# Patient Record
Sex: MALE | Race: BLACK OR AFRICAN AMERICAN | NOT HISPANIC OR LATINO | Employment: OTHER | URBAN - METROPOLITAN AREA
[De-identification: names, ages, dates, MRNs, and addresses within clinical notes are randomized per-mention and may not be internally consistent; named-entity substitution may affect disease eponyms.]

---

## 2017-01-03 ENCOUNTER — ALLSCRIPTS OFFICE VISIT (OUTPATIENT)
Dept: OTHER | Facility: OTHER | Age: 77
End: 2017-01-03

## 2017-03-13 ENCOUNTER — GENERIC CONVERSION - ENCOUNTER (OUTPATIENT)
Dept: OTHER | Facility: OTHER | Age: 77
End: 2017-03-13

## 2017-03-28 ENCOUNTER — GENERIC CONVERSION - ENCOUNTER (OUTPATIENT)
Dept: OTHER | Facility: OTHER | Age: 77
End: 2017-03-28

## 2017-04-25 ENCOUNTER — GENERIC CONVERSION - ENCOUNTER (OUTPATIENT)
Dept: OTHER | Facility: OTHER | Age: 77
End: 2017-04-25

## 2017-05-15 ENCOUNTER — ALLSCRIPTS OFFICE VISIT (OUTPATIENT)
Dept: OTHER | Facility: OTHER | Age: 77
End: 2017-05-15

## 2017-05-16 ENCOUNTER — GENERIC CONVERSION - ENCOUNTER (OUTPATIENT)
Dept: OTHER | Facility: OTHER | Age: 77
End: 2017-05-16

## 2017-06-05 ENCOUNTER — GENERIC CONVERSION - ENCOUNTER (OUTPATIENT)
Dept: OTHER | Facility: OTHER | Age: 77
End: 2017-06-05

## 2017-06-19 ENCOUNTER — GENERIC CONVERSION - ENCOUNTER (OUTPATIENT)
Dept: OTHER | Facility: OTHER | Age: 77
End: 2017-06-19

## 2017-06-27 ENCOUNTER — GENERIC CONVERSION - ENCOUNTER (OUTPATIENT)
Dept: OTHER | Facility: OTHER | Age: 77
End: 2017-06-27

## 2017-07-11 ENCOUNTER — APPOINTMENT (OUTPATIENT)
Dept: LAB | Facility: CLINIC | Age: 77
End: 2017-07-11
Payer: MEDICARE

## 2017-07-11 DIAGNOSIS — N18.9 CHRONIC KIDNEY DISEASE: ICD-10-CM

## 2017-07-11 DIAGNOSIS — E78.5 HYPERLIPIDEMIA: ICD-10-CM

## 2017-07-11 DIAGNOSIS — E11.9 TYPE 2 DIABETES MELLITUS WITHOUT COMPLICATIONS (HCC): ICD-10-CM

## 2017-07-11 LAB
ALBUMIN SERPL BCP-MCNC: 4.4 G/DL (ref 3.5–5)
ALP SERPL-CCNC: 109 U/L (ref 46–116)
ALT SERPL W P-5'-P-CCNC: 37 U/L (ref 12–78)
ANION GAP SERPL CALCULATED.3IONS-SCNC: 10 MMOL/L (ref 4–13)
AST SERPL W P-5'-P-CCNC: 48 U/L (ref 5–45)
BILIRUB SERPL-MCNC: 1.5 MG/DL (ref 0.2–1)
BUN SERPL-MCNC: 21 MG/DL (ref 5–25)
CALCIUM SERPL-MCNC: 9.8 MG/DL (ref 8.3–10.1)
CHLORIDE SERPL-SCNC: 104 MMOL/L (ref 100–108)
CHOLEST SERPL-MCNC: 132 MG/DL (ref 50–200)
CO2 SERPL-SCNC: 24 MMOL/L (ref 21–32)
CREAT SERPL-MCNC: 2.55 MG/DL (ref 0.6–1.3)
ERYTHROCYTE [DISTWIDTH] IN BLOOD BY AUTOMATED COUNT: 16.9 % (ref 11.6–15.1)
EST. AVERAGE GLUCOSE BLD GHB EST-MCNC: 140 MG/DL
GFR SERPL CREATININE-BSD FRML MDRD: 30.1 ML/MIN/1.73SQ M
GLUCOSE P FAST SERPL-MCNC: 121 MG/DL (ref 65–99)
HBA1C MFR BLD: 6.5 % (ref 4.2–6.3)
HCT VFR BLD AUTO: 50.8 % (ref 36.5–49.3)
HDLC SERPL-MCNC: 37 MG/DL (ref 40–60)
HGB BLD-MCNC: 16.2 G/DL (ref 12–17)
LDLC SERPL CALC-MCNC: 72 MG/DL (ref 0–100)
MCH RBC QN AUTO: 24.4 PG (ref 26.8–34.3)
MCHC RBC AUTO-ENTMCNC: 31.9 G/DL (ref 31.4–37.4)
MCV RBC AUTO: 77 FL (ref 82–98)
PLATELET # BLD AUTO: 245 THOUSANDS/UL (ref 149–390)
POTASSIUM SERPL-SCNC: 4 MMOL/L (ref 3.5–5.3)
PROT SERPL-MCNC: 8.7 G/DL (ref 6.4–8.2)
RBC # BLD AUTO: 6.63 MILLION/UL (ref 3.88–5.62)
SODIUM SERPL-SCNC: 138 MMOL/L (ref 136–145)
TRIGL SERPL-MCNC: 116 MG/DL
TSH SERPL DL<=0.05 MIU/L-ACNC: 1.19 UIU/ML (ref 0.36–3.74)
WBC # BLD AUTO: 12.08 THOUSAND/UL (ref 4.31–10.16)

## 2017-07-11 PROCEDURE — 80053 COMPREHEN METABOLIC PANEL: CPT

## 2017-07-11 PROCEDURE — 85027 COMPLETE CBC AUTOMATED: CPT

## 2017-07-11 PROCEDURE — 36415 COLL VENOUS BLD VENIPUNCTURE: CPT

## 2017-07-11 PROCEDURE — 80061 LIPID PANEL: CPT

## 2017-07-11 PROCEDURE — 83036 HEMOGLOBIN GLYCOSYLATED A1C: CPT

## 2017-07-11 PROCEDURE — 84443 ASSAY THYROID STIM HORMONE: CPT

## 2017-07-17 ENCOUNTER — GENERIC CONVERSION - ENCOUNTER (OUTPATIENT)
Dept: OTHER | Facility: OTHER | Age: 77
End: 2017-07-17

## 2017-07-18 ENCOUNTER — APPOINTMENT (OUTPATIENT)
Dept: LAB | Facility: CLINIC | Age: 77
End: 2017-07-18
Payer: MEDICARE

## 2017-07-18 ENCOUNTER — TRANSCRIBE ORDERS (OUTPATIENT)
Dept: LAB | Facility: CLINIC | Age: 77
End: 2017-07-18

## 2017-07-18 ENCOUNTER — GENERIC CONVERSION - ENCOUNTER (OUTPATIENT)
Dept: OTHER | Facility: OTHER | Age: 77
End: 2017-07-18

## 2017-07-18 ENCOUNTER — ALLSCRIPTS OFFICE VISIT (OUTPATIENT)
Dept: OTHER | Facility: OTHER | Age: 77
End: 2017-07-18

## 2017-07-18 DIAGNOSIS — N18.9 CHRONIC KIDNEY DISEASE, UNSPECIFIED: ICD-10-CM

## 2017-07-18 DIAGNOSIS — N18.9 CHRONIC KIDNEY DISEASE: ICD-10-CM

## 2017-07-18 DIAGNOSIS — N18.9 CHRONIC KIDNEY DISEASE, UNSPECIFIED: Primary | ICD-10-CM

## 2017-07-18 LAB
ALBUMIN SERPL BCP-MCNC: 4.5 G/DL (ref 3.5–5)
ANION GAP SERPL CALCULATED.3IONS-SCNC: 10 MMOL/L (ref 4–13)
BASOPHILS # BLD AUTO: 0.02 THOUSANDS/ΜL (ref 0–0.1)
BASOPHILS NFR BLD AUTO: 0 % (ref 0–1)
BUN SERPL-MCNC: 21 MG/DL (ref 5–25)
CALCIUM ALBUM COR SERPL-MCNC: 9.9 MG/DL (ref 8.3–10.1)
CALCIUM SERPL-MCNC: 10.3 MD/DL (ref 8.3–10.1)
CALCIUM SERPL-MCNC: 10.3 MG/DL (ref 8.3–10.1)
CHLORIDE SERPL-SCNC: 108 MMOL/L (ref 100–108)
CO2 SERPL-SCNC: 24 MMOL/L (ref 21–32)
CREAT SERPL-MCNC: 1.88 MG/DL (ref 0.6–1.3)
EOSINOPHIL # BLD AUTO: 0.05 THOUSAND/ΜL (ref 0–0.61)
EOSINOPHIL NFR BLD AUTO: 0 % (ref 0–6)
ERYTHROCYTE [DISTWIDTH] IN BLOOD BY AUTOMATED COUNT: 16.7 % (ref 11.6–15.1)
ERYTHROCYTE [SEDIMENTATION RATE] IN BLOOD: 13 MM/HOUR (ref 0–10)
GFR SERPL CREATININE-BSD FRML MDRD: 42.7 ML/MIN/1.73SQ M
GLUCOSE P FAST SERPL-MCNC: 138 MG/DL (ref 65–99)
HCT VFR BLD AUTO: 48 % (ref 36.5–49.3)
HGB BLD-MCNC: 15.7 G/DL (ref 12–17)
LYMPHOCYTES # BLD AUTO: 2.01 THOUSANDS/ΜL (ref 0.6–4.47)
LYMPHOCYTES NFR BLD AUTO: 18 % (ref 14–44)
MAGNESIUM SERPL-MCNC: 2.2 MG/DL (ref 1.6–2.6)
MCH RBC QN AUTO: 24.8 PG (ref 26.8–34.3)
MCHC RBC AUTO-ENTMCNC: 32.7 G/DL (ref 31.4–37.4)
MCV RBC AUTO: 76 FL (ref 82–98)
MONOCYTES # BLD AUTO: 0.73 THOUSAND/ΜL (ref 0.17–1.22)
MONOCYTES NFR BLD AUTO: 7 % (ref 4–12)
NEUTROPHILS # BLD AUTO: 8.34 THOUSANDS/ΜL (ref 1.85–7.62)
NEUTS SEG NFR BLD AUTO: 75 % (ref 43–75)
NRBC BLD AUTO-RTO: 0 /100 WBCS
PLATELET # BLD AUTO: 270 THOUSANDS/UL (ref 149–390)
POTASSIUM SERPL-SCNC: 3.8 MMOL/L (ref 3.5–5.3)
PTH-INTACT SERPL-MCNC: 24.6 PG/ML (ref 14–72)
RBC # BLD AUTO: 6.34 MILLION/UL (ref 3.88–5.62)
SODIUM SERPL-SCNC: 142 MMOL/L (ref 136–145)
URATE SERPL-MCNC: 6.8 MG/DL (ref 4.2–8)
WBC # BLD AUTO: 11.18 THOUSAND/UL (ref 4.31–10.16)

## 2017-07-18 PROCEDURE — 82310 ASSAY OF CALCIUM: CPT

## 2017-07-18 PROCEDURE — 83735 ASSAY OF MAGNESIUM: CPT

## 2017-07-18 PROCEDURE — 83883 ASSAY NEPHELOMETRY NOT SPEC: CPT

## 2017-07-18 PROCEDURE — 85025 COMPLETE CBC W/AUTO DIFF WBC: CPT

## 2017-07-18 PROCEDURE — 82040 ASSAY OF SERUM ALBUMIN: CPT

## 2017-07-18 PROCEDURE — 36415 COLL VENOUS BLD VENIPUNCTURE: CPT

## 2017-07-18 PROCEDURE — 84550 ASSAY OF BLOOD/URIC ACID: CPT

## 2017-07-18 PROCEDURE — 83970 ASSAY OF PARATHORMONE: CPT

## 2017-07-18 PROCEDURE — 84166 PROTEIN E-PHORESIS/URINE/CSF: CPT

## 2017-07-18 PROCEDURE — 82043 UR ALBUMIN QUANTITATIVE: CPT

## 2017-07-18 PROCEDURE — 84165 PROTEIN E-PHORESIS SERUM: CPT

## 2017-07-18 PROCEDURE — 80048 BASIC METABOLIC PNL TOTAL CA: CPT

## 2017-07-18 PROCEDURE — 85652 RBC SED RATE AUTOMATED: CPT

## 2017-07-18 PROCEDURE — 86038 ANTINUCLEAR ANTIBODIES: CPT

## 2017-07-18 PROCEDURE — 82570 ASSAY OF URINE CREATININE: CPT

## 2017-07-19 LAB
CREAT UR-MCNC: 481 MG/DL
KAPPA LC FREE SER-MCNC: 28.4 MG/L (ref 3.3–19.4)
KAPPA LC FREE/LAMBDA FREE SER: 1.27 {RATIO} (ref 0.26–1.65)
LAMBDA LC FREE SERPL-MCNC: 22.4 MG/L (ref 5.7–26.3)
MICROALBUMIN UR-MCNC: 36.1 MG/L (ref 0–20)
MICROALBUMIN/CREAT 24H UR: 8 MG/G CREATININE (ref 0–30)
RYE IGE QN: NEGATIVE

## 2017-07-20 LAB
ALBUMIN SERPL ELPH-MCNC: 4.64 G/DL (ref 3.5–5)
ALBUMIN SERPL ELPH-MCNC: 54.6 % (ref 52–65)
ALPHA1 GLOB SERPL ELPH-MCNC: 0.33 G/DL (ref 0.1–0.4)
ALPHA1 GLOB SERPL ELPH-MCNC: 3.9 % (ref 2.5–5)
ALPHA2 GLOB SERPL ELPH-MCNC: 0.99 G/DL (ref 0.4–1.2)
ALPHA2 GLOB SERPL ELPH-MCNC: 11.7 % (ref 7–13)
BETA GLOB ABNORMAL SERPL ELPH-MCNC: 0.51 G/DL (ref 0.4–0.8)
BETA1 GLOB SERPL ELPH-MCNC: 6 % (ref 5–13)
BETA2 GLOB SERPL ELPH-MCNC: 6.1 % (ref 2–8)
BETA2+GAMMA GLOB SERPL ELPH-MCNC: 0.52 G/DL (ref 0.2–0.5)
GAMMA GLOB ABNORMAL SERPL ELPH-MCNC: 1.5 G/DL (ref 0.5–1.6)
GAMMA GLOB SERPL ELPH-MCNC: 17.7 % (ref 12–22)
IGG/ALB SER: 1.2 {RATIO} (ref 1.1–1.8)
PROT PATTERN SERPL ELPH-IMP: ABNORMAL
PROT SERPL-MCNC: 8.5 G/DL (ref 6.4–8.2)

## 2017-07-21 LAB
ALBUMIN UR ELPH-MCNC: 36 %
ALPHA1 GLOB MFR UR ELPH: 17.7 %
ALPHA2 GLOB MFR UR ELPH: 13.2 %
B-GLOBULIN MFR UR ELPH: 17.1 %
GAMMA GLOB MFR UR ELPH: 16 %
PROT PATTERN UR ELPH-IMP: ABNORMAL
PROT UR-MCNC: 44 MG/DL

## 2017-07-24 ENCOUNTER — HOSPITAL ENCOUNTER (OUTPATIENT)
Dept: ULTRASOUND IMAGING | Facility: HOSPITAL | Age: 77
Discharge: HOME | End: 2017-07-24
Attending: INTERNAL MEDICINE
Payer: MEDICARE

## 2017-07-24 DIAGNOSIS — N18.9 CHRONIC KIDNEY DISEASE: ICD-10-CM

## 2017-07-24 PROCEDURE — 76770 US EXAM ABDO BACK WALL COMP: CPT

## 2017-08-29 ENCOUNTER — ALLSCRIPTS OFFICE VISIT (OUTPATIENT)
Dept: OTHER | Facility: OTHER | Age: 77
End: 2017-08-29

## 2017-09-12 ENCOUNTER — ALLSCRIPTS OFFICE VISIT (OUTPATIENT)
Dept: OTHER | Facility: OTHER | Age: 77
End: 2017-09-12

## 2017-09-12 ENCOUNTER — APPOINTMENT (OUTPATIENT)
Dept: LAB | Facility: HOSPITAL | Age: 77
End: 2017-09-12
Attending: INTERNAL MEDICINE
Payer: MEDICARE

## 2017-09-12 ENCOUNTER — TRANSCRIBE ORDERS (OUTPATIENT)
Dept: ADMINISTRATIVE | Facility: HOSPITAL | Age: 77
End: 2017-09-12

## 2017-09-12 DIAGNOSIS — N18.30 CHRONIC KIDNEY DISEASE, STAGE III (MODERATE) (HCC): ICD-10-CM

## 2017-09-12 LAB
25(OH)D3 SERPL-MCNC: 46.8 NG/ML (ref 30–100)
ANION GAP SERPL CALCULATED.3IONS-SCNC: 7 MMOL/L (ref 4–13)
BACTERIA UR QL AUTO: ABNORMAL /HPF
BASOPHILS # BLD AUTO: 0.04 THOUSANDS/ΜL (ref 0–0.1)
BASOPHILS NFR BLD AUTO: 1 % (ref 0–1)
BILIRUB UR QL STRIP: NEGATIVE
BUN SERPL-MCNC: 13 MG/DL (ref 5–25)
CALCIUM SERPL-MCNC: 9.3 MG/DL (ref 8.3–10.1)
CAOX CRY URNS QL MICRO: ABNORMAL /HPF
CHLORIDE SERPL-SCNC: 104 MMOL/L (ref 100–108)
CLARITY UR: CLEAR
CO2 SERPL-SCNC: 30 MMOL/L (ref 21–32)
COLOR UR: YELLOW
CREAT SERPL-MCNC: 1.32 MG/DL (ref 0.6–1.3)
CREAT UR-MCNC: 309 MG/DL
EOSINOPHIL # BLD AUTO: 0.08 THOUSAND/ΜL (ref 0–0.61)
EOSINOPHIL NFR BLD AUTO: 1 % (ref 0–6)
ERYTHROCYTE [DISTWIDTH] IN BLOOD BY AUTOMATED COUNT: 18.6 % (ref 11.6–15.1)
GFR SERPL CREATININE-BSD FRML MDRD: 61 ML/MIN/1.73SQ M
GLUCOSE P FAST SERPL-MCNC: 109 MG/DL (ref 65–99)
GLUCOSE UR STRIP-MCNC: NEGATIVE MG/DL
HCT VFR BLD AUTO: 43.5 % (ref 36.5–49.3)
HGB BLD-MCNC: 13.5 G/DL (ref 12–17)
HGB UR QL STRIP.AUTO: ABNORMAL
KETONES UR STRIP-MCNC: NEGATIVE MG/DL
LEUKOCYTE ESTERASE UR QL STRIP: NEGATIVE
LYMPHOCYTES # BLD AUTO: 1.47 THOUSANDS/ΜL (ref 0.6–4.47)
LYMPHOCYTES NFR BLD AUTO: 18 % (ref 14–44)
MCH RBC QN AUTO: 23.7 PG (ref 26.8–34.3)
MCHC RBC AUTO-ENTMCNC: 31 G/DL (ref 31.4–37.4)
MCV RBC AUTO: 76 FL (ref 82–98)
MICROALBUMIN UR-MCNC: 28.1 MG/L (ref 0–20)
MICROALBUMIN/CREAT 24H UR: 9 MG/G CREATININE (ref 0–30)
MONOCYTES # BLD AUTO: 0.47 THOUSAND/ΜL (ref 0.17–1.22)
MONOCYTES NFR BLD AUTO: 6 % (ref 4–12)
MUCOUS THREADS UR QL AUTO: ABNORMAL
NEUTROPHILS # BLD AUTO: 6.01 THOUSANDS/ΜL (ref 1.85–7.62)
NEUTS SEG NFR BLD AUTO: 74 % (ref 43–75)
NITRITE UR QL STRIP: NEGATIVE
NON-SQ EPI CELLS URNS QL MICRO: ABNORMAL /HPF
NRBC BLD AUTO-RTO: 0 /100 WBCS
PH UR STRIP.AUTO: 5.5 [PH] (ref 4.5–8)
PHOSPHATE SERPL-MCNC: 2.7 MG/DL (ref 2.3–4.1)
PLATELET # BLD AUTO: 214 THOUSANDS/UL (ref 149–390)
PMV BLD AUTO: 10.6 FL (ref 8.9–12.7)
POTASSIUM SERPL-SCNC: 3.5 MMOL/L (ref 3.5–5.3)
PROT UR STRIP-MCNC: NEGATIVE MG/DL
PTH-INTACT SERPL-MCNC: 27.3 PG/ML (ref 14–72)
RBC # BLD AUTO: 5.7 MILLION/UL (ref 3.88–5.62)
RBC #/AREA URNS AUTO: ABNORMAL /HPF
SODIUM SERPL-SCNC: 141 MMOL/L (ref 136–145)
SP GR UR STRIP.AUTO: >=1.03 (ref 1–1.03)
URATE SERPL-MCNC: 6 MG/DL (ref 4.2–8)
UROBILINOGEN UR QL STRIP.AUTO: 0.2 E.U./DL
WBC # BLD AUTO: 8.1 THOUSAND/UL (ref 4.31–10.16)
WBC #/AREA URNS AUTO: ABNORMAL /HPF

## 2017-09-12 PROCEDURE — 36415 COLL VENOUS BLD VENIPUNCTURE: CPT

## 2017-09-12 PROCEDURE — 82306 VITAMIN D 25 HYDROXY: CPT

## 2017-09-12 PROCEDURE — 83970 ASSAY OF PARATHORMONE: CPT

## 2017-09-12 PROCEDURE — 82570 ASSAY OF URINE CREATININE: CPT

## 2017-09-12 PROCEDURE — 80048 BASIC METABOLIC PNL TOTAL CA: CPT

## 2017-09-12 PROCEDURE — 84550 ASSAY OF BLOOD/URIC ACID: CPT

## 2017-09-12 PROCEDURE — 84100 ASSAY OF PHOSPHORUS: CPT

## 2017-09-12 PROCEDURE — 85025 COMPLETE CBC W/AUTO DIFF WBC: CPT

## 2017-09-12 PROCEDURE — 82043 UR ALBUMIN QUANTITATIVE: CPT

## 2017-09-12 PROCEDURE — 81001 URINALYSIS AUTO W/SCOPE: CPT

## 2017-09-21 ENCOUNTER — GENERIC CONVERSION - ENCOUNTER (OUTPATIENT)
Dept: OTHER | Facility: OTHER | Age: 77
End: 2017-09-21

## 2017-10-16 ENCOUNTER — HOSPITAL ENCOUNTER (INPATIENT)
Facility: HOSPITAL | Age: 77
LOS: 3 days | Discharge: HOME | DRG: 872 | End: 2017-10-20
Attending: EMERGENCY MEDICINE | Admitting: ANESTHESIOLOGY
Payer: MEDICARE

## 2017-10-16 ENCOUNTER — APPOINTMENT (EMERGENCY)
Dept: CT IMAGING | Facility: HOSPITAL | Age: 77
DRG: 872 | End: 2017-10-16
Payer: MEDICARE

## 2017-10-16 ENCOUNTER — APPOINTMENT (EMERGENCY)
Dept: RADIOLOGY | Facility: HOSPITAL | Age: 77
DRG: 872 | End: 2017-10-16
Payer: MEDICARE

## 2017-10-16 DIAGNOSIS — R65.20 SEVERE SEPSIS (HCC): Primary | ICD-10-CM

## 2017-10-16 DIAGNOSIS — N17.9 AKI (ACUTE KIDNEY INJURY) (HCC): ICD-10-CM

## 2017-10-16 DIAGNOSIS — K56.7 ILEUS (HCC): ICD-10-CM

## 2017-10-16 DIAGNOSIS — A41.9 SEVERE SEPSIS (HCC): Primary | ICD-10-CM

## 2017-10-16 DIAGNOSIS — R34 OLIGURIA: ICD-10-CM

## 2017-10-16 DIAGNOSIS — N18.9 ACUTE KIDNEY INJURY SUPERIMPOSED ON CHRONIC KIDNEY DISEASE (HCC): ICD-10-CM

## 2017-10-16 DIAGNOSIS — E87.2 LACTIC ACIDOSIS: ICD-10-CM

## 2017-10-16 DIAGNOSIS — N17.9 ACUTE KIDNEY INJURY SUPERIMPOSED ON CHRONIC KIDNEY DISEASE (HCC): ICD-10-CM

## 2017-10-16 DIAGNOSIS — E86.0 SEVERE DEHYDRATION: ICD-10-CM

## 2017-10-16 LAB
ABO GROUP BLD: NORMAL
ALBUMIN SERPL BCP-MCNC: 4.1 G/DL (ref 3.5–5)
ALP SERPL-CCNC: 85 U/L (ref 46–116)
ALT SERPL W P-5'-P-CCNC: 32 U/L (ref 12–78)
ANION GAP SERPL CALCULATED.3IONS-SCNC: 18 MMOL/L (ref 4–13)
APTT PPP: 31 SECONDS (ref 23–35)
AST SERPL W P-5'-P-CCNC: 42 U/L (ref 5–45)
BACTERIA UR QL AUTO: ABNORMAL /HPF
BASOPHILS # BLD MANUAL: 0 THOUSAND/UL (ref 0–0.1)
BASOPHILS NFR MAR MANUAL: 0 % (ref 0–1)
BILIRUB DIRECT SERPL-MCNC: 0.4 MG/DL (ref 0–0.2)
BILIRUB SERPL-MCNC: 1.8 MG/DL (ref 0.2–1)
BILIRUB UR QL STRIP: ABNORMAL
BLD GP AB SCN SERPL QL: NEGATIVE
BUN SERPL-MCNC: 52 MG/DL (ref 5–25)
CALCIUM SERPL-MCNC: 9.9 MG/DL (ref 8.3–10.1)
CHLORIDE SERPL-SCNC: 99 MMOL/L (ref 100–108)
CLARITY UR: ABNORMAL
CO2 SERPL-SCNC: 22 MMOL/L (ref 21–32)
COLOR UR: YELLOW
CREAT SERPL-MCNC: 5.19 MG/DL (ref 0.6–1.3)
EOSINOPHIL # BLD MANUAL: 0 THOUSAND/UL (ref 0–0.4)
EOSINOPHIL NFR BLD MANUAL: 0 % (ref 0–6)
ERYTHROCYTE [DISTWIDTH] IN BLOOD BY AUTOMATED COUNT: 18.5 % (ref 11.6–15.1)
GFR SERPL CREATININE-BSD FRML MDRD: 12 ML/MIN/1.73SQ M
GLUCOSE SERPL-MCNC: 152 MG/DL (ref 65–140)
GLUCOSE UR STRIP-MCNC: NEGATIVE MG/DL
HCT VFR BLD AUTO: 50.9 % (ref 36.5–49.3)
HGB BLD-MCNC: 15.9 G/DL (ref 12–17)
HGB UR QL STRIP.AUTO: NEGATIVE
INR PPP: 1.1 (ref 0.86–1.16)
KETONES UR STRIP-MCNC: NEGATIVE MG/DL
LACTATE SERPL-SCNC: 3.4 MMOL/L (ref 0.5–2)
LEUKOCYTE ESTERASE UR QL STRIP: NEGATIVE
LYMPHOCYTES # BLD AUTO: 26 % (ref 14–44)
LYMPHOCYTES # BLD AUTO: 3.13 THOUSAND/UL (ref 0.6–4.47)
MAGNESIUM SERPL-MCNC: 2.1 MG/DL (ref 1.6–2.6)
MCH RBC QN AUTO: 23.8 PG (ref 26.8–34.3)
MCHC RBC AUTO-ENTMCNC: 31.2 G/DL (ref 31.4–37.4)
MCV RBC AUTO: 76 FL (ref 82–98)
MONOCYTES # BLD AUTO: 0.48 THOUSAND/UL (ref 0–1.22)
MONOCYTES NFR BLD: 4 % (ref 4–12)
NEUTROPHILS # BLD MANUAL: 8.43 THOUSAND/UL (ref 1.85–7.62)
NEUTS BAND NFR BLD MANUAL: 9 % (ref 0–8)
NEUTS SEG NFR BLD AUTO: 61 % (ref 43–75)
NITRITE UR QL STRIP: NEGATIVE
NON-SQ EPI CELLS URNS QL MICRO: ABNORMAL /HPF
NRBC BLD AUTO-RTO: 0 /100 WBCS
PH UR STRIP.AUTO: 5.5 [PH] (ref 4.5–8)
PLATELET # BLD AUTO: 299 THOUSANDS/UL (ref 149–390)
PLATELET BLD QL SMEAR: ADEQUATE
PMV BLD AUTO: 12.1 FL (ref 8.9–12.7)
POTASSIUM SERPL-SCNC: 4 MMOL/L (ref 3.5–5.3)
PROT SERPL-MCNC: 9.1 G/DL (ref 6.4–8.2)
PROT UR STRIP-MCNC: ABNORMAL MG/DL
PROTHROMBIN TIME: 14.5 SECONDS (ref 12.1–14.4)
RBC # BLD AUTO: 6.67 MILLION/UL (ref 3.88–5.62)
RBC #/AREA URNS AUTO: ABNORMAL /HPF
RBC MORPH BLD: NORMAL
RH BLD: POSITIVE
SODIUM SERPL-SCNC: 139 MMOL/L (ref 136–145)
SP GR UR STRIP.AUTO: 1.02 (ref 1–1.03)
SPECIMEN EXPIRATION DATE: NORMAL
TOTAL CELLS COUNTED SPEC: 100
TROPONIN I SERPL-MCNC: <0.02 NG/ML
UROBILINOGEN UR QL STRIP.AUTO: 0.2 E.U./DL
WBC # BLD AUTO: 12.04 THOUSAND/UL (ref 4.31–10.16)
WBC #/AREA URNS AUTO: ABNORMAL /HPF

## 2017-10-16 PROCEDURE — 83735 ASSAY OF MAGNESIUM: CPT | Performed by: PHYSICIAN ASSISTANT

## 2017-10-16 PROCEDURE — 85027 COMPLETE CBC AUTOMATED: CPT | Performed by: PHYSICIAN ASSISTANT

## 2017-10-16 PROCEDURE — 85610 PROTHROMBIN TIME: CPT | Performed by: PHYSICIAN ASSISTANT

## 2017-10-16 PROCEDURE — 74000 HB X-RAY EXAM OF ABDOMEN (SINGLE ANTEROPOSTERIOR VIEW): CPT

## 2017-10-16 PROCEDURE — 96361 HYDRATE IV INFUSION ADD-ON: CPT

## 2017-10-16 PROCEDURE — 82436 ASSAY OF URINE CHLORIDE: CPT | Performed by: PHYSICIAN ASSISTANT

## 2017-10-16 PROCEDURE — 93005 ELECTROCARDIOGRAM TRACING: CPT | Performed by: PHYSICIAN ASSISTANT

## 2017-10-16 PROCEDURE — 86850 RBC ANTIBODY SCREEN: CPT | Performed by: PHYSICIAN ASSISTANT

## 2017-10-16 PROCEDURE — 86901 BLOOD TYPING SEROLOGIC RH(D): CPT | Performed by: PHYSICIAN ASSISTANT

## 2017-10-16 PROCEDURE — 74176 CT ABD & PELVIS W/O CONTRAST: CPT

## 2017-10-16 PROCEDURE — 84300 ASSAY OF URINE SODIUM: CPT | Performed by: PHYSICIAN ASSISTANT

## 2017-10-16 PROCEDURE — 84484 ASSAY OF TROPONIN QUANT: CPT | Performed by: PHYSICIAN ASSISTANT

## 2017-10-16 PROCEDURE — 86900 BLOOD TYPING SEROLOGIC ABO: CPT | Performed by: PHYSICIAN ASSISTANT

## 2017-10-16 PROCEDURE — 96365 THER/PROPH/DIAG IV INF INIT: CPT

## 2017-10-16 PROCEDURE — 96375 TX/PRO/DX INJ NEW DRUG ADDON: CPT

## 2017-10-16 PROCEDURE — 80076 HEPATIC FUNCTION PANEL: CPT | Performed by: PHYSICIAN ASSISTANT

## 2017-10-16 PROCEDURE — 85007 BL SMEAR W/DIFF WBC COUNT: CPT | Performed by: PHYSICIAN ASSISTANT

## 2017-10-16 PROCEDURE — 80048 BASIC METABOLIC PNL TOTAL CA: CPT | Performed by: PHYSICIAN ASSISTANT

## 2017-10-16 PROCEDURE — 71250 CT THORAX DX C-: CPT

## 2017-10-16 PROCEDURE — 81001 URINALYSIS AUTO W/SCOPE: CPT | Performed by: PHYSICIAN ASSISTANT

## 2017-10-16 PROCEDURE — 36415 COLL VENOUS BLD VENIPUNCTURE: CPT | Performed by: PHYSICIAN ASSISTANT

## 2017-10-16 PROCEDURE — 82570 ASSAY OF URINE CREATININE: CPT | Performed by: PHYSICIAN ASSISTANT

## 2017-10-16 PROCEDURE — 85730 THROMBOPLASTIN TIME PARTIAL: CPT | Performed by: PHYSICIAN ASSISTANT

## 2017-10-16 PROCEDURE — 87040 BLOOD CULTURE FOR BACTERIA: CPT | Performed by: PHYSICIAN ASSISTANT

## 2017-10-16 PROCEDURE — 83605 ASSAY OF LACTIC ACID: CPT | Performed by: PHYSICIAN ASSISTANT

## 2017-10-16 PROCEDURE — 71010 HB CHEST X-RAY 1 VIEW FRONTAL (PORTABLE): CPT

## 2017-10-16 RX ORDER — LOSARTAN POTASSIUM 100 MG/1
100 TABLET ORAL DAILY
COMMUNITY
Start: 2017-04-12 | End: 2018-06-08 | Stop reason: SDUPTHER

## 2017-10-16 RX ORDER — ALLOPURINOL 100 MG/1
100 TABLET ORAL DAILY
COMMUNITY
Start: 2015-04-14 | End: 2018-10-11 | Stop reason: SDUPTHER

## 2017-10-16 RX ORDER — ONDANSETRON 2 MG/ML
4 INJECTION INTRAMUSCULAR; INTRAVENOUS ONCE
Status: COMPLETED | OUTPATIENT
Start: 2017-10-16 | End: 2017-10-16

## 2017-10-16 RX ORDER — AMLODIPINE BESYLATE 5 MG/1
5 TABLET ORAL DAILY
COMMUNITY
Start: 2016-10-24 | End: 2018-10-11 | Stop reason: SDUPTHER

## 2017-10-16 RX ORDER — METOPROLOL SUCCINATE 25 MG/1
25 TABLET, EXTENDED RELEASE ORAL DAILY
COMMUNITY
Start: 2015-05-19 | End: 2018-06-23 | Stop reason: SDUPTHER

## 2017-10-16 RX ADMIN — SODIUM CHLORIDE 1000 ML: 0.9 INJECTION, SOLUTION INTRAVENOUS at 23:03

## 2017-10-16 RX ADMIN — ONDANSETRON 4 MG: 2 INJECTION INTRAMUSCULAR; INTRAVENOUS at 22:29

## 2017-10-16 RX ADMIN — PIPERACILLIN SODIUM,TAZOBACTAM SODIUM 3.38 G: 3; .375 INJECTION, POWDER, FOR SOLUTION INTRAVENOUS at 22:23

## 2017-10-16 RX ADMIN — SODIUM CHLORIDE 1000 ML: 0.9 INJECTION, SOLUTION INTRAVENOUS at 22:21

## 2017-10-16 RX ADMIN — SODIUM CHLORIDE 1000 ML: 0.9 INJECTION, SOLUTION INTRAVENOUS at 22:22

## 2017-10-16 RX ADMIN — SODIUM CHLORIDE 500 ML: 0.9 INJECTION, SOLUTION INTRAVENOUS at 23:28

## 2017-10-17 PROBLEM — R19.7 NAUSEA VOMITING AND DIARRHEA: Status: ACTIVE | Noted: 2017-10-17

## 2017-10-17 PROBLEM — E87.20 LACTIC ACIDOSIS: Status: ACTIVE | Noted: 2017-10-17

## 2017-10-17 PROBLEM — J44.9 COPD (CHRONIC OBSTRUCTIVE PULMONARY DISEASE) (HCC): Status: ACTIVE | Noted: 2017-10-17

## 2017-10-17 PROBLEM — Z86.79 HISTORY OF HYPERTENSION: Status: ACTIVE | Noted: 2017-10-17

## 2017-10-17 PROBLEM — E80.6 HYPERBILIRUBINEMIA: Status: ACTIVE | Noted: 2017-10-17

## 2017-10-17 PROBLEM — K56.7 ILEUS (HCC): Status: ACTIVE | Noted: 2017-10-17

## 2017-10-17 PROBLEM — N18.9 ACUTE KIDNEY INJURY SUPERIMPOSED ON CHRONIC KIDNEY DISEASE (HCC): Status: ACTIVE | Noted: 2017-10-17

## 2017-10-17 PROBLEM — N17.9 ACUTE KIDNEY INJURY SUPERIMPOSED ON CHRONIC KIDNEY DISEASE (HCC): Status: ACTIVE | Noted: 2017-10-17

## 2017-10-17 PROBLEM — E87.2 LACTIC ACIDOSIS: Status: ACTIVE | Noted: 2017-10-17

## 2017-10-17 PROBLEM — A41.9 SEPSIS (HCC): Status: ACTIVE | Noted: 2017-10-17

## 2017-10-17 PROBLEM — R11.2 NAUSEA VOMITING AND DIARRHEA: Status: ACTIVE | Noted: 2017-10-17

## 2017-10-17 LAB
ALBUMIN SERPL BCP-MCNC: 3.4 G/DL (ref 3.5–5)
ALP SERPL-CCNC: 67 U/L (ref 46–116)
ALT SERPL W P-5'-P-CCNC: 31 U/L (ref 12–78)
ANION GAP SERPL CALCULATED.3IONS-SCNC: 14 MMOL/L (ref 4–13)
AST SERPL W P-5'-P-CCNC: 44 U/L (ref 5–45)
ATRIAL RATE: 134 BPM
BASOPHILS # BLD MANUAL: 0 THOUSAND/UL (ref 0–0.1)
BASOPHILS NFR MAR MANUAL: 0 % (ref 0–1)
BILIRUB SERPL-MCNC: 1.3 MG/DL (ref 0.2–1)
BUN SERPL-MCNC: 48 MG/DL (ref 5–25)
C DIFF TOX GENS STL QL NAA+PROBE: NORMAL
CALCIUM SERPL-MCNC: 8.7 MG/DL (ref 8.3–10.1)
CHLORIDE SERPL-SCNC: 106 MMOL/L (ref 100–108)
CHLORIDE UR-SCNC: <10 MMOL/L (ref 10–330)
CO2 SERPL-SCNC: 21 MMOL/L (ref 21–32)
CREAT SERPL-MCNC: 3.45 MG/DL (ref 0.6–1.3)
CREAT UR-MCNC: 199 MG/DL
EOSINOPHIL # BLD MANUAL: 0.09 THOUSAND/UL (ref 0–0.4)
EOSINOPHIL NFR BLD MANUAL: 1 % (ref 0–6)
ERYTHROCYTE [DISTWIDTH] IN BLOOD BY AUTOMATED COUNT: 18.3 % (ref 11.6–15.1)
GFR SERPL CREATININE-BSD FRML MDRD: 19 ML/MIN/1.73SQ M
GLUCOSE SERPL-MCNC: 113 MG/DL (ref 65–140)
GLUCOSE SERPL-MCNC: 96 MG/DL (ref 65–140)
HCT VFR BLD AUTO: 47 % (ref 36.5–49.3)
HGB BLD-MCNC: 14.6 G/DL (ref 12–17)
LACTATE SERPL-SCNC: 1.5 MMOL/L (ref 0.5–2)
LACTATE SERPL-SCNC: 2.6 MMOL/L (ref 0.5–2)
LG PLATELETS BLD QL SMEAR: PRESENT
LYMPHOCYTES # BLD AUTO: 2.16 THOUSAND/UL (ref 0.6–4.47)
LYMPHOCYTES # BLD AUTO: 23 % (ref 14–44)
MCH RBC QN AUTO: 23.8 PG (ref 26.8–34.3)
MCHC RBC AUTO-ENTMCNC: 31.1 G/DL (ref 31.4–37.4)
MCV RBC AUTO: 77 FL (ref 82–98)
METAMYELOCYTES NFR BLD MANUAL: 2 % (ref 0–1)
MONOCYTES # BLD AUTO: 0.75 THOUSAND/UL (ref 0–1.22)
MONOCYTES NFR BLD: 8 % (ref 4–12)
NEUTROPHILS # BLD MANUAL: 6.1 THOUSAND/UL (ref 1.85–7.62)
NEUTS BAND NFR BLD MANUAL: 8 % (ref 0–8)
NEUTS SEG NFR BLD AUTO: 57 % (ref 43–75)
NRBC BLD AUTO-RTO: 0 /100 WBCS
P AXIS: 68 DEGREES
PLATELET # BLD AUTO: 229 THOUSANDS/UL (ref 149–390)
PLATELET BLD QL SMEAR: ADEQUATE
PMV BLD AUTO: 10.3 FL (ref 8.9–12.7)
POTASSIUM SERPL-SCNC: 4 MMOL/L (ref 3.5–5.3)
PR INTERVAL: 154 MS
PROT SERPL-MCNC: 7.8 G/DL (ref 6.4–8.2)
QRS AXIS: 94 DEGREES
QRSD INTERVAL: 132 MS
QT INTERVAL: 362 MS
QTC INTERVAL: 540 MS
RBC # BLD AUTO: 6.14 MILLION/UL (ref 3.88–5.62)
SODIUM 24H UR-SCNC: 15 MOL/L
SODIUM SERPL-SCNC: 141 MMOL/L (ref 136–145)
T WAVE AXIS: 53 DEGREES
TOTAL CELLS COUNTED SPEC: 100
VARIANT LYMPHS # BLD AUTO: 1 %
VENTRICULAR RATE: 134 BPM
WBC # BLD AUTO: 9.39 THOUSAND/UL (ref 4.31–10.16)

## 2017-10-17 PROCEDURE — 83605 ASSAY OF LACTIC ACID: CPT | Performed by: NURSE PRACTITIONER

## 2017-10-17 PROCEDURE — 94760 N-INVAS EAR/PLS OXIMETRY 1: CPT

## 2017-10-17 PROCEDURE — 87493 C DIFF AMPLIFIED PROBE: CPT | Performed by: PHYSICIAN ASSISTANT

## 2017-10-17 PROCEDURE — 96365 THER/PROPH/DIAG IV INF INIT: CPT

## 2017-10-17 PROCEDURE — 82948 REAGENT STRIP/BLOOD GLUCOSE: CPT

## 2017-10-17 PROCEDURE — 85027 COMPLETE CBC AUTOMATED: CPT | Performed by: NURSE PRACTITIONER

## 2017-10-17 PROCEDURE — 94664 DEMO&/EVAL PT USE INHALER: CPT

## 2017-10-17 PROCEDURE — 85007 BL SMEAR W/DIFF WBC COUNT: CPT | Performed by: NURSE PRACTITIONER

## 2017-10-17 PROCEDURE — 99285 EMERGENCY DEPT VISIT HI MDM: CPT

## 2017-10-17 PROCEDURE — 80053 COMPREHEN METABOLIC PANEL: CPT | Performed by: NURSE PRACTITIONER

## 2017-10-17 PROCEDURE — 36415 COLL VENOUS BLD VENIPUNCTURE: CPT | Performed by: PHYSICIAN ASSISTANT

## 2017-10-17 PROCEDURE — 83605 ASSAY OF LACTIC ACID: CPT | Performed by: PHYSICIAN ASSISTANT

## 2017-10-17 RX ORDER — HEPARIN SODIUM 5000 [USP'U]/ML
5000 INJECTION, SOLUTION INTRAVENOUS; SUBCUTANEOUS EVERY 8 HOURS SCHEDULED
Status: DISCONTINUED | OUTPATIENT
Start: 2017-10-17 | End: 2017-10-20 | Stop reason: HOSPADM

## 2017-10-17 RX ORDER — METOPROLOL TARTRATE 5 MG/5ML
2.5 INJECTION INTRAVENOUS EVERY 6 HOURS
Status: DISCONTINUED | OUTPATIENT
Start: 2017-10-17 | End: 2017-10-19

## 2017-10-17 RX ORDER — FAMOTIDINE 20 MG/1
20 TABLET, FILM COATED ORAL 2 TIMES DAILY
Status: DISCONTINUED | OUTPATIENT
Start: 2017-10-17 | End: 2017-10-20 | Stop reason: HOSPADM

## 2017-10-17 RX ORDER — SODIUM CHLORIDE, SODIUM LACTATE, POTASSIUM CHLORIDE, CALCIUM CHLORIDE 600; 310; 30; 20 MG/100ML; MG/100ML; MG/100ML; MG/100ML
125 INJECTION, SOLUTION INTRAVENOUS CONTINUOUS
Status: DISCONTINUED | OUTPATIENT
Start: 2017-10-17 | End: 2017-10-18

## 2017-10-17 RX ORDER — ALBUTEROL SULFATE 2.5 MG/3ML
2.5 SOLUTION RESPIRATORY (INHALATION) EVERY 4 HOURS PRN
Status: DISCONTINUED | OUTPATIENT
Start: 2017-10-17 | End: 2017-10-20 | Stop reason: HOSPADM

## 2017-10-17 RX ORDER — LIDOCAINE HYDROCHLORIDE 20 MG/ML
JELLY TOPICAL ONCE
Status: COMPLETED | OUTPATIENT
Start: 2017-10-17 | End: 2017-10-17

## 2017-10-17 RX ORDER — ERYTHROMYCIN ETHYLSUCCINATE 200 MG/5ML
400 SUSPENSION ORAL
Status: COMPLETED | OUTPATIENT
Start: 2017-10-17 | End: 2017-10-19

## 2017-10-17 RX ORDER — CHLORHEXIDINE GLUCONATE 0.12 MG/ML
15 RINSE ORAL EVERY 12 HOURS SCHEDULED
Status: DISCONTINUED | OUTPATIENT
Start: 2017-10-17 | End: 2017-10-20 | Stop reason: HOSPADM

## 2017-10-17 RX ADMIN — HEPARIN SODIUM 5000 UNITS: 5000 INJECTION, SOLUTION INTRAVENOUS; SUBCUTANEOUS at 13:58

## 2017-10-17 RX ADMIN — FAMOTIDINE 20 MG: 10 INJECTION, SOLUTION INTRAVENOUS at 17:05

## 2017-10-17 RX ADMIN — FLUTICASONE PROPIONATE AND SALMETEROL 1 PUFF: 50; 250 POWDER RESPIRATORY (INHALATION) at 17:05

## 2017-10-17 RX ADMIN — ERYTHROMYCIN ETHYLSUCCINATE 400 MG: 200 SUSPENSION ORAL at 17:05

## 2017-10-17 RX ADMIN — SODIUM CHLORIDE, SODIUM LACTATE, POTASSIUM CHLORIDE, AND CALCIUM CHLORIDE 1000 ML: .6; .31; .03; .02 INJECTION, SOLUTION INTRAVENOUS at 00:24

## 2017-10-17 RX ADMIN — METOPROLOL TARTRATE 2.5 MG: 5 INJECTION INTRAVENOUS at 09:10

## 2017-10-17 RX ADMIN — SODIUM CHLORIDE, SODIUM LACTATE, POTASSIUM CHLORIDE, AND CALCIUM CHLORIDE 1000 ML: .6; .31; .03; .02 INJECTION, SOLUTION INTRAVENOUS at 03:28

## 2017-10-17 RX ADMIN — METOPROLOL TARTRATE 2.5 MG: 5 INJECTION INTRAVENOUS at 03:30

## 2017-10-17 RX ADMIN — SODIUM CHLORIDE 3.38 G: 9 INJECTION, SOLUTION INTRAVENOUS at 05:00

## 2017-10-17 RX ADMIN — SODIUM CHLORIDE, SODIUM LACTATE, POTASSIUM CHLORIDE, AND CALCIUM CHLORIDE 125 ML/HR: .6; .31; .03; .02 INJECTION, SOLUTION INTRAVENOUS at 22:04

## 2017-10-17 RX ADMIN — CHLORHEXIDINE GLUCONATE 15 ML: 1.2 RINSE ORAL at 09:10

## 2017-10-17 RX ADMIN — TIOTROPIUM BROMIDE 18 MCG: 18 CAPSULE ORAL; RESPIRATORY (INHALATION) at 09:10

## 2017-10-17 RX ADMIN — HEPARIN SODIUM 5000 UNITS: 5000 INJECTION, SOLUTION INTRAVENOUS; SUBCUTANEOUS at 22:38

## 2017-10-17 RX ADMIN — METOPROLOL TARTRATE 2.5 MG: 5 INJECTION INTRAVENOUS at 15:46

## 2017-10-17 RX ADMIN — ERYTHROMYCIN ETHYLSUCCINATE 400 MG: 200 SUSPENSION ORAL at 13:58

## 2017-10-17 RX ADMIN — PIPERACILLIN SODIUM,TAZOBACTAM SODIUM 2.25 G: 2; .25 INJECTION, POWDER, FOR SOLUTION INTRAVENOUS at 13:59

## 2017-10-17 RX ADMIN — LIDOCAINE HYDROCHLORIDE: 20 JELLY TOPICAL at 11:34

## 2017-10-17 RX ADMIN — FLUTICASONE PROPIONATE AND SALMETEROL 1 PUFF: 50; 250 POWDER RESPIRATORY (INHALATION) at 09:10

## 2017-10-17 RX ADMIN — SODIUM CHLORIDE 1000 ML: 0.9 INJECTION, SOLUTION INTRAVENOUS at 00:20

## 2017-10-17 RX ADMIN — ERYTHROMYCIN ETHYLSUCCINATE 400 MG: 200 SUSPENSION ORAL at 09:11

## 2017-10-17 RX ADMIN — FAMOTIDINE 20 MG: 20 TABLET, FILM COATED ORAL at 09:10

## 2017-10-17 RX ADMIN — SODIUM CHLORIDE, SODIUM LACTATE, POTASSIUM CHLORIDE, AND CALCIUM CHLORIDE 125 ML/HR: .6; .31; .03; .02 INJECTION, SOLUTION INTRAVENOUS at 04:00

## 2017-10-17 RX ADMIN — PIPERACILLIN SODIUM,TAZOBACTAM SODIUM 2.25 G: 2; .25 INJECTION, POWDER, FOR SOLUTION INTRAVENOUS at 22:38

## 2017-10-17 RX ADMIN — METOPROLOL TARTRATE 2.5 MG: 5 INJECTION INTRAVENOUS at 22:40

## 2017-10-17 RX ADMIN — SODIUM CHLORIDE, SODIUM LACTATE, POTASSIUM CHLORIDE, AND CALCIUM CHLORIDE 125 ML/HR: .6; .31; .03; .02 INJECTION, SOLUTION INTRAVENOUS at 13:59

## 2017-10-17 NOTE — CASE MANAGEMENT
Initial Clinical Review    Admission: Date/Time/Statement: 10/17/17 @ 0147 Inpatient Written     Orders Placed This Encounter   Procedures    Inpatient Admission (expected length of stay for this patient is greater than two midnights)     Standing Status:   Standing     Number of Occurrences:   1     Order Specific Question:   Admitting Physician     Answer:   Jillian Huggins     Order Specific Question:   Level of Care     Answer:   Level 1 Stepdown [13]     Order Specific Question:   Estimated length of stay     Answer:   More than 2 Midnights     Order Specific Question:   Certification     Answer:   I certify that inpatient services are medically necessary for this patient for a duration of greater than two midnights  See H&P and MD Progress Notes for additional information about the patient's course of treatment  ED: Date/Time/Mode of Arrival:   ED Arrival Information     Expected Arrival Acuity Means of Arrival Escorted By Service Admission Type    - 10/16/2017 21:24 Emergent Walk-In Spouse Critical Care/ICU Urgent    Arrival Complaint    NAUSEA          Chief Complaint:   Chief Complaint   Patient presents with    Weakness - Generalized     Pt c/o N/V and diarrhea for 3 days  History of Illness: Truddie Phalen is a 78 y o  male who presents On 10/17 with a complaint of 3 days of nausea, vomiting, and diarrhea  He has a past medical history of chronic kidney disease, COPD, type 2 diabetes, gout, iron deficiency anemia, hypertension, history of prostate cancer status post prostatectomy, history of colon cancer status post partial colectomy and wears O2 at home at night  Per the patient there has been no change in his diet and he began spontaneously with nausea and vomiting and intermittent diarrhea 3 days ago  He denies any precipitating factors  After he was unable to keep down Pepto-Bismol he presented to the emergency room    He was found to be tachycardic with a lactic acidosis of 3 and despite fluid resuscitation continued to have a lactic acidosis for this reason he is being referred to the step-down unit for closer monitoring      ED Vital Signs:   ED Triage Vitals   Temperature Pulse Respirations Blood Pressure SpO2   10/16/17 2214 10/16/17 2146 10/16/17 2146 10/16/17 2146 10/16/17 2217   98 3 °F (36 8 °C) (!) 135 (!) 28 (!) 89/61 (!) 88 %      Temp Source Heart Rate Source Patient Position - Orthostatic VS BP Location FiO2 (%)   10/16/17 2214 10/16/17 2226 10/16/17 2226 10/16/17 2146 --   Oral Monitor Lying Right arm       Pain Score       --               Wt Readings from Last 1 Encounters:   10/17/17 116 kg (256 lb 9 9 oz)       Vital Signs (abnormal):   Date/Time  Temp  Pulse  Resp  BP  SpO2  O2 Device   10/17/17 0911  --   114  15  --  --  --   10/17/17 0700  98 °F (36 7 °C)  104  17  119/77  93 %  Nasal cannula   10/17/17 0600  --   109  21  112/76  96 %  --   10/17/17 0425  --   110  20  --  93 %  --   10/17/17 0400  --   113  19  101/72  92 %  --   10/17/17 0333  --   114  20  113/73  91 %  --   10/17/17 0324  98 7 °F (37 1 °C)   127  20  --  91 %  --   10/17/17 0245  --   127  --  --  92 %  --   10/17/17 0230  --   128  16  --  90 %  --   10/17/17 0027  --   119  20  129/69  92 %  Nasal cannula   10/17/17 0015  --   118  --  --  95 %  --   10/17/17 0000  --   118   37  --  95 %  --   10/16/17 2300  --   115  22  111/73   89 %  --   10/16/17 2254  --   122  --  122/65  97 %  --   10/16/17 2245  --   118  --  108/69  --  --   10/16/17 2226  --   119   24  115/73  91 %  Nasal cannula   10/16/17 2217  --  --  --  --   88 %  None (Room air)     Abnormal Labs/Diagnostic Test Results:   WBC 12 04*   HEMATOCRIT 50 9*     CHLORIDE 99*   BUN 52*   CREATININE 5 19*   TOTAL PROTEIN 9 1*   BILIRUBIN TOTAL 1 80*   GLUCOSE RANDOM 152*     LACTIC ACID 2 6* 3 4*     10/16 CT C/A/P- S/p partial colectomy, portion of remaining colon moderately distended and gas-filled  10/16 CXR- No active pulmonary disease, my interpretation I have personally reviewed pertinent reports  and I have personally reviewed pertinent films in PACS  EKG: sinus tachycardia     ED Treatment:   Medication Administration from 10/16/2017 2124 to 10/17/2017 0258       Date/Time Order Dose Route Action     10/16/2017 2229 ondansetron (ZOFRAN) injection 4 mg 4 mg Intravenous Given     10/16/2017 2222 sodium chloride 0 9 % bolus 1,000 mL 1,000 mL Intravenous New Bag     10/16/2017 2221 sodium chloride 0 9 % bolus 1,000 mL 1,000 mL Intravenous New Bag     10/16/2017 2223 piperacillin-tazobactam (ZOSYN) 3 375 g in sodium chloride 0 9 % 50 mL IVPB 3 375 g Intravenous New Bag     10/16/2017 2303 sodium chloride 0 9 % bolus 1,000 mL 1,000 mL Intravenous New Bag     10/16/2017 2328 sodium chloride 0 9 % bolus 510 mL 500 mL Intravenous New Bag     10/17/2017 0020 sodium chloride 0 9 % bolus 1,000 mL 1,000 mL Intravenous New Bag     10/17/2017 0024 lactated ringers bolus 1,000 mL 1,000 mL Intravenous New Bag          Past Medical/Surgical History: Active Ambulatory Problems     Diagnosis Date Noted    No Active Ambulatory Problems     Resolved Ambulatory Problems     Diagnosis Date Noted    No Resolved Ambulatory Problems     Past Medical History:   Diagnosis Date    [facility] ([facility] )     Hypertension        Admitting Diagnosis: Lactic acidosis [E87 2]  Oliguria [R34]  Ileus (HCC) [K56 7]  Vomiting [R11 10]  Severe dehydration [E86 0]  [facility] [N17 9]  Severe sepsis (HCC) [A41 9, R65 20]    Age/Sex: 78 y o  male    Assessment:    1  Sepsis  2  Likely ileus  3  Acute on chronic kidney disease   4  Lactic acidosis  5  Hyperbilirubinemia  6  Nausea, vomiting, and diarrhea  7  COPD on home O2  8   History of hypertension     Plan:                            Neuro: Frequent neuro monitoring, reorientation as needed                           CV: Close hemodynamic monitoring, continue volume resuscitation                           Pulm: Encourage good pulmonary hygiene, continue home Advair, order respiratory protocol                           GI: NPO for now, serial abdominal exams, can begin erythromycin                           :  Close intake and output, daily weights, transferring creatinine, follow urine electrolytes                           F/E/N:  Continue fluid resuscitation, replete electrolytes as necessary, NPO                           ID:  Given history low suspicion of C diff however will obtain sample in addition stool samples                           Heme:  Begin heparin for DVT prophylaxis                           Endo: Follow blood sugar closely                           Msk/Skin:  Out of bed as tolerated                           Disposition:  Step-down    Admission Orders:  ICU, telemetry  NPO  NGT  Daily weights  I/O  Stool and blood cxs pending  Consult cm  Sequential compression device  Urinary catheter    Scheduled Meds:   chlorhexidine 15 mL Swish & Spit Q12H [address]   erythromycin ethylsuccinate 400 mg Oral TID AC   famotidine 20 mg Oral BID   Or      famotidine 20 mg Intravenous BID   fluticasone-salmeterol 1 puff Inhalation BID   heparin (porcine) 5,000 Units Subcutaneous Q8H [address]   lidocaine  Topical Once   metoprolol 2 5 mg Intravenous Q6H   piperacillin-tazobactam 2 25 g Intravenous Q8H   tiotropium 18 mcg Inhalation Daily     Continuous Infusions:   lactated ringers 125 mL/hr Last Rate: 125 mL/hr (10/17/17 0400)     PRN Meds:   albuterol    St  [address] [facility] in the [facility] by August Orellana for 2017  Network Utilization Review Department  Phone: [phone]; Fax [phone]  ATTENTION: The Network Utilization Review Department is now centralized for our 7 Facilities  Make a note that we have a new phone and fax numbers for our Department   Please call with any questions or concerns to [phone] and carefully follow the prompts so that you are directed to the right person  All voicemails are confidential  Fax any determinations, approvals, denials, and requests for initial or continue stay review clinical to [phone]  Due to HIGH CALL volume, it would be easier if you could please send faxed requests to expedite your requests and in part, help us provide discharge notifications faster

## 2017-10-17 NOTE — H&P
History and Physical - Critical Care   Radha Quiñones 78 y o  male MRN: [id number]  Unit/Bed#: ED 26 Encounter: [id number]    Reason for Admission / Chief Complaint: Lactic acidosis    History of Present Illness:  Radha Quiñones is a 78 y o  male who presents On 10/17 with a complaint of 3 days of nausea, vomiting, and diarrhea  He has a past medical history of chronic kidney disease, COPD, type 2 diabetes, gout, iron deficiency anemia, hypertension, history of prostate cancer status post prostatectomy, history of colon cancer status post partial colectomy and wears O2 at home at night  Per the patient there has been no change in his diet and he began spontaneously with nausea and vomiting and intermittent diarrhea 3 days ago  He denies any precipitating factors  After he was unable to keep down Pepto-Bismol he presented to the emergency room  He was found to be tachycardic with a lactic acidosis of  3 and despite fluid resuscitation continued to have a lactic acidosis for this reason he is being referred to the step-down unit for closer monitoring  History obtained from chart review and the patient  Past Medical History:  Past Medical History:   Diagnosis Date    [facility]     Hypertension        Past Surgical History:  Past Surgical History:   Procedure Laterality Date    JOINT REPLACEMENT         Past Family History:  No family history on file  Social History:  History   Smoking Status    Former Smoker   Smokeless Tobacco    Never Used     History   Alcohol Use No     History   Drug Use No     Marital Status: /Civil Union  Exercise History:   Independent in ADLs    Medications:  No current facility-administered medications for this encounter  Home medications:  Prior to Admission medications    Medication Sig Start Date End Date Taking?  Authorizing Provider   allopurinol (ZYLOPRIM) 100 mg tablet Take 1 tablet by mouth daily 4/14/15  Yes Historical Provider, MD   amLODIPine (NORVASC) 5 mg tablet Take 1 tablet by mouth daily 10/24/16  Yes Historical Provider, MD   fluticasone-salmeterol (ADVAIR DISKUS) 250-50 mcg/dose inhaler Inhale 1 puff 2 (two) times a day 14  Yes Historical Provider, MD   losartan (COZAAR) 100 MG tablet Take 100 mg by mouth daily 17  Yes Historical Provider, MD   metoprolol succinate (TOPROL-XL) 25 mg 24 hr tablet Take 25 mg by mouth daily 5/19/15  Yes Historical Provider, MD   Vitamins-Lipotropics (LIPOFLAVONOID PO) Take 1 tablet by mouth 2 (two) times a day    Historical Provider, MD     Allergies:  No Known Allergies    ROS:   Review of Systems   Constitutional: Positive for appetite change (anoerxia) and chills  Negative for unexpected weight change  HENT: Positive for dental problem  Negative for trouble swallowing  Eyes: Negative for photophobia and visual disturbance  Respiratory: Positive for shortness of breath (chronic, with exertion)  Cardiovascular: Negative  Gastrointestinal: Positive for abdominal distention (chronic), diarrhea, nausea and vomiting  Negative for abdominal pain  Genitourinary: Negative  Musculoskeletal: Positive for arthralgias, back pain and gait problem  Skin: Negative  Neurological: Positive for weakness and light-headedness  Hematological: Negative  Psychiatric/Behavioral: Negative  Vitals:  Vitals:    10/17/17 0000 10/17/17 0005 10/17/17 0015 10/17/17 0027   BP:  123/81  129/69   Pulse: (!) 118  (!) 118 (!) 119   Resp: (!) 37   20   Temp:       TempSrc:       SpO2: 95%  95% 92%   Weight:         Temperature:   Temp (24hrs), Av 3 °F (36 8 °C), Min:98 3 °F (36 8 °C), Max:98 3 °F (36 8 °C)    Current Temperature: 98 3 °F (36 8 °C)    Weights:   IBW: -88 kg  There is no height or weight on file to calculate BMI      Hemodynamic Monitoring:  N/A     Non-Invasive/Invasive Ventilation Settings:  Respiratory    Lab Data (Last 4 hours)    None         O2/Vent Data (Last 4 hours)    None              No results found for: PHART, TDN5HIX, PO2ART, GPN7KLQ, Y7TAFHEN, BEART, SOURCE  SpO2: SpO2: 92 %, SpO2 Activity: SpO2 Activity: At Rest, SpO2 Device: O2 Device: Nasal cannula     Physical Exam:  Physical Exam   Constitutional: He is oriented to person, place, and time  He appears well-developed and well-nourished  He is cooperative  No distress  Nasal cannula in place  HENT:   Head: Normocephalic and atraumatic  Eyes: Conjunctivae are normal  Pupils are equal, round, and reactive to light  Neck: Normal range of motion  No JVD present  No tracheal deviation present  Cardiovascular: Regular rhythm and intact distal pulses  Tachycardia present  Pulmonary/Chest: Effort normal and breath sounds normal  No respiratory distress  He has no wheezes  Abdominal: Soft  He exhibits distension  There is no tenderness  Musculoskeletal: Normal range of motion  He exhibits no edema, tenderness or deformity  Neurological: He is alert and oriented to person, place, and time  He has normal strength  No cranial nerve deficit or sensory deficit  GCS eye subscore is 4  GCS verbal subscore is 5  GCS motor subscore is 6  Skin: Skin is warm and dry  He is not diaphoretic  Psychiatric: He has a normal mood and affect         Labs:    Results from last 7 days  Lab Units 10/16/17  2208   WBC Thousand/uL 12 04*   HEMOGLOBIN g/dL 15 9   HEMATOCRIT % 50 9*   PLATELETS Thousands/uL 299   MONO PCT MAN % 4      Results from last 7 days  Lab Units 10/16/17  2208   SODIUM mmol/L 139   POTASSIUM mmol/L 4 0   CHLORIDE mmol/L 99*   CO2 mmol/L 22   BUN mg/dL 52*   CREATININE mg/dL 5 19*   CALCIUM mg/dL 9 9   TOTAL PROTEIN g/dL 9 1*   BILIRUBIN TOTAL mg/dL 1 80*   ALK PHOS U/L 85   ALT U/L 32   AST U/L 42   GLUCOSE RANDOM mg/dL 152*       Results from last 7 days  Lab Units 10/16/17  2208   MAGNESIUM mg/dL 2 1            Results from last 7 days  Lab Units 10/16/17  2208   INR  1 10   PTT seconds 31       Results from last 7 days  Lab Units 10/17/17  0005 10/16/17  2208   LACTIC ACID mmol/L 2 6* 3 4*       0  Lab Value Date/Time   TROPONINI <0 02 10/16/2017 2208       Imaging:   10/16 CT C/A/P- S/p partial colectomy, portion of remaining colon moderately distended and gas-filled  10/16 CXR- No active pulmonary disease, my interpretation I have personally reviewed pertinent reports  and I have personally reviewed pertinent films in PACS  EKG: Review of EKG demonstrates sinus tachycardia This was personally reviewed by myself  Micro:  No results found for: Ashlie Snider, WOUNDCULT, [facility]    ______________________________________________________________________    Assessment:    1  Sepsis  2  Likely ileus  3  Acute on chronic kidney disease   4  Lactic acidosis  5  Hyperbilirubinemia  6  Nausea, vomiting, and diarrhea  7  COPD on home O2  8  History of hypertension    Plan:      Neuro: Frequent neuro monitoring, reorientation as needed     CV: Close hemodynamic monitoring, continue volume resuscitation     Pulm: Encourage good pulmonary hygiene, continue home Advair, order respiratory protocol     GI: NPO for now, serial abdominal exams, can begin erythromycin     :  Close intake and output, daily weights, transferring creatinine, follow urine electrolytes     F/E/N:  Continue fluid resuscitation, replete electrolytes as necessary, NPO     ID:  Given history low suspicion of C diff however will obtain sample in addition stool samples     Heme:  Begin heparin for DVT prophylaxis     Endo: Follow blood sugar closely     Msk/Skin:  Out of bed as tolerated     Disposition:  Step-down    ______________________________________________________________________    VTE Pharmacologic Prophylaxis: Heparin  VTE Mechanical Prophylaxis: sequential compression device    Invasive lines and devices:   Invasive Devices     Peripheral Intravenous Line            Peripheral IV 10/16/17 Right Antecubital less than 1 day    Peripheral IV 10/16/17 Right Hand less than 1 day          Drain            Urethral Catheter Temperature probe less than 1 day                Code Status: No Order  POA:    POLST:      Given critical illness, patient length of stay will require greater than two midnights  Portions of the record may have been created with voice recognition software  Occasional wrong word or "sound a like" substitutions may have occurred due to the inherent limitations of voice recognition software  Read the chart carefully and recognize, using context, where substitutions have occurred        HUA Arias

## 2017-10-17 NOTE — ED NOTES
Patient transported to [address] [place] [address] [place] Dayron Manzano RN  10/16/17 9036

## 2017-10-17 NOTE — CONSULTS
GENERAL SURGERY CONSULT                                                                                                                                               Denis Signs Leyla 78 y o  male MRN:                                                                     [id number]  Unit/Bed#:                                                          Encounter: [id number]                                                        Assessment/Plan    Ileus  Vomiting Diarrhea x 3 days  vanco  -NGT, ok to clamp for Erythromycin  NPO  -IVF    Lactic Acidosis  PRIMO  Hyperbilirubinemia  COPD O2 Dependant   HTN  -management per ICU Team    CHIEF COMPLAINT:  Vomiting and diarrhea x 3 days     HPI: Casa Iniguez is a 78y o  year old male consulted for lactic acidosis, diarrhea and vomiting x 3 days  Pt was admitted to the ICU Ct imaging reveals taransverse colon distention  He has a PMH: Colon Resection 9 yrs ago, primary anastomosis for numerous colonic polyps  Open prostatectomy for CA, HTN,Gout, COPD, back and hip surgery  Pt states that he developed vomiting and diarrhea x 3 days  He denies f/c,history of antibiotics  He  does not think it was food born illness, his wife is not ill  He states that he has normal formed bowel movements  Lactic Acid 3 on admission, 1 today with fluids  WBC 9    Ct Chest Abdomen Pelvis Wo Contrast    Result Date: 10/17/2017  Impression: 1  Stable pulmonary nodules on the right  Stable pleural plaques on the right  2   Partial colectomy  There is liquid stool in the distal colon concerning for an acute diarrheal disease  Gaseous distention of the transverse colon is concerning for colonic ileus      Workstation performed: [id number]     Xr Chest Portable    Result Date: 10/17/2017  Impression: COPD with minor bibasilar subsegmental atelectasis or scarring Workstation performed: [id number]     Xr Abdomen 1 View Kub    Result Date: 10/17/2017  Impression: Diffusely increased bowel gas within the colon, nonspecific but suggestive of ileus  Please refer to subsequently performed abdominal CT  Workstation performed: [id number]       Inpatient consult to Acute Care Surgery  Consult performed by: Adalgisa Topete ordered by: Flavio Bertrand          Historical Information   Past Medical History:   Diagnosis Date    COPD (chronic obstructive pulmonary disease) ([address] )     Hypertension      Past Surgical History:   Procedure Laterality Date    JOINT REPLACEMENT       Social History   History   Alcohol Use No     History   Drug Use No     History   Smoking Status    Former Smoker   Smokeless Tobacco    Never Used     Family History: non-contributory    No Known Allergies    Meds/Allergies   current meds:   Current Facility-Administered Medications   Medication Dose Route Frequency    albuterol inhalation solution 2 5 mg  2 5 mg Nebulization Q4H PRN    chlorhexidine (PERIDEX) 0 12 % oral rinse 15 mL  15 mL Swish & Spit Q12H [address]    erythromycin ethylsuccinate (ERYPED) oral suspension 400 mg  400 mg Oral TID AC    famotidine (PEPCID) tablet 20 mg  20 mg Oral BID    Or    famotidine (PEPCID) injection 20 mg  20 mg Intravenous BID    fluticasone-salmeterol (ADVAIR) 250-50 mcg/dose inhaler 1 puff  1 puff Inhalation BID    heparin (porcine) subcutaneous injection 5,000 Units  5,000 Units Subcutaneous Q8H [address]    lactated ringers infusion  125 mL/hr Intravenous Continuous    metoprolol (LOPRESSOR) injection 2 5 mg  2 5 mg Intravenous Q6H    piperacillin-tazobactam (ZOSYN) 2 25 g in sodium chloride 0 9 % 50 mL IVPB  2 25 g Intravenous Q8H    tiotropium (SPIRIVA) capsule for inhaler 18 mcg  18 mcg Inhalation Daily              Objective   Vitals: Blood pressure 119/76, pulse (!) 106, temperature 97 8 °F (36 6 °C), temperature source Oral, resp  rate 14, height 6' 1" (1 854 m), weight 116 kg (256 lb 9 9 oz), SpO2 91 %      Intake/Output Summary (Last 24 hours) at 10/17/17 1305  Last data filed at 10/17/17 1137   Gross per 24 hour   Intake          8500 41 ml   Output             1665 ml   Net          6835 41 ml     Invasive Devices     Peripheral Intravenous Line            Peripheral IV 10/16/17 Right Antecubital less than 1 day    Peripheral IV 10/16/17 Right Hand less than 1 day          Drain            NG/OG/Enteral Tube Nasogastric 16 Fr Right nares less than 1 day    Urethral Catheter Temperature probe less than 1 day                Review of Systems   Diarrhea  Vomiting  No gout flares  abd is distended,     Physical Exam    GEN: A & O x 3, cooperative   HEENT: PERRLA nystagmus , sclera anicterus, oral mucosa dry, NGT in place draining bilious material 30 ml out  NECK: supple   LUNGS: clear throughout  COR: RRR no murmur  ABD: distended, this is his baseline per pt  Tense, non tender, tympanic bowel sounds, healed midline incisional scars, no hernia,   EXTREM: FROM no joint deformities    Edema none   PULSES:+2   SKIN:no rashes, lesions, jaundice   NEURO: CN II -XII intact, no tremor, affect appropriate    Lab Results:   Lab Results   Component Value Date    WBC 9 39 10/17/2017    WBC 8 4 08/18/2015    HGB 14 6 10/17/2017    HGB 13 8 08/18/2015    HCT 47 0 10/17/2017    HCT 44 0 08/18/2015     10/17/2017     08/18/2015     Lab Results   Component Value Date     10/17/2017     08/18/2015    K 4 0 10/17/2017    K 3 8 08/18/2015     10/17/2017     08/18/2015    CO2 21 10/17/2017    CO2 25 4 08/18/2015    BUN 48 (H) 10/17/2017    BUN 9 08/18/2015    CREATININE 3 45 (H) 10/17/2017    CREATININE 1 3 08/18/2015    GLUCOSE 113 10/17/2017    GLUCOSE 97 08/18/2015     Lab Results   Component Value Date    COLORU Yellow 10/16/2017    COLORU Yellow 01/02/2015    CLARITYU Cloudy 10/16/2017    CLARITYU Cloudy 01/02/2015    SPECGRAV 1 020 10/16/2017    SPECGRAV 1 020 01/02/2015    PHUR 5 5 10/16/2017    PHUR 6 0 01/02/2015    GLUCOSEU Negative 10/16/2017    GLUCOSEU Negative 01/02/2015    KETONESU Negative 10/16/2017    KETONESU Trace (A) 01/02/2015    BLOODU Negative 10/16/2017    BLOODU Small (A) 01/02/2015    NITRITE Negative 10/16/2017    NITRITE Positive (A) 01/02/2015    LEUKOCYTESUR Negative 10/16/2017    LEUKOCYTESUR Small (A) 01/02/2015    BILIRUBINUR Small (A) 10/16/2017    BILIRUBINUR Negative 01/02/2015    UROBILINOGEN 0 2 10/16/2017    UROBILINOGEN 0 2 01/02/2015    RBCUA None Seen 10/16/2017    RBCUA None Seen 01/02/2015    WBCUA 1-2 (A) 10/16/2017    WBCUA 3-8 01/02/2015    BACTERIA Moderate (A) 10/16/2017    BACTERIA 3 +++ 01/02/2015     Imaging Studies: Ct Chest Abdomen Pelvis Wo Contrast    Result Date: 10/17/2017  Impression: 1  Stable pulmonary nodules on the right  Stable pleural plaques on the right  2   Partial colectomy  There is liquid stool in the distal colon concerning for an acute diarrheal disease  Gaseous distention of the transverse colon is concerning for colonic ileus  Workstation performed: [id number]     Xr Chest Portable    Result Date: 10/17/2017  Impression: COPD with minor bibasilar subsegmental atelectasis or scarring Workstation performed: [id number]     Xr Abdomen 1 View Kub    Result Date: 10/17/2017  Impression: Diffusely increased bowel gas within the colon, nonspecific but suggestive of ileus  Please refer to subsequently performed abdominal CT   Workstation performed: [id number]       Code Status: Level 1 - Full Code  Advance Directive and Living Will:      Power of :    POLST:        Carolina Meadows PA-C  10/17/2017

## 2017-10-17 NOTE — ED PROVIDER NOTES
History  Chief Complaint   Patient presents with    Weakness - Generalized     Pt c/o N/V and diarrhea for 3 days  This is a 79-year-old male presenting to have ER for evaluation of nausea vomiting diarrhea for the past 3 days  States that the past 1 day has suddenly got worse  Today he is unable to tolerate any solids or liquids by mouth  States he will immediately vomit after eating or drinking  Continues to feel constantly nauseous  He has had loose stool  No hematochezia  No melena  He has had no recent changes in his diet  No sick contacts  He has never had this issue in the past   He has had prior extensive abdominal surgery including partial colectomy for severe polyps  States he has not been making urine for the past few days  He previously followed with Nephrology for chronic kidney disease but on his most recent visit he was told that everything was normal  Past medical history of COPD as well as hypertension  No recent travels traumas or surgeries  Most recent endoscopy was about 7-8 years ago and was normal  Most recent colonoscopy was later than that and was told at that time he does need anything further because of the surgeries he had  He feels generally weak  He feels dehydrated  He has abdominal distension at this time but states this is chronic and ongoing for years  Denies chest pain, fevers, difficulty breathing or shortness of breath, leg pain or swelling          History provided by:  Patient   used: No    Abdominal Pain   Pain location:  Generalized  Pain quality: bloating    Pain radiates to:  Does not radiate  Pain severity:  Mild  Onset quality:  Gradual  Duration:  3 days  Timing:  Constant  Progression:  Unchanged  Chronicity:  New  Context: previous surgery    Context: not alcohol use, not awakening from sleep, not diet changes, not eating, not laxative use, not medication withdrawal, not recent illness, not recent sexual activity, not recent travel, not retching, not sick contacts, not suspicious food intake and not trauma    Relieved by:  Nothing  Worsened by:  Eating  Ineffective treatments:  None tried  Associated symptoms: anorexia, diarrhea and nausea    Associated symptoms: no belching, no chest pain, no chills, no constipation, no cough, no dysuria, no fatigue, no fever, no flatus, no hematemesis, no hematochezia, no hematuria, no melena, no shortness of breath, no sore throat and no vomiting    Diarrhea:     Quality:  Semi-solid    Number of occurrences:  Multiple    Severity:  Moderate    Duration:  3 days    Timing:  Constant    Progression:  Unchanged  Nausea:     Severity:  Mild    Onset quality:  Gradual    Duration:  3 days    Timing:  Constant    Progression:  Unchanged  Risk factors: no alcohol abuse, no aspirin use, not elderly, has not had multiple surgeries, no NSAID use, not obese, not pregnant and no recent hospitalization        Prior to Admission Medications   Prescriptions Last Dose Informant Patient Reported? Taking?    Vitamins-Lipotropics (LIPOFLAVONOID PO) 10/16/2017 at Unknown time  Yes Yes   Sig: Take 1 tablet by mouth 2 (two) times a day   allopurinol (ZYLOPRIM) 100 mg tablet 10/16/2017 at Unknown time  Yes Yes   Sig: Take 1 tablet by mouth daily   amLODIPine (NORVASC) 5 mg tablet 10/16/2017 at Unknown time  Yes Yes   Sig: Take 1 tablet by mouth daily   fluticasone-salmeterol (ADVAIR DISKUS) 250-50 mcg/dose inhaler 10/16/2017 at Unknown time  Yes Yes   Sig: Inhale 1 puff 2 (two) times a day   losartan (COZAAR) 100 MG tablet 10/16/2017 at Unknown time  Yes Yes   Sig: Take 100 mg by mouth daily   metoprolol succinate (TOPROL-XL) 25 mg 24 hr tablet 10/16/2017 at Unknown time  Yes Yes   Sig: Take 25 mg by mouth daily      Facility-Administered Medications: None       Past Medical History:   Diagnosis Date    COPD (chronic obstructive pulmonary disease) ([facility] [address] )     Hypertension        Past Surgical History:   Procedure Laterality Date    JOINT REPLACEMENT         History reviewed  No pertinent family history  I have reviewed and agree with the history as documented  Social History   Substance Use Topics    Smoking status: Former Smoker    Smokeless tobacco: Never Used    Alcohol use No        Review of Systems   Constitutional: Negative for activity change, appetite change, chills, diaphoresis, fatigue, fever and unexpected weight change  HENT: Negative for congestion, rhinorrhea, sinus pressure, sore throat and trouble swallowing  Eyes: Negative for photophobia and visual disturbance  Respiratory: Negative for apnea, cough, choking, chest tightness, shortness of breath, wheezing and stridor  Cardiovascular: Negative for chest pain, palpitations and leg swelling  Gastrointestinal: Positive for abdominal pain, anorexia, diarrhea and nausea  Negative for abdominal distention, blood in stool, constipation, flatus, hematemesis, hematochezia, melena and vomiting  Genitourinary: Negative for decreased urine volume, difficulty urinating, dysuria, enuresis, flank pain, frequency, hematuria and urgency  Musculoskeletal: Negative for arthralgias, myalgias, neck pain and neck stiffness  Skin: Negative for color change, pallor, rash and wound  Allergic/Immunologic: Negative  Neurological: Negative for dizziness, tremors, syncope, weakness, light-headedness, numbness and headaches  Hematological: Negative  Psychiatric/Behavioral: Negative  All other systems reviewed and are negative        Physical Exam  ED Triage Vitals   Temperature Pulse Respirations Blood Pressure SpO2   10/16/17 2214 10/16/17 2146 10/16/17 2146 10/16/17 2146 10/16/17 2217   98 3 °F (36 8 °C) (!) 135 (!) 28 (!) 89/61 (!) 88 %      Temp Source Heart Rate Source Patient Position - Orthostatic VS BP Location FiO2 (%)   10/16/17 2214 10/16/17 2226 10/16/17 2226 10/16/17 2146 --   Oral Monitor Lying Right arm       Pain Score       10/18/17 0800       No Pain           Physical Exam   Constitutional: He is oriented to person, place, and time  He appears well-developed and well-nourished  Non-toxic appearance  He does not have a sickly appearance  He does not appear ill  No distress  HENT:   Head: Normocephalic and atraumatic  Eyes: EOM and lids are normal  Pupils are equal, round, and reactive to light  Neck: Normal range of motion  Neck supple  Cardiovascular: Normal rate, regular rhythm, S1 normal, S2 normal, normal heart sounds, intact distal pulses and normal pulses  Exam reveals no gallop, no distant heart sounds, no friction rub and no decreased pulses  No murmur heard  Pulses:       Radial pulses are 2+ on the right side, and 2+ on the left side  Pulmonary/Chest: Effort normal and breath sounds normal  No accessory muscle usage  No apnea, no tachypnea and no bradypnea  No respiratory distress  He has no decreased breath sounds  He has no wheezes  He has no rhonchi  He has no rales  Abdominal: Soft  Normal appearance and bowel sounds are normal  He exhibits distension  He exhibits no mass  There is generalized tenderness  There is no rigidity, no rebound, no guarding, no CVA tenderness, no tenderness at McBurney's point and negative Gaona's sign  No hernia  Musculoskeletal: Normal range of motion  He exhibits no edema, tenderness or deformity  Neurological: He is alert and oriented to person, place, and time  No cranial nerve deficit  GCS eye subscore is 4  GCS verbal subscore is 5  GCS motor subscore is 6  GCS 15  AAOx3  Ambulating in department without difficulty  CN II-XII grossly intact  No focal neuro deficits  Skin: Skin is warm, dry and intact  No rash noted  He is not diaphoretic  No erythema  No pallor  Psychiatric: His speech is normal    Nursing note and vitals reviewed        ED Medications  Medications   fluticasone-salmeterol (ADVAIR) 250-50 mcg/dose inhaler 1 puff (1 puff Inhalation Given 10/19/17 1702)   chlorhexidine (PERIDEX) 0 12 % oral rinse 15 mL (15 mL Swish & Spit Given 10/19/17 2005)   famotidine (PEPCID) tablet 20 mg (20 mg Oral Given 10/19/17 1702)   heparin (porcine) subcutaneous injection 5,000 Units (5,000 Units Subcutaneous Given 10/20/17 0521)   tiotropium (SPIRIVA) capsule for inhaler 18 mcg (18 mcg Inhalation Given 10/19/17 0919)   albuterol inhalation solution 2 5 mg (not administered)   allopurinol (ZYLOPRIM) tablet 100 mg (100 mg Oral Given 10/19/17 0918)   amLODIPine (NORVASC) tablet 5 mg (5 mg Oral Given 10/19/17 0919)   losartan (COZAAR) tablet 100 mg (100 mg Oral Given 10/19/17 0918)   metoprolol succinate (TOPROL-XL) 24 hr tablet 25 mg (25 mg Oral Given 10/19/17 0919)   cefdinir (OMNICEF) capsule 300 mg (300 mg Oral Given 10/19/17 2005)   potassium chloride (K-DUR,KLOR-CON) CR tablet 40 mEq (not administered)   ondansetron (ZOFRAN) injection 4 mg (4 mg Intravenous Given 10/16/17 2229)   sodium chloride 0 9 % bolus 1,000 mL (0 mL Intravenous Stopped 10/16/17 2323)   sodium chloride 0 9 % bolus 1,000 mL (0 mL Intravenous Stopped 10/16/17 2234)   piperacillin-tazobactam (ZOSYN) 3 375 g in sodium chloride 0 9 % 50 mL IVPB (0 g Intravenous Stopped 10/16/17 2252)   sodium chloride 0 9 % bolus 1,000 mL (0 mL Intravenous Stopped 10/16/17 2327)   sodium chloride 0 9 % bolus 510 mL (0 mL Intravenous Stopped 10/17/17 0020)   lactated ringers bolus 1,000 mL (0 mL Intravenous Stopped 10/17/17 0124)   lactated ringers bolus 1,000 mL (0 mL Intravenous Stopped 10/17/17 0435)   erythromycin ethylsuccinate (ERYPED) oral suspension 400 mg (400 mg Oral Given 10/19/17 1702)   piperacillin-tazobactam (ZOSYN) 3 375 g in sodium chloride 0 9 % 50 mL IVPB (0 g Intravenous Stopped 10/17/17 1824)   lidocaine (URO-JET) 2 % topical gel ( Topical Given 10/17/17 1134)   potassium chloride (K-DUR,KLOR-CON) CR tablet 40 mEq (40 mEq Oral Given 10/19/17 0918)   magnesium sulfate 2 g/50 mL IVPB (premix) 2 g (2 g Intravenous New Bag 10/19/17 0919)       Diagnostic Studies  Labs Reviewed   CBC AND DIFFERENTIAL - Abnormal        Result Value Ref Range Status    WBC 12 04 (*) 4 31 - 10 16 Thousand/uL Final    RBC 6 67 (*) 3 88 - 5 62 Million/uL Final    Hematocrit 50 9 (*) 36 5 - 49 3 % Final    MCV 76 (*) 82 - 98 fL Final    MCH 23 8 (*) 26 8 - 34 3 pg Final    MCHC 31 2 (*) 31 4 - 37 4 g/dL Final    RDW 18 5 (*) 11 6 - 15 1 % Final    Hemoglobin 15 9  12 0 - 17 0 g/dL Final    MPV 12 1  8 9 - 12 7 fL Final    Platelets 600  404 - 390 Thousands/uL Final    nRBC 0  /100 WBCs Final    Comment: This is an appended report  These results have been appended to a previously preliminary verified report  Narrative: This is an appended report  These results have been appended to a previously verified report  PROTIME-INR - Abnormal     Protime 14 5 (*) 12 1 - 14 4 seconds Final    INR 1 10  0 86 - 1 16 Final   BASIC METABOLIC PANEL - Abnormal     Chloride 99 (*) 100 - 108 mmol/L Final    Anion Gap 18 (*) 4 - 13 mmol/L Final    BUN 52 (*) 5 - 25 mg/dL Final    Creatinine 5 19 (*) 0 60 - 1 30 mg/dL Final    Comment: Standardized to IDMS reference method    Glucose 152 (*) 65 - 140 mg/dL Final    Comment:   If the patient is fasting, the ADA then defines impaired fasting glucose as > 100 mg/dL and diabetes as > or equal to 123 mg/dL  Specimen collection should occur prior to Sulfasalazine administration due to the potential for falsely depressed results  Specimen collection should occur prior to Sulfapyridine administration due to the potential for falsely elevated results      Sodium 139  136 - 145 mmol/L Final    Potassium 4 0  3 5 - 5 3 mmol/L Final    CO2 22  21 - 32 mmol/L Final    Calcium 9 9  8 3 - 10 1 mg/dL Final    eGFR 12  ml/min/1 73sq m Final    Narrative:     National Kidney Disease Education Program recommendations are as follows:  GFR calculation is accurate only with a steady state creatinine  Chronic Kidney disease less than 60 ml/min/1 73 sq  meters  Kidney failure less than 15 ml/min/1 73 sq  meters  HEPATIC FUNCTION PANEL - Abnormal     Total Bilirubin 1 80 (*) 0 20 - 1 00 mg/dL Final    Bilirubin, Direct 0 40 (*) 0 00 - 0 20 mg/dL Final    Total Protein 9 1 (*) 6 4 - 8 2 g/dL Final    Alkaline Phosphatase 85  46 - 116 U/L Final    AST 42  5 - 45 U/L Final    Comment:   Specimen collection should occur prior to Sulfasalazine administration due to the potential for falsely depressed results  ALT 32  12 - 78 U/L Final    Comment:   Specimen collection should occur prior to Sulfasalazine administration due to the potential for falsely depressed results  Albumin 4 1  3 5 - 5 0 g/dL Final   LACTIC ACID, PLASMA - Abnormal     LACTIC ACID 3 4 (*) 0 5 - 2 0 mmol/L Final    Narrative:     Result may be elevated if tourniquet was used during collection  LACTIC ACID, PLASMA - Abnormal     LACTIC ACID 2 6 (*) 0 5 - 2 0 mmol/L Final    Narrative:     Result may be elevated if tourniquet was used during collection     UA W REFLEX TO MICROSCOPIC WITH REFLEX TO CULTURE - Abnormal     Protein, UA 30 (1+) (*) Negative mg/dl Final    Bilirubin, UA Small (*) Negative Final    Color, UA Yellow   Final    Clarity, UA Cloudy   Final    Specific Gravity, UA 1 020  1 003 - 1 030 Final    pH, UA 5 5  4 5 - 8 0 Final    Leukocytes, UA Negative  Negative Final    Nitrite, UA Negative  Negative Final    Glucose, UA Negative  Negative mg/dl Final    Ketones, UA Negative  Negative mg/dl Final    Urobilinogen, UA 0 2  0 2, 1 0 E U /dl E U /dl Final    Blood, UA Negative  Negative Final   URINE MICROSCOPIC - Abnormal     WBC, UA 1-2 (*) None Seen, 0-5, 5-55, 5-65 /hpf Final    Bacteria, UA Moderate (*) None Seen, Occasional /hpf Final    RBC, UA None Seen  None Seen, 0-5 /hpf Final    Epithelial Cells Occasional  None Seen, Occasional /hpf Final   MANUAL DIFFERENTIAL(PHLEBS DO NOT ORDER) - Abnormal     Bands % 9 (*) 0 - 8 % Final Absolute Neutrophils 8 43 (*) 1 85 - 7 62 Thousand/uL Final    Segmented % 61  43 - 75 % Final    Lymphocytes % 26  14 - 44 % Final    Monocytes % 4  4 - 12 % Final    Eosinophils % 0  0 - 6 % Final    Basophils % 0  0 - 1 % Final    Lymphocytes Absolute 3 13  0 60 - 4 47 Thousand/uL Final    Monocytes Absolute 0 48  0 00 - 1 22 Thousand/uL Final    Eosinophils Absolute 0 00  0 00 - 0 40 Thousand/uL Final    Basophils Absolute 0 00  0 00 - 0 10 Thousand/uL Final    Total Counted 100   Final    RBC Morphology Normal   Final    Platelet Estimate Adequate  Adequate Final   APTT - Normal    PTT 31  23 - 35 seconds Final    Narrative: Therapeutic Heparin Range = 60-90 seconds   MAGNESIUM - Normal    Magnesium 2 1  1 6 - 2 6 mg/dL Final   TROPONIN I - Normal    Troponin I <0 02  <=0 04 ng/mL Final    Comment: 3Autovalidation override    Narrative:     [employer] Chemistry analyzer 99% cutoff is > 0 04 ng/mL in network labs    o cTnI 99% cutoff is useful only when applied to patients in the clinical setting of myocardial ischemia  o cTnI 99% cutoff should be interpreted in the context of clinical history, ECG findings and possibly cardiac imaging to establish correct diagnosis  o cTnI 99% cutoff may be suggestive but clearly not indicative of a coronary event without the clinical setting of myocardial ischemia  LACTIC ACID, PLASMA - Normal    LACTIC ACID 1 5  0 5 - 2 0 mmol/L Final    Narrative:     Result may be elevated if tourniquet was used during collection  TYPE AND SCREEN    ABO Grouping O   Final    Rh Factor Positive   Final    Antibody Screen Negative   Final    Specimen Expiration Date [id number]   Final       XR chest portable   ED Interpretation   No obvious pulmonary infiltrate  Limited to rotation        Final Result      COPD with minor bibasilar subsegmental atelectasis or scarring         Workstation performed: [id number]         XR abdomen 1 view kub   ED Interpretation   Obstructive bowel gas pattern      Final Result      Diffusely increased bowel gas within the colon, nonspecific but suggestive of ileus  Please refer to subsequently performed abdominal CT  Workstation performed: [id number]         CT chest abdomen pelvis wo contrast   ED Interpretation   EXAM:   CT Chest Without Intravenous Contrast   CLINICAL HISTORY:   78years old, male; Signs and symptoms; Nausea and vomiting; Other: Weakness; Prior surgery; Additional info: Weakness - generalized (pt C/O n/v and diarrhea   for 3 days  )   TECHNIQUE:   Axial computed tomography images of the chest without intravenous   contrast  All CT scans at this   facility use one or more dose reduction techniques, viz :   automated exposure control; ma/kV   adjustment per patient size (including targeted exams where dose   is matched to indication; i e  head);   or iterative reconstruction technique  Coronal and sagittal reformatted images were created and   reviewed  COMPARISON:   No relevant prior studies available  FINDINGS:   Lungs: Basilar dependent pulmonary atelectasis is present  Centrilobular emphysema is present  Pleural space: Right sided pleural plaque noted No pneumothorax  No significant effusion  Heart: Coronary artery calcifications are noted  No significant   pericardial effusion  Bones/joints: Unremarkable  No acute fracture  No dislocation  Soft tissues: Unremarkable  Vasculature: Ascending thoracic aortic aneurysm measures 4 4 x   4 3 cm   Lymph nodes: Unremarkable  No enlarged lymph nodes  IMPRESSION:   No acute findings  EXAM:   CT Abdomen and Pelvis Without Intravenous Contrast   EXAM DATE/TIME:   10/16/2017 11:42 PM   CLINICAL HISTORY:   78years old, male; Signs and symptoms; Nausea and vomiting; Other: Weakness; Prior surgery;    Additional info: Weakness - generalized (pt C/O n/v and diarrhea   for 3 days  )   TECHNIQUE:   Axial computed tomography images of the abdomen and pelvis   without intravenous contrast  All CT   scans at this facility use one or more dose reduction techniques,   viz : automated exposure control;   ma/kV adjustment per patient size (including targeted exams where   dose is matched to indication; i e    head); or iterative reconstruction technique  Coronal and sagittal reformatted images were created and   reviewed  COMPARISON:   POCCT ABD/PELVIS W/O W [id number] 4/14/2015 9:30:03 AM   FINDINGS:   Lower thorax: No acute findings  ABDOMEN:   Liver: Unremarkable  Gallbladder and bile ducts: Unremarkable  No calcified stones  No   ductal dilation  Pancreas: Unremarkable  No ductal dilation  Spleen: Unremarkable  No splenomegaly  Adrenals: Unremarkable  No mass  Kidneys and ureters: Unremarkable  No obstructing stones  No   hydronephrosis  Stomach and bowel: Patient status post partial colectomy  A   portion of the colon is moderately   distended and gas-filled measuring up to 6 8 cm  This may reflect   a colonic ileus  If clinical suspicion   for a colonic obstruction exists, an early or partial obstruction   is not excluded initial further evaluation   could include an oral contrast enhanced CT scan to assess for   distal progress of contrast    Appendix: See above  PELVIS:   Bladder: The urinary bladder contains a Larson catheter  No   stones  Reproductive: Patient status post prostatectomy   ABDOMEN and PELVIS:   Intraperitoneal space: Unremarkable  No free air  No significant   fluid collection  Bones/joints: No acute fracture  No dislocation  Soft tissues: Unremarkable  Vasculature: Unremarkable  No abdominal aortic aneurysm  Lymph nodes: Unremarkable  No enlarged lymph nodes  IMPRESSION:   Patient status post partial colectomy  A portion of the colon is   moderately distended and gas-filled   measuring up to 6 8 cm  This may reflect a colonic ileus   If   clinical suspicion for a colonic obstruction   exists, an early or partial obstruction is not excluded initial   further evaluation could include an oral   contrast enhanced CT scan to assess for distal progress of   contrast    Thank you for allowing us to participate in the care of your   patient  Dictated and Authenticated by: Milagros Butts MD   10/16/2017 11:44 PM Central Time (Lucretia Mckeon 2241)      Final Result         1  Stable pulmonary nodules on the right  Stable pleural plaques on the right  2   Partial colectomy  There is liquid stool in the distal colon concerning for an acute diarrheal disease  Gaseous distention of the transverse colon is concerning for colonic ileus                    Workstation performed: [id number]             Procedures  ECG 12 Lead Documentation  Date/Time: 10/16/2017 10:10 PM  Performed by: Verona Verduzco  Authorized by: Edvin Marr     Indications / Diagnosis:  Generalized weakness  ECG reviewed by me, the ED Provider: yes    Patient location:  ED  Previous ECG:     Previous ECG:  Unavailable    Comparison to cardiac monitor: Yes    Interpretation:     Interpretation: abnormal    Quality:     Tracing quality:  Limited by artifact  Rate:     ECG rate:  134    ECG rate assessment: tachycardic    Rhythm:     Rhythm: sinus tachycardia    Ectopy:     Ectopy: none    QRS:     QRS axis:  Normal    QRS intervals:  Normal  Conduction:     Conduction: abnormal      Abnormal conduction: complete RBBB    ST segments:     ST segments:  Normal  T waves:     T waves: non-specific    CriticalCare Time  Performed by: Verona Verduzco  Authorized by: Edvin Marr     Critical care provider statement:     Critical care time (minutes):  60    Critical care time was exclusive of:  Separately billable procedures and treating other patients and teaching time    Critical care was necessary to treat or prevent imminent or life-threatening deterioration of the following conditions:  Renal failure, sepsis, shock and dehydration    Critical care was time spent personally by me on the following activities:  Obtaining history from patient or surrogate, development of treatment plan with patient or surrogate, evaluation of patient's response to treatment, examination of patient, review of old charts, re-evaluation of patient's condition, ordering and review of radiographic studies, ordering and review of laboratory studies and discussions with consultants          Phone Contacts  ED Phone Contact    ED Course  ED Course as of Oct 20 0812   Mon Oct 16, 2017   2219 Two peripheral IVs are placed  Patient has fluids running through both IVs     2231 Bedside US performed  No obvious pericardial effusion  BP has normalized after about 1L of fluids  2320 False reading  Pt was having bergeron catheter placed during this time SpO2: (!) 83 %   2327 Bergeron placed, 15ml initial return    2353 Bacteria, UA: (!) Moderate   Tue Oct 17, 2017   0008 Urine output: 30ml over 1 hour    0049 Discussed case with on call surgeon who will review patient's CT scan and call back    0055 [facility] INTERPRETATION CT chest abdomen pelvis wo contrast   0121 Discussed with Dr Mery Brice  No surgical intervention at this time  Suspicious for low-flow state ileus  Discussed with ICU attending, Dr Gladis Honeycutt who agrees with admission             HEART Risk Score    Flowsheet Row Most Recent Value   History  0 Filed at: 10/17/2017 0015   ECG  1 Filed at: 10/17/2017 0015   Age  2 Filed at: 10/17/2017 0015   Risk Factors  1 Filed at: 10/17/2017 0015   Troponin  0 Filed at: 10/17/2017 0015   Heart Score Risk Calculator   History  0 Filed at: 10/17/2017 0015   ECG  1 Filed at: 10/17/2017 0015   Age  2 Filed at: 10/17/2017 0015   Risk Factors  1 Filed at: 10/17/2017 0015   Troponin  0 Filed at: 10/17/2017 0015   HEART Score  4 Filed at: 10/17/2017 0015   HEART Score  4 Filed at: 10/17/2017 0015                      Initial Sepsis Screening     Row Name 10/17/17 0043 10/17/17 0001 10/16/17 2208          Is the patient's history suggestive of a new or worsening infection?    (!)  Yes (Proceed)  -CO      Suspected source of infection     acute abdominal infection  -CO      Are two or more of the following signs & symptoms of infection both present and new to the patient?    (!)  Yes (Proceed)  -CO      Indicate SIRS criteria     Leukocytosis (WBC > 02732 IJL); Tachycardia > 90 bpm  -CO      If the answer is yes to both questions, suspicion of sepsis is present            If severe sepsis is present AND tissue hypoperfusion perists in the hour after fluid resuscitation or lactate > 4, the patient meets criteria for SEPTIC SHOCK            Are any of the following organ dysfunction criteria present within 6 hours of suspected infection and SIRS criteria that are NOT considered to be chronic conditions?    (!)  Yes  -CO      Organ dysfunction     Creatinine > 2 0 mg/dL; Lactate > 2 0 mmol/L;SBP < 90 mmHg  -CO      Date of presentation of severe sepsis   10/16/17  -CO        Time of presentation of severe sepsis   2208  -CO        Tissue hypoperfusion persists in the hour after crystalloid fluid administration, evidenced, by either:            Was hypotension present within one hour of the conclusion of crystalloid fluid administration? No  -CO          Date of presentation of septic shock            Time of presentation of septic shock              User Key  (r) = Recorded By, (t) = Taken By, (c) = Cosigned By    [address] Name Provider Type    CO Francia Menjivar PA-C Physician Assistant           Default Flowsheet Data (last 720 hours)      Sepsis Reassessment     Row Name 10/17/17 0004                   Volume Status and Tissue Perfusion Post Fluid Resuscitation- Must Document ALL of the Following:    Vital Signs Reviewed Yes  -CO        Cardio (!)  Normal S1/S2; No murmor; No rub or gallop; Tachycardia  -CO        Pulmonary Normal effort  -CO        Capillary Refill Brisk  -CO        Peripheral Pulses Radial  -CO        Peripheral Pulse +2  -CO        Skin Warm  -CO           *OR*   Intensive Monitoring- Must Document Two * of the Following Four *:    Vital Signs Reviewed Yes  -CO        * Central Venous Pressure (CVP or RAP)          * Central Venous Oxygen (SVO2, ScvO2 or Oxygen saturation via central catheter)          * Bedside Cardiovascular US in IVC diameter and % collapse          * Passive Leg Raise OR Crystalloid Challenge            User Key  (r) = Recorded By, (t) = Taken By, (c) = Cosigned By    Initials Name Provider Type    CO Jovani Roman PA-C Physician Assistant                MDM  Number of Diagnoses or Management Options  PRIMO (acute kidney injury) [facility]): new and requires workup  Ileus [facility]): new and requires workup  Lactic acidosis: new and requires workup  Oliguria: new and requires workup  Severe dehydration: new and requires workup  Severe sepsis [facility]): new and requires workup  Diagnosis management comments: DDx including but not limited to: appendicitis, gastroenteritis, gastritis, PUD, GERD, gastroparesis, hepatitis, pancreatitis, colitis, enteritis, diverticulitis, food poisoning, mesenteric adenitis, mesenteric ischemia, IBD, IBS, ileus, bowel obstruction, volvulus, cholecystitis, biliary colic, choledocholithiasis, AAA, testicular torsion, renal colic, pyelonephritis, UTI; doubt cardiac etiology  Plan:  Fluid resuscitation given his hypotension, cardiac workup, portable chest portable KUB  Ultimately CT scan with or without contrast, depending on kidney function  Will perform bedside US to check for possible pericardial effusion  Bedside US shows no pericardial effusion          Amount and/or Complexity of Data Reviewed  Clinical lab tests: ordered and reviewed  Tests in the radiology section of CPT®: ordered and reviewed  Discuss the patient with other providers: yes  Independent visualization of images, tracings, or specimens: yes    Risk of Complications, Morbidity, and/or Mortality  Presenting problems: moderate  Management options: low  General comments: 79 y/o male presenting with n/v/d  Meets sepsis criteria  Given zosyn for suspected intraabdominal source of infection  Also initially hypotensive  He was fluid responsive  Initially had poor urine output which improved with fluid hydration  Found to have ileus vs obstruction on CT scan  Discussed with surgery who feels this is more likely ileus 2/2 low flow state from hypotension  No surgical intervention at this time  He at this point has no abdominal pain  Does have PRIMO, lactic acidosis, leukocytosis  Urine suspicious for UTI  Given his severe sepsis and hypotension will admit pt to stepdown unit  His vitals at this time are all improving, continues to be tachycardic however  The patient has been informed of results  Pt understands and agrees with current plan  Spoke with ICU physician for admission who agrees  Pt tachycardic but otherwise stable at time of admission  Patient Progress  Patient progress: stable    CritCare Time    Disposition  Final diagnoses:   Severe sepsis ([address] )   PRIMO (acute kidney injury) ([address] )   Oliguria   Lactic acidosis   Ileus (HCC)   Severe dehydration     ED Disposition     ED Disposition Condition Comment    Admit  Case was discussed with Dr Courtney Rodriguez and the patient's admission status was agreed to be Admission Status: inpatient status to the service of Dr Courtney Rodriguez          Follow-up Information     Follow up With Specialties Details Why Contact Info    Shimon Andrade MD Internal Medicine   [address] [place] [address], [address]          Current Discharge Medication List      START taking these medications    Details   tiotropium (SPIRIVA) 18 mcg inhalation capsule Place 1 capsule into inhaler and inhale daily  Qty: 30 capsule, Refills: 0         CONTINUE these medications which have NOT CHANGED    Details   allopurinol (ZYLOPRIM) 100 mg tablet Take 1 tablet by mouth daily amLODIPine (NORVASC) 5 mg tablet Take 1 tablet by mouth daily      fluticasone-salmeterol (ADVAIR DISKUS) 250-50 mcg/dose inhaler Inhale 1 puff 2 (two) times a day      losartan (COZAAR) 100 MG tablet Take 100 mg by mouth daily      metoprolol succinate (TOPROL-XL) 25 mg 24 hr tablet Take 25 mg by mouth daily      Vitamins-Lipotropics (LIPOFLAVONOID PO) Take 1 tablet by mouth 2 (two) times a day             Outpatient Discharge Orders  CBC   Standing Status: Future  Standing Exp  Date: 10/21/18     Comprehensive metabolic panel   Standing Status: Future  Standing Exp  Date: 10/20/18     Ambulatory Referral to Home Health   Standing Status: Future  Standing Exp   Date: 04/20/18     Discharge Diet     Activity as tolerated     Call provider for:  persistent nausea or vomiting     Call provider for:  severe uncontrolled pain     Call provider for:  difficulty breathing, headache or visual disturbances     Call provider for:  extreme fatigue     Discharge Instructions         ED Provider  Electronically Signed by       Tio Saab PA-C  10/20/17 0813       Tio Saab PA-C  10/20/17 7035

## 2017-10-17 NOTE — RESPIRATORY THERAPY NOTE
RT Protocol Note  Remi Rojas 78 y o  male MRN: [id number]  Unit/Bed#:  Encounter: [id number]    Assessment     Principal Problem:    Lactic acidosis  Active Problems:    Sepsis ([facility] )    Ileus ([facility] )    Acute kidney injury superimposed on chronic kidney disease (HCC)    Hyperbilirubinemia    Nausea vomiting and diarrhea    COPD (chronic obstructive pulmonary disease) ([facility])    History of hypertension      Home Pulmonary Medications:  Advair  Spiriva    Past Medical History:   Diagnosis Date    COPD (chronic obstructive pulmonary disease) ([facility] )     Hypertension      Social History     Social History    Marital status: /Civil Union     Spouse name: N/A    Number of children: N/A    Years of education: N/A     Social History Main Topics    Smoking status: Former Smoker    Smokeless tobacco: Never Used    Alcohol use No    Drug use: No    Sexual activity: Not on file     Other Topics Concern    Not on file     Social History Narrative    No narrative on file       Subjective   Pt states he has a 60 pack yr smoking Hx, does not have chest tightness, SOB while resting  Pt wears 4LNC HS only  Per pt he takes Advair and Spiriva at home  He currently does not have a rescue inhaler  Objective    BS: clear diminished with no resp distress noted at this time  SpO2 >92  Will order albuterol neb prn  Physical Exam:   Assessment Type: Assess only  General Appearance: Alert  Respiratory Pattern: Dyspnea with exertion  Chest Assessment: Chest expansion symmetrical  Bilateral Breath Sounds: Clear, Diminished    Vitals:  Blood pressure 113/73, pulse (!) 110, temperature 98 7 °F (37 1 °C), temperature source Oral, resp  rate 20, height 6' 1" (1 854 m), weight 116 kg (256 lb 9 9 oz), SpO2 93 %            Imaging and other studies: I have personally reviewed pertinent films in PACS          Plan     Respiratory Plan: Home Bronchodilator Patient pathway

## 2017-10-17 NOTE — SOCIAL WORK
no PT eval yet  cm met w pt who reposts admitting to the hospital with stomach issues and diarrhea  resides w wife and Olivier Kerns  no AD  ind w adls  no dme  does not drive  wife transports him to medical appts  VNA hx w [place]  agreeable to RN at d c choice [place]  referral sent  uses [employer] in [facility], also uses mail in [facility]  reports no issues obtaining medications  no other needs reported  wife will transport home   will await any PT recs

## 2017-10-17 NOTE — SEPSIS NOTE
Sepsis Note   Izabela Aguilar 78 y o  male MRN: [id number]  Unit/Bed#: ED 26 Encounter: [id number]            Initial Sepsis Screening     Row Name 10/17/17 0043 10/17/17 0001 10/16/17 2208          Is the patient's history suggestive of a new or worsening infection?    (!)  Yes (Proceed)  -CO      Suspected source of infection     acute abdominal infection  -CO      Are two or more of the following signs & symptoms of infection both present and new to the patient?    (!)  Yes (Proceed)  -CO      Indicate SIRS criteria     Leukocytosis (WBC > 22136 IJL); Tachycardia > 90 bpm  -CO      If the answer is yes to both questions, suspicion of sepsis is present            If severe sepsis is present AND tissue hypoperfusion perists in the hour after fluid resuscitation or lactate > 4, the patient meets criteria for SEPTIC SHOCK            Are any of the following organ dysfunction criteria present within 6 hours of suspected infection and SIRS criteria that are NOT considered to be chronic conditions?    (!)  Yes  -CO      Organ dysfunction     Creatinine > 2 0 mg/dL; Lactate > 2 0 mmol/L;SBP < 90 mmHg  -CO      Date of presentation of severe sepsis   10/16/17  -CO        Time of presentation of severe sepsis   2208  -CO        Tissue hypoperfusion persists in the hour after crystalloid fluid administration, evidenced, by either:            Was hypotension present within one hour of the conclusion of crystalloid fluid administration?  No  -CO          Date of presentation of septic shock            Time of presentation of septic shock              User Key  (r) = Recorded By, (t) = Taken By, (c) = Cosigned By    [address] Name Provider Type    CO Red Martinez PA-C Physician Assistant               Default Flowsheet Data (last 720 hours)      Sepsis Reassessment     Row Name 10/17/17 0004                   Volume Status and Tissue Perfusion Post Fluid Resuscitation- Must Document ALL of the Following:    Vital Signs Reviewed Yes  -CO        Cardio (!)  Normal S1/S2; No murmor; No rub or gallop; Tachycardia  -CO        Pulmonary Normal effort  -CO        Capillary Refill Brisk  -CO        Peripheral Pulses Radial  -CO        Peripheral Pulse +2  -CO        Skin Warm  -CO           *OR*   Intensive Monitoring- Must Document Two * of the Following Four *:    Vital Signs Reviewed Yes  -CO        * Central Venous Pressure (CVP or RAP)          * Central Venous Oxygen (SVO2, ScvO2 or Oxygen saturation via central catheter)          * Bedside Cardiovascular US in IVC diameter and % collapse          * Passive Leg Raise OR Crystalloid Challenge            User Key  (r) = Recorded By, (t) = Taken By, (c) = Cosigned By    Initials Name Provider Type    CO Red Martinez PA-C Physician Assistant

## 2017-10-17 NOTE — PLAN OF CARE
Problem: DISCHARGE PLANNING - CARE MANAGEMENT  Goal: Discharge to post-acute care or home with appropriate resources  INTERVENTIONS:  - Conduct assessment to determine patient/family and health care team treatment goals, and need for post-acute services based on payer coverage, community resources, and patient preferences, and barriers to discharge  - Address psychosocial, clinical, and financial barriers to discharge as identified in assessment in conjunction with the patient/family and health care team  - Arrange appropriate level of post-acute services according to patients   needs and preference and payer coverage in collaboration with the physician and health care team  - Communicate with and update the patient/family, physician, and health care team regarding progress on the discharge plan  - Arrange appropriate transportation to post-acute venues  Outcome: Progressing  no PT eval yet  cm met w pt who reposts admitting to the hospital with stomach issues and diarrhea  resides w wife and Maco Schilling  no AD  ind w adls  no dme  does not drive  wife transports him to medical appts  [facility] hx w [place]  agreeable to RN at d c choice [place]  referral sent  uses [facility] in [facility], also uses mail in [facility]  reports no issues obtaining medications  no other needs reported  wife will transport home   will await any PT recs

## 2017-10-18 PROBLEM — N17.9 ACUTE KIDNEY INJURY SUPERIMPOSED ON CHRONIC KIDNEY DISEASE (HCC): Status: RESOLVED | Noted: 2017-10-17 | Resolved: 2017-10-18

## 2017-10-18 PROBLEM — R19.7 NAUSEA VOMITING AND DIARRHEA: Status: RESOLVED | Noted: 2017-10-17 | Resolved: 2017-10-18

## 2017-10-18 PROBLEM — E87.2 LACTIC ACIDOSIS: Status: RESOLVED | Noted: 2017-10-17 | Resolved: 2017-10-18

## 2017-10-18 PROBLEM — E80.6 HYPERBILIRUBINEMIA: Status: RESOLVED | Noted: 2017-10-17 | Resolved: 2017-10-18

## 2017-10-18 PROBLEM — R11.2 NAUSEA VOMITING AND DIARRHEA: Status: RESOLVED | Noted: 2017-10-17 | Resolved: 2017-10-18

## 2017-10-18 PROBLEM — E87.20 LACTIC ACIDOSIS: Status: RESOLVED | Noted: 2017-10-17 | Resolved: 2017-10-18

## 2017-10-18 PROBLEM — A41.9 SEPSIS (HCC): Status: RESOLVED | Noted: 2017-10-17 | Resolved: 2017-10-18

## 2017-10-18 PROBLEM — N18.9 ACUTE KIDNEY INJURY SUPERIMPOSED ON CHRONIC KIDNEY DISEASE (HCC): Status: RESOLVED | Noted: 2017-10-17 | Resolved: 2017-10-18

## 2017-10-18 LAB
AMMONIA PLAS-SCNC: <10 UMOL/L (ref 11–35)
ANION GAP SERPL CALCULATED.3IONS-SCNC: 10 MMOL/L (ref 4–13)
BASOPHILS # BLD AUTO: 0.02 THOUSANDS/ΜL (ref 0–0.1)
BASOPHILS NFR BLD AUTO: 0 % (ref 0–1)
BUN SERPL-MCNC: 26 MG/DL (ref 5–25)
CALCIUM SERPL-MCNC: 7.9 MG/DL (ref 8.3–10.1)
CHLORIDE SERPL-SCNC: 109 MMOL/L (ref 100–108)
CO2 SERPL-SCNC: 22 MMOL/L (ref 21–32)
CREAT SERPL-MCNC: 1.43 MG/DL (ref 0.6–1.3)
EOSINOPHIL # BLD AUTO: 0.04 THOUSAND/ΜL (ref 0–0.61)
EOSINOPHIL NFR BLD AUTO: 1 % (ref 0–6)
ERYTHROCYTE [DISTWIDTH] IN BLOOD BY AUTOMATED COUNT: 17.1 % (ref 11.6–15.1)
GFR SERPL CREATININE-BSD FRML MDRD: 55 ML/MIN/1.73SQ M
GLUCOSE SERPL-MCNC: 114 MG/DL (ref 65–140)
GLUCOSE SERPL-MCNC: 71 MG/DL (ref 65–140)
GLUCOSE SERPL-MCNC: 73 MG/DL (ref 65–140)
HCT VFR BLD AUTO: 34.3 % (ref 36.5–49.3)
HGB BLD-MCNC: 10.3 G/DL (ref 12–17)
LYMPHOCYTES # BLD AUTO: 1.14 THOUSANDS/ΜL (ref 0.6–4.47)
LYMPHOCYTES NFR BLD AUTO: 18 % (ref 14–44)
MCH RBC QN AUTO: 23.7 PG (ref 26.8–34.3)
MCHC RBC AUTO-ENTMCNC: 30 G/DL (ref 31.4–37.4)
MCV RBC AUTO: 79 FL (ref 82–98)
MONOCYTES # BLD AUTO: 0.47 THOUSAND/ΜL (ref 0.17–1.22)
MONOCYTES NFR BLD AUTO: 7 % (ref 4–12)
NEUTROPHILS # BLD AUTO: 4.76 THOUSANDS/ΜL (ref 1.85–7.62)
NEUTS SEG NFR BLD AUTO: 74 % (ref 43–75)
NRBC BLD AUTO-RTO: 0 /100 WBCS
PLATELET # BLD AUTO: 183 THOUSANDS/UL (ref 149–390)
PMV BLD AUTO: 11.7 FL (ref 8.9–12.7)
POTASSIUM SERPL-SCNC: 4.4 MMOL/L (ref 3.5–5.3)
RBC # BLD AUTO: 4.34 MILLION/UL (ref 3.88–5.62)
SODIUM SERPL-SCNC: 141 MMOL/L (ref 136–145)
WBC # BLD AUTO: 6.47 THOUSAND/UL (ref 4.31–10.16)

## 2017-10-18 PROCEDURE — 80048 BASIC METABOLIC PNL TOTAL CA: CPT | Performed by: PHYSICIAN ASSISTANT

## 2017-10-18 PROCEDURE — 82948 REAGENT STRIP/BLOOD GLUCOSE: CPT

## 2017-10-18 PROCEDURE — 82140 ASSAY OF AMMONIA: CPT | Performed by: ANESTHESIOLOGY

## 2017-10-18 PROCEDURE — 85025 COMPLETE CBC W/AUTO DIFF WBC: CPT | Performed by: PHYSICIAN ASSISTANT

## 2017-10-18 RX ADMIN — METOPROLOL TARTRATE 2.5 MG: 5 INJECTION INTRAVENOUS at 15:30

## 2017-10-18 RX ADMIN — CHLORHEXIDINE GLUCONATE 15 ML: 1.2 RINSE ORAL at 08:17

## 2017-10-18 RX ADMIN — HEPARIN SODIUM 5000 UNITS: 5000 INJECTION, SOLUTION INTRAVENOUS; SUBCUTANEOUS at 14:24

## 2017-10-18 RX ADMIN — ERYTHROMYCIN ETHYLSUCCINATE 400 MG: 200 SUSPENSION ORAL at 17:15

## 2017-10-18 RX ADMIN — METOPROLOL TARTRATE 2.5 MG: 5 INJECTION INTRAVENOUS at 04:25

## 2017-10-18 RX ADMIN — FAMOTIDINE 20 MG: 20 TABLET, FILM COATED ORAL at 17:15

## 2017-10-18 RX ADMIN — FAMOTIDINE 20 MG: 10 INJECTION, SOLUTION INTRAVENOUS at 08:18

## 2017-10-18 RX ADMIN — HEPARIN SODIUM 5000 UNITS: 5000 INJECTION, SOLUTION INTRAVENOUS; SUBCUTANEOUS at 06:07

## 2017-10-18 RX ADMIN — METOPROLOL TARTRATE 2.5 MG: 5 INJECTION INTRAVENOUS at 20:54

## 2017-10-18 RX ADMIN — CHLORHEXIDINE GLUCONATE 15 ML: 1.2 RINSE ORAL at 20:53

## 2017-10-18 RX ADMIN — METOPROLOL TARTRATE 2.5 MG: 5 INJECTION INTRAVENOUS at 08:48

## 2017-10-18 RX ADMIN — ERYTHROMYCIN ETHYLSUCCINATE 400 MG: 200 SUSPENSION ORAL at 08:17

## 2017-10-18 RX ADMIN — PIPERACILLIN SODIUM,TAZOBACTAM SODIUM 2.25 G: 2; .25 INJECTION, POWDER, FOR SOLUTION INTRAVENOUS at 14:24

## 2017-10-18 RX ADMIN — PIPERACILLIN SODIUM,TAZOBACTAM SODIUM 2.25 G: 2; .25 INJECTION, POWDER, FOR SOLUTION INTRAVENOUS at 06:05

## 2017-10-18 RX ADMIN — PIPERACILLIN SODIUM,TAZOBACTAM SODIUM 2.25 G: 2; .25 INJECTION, POWDER, FOR SOLUTION INTRAVENOUS at 21:05

## 2017-10-18 RX ADMIN — FLUTICASONE PROPIONATE AND SALMETEROL 1 PUFF: 50; 250 POWDER RESPIRATORY (INHALATION) at 17:15

## 2017-10-18 RX ADMIN — ERYTHROMYCIN ETHYLSUCCINATE 400 MG: 200 SUSPENSION ORAL at 11:24

## 2017-10-18 RX ADMIN — FLUTICASONE PROPIONATE AND SALMETEROL 1 PUFF: 50; 250 POWDER RESPIRATORY (INHALATION) at 08:17

## 2017-10-18 RX ADMIN — TIOTROPIUM BROMIDE 18 MCG: 18 CAPSULE ORAL; RESPIRATORY (INHALATION) at 08:17

## 2017-10-18 RX ADMIN — HEPARIN SODIUM 5000 UNITS: 5000 INJECTION, SOLUTION INTRAVENOUS; SUBCUTANEOUS at 21:00

## 2017-10-18 NOTE — PROGRESS NOTES
Patient bladder scanned at 1800 s/p bergeron catheter removal and no urine output, patient with 115 mL in bladder

## 2017-10-18 NOTE — PROGRESS NOTES
Transfer Note - ICU Transfer to SD/MS tele   Gurmeet Ríos 78 y o  male MRN: [id number]  [facility]   Unit/Bed#:  Encounter: [id number]    Code Status: Level 1 - Full Code  POA:    POLST:      Reason for ICU adm:     Active problems:   Principal Problem (Resolved):    Lactic acidosis  Active Problems:    Ileus (HCC)    COPD (chronic obstructive pulmonary disease) ([facility] )    History of hypertension  Resolved Problems:    Sepsis ([place] [address] )    Acute kidney injury superimposed on chronic kidney disease ([place] [address] )    Hyperbilirubinemia    Nausea vomiting and diarrhea      Consultants:  General surgery    History of Present Illness:   Per Documentation  " Guremet Ríos is a 78 y o  male who presents On 10/17 with a complaint of 3 days of nausea, vomiting, and diarrhea  He has a past medical history of chronic kidney disease, COPD, type 2 diabetes, gout, iron deficiency anemia, hypertension, history of prostate cancer status post prostatectomy, history of colon cancer status post partial colectomy and wears O2 at home at night  Per the patient there has been no change in his diet and he began spontaneously with nausea and vomiting and intermittent diarrhea 3 days ago  He denies any precipitating factors  After he was unable to keep down Pepto-Bismol he presented to the emergency room  He was found to be tachycardic with a lactic acidosis of  3 and despite fluid resuscitation continued to have a lactic acidosis for this reason he is being referred to the step-down unit for closer monitoring"    Summary of clinical course: On CT imaging patient was found to have dilated distal colon concerning for colonic ileus  In further discussion patient, he is followed by acute pain service for chronic lower lumbar disc herniation, in uses chronic narcotics  Patient was managed medically  NG tube was placed for gastric decompression, and he was started on erythromycin    He is valued by General surgery team who agreed with primary medical management  The preceding day patient malfunction return  He was trialed on a clear liquid diet, and tolerated without difficulty  NG tube was removed 10/8/17  He will continue close diet times 24 hours  And continue to advance as tolerated  Of note patient also noted to have UTI present on admission  While NPO patient was started on Zosyn for broad-spectrum coverage  He can be de-escalated to p o  antibiotics once he is tolerating advanced diet  He is currently day 3 of 7 of antibiotics  Recent or scheduled procedures: Outstanding/pending diagnostics: n/a       Mobilization Plan: Activity as tolerated  Nutrition Plan:  Clinical diet times 24 hours end date 10/19/17  Advance as tolerated  Antibiotic plan:  Day 3 of 7 of antibiotics  Transition to p  o  medication when tolerating advanced diet  Discharge Plan:   Patient should be ready for discharge in next 24-48 hours  Specific Diagnosis Plan:    Sepsis: Source: urine , Antibiotics:  Zosyn, Length:  7 days    Need for Infectious Disease consult: no  Heart Failure:  Cardiology consult placed  Diuresis plan: N/A  Hyperglycemia:  Converting from IV to subcutaneous insulin: N/a    Need for Endocrine consult: no    Spoke with Dr Corea Patches  regarding transfer  Please call critical care with any questions or concerns  Portions of the record may have been created with voice recognition software  Occasional wrong word or "sound a like" substitutions may have occurred due to the inherent limitations of voice recognition software  Read the chart carefully and recognize, using context, where substitutions have occurred      Primo Miranda PA-C

## 2017-10-18 NOTE — PROGRESS NOTES
Progress Note - [address] 78 y o  male MRN: [id number]  Unit/Bed#:  Encounter: [id number]    Attending Physician: Yandel Rowe MD      ______________________________________________________________________  Assessment and Plan:   Principal Problem:    Lactic acidosis  Active Problems:    Sepsis ([facility] )    Ileus ([facility] [address] )    Acute kidney injury superimposed on chronic kidney disease ([address] )    Hyperbilirubinemia    Nausea vomiting and diarrhea    COPD (chronic obstructive pulmonary disease) ([facility] )    History of hypertension  Resolved Problems:    * No resolved hospital problems  *        Neuro: continue to monitor neuro status, obtain ammonia level    CV: normotensive, 1st degree AV block, continue to monitor    Pulm: encourage deep breathing, have patient OOB to chair    GI: Small bowel obstruction, keep NG tube to LIS, general surgery consulted, medical management for now    : strict I&O's    F/E/N: NPO    ID: ABX as ordered    Heme: monitor labs, replete electrolytes as indicated    Endo: monitor Bg, insulin per protocol       Disposition: keep In ICU    Code Status: Level 1 - Full Code    Counseling / Coordination of Care  Total Critical Care time spent 35 minutes excluding procedures, teaching and family updates      ______________________________________________________________________    Chief Complaint: 3 day hx of N/V/D, CT abd/pelvis, small bowel obstruction    24 Hour Events: abdomen remains distended, no bowel movement overnight      ______________________________________________________________________    Physical Exam:   Constitutional: ill appearing  HEENT: normocephalic, PEARLA, sclera anicteric, airway patent  Pulm: Lungs =CTA  CV: normotensive, 1st degree AV block, S1S2 neg GMR  ABD: distended, bowel sounds not appreciated  M/S: good ROM x4  Skin: intact w/d  Neuro: no focal deficits  Mental Status: CA&Ox3, in proper sensorium    ______________________________________________________________________  Vitals:    10/17/17 1137 10/17/17 1547 10/17/17 1848 10/17/17 2318   BP: 119/76 130/84 134/81 139/81   Pulse: (!) 106 (!) 106 97 89   Resp: 14  16   Temp: 97 8 °F (36 6 °C) 98 5 °F (36 9 °C) 97 5 °F (36 4 °C) 98 4 °F (36 9 °C)   TempSrc: Oral Oral Oral Oral   SpO2: 91% 94% 96% 92%   Weight:       Height:                  Temperature:   Temp (24hrs), Av °F (36 7 °C), Min:97 5 °F (36 4 °C), Max:98 5 °F (36 9 °C)    Current Temperature: 98 4 °F (36 9 °C)  Weights:   IBW: 79 9 kg    Body mass index is 33 86 kg/m²  Weight (last 2 days)     Date/Time   Weight    10/17/17 0259  116 (256 62)    10/16/17 2232  117 (257 28)            Hemodynamic Monitoring:  N/A     Non-Invasive/Invasive Ventilation Settings:  Respiratory    Lab Data (Last 4 hours)    None         O2/Vent Data (Last 4 hours)    None              No results found for: PHART, RVA9OZL, PO2ART, HYL0OUX, C9QGQMNH, BEART, SOURCE  SpO2: SpO2: 92 %  Intake and Outputs:  I/O       10/16 0701 - 10/17 0700 10/17 07 - 10/18 0700    I V  (mL/kg)  1500 (12 9)    NG/GT  60    IV Piggyback 7548 3 50    Total Intake(mL/kg) 7548 3 (65 1) 1610 (13 9)    Urine (mL/kg/hr) 500 725 (0 3)    Emesis/NG output  50 (0)    Stool  1000 (0 4)    Total Output 500 1775    Net +7048 3 -165              UOP: 1700 ml out   Nutrition:        Diet Orders            Start     Ordered    10/17/17 1158  Diet NPO  Diet effective now     Question Answer Comment   Diet Type NPO    RD to adjust diet per protocol?  No        10/17/17 115          Labs:     Results from last 7 days  Lab Units 10/17/17  0908 10/16/17  2208   WBC Thousand/uL 9 39 12 04*   HEMOGLOBIN g/dL 14 6 15 9   HEMATOCRIT % 47 0 50 9*   PLATELETS Thousands/uL 229 299   MONO PCT MAN % 8 4       Results from last 7 days  Lab Units 10/17/17  0908 10/16/17  2208   SODIUM mmol/L 141 139   POTASSIUM mmol/L 4 0 4 0   CHLORIDE mmol/L 106 99* CO2 mmol/L 21 22   BUN mg/dL 48* 52*   CREATININE mg/dL 3 45* 5 19*   CALCIUM mg/dL 8 7 9 9   TOTAL PROTEIN g/dL 7 8 9 1*   BILIRUBIN TOTAL mg/dL 1 30* 1 80*   ALK PHOS U/L 67 85   ALT U/L 31 32   AST U/L 44 42   GLUCOSE RANDOM mg/dL 113 152*       Results from last 7 days  Lab Units 10/16/17  2208   MAGNESIUM mg/dL 2 1     Lab Results   Component Value Date    PHOS 2 7 09/12/2017        Results from last 7 days  Lab Units 10/16/17  2208   INR  1 10   PTT seconds 31       0  Lab Value Date/Time   TROPONINI <0 02 10/16/2017 2208       Results from last 7 days  Lab Units 10/17/17  0225 10/17/17  0005 10/16/17  2208   LACTIC ACID mmol/L 1 5 2 6* 3 4*     ABG:No results found for: PHART, BRK5RTL, PO2ART, ORL2AUD, A3VLOSKY, BEART, SOURCE  Imaging:  I have personally reviewed pertinent reports  EKG: monitor 1st degree av block  Micro:  No results found for: Anita Pennington, WOUNDCULT, SPUTUMCULTUR  Allergies: No Known Allergies  Medications:   Scheduled Meds:  chlorhexidine 15 mL Swish & Spit Q12H [address]   erythromycin ethylsuccinate 400 mg Oral TID AC   famotidine 20 mg Oral BID   Or      famotidine 20 mg Intravenous BID   fluticasone-salmeterol 1 puff Inhalation BID   heparin (porcine) 5,000 Units Subcutaneous Q8H [address]   metoprolol 2 5 mg Intravenous Q6H   piperacillin-tazobactam 2 25 g Intravenous Q8H   tiotropium 18 mcg Inhalation Daily     Continuous Infusions:  lactated ringers 125 mL/hr Last Rate: 125 mL/hr (10/17/17 2204)     PRN Meds:    albuterol 2 5 mg Q4H PRN     VTE Pharmacologic Prophylaxis: Heparin  VTE Mechanical Prophylaxis: sequential compression device  Invasive lines and devices:   Invasive Devices     Peripheral Intravenous Line            Peripheral IV 10/16/17 Right Antecubital 1 day    Peripheral IV 10/16/17 Right Hand 1 day          Drain            Urethral Catheter Temperature probe 1 day    NG/OG/Enteral Tube Nasogastric 16 Fr Right nares less than 1 day                     Portions of the record may have been created with voice recognition software  Occasional wrong word or "sound a like" substitutions may have occurred due to the inherent limitations of voice recognition software  Read the chart carefully and recognize, using context, where substitutions have occurred      Enrique Nava

## 2017-10-18 NOTE — PLAN OF CARE
DISCHARGE PLANNING     Discharge to home or other facility with appropriate resources Progressing        DISCHARGE PLANNING - CARE MANAGEMENT     Discharge to post-acute care or home with appropriate resources Progressing        GASTROINTESTINAL - ADULT     Minimal or absence of nausea and/or vomiting Progressing     Maintains adequate nutritional intake Progressing        GENITOURINARY - ADULT     Maintains or returns to baseline urinary function Progressing     Urinary catheter remains patent Progressing        INFECTION - ADULT     Absence or prevention of progression during hospitalization Progressing        Knowledge Deficit     Patient/family/caregiver demonstrates understanding of disease process, treatment plan, medications, and discharge instructions Progressing        METABOLIC, FLUID AND ELECTROLYTES - ADULT     Electrolytes maintained within normal limits Progressing     Fluid balance maintained Progressing        Nutrition/Hydration-ADULT     Nutrient/Hydration intake appropriate for improving, restoring or maintaining nutritional needs Progressing        PAIN - ADULT     Verbalizes/displays adequate comfort level or baseline comfort level Progressing        Potential for Falls     Patient will remain free of falls Progressing        Prexisting or High Potential for Compromised Skin Integrity     Skin integrity is maintained or improved Progressing        SAFETY ADULT     Maintain or return to baseline ADL function Progressing     Maintain or return mobility status to optimal level Progressing

## 2017-10-19 LAB
ANION GAP SERPL CALCULATED.3IONS-SCNC: 8 MMOL/L (ref 4–13)
BASOPHILS # BLD AUTO: 0.02 THOUSANDS/ΜL (ref 0–0.1)
BASOPHILS NFR BLD AUTO: 0 % (ref 0–1)
BUN SERPL-MCNC: 14 MG/DL (ref 5–25)
CALCIUM SERPL-MCNC: 8.4 MG/DL (ref 8.3–10.1)
CHLORIDE SERPL-SCNC: 108 MMOL/L (ref 100–108)
CO2 SERPL-SCNC: 27 MMOL/L (ref 21–32)
CREAT SERPL-MCNC: 1.4 MG/DL (ref 0.6–1.3)
EOSINOPHIL # BLD AUTO: 0.07 THOUSAND/ΜL (ref 0–0.61)
EOSINOPHIL NFR BLD AUTO: 1 % (ref 0–6)
ERYTHROCYTE [DISTWIDTH] IN BLOOD BY AUTOMATED COUNT: 16.8 % (ref 11.6–15.1)
GFR SERPL CREATININE-BSD FRML MDRD: 56 ML/MIN/1.73SQ M
GLUCOSE SERPL-MCNC: 86 MG/DL (ref 65–140)
GLUCOSE SERPL-MCNC: 90 MG/DL (ref 65–140)
GLUCOSE SERPL-MCNC: 92 MG/DL (ref 65–140)
HCT VFR BLD AUTO: 39.1 % (ref 36.5–49.3)
HGB BLD-MCNC: 12.2 G/DL (ref 12–17)
LYMPHOCYTES # BLD AUTO: 1.8 THOUSANDS/ΜL (ref 0.6–4.47)
LYMPHOCYTES NFR BLD AUTO: 26 % (ref 14–44)
MAGNESIUM SERPL-MCNC: 1.7 MG/DL (ref 1.6–2.6)
MCH RBC QN AUTO: 23.9 PG (ref 26.8–34.3)
MCHC RBC AUTO-ENTMCNC: 31.2 G/DL (ref 31.4–37.4)
MCV RBC AUTO: 77 FL (ref 82–98)
MONOCYTES # BLD AUTO: 0.42 THOUSAND/ΜL (ref 0.17–1.22)
MONOCYTES NFR BLD AUTO: 6 % (ref 4–12)
NEUTROPHILS # BLD AUTO: 4.55 THOUSANDS/ΜL (ref 1.85–7.62)
NEUTS SEG NFR BLD AUTO: 66 % (ref 43–75)
NRBC BLD AUTO-RTO: 0 /100 WBCS
PHOSPHATE SERPL-MCNC: 2.2 MG/DL (ref 2.3–4.1)
PLATELET # BLD AUTO: 226 THOUSANDS/UL (ref 149–390)
PMV BLD AUTO: 10.9 FL (ref 8.9–12.7)
POTASSIUM SERPL-SCNC: 3.6 MMOL/L (ref 3.5–5.3)
RBC # BLD AUTO: 5.11 MILLION/UL (ref 3.88–5.62)
SODIUM SERPL-SCNC: 143 MMOL/L (ref 136–145)
WBC # BLD AUTO: 6.9 THOUSAND/UL (ref 4.31–10.16)

## 2017-10-19 PROCEDURE — 85025 COMPLETE CBC W/AUTO DIFF WBC: CPT | Performed by: PHYSICIAN ASSISTANT

## 2017-10-19 PROCEDURE — 82948 REAGENT STRIP/BLOOD GLUCOSE: CPT

## 2017-10-19 PROCEDURE — 83735 ASSAY OF MAGNESIUM: CPT | Performed by: PHYSICIAN ASSISTANT

## 2017-10-19 PROCEDURE — 84100 ASSAY OF PHOSPHORUS: CPT | Performed by: PHYSICIAN ASSISTANT

## 2017-10-19 PROCEDURE — 80048 BASIC METABOLIC PNL TOTAL CA: CPT | Performed by: PHYSICIAN ASSISTANT

## 2017-10-19 RX ORDER — METOPROLOL SUCCINATE 25 MG/1
25 TABLET, EXTENDED RELEASE ORAL DAILY
Status: DISCONTINUED | OUTPATIENT
Start: 2017-10-19 | End: 2017-10-20 | Stop reason: HOSPADM

## 2017-10-19 RX ORDER — ALLOPURINOL 100 MG/1
100 TABLET ORAL DAILY
Status: DISCONTINUED | OUTPATIENT
Start: 2017-10-19 | End: 2017-10-20 | Stop reason: HOSPADM

## 2017-10-19 RX ORDER — LOSARTAN POTASSIUM 50 MG/1
100 TABLET ORAL DAILY
Status: DISCONTINUED | OUTPATIENT
Start: 2017-10-19 | End: 2017-10-20 | Stop reason: HOSPADM

## 2017-10-19 RX ORDER — CEFDINIR 300 MG/1
300 CAPSULE ORAL EVERY 12 HOURS SCHEDULED
Status: DISCONTINUED | OUTPATIENT
Start: 2017-10-19 | End: 2017-10-20 | Stop reason: HOSPADM

## 2017-10-19 RX ORDER — BRAN 5 G
WAFER ORAL 2 TIMES DAILY
Status: DISCONTINUED | OUTPATIENT
Start: 2017-10-19 | End: 2017-10-19

## 2017-10-19 RX ORDER — AMLODIPINE BESYLATE 5 MG/1
5 TABLET ORAL DAILY
Status: DISCONTINUED | OUTPATIENT
Start: 2017-10-19 | End: 2017-10-20 | Stop reason: HOSPADM

## 2017-10-19 RX ORDER — POTASSIUM CHLORIDE 20 MEQ/1
40 TABLET, EXTENDED RELEASE ORAL ONCE
Status: COMPLETED | OUTPATIENT
Start: 2017-10-19 | End: 2017-10-19

## 2017-10-19 RX ORDER — MAGNESIUM SULFATE HEPTAHYDRATE 40 MG/ML
2 INJECTION, SOLUTION INTRAVENOUS ONCE
Status: COMPLETED | OUTPATIENT
Start: 2017-10-19 | End: 2017-10-19

## 2017-10-19 RX ADMIN — HEPARIN SODIUM 5000 UNITS: 5000 INJECTION, SOLUTION INTRAVENOUS; SUBCUTANEOUS at 15:51

## 2017-10-19 RX ADMIN — CHLORHEXIDINE GLUCONATE 15 ML: 1.2 RINSE ORAL at 20:05

## 2017-10-19 RX ADMIN — ERYTHROMYCIN ETHYLSUCCINATE 400 MG: 200 SUSPENSION ORAL at 17:02

## 2017-10-19 RX ADMIN — AMLODIPINE BESYLATE 5 MG: 5 TABLET ORAL at 09:19

## 2017-10-19 RX ADMIN — METOPROLOL TARTRATE 2.5 MG: 5 INJECTION INTRAVENOUS at 03:06

## 2017-10-19 RX ADMIN — HEPARIN SODIUM 5000 UNITS: 5000 INJECTION, SOLUTION INTRAVENOUS; SUBCUTANEOUS at 05:16

## 2017-10-19 RX ADMIN — POTASSIUM CHLORIDE 40 MEQ: 1500 TABLET, EXTENDED RELEASE ORAL at 09:18

## 2017-10-19 RX ADMIN — CEFDINIR 300 MG: 300 CAPSULE ORAL at 15:51

## 2017-10-19 RX ADMIN — FAMOTIDINE 20 MG: 20 TABLET, FILM COATED ORAL at 17:02

## 2017-10-19 RX ADMIN — FLUTICASONE PROPIONATE AND SALMETEROL 1 PUFF: 50; 250 POWDER RESPIRATORY (INHALATION) at 09:19

## 2017-10-19 RX ADMIN — TIOTROPIUM BROMIDE 18 MCG: 18 CAPSULE ORAL; RESPIRATORY (INHALATION) at 09:19

## 2017-10-19 RX ADMIN — METOPROLOL SUCCINATE 25 MG: 25 TABLET, FILM COATED, EXTENDED RELEASE ORAL at 09:19

## 2017-10-19 RX ADMIN — CHLORHEXIDINE GLUCONATE 15 ML: 1.2 RINSE ORAL at 09:19

## 2017-10-19 RX ADMIN — ALLOPURINOL 100 MG: 100 TABLET ORAL at 09:18

## 2017-10-19 RX ADMIN — ERYTHROMYCIN ETHYLSUCCINATE 400 MG: 200 SUSPENSION ORAL at 09:19

## 2017-10-19 RX ADMIN — HEPARIN SODIUM 5000 UNITS: 5000 INJECTION, SOLUTION INTRAVENOUS; SUBCUTANEOUS at 21:13

## 2017-10-19 RX ADMIN — CEFDINIR 300 MG: 300 CAPSULE ORAL at 20:05

## 2017-10-19 RX ADMIN — ERYTHROMYCIN ETHYLSUCCINATE 400 MG: 200 SUSPENSION ORAL at 12:55

## 2017-10-19 RX ADMIN — FLUTICASONE PROPIONATE AND SALMETEROL 1 PUFF: 50; 250 POWDER RESPIRATORY (INHALATION) at 17:02

## 2017-10-19 RX ADMIN — FAMOTIDINE 20 MG: 20 TABLET, FILM COATED ORAL at 09:18

## 2017-10-19 RX ADMIN — PIPERACILLIN SODIUM,TAZOBACTAM SODIUM 2.25 G: 2; .25 INJECTION, POWDER, FOR SOLUTION INTRAVENOUS at 05:16

## 2017-10-19 RX ADMIN — LOSARTAN POTASSIUM 100 MG: 50 TABLET, FILM COATED ORAL at 09:18

## 2017-10-19 RX ADMIN — MAGNESIUM SULFATE HEPTAHYDRATE 2 G: 40 INJECTION, SOLUTION INTRAVENOUS at 09:19

## 2017-10-19 NOTE — SOCIAL WORK
S/w Dr Alanna Yee who thought pt may be ready to go but was hesistant as he had not been seen by PT so he placed PT eval order  I questioned if nursing had ambulated the pt  I questioned this as the pt has an ileus  Checked with nursing who stated that pt had been out of bed ambulating and doing well  DCP is for pt to go home with  and possible PT with [facility]

## 2017-10-19 NOTE — PROGRESS NOTES
[facility] Progress Note  Patient: Gina Ramirez 78 y o  male   MRN: [id number]  PCP: Ar Gardner MD  Unit/Bed#:  Encounter: [id number]  Date Of Visit: 10/19/17    Assessment:    Active Problems:    Ileus ([facility] [address] )    COPD (chronic obstructive pulmonary disease) ([facility] [address] )    History of hypertension      Plan:    1  Small-bowel obstruction, on imaging, patient has benign exam today also spoke with surgery with signed off after initial evaluation, NG tube has been removed patient now tolerating a diet, will continue erythromycin for now discontinue, after a total of 9 doses will not discharge home on prokinetic   2  Lactic acidosis time of admission, was not hypotensive though, sepsis, possibly from urinary tract infection, resolved  3  UA shows bacteria, no culture was done though, no indication for IV antibiotics will continue with p  o  since at this time the culture will be unreliable  4  Diet has been advanced and he seems to be tolerating it  5  Physical therapy occupational therapy, will ambulate today and anticipate/plan discharge tomorrow  6  Morbid obesity nutritional counseling  7  Acute kidney injury on chronic kidney disease, improved very quickly with resuscitation and improved hemodynamics, creatinine seems now to be at baseline         VTE Pharmacologic Prophylaxis:   Pharmacologic: Heparin  Mechanical VTE Prophylaxis in Place: Yes    Patient Centered Rounds: I have performed bedside rounds with nursing staff today  Discussions with Specialists or Other Care Team Provider: y    Education and Discussions with Family / Patient: y    Time Spent for Care: 20 minutes  More than 50% of total time spent on counseling and coordination of care as described above      Current Length of Stay: 2 day(s)    Current Patient Status: Inpatient   Certification Statement: The patient will continue to require additional inpatient hospital stay due to Small-bowel obstruction        Code Status: Level 1 - Full Code      Subjective:   Patient feels better today as per him, tolerated his lunch    Objective:     Vitals:   Temp (24hrs), Av 1 °F (36 7 °C), Min:97 8 °F (36 6 °C), Max:98 4 °F (36 9 °C)    HR:  [] 86  Resp:  [14-25] 14  BP: (130-157)/(73-96) 136/82  SpO2:  [89 %-94 %] 91 %  Body mass index is 33 86 kg/m²  Input and Output Summary (last 24 hours): Intake/Output Summary (Last 24 hours) at 10/19/17 1404  Last data filed at 10/19/17 0000   Gross per 24 hour   Intake              100 ml   Output              850 ml   Net             -750 ml       Physical Exam:     Physical Exam   Constitutional: He is oriented to person, place, and time  No distress  HENT:   Head: Normocephalic  Eyes: Pupils are equal, round, and reactive to light  Neck: No tracheal deviation present  Cardiovascular: Normal rate  No murmur heard  Pulmonary/Chest: No respiratory distress  He has no wheezes  Abdominal: He exhibits no distension  There is no tenderness  Musculoskeletal: He exhibits no edema  Neurological: He is alert and oriented to person, place, and time  Skin: He is not diaphoretic  Additional Data:     Labs:      Results from last 7 days  Lab Units 10/19/17  0520   WBC Thousand/uL 6 90   HEMOGLOBIN g/dL 12 2   HEMATOCRIT % 39 1   PLATELETS Thousands/uL 226   NEUTROS PCT % 66   LYMPHS PCT % 26   MONOS PCT % 6   EOS PCT % 1       Results from last 7 days  Lab Units 10/19/17  0520  10/17/17  0908   SODIUM mmol/L 143  < > 141   POTASSIUM mmol/L 3 6  < > 4 0   CHLORIDE mmol/L 108  < > 106   CO2 mmol/L 27  < > 21   BUN mg/dL 14  < > 48*   CREATININE mg/dL 1 40*  < > 3 45*   CALCIUM mg/dL 8 4  < > 8 7   TOTAL PROTEIN g/dL  --   --  7 8   BILIRUBIN TOTAL mg/dL  --   --  1 30*   ALK PHOS U/L  --   --  67   ALT U/L  --   --  31   AST U/L  --   --  44   GLUCOSE RANDOM mg/dL 90  < > 113   < > = values in this interval not displayed      Results from last 7 days  Lab Units 10/16/17  5591 INR  1 10       * I Have Reviewed All Lab Data Listed Above  * Additional Pertinent Lab Tests Reviewed: [address] Admission Reviewed    Imaging:      Recent Cultures (last 7 days):       Results from last 7 days  Lab Units 10/17/17  1136 10/16/17  2210 10/16/17  2208   BLOOD CULTURE   --  No Growth at 48 hrs  No Growth at 48 hrs  C DIFF TOXIN B  NEGATIVE for C difficle toxin by PCR    --   --        Last 24 Hours Medication List:     allopurinol 100 mg Oral Daily   amLODIPine 5 mg Oral Daily   cefdinir 300 mg Oral Q12H LAZ   chlorhexidine 15 mL [employer] Spit Q12H [address]   erythromycin ethylsuccinate 400 mg Oral TID AC   famotidine 20 mg Oral BID   fluticasone-salmeterol 1 puff Inhalation BID   heparin (porcine) 5,000 Units Subcutaneous Q8H [address]   losartan 100 mg Oral Daily   metoprolol succinate 25 mg Oral Daily   tiotropium 18 mcg Inhalation Daily        Today, Patient Was Seen By: Usama Chamberlain MD    ** Please Note: Dragon 360 Dictation voice to text software may have been used in the creation of this document   **

## 2017-10-19 NOTE — CONSULTS
Assessment  1  Chronic kidney disease, stage 3 (585 3) (N18 3)   2  Benign hypertension with chronic kidney disease (403 10,585 9) (I12 9)    Plan  Chronic kidney disease, stage 3    · (1) BASIC METABOLIC PROFILE; Status:Active; Requested for:72Std6782;    Perform:[facility]; Due:35Lwq5602; Ordered; For:Chronic kidney disease, stage 3; Ordered By:Brian Pierre;   · (1) BASIC METABOLIC PROFILE; Status:Active; Requested for:57Fpd2375;    Perform:[facility]; Due:47Ykk0646; Ordered; For:Chronic kidney disease, stage 3; Ordered By:Brian Pierre;   · (1) CBC/PLT/DIFF; Status:Active; Requested for:19Xss4159;    Perform:[facility]; Due:50Gsr2377; Ordered; For:Chronic kidney disease, stage 3; Ordered By:Brian Pierre;   · (1) CBC/PLT/DIFF; Status:Active; Requested for:85Sxu5270;    Perform:[facility]; Due:29Nnd3750; Ordered; For:Chronic kidney disease, stage 3; Ordered By:Brian Pierre;   · (1) MICROALBUMIN CREATININE RATIO, RANDOM URINE; Status:Active; Requested  for:00Cuf3708;    Perform:[facility]; Due:71Oig3468; Ordered; For:Chronic kidney disease, stage 3; Ordered By:Brian Pierre;   · (1) MICROALBUMIN CREATININE RATIO, RANDOM URINE; Status:Active; Requested  for:61Kku7903;    Perform:[facility]; Due:95Ljt7234; Ordered; For:Chronic kidney disease, stage 3; Ordered By:Brian Pierre;   · (1) PHOSPHORUS; Status:Active; Requested for:37Yaw6861;    Perform:[facility]; Due:57Efq7640; Ordered; For:Chronic kidney disease, stage 3; Ordered By:Brian Pierre;   · (1) PHOSPHORUS; Status:Active; Requested for:85Wvx5364;    Perform:[facility]; Due:46Lyp2480; Ordered; For:Chronic kidney disease, stage 3; Ordered By:Brian Pierre;   · (1) PTH N-TERMINAL (INTACT); Status:Active; Requested for:04Wvg1518;    Perform:[facility]; Due:29Abo0011; Ordered; For:Chronic kidney disease, stage 3; Ordered By:Brian Pierre;   · (1) PTH N-TERMINAL (INTACT); Status:Active; Requested for:38Ljx3266;    Perform:[facility]; Due:06Sli7423; Ordered; For:Chronic kidney disease, stage 3; Ordered By:Brian Pierre;   · (1) URIC ACID; Status:Active; Requested for:56Vno7454;    Perform:[facility]; Due:44Abg0123; Ordered; For:Chronic kidney disease, stage 3; Ordered By:Brian Pierre;   · (1) URIC ACID; Status:Active; Requested for:23Zml3689;    Perform:[facility]; Due:96Syz0651; Ordered; For:Chronic kidney disease, stage 3; Ordered By:Brian Pierre;   · (1) URINALYSIS WITH MICROSCOPIC; Status:Active; Requested for:68Kvl3734;    Perform:[facility]; Due:60Evn3227; Ordered; For:Chronic kidney disease, stage 3; Ordered By:Brian Pierre;   · (1) URINALYSIS WITH MICROSCOPIC; Status:Active; Requested for:08Fah4473;    Perform:[facility]; Due:78Iby2029; Ordered; For:Chronic kidney disease, stage 3; Ordered By:Brian Pierre;   · (1) VITAMIN D 25-HYDROXY; Status:Active; Requested for:45Uok5020;    Perform:[facility]; Due:50Dyb5936; Ordered; For:Chronic kidney disease, stage 3; Ordered By:Brian Pierre;   · (1) VITAMIN D 25-HYDROXY; Status:Active; Requested for:78Vhq7484;    Perform:[facility]; Due:96Yhv9333; Ordered; For:Chronic kidney disease, stage 3; Ordered By:Brian Pierre;   · Follow-up visit in 3 months Evaluation and Treatment  Follow-up  Status: Hold For -  Scheduling  Requested for: 79Mwg3239   Ordered; For: Chronic kidney disease, stage 3; Ordered By: Moni Storey Performed:  Due: 93IFZ9813    Discussion/Summary    1  CKD stage III  Multifactorial and suspect secondary to underlying hypertensive nephrosclerosis  review of old medical records patient's previously known baseline creatinine was between 1 3-1 6   Patient's last known creatinine was 1 88 with EGFR of 42   to check CBC, BMP, uric acid, PTH, vitamin D, urinalysis along with urine microalbumin to creatinine ratio today to further evaluate chronic kidney disease  Patient had underwent a renal ultrasound on 7/24/2017 which showed multiple simple left renal cyst but no stone or hydronephrosis on either kidneys  blood pressure was elevated in the office but said that he has a history of white coat hypertension and his blood pressure was generally well controlled at home  Patient was advised to make a log of home blood pressure for our review with the next visit  to avoid NSAIDs and plan to recheck renal function before next visit  Hypertension in chronic kidney disease  Today's blood pressure was elevated and able to go although patient said that he has a history of white coat hypertension and he regularly checks blood pressure at home which is well controlled  I have advised patient to follow a low-salt diet and medical home log of blood pressure  Plan to monitor hypertension today with chlorthalidone 12 5 mg q  daily, losartan 100 mg q  daily and metoprolol extended release 25 mg q  daily  to nephrology clinic in 3 months  Future labs ordered  can be reached at [phone]  [reviewed on 9/13/2017]-called and discussed results with the patientcreatinine at 1 32 with EGFR of 61  Hemoglobin 13 5  Urine analysis showed trace microscopic hematuria + calcium oxalate crystals but no signs of dysuria -> ? Stone    Advise patient to drink at least 2 L of water every day unless tablet labs leg swelling  Urine microalbumin to creatinine ratio of 9 mg -> continue losartan 100 mg PO daily  Normal PTH [27], vitamin D [46], uric acid [6 0], sodium, potassium, bicarbonate, calcium and phosphorus  The patient was counseled regarding diagnostic results,-- instructions for management,-- risk factor reductions,-- prognosis,-- patient and family education,-- impressions,-- risks and benefits of treatment options,-- importance of compliance with treatment  Patient is able to Self-Care  Check blood pressure regularly at home and make a log     Possible side effects of new medications were reviewed with the patient/guardian today  The treatment plan was reviewed with the patient/guardian  The patient/guardian understands and agrees with the treatment plan      Reason For Visit  Further management of CKD stage III      History of Present Illness  This is 79-year-old male with a past medical history of gout, chronic kidney disease, hypertension, COPD, hyperlipidemia recently found to have creatinine of 1 88 with EGFR 42 and was referred to nephrology for further management of CKD stage III  review of old medical records patient's previously known baseline creatinine was between 1 3-1 6   has a history of hypertension and said that he also history of white coat hypertension and his blood pressure is severely elevated at doctor's office but he routinely check blood pressure at home and according to patient his blood pressure has been well controlled at home  was also taking NSAID in the past but for last few months he has stopped taking NSAID and takes only Tylenol as needed  also a history of gout and did not have any gout flareup in last few years  also history of COPD  Patient currently does not smoke and his COPD is currently well controlled on current regimen  Review of Systems    Constitutional: no fever,-- no chills,-- no fatigue-- and-- not feeling poorly  Eyes: no dryness of the eyes  ENT: no hearing loss-- and-- no nasal discharge  Cardiovascular: no orthopnea,-- no chest pain,-- no palpitations-- and-- no lower extremity edema  Respiratory: no shortness of breath,-- no wheezing,-- no cough-- and-- no shortness of breath during exertion  Gastrointestinal: no abdominal pain,-- no nausea-- and-- no diarrhea  Genitourinary: no dysuria,-- does not have slow stream-- and-- no nocturia  Musculoskeletal: no arthralgias,-- no joint swelling,-- no joint stiffness-- and-- no back pain  Integumentary: no itching-- and-- no skin wound     Neurological: no headache,-- no numbness-- and-- no dizziness  Psychiatric: not suicidal,-- no anxiety-- and-- no depression  Endocrine: no muscle weakness  Hematologic/Lymphatic: no tendency for easy bleeding  ROS reviewed  Active Problems  1  Adenocarcinoma of prostate (185) (C61)   2  Changes in skin texture (782 8) (R23 4)   3  Chronic kidney disease, stage 3 (585 3) (N18 3)   4  Chronic obstructive pulmonary disease (496) (J44 9)   5  COPD with exacerbation (491 21) (J44 1)   6  Disc degeneration, 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) (M51 36)   7  DMII (diabetes mellitus, type 2) (250 00) (E11 9)   8  Dyslipidemia (272 4) (E78 5)   9  Esophageal reflux (530 81) (K21 9)   10  Fissure, anal (565 0) (K60 2)   11  Generalized osteoarthritis of multiple sites (715 09) (M15 9)   12  Gout (274 9) (M10 9)   13  Herniated lumbar intervertebral disc (722 10) (M51 26)   14  History of colon cancer (V10 05) (Z85 038)   15  History of prostate cancer (V10 46) (Z85 46)   16  Hypertension (401 9) (I10)   17  Iron deficiency anemia (280 9) (D50 9)   18  Knee pain, right (719 46) (M25 561)   19  Lumbago (724 2) (M54 5)   20  Lumbar canal stenosis (724 02) (M48 06)   21  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)   22  Lung nodule (793 11) (R91 1)   23  Nausea (787 02) (R11 0)   24  Need for influenza vaccination (V04 81) (Z23)   25  Need for pneumococcal vaccination (V03 82) (Z23)   26  Need for pneumococcal vaccine (V03 82) (Z23)   27  Obesity (278 00) (E66 9)   28  Pilonidal cyst (685 1) (L05 91)   29  Renal mass, right (593 9) (N28 89)   30  Requires oxygen therapy (V46 2) (Z99 81)   31  Screening for genitourinary condition (V81 6) (Z13 89)   32  SOB (shortness of breath) (786 05) (R06 02)   33  Tachycardia (785 0) (R00 0)   34  UTI (urinary tract infection) (599 0) (N39 0)   35  Vitamin B12 deficiency (266 2) (E53 8)   36  Vitamin D deficiency (268 9) (E55 9)   37  Weight loss, non-intentional (783 21) (R63 4)   38   Wound of skin (782 9) (R23 8)    Past Medical History  1  History of Bursitis of hip, unspecified laterality   2  History of Colonoscopy (Fiberoptic)   3  History of Congestive heart failure (428 0) (I50 9)   4  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)   5  History of Malignant Oral Cavity Neoplasm M0   6  History of Open Wound Of The Toe(S) (893 0)   7  History of Prostate Cancer (V10 46)   8  History of Special screening examination for neoplasm of prostate (V76 44) (Z12 5)   9  History of Tachycardia (785 0) (R00 0)   10  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)   11  History of X-Ray UGI Small Bowel Obstruction    The active problems and past medical history were reviewed and updated today  Surgical History  1  History of Appendectomy   2  History of CABG   3  History of Cath Stent Placement   4  History of Cholecystectomy   5  History of Incisional Hernia Repair   6  History of Partial Colectomy   7  History of Prostate Surgery    The surgical history was reviewed and updated today  Family History  Mother    1  Family history of Congestive Heart Failure   2  Family history of Coronary Artery Disease (V17 49)  Father    3  Family history of Coronary Artery Disease (305 90)    The family history was reviewed and updated today  Social History   · Denied: History of Alcohol Use (History)   · Denied: History of Drug Use   · Former smoker (W51 08) (O71 960)   · Former tobacco use (V15 82) (Z87 891)   · Living Independently With Spouse   · Non smoker / tobacco user (V49 89) (Z78 9)   · Occupation:  The social history was reviewed and updated today  The social history was reviewed and is unchanged  Current Meds   1  Acetaminophen-Codeine #3 300-30 MG Oral Tablet; TAKE 1 TABLET EVERY 4 TO 6   HOURS AS NEEDED FOR PAIN;   Therapy: (Recorded:11Mwb1104) to Recorded   2  Allopurinol 100 MG Oral Tablet; Take 1 tablet daily; Therapy: 79Aee2876 to (Last Rx:24Apr2017)  Requested for: 24Apr2017 Ordered   3  AmLODIPine Besylate 5 MG Oral Tablet; TAKE 1 TABLET DAILY IN THE MORNING; Therapy: 18MTX0261 to (Last Rx:24Oct2016)  Requested for: 28Auz3609 Ordered   4  Chlorthalidone 25 MG Oral Tablet; TAKE ONE-HALF (1/2) TABLET DAILY; Therapy: 99JCD6313 to (Last Rx:25Nov2016)  Requested for: 99BMT6269 Ordered   5  Colcrys 0 6 MG Oral Tablet; 1 po qd-tid prn; Therapy: 65Ckp0040 to (Last Rx:14Apr2015) Ordered   6  Endocet 5-325 MG Oral Tablet; TAKE 1 TABLET EVERY 4 TO 6 HOURS AS NEEDED FOR   PAIN;   Therapy: (Recorded:05Lan1982) to Recorded   7  Klor-Con/EF 25 MEQ Oral Tablet Effervescent; TAKE AS DIRECTED; Therapy: (Recorded:50Dup3733) to Recorded   8  Lipoflavonoid TABS; take 2 tabs qd; Therapy: (Malena Crwaford) to Recorded   9  Losartan Potassium 100 MG Oral Tablet; Take 1 tablet daily; Therapy: 78KAT9499 to (Last Rx:12Apr2017)  Requested for: 12Apr2017 Ordered   10  Metoprolol Succinate ER 25 MG Oral Tablet Extended Release 24 Hour; TAKE ONE    TABLET BY MOUTH ONCE DAILY; Therapy: 65HMI0864 to (Evaluate:17Oct2017)  Requested for: 43PGV6344; Last    Rx:19Jun2017 Ordered   11  ProAir  (90 Base) MCG/ACT Inhalation Aerosol Solution; INHALE 2 PUFFS    EVERY 4 HOURS AS NEEDED; Therapy: 70FQZ4501 to (Last Rx:33Kir9338)  Requested for: 76IWM6116 Ordered   12  Prochlorperazine Maleate 10 MG Oral Tablet; TAKE 1 TABLET 3 times daily PRN    NAUSEA; Therapy: 12THB0767 to (Last Rx:89Scm4357)  Requested for: 17IFL7280 Ordered   13  Spiriva HandiHaler 18 MCG Inhalation Capsule; INHALE THE CONTENTS OF 1    CAPSULE DAILY; Therapy: 42PMP6681 to (Last Rx:25Nov2016)  Requested for: 58USR2437 Ordered   14  TraMADol HCl - 50 MG Oral Tablet; TAKE ONE TABLET BY MOUTH FOUR TIMES DAILY    AS NEEDED FOR PAIN; Last Rx:20Oct2015 Ordered   15  Tylenol with Codeine #4 300-60 MG Oral Tablet; Therapy: 74ADO1107 to Recorded   16  Vitamin D3 2000 UNIT Oral Tablet Chewable; 1 PO QD;     Therapy: 97BTL1297 to (Last Rx: 15ZBB9507) Ordered    The medication list was reviewed and updated today  Allergies  1  No Known Drug Allergies    Vitals  Vital Signs    Recorded: 12Sep2017 09:33AM   Temperature 98 2 F   Heart Rate 88   Systolic 413, Sitting   Diastolic 101, Sitting   Height 6 ft 1 in   Weight 262 lb 6 oz   BMI Calculated 34 62   BSA Calculated 2 41     Physical Exam    Constitutional: General appearance: Abnormal  -- Awake, obese, not in acute distress  ENT: External ears and nose appear normal      Eyes: Anicteric sclerae  Neck: No bruit heard over either carotid  JVD:  No JVD present  Pulmonary: Respiratory effort: No increased work of breathing or signs of respiratory distress  -- Auscultation of lungs: Clear to auscultation  Cardiovascular: Auscultation of heart: Normal rate and rhythm, normal S1 and S2, without murmurs  Abdomen: Non-tender, no masses  Extremities: No cyanosis, clubbing or edema  Pulses: Dorsalis Pedis and Posterior Tibial pulses normal    Rash: No rash present  Neurologic: Non Focal      Psychiatric: Orientation to person, place, and time: Normal  -- and-- Mood and affect: Normal     Back: No CVA tenderness  Results/Data  PHQ-9 Adult Depression Screening 85Pxv4384 11:10AM User, [employer]     Test Name Result Flag Reference   PHQ-9 Adult Depression Score 0     Over the last two weeks, how often have you been bothered by any of the following problems?   Little interest or pleasure in doing things: Not at all - 0  Feeling down, depressed, or hopeless: Not at all - 0  Trouble falling or staying asleep, or sleeping too much: Not at all - 0  Feeling tired or having little energy: Not at all - 0  Poor appetite or over eating: Not at all - 0  Feeling bad about yourself - or that you are a failure or have let yourself or your family down: Not at all - 0  Trouble concentrating on things, such as reading the newspaper or watching television: Not at all - 0  Moving or speaking so slowly that other people could have noticed  Or the opposite -  being so fidgety or restless that you have been moving around a lot more than usual: Not at all - 0  Thoughts that you would be better off dead, or of hurting yourself in some way: Not at all - 0   PHQ-9 Adult Depression Screening Negative     PHQ-9 Difficulty Level Not difficult at all     PHQ-9 Severity No Depression       Falls Risk Assessment (Dx Z13 89 Screen for Neurologic Disorder) 83Zwt4914 11:10AM User, [facility]     Test Name Result Flag Reference   Falls Risk      No falls in the past year     *VB - Foot Exam 29Aug2017 11:05AM Kelli Chandler Vickie     Test Name Result Flag Reference   FOOT EXAM 72WDQ4230       *VB - Urinary Incontinence Screen (Dx Z13 89 Screen for UI) 29Aug2017 11:04AM Kelli Chandler Vickie     Test Name Result Flag Reference   Urinary Incontinence Assessment 29Aug2017       US KIDNEY AND BLADDER 94JSD3766 07:44AM Consuelo Olsen Order Number: [id number]    - Patient Instructions: To schedule this appointment, please contact Central Scheduling at [phone]  Test Name Result Flag Reference   US KIDNEY AND BLADDER (Report)     RENAL ULTRASOUND     INDICATION: 68-year-old male with history of chronic kidney disease  Routine follow-up     COMPARISON: 3/31/2015 ultrasound exam as well as CT study from 4/14/2015     TECHNIQUE:  Ultrasound of the retroperitoneum was performed with a curvilinear transducer utilizing volumetric sweeps and still imaging techniques  FINDINGS:     KIDNEYS:   Symmetric and normal size  Right kidney: 10 8 x 5 7 cm with cortical thickness 1 1 cm  Normal echogenicity and contour  No suspicious masses detected  Small exophytic mid pole region cyst is 12 mm diameter and appears probably slightly larger than on the prior CT from a couple years ago and also slightly enlarged from the prior ultrasound but remains otherwise benign in    appearance   Another exophytic cyst closer to the lower pole is 8 mm and appears new but has benign appearance  No hydronephrosis  No shadowing calculi  No perinephric fluid collections  Left kidney: 11 1 x 5 7 cm with cortical thickness 1 1 cm  Normal echogenicity and contour  No suspicious masses detected  There is a lower pole cyst which is 12 x 20 x 10 mm diameter  Adjacent to this is a small right echogenic focus which does not seem to produce definitive shadowing  Whether this represents a small calcification is    uncertain  Adjacent lower pole cyst is about 12 mm diameter with a more rounded configuration  No hydronephrosis  No shadowing calculi  No perinephric fluid collections  URETERS:   Nonvisualized  BLADDER:    The bladder is not well distended despite patient reporting following the recommended prep  It is only partially distended  No obvious stone or mass  Portions of the bladder are secured by shadowing  Jets were not seen from the ureters  IMPRESSION:     Limited assessment of the bladder  Small bilateral renal cysts  Questionable small calculus lower pole left kidney  No hydronephrosis  Workstation performed: [id number]     Signed by:   Trace Garcia MD   7/26/17     (1) BASIC METABOLIC PROFILE 70WAM2269 03:41PM Catalina Chandler     Test Name Result Flag Reference   SODIUM 142 mmol/L  136-145   POTASSIUM 3 8 mmol/L  3 5-5 3   CHLORIDE 108 mmol/L  100-108   CARBON DIOXIDE 24 mmol/L  21-32   ANION GAP (CALC) 10 mmol/L  4-13   BLOOD UREA NITROGEN 21 mg/dL  5-25   CREATININE 1 88 mg/dL H 0 60-1 30   Standardized to IDMS reference method   CALCIUM 10 3 mg/dL H 8 3-10 1   eGFR Non-African American 42 7 ml/min/1 73sq m     National Kidney Disease Education Program recommendations are as follows:  GFR calculation is accurate only with a steady state creatinine  Chronic Kidney disease less than 60 ml/min/1 73 sq  meters  Kidney failure less than 15 ml/min/1 73 sq  meters     CORRECTED CALCIUM 9 9 mg/dL 8  3-10 1   ALBUMIN 4 5 g/dL  3 5-5 0   CALCIUM FOR CA/ALB 10 3 md/dL H 8 3-10 1   GLUCOSE FASTING 138 mg/dL H 65-99     Future Appointments    Date/Time Provider Specialty Site   12/12/2017 02:45 PM Bishop Morales, [facility] [address] [facility]     Signatures   Electronically signed by : CRISTIANA Mohr ; Sep 12 2017  9:58AM EST                       (Author)    Electronically signed by : CRISTIANA Mohr ; Sep 13 2017  9:53AM EST                       (Author)

## 2017-10-20 VITALS
SYSTOLIC BLOOD PRESSURE: 134 MMHG | DIASTOLIC BLOOD PRESSURE: 79 MMHG | HEIGHT: 73 IN | WEIGHT: 256.62 LBS | BODY MASS INDEX: 34.01 KG/M2 | TEMPERATURE: 97.7 F | HEART RATE: 91 BPM | RESPIRATION RATE: 15 BRPM | OXYGEN SATURATION: 97 %

## 2017-10-20 LAB
ANION GAP SERPL CALCULATED.3IONS-SCNC: 8 MMOL/L (ref 4–13)
BUN SERPL-MCNC: 11 MG/DL (ref 5–25)
CALCIUM SERPL-MCNC: 8.7 MG/DL (ref 8.3–10.1)
CHLORIDE SERPL-SCNC: 109 MMOL/L (ref 100–108)
CO2 SERPL-SCNC: 28 MMOL/L (ref 21–32)
CREAT SERPL-MCNC: 1.42 MG/DL (ref 0.6–1.3)
ERYTHROCYTE [DISTWIDTH] IN BLOOD BY AUTOMATED COUNT: 16.6 % (ref 11.6–15.1)
GFR SERPL CREATININE-BSD FRML MDRD: 55 ML/MIN/1.73SQ M
GLUCOSE SERPL-MCNC: 86 MG/DL (ref 65–140)
GLUCOSE SERPL-MCNC: 95 MG/DL (ref 65–140)
GLUCOSE SERPL-MCNC: 96 MG/DL (ref 65–140)
HCT VFR BLD AUTO: 40.1 % (ref 36.5–49.3)
HGB BLD-MCNC: 12.3 G/DL (ref 12–17)
MCH RBC QN AUTO: 23.7 PG (ref 26.8–34.3)
MCHC RBC AUTO-ENTMCNC: 30.7 G/DL (ref 31.4–37.4)
MCV RBC AUTO: 77 FL (ref 82–98)
PLATELET # BLD AUTO: 208 THOUSANDS/UL (ref 149–390)
PMV BLD AUTO: 10.1 FL (ref 8.9–12.7)
POTASSIUM SERPL-SCNC: 3.3 MMOL/L (ref 3.5–5.3)
RBC # BLD AUTO: 5.19 MILLION/UL (ref 3.88–5.62)
SODIUM SERPL-SCNC: 145 MMOL/L (ref 136–145)
WBC # BLD AUTO: 6.3 THOUSAND/UL (ref 4.31–10.16)

## 2017-10-20 PROCEDURE — G8979 MOBILITY GOAL STATUS: HCPCS

## 2017-10-20 PROCEDURE — 85027 COMPLETE CBC AUTOMATED: CPT | Performed by: INTERNAL MEDICINE

## 2017-10-20 PROCEDURE — 97162 PT EVAL MOD COMPLEX 30 MIN: CPT

## 2017-10-20 PROCEDURE — G8978 MOBILITY CURRENT STATUS: HCPCS

## 2017-10-20 PROCEDURE — 80048 BASIC METABOLIC PNL TOTAL CA: CPT | Performed by: INTERNAL MEDICINE

## 2017-10-20 PROCEDURE — 82948 REAGENT STRIP/BLOOD GLUCOSE: CPT

## 2017-10-20 RX ORDER — POTASSIUM CHLORIDE 20 MEQ/1
40 TABLET, EXTENDED RELEASE ORAL ONCE
Status: COMPLETED | OUTPATIENT
Start: 2017-10-20 | End: 2017-10-20

## 2017-10-20 RX ADMIN — CHLORHEXIDINE GLUCONATE 15 ML: 1.2 RINSE ORAL at 08:30

## 2017-10-20 RX ADMIN — AMLODIPINE BESYLATE 5 MG: 5 TABLET ORAL at 08:29

## 2017-10-20 RX ADMIN — LOSARTAN POTASSIUM 100 MG: 50 TABLET, FILM COATED ORAL at 08:29

## 2017-10-20 RX ADMIN — POTASSIUM CHLORIDE 40 MEQ: 1500 TABLET, EXTENDED RELEASE ORAL at 07:45

## 2017-10-20 RX ADMIN — CEFDINIR 300 MG: 300 CAPSULE ORAL at 08:30

## 2017-10-20 RX ADMIN — TIOTROPIUM BROMIDE 18 MCG: 18 CAPSULE ORAL; RESPIRATORY (INHALATION) at 08:30

## 2017-10-20 RX ADMIN — FAMOTIDINE 20 MG: 20 TABLET, FILM COATED ORAL at 08:29

## 2017-10-20 RX ADMIN — HEPARIN SODIUM 5000 UNITS: 5000 INJECTION, SOLUTION INTRAVENOUS; SUBCUTANEOUS at 05:21

## 2017-10-20 RX ADMIN — METOPROLOL SUCCINATE 25 MG: 25 TABLET, FILM COATED, EXTENDED RELEASE ORAL at 08:29

## 2017-10-20 RX ADMIN — ALLOPURINOL 100 MG: 100 TABLET ORAL at 08:29

## 2017-10-20 RX ADMIN — FLUTICASONE PROPIONATE AND SALMETEROL 1 PUFF: 50; 250 POWDER RESPIRATORY (INHALATION) at 08:31

## 2017-10-20 NOTE — DISCHARGE SUMMARY
Discharge Summary - Toshia Cloud 78 y o  male MRN: [id number]  Unit/Bed#:  Encounter: [id number]    Admission Date:    10/16/2017   Discharge Date:   10/20/17   Admitting Diagnosis:   Lactic acidosis [E87 2]  Oliguria [R34]  Ileus (HCC) [K56 7]  Vomiting [R11 10]  Severe dehydration [E86 0]  PRIMO (acute kidney injury) ([facility] [address] ) [N17 9]  Severe sepsis ([facility] [address] ) [A41 9, R65 20]  Admitting Provider:   Monique Ramírez MD  Discharge Provider:   Kathyleen Harada, MD     Primary Care Physician at Discharge:   Kathyleen Harada, MD,[phone]    HPI:   Toshia Cloud is a 78 y o  male who presents On 10/17 with a complaint of 3 days of nausea, vomiting, and diarrhea  He has a past medical history of chronic kidney disease, COPD, type 2 diabetes, gout, iron deficiency anemia, hypertension, history of prostate cancer status post prostatectomy, history of colon cancer status post partial colectomy and wears O2 at home at night  Per the patient there has been no change in his diet and he began spontaneously with nausea and vomiting and intermittent diarrhea 3 days ago  He denies any precipitating factors  After he was unable to keep down Pepto-Bismol he presented to the emergency room  He was found to be tachycardic with a lactic acidosis of  3 and despite fluid resuscitation continued to have a lactic acidosis for this reason he is being referred to the step-down unit for closer monitoring        Procedures Performed:   Orders Placed This Encounter   Procedures   [facility] ED ECG Documentation Only       Hospital Course:   Priscila Montoya was admitted to step-down ICU where his lactic acidosis and acute kidney injury resolved with intravenous fluids  General surgery evaluated the patient and after reviewing CT scan in bedside examination found no indication for surgical intervention    CT scan showed evidence of ileus with diarrheal illness coupled with patient's history of nausea, vomiting and diarrhea preceding is this admission it is most likely related to viral gastroenteritis which has resolved  Diet was advanced and patient able to tolerate regular diet  Diarrhea and vomiting resolved  He had a normal formed bowel movement on the evening prior to discharge  Patient was ambulatory within his room but the time of this dictation has not yet been formally evaluated by Physical therapy  He does feel strong enough to return home and complete his activities of daily living  He has been active with [facility] and has made arrangements for them to visit with him upon discharge  Acute kidney injury-admission serum creatinine 3 45 with estimated GFR 19 down to 1 4 on the day of discharge which is patient's baseline  Hypertension-patient was maintained on amlodipine and losartan, chlorthalidone was held due to kidney injury  Chlorthalidone will not be resumed based upon history of gout in addition to kidney injury  COPD remained stable throughout the hospital course  Questionable UTI-patient received Omnicef for bacteria however no culture was performed  Nitrate and leukocyte esterase were negative on urinalysis  Patient did not have symptoms of dysuria, increased urinary frequency or urgency  Therefore, it is my opinion that this is contaminant and does not represent true infection, will follow clinically off of antibiotics in the outpatient setting  Consulting Providers   General surgery    Significant Findings, Care, Treatment and Services Provided:   CT chest, abdomen and pelvis:1   Stable pulmonary nodules on the right  Stable pleural plaques on the right  2   Partial colectomy  There is liquid stool in the distal colon concerning for an acute diarrheal disease  Gaseous distention of the transverse colon is concerning for colonic ileus      Complications:   None  Physical Exam:  General Appearance:    Alert, cooperative, no distress   Head:    Normocephalic, without obvious abnormality, atraumatic   Eyes: PERRL, conjunctiva/corneas clear, EOM's intact       Nose:   Moist mucous membranes, no drainage or sinus tenderness   Throat:   No tenderness, no exudates   Neck:   Supple, symmetrical, trachea midline, no JVD   Lungs:     Clear to auscultation bilaterally, respirations unlabored, prolonged expiratory phase   Heart:    Regular rate and rhythm, S1 and S2 normal, no murmur, rub   or gallop   Abdomen: Soft, obese, mildly distended, non-tender, positive bowel sounds, no masses, no organomegaly   Extremities:  No pedal edema, calf tenderness  Distal pulses palpable  Neurologic:     CNII-XII intact      Labs:   Lab Results   Component Value Date    WBC 6 30 10/20/2017    WBC 8 4 08/18/2015    RBC 5 19 10/20/2017    RBC 6 07 (H) 08/18/2015    HGB 12 3 10/20/2017    HGB 13 8 08/18/2015    HCT 40 1 10/20/2017    HCT 44 0 08/18/2015    MCV 77 (L) 10/20/2017    MCV 72 (L) 08/18/2015    MCH 23 7 (L) 10/20/2017    MCH 22 7 (L) 08/18/2015    RDW 16 6 (H) 10/20/2017    RDW 16 3 (H) 08/18/2015     10/20/2017     08/18/2015     Lab Results   Component Value Date    CREATININE 1 42 (H) 10/20/2017    CREATININE 1 3 08/18/2015    BUN 11 10/20/2017    BUN 9 08/18/2015     10/20/2017     08/18/2015    K 3 3 (L) 10/20/2017    K 3 8 08/18/2015     (H) 10/20/2017     08/18/2015    CO2 28 10/20/2017    CO2 25 4 08/18/2015    GLUCOSE 96 10/20/2017    GLUCOSE 97 08/18/2015    PROT 7 8 10/17/2017    PROT 8 2 08/18/2015    ALKPHOS 67 10/17/2017    ALKPHOS 125 (H) 08/18/2015    ALT 31 10/17/2017    ALT 28 08/18/2015    AST 44 10/17/2017    AST 34 08/18/2015    BILIDIR 0 40 (H) 10/16/2017    BILIDIR 0 23 (H) 08/18/2015       Treatments:  IV hydration    Discharge Diagnosis:   Principal Problem (Resolved):    Lactic acidosis  Active Problems:    Ileus (HCC)    COPD (chronic obstructive pulmonary disease) (HCC)    History of hypertension  Resolved Problems:    Sepsis ([facility] [address] )    Acute kidney injury superimposed on chronic kidney disease (HCC)    Hyperbilirubinemia    Nausea vomiting and diarrhea      Condition at Discharge:   Good     Code Status: Level 1 - Full Code  Advance Directive and Living Will: <no information>  Power of :    POLST:      Discharge instructions/Information to patient and family:   See after visit summary for information provided to patient and family  Provisions for Follow-Up Care:  See after visit summary for information related to follow-up care and any pertinent home health orders  Disposition:   Home    Planned Readmission:   No    Discharge Statement   I spent 25 minutes discharging the patient  This time was spent on the day of discharge  I had direct contact with the patient on the day of discharge  Additional documentation is required if more than 30 minutes were spent on discharge  Greater than 50% of the total time was spent examining patient, answering all patient questions, arranging and discussing plan of care with patient as well as directly providing post-discharge instructions  Additional time then spent on discharge activities  Discharge Medications:  See after visit summary for reconciled discharge medications provided to patient and family        Jason Woody MD  10/20/2017,8:16 AM

## 2017-10-20 NOTE — PHYSICAL THERAPY NOTE
Physical Therapy Evaluation    Patient's Name: Prasanna Cook    Admitting Diagnosis  Lactic acidosis [E87 2]  Oliguria [R34]  Ileus ([facility] [address] ) [K56 7]  Vomiting [R11 10]  Severe dehydration [E86 0]  PRIMO [facility]) [facility] ) [N17 9]  Severe sepsis ([facility] ) [A41 9, R65 20]    Problem List  Patient Active Problem List   Diagnosis    Ileus ([facility] )    COPD (chronic obstructive pulmonary disease) ([place] [address] )    History of hypertension       Past Medical History  Past Medical History:   Diagnosis Date    COPD [facility]chronic obstructive pulmonary disease) [facility]     Hypertension        Past Surgical History  Past Surgical History:   Procedure Laterality Date    JOINT REPLACEMENT          10/20/17 0839   Note Type   Note type Eval/Treat   Pain Assessment   Pain Assessment 0-10   Pain Score 3  (increased pain w/ mobility)   Pain Type Chronic pain   Pain Location Back   Pain Orientation Lower;Right   Home Living   Type of [address] [place] [address] Two level;Stairs to enter with rails; Performs ADLs on one level;1/2 bath on main level  (4 ELAINE w/ HR; 1st floor setup)   Bathroom Shower/Tub Tub/shower unit  (on 2nd level)   Bathroom Toilet Raised   Bathroom Equipment Grab bars in shower; Shower chair; Toilet raiser   [facility] Walker;Cane  (no AD at baseline)   Prior Function   Level of [place] Independent with ADLs and functional mobility   Lives With Spouse   Receives Help From Family   ADL Assistance Independent   IADLs Independent   Falls in the last 6 months 0   Vocational Retired   Comments pt drives   Restrictions/Precautions   [place] Bearing Precautions Per Order No   Braces or Orthoses (none per pt)   Other Precautions Fall Risk;Pain   General   Family/Caregiver Present No   Cognition   Overall Cognitive Status WFL   Arousal/Participation Alert   Orientation Level Oriented X4   Memory Within functional limits   Following Commands Follows all commands and directions without difficulty   RUE Assessment   RUE Assessment WFL   LUE Assessment   LUE Assessment WFL   RLE Assessment   RLE Assessment WFL  (MMT full in B LEs)   LLE Assessment   LLE Assessment WFL   Coordination   Movements are Fluid and Coordinated 1   Sensation WFL  (pt denies numbness/tingling)   Light Touch   RLE Light Touch Grossly intact   LLE Light Touch Grossly intact   Bed Mobility   Rolling R Unable to assess  (pt received OOB in recliner)   Transfers   Sit to Stand 5  Supervision   Additional items Assist x 1; Armrests   Stand to Sit 5  Supervision   Additional items Assist x 1; Armrests; Verbal cues   Ambulation/Elevation   Gait pattern Wide VLADISLAV; Decreased foot clearance  (increased sway; 1 corrected LOB at end of amb trial)   Gait Assistance 5  Supervision   Additional items Assist x 1;Verbal cues   Assistive Device None   Distance 150'   Stair Management Assistance 5  Supervision   Additional items Assist x 1;Verbal cues   Stair Management Technique One rail R;Alternating pattern; Foreward   Number of Stairs 13   Balance   Static Sitting Normal   Dynamic Sitting Normal   Static Standing Good   Dynamic Standing Fair +   Ambulatory Fair +   Endurance Deficit   Endurance Deficit Yes   Endurance Deficit Description (+) SOB/ROBLES at end of amb trial   Activity Tolerance   Activity Tolerance Patient limited by fatigue;Patient tolerated treatment well   Medical Staff Made Aware pt w/ up w/ A and activity as tolerated orders   Nurse Made Aware Yes, RN Deanna verbalized pt appropriate for PT session   Assessment   Prognosis Good   Problem List Decreased endurance; Impaired balance;Decreased mobility;Pain   Assessment Pt is 78 y o  male seen for PT evaluation on 10/20/2017 s/p admit to [facility] on 10/16/2017 w/ Lactic acidosis; pt presented to ED w/ 3 days of N/V/D  PT consulted to assess pt's functional mobility and d/c needs  Order placed for PT eval and tx, w/ up w/ A and activity as tolerated order   Comorbidities affecting pt's physical performance at time of assessment include: CKD, COPD, type 2 diabetes, gout, iron deficiency anemia, HTN, h/o prostate CA s/p prostatectomy, h/o colon CA s/p partial colectomy  PTA, pt was independent w/ all functional mobility w/ no AD, ambulates community distances and elevations, ambulates household distances, has 4 ELAINE, lives w/ wife in 2 level home and retired  Personal factors affecting pt at time of IE include: lives in 2 story house, stairs to enter home and limited insight into impairments  Please find objective findings from PT assessment regarding body systems outlined above with impairments and limitations including impaired balance, decreased endurance, pain, decreased activity tolerance, fall risk and SOB upon exertion, as well as mobility assessment (need for supervision level of assist w/ all phases of mobility when usually ambulating independently)  The following objective measures performed on IE also reveal limitations: Barthel Index: 85/100 and Modified Ervin: 3 (moderate disability)  Pt's clinical presentation is currently evolving  Pt to benefit from continued PT tx to address deficits as defined above and maximize level of functional independent mobility and consistency  From PT/mobility standpoint, recommendation at time of d/c would be Home PT pending progress in order to facilitate return to PLOF     Barriers to Discharge None   Goals   Patient Goals "to go home"   [facility] Expiration Date 10/30/17   Short Term Goal #1 In 7-10 days: Perform all transfers independently to improve independence, Ambulate > 250 ft  with least restrictive assistive device independently w/o LOB and w/ normalized gait pattern 100% of the time, Navigate 13 stairs independently with unilateral handrail to facilitate return to previous living environment, Increase all balance 1/2 grade to decrease risk for falls and Complete exercise program independently   Treatment Day 0   Plan   Treatment/Interventions LE strengthening/ROM; Elevations; Therapeutic exercise; Endurance training;Patient/family training;Gait training;Spoke to nursing   PT Frequency 2-3x/wk   Recommendation   Recommendation Home PT   Equipment Recommended (none at this time)   PT - OK to Discharge Yes  (when medically cleared)   Modified Placer Scale   Modified Ervin Scale 3   Barthel Index   Feeding 10   Bathing 5   Grooming Score 5   Dressing Score 10   Bladder Score 10   Bowels Score 10   Toilet Use Score 10   Transfers (Bed/Chair) Score 10   Mobility (Level Surface) Score 10   Stairs Score 5   Barthel Index Score 85         Margie Webb, PT

## 2017-10-20 NOTE — PROGRESS NOTES
[facility] Internal Medicine Progress Note  Patient: Laymon Schaumann 78 y o  male   MRN: [id number]  PCP: Andrew Johnson MD  Unit/Bed#:  Encounter: [id number]  Date Of Visit: 10/20/17    Assessment:    Active Problems:    Ileus ([facility] [address] )    [facility]    History of hypertension      Plan:    · Ileus- Resolved, continue oral diet  · UTI- Day 5 of antibiotics, continue for 7 days total  · Hypertension- Continue home medications  · COPD- Continue home medications      VTE Pharmacologic Prophylaxis:   Pharmacologic: Heparin  Mechanical VTE Prophylaxis in Place: Yes    Patient Centered Rounds: I have performed bedside rounds with nursing staff today  Discussions with Specialists or Other Care Team Provider: Discuss suitability for discharge with therapy services    Education and Discussions with Family / Patient: Barriers to discharge    Time Spent for Care: 15 minutes  More than 50% of total time spent on counseling and coordination of care as described above  Current Length of Stay: 3 day(s)    Current Patient Status: Inpatient   Certification Statement: The patient will continue to require additional inpatient hospital stay due to therapy evaluation    Discharge Plan / Estimated Discharge Date: Hopeful discharge today    Code Status: Level 1 - Full Code      Subjective:   "I'd like to go home"    Objective:     Vitals:   Temp (24hrs), Av 1 °F (36 7 °C), Min:97 9 °F (36 6 °C), Max:98 2 °F (36 8 °C)    HR:  [86-96] 96  Resp:  [14-25] 15  BP: (136-161)/(82-96) 161/86  SpO2:  [91 %-92 %] 92 %  Body mass index is 33 86 kg/m²  Input and Output Summary (last 24 hours): Intake/Output Summary (Last 24 hours) at 10/20/17 0215  Last data filed at 10/19/17 1601   Gross per 24 hour   Intake                0 ml   Output              100 ml   Net             -100 ml       Physical Exam:     Physical Exam   Constitutional: He is oriented to person, place, and time   He appears well-developed and well-nourished  No distress  HENT:   Head: Normocephalic and atraumatic  Eyes: Conjunctivae are normal  Pupils are equal, round, and reactive to light  Neck: No JVD present  No tracheal deviation present  Cardiovascular: Normal rate and regular rhythm  Pulmonary/Chest: Effort normal and breath sounds normal  No respiratory distress  Abdominal: Soft  He exhibits distension (chronic per patient)  There is no tenderness  Musculoskeletal: Normal range of motion  He exhibits no edema, tenderness or deformity  Neurological: He is alert and oriented to person, place, and time  No cranial nerve deficit  Skin: Skin is warm and dry  He is not diaphoretic  Psychiatric: He has a normal mood and affect  Additional Data:     Labs:      Results from last 7 days  Lab Units 10/19/17  0520   WBC Thousand/uL 6 90   HEMOGLOBIN g/dL 12 2   HEMATOCRIT % 39 1   PLATELETS Thousands/uL 226   NEUTROS PCT % 66   LYMPHS PCT % 26   MONOS PCT % 6   EOS PCT % 1       Results from last 7 days  Lab Units 10/19/17  0520  10/17/17  0908   SODIUM mmol/L 143  < > 141   POTASSIUM mmol/L 3 6  < > 4 0   CHLORIDE mmol/L 108  < > 106   CO2 mmol/L 27  < > 21   BUN mg/dL 14  < > 48*   CREATININE mg/dL 1 40*  < > 3 45*   CALCIUM mg/dL 8 4  < > 8 7   TOTAL PROTEIN g/dL  --   --  7 8   BILIRUBIN TOTAL mg/dL  --   --  1 30*   ALK PHOS U/L  --   --  67   ALT U/L  --   --  31   AST U/L  --   --  44   GLUCOSE RANDOM mg/dL 90  < > 113   < > = values in this interval not displayed  Results from last 7 days  Lab Units 10/16/17  2208   INR  1 10       * I Have Reviewed All Lab Data Listed Above  * Additional Pertinent Lab Tests Reviewed:  All Labs Within Last 24 Hours Reviewed    Imaging:    Imaging Reports Reviewed Today Include: None  Imaging Personally Reviewed by Myself Includes: None    Recent Cultures (last 7 days):       Results from last 7 days  Lab Units 10/17/17  1136 10/16/17  2210 10/16/17  2208   BLOOD CULTURE   --  No Growth at 48 hrs  No Growth at 48 hrs  C DIFF TOXIN B  NEGATIVE for C difficle toxin by PCR    --   --        Last 24 Hours Medication List:     allopurinol 100 mg Oral Daily   amLODIPine 5 mg Oral Daily   cefdinir 300 mg Oral Q12H LAZ   chlorhexidine 15 mL Swish & Spit Q12H [address]   famotidine 20 mg Oral BID   fluticasone-salmeterol 1 puff Inhalation BID   heparin (porcine) 5,000 Units Subcutaneous Q8H [address]   losartan 100 mg Oral Daily   metoprolol succinate 25 mg Oral Daily   tiotropium 18 mcg Inhalation Daily        Today, Patient Was Seen By: HUA Sen    ** Please Note: This note has been constructed using a voice recognition system   **

## 2017-10-20 NOTE — CASE MANAGEMENT
8:16 AM         []Hide copied text  Discharge Summary - Scarlet Redman 78 y o  male MRN: [id number]  Unit/Bed#:  Encounter: [id number]     Admission Date:                   10/16/2017   Discharge Date:   10/20/17   Admitting Diagnosis:   Lactic acidosis [E87 2]  Oliguria [R34]  Ileus (HCC) [K56 7]  Vomiting [R11 10]  Severe dehydration [E86 0]  PRIMO (acute kidney injury) ([facility] ) [N17 9]  Severe sepsis ([facility] ) [A41 9, R65 20]  Admitting Provider:   Rilla Boast, MD  Discharge Provider:   Chantell Del Real MD      Primary Care Physician at Discharge:   Chantell Del Real MD,[phone]     HPI:   Josh arriola 78 y  o  male who presents On 10/17 with a complaint of 3 days of nausea, vomiting, and diarrhea  Arvind Jones has a past medical history of chronic kidney disease, COPD, type 2 diabetes, gout, iron deficiency anemia, hypertension, history of prostate cancer status post prostatectomy, history of colon cancer status post partial colectomy and wears O2 at home at night   Per the patient there has been no change in his diet and he began spontaneously with nausea and vomiting and intermittent diarrhea 3 days ago  Arvind Jones denies any precipitating factors   After he was unable to keep down Pepto-Bismol he presented to the emergency room  Arvind Jones was found to be tachycardic with a lactic acidosis of  3 and despite fluid resuscitation continued to have a lactic acidosis for this reason he is being referred to the step-down unit for closer monitoring         Procedures Performed:       Orders Placed This Encounter   Procedures   [address] ED ECG Documentation Only         Hospital Course:   Keara Ledesma was admitted to step-down ICU where his lactic acidosis and acute kidney injury resolved with intravenous fluids  General surgery evaluated the patient and after reviewing CT scan in bedside examination found no indication for surgical intervention    CT scan showed evidence of ileus with diarrheal illness coupled with patient's history of nausea, vomiting and diarrhea preceding is this admission it is most likely related to viral gastroenteritis which has resolved  Diet was advanced and patient able to tolerate regular diet  Diarrhea and vomiting resolved  He had a normal formed bowel movement on the evening prior to discharge  Patient was ambulatory within his room but the time of this dictation has not yet been formally evaluated by Physical therapy  He does feel strong enough to return home and complete his activities of daily living  He has been active with [facility] and has made arrangements for them to visit with him upon discharge      Acute kidney injury-admission serum creatinine 3 45 with estimated GFR 19 down to 1 4 on the day of discharge which is patient's baseline      Hypertension-patient was maintained on amlodipine and losartan, chlorthalidone was held due to kidney injury  Chlorthalidone will not be resumed based upon history of gout in addition to kidney injury      COPD remained stable throughout the hospital course      Questionable UTI-patient received Omnicef for bacteria however no culture was performed  Nitrate and leukocyte esterase were negative on urinalysis  Patient did not have symptoms of dysuria, increased urinary frequency or urgency  Therefore, it is my opinion that this is contaminant and does not represent true infection, will follow clinically off of antibiotics in the outpatient setting      Consulting Providers   General surgery     Significant Findings, Care, Treatment and Services Provided:   CT chest, abdomen and pelvis:1   Stable pulmonary nodules on the right   Stable pleural plaques on the right    2   Partial colectomy  Rissa Luther is liquid stool in the distal colon concerning for an acute diarrheal disease   Gaseous distention of the transverse colon is concerning for colonic ileus      Complications:   None  Physical Exam:  General Appearance:    Alert, cooperative, no distress   Head: Normocephalic, without obvious abnormality, atraumatic   Eyes:    PERRL, conjunctiva/corneas clear, EOM's intact       Nose:   Moist mucous membranes, no drainage or sinus tenderness   Throat:   No tenderness, no exudates   Neck:   Supple, symmetrical, trachea midline, no JVD   Lungs:     Clear to auscultation bilaterally, respirations unlabored, prolonged expiratory phase   Heart:    Regular rate and rhythm, S1 and S2 normal, no murmur, rub   or gallop   Abdomen: Soft, obese, mildly distended, non-tender, positive bowel sounds, no masses, no organomegaly   Extremities:  No pedal edema, calf tenderness  Distal pulses palpable  Neurologic:     CNII-XII intact      Labs:         Lab Results   Component Value Date     WBC 6 30 10/20/2017     WBC 8 4 08/18/2015     RBC 5 19 10/20/2017     RBC 6 07 (H) 08/18/2015     HGB 12 3 10/20/2017     HGB 13 8 08/18/2015     HCT 40 1 10/20/2017     HCT 44 0 08/18/2015     MCV 77 (L) 10/20/2017     MCV 72 (L) 08/18/2015     MCH 23 7 (L) 10/20/2017     MCH 22 7 (L) 08/18/2015     RDW 16 6 (H) 10/20/2017     RDW 16 3 (H) 08/18/2015      10/20/2017      08/18/2015            Lab Results   Component Value Date     CREATININE 1 42 (H) 10/20/2017     CREATININE 1 3 08/18/2015     BUN 11 10/20/2017     BUN 9 08/18/2015      10/20/2017      08/18/2015     K 3 3 (L) 10/20/2017     K 3 8 08/18/2015      (H) 10/20/2017      08/18/2015     CO2 28 10/20/2017     CO2 25 4 08/18/2015     GLUCOSE 96 10/20/2017     GLUCOSE 97 08/18/2015     PROT 7 8 10/17/2017     PROT 8 2 08/18/2015     ALKPHOS 67 10/17/2017     ALKPHOS 125 (H) 08/18/2015     ALT 31 10/17/2017     ALT 28 08/18/2015     AST 44 10/17/2017     AST 34 08/18/2015     BILIDIR 0 40 (H) 10/16/2017     BILIDIR 0 23 (H) 08/18/2015         Treatments:  IV hydration     Discharge Diagnosis:   Principal Problem (Resolved):    Lactic acidosis  Active Problems:    Ileus (HCC)    COPD (chronic obstructive pulmonary disease) ([facility] )    History of hypertension  Resolved Problems:    Sepsis ([facility] )    Acute kidney injury superimposed on chronic kidney disease (HCC)    Hyperbilirubinemia    Nausea vomiting and diarrhea        Condition at Discharge:   Good      Code Status: Level 1 - Full Code  Advance Directive and Living Will: <no information>  Power of :    POLST:       Discharge instructions/Information to patient and family:   See after visit summary for information provided to patient and family        Provisions for Follow-Up Care:  See after visit summary for information related to follow-up care and any pertinent home health orders        Disposition:   Home     Planned Readmission:   No     Discharge Statement   I spent 25 minutes discharging the patient  This time was spent on the day of discharge  I had direct contact with the patient on the day of discharge  Additional documentation is required if more than 30 minutes were spent on discharge  Greater than 50% of the total time was spent examining patient, answering all patient questions, arranging and discussing plan of care with patient as well as directly providing post-discharge instructions   Additional time then spent on discharge activities      Discharge Medications:  See after visit summary for reconciled discharge medications provided to patient and family        Martina Rivera MD  10/20/2017,8:16 AM        Scheduled Meds:  allopurinol 100 mg Oral Daily   amLODIPine 5 mg Oral Daily   cefdinir 300 mg Oral Q12H [address]   chlorhexidine 15 mL Swish [employer] Spit Q12H [address]   famotidine 20 mg Oral BID   fluticasone-salmeterol 1 puff Inhalation BID   heparin (porcine) 5,000 Units Subcutaneous Q8H [address]   losartan 100 mg Oral Daily   metoprolol succinate 25 mg Oral Daily   tiotropium 18 mcg Inhalation Daily     Continuous Infusions:   PRN Meds:   albuterol

## 2017-10-20 NOTE — PLAN OF CARE
Problem: PHYSICAL THERAPY ADULT  Goal: Performs mobility at highest level of function for planned discharge setting  See evaluation for individualized goals  Treatment/Interventions: LE strengthening/ROM, Elevations, Therapeutic exercise, Endurance training, Patient/family training, Gait training, Spoke to nursing  Equipment Recommended:  (none at this time)       See flowsheet documentation for full assessment, interventions and recommendations  Prognosis: Good  Problem List: Decreased endurance, Impaired balance, Decreased mobility, Pain  Assessment: Pt is 78 y o  male seen for PT evaluation on 10/20/2017 s/p admit to [facility] on 10/16/2017 w/ Lactic acidosis; pt presented to ED w/ 3 days of N/V/D  PT consulted to assess pt's functional mobility and d/c needs  Order placed for PT eval and tx, w/ up w/ A and activity as tolerated order  Comorbidities affecting pt's physical performance at time of assessment include: CKD, COPD, type 2 diabetes, gout, iron deficiency anemia, HTN, h/o prostate CA s/p prostatectomy, h/o colon CA s/p partial colectomy  PTA, pt was independent w/ all functional mobility w/ no AD, ambulates community distances and elevations, ambulates household distances, has 4 ELAINE, lives w/ wife in 2 level home and retired  Personal factors affecting pt at time of IE include: lives in 2 story house, stairs to enter home and limited insight into impairments  Please find objective findings from PT assessment regarding body systems outlined above with impairments and limitations including impaired balance, decreased endurance, pain, decreased activity tolerance, fall risk and SOB upon exertion, as well as mobility assessment (need for supervision level of assist w/ all phases of mobility when usually ambulating independently)  The following objective measures performed on IE also reveal limitations: Barthel Index: 85/100 and Modified Ervin: 3 (moderate disability)   Pt's clinical presentation is currently evolving  Pt to benefit from continued PT tx to address deficits as defined above and maximize level of functional independent mobility and consistency  From PT/mobility standpoint, recommendation at time of d/c would be Home PT pending progress in order to facilitate return to PLOF  Barriers to Discharge: None     Recommendation: Home PT     PT - OK to Discharge: Yes (when medically cleared)    See flowsheet documentation for full assessment

## 2017-10-22 LAB
BACTERIA BLD CULT: NORMAL
BACTERIA BLD CULT: NORMAL

## 2017-10-24 ENCOUNTER — APPOINTMENT (OUTPATIENT)
Dept: LAB | Facility: HOSPITAL | Age: 77
End: 2017-10-24
Attending: INTERNAL MEDICINE
Payer: MEDICARE

## 2017-10-24 DIAGNOSIS — N18.9 ACUTE KIDNEY INJURY SUPERIMPOSED ON CHRONIC KIDNEY DISEASE (HCC): ICD-10-CM

## 2017-10-24 DIAGNOSIS — N17.9 ACUTE KIDNEY INJURY SUPERIMPOSED ON CHRONIC KIDNEY DISEASE (HCC): ICD-10-CM

## 2017-10-24 LAB
ALBUMIN SERPL BCP-MCNC: 3.7 G/DL (ref 3.5–5)
ALP SERPL-CCNC: 97 U/L (ref 46–116)
ALT SERPL W P-5'-P-CCNC: 70 U/L (ref 12–78)
ANION GAP SERPL CALCULATED.3IONS-SCNC: 8 MMOL/L (ref 4–13)
AST SERPL W P-5'-P-CCNC: 72 U/L (ref 5–45)
BILIRUB SERPL-MCNC: 0.6 MG/DL (ref 0.2–1)
BUN SERPL-MCNC: 13 MG/DL (ref 5–25)
CALCIUM SERPL-MCNC: 9.7 MG/DL (ref 8.3–10.1)
CHLORIDE SERPL-SCNC: 104 MMOL/L (ref 100–108)
CO2 SERPL-SCNC: 30 MMOL/L (ref 21–32)
CREAT SERPL-MCNC: 1.46 MG/DL (ref 0.6–1.3)
ERYTHROCYTE [DISTWIDTH] IN BLOOD BY AUTOMATED COUNT: 17.1 % (ref 11.6–15.1)
GFR SERPL CREATININE-BSD FRML MDRD: 54 ML/MIN/1.73SQ M
GLUCOSE P FAST SERPL-MCNC: 107 MG/DL (ref 65–99)
HCT VFR BLD AUTO: 45.4 % (ref 36.5–49.3)
HGB BLD-MCNC: 13.9 G/DL (ref 12–17)
MCH RBC QN AUTO: 23.8 PG (ref 26.8–34.3)
MCHC RBC AUTO-ENTMCNC: 30.6 G/DL (ref 31.4–37.4)
MCV RBC AUTO: 78 FL (ref 82–98)
PLATELET # BLD AUTO: 299 THOUSANDS/UL (ref 149–390)
PMV BLD AUTO: 11.4 FL (ref 8.9–12.7)
POTASSIUM SERPL-SCNC: 4.2 MMOL/L (ref 3.5–5.3)
PROT SERPL-MCNC: 8 G/DL (ref 6.4–8.2)
RBC # BLD AUTO: 5.85 MILLION/UL (ref 3.88–5.62)
SODIUM SERPL-SCNC: 142 MMOL/L (ref 136–145)
WBC # BLD AUTO: 8.67 THOUSAND/UL (ref 4.31–10.16)

## 2017-10-24 PROCEDURE — 80053 COMPREHEN METABOLIC PANEL: CPT

## 2017-10-24 PROCEDURE — 85027 COMPLETE CBC AUTOMATED: CPT

## 2017-10-24 PROCEDURE — 36415 COLL VENOUS BLD VENIPUNCTURE: CPT

## 2017-10-24 NOTE — PROGRESS NOTES
Assessment  1  History of chronic kidney disease (V13 09) (Z87 448)  2  Chronic kidney disease, stage 3 (585 3) (N18 3)  3  Chronic obstructive pulmonary disease (496) (J44 9)  4  DMII (diabetes mellitus, type 2) (250 00) (E11 9)  5  Dyslipidemia (272 4) (E78 5)  6  Gout (274 9) (M10 9)  7  Hypertension (401 9) (I10)    Plan  DMII (diabetes mellitus, type 2)    · (1) HEMOGLOBIN A1C; Status:Active; Requested for:11Nov2017;    · *VB - Eye Exam; Status:Canceled;    · *VB - Foot Exam; Status:Complete;   Done: 32JXT4527 11:05AM   · *VB - Foot Exam ; every 1 year; Last 29Aug2017; Next 29Aug2018; Status:Active  Dyslipidemia    · (1) LIPID PANEL, FASTING; Status:Active; Requested for:11Nov2017;   Gout    · (1) URIC ACID; Status:Active; Requested for:11Nov2017;   Hypertension    · (1) BASIC METABOLIC PROFILE; Status:Active; Requested for:11Nov2017;    · (1) CBC/PLT/DIFF; Status:Active; Requested for:11Nov2017;    · (1) HEPATIC FUNCTION PANEL; Status:Active; Requested for:11Nov2017;     Discussion/Summary  Discussion Summary:   Lab data and renal ultrasound reviewed?no identifiable primary cause for renal insufficiency, likely related to hypertensive kidney disease and diabetes?scheduled to see nephrology next week, continue to keep well hydrated and avoid nephrotoxic substances such as nonsteroidal anti-inflammatory drugs  improved with pro air, continue Advair and Spiriva as well  2 diabetes mellitus?diet controlled? check A1c in November prior to follow-up  stable on present regimen  pain following with pain management  follow-up after labs in November, sooner as needed  Medication SE Review and Pt Understands Tx: Possible side effects of new medications were reviewed with the patient/guardian today  The treatment plan was reviewed with the patient/guardian   The patient/guardian understands and agrees with the treatment plan      Chief Complaint  Chief Complaint Chronic Condition  Sanchez [place]: Patient is here today for follow up of chronic conditions described in HPI  History of Present Illness  HPI: Feeling better, more active, appetite improved  Keeping adequately hydrated  improved  Breathing much better since starting back on proairmore limited by back pain than breathing, f/b Dr Diana Mitchell      Review of Systems  Complete-Male:   Constitutional: No fever or chills, feels well, no tiredness, no recent weight gain or weight loss  Eyes: No complaints of eye pain, no red eyes, no discharge from eyes, no itchy eyes  ENT: no complaints of earache, no hearing loss, no nosebleeds, no nasal discharge, no sore throat, no hoarseness  Cardiovascular: No complaints of slow heart rate, no fast heart rate, no chest pain, no palpitations, no leg claudication, no lower extremity  Respiratory: No complaints of shortness of breath, no wheezing, no cough, no SOB on exertion, no orthopnea or PND  Gastrointestinal: No complaints of abdominal pain, no constipation, no nausea or vomiting, no diarrhea or bloody stools  Genitourinary: No complaints of dysuria, no incontinence, no hesitancy, no nocturia, no genital lesion, no testicular pain  Musculoskeletal: as noted in HPI  Integumentary: as noted in HPI  Neurological: No compliants of headache, no confusion, no convulsions, no numbness or tingling, no dizziness or fainting, no limb weakness, no difficulty walking  Psychiatric: Is not suicidal, no sleep disturbances, no anxiety or depression, no change in personality, no emotional problems  Endocrine: No complaints of proptosis, no hot flashes, no muscle weakness, no erectile dysfunction, no deepening of the voice, no feelings of weakness  Hematologic/Lymphatic: No complaints of swollen glands, no swollen glands in the neck, does not bleed easily, no easy bruising  Active Problems  1  Adenocarcinoma of prostate (185) (C61)  2  Changes in skin texture (782 8) (R23 4)  3  Chronic obstructive pulmonary disease (496) (J44 9)  4  COPD with exacerbation (491 21) (J44 1)  5  Disc degeneration, 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) (M51 36)  6  DMII (diabetes mellitus, type 2) (250 00) (E11 9)  7  Dyslipidemia (272 4) (E78 5)  8  Esophageal reflux (530 81) (K21 9)  9  Fissure, anal (565 0) (K60 2)  10  Generalized osteoarthritis of multiple sites (715 09) (M15 9)  11  Gout (274 9) (M10 9)  12  Herniated lumbar intervertebral disc (722 10) (M51 26)  13  History of colon cancer (V10 05) (Z85 038)  14  History of prostate cancer (V10 46) (Z85 46)  15  Hypertension (401 9) (I10)  16  Iron deficiency anemia (280 9) (D50 9)  17  Knee pain, right (719 46) (M25 561)  18  Lumbago (724 2) (M54 5)  19  Lumbar canal stenosis (724 02) (M48 06)  20  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)  21  Lung nodule (793 11) (R91 1)  22  Nausea (787 02) (R11 0)  23  Need for influenza vaccination (V04 81) (Z23)  24  Need for pneumococcal vaccination (V03 82) (Z23)  25  Need for pneumococcal vaccine (V03 82) (Z23)  26  Obesity (278 00) (E66 9)  27  Pilonidal cyst (685 1) (L05 91)  28  Renal mass, right (593 9) (N28 89)  29  Requires oxygen therapy (V46 2) (Z99 81)  30  Screening for genitourinary condition (V81 6) (Z13 89)  31  SOB (shortness of breath) (786 05) (R06 02)  32  Tachycardia (785 0) (R00 0)  33  UTI (urinary tract infection) (599 0) (N39 0)  34  Vitamin B12 deficiency (266 2) (E53 8)  35  Vitamin D deficiency (268 9) (E55 9)  36  Weight loss, non-intentional (783 21) (R63 4)  37  Wound of skin (782 9) (R23 8)    Past Medical History  1  History of Bursitis of hip, unspecified laterality  2  History of Colonoscopy (Fiberoptic)  3  History of Congestive heart failure (428 0) (I50 9)  4  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)  5  History of Malignant Oral Cavity Neoplasm M0  6  History of Open Wound Of The Toe(S) (893 0)  7  History of Prostate Cancer (V10 46)  8  History of Special screening examination for neoplasm of prostate (V76 44) (Z12 5)  9   History of Tachycardia (785 0) (R00 0)  10  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)  11  History of X-Ray UGI Small Bowel Obstruction  Active Problems And Past Medical History Reviewed: The active problems and past medical history were reviewed and updated today  Surgical History  1  History of Appendectomy  2  History of CABG  3  History of Cath Stent Placement  4  History of Cholecystectomy  5  History of Incisional Hernia Repair  6  History of Partial Colectomy  7  History of Prostate Surgery  Surgical History Reviewed: The surgical history was reviewed and updated today  Family History  Mother   1  Family history of Congestive Heart Failure  2  Family history of Coronary Artery Disease (V17 49)  Father   3  Family history of Coronary Artery Disease (305 90)  Family History Reviewed: The family history was reviewed and updated today  Social History   · Denied: History of Alcohol Use (History)   · Denied: History of Drug Use   · Former smoker (D39 61) (I18 416)   · Former tobacco use (V15 82) (S41 109)   · Living Independently With Spouse   · Non smoker / tobacco user (V49 89) (Z78 9)   · Occupation:  Social History Reviewed: The social history was reviewed and updated today  Current Meds  1  Advair Diskus 500-50 MCG/DOSE Inhalation Aerosol Powder Breath Activated; INHALE 1 PUFF   TWICE DAILY; Therapy: 33SZE2366 to (Last Rx:80Oft2744)  Requested for: 04XBP6398; Status: ACTIVE -   [facility] Ordered  2  Allopurinol 100 MG Oral Tablet; Take 1 tablet daily; Therapy: 13Dat6511 to (Last Rx:97Jwb4700)  Requested for: 32Ipy3941 Ordered  3  AmLODIPine Besylate 5 MG Oral Tablet; TAKE 1 TABLET DAILY IN THE MORNING; Therapy: 20WII5495 to (Last Rx:59Fys2110)  Requested for: 38Yzj2300 Ordered  4  Chlorthalidone 25 MG Oral Tablet; TAKE ONE-HALF (1/2) TABLET DAILY; Therapy: 89UVJ4788 to (Last Rx:91Egt2812)  Requested for: 22JOG6268 Ordered  5   Colcrys 0 6 MG Oral Tablet (Colchicine); 1 po qd-tid prn; Therapy: 57Qzr2885 to (Last Rx:55Imz1673) Ordered  6  Lipoflavonoid TABS; take 2 tabs qd; Therapy: (Vera Conroy) to Recorded  7  Losartan Potassium 100 MG Oral Tablet; Take 1 tablet daily; Therapy: 01ZRR6735 to (Last Rx:36Qzb9185)  Requested for: 27Vpk9103 Ordered  8  Metoprolol Succinate ER 25 MG Oral Tablet Extended Release 24 Hour; TAKE ONE TABLET BY   MOUTH ONCE DAILY; Therapy: 26UTT1322 to (Evaluate:38Uak5579)  Requested for: 83HBY9345; Last Rx:45Vtt1630   Ordered  9  ProAir  (90 Base) MCG/ACT Inhalation Aerosol Solution; INHALE 2 PUFFS EVERY 4 HOURS   AS NEEDED; Therapy: 96PZX7698 to (Last Rx:13Imy7677)  Requested for: 86RCX8871 Ordered  10  Prochlorperazine Maleate 10 MG Oral Tablet; TAKE 1 TABLET 3 times daily PRN NAUSEA; Therapy: 31JYZ0621 to (Last Rx:07Fid2133)  Requested for: 23MPH0314 Ordered  11  Spiriva HandiHaler 18 MCG Inhalation Capsule; INHALE THE CONTENTS OF 1 CAPSULE DAILY; Therapy: 32IPE5605 to (Last Rx:38Pof3444)  Requested for: 19VNC3284 Ordered  12  TraMADol HCl - 50 MG Oral Tablet; TAKE ONE TABLET BY MOUTH FOUR TIMES DAILY AS NEEDED    FOR PAIN; Last Rx:77Rwq8364 Ordered  13  Tylenol with Codeine #4 300-60 MG Oral Tablet (Acetaminophen-Codeine #4); Therapy: 67ZHL9511 to Recorded  14  Vitamin D3 2000 UNIT Oral Tablet Chewable; 1 PO QD; Therapy: 45IZE2254 to (Last Rx:20Bad0299) Ordered  Medication List Reviewed: The medication list was reviewed and updated today  Allergies  1  No Known Drug Allergies    Vitals  Vital Signs    Recorded: 57Snj0349 11:07AM   Heart Rate 92   Respiration 18   Systolic 085   Diastolic 80   Height 6 ft 1 in   Weight 261 lb    BMI Calculated 34 44   BSA Calculated 2 41     Physical Exam    Constitutional   General appearance: Abnormal  -- obese  Eyes   Conjunctiva and lids: No swelling, erythema, or discharge      Ears, Nose, Mouth, and Throat   External inspection of ears and nose: Normal     Oropharynx: Normal with no erythema, edema, exudate or lesions  Pulmonary   Respiratory effort: No increased work of breathing or signs of respiratory distress  Auscultation of lungs: Abnormal  -- No wheeze, but expiratory phase is prolonged  Cardiovascular   Auscultation of heart: Normal rate and rhythm, normal S1 and S2, without murmurs  Examination of extremities for edema and/or varicosities: Normal     Carotid pulses: Normal     Abdomen   Abdomen: Non-tender, no masses  Liver and spleen: No hepatomegaly or splenomegaly  Lymphatic   Palpation of lymph nodes in neck: No lymphadenopathy  Musculoskeletal   Gait and station: Normal     Skin   Skin and subcutaneous tissue: Normal without rashes or lesions  Neurologic   Cranial nerves: Cranial nerves 2-12 intact  Psychiatric   Orientation to person, place and time: Normal     Mood and affect: Normal          Results/Data  PHQ-9 Adult Depression Screening 39Ccx7700 11:10AM User, [facility]     Test Name Result Flag Reference   PHQ-9 Adult Depression Score 0     Over the last two weeks, how often have you been bothered by any of the following problems? Little interest or pleasure in doing things: Not at all - 0  Feeling down, depressed, or hopeless: Not at all - 0  Trouble falling or staying asleep, or sleeping too much: Not at all - 0  Feeling tired or having little energy: Not at all - 0  Poor appetite or over eating: Not at all - 0  Feeling bad about yourself - or that you are a failure or have let yourself or your family down: Not at all - 0  Trouble concentrating on things, such as reading the newspaper or watching television: Not at all - 0  Moving or speaking so slowly that other people could have noticed   Or the opposite -  being so fidgety or restless that you have been moving around a lot more than usual: Not at all - 0  Thoughts that you would be better off dead, or of hurting yourself in some way: Not at all - 0   PHQ-9 Adult Depression Screening Negative     PHQ-9 Difficulty Level Not difficult at all     PHQ-9 Severity No Depression       Falls Risk Assessment (Dx Z13 89 Screen for Neurologic Disorder) 59Jua7780 11:10AM User, [facility]     Test Name Result Flag Reference   Falls Risk      No falls in the past year     *VB - Foot Exam 29Aug2017 11:05AM [facility] [employer] [facility]     Test Name Result Flag Reference   FOOT EXAM 62EHX6103       *VB - Urinary Incontinence Screen (Dx Z13 89 Screen for UI) 29Aug2017 11:04AM [facility] [employer] [facility]     Test Name Result Flag Reference   Urinary Incontinence Assessment 83Quy0788       US KIDNEY AND BLADDER 60YRN4022 07:44AM Dora Vincent Order Number: [id number]    - Patient Instructions: To schedule this appointment, please contact Central Scheduling at [phone]  Test Name Result Flag Reference   US KIDNEY AND BLADDER (Report)     RENAL ULTRASOUND     INDICATION: 51-year-old male with history of chronic kidney disease  Routine follow-up     COMPARISON: 3/31/2015 ultrasound exam as well as CT study from 4/14/2015     TECHNIQUE:  Ultrasound of the retroperitoneum was performed with a curvilinear transducer utilizing volumetric sweeps and still imaging techniques  FINDINGS:     KIDNEYS:   Symmetric and normal size  Right kidney: 10 8 x 5 7 cm with cortical thickness 1 1 cm  Normal echogenicity and contour  No suspicious masses detected  Small exophytic mid pole region cyst is 12 mm diameter and appears probably slightly larger than on the prior CT from a couple years ago and also slightly enlarged from the prior ultrasound but remains otherwise benign in    appearance  Another exophytic cyst closer to the lower pole is 8 mm and appears new but has benign appearance  No hydronephrosis  No shadowing calculi  No perinephric fluid collections  Left kidney: 11 1 x 5 7 cm with cortical thickness 1 1 cm  Normal echogenicity and contour  No suspicious masses detected   There is a lower pole cyst which is 12 x 20 x 10 mm diameter  Adjacent to this is a small right echogenic focus which does not seem to produce definitive shadowing  Whether this represents a small calcification is    uncertain  Adjacent lower pole cyst is about 12 mm diameter with a more rounded configuration  No hydronephrosis  No shadowing calculi  No perinephric fluid collections  URETERS:   Nonvisualized  BLADDER:    The bladder is not well distended despite patient reporting following the recommended prep  It is only partially distended  No obvious stone or mass  Portions of the bladder are secured by shadowing  Jets were not seen from the ureters  IMPRESSION:     Limited assessment of the bladder  Small bilateral renal cysts  Questionable small calculus lower pole left kidney  No hydronephrosis  Workstation performed: [id number]     Signed by:   Nohemy Borrero MD   7/26/17     (1) MICROALBUMIN CREATININE RATIO, RANDOM URINE 29RVZ5633 10:24AM Julia Chandler     Test Name Result Flag Reference   MICROALBUMIN/ CREAT R 8 mg/g creatinine  0-30   MICROALBUMIN,URINE 36 1 mg/L H 0 0-20 0   CREATININE URINE 481 0 mg/dL       (1) PROTEIN ELECTRO, URINE 14KVD0111 10:24AM Min Chandler Order Number: [id number]     Test Name Result Flag Reference   ALPHA 1 URINE 17 7 %     ALPHA 2 URINE 13 2 %     BETA URINE 17 1 %     GAMMA GLOBULIN URINE 16 0 %     UPEP INTERPRETATION      The urine total protein is increased  The urine protein electrophoresis shows non-selective proteinuria  No monoclonal bands noted  Reviewed by: [place] ([id number]) **Electronic Signature**   ALBUMIN URINE ELP 36 0 %     URINE PROTEIN 44 0 mg/dL H 2 0-17 5     (1) BASIC METABOLIC PROFILE 98DJW9905 03:41PM Julia Chandler     Test Name Result Flag Reference   SODIUM 142 mmol/L  136-145   POTASSIUM 3 8 mmol/L  3 5-5 3   CHLORIDE 108 mmol/L  100-108   CARBON DIOXIDE 24 mmol/L  21-32 ANION GAP (CALC) 10 mmol/L  4-13   BLOOD UREA NITROGEN 21 mg/dL  5-25   CREATININE 1 88 mg/dL H 0 60-1 30   Standardized to IDMS reference method   CALCIUM 10 3 mg/dL H 8 3-10 1   eGFR Non-African American 42 7 ml/min/1 73sq m     [facility] Disease Education Program recommendations are as follows:  GFR calculation is accurate only with a steady state creatinine  Chronic Kidney disease less than 60 ml/min/1 73 sq  meters  Kidney failure less than 15 ml/min/1 73 sq  meters  CORRECTED CALCIUM 9 9 mg/dL  8 3-10 1   ALBUMIN 4 5 g/dL  3 5-5 0   CALCIUM FOR CA/ALB 10 3 md/dL H 8 3-10 1   GLUCOSE FASTING 138 mg/dL H 65-99     (1) CBC/PLT/DIFF 27HBN2865 03:41PM Mann Chandler     Test Name Result Flag Reference   WBC COUNT 11 18 Thousand/uL H 4 31-10 16   RBC COUNT 6 34 Million/uL H 3 88-5 62   HEMOGLOBIN 15 7 g/dL  12 0-17 0   HEMATOCRIT 48 0 %  36 5-49 3   MCV 76 fL L 82-98   MCH 24 8 pg L 26 8-34 3   MCHC 32 7 g/dL  31 4-37 4   RDW 16 7 % H 11 6-15 1   PLATELET COUNT 285 Thousands/uL  149-390   nRBC AUTOMATED 0 /100 WBCs     NEUTROPHILS RELATIVE PERCENT 75 %  43-75   LYMPHOCYTES RELATIVE PERCENT 18 %  14-44   MONOCYTES RELATIVE PERCENT 7 %  4-12   EOSINOPHILS RELATIVE PERCENT 0 %  0-6   BASOPHILS RELATIVE PERCENT 0 %  0-1   NEUTROPHILS ABSOLUTE COUNT 8 34 Thousands/? ??L H 1 85-7 62   LYMPHOCYTES ABSOLUTE COUNT 2 01 Thousands/? ??L  0 60-4 47   MONOCYTES ABSOLUTE COUNT 0 73 Thousand/? ??L  0 17-1 22   EOSINOPHILS ABSOLUTE COUNT 0 05 Thousand/? ??L  0 00-0 61   BASOPHILS ABSOLUTE COUNT 0 02 Thousands/? ??L  0 00-0 10     (1) ACACIA SCREEN W/REFLEX TO TITER/PATTERN 60PBD9679 03:41PM Geoff Chandler Order Number: [id number]     Test Name Result Flag Reference   ACACIA SCREEN   Negative  Negative     (1) MAGNESIUM 69AZF3421 03:41PM Mann Chandler     Test Name Result Flag Reference   MAGNESIUM 2 2 mg/dL  1 6-2 6     (1) PROTEIN ELECTRO, SERUM 11XCV7572 03:41PM Mann Chandler     Test Name Result Flag Reference A/G RATIO 1 20  1 10-1 80   Albumin 54 6 %  52 0-65 0   Albumin Conc  4 64 g/dl  3 50-5 00   Alpha 1 Conc  0 33 g/dL  0 10-0 40   ALPHA 1 3 9 %  2 5-5 0   Alpha 2 Conc  0 99 g/dL  0 40-1 20   ALPHA 2 11 7 %  7 0-13 0   Beta 1 Conc  0 51 g/dL  0 40-0 80   BETA-1 6 0 %  5 0-13 0   Beta 2 Conc 0 52 g/dL H 0 20-0 50   BETA-2 6 1 %  2 0-8 0   Gamma Conc 1 50 g/dL  0 50-1 60   GAMMA GLOBULIN 17 7 %  12 0-22 0   Interpretation      The serum total protein is increased and albumin is within normal limits  The serum protein electrophoresis shows an increased beta-2 region  No monoclonal bands noted  Reviewed by: Diane King MD ([id number]) **Electronic Signature**   TOTAL PROTEIN  8 5 g/dL H 6 4-8 2     (1) PTH N-TERMINAL (INTACT) 92YVH0074 03:41PM Degler, Pat Later     Test Name Result Flag Reference   PARATHYROID HORMONE INTACT 24 6 pg/mL  14 0-72 0     (1) SED RATE 38FSW0930 03:41PM Degler, Pat Later     Test Name Result Flag Reference   SED RATE 13 mm/hour H 0-10     (1) URIC ACID 78HFV4768 03:41PM Degler, Pat Later     Test Name Result Flag Reference   URIC ACID 6 8 mg/dL  4 2-8 0   Specimen collection should occur prior to Metamizole administration due to the potential for falsely depressed results  (1) FREE LIGHT CHAINS, SERUM 05KIN5850 03:41PM Degler, Pat Later     Test Name Result Flag Reference   FREE KAPPA LIGHT CHAINS, SERUM 28 4 mg/L H 3 3 - 19 4   FREE LAMBDA LIGHT CHAINS, SERUM 22 4 mg/L  5 7 - 26 3   KAPPA/LAMBDA RATIO, SERUM 1 27  0 26 - 1 65   Performed at:  [address]  [id number]  : Eduardo Finley MD, Phone:  [phone]     Health Management  DMII (diabetes mellitus, type 2)   *VB - Foot Exam; every 1 year; Last 62Hew3068; Next Due: 05Fbd8427; Active  Health Maintenance   Tdap (Adacel); every 10 years; Last 04JAM4079; Next Due: 97ETG2179; Active    Future Appointments    Date/Time Provider Specialty Site   09/12/2017 09:15 AM CRISTIANA [place]   Nephrology [address]     Signatures   Electronically signed by : CRISTIANA Solano ; Aug 29 2017 11:41AM EST                       (Author)    Electronically signed by : CRISTIANA Solano ; Aug 29 2017 11:42AM EST                       (Author)

## 2017-10-26 ENCOUNTER — HOSPITAL ENCOUNTER (EMERGENCY)
Facility: HOSPITAL | Age: 77
Discharge: HOME | End: 2017-10-26
Attending: EMERGENCY MEDICINE | Admitting: EMERGENCY MEDICINE
Payer: MEDICARE

## 2017-10-26 VITALS
OXYGEN SATURATION: 90 % | BODY MASS INDEX: 32.98 KG/M2 | DIASTOLIC BLOOD PRESSURE: 68 MMHG | TEMPERATURE: 97 F | SYSTOLIC BLOOD PRESSURE: 108 MMHG | WEIGHT: 250 LBS | RESPIRATION RATE: 16 BRPM | HEART RATE: 92 BPM

## 2017-10-26 DIAGNOSIS — R19.7 DIARRHEA: Primary | ICD-10-CM

## 2017-10-26 LAB
ALBUMIN SERPL BCP-MCNC: 3.9 G/DL (ref 3.5–5)
ALP SERPL-CCNC: 92 U/L (ref 46–116)
ALT SERPL W P-5'-P-CCNC: 49 U/L (ref 12–78)
ANION GAP SERPL CALCULATED.3IONS-SCNC: 11 MMOL/L (ref 4–13)
AST SERPL W P-5'-P-CCNC: 67 U/L (ref 5–45)
BASOPHILS # BLD AUTO: 0.02 THOUSANDS/ΜL (ref 0–0.1)
BASOPHILS NFR BLD AUTO: 0 % (ref 0–1)
BILIRUB SERPL-MCNC: 1.4 MG/DL (ref 0.2–1)
BUN SERPL-MCNC: 13 MG/DL (ref 5–25)
CALCIUM SERPL-MCNC: 9.3 MG/DL (ref 8.3–10.1)
CHLORIDE SERPL-SCNC: 101 MMOL/L (ref 100–108)
CO2 SERPL-SCNC: 28 MMOL/L (ref 21–32)
CREAT SERPL-MCNC: 1.68 MG/DL (ref 0.6–1.3)
EOSINOPHIL # BLD AUTO: 0.02 THOUSAND/ΜL (ref 0–0.61)
EOSINOPHIL NFR BLD AUTO: 0 % (ref 0–6)
ERYTHROCYTE [DISTWIDTH] IN BLOOD BY AUTOMATED COUNT: 17.5 % (ref 11.6–15.1)
GFR SERPL CREATININE-BSD FRML MDRD: 45 ML/MIN/1.73SQ M
GLUCOSE SERPL-MCNC: 101 MG/DL (ref 65–140)
HCT VFR BLD AUTO: 48.4 % (ref 36.5–49.3)
HGB BLD-MCNC: 14.9 G/DL (ref 12–17)
LYMPHOCYTES # BLD AUTO: 1.49 THOUSANDS/ΜL (ref 0.6–4.47)
LYMPHOCYTES NFR BLD AUTO: 10 % (ref 14–44)
MCH RBC QN AUTO: 23.8 PG (ref 26.8–34.3)
MCHC RBC AUTO-ENTMCNC: 30.8 G/DL (ref 31.4–37.4)
MCV RBC AUTO: 77 FL (ref 82–98)
MONOCYTES # BLD AUTO: 0.74 THOUSAND/ΜL (ref 0.17–1.22)
MONOCYTES NFR BLD AUTO: 5 % (ref 4–12)
NEUTROPHILS # BLD AUTO: 12.27 THOUSANDS/ΜL (ref 1.85–7.62)
NEUTS SEG NFR BLD AUTO: 84 % (ref 43–75)
NRBC BLD AUTO-RTO: 0 /100 WBCS
PLATELET # BLD AUTO: 302 THOUSANDS/UL (ref 149–390)
PMV BLD AUTO: 12.2 FL (ref 8.9–12.7)
POTASSIUM SERPL-SCNC: 5.2 MMOL/L (ref 3.5–5.3)
PROT SERPL-MCNC: 8.4 G/DL (ref 6.4–8.2)
RBC # BLD AUTO: 6.25 MILLION/UL (ref 3.88–5.62)
SODIUM SERPL-SCNC: 140 MMOL/L (ref 136–145)
WBC # BLD AUTO: 14.61 THOUSAND/UL (ref 4.31–10.16)

## 2017-10-26 PROCEDURE — 96361 HYDRATE IV INFUSION ADD-ON: CPT

## 2017-10-26 PROCEDURE — 87493 C DIFF AMPLIFIED PROBE: CPT | Performed by: EMERGENCY MEDICINE

## 2017-10-26 PROCEDURE — 80053 COMPREHEN METABOLIC PANEL: CPT | Performed by: EMERGENCY MEDICINE

## 2017-10-26 PROCEDURE — 99284 EMERGENCY DEPT VISIT MOD MDM: CPT

## 2017-10-26 PROCEDURE — 85025 COMPLETE CBC W/AUTO DIFF WBC: CPT | Performed by: EMERGENCY MEDICINE

## 2017-10-26 PROCEDURE — 36415 COLL VENOUS BLD VENIPUNCTURE: CPT | Performed by: EMERGENCY MEDICINE

## 2017-10-26 PROCEDURE — 96374 THER/PROPH/DIAG INJ IV PUSH: CPT

## 2017-10-26 RX ORDER — ONDANSETRON 2 MG/ML
4 INJECTION INTRAMUSCULAR; INTRAVENOUS ONCE
Status: COMPLETED | OUTPATIENT
Start: 2017-10-26 | End: 2017-10-26

## 2017-10-26 RX ADMIN — ONDANSETRON 4 MG: 2 INJECTION INTRAMUSCULAR; INTRAVENOUS at 19:41

## 2017-10-26 RX ADMIN — SODIUM CHLORIDE 500 ML: 0.9 INJECTION, SOLUTION INTRAVENOUS at 21:14

## 2017-10-27 LAB — C DIFF TOX GENS STL QL NAA+PROBE: NORMAL

## 2017-10-27 NOTE — DISCHARGE INSTRUCTIONS
Acute Diarrhea   WHAT YOU NEED TO KNOW:   Acute diarrhea starts quickly and lasts a short time, usually 1 to 3 days  It can last up to 2 weeks  You may not be able to control your diarrhea  Acute diarrhea usually stops on its own  DISCHARGE INSTRUCTIONS:   Return to the emergency department if:   · You feel confused  · Your heartbeat is faster than normal      · Your eyes look deeply sunken, or you have no tears when you cry  · You urinate less than usual, or your urine is dark yellow  · You have blood or mucus in your stools  · You have severe abdominal pain  · You are unable to drink any liquids  Contact your healthcare provider if:   · Your symptoms do not get better with treatment  · You have a fever higher than 101 3°F (38 5°C)  · You have trouble eating and drinking because you are vomiting  · You are thirsty or have a dry mouth  · Your diarrhea does not get better in 7 days  · You have questions or concerns about your condition or care  Follow up with your healthcare provider as directed:  Write down your questions so you remember to ask them during your visits  Medicines:  · Diarrhea medicine  is an over-the-counter medicine that helps slow or stop your diarrhea  If you take other medicines, talk to your healthcare provider before you take diarrhea medicine  · Antibiotics  may be given to help treat an infection caused by bacteria  · Antiparasitics  may be given to treat an infection caused by parasites  · Take your medicine as directed  Contact your healthcare provider if you think your medicine is not helping or if you have side effects  Tell him of her if you are allergic to any medicine  Keep a list of the medicines, vitamins, and herbs you take  Include the amounts, and when and why you take them  Bring the list or the pill bottles to follow-up visits  Carry your medicine list with you in case of an emergency    Self-care:   · Drink liquids as directed  Liquids will help prevent dehydration caused by diarrhea  Ask your healthcare provider how much liquid to drink each day and which liquids are best for you  You may need to drink an oral rehydration solution (ORS)  An ORS has the right amounts of water, salts, and sugar you need to replace body fluids  You can buy an ORS at most grocery stores and pharmacies  · Eat foods that are easy to digest   Examples include rice, lentils, cereal, bananas, potatoes, and bread  It also includes some fruits (bananas, melon), well-cooked vegetables, and lean meats  Avoid foods high in fiber, fat, and sugar  Also avoid caffeine, alcohol, dairy, and red meat until your diarrhea is gone  Prevent acute diarrhea:   · Wash your hands often  Use soap and water  Wash your hands before you eat or prepare food  Also wash your hands after you use the bathroom  Use an alcohol-based hand gel when soap and water are not available  · Keep bathroom surfaces clean  This helps prevent the spread of germs that cause acute diarrhea  · Wash fruits and vegetables well before you eat them  This can help remove germs that cause diarrhea  If possible, remove the skin from fruits and vegetables, or cook them well before you eat them  · Cook meat as directed  ¨ Cook ground meat  to 160°F      ¨ Cook ground poultry, whole poultry, or cuts of poultry  to at least 165°F  Remove the meat from heat  Let it stand for 3 minutes before you eat it  ¨ Cook whole cuts of meat other than poultry  to at least 145°F  Remove the meat from heat  Let it stand for 3 minutes before you eat it  · Wash dishes that have touched raw meat with hot water and soap  This includes cutting boards, utensils, dishes, and serving containers  · Place raw or cooked meat in the refrigerator as soon as possible  Bacteria can grow in meat that is left at room temperature too long  · Do not eat raw or undercooked oysters, clams, or mussels  These foods may be contaminated and cause infection  · Drink filtered or treated water only when you travel  Do not put ice in your drinks  Drink bottled water whenever possible  © 2017 [address] [employer] Information is for End User's use only and may not be sold, redistributed or otherwise used for commercial purposes  All illustrations and images included in CareNotes® are the copyrighted property of A D A M [employer]  or August Orlelana  The above information is an  only  It is not intended as medical advice for individual conditions or treatments  Talk to your doctor, nurse or pharmacist before following any medical regimen to see if it is safe and effective for you

## 2017-10-31 ENCOUNTER — GENERIC CONVERSION - ENCOUNTER (OUTPATIENT)
Dept: OTHER | Facility: OTHER | Age: 77
End: 2017-10-31

## 2017-11-11 DIAGNOSIS — E11.9 TYPE 2 DIABETES MELLITUS WITHOUT COMPLICATIONS (HCC): ICD-10-CM

## 2017-11-11 DIAGNOSIS — I10 ESSENTIAL (PRIMARY) HYPERTENSION: ICD-10-CM

## 2017-11-11 DIAGNOSIS — M10.9 GOUT: ICD-10-CM

## 2017-11-11 DIAGNOSIS — E78.5 HYPERLIPIDEMIA: ICD-10-CM

## 2017-11-28 ENCOUNTER — GENERIC CONVERSION - ENCOUNTER (OUTPATIENT)
Dept: OTHER | Facility: OTHER | Age: 77
End: 2017-11-28

## 2017-11-30 ENCOUNTER — LAB REQUISITION (OUTPATIENT)
Dept: LAB | Facility: HOSPITAL | Age: 77
End: 2017-11-30
Payer: MEDICARE

## 2017-11-30 DIAGNOSIS — M10.9 GOUT: ICD-10-CM

## 2017-11-30 DIAGNOSIS — E11.9 TYPE 2 DIABETES MELLITUS WITHOUT COMPLICATIONS (HCC): ICD-10-CM

## 2017-11-30 DIAGNOSIS — I10 ESSENTIAL (PRIMARY) HYPERTENSION: ICD-10-CM

## 2017-11-30 DIAGNOSIS — E78.5 HYPERLIPIDEMIA: ICD-10-CM

## 2017-11-30 LAB
ALBUMIN SERPL BCP-MCNC: 3.8 G/DL (ref 3.5–5)
ALP SERPL-CCNC: 98 U/L (ref 46–116)
ALT SERPL W P-5'-P-CCNC: 26 U/L (ref 12–78)
ANION GAP SERPL CALCULATED.3IONS-SCNC: 8 MMOL/L (ref 4–13)
AST SERPL W P-5'-P-CCNC: 30 U/L (ref 5–45)
BASOPHILS # BLD AUTO: 0.02 THOUSANDS/ΜL (ref 0–0.1)
BASOPHILS NFR BLD AUTO: 0 % (ref 0–1)
BILIRUB DIRECT SERPL-MCNC: 0.19 MG/DL (ref 0–0.2)
BILIRUB SERPL-MCNC: 0.6 MG/DL (ref 0.2–1)
BUN SERPL-MCNC: 17 MG/DL (ref 5–25)
CALCIUM SERPL-MCNC: 9.3 MG/DL (ref 8.3–10.1)
CHLORIDE SERPL-SCNC: 104 MMOL/L (ref 100–108)
CHOLEST SERPL-MCNC: 138 MG/DL (ref 50–200)
CO2 SERPL-SCNC: 29 MMOL/L (ref 21–32)
CREAT SERPL-MCNC: 1.36 MG/DL (ref 0.6–1.3)
EOSINOPHIL # BLD AUTO: 0 THOUSAND/ΜL (ref 0–0.61)
EOSINOPHIL NFR BLD AUTO: 0 % (ref 0–6)
ERYTHROCYTE [DISTWIDTH] IN BLOOD BY AUTOMATED COUNT: 16.5 % (ref 11.6–15.1)
EST. AVERAGE GLUCOSE BLD GHB EST-MCNC: 126 MG/DL
GFR SERPL CREATININE-BSD FRML MDRD: 58 ML/MIN/1.73SQ M
GLUCOSE SERPL-MCNC: 119 MG/DL (ref 65–140)
HBA1C MFR BLD: 6 % (ref 4.2–6.3)
HCT VFR BLD AUTO: 44.8 % (ref 36.5–49.3)
HDLC SERPL-MCNC: 46 MG/DL (ref 40–60)
HGB BLD-MCNC: 13.6 G/DL (ref 12–17)
LDLC SERPL CALC-MCNC: 69 MG/DL (ref 0–100)
LYMPHOCYTES # BLD AUTO: 1.72 THOUSANDS/ΜL (ref 0.6–4.47)
LYMPHOCYTES NFR BLD AUTO: 12 % (ref 14–44)
MCH RBC QN AUTO: 23.8 PG (ref 26.8–34.3)
MCHC RBC AUTO-ENTMCNC: 30.4 G/DL (ref 31.4–37.4)
MCV RBC AUTO: 78 FL (ref 82–98)
MONOCYTES # BLD AUTO: 1.05 THOUSAND/ΜL (ref 0.17–1.22)
MONOCYTES NFR BLD AUTO: 7 % (ref 4–12)
NEUTROPHILS # BLD AUTO: 11.42 THOUSANDS/ΜL (ref 1.85–7.62)
NEUTS SEG NFR BLD AUTO: 80 % (ref 43–75)
NRBC BLD AUTO-RTO: 0 /100 WBCS
PLATELET # BLD AUTO: 312 THOUSANDS/UL (ref 149–390)
POTASSIUM SERPL-SCNC: 3.9 MMOL/L (ref 3.5–5.3)
PROT SERPL-MCNC: 7.8 G/DL (ref 6.4–8.2)
RBC # BLD AUTO: 5.72 MILLION/UL (ref 3.88–5.62)
SODIUM SERPL-SCNC: 141 MMOL/L (ref 136–145)
TRIGL SERPL-MCNC: 116 MG/DL
URATE SERPL-MCNC: 5.3 MG/DL (ref 4.2–8)
WBC # BLD AUTO: 14.29 THOUSAND/UL (ref 4.31–10.16)

## 2017-11-30 PROCEDURE — 80048 BASIC METABOLIC PNL TOTAL CA: CPT | Performed by: INTERNAL MEDICINE

## 2017-11-30 PROCEDURE — 80061 LIPID PANEL: CPT | Performed by: INTERNAL MEDICINE

## 2017-11-30 PROCEDURE — 85025 COMPLETE CBC W/AUTO DIFF WBC: CPT | Performed by: INTERNAL MEDICINE

## 2017-11-30 PROCEDURE — 80076 HEPATIC FUNCTION PANEL: CPT | Performed by: INTERNAL MEDICINE

## 2017-11-30 PROCEDURE — 84550 ASSAY OF BLOOD/URIC ACID: CPT | Performed by: INTERNAL MEDICINE

## 2017-11-30 PROCEDURE — 83036 HEMOGLOBIN GLYCOSYLATED A1C: CPT | Performed by: INTERNAL MEDICINE

## 2017-12-12 ENCOUNTER — ALLSCRIPTS OFFICE VISIT (OUTPATIENT)
Dept: OTHER | Facility: OTHER | Age: 77
End: 2017-12-12

## 2017-12-12 DIAGNOSIS — D72.829 ELEVATED WHITE BLOOD CELL COUNT: ICD-10-CM

## 2017-12-12 DIAGNOSIS — N18.30 CHRONIC KIDNEY DISEASE, STAGE III (MODERATE) (HCC): ICD-10-CM

## 2017-12-13 NOTE — PROGRESS NOTES
Assessment  1  Chronic kidney disease, stage 3 (585 3) (N18 3)   2  Benign hypertension with chronic kidney disease (403 10,585 9) (I12 9)    Plan  Chronic kidney disease, stage 3    · (1) BASIC METABOLIC PROFILE; Status:Active; Requested for:39Hzn5776;    Perform:[facility]; Due:36Vnj0265; Ordered; For:Chronic kidney disease, stage 3; Ordered By:Brian Pierre;   · (1) CBC/PLT/DIFF; Status:Active; Requested for:56Meb9561;    Perform:[facility]; Due:12Ivg9652; Ordered;kidney disease, stage 3; Ordered By:Brina Pierre;   · (1) MICROALBUMIN CREATININE RATIO, RANDOM URINE; Status:Active; Requestedfor:03Fhk2932;    Perform:[facility]; Due:55Nrt3876; Ordered; For:Chronic kidney disease, stage 3; Ordered By:Brian Pierre;   · (1) PHOSPHORUS; Status:Active; Requested for:14Tgj4556;    Perform:[facility]; Due:58Fsz0152; Ordered; For:Chronic kidney disease, stage 3; Ordered By:Brian Pierre;   · (1) PTH N-TERMINAL (INTACT); Status:Active; Requested for:17Uwq5314;    Perform:[facility]; Due:38Cwt5573; Ordered; For:Chronic kidney disease, stage 3; Ordered By:Brian Pierre;   · (1) URIC ACID; Status:Active; Requested for:24Svi0739;    Perform:[facility]; Due:67Hib5412; Ordered; For:Chronic kidney disease, stage 3; Ordered By:Brian Pierre;   · (1) URINALYSIS WITH MICROSCOPIC; Status:Active; Requested for:75Dyu6955;    Perform:[facility]; Due:36Mmo3643; Ordered; For:Chronic kidney disease, stage 3; Ordered By:Brian Pierre;   · (1) VITAMIN D 25-HYDROXY; Status:Active; Requested for:36Fpm8340;    Perform:[facility]; Due:55Qpe3372; Ordered;kidney disease, stage 3; Ordered By:Brian Pierre;   · Follow-up visit in 6 months Evaluation and Treatment  Follow-up  Status: Hold For -Scheduling  Requested for: 66Dbo0411   Ordered;Chronic kidney disease, stage 3; Ordered By: Anyi Nevarez Performed:  Due: 98LHF5717    Discussion/Summary    1   CKD stage III  Multifactorial and suspect secondary to underlying hypertensive nephrosclerosis  review of old medical records patient's previously known baseline creatinine was between 1 3-1 6  In the interim patient's renal function has remained stable at creatinine of 1 36 with EGFR of 58  Patient was also found to have calcium oxalate crystals in the urine which is most likely due to kidney stone and was advised to drink plenty of water to maintain daily urinary output > 2 L per day  Continue to avoid NSAID  Patient has been checking blood pressure regularly at home which is also well controlled  Plan to recheck renal function before next visit  Hypertension in chronic kidney disease  Today's blood pressure was elevated and above the goal although patient said that he has a history of white coat hypertension and he regularly checks blood pressure at home which is well controlled  I have reviewed patient's home log of blood pressure which is showing that patient's hypertension has been well controlled at home  I have advised patient to follow a low-salt diet    Plan to monitor hypertension today with chlorthalidone 12 5 mg q  daily, losartan 100 mg q  daily and metoprolol extended release 25 mg q  daily  Proteinuria  Minimal with urine microalbumin to creatinine ratio of 9 mg  Continue losartan 100 mg PO daily  Calcium oxalate crystal  Patient's urine analysis showed calcium oxalate crystals in the urine and renal ultrasound also showed possible stone in the left kidney  Calcium oxalate crystal is most likely due to underlying kidney stone  Patient was advised to drink plenty of water to maintain daily urinary output > 2 L for time being  Patient was also advised to avoid NSAIDs in the light of chronic kidney disease  to nephrology clinic in 6 months  Future labs ordered     The patient was counseled regarding diagnostic results,-- instructions for management,-- risk factor reductions,-- prognosis,-- patient and family education,-- impressions,-- risks and benefits of treatment options,-- importance of compliance with treatment  The patient has the current Goals: Drink plenty of water to maintain daily urinary output > 2 LNSAIDsblood pressure regularly at home and make along  Patient is able to Self-Care  Follow low salt diet  Possible side effects of new medications were reviewed with the patient/guardian today  The treatment plan was reviewed with the patient/guardian  The patient/guardian understands and agrees with the treatment plan      Reason For Visit  Further management of CKD stage III      History of Present Illness  This is 30-year-old male with a past medical history of gout, chronic kidney disease, hypertension, COPD, hyperlipidemia recently returns to Nephrology Clinic for further management of CKD stage 3  review of old medical records patient's previously known baseline creatinine was between 1 3-1 6   also a history of hypertension has been checking blood pressure regularly at home which is also well controlled  has not taken any NSAID since last visit and use p r n  Tylenol  also history of gout which is currently well controlled with colchicine and allopurinol  also history of COPD  Patient currently does not smoke and his COPD is currently well controlled on current regimen  Review of Systems   Constitutional: no fever,-- no chills-- and-- no anorexia  Integumentary: no rashes  Gastrointestinal: no abdominal pain,-- no constipation-- and-- no nausea  Respiratory: no shortness of breath-- and-- no cough  Cardiovascular: no orthopnea,-- no chest pain-- and-- no palpitations  Musculoskeletal: no joint swelling  Neurological: no dizziness  Genitourinary: no dysuria,-- no nocturia-- and-- no change in urinary frequency  Eyes: no eyesight problems  ENT: no nasal discharge  Psychiatric: not suicidal,-- no anxiety-- and-- no depression  ROS reviewed        Past Medical History    The active problems and past medical history were reviewed and updated today  Surgical History    The surgical history was reviewed and updated today  Family History    The family history was reviewed and updated today  Social History  The social history was reviewed and updated today  The social history was reviewed and is unchanged  Current Meds   1  Acetaminophen-Codeine #3 300-30 MG Oral Tablet; TAKE 1 TABLET EVERY 4 TO 6 HOURS AS NEEDED FOR PAIN; Therapy: (Recorded:84Zav7931) to Recorded   2  Advair Diskus 500-50 MCG/DOSE Inhalation Aerosol Powder Breath Activated; INHALE 1 PUFF TWICE DAILY; Therapy: 99DQS5039 to (Last Rx:34Hde6473)  Requested for: 31Oct2017 Ordered   3  Allopurinol 100 MG Oral Tablet; Take 1 tablet daily; Therapy: 68Nuw0262 to (Last Rx:24Eer7152)  Requested for: 53Oqs8976 Ordered   4  AmLODIPine Besylate 5 MG Oral Tablet; TAKE 1 TABLET DAILY IN THE MORNING; Therapy: 47WCX1228 to (Last Rx:29Oct2017)  Requested for: 29Oct2017 Ordered   5  Colcrys 0 6 MG Oral Tablet; 1 po qd-tid prn; Therapy: 28Zgv0349 to (Last Rx:22Vvh2878) Ordered   6  Lipoflavonoid TABS; take 2 tabs qd; Therapy: (Jason Evans) to Recorded   7  Losartan Potassium 100 MG Oral Tablet; Take 1 tablet daily; Therapy: 18EFE2569 to (Last Rx:29Oct2017)  Requested for: 29Oct2017 Ordered   8  Metoprolol Succinate ER 25 MG Oral Tablet Extended Release 24 Hour; TAKE ONE TABLET BY MOUTH ONCE DAILY; Therapy: 14SPN3081 to (Evaluate:21Pcn6660)  Requested for: 25Frr0501 Recorded   9  ProAir  (90 Base) MCG/ACT Inhalation Aerosol Solution; INHALE 2 PUFFS EVERY 4 HOURS AS NEEDED; Therapy: 76OTA6018 to (Last Rx:28Jfx0102)  Requested for: 47NUO8122 Ordered   10  Prochlorperazine Maleate 10 MG Oral Tablet; TAKE 1 TABLET 3 times daily PRN  NAUSEA; Therapy: 32QEV5075 to (Last Rx:89Evj3684)  Requested for: 69VHE4780 Ordered   11   Spiriva HandiHaler 18 MCG Inhalation Capsule; INHALE THE CONTENTS OF 1  CAPSULE DAILY; Therapy: 79AJU3816 to (Last Rx:26Qnx8218)  Requested for: 29Oct2017 Ordered   12  Vitamin D3 2000 UNIT Oral Tablet Chewable; 1 PO QD; Therapy: 81Hou7039 to (Last Rx:06Mkz1154) Ordered    The medication list was reviewed and updated today  Allergies  1  No Known Drug Allergies  2  No Known Environmental Allergies   3  No Known Food Allergies    Vitals  Vital Signs    Recorded: 12Dec2017 09:06AM   Temperature 98 5 F   Heart Rate 92   Systolic 909, Sitting   Diastolic 086, Sitting   Height 6 ft 1 in   Weight 260 lb    BMI Calculated 34 3   BSA Calculated 2 41       Physical Exam   Constitutional: General appearance: Abnormal  -- Awake, obese, not in acute distress  ENT: External ears and nose appear normal     Eyes: Anicteric sclerae  Neck: No bruit heard over either carotid  JVD:  No JVD present  Pulmonary: Respiratory effort: No increased work of breathing or signs of respiratory distress  -- Auscultation of lungs: Clear to auscultation  Cardiovascular: Auscultation of heart: Normal rate and rhythm, normal S1 and S2, without murmurs  Abdomen: Non-tender, no masses  Extremities: No cyanosis, clubbing or edema  Pulses: Dorsalis Pedis and Posterior Tibial pulses normal   Rash: No rash present  Neurologic: Non Focal     Psychiatric: Orientation to person, place, and time: Normal  -- and-- Mood and affect: Normal    Back: No CVA tenderness        Results/Data  (1) CBC/PLT/DIFF 82BBF5490 11:30AM Mann Chandler     Test Name Result Flag Reference   WBC COUNT 14 29 Thousand/uL H 4 31-10 16   RBC COUNT 5 72 Million/uL H 3 88-5 62   HEMOGLOBIN 13 6 g/dL  12 0-17 0   HEMATOCRIT 44 8 %  36 5-49 3   MCV 78 fL L 82-98   MCH 23 8 pg L 26 8-34 3   MCHC 30 4 g/dL L 31 4-37 4   RDW 16 5 % H 11 6-15 1   PLATELET COUNT 031 Thousands/uL  149-390   nRBC AUTOMATED 0 /100 WBCs     NEUTROPHILS RELATIVE PERCENT 80 % H 43-75   LYMPHOCYTES RELATIVE PERCENT 12 % L 14-44   MONOCYTES RELATIVE PERCENT 7 %  4-12   EOSINOPHILS RELATIVE PERCENT 0 %  0-6   BASOPHILS RELATIVE PERCENT 0 %  0-1   NEUTROPHILS ABSOLUTE COUNT 11 42 Thousands/? ??L H 1 85-7 62   LYMPHOCYTES ABSOLUTE COUNT 1 72 Thousands/? ??L  0 60-4 47   MONOCYTES ABSOLUTE COUNT 1 05 Thousand/? ??L  0 17-1 22   EOSINOPHILS ABSOLUTE COUNT 0 00 Thousand/? ??L  0 00-0 61   BASOPHILS ABSOLUTE COUNT 0 02 Thousands/? ??L  0 00-0 10   This is a patient instruction: This test is non-fasting  Please drink two glasses of water morning of bloodwork  (1) URIC ACID 57YIW2709 11:30AM Tahmina Chandler Later     Test Name Result Flag Reference   URIC ACID 5 3 mg/dL  4 2-8 0   Specimen collection should occur prior to Metamizole administration due to the potential for falsely depressed results  (1) BASIC METABOLIC PROFILE 42MHQ9187 11:30AM Tahmina Chandler Later     Test Name Result Flag Reference   GLUCOSE,RANDM 119 mg/dL       If the patient is fasting, the ADA then defines impaired fasting glucose as > 100 mg/dL and diabetes as > or equal to 123 mg/dL  Specimen collection should occur prior to Sulfasalazine administration due to the potential for falsely depressed results  Specimen collection should occur prior to Sulfapyridine administration due to the potential for falsely elevated results  SODIUM 141 mmol/L  136-145   POTASSIUM 3 9 mmol/L  3 5-5 3   CHLORIDE 104 mmol/L  100-108   CARBON DIOXIDE 29 mmol/L  21-32   ANION GAP (CALC) 8 mmol/L  4-13   BLOOD UREA NITROGEN 17 mg/dL  5-25   CREATININE 1 36 mg/dL H 0 60-1 30   Standardized to IDMS reference method   CALCIUM 9 3 mg/dL  8 3-10 1   eGFR 58 ml/min/1 73sq m       This is a patient instruction: Patient fasting for 8 hours or longer recommended  National Kidney Disease Education Program recommendations are as follows: GFR calculation is accurate only with a steady state creatinine Chronic Kidney disease less than 60 ml/min/1 73 sq  meters Kidney failure less than 15 ml/min/1 73 sq  meters       (1) LIPID PANEL, FASTING 79FDN6047 11:30AM Sarah Stringer     Test Name Result Flag Reference   CHOLESTEROL 138 mg/dL     HDL,DIRECT 46 mg/dL  40-60   Specimen collection should occur prior to Metamizole administration due to the potential for falsley depressed results  LDL CHOLESTEROL CALCULATED 69 mg/dL  0-100     This is a patient instruction: This is a fasting test  Water, black tea or black coffee only after 9:00pm the night before the test  Drink 2 glasses of water the morning of the test    Triglyceride:       Normal <150 mg/dl  Borderline High 150-199 mg/dl  High 200-499 mg/dl  Very High >499 mg/dl   Cholesterol:      Desirable <200 mg/dl   Borderline High 200-239 mg/dl   High >239 mg/dl   HDL Cholesterol:      High>59 mg/dL   Low <41 mg/dL   This screening LDL is a calculated result  It does not have the accuracy of the Direct Measured LDL in the monitoring of patients with hyperlipidemia and/or statin therapy  Direct Measure LDL (WER580) must be ordered separately in these patients  TRIGLYCERIDES 116 mg/dL  <=150   Specimen collection should occur prior to N-Acetylcysteine or Metamizole administration due to the potential for falsely depressed results  (1) HEPATIC FUNCTION PANEL 52TQD2724 11:30AM Degler, Ora Drivers     Test Name Result Flag Reference   ALBUMIN 3 8 g/dL  3 5-5 0   This is a patient instruction: This test is non-fasting  Please drink two glasses of water morning of bloodwork  ALK PHOSPHATAS 98 U/L     ALT (SGPT) 26 U/L  12-78   Specimen collection should occur prior to Sulfasalazine administration due to the potential for falsely depressed results  AST(SGOT) 30 U/L  5-45   Specimen collection should occur prior to Sulfasalazine administration due to the potential for falsely depressed results     BILI, DIRECT 0 19 mg/dL  0 00-0 20   BILI, TOTAL 0 60 mg/dL  0 20-1 00   TOTAL PROTEIN 7 8 g/dL  6 4-8 2     (1) HEMOGLOBIN A1C [id number] 11:30AM Degler, Ora Drivers     Test Name Result Flag Reference   HEMOGLOBIN A1C 6 0 %  4 2-6 3   EST  AVG  GLUCOSE 126 mg/dl       (1) CBC/PLT/DIFF 70LRD1541 11:30AM Mariaelena Chandler     Test Name Result Flag Reference   WBC COUNT 14 29 Thousand/uL H 4 31-10 16   RBC COUNT 5 72 Million/uL H 3 88-5 62   HEMOGLOBIN 13 6 g/dL  12 0-17 0   HEMATOCRIT 44 8 %  36 5-49 3   MCV 78 fL L 82-98   MCH 23 8 pg L 26 8-34 3   MCHC 30 4 g/dL L 31 4-37 4   RDW 16 5 % H 11 6-15 1   PLATELET COUNT 957 Thousands/uL  149-390   nRBC AUTOMATED 0 /100 WBCs     NEUTROPHILS RELATIVE PERCENT 80 % H 43-75   LYMPHOCYTES RELATIVE PERCENT 12 % L 14-44   MONOCYTES RELATIVE PERCENT 7 %  4-12   EOSINOPHILS RELATIVE PERCENT 0 %  0-6   BASOPHILS RELATIVE PERCENT 0 %  0-1   NEUTROPHILS ABSOLUTE COUNT 11 42 Thousands/? ??L H 1 85-7 62   LYMPHOCYTES ABSOLUTE COUNT 1 72 Thousands/? ??L  0 60-4 47   MONOCYTES ABSOLUTE COUNT 1 05 Thousand/? ??L  0 17-1 22   EOSINOPHILS ABSOLUTE COUNT 0 00 Thousand/? ??L  0 00-0 61   BASOPHILS ABSOLUTE COUNT 0 02 Thousands/? ??L  0 00-0 10   This is a patient instruction: This test is non-fasting  Please drink two glasses of water morning of bloodwork  (1) URIC ACID [id number] 11:30AM Mariaelena Chandler     Test Name Result Flag Reference   URIC ACID 5 3 mg/dL  4 2-8 0   Specimen collection should occur prior to Metamizole administration due to the potential for falsely depressed results  (1) BASIC METABOLIC PROFILE 37LRV8406 11:30AM Mariaelena Chandler     Test Name Result Flag Reference   GLUCOSE,RANDM 119 mg/dL       If the patient is fasting, the ADA then defines impaired fasting glucose as > 100 mg/dL and diabetes as > or equal to 123 mg/dL  Specimen collection should occur prior to Sulfasalazine administration due to the potential for falsely depressed results  Specimen collection should occur prior to Sulfapyridine administration due to the potential for falsely elevated results     SODIUM 141 mmol/L  136-145   POTASSIUM 3 9 mmol/L  3 5-5 3   CHLORIDE 104 mmol/L  100-108   CARBON DIOXIDE 29 mmol/L 21-32   ANION GAP (CALC) 8 mmol/L  4-13   BLOOD UREA NITROGEN 17 mg/dL  5-25   CREATININE 1 36 mg/dL H 0 60-1 30   Standardized to IDMS reference method   CALCIUM 9 3 mg/dL  8 3-10 1   eGFR 58 ml/min/1 73sq m       This is a patient instruction: Patient fasting for 8 hours or longer recommended  National Kidney Disease Education Program recommendations are as follows: GFR calculation is accurate only with a steady state creatinine Chronic Kidney disease less than 60 ml/min/1 73 sq  meters Kidney failure less than 15 ml/min/1 73 sq  meters  Health Management  DMII (diabetes mellitus, type 2)   *VB - Foot Exam; every 1 year; Last 10Pqj0939; Next Due: 83Wsr9349; Active  Health Maintenance   Tdap (Adacel); every 10 years; Last 38FCZ3711; Next Due: 72YKR7733;  Active    Future Appointments    Date/Time Provider Specialty Site   12/12/2017 02:45 PM Stephan Quiros, 10 [facility] Internal Medicine [facility]       Signatures   Electronically signed by : CRISTIANA Winslow ; Dec 12 2017  9:24AM EST                       (Author)

## 2017-12-13 NOTE — PROGRESS NOTES
Assessment    1  Elevated white blood cell count (288 60) (D72 829)   2  Adenocarcinoma of prostate (185) (C61)   3  Chronic obstructive pulmonary disease (496) (J44 9)   4  DMII (diabetes mellitus, type 2) (250 00) (E11 9)   5  Gout (274 9) (M10 9)   6  Hypertension (401 9) (I10)    Plan  Adenocarcinoma of prostate, DMII (diabetes mellitus, type 2), Hypertension    · (1) PSA, DIAGNOSTIC (FOLLOW-UP); Status:Active; Requested [id number];   DMII (diabetes mellitus, type 2)    · (1) HEMOGLOBIN A1C; Status:Canceled;   DMII (diabetes mellitus, type 2), Hypertension    · (1) CBC/ PLT (NO DIFF); Status:Active; Requested [id number];    · (1) COMPREHENSIVE METABOLIC PANEL; Status:Active; Requested KQI:[id number];    · (1) HEMOGLOBIN A1C; Status:Active; Requested SYLVESTER:[id number];    · (1) LIPID PANEL, FASTING; Status:Active; Requested [id number]:[id number];    · (1) TSH; Status:Active; Requested [id number];   Dyslipidemia    · (1) LIPID PANEL, FASTING; Status:Canceled;   Elevated white blood cell count    · (1) CBC/PLT/DIFF; Status:Active; Requested for:97Vam3933;   Gout    · (1) URIC ACID; Status:Canceled; Hypertension    · (1) BASIC METABOLIC PROFILE; Status:Canceled;    · (1) CBC/PLT/DIFF; Status:Canceled;    · (1) HEPATIC FUNCTION PANEL; Status:Canceled; Discussion/Summary  Discussion Summary:   Chronic kidney disease at baseline continue to monitor following with Nephrology  2 diabetes mellitus under excellent control continue with weight loss efforts  stable without flare  stable on current medication  likely related to recent epidural injection no evidence of infection recheck  of prostate cancer check diagnostic PSA  stable on current treatment  pain under the care of pain management     CBC 2-3 weeks call with results      Chief Complaint  Chief Complaint Free Text Note Form: routine follow up review labs and multiple medical problems      History of Present Illness  HPI: no home sugarsbowel obstruction in oct no surgery needed resolved on its ownformal exercisehome bpsw/ nephroprostate caw/ pain management- had epidural -- 10 days ago      Review of Systems  Complete-Male:  Constitutional: No fever or chills, feels well, no tiredness, no recent weight gain or weight loss  Eyes: No complaints of eye pain, no red eyes, no discharge from eyes, no itchy eyes  ENT: no complaints of earache, no hearing loss, no nosebleeds, no nasal discharge, no sore throat, no hoarseness  Cardiovascular: No complaints of slow heart rate, no fast heart rate, no chest pain, no palpitations, no leg claudication, no lower extremity  Respiratory: No complaints of shortness of breath, no wheezing, no cough, no SOB on exertion, no orthopnea or PND  Gastrointestinal: No complaints of abdominal pain, no constipation, no nausea or vomiting, no diarrhea or bloody stools  Genitourinary: No complaints of dysuria, no incontinence, no hesitancy, no nocturia, no genital lesion, no testicular pain  Musculoskeletal: No complaints of arthralgia, no myalgias, no joint swelling or stiffness, no limb pain or swelling  Integumentary: No complaints of skin rash or skin lesions, no itching, no skin wound, no dry skin  Neurological: No compliants of headache, no confusion, no convulsions, no numbness or tingling, no dizziness or fainting, no limb weakness, no difficulty walking  Psychiatric: Is not suicidal, no sleep disturbances, no anxiety or depression, no change in personality, no emotional problems  Endocrine: No complaints of proptosis, no hot flashes, no muscle weakness, no erectile dysfunction, no deepening of the voice, no feelings of weakness  Hematologic/Lymphatic: No complaints of swollen glands, no swollen glands in the neck, does not bleed easily, no easy bruising  Active Problems  1  Active advance directive (V49 89) (Z78 9)   2  Acute kidney injury superimposed on CKD (866 00,585 9) (N17 9,N18 9)   3   Adenocarcinoma of prostate (185) (C61)   4  Benign hypertension with chronic kidney disease (403 10,585 9) (I12 9)   5  Changes in skin texture (782 8) (R23 4)   6  Chronic kidney disease, stage 3 (585 3) (N18 3)   7  Chronic obstructive pulmonary disease (496) (J44 9)   8  COPD with exacerbation (491 21) (J44 1)   9  Disc degeneration, 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) (M51 36)   10  DMII (diabetes mellitus, type 2) (250 00) (E11 9)   11  Dyslipidemia (272 4) (E78 5)   12  Esophageal reflux (530 81) (K21 9)   13  Fissure, anal (565 0) (K60 2)   14  Generalized osteoarthritis of multiple sites (715 09) (M15 9)   15  Gout (274 9) (M10 9)   16  Herniated lumbar intervertebral disc (722 10) (M51 26)   17  History of colon cancer (V10 05) (Z85 038)   18  History of prostate cancer (V10 46) (Z85 46)   19  Hypertension (401 9) (I10)   20  Ileus (560 1) (K56 7)   21  Iron deficiency anemia (280 9) (D50 9)   22  Knee pain, right (719 46) (M25 561)   23  Lactic acidosis (276 2) (E87 2)   24  Living will on file (V49 89) (Y99 006)   25  History of Living will on file (V49 89) (E65 450)   26  Lumbago (724 2) (M54 5)   27  Lumbar canal stenosis (724 02) (M48 061)   28  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)   29  Lung nodule (793 11) (R91 1)   30  Nausea (787 02) (R11 0)   31  Need for influenza vaccination (V04 81) (Z23)   32  Need for pneumococcal vaccination (V03 82) (Z23)   33  Need for pneumococcal vaccine (V03 82) (Z23)   34  Obesity (278 00) (E66 9)   35  Pilonidal cyst (685 1) (L05 91)   36  Renal mass, right (593 9) (N28 89)   37  Requires oxygen therapy (V46 2) (Z99 81)   38  Screening for genitourinary condition (V81 6) (Z13 89)   39  SOB (shortness of breath) (786 05) (R06 02)   40  Tachycardia (785 0) (R00 0)   41  UTI (urinary tract infection) (599 0) (N39 0)   42  Vitamin B12 deficiency (266 2) (E53 8)   43  Vitamin D deficiency (268 9) (E55 9)   44  Weight loss, non-intentional (783 21) (R63 4)   45  Wound of skin (782 9) (R23 8)    Past Medical History  1   History of Bursitis of hip, unspecified laterality   2  History of Colonoscopy (Fiberoptic)   3  History of Congestive heart failure (428 0) (I50 9)   4  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)   5  History of Malignant Oral Cavity Neoplasm M0   6  History of Open Wound Of The Toe(S) (893 0)   7  History of Prostate Cancer (V10 46)   8  History of Special screening examination for neoplasm of prostate (V76 44) (Z12 5)   9  History of Tachycardia (785 0) (R00 0)   10  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)   11  History of X-Ray UGI Small Bowel Obstruction  Active Problems And Past Medical History Reviewed: The active problems and past medical history were reviewed and updated today  Surgical History  1  History of Appendectomy   2  History of Back Surgery   3  History of CABG   4  History of Cath Stent Placement   5  History of Cholecystectomy   6  History of Hip Surgery   7  History of Incisional Hernia Repair   8  History of Partial Colectomy   9  History of Prostate Surgery  Surgical History Reviewed: The surgical history was reviewed and updated today  Family History  Mother    1  Family history of Congestive Heart Failure   2  Family history of Coronary Artery Disease (V17 49)   3  Family history of diabetes mellitus (V18 0) (Z83 3)  Father    4  Family history of Coronary Artery Disease (305 90)  Sister    5  Family history of malignant neoplasm (V16 9) (Z80 9)  Family History Reviewed: The family history was reviewed and updated today         Social History     · Active advance directive (V49 89) (Z78 9)   · Denied: History of Alcohol Use (History)   · Denied: History of Drug Use   · Former smoker (U72 47) (Q32 389)   · Former tobacco use (V15 82) (Z87 891)   · Living Independently With Spouse   · Living will on file (V49 89) (Z87 898)   · History of Living will on file (V49 89) (H08 772)   · Non smoker / tobacco user (V49 89) (Z78 9)   · Occupation:  Social History Reviewed: The social history was reviewed and updated today  Current Meds   1  Acetaminophen-Codeine #3 300-30 MG Oral Tablet; TAKE 1 TABLET EVERY 4 TO 6 HOURS AS NEEDED FOR PAIN; Therapy: (Recorded:42Ygh5732) to Recorded   2  Advair Diskus 500-50 MCG/DOSE Inhalation Aerosol Powder Breath Activated; INHALE 1 PUFF TWICE DAILY; Therapy: 82VZH0142 to (Last Rx:64Mru4887)  Requested for: 38Wsb9888 Ordered   3  Allopurinol 100 MG Oral Tablet; Take 1 tablet daily; Therapy: 77Jnc1419 to (Last Rx:34Bbh4974)  Requested for: 69Has2483 Ordered   4  AmLODIPine Besylate 5 MG Oral Tablet; TAKE 1 TABLET DAILY IN THE MORNING; Therapy: 19BTT6799 to (Last Rx:29Oct2017)  Requested for: 29Oct2017 Ordered   5  Colcrys 0 6 MG Oral Tablet; 1 po qd-tid prn; Therapy: 99Vdv6883 to (Last Rx:05Zho7327) Ordered   6  Lipoflavonoid TABS; take 2 tabs qd; Therapy: ([facility]) to Recorded   7  Losartan Potassium 100 MG Oral Tablet; Take 1 tablet daily; Therapy: 69RDS0162 to (Last Rx:29Oct2017)  Requested for: 29Oct2017 Ordered   8  Metoprolol Succinate ER 25 MG Oral Tablet Extended Release 24 Hour; TAKE ONE TABLET BY MOUTH ONCE DAILY; Therapy: 70FSL9102 to (Evaluate:30Mst5057)  Requested for: 76Enf6643 Recorded   9  ProAir  (90 Base) MCG/ACT Inhalation Aerosol Solution; INHALE 2 PUFFS EVERY 4 HOURS AS NEEDED; Therapy: 76XXO7062 to (Last Rx:15Ucg4595)  Requested for: 19GIV1646 Ordered   10  Prochlorperazine Maleate 10 MG Oral Tablet; TAKE 1 TABLET 3 times daily PRN NAUSEA; Therapy: 88JWJ3940 to (Last Rx:78Elo2900)  Requested for: 50ZPB6429 Ordered   11  Spiriva HandiHaler 18 MCG Inhalation Capsule; INHALE THE CONTENTS OF 1 CAPSULE DAILY; Therapy: 97DYS5332 to (Last Rx:21Nbm2797)  Requested for: 29Oct2017 Ordered   12  Vitamin D3 2000 UNIT Oral Tablet Chewable; 1 PO QD; Therapy: 88PRN5358 to (Last Rx:60Zuc6037) Ordered  Medication List Reviewed: The medication list was reviewed and updated today  Allergies  1   No Known Drug Allergies  2  No Known Environmental Allergies   3  No Known Food Allergies    Vitals  Vital Signs    Recorded: 32Nql0078 03:16PM Recorded: 66Tfb3584 02:47PM   Heart Rate  76   Respiration  18   Systolic 046 635   Diastolic 82 96   Weight  758 lb 6 oz   BMI Calculated  34 35   BSA Calculated  2 41       Physical Exam   Constitutional  General appearance: No acute distress, well appearing and well nourished  Eyes  Conjunctiva and lids: No swelling, erythema, or discharge  Pulmonary  Auscultation of lungs: Clear to auscultation, equal breath sounds bilaterally, no wheezes, no rales, no rhonci  Cardiovascular  Auscultation of heart: Normal rate and rhythm, normal S1 and S2, without murmurs  Examination of extremities for edema and/or varicosities: Normal    Lymphatic  Palpation of lymph nodes in neck: No lymphadenopathy  Musculoskeletal  Gait and station: Normal    Neurologic  Cranial nerves: Cranial nerves 2-12 intact  Health Management  DMII (diabetes mellitus, type 2)   *VB - Foot Exam; every 1 year; Last 68Rzl8086; Next Due: 07Rpv0337; Active  Health Maintenance   Tdap (Adacel); every 10 years; Last 03FKW7551; Next Due: 09OYW8285; Active    Future Appointments    Date/Time Provider Specialty Site   06/12/2018 03:00 PM Eva Shirley [facility]   06/12/2018 08:30 AM CRISTIANA Rodriguez   Nephrology [address]       Signatures   Electronically signed by : Eva Pak; Dec 12 2017  4:21PM EST                       (Author)    Electronically signed by : CRISTIANA Price ; Dec 12 2017  5:44PM EST

## 2018-01-02 ENCOUNTER — APPOINTMENT (INPATIENT)
Dept: CT IMAGING | Facility: HOSPITAL | Age: 78
DRG: 191 | End: 2018-01-02
Payer: MEDICARE

## 2018-01-02 ENCOUNTER — APPOINTMENT (EMERGENCY)
Dept: RADIOLOGY | Facility: HOSPITAL | Age: 78
DRG: 191 | End: 2018-01-02
Payer: MEDICARE

## 2018-01-02 ENCOUNTER — HOSPITAL ENCOUNTER (INPATIENT)
Facility: HOSPITAL | Age: 78
LOS: 3 days | Discharge: HOME | DRG: 191 | End: 2018-01-05
Attending: EMERGENCY MEDICINE | Admitting: INTERNAL MEDICINE
Payer: MEDICARE

## 2018-01-02 ENCOUNTER — APPOINTMENT (OUTPATIENT)
Dept: LAB | Facility: HOSPITAL | Age: 78
DRG: 191 | End: 2018-01-02
Payer: MEDICARE

## 2018-01-02 ENCOUNTER — TRANSCRIBE ORDERS (OUTPATIENT)
Dept: ADMINISTRATIVE | Facility: HOSPITAL | Age: 78
End: 2018-01-02

## 2018-01-02 DIAGNOSIS — D72.829 ELEVATED WHITE BLOOD CELL COUNT: ICD-10-CM

## 2018-01-02 DIAGNOSIS — J44.1 COPD EXACERBATION (HCC): Primary | ICD-10-CM

## 2018-01-02 DIAGNOSIS — J44.1 ACUTE EXACERBATION OF CHRONIC OBSTRUCTIVE PULMONARY DISEASE (COPD) (HCC): ICD-10-CM

## 2018-01-02 DIAGNOSIS — R55 SYNCOPE: ICD-10-CM

## 2018-01-02 DIAGNOSIS — N17.9 ACUTE KIDNEY INJURY (HCC): ICD-10-CM

## 2018-01-02 PROBLEM — J20.9 ACUTE BRONCHITIS: Status: ACTIVE | Noted: 2018-01-02

## 2018-01-02 PROBLEM — I10 HYPERTENSION: Status: ACTIVE | Noted: 2018-01-02

## 2018-01-02 LAB
ANION GAP SERPL CALCULATED.3IONS-SCNC: 10 MMOL/L (ref 4–13)
ATRIAL RATE: 109 BPM
BASOPHILS # BLD AUTO: 0.03 THOUSANDS/ΜL (ref 0–0.1)
BASOPHILS # BLD MANUAL: 0 THOUSAND/UL (ref 0–0.1)
BASOPHILS NFR BLD AUTO: 0 % (ref 0–1)
BASOPHILS NFR MAR MANUAL: 0 % (ref 0–1)
BUN SERPL-MCNC: 36 MG/DL (ref 5–25)
CALCIUM SERPL-MCNC: 9.7 MG/DL (ref 8.3–10.1)
CHLORIDE SERPL-SCNC: 98 MMOL/L (ref 100–108)
CO2 SERPL-SCNC: 26 MMOL/L (ref 21–32)
CREAT SERPL-MCNC: 2.31 MG/DL (ref 0.6–1.3)
DEPRECATED D DIMER PPP: 582 NG/ML (FEU) (ref 0–424)
EOSINOPHIL # BLD AUTO: 0.02 THOUSAND/ΜL (ref 0–0.61)
EOSINOPHIL # BLD MANUAL: 0.16 THOUSAND/UL (ref 0–0.4)
EOSINOPHIL NFR BLD AUTO: 0 % (ref 0–6)
EOSINOPHIL NFR BLD MANUAL: 2 % (ref 0–6)
ERYTHROCYTE [DISTWIDTH] IN BLOOD BY AUTOMATED COUNT: 16.8 % (ref 11.6–15.1)
ERYTHROCYTE [DISTWIDTH] IN BLOOD BY AUTOMATED COUNT: 17.3 % (ref 11.6–15.1)
FLUAV AG SPEC QL: NORMAL
FLUBV AG SPEC QL: NORMAL
GFR SERPL CREATININE-BSD FRML MDRD: 31 ML/MIN/1.73SQ M
GLUCOSE SERPL-MCNC: 113 MG/DL (ref 65–140)
HCT VFR BLD AUTO: 49.6 % (ref 36.5–49.3)
HCT VFR BLD AUTO: 51.1 % (ref 36.5–49.3)
HGB BLD-MCNC: 15.4 G/DL (ref 12–17)
HGB BLD-MCNC: 15.9 G/DL (ref 12–17)
LYMPHOCYTES # BLD AUTO: 1.01 THOUSAND/UL (ref 0.6–4.47)
LYMPHOCYTES # BLD AUTO: 1.51 THOUSANDS/ΜL (ref 0.6–4.47)
LYMPHOCYTES # BLD AUTO: 13 % (ref 14–44)
LYMPHOCYTES NFR BLD AUTO: 16 % (ref 14–44)
MCH RBC QN AUTO: 23.7 PG (ref 26.8–34.3)
MCH RBC QN AUTO: 23.9 PG (ref 26.8–34.3)
MCHC RBC AUTO-ENTMCNC: 31 G/DL (ref 31.4–37.4)
MCHC RBC AUTO-ENTMCNC: 31.1 G/DL (ref 31.4–37.4)
MCV RBC AUTO: 76 FL (ref 82–98)
MCV RBC AUTO: 77 FL (ref 82–98)
MICROCYTES BLD QL AUTO: PRESENT
MONOCYTES # BLD AUTO: 0.7 THOUSAND/UL (ref 0–1.22)
MONOCYTES # BLD AUTO: 0.79 THOUSAND/ΜL (ref 0.17–1.22)
MONOCYTES NFR BLD AUTO: 9 % (ref 4–12)
MONOCYTES NFR BLD: 9 % (ref 4–12)
NEUTROPHILS # BLD AUTO: 6.89 THOUSANDS/ΜL (ref 1.85–7.62)
NEUTROPHILS # BLD MANUAL: 5.5 THOUSAND/UL (ref 1.85–7.62)
NEUTS BAND NFR BLD MANUAL: 3 % (ref 0–8)
NEUTS SEG NFR BLD AUTO: 68 % (ref 43–75)
NEUTS SEG NFR BLD AUTO: 74 % (ref 43–75)
NRBC BLD AUTO-RTO: 0 /100 WBCS
NRBC BLD AUTO-RTO: 0 /100 WBCS
P AXIS: 65 DEGREES
PLATELET # BLD AUTO: 314 THOUSANDS/UL (ref 149–390)
PLATELET # BLD AUTO: 317 THOUSANDS/UL (ref 149–390)
PLATELET BLD QL SMEAR: ADEQUATE
PMV BLD AUTO: 10.7 FL (ref 8.9–12.7)
PMV BLD AUTO: 11.2 FL (ref 8.9–12.7)
POTASSIUM SERPL-SCNC: 3.5 MMOL/L (ref 3.5–5.3)
PR INTERVAL: 168 MS
QRS AXIS: 87 DEGREES
QRSD INTERVAL: 142 MS
QT INTERVAL: 378 MS
QTC INTERVAL: 509 MS
RBC # BLD AUTO: 6.5 MILLION/UL (ref 3.88–5.62)
RBC # BLD AUTO: 6.66 MILLION/UL (ref 3.88–5.62)
SODIUM SERPL-SCNC: 134 MMOL/L (ref 136–145)
T WAVE AXIS: 53 DEGREES
TOTAL CELLS COUNTED SPEC: 100
TROPONIN I SERPL-MCNC: <0.02 NG/ML
VARIANT LYMPHS # BLD AUTO: 5 %
VENTRICULAR RATE: 109 BPM
WBC # BLD AUTO: 7.75 THOUSAND/UL (ref 4.31–10.16)
WBC # BLD AUTO: 9.27 THOUSAND/UL (ref 4.31–10.16)

## 2018-01-02 PROCEDURE — 85379 FIBRIN DEGRADATION QUANT: CPT | Performed by: PHYSICIAN ASSISTANT

## 2018-01-02 PROCEDURE — 85027 COMPLETE CBC AUTOMATED: CPT | Performed by: PHYSICIAN ASSISTANT

## 2018-01-02 PROCEDURE — 94644 CONT INHLJ TX 1ST HOUR: CPT

## 2018-01-02 PROCEDURE — 99285 EMERGENCY DEPT VISIT HI MDM: CPT

## 2018-01-02 PROCEDURE — 93005 ELECTROCARDIOGRAM TRACING: CPT | Performed by: PHYSICIAN ASSISTANT

## 2018-01-02 PROCEDURE — 94640 AIRWAY INHALATION TREATMENT: CPT

## 2018-01-02 PROCEDURE — 71046 X-RAY EXAM CHEST 2 VIEWS: CPT

## 2018-01-02 PROCEDURE — 80048 BASIC METABOLIC PNL TOTAL CA: CPT | Performed by: PHYSICIAN ASSISTANT

## 2018-01-02 PROCEDURE — 71250 CT THORAX DX C-: CPT

## 2018-01-02 PROCEDURE — 36415 COLL VENOUS BLD VENIPUNCTURE: CPT

## 2018-01-02 PROCEDURE — 87798 DETECT AGENT NOS DNA AMP: CPT | Performed by: PHYSICIAN ASSISTANT

## 2018-01-02 PROCEDURE — 85007 BL SMEAR W/DIFF WBC COUNT: CPT | Performed by: PHYSICIAN ASSISTANT

## 2018-01-02 PROCEDURE — 85025 COMPLETE CBC W/AUTO DIFF WBC: CPT

## 2018-01-02 PROCEDURE — 94760 N-INVAS EAR/PLS OXIMETRY 1: CPT

## 2018-01-02 PROCEDURE — 84484 ASSAY OF TROPONIN QUANT: CPT | Performed by: PHYSICIAN ASSISTANT

## 2018-01-02 PROCEDURE — 87801 DETECT AGNT MULT DNA AMPLI: CPT | Performed by: FAMILY MEDICINE

## 2018-01-02 RX ORDER — SODIUM CHLORIDE FOR INHALATION 0.9 %
3 VIAL, NEBULIZER (ML) INHALATION ONCE
Status: COMPLETED | OUTPATIENT
Start: 2018-01-02 | End: 2018-01-02

## 2018-01-02 RX ORDER — HEPARIN SODIUM 5000 [USP'U]/ML
5000 INJECTION, SOLUTION INTRAVENOUS; SUBCUTANEOUS EVERY 8 HOURS SCHEDULED
Status: DISCONTINUED | OUTPATIENT
Start: 2018-01-02 | End: 2018-01-05 | Stop reason: HOSPADM

## 2018-01-02 RX ORDER — ACETAMINOPHEN 325 MG/1
650 TABLET ORAL EVERY 6 HOURS PRN
Status: DISCONTINUED | OUTPATIENT
Start: 2018-01-02 | End: 2018-01-05 | Stop reason: HOSPADM

## 2018-01-02 RX ORDER — ACETAMINOPHEN AND CODEINE PHOSPHATE 300; 30 MG/1; MG/1
1 TABLET ORAL 3 TIMES DAILY PRN
COMMUNITY
End: 2019-08-01

## 2018-01-02 RX ORDER — GLUCOSAMINE/CHONDR SU A SOD 750-600 MG
TABLET ORAL DAILY
Status: DISCONTINUED | OUTPATIENT
Start: 2018-01-02 | End: 2018-01-05 | Stop reason: HOSPADM

## 2018-01-02 RX ORDER — AZITHROMYCIN 250 MG/1
500 TABLET, FILM COATED ORAL EVERY 24 HOURS
Status: DISCONTINUED | OUTPATIENT
Start: 2018-01-02 | End: 2018-01-03

## 2018-01-02 RX ORDER — PREDNISONE 20 MG/1
60 TABLET ORAL ONCE
Status: COMPLETED | OUTPATIENT
Start: 2018-01-02 | End: 2018-01-02

## 2018-01-02 RX ORDER — METHYLPREDNISOLONE SODIUM SUCCINATE 40 MG/ML
40 INJECTION, POWDER, LYOPHILIZED, FOR SOLUTION INTRAMUSCULAR; INTRAVENOUS EVERY 8 HOURS SCHEDULED
Status: DISCONTINUED | OUTPATIENT
Start: 2018-01-02 | End: 2018-01-03

## 2018-01-02 RX ORDER — AMLODIPINE BESYLATE 5 MG/1
5 TABLET ORAL DAILY
Status: DISCONTINUED | OUTPATIENT
Start: 2018-01-02 | End: 2018-01-05 | Stop reason: HOSPADM

## 2018-01-02 RX ORDER — SODIUM CHLORIDE FOR INHALATION 0.9 %
3 VIAL, NEBULIZER (ML) INHALATION
Status: DISCONTINUED | OUTPATIENT
Start: 2018-01-02 | End: 2018-01-05 | Stop reason: HOSPADM

## 2018-01-02 RX ORDER — SODIUM CHLORIDE 9 MG/ML
125 INJECTION, SOLUTION INTRAVENOUS CONTINUOUS
Status: DISCONTINUED | OUTPATIENT
Start: 2018-01-02 | End: 2018-01-03

## 2018-01-02 RX ORDER — ONDANSETRON 2 MG/ML
4 INJECTION INTRAMUSCULAR; INTRAVENOUS EVERY 6 HOURS PRN
Status: DISCONTINUED | OUTPATIENT
Start: 2018-01-02 | End: 2018-01-05 | Stop reason: HOSPADM

## 2018-01-02 RX ORDER — METOPROLOL SUCCINATE 25 MG/1
25 TABLET, EXTENDED RELEASE ORAL DAILY
Status: DISCONTINUED | OUTPATIENT
Start: 2018-01-02 | End: 2018-01-05 | Stop reason: HOSPADM

## 2018-01-02 RX ORDER — ACETAMINOPHEN AND CODEINE PHOSPHATE 300; 30 MG/1; MG/1
1 TABLET ORAL 3 TIMES DAILY PRN
Status: DISCONTINUED | OUTPATIENT
Start: 2018-01-02 | End: 2018-01-02 | Stop reason: ALTCHOICE

## 2018-01-02 RX ORDER — LEVALBUTEROL 1.25 MG/.5ML
1.25 SOLUTION, CONCENTRATE RESPIRATORY (INHALATION)
Status: DISCONTINUED | OUTPATIENT
Start: 2018-01-02 | End: 2018-01-05 | Stop reason: HOSPADM

## 2018-01-02 RX ADMIN — LEVALBUTEROL HYDROCHLORIDE 1.25 MG: 1.25 SOLUTION, CONCENTRATE RESPIRATORY (INHALATION) at 15:40

## 2018-01-02 RX ADMIN — LEVALBUTEROL HYDROCHLORIDE 1.25 MG: 1.25 SOLUTION, CONCENTRATE RESPIRATORY (INHALATION) at 19:28

## 2018-01-02 RX ADMIN — SODIUM CHLORIDE 125 ML/HR: 0.9 INJECTION, SOLUTION INTRAVENOUS at 13:26

## 2018-01-02 RX ADMIN — HEPARIN SODIUM 5000 UNITS: 5000 INJECTION, SOLUTION INTRAVENOUS; SUBCUTANEOUS at 14:39

## 2018-01-02 RX ADMIN — ISODIUM CHLORIDE 3 ML: 0.03 SOLUTION RESPIRATORY (INHALATION) at 10:02

## 2018-01-02 RX ADMIN — IPRATROPIUM BROMIDE 1 MG: 0.5 SOLUTION RESPIRATORY (INHALATION) at 10:02

## 2018-01-02 RX ADMIN — ISODIUM CHLORIDE 3 ML: 0.03 SOLUTION RESPIRATORY (INHALATION) at 15:40

## 2018-01-02 RX ADMIN — SODIUM CHLORIDE 1000 ML: 0.9 INJECTION, SOLUTION INTRAVENOUS at 11:02

## 2018-01-02 RX ADMIN — METHYLPREDNISOLONE SODIUM SUCCINATE 40 MG: 40 INJECTION, POWDER, FOR SOLUTION INTRAMUSCULAR; INTRAVENOUS at 14:39

## 2018-01-02 RX ADMIN — METHYLPREDNISOLONE SODIUM SUCCINATE 40 MG: 40 INJECTION, POWDER, FOR SOLUTION INTRAMUSCULAR; INTRAVENOUS at 22:24

## 2018-01-02 RX ADMIN — ALBUTEROL SULFATE 10 MG: 2.5 SOLUTION RESPIRATORY (INHALATION) at 10:02

## 2018-01-02 RX ADMIN — FLUTICASONE PROPIONATE AND SALMETEROL 1 PUFF: 50; 250 POWDER RESPIRATORY (INHALATION) at 19:14

## 2018-01-02 RX ADMIN — HEPARIN SODIUM 5000 UNITS: 5000 INJECTION, SOLUTION INTRAVENOUS; SUBCUTANEOUS at 22:24

## 2018-01-02 RX ADMIN — PREDNISONE 60 MG: 20 TABLET ORAL at 09:50

## 2018-01-02 RX ADMIN — ISODIUM CHLORIDE 3 ML: 0.03 SOLUTION RESPIRATORY (INHALATION) at 19:28

## 2018-01-02 RX ADMIN — AZITHROMYCIN 500 MG: 250 TABLET, FILM COATED ORAL at 14:39

## 2018-01-02 NOTE — ED PROVIDER NOTES
History  Chief Complaint   Patient presents with    Cough     Pt stated that he has been having a cough for the past cough  Wife stated that he was coughing so much he would pass out  78year old male with past medical history of hypertension, diabetes type 2, COPD (complaint with Spiriva and Advair), presents emergency department for dry cough x2 weeks  Patient also admits to having occasional syncopal episodes, lasting 10 seconds, only following coughing episodes  Spouse states that she has had to [address] him up off the floor after he passes out " Denies any auras prior to syncope, or any postictal periods or loss of bowel bladder control or tongue biting  Denies chest pain, palpitations, leg pain or swelling  Denies fever, chills, nasal congestion, rhinorrhea, sore throat  Denies nausea, vomiting, diarrhea, abdominal pain  Denies dizziness or headaches  Denies ill contacts  Denies recent travel, surgery, history of clot  Does have history of smoking, but quit years ago  Does use 4 L of oxygen at at night, which is baseline for him and has not required increased oxygen demand  History provided by:  Patient and medical records   used: No        Prior to Admission Medications   Prescriptions Last Dose Informant Patient Reported? Taking?    Specialty Vitamins Products (RA EAR CARE PO)   Yes Yes   Sig: Take 3 tablets by mouth daily   Vitamins-Lipotropics (LIPOFLAVONOID PO)   Yes No   Sig: Take 1 tablet by mouth 2 (two) times a day   acetaminophen-codeine (TYLENOL #3) 300-30 mg per tablet   Yes Yes   Sig: Take 1 tablet by mouth 3 (three) times a day as needed for moderate pain   allopurinol (ZYLOPRIM) 100 mg tablet   Yes Yes   Sig: Take 100 mg by mouth daily     amLODIPine (NORVASC) 5 mg tablet   Yes Yes   Sig: Take 5 mg by mouth daily     fluticasone-salmeterol (ADVAIR DISKUS) 250-50 mcg/dose inhaler   Yes Yes   Sig: Inhale 1 puff 2 (two) times a day   losartan (COZAAR) 100 MG tablet   Yes Yes   Sig: Take 100 mg by mouth daily   metoprolol succinate (TOPROL-XL) 25 mg 24 hr tablet   Yes Yes   Sig: Take 25 mg by mouth daily   tiotropium (SPIRIVA) 18 mcg inhalation capsule   No Yes   Sig: Place 1 capsule into inhaler and inhale daily      Facility-Administered Medications: None       Past Medical History:   Diagnosis Date    COPD (chronic obstructive pulmonary disease) ([facility] [address] )     Hypertension        Past Surgical History:   Procedure Laterality Date    COLON SURGERY      JOINT REPLACEMENT         History reviewed  No pertinent family history  I have reviewed and agree with the history as documented  Social History   Substance Use Topics    Smoking status: Former Smoker    Smokeless tobacco: Never Used    Alcohol use No        Review of Systems   Constitutional: Negative for chills and fever  HENT: Negative for congestion, ear pain, facial swelling, rhinorrhea, sneezing, sore throat and trouble swallowing  Respiratory: Positive for cough and shortness of breath  Negative for chest tightness, wheezing and stridor  Cardiovascular: Negative for chest pain, palpitations and leg swelling  Gastrointestinal: Negative  Genitourinary: Negative  Neurological: Negative  Physical Exam  ED Triage Vitals [01/02/18 0817]   Temperature Pulse Respirations Blood Pressure SpO2   98 5 °F (36 9 °C) (!) 131 22 118/63 91 %      Temp Source Heart Rate Source Patient Position - Orthostatic VS BP Location FiO2 (%)   Oral Monitor Sitting Right arm --      Pain Score       --           Orthostatic Vital Signs  Vitals:    01/02/18 0817 01/02/18 1051   BP: 118/63 109/72   Pulse: (!) 131 (!) 106   Patient Position - Orthostatic VS: Sitting Lying       Physical Exam   Constitutional: He is oriented to person, place, and time  He appears well-developed and well-nourished  HENT:   Mouth/Throat: Oropharynx is clear and moist    Bilateral TMs with good light reflex  No erythema     Eyes: Conjunctivae and EOM are normal  Pupils are equal, round, and reactive to light  Neck: Normal range of motion  Neck supple  Cardiovascular: Normal rate, regular rhythm, normal heart sounds and intact distal pulses  Pulmonary/Chest: Effort normal  No respiratory distress  He has wheezes (Expiratory, Throughout bilateral lung fields)  He has no rales  He exhibits no tenderness  Abdominal: Soft  Bowel sounds are normal  He exhibits no distension  There is no tenderness  There is no rebound and no guarding  Musculoskeletal: Normal range of motion  He exhibits no edema or tenderness  Neurological: He is alert and oriented to person, place, and time  No cranial nerve deficit or sensory deficit  He exhibits normal muscle tone  Coordination normal    Skin: Skin is warm and dry  Capillary refill takes less than 2 seconds  Psychiatric: He has a normal mood and affect  His behavior is normal    Nursing note and vitals reviewed        ED Medications  Medications   sodium chloride 0 9 % bolus 1,000 mL (1,000 mL Intravenous New Bag 1/2/18 1102)   albuterol inhalation solution 10 mg (10 mg Nebulization Given 1/2/18 1002)   ipratropium (ATROVENT) 0 02 % inhalation solution 1 mg (1 mg Nebulization Given 1/2/18 1002)   sodium chloride 0 9 % inhalation solution 3 mL (3 mL Nebulization Given 1/2/18 1002)   predniSONE tablet 60 mg (60 mg Oral Given 1/2/18 0950)       Diagnostic Studies  Results Reviewed     Procedure Component Value Units Date/Time    CBC and differential [[id number]]  (Abnormal) Collected:  01/02/18 0937    Lab Status:  Final result Specimen:  Blood from Arm, Right Updated:  01/02/18 1016     WBC 7 75 Thousand/uL      RBC 6 50 (H) Million/uL      Hemoglobin 15 4 g/dL      Hematocrit 49 6 (H) %      MCV 76 (L) fL      MCH 23 7 (L) pg      MCHC 31 0 (L) g/dL      RDW 16 8 (H) %      MPV 11 2 fL      Platelets 781 Thousands/uL      nRBC 0 /100 WBCs     Troponin I [[id number]]  (Normal) Collected:  01/02/18 0510 Lab Status:  Final result Specimen:  Blood from Arm, Right Updated:  01/02/18 1004     Troponin I <0 02 ng/mL     Narrative:         [employer] Chemistry analyzer 99% cutoff is > 0 04 ng/mL in network labs    o cTnI 99% cutoff is useful only when applied to patients in the clinical setting of myocardial ischemia  o cTnI 99% cutoff should be interpreted in the context of clinical history, ECG findings and possibly cardiac imaging to establish correct diagnosis  o cTnI 99% cutoff may be suggestive but clearly not indicative of a coronary event without the clinical setting of myocardial ischemia  D-dimer, quantitative [[id number]]  (Abnormal) Collected:  01/02/18 0937    Lab Status:  Final result Specimen:  Blood from Arm, Right Updated:  01/02/18 1002     D-Dimer, Quant 582 (H) ng/ml (FEU)     Basic metabolic panel [[id number]]  (Abnormal) Collected:  01/02/18 0937    Lab Status:  Final result Specimen:  Blood from Arm, Right Updated:  01/02/18 0955     Sodium 134 (L) mmol/L      Potassium 3 5 mmol/L      Chloride 98 (L) mmol/L      CO2 26 mmol/L      Anion Gap 10 mmol/L      BUN 36 (H) mg/dL      Creatinine 2 31 (H) mg/dL      Glucose 113 mg/dL      Calcium 9 7 mg/dL      eGFR 31 ml/min/1 73sq m     Narrative:         National Kidney Disease Education Program recommendations are as follows:  GFR calculation is accurate only with a steady state creatinine  Chronic Kidney disease less than 60 ml/min/1 73 sq  meters  Kidney failure less than 15 ml/min/1 73 sq  meters  Influenza A/B and RSV by PCR (indicated for patients >2 mo of age) [[id number]] Collected:  01/02/18 0939    Lab Status: In process Specimen:  Nasopharyngeal from Nasopharyngeal Swab Updated:  01/02/18 0942                 XR chest 2 views   Final Result by Glenys Strange MD (01/02 0940)   No congestion seen   Hazy groundglass areas seen in the right lower lung may be due to atelectasis, less likely infiltrate     Consider follow-up radiograph in 6-8 weeks to demonstrate resolution      The study was marked in EPIC for immediate notification  Workstation performed: [id number]                    Procedures  Procedures       Phone Contacts  ED Phone Contact    ED Course  ED Course as of Jan 02 1128   Tue Jan 02, 2018   1011 D-dimer elevated  However, due to patient's newly elevated creatinine, unable to obtain CT r/o PE  Will plan for admission for new PRIMO and V/Q scan to r/o PE      1015 EKG: Sinus tachycardia 109, QTc: 509  RBBB, TWI in leads V2, V3  Non-specific ST depressions, which are unchanged compared to 10/16/17  MDM  Number of Diagnoses or Management Options  Diagnosis management comments: 78year old male with past medical history of hypertension, diabetes type 2, COPD (complaint with Spiriva and Advair), presents emergency department for dry cough x2 weeks  Hx of smoking, but quit  Differential Diagnosis includes but is not limited to:  Sepsis, COPD exacerbation, upper respiratory infection, cough, pulmonary embolus, ACS, pneumonia  Sepsis considered  Patient is tachycardic and oxygen saturation is 91% on intake; however, on chart review this is patient's baseline oxygen saturation  He is currently afebrile  Sepsis unlikely  CXR shows atelectasis, unlikely infiltrate  EKG shows no acute ST elevations  Initial peak flow was 200 prior to nebulizer treatments and steroids  Labs shows new PRIMO  Otherwise generally unchanged compared to previous  Troponin negative  D-dimer mildly elevated; however, due to new PRIMO unable to obtain CT chest to rule out pulmonary embolus  Will need to be admitted for a V/Q scan  On observation in the Ed, patient has continued to have O2 saturation 89-91% while on room air  HR continues to remain in low 100s  Patient will be admitted to Medicine for COPD exacerbation with possible syncopal episodes, new PRIMO, and for a V/Q scan to rule out pulmonary embolus         Amount and/or Complexity of Data Reviewed  Clinical lab tests: ordered and reviewed  Tests in the radiology section of CPT®: ordered and reviewed  Independent visualization of images, tracings, or specimens: yes      CritCare Time    Disposition  Final diagnoses:   COPD exacerbation ([facility] )   Acute kidney injury ([facility] )   Syncope     Time reflects when diagnosis was documented in both MDM as applicable and the Disposition within this note     Time User Action Codes Description Comment    1/2/2018 11:25 AM Stephanie Macias Add [J44 1] COPD exacerbation ([facility] )     1/2/2018 11:25 AM Angélica Paz Add [N17 9] Acute kidney injury ([facility] )     1/2/2018 11:26 AM Stephanie Macias Add [R55] Syncope       ED Disposition     ED Disposition Condition Comment    Admit  Case was discussed with Dr Melani James and the patient's admission status was agreed to be Admission Status: inpatient status to the service of Dr Melani James  Follow-up Information    None       Patient's Medications   Discharge Prescriptions    No medications on file     No discharge procedures on file      ED Provider  Electronically Signed by           Sybil Willis PA-C  01/02/18 1128

## 2018-01-02 NOTE — CONSULTS
Consultation - Pulmonary Medicine   Ariane Christensen 78 y o  male MRN: [id number]  Unit/Bed#: ED 27 Encounter: [id number]      Assessment:  1  COPD with acute exacerbation  2  Acute tracheobronchitis  3  Lung nodules    Plan:   1  COPD with acute exacerbation secondary to Acute tracheobronchitis  -currently saturating 90 to 93% on room air; documented 94% on 2 L via nasal cannula; continue oxygen at night at 2 L via nasal cannula  -reviewed chest x-ray, which has a questionable right lower lobe infiltrate versus atelectasis? Evidence of Peribronchial cuffing; would recommend obtaining CT of chest for better characterization to rule out underlying pneumonia  Will consider adjusting antibiotics based on results of CT of chest   Can continue with azithromycin for now (day 1/5)  -influenza and RSV PCR negative; sputum culture to be collected; pertussis PCR pending; no leukocytosis  -agree with 40 mg q 8 of IV Solu-Medrol; can consider decrease in a m  if patient improves; monitor peak flows  -continue Spiriva and Advair; add in levalbuterol nebulizer treatments t i d   -needs outpatient PFTs  2  Lung nodules  -stable since August 2016; will need repeat CT of chest in 1 year to demonstrate stability for 2 years    History of Present Illness   Physician Requesting Consult: Lucy Cunningham MD  Reason for Consult / Principal Problem: COPD exac  Hx and PE limited by: None  HPI: Ariane Christensen is a 78y o  year old male former smoker who quit in early 2000s with about a 45 pack year smoking history with significant PMH of COPD, HTN, Prostate Cancer who originally presented to the ED for worsening SOB  Patient reports his wife was sick a couple weeks ago with the cold and a few days later, he noticed himself feeling fatigued/foggy with increased cough/mucus production and SOB  Denies fever, chills, chest pain, N/V/D, or abdominal pain  Does admit to associated wheezing and loss of appetite   His symptoms continued to persist so decision was made to go into the ED  In the ED, CXR was completed with questionable RLL infiltrate  He received one dose of azithromycin in the ED  Patient was diagnosed with COPD years ago and is on 2 L of oxygen via NC nocturnally  He uses symbicort and spiriva on a daily basis and his last COPD exacerbation was 1 year ago  He has never been hospitalized for a COPD exacerbation  He uses his rescue inhaler about once daily on average for increase WOB/SOB  Inpatient consult to Pulmonology  Consult performed by: Laura Murray ordered by: Anton Silveira          Review of Systems   Constitutional: Positive for activity change  Negative for chills, fatigue, fever and unexpected weight change  HENT: Positive for congestion  Eyes: Negative for visual disturbance  Respiratory: Positive for cough, shortness of breath and wheezing  Negative for chest tightness  Cardiovascular: Negative for chest pain and leg swelling  Gastrointestinal: Negative for abdominal pain, diarrhea, nausea and vomiting  Musculoskeletal: Negative for arthralgias and gait problem  Skin: Negative for rash  Allergic/Immunologic: Negative for immunocompromised state  Neurological: Negative for dizziness, facial asymmetry and light-headedness  Hematological: Negative for adenopathy  Psychiatric/Behavioral: Negative for agitation, behavioral problems and confusion         Historical Information   Past Medical History:   Diagnosis Date    Back pain     [facility]     Hypertension     [facility]      Past Surgical History:   Procedure Laterality Date    BACK SURGERY      COLON SURGERY      JOINT REPLACEMENT      PROSTATE SURGERY       Social History   History   Alcohol Use No     History   Drug Use No     History   Smoking Status    Former Smoker   Smokeless Tobacco    Never Used     Occupational History:     Family History: non-contributory    Meds/Allergies   all current active meds have been reviewed    No Known Allergies    Objective   Vitals: Blood pressure 116/79, pulse 99, temperature 98 5 °F (36 9 °C), temperature source Oral, resp  rate 18, height 6' 1" (1 854 m), weight 113 kg (250 lb), SpO2 94 %  ,Body mass index is 32 98 kg/m²  Intake/Output Summary (Last 24 hours) at 01/02/18 1454  Last data filed at 01/02/18 1326   Gross per 24 hour   Intake             1000 ml   Output                0 ml   Net             1000 ml     Invasive Devices     Peripheral Intravenous Line            Peripheral IV 01/02/18 Right Forearm less than 1 day                Physical Exam   Constitutional: He is oriented to person, place, and time  He appears well-developed and well-nourished  No distress  HENT:   Head: Normocephalic and atraumatic  Cardiovascular: Regular rhythm  No murmur heard  Fast HR   Pulmonary/Chest: Effort normal    Poor air movement bilaterally with diffuse expiratory wheezes   Abdominal: Soft  He exhibits no distension  There is no tenderness  Rounded   Musculoskeletal: Normal range of motion  He exhibits no edema or tenderness  Lymphadenopathy:     He has no cervical adenopathy  Neurological: He is alert and oriented to person, place, and time  Skin: Skin is warm and dry  Psychiatric: He has a normal mood and affect  His behavior is normal        Lab Results:   I have personally reviewed pertinent lab results  , CBC:   Lab Results   Component Value Date    WBC 7 75 01/02/2018    HGB 15 4 01/02/2018    HCT 49 6 (H) 01/02/2018    MCV 76 (L) 01/02/2018     01/02/2018    MCH 23 7 (L) 01/02/2018    MCHC 31 0 (L) 01/02/2018    RDW 16 8 (H) 01/02/2018    MPV 11 2 01/02/2018    NRBC 0 01/02/2018   , CMP:   Lab Results   Component Value Date     (L) 01/02/2018    K 3 5 01/02/2018    CL 98 (L) 01/02/2018    CO2 26 01/02/2018    ANIONGAP 10 01/02/2018    BUN 36 (H) 01/02/2018    CREATININE 2 31 (H) 01/02/2018    GLUCOSE 113 01/02/2018    CALCIUM 9 7 01/02/2018    EGFR 31 01/02/2018     Imaging Studies: I have personally reviewed pertinent reports  EKG, Pathology, and Other Studies: I have personally reviewed pertinent reports  VTE Prophylaxis: Heparin    Code Status: Level 1 - Full Code  Advance Directive and Living Will:      Power of :    POLST:      Counseling/Coordination of Care: Total floor / unit time spent today 35 minutes  Greater than 50% of total time was spent with the patient and / or family counseling and / or coordination of care   A description of the counseling / coordination of care: Discussion with attending physician, discussion with SLIM, review of diagnostic studies with patient discussion of clinical impressions and treatment plan with patient

## 2018-01-02 NOTE — ED NOTES
Patient transported to [address] [place] [address], [address] [place] [address]  01/02/18 5161

## 2018-01-02 NOTE — H&P
History and Physical - [facility] Internal Medicine    Patient Information: Ale Joy 78 y o  male MRN: [id number]  Unit/Bed#: ED 27 Encounter: [id number]  Admitting Physician: Conchita Powers MD  PCP: Antonina Garcia MD  Date of Admission:  01/02/18    Assessment/Plan:    Hospital Problem List:     Principal Problem:    Acute exacerbation of chronic obstructive pulmonary disease (COPD) ([facility] )  Active Problems:    Acute bronchitis    Acute renal failure (ARF) ([facility] )    Hypertension      Plan for the Primary Problem(s):  · Acute asthma exacerbation:  · Put the patient on IV steroids and monitor  Put him on respiratory protocol and have pulmonology evaluate the patient  Chest x-ray did not show any active disease  Plan for Additional Problems:   · Bronchitis with persistent cough  Although suspicion is less we will check a Bordetella since the patient did have some post-tussive syncope as well as his description for the cough  I will put him on azithromycin and monitor  · Acute renal failure with chronic kidney disease stage 3:  Patient IV fluids and monitor  There is no reason for a renal evaluation at this time unless his creatinine does not improve  Hold Cozaar  · Hypertension:  Continue Toprol  We will keep the Cozaar on hold secondary to his renal failure  Continue amlodipine  ·   ·     VTE Prophylaxis: Heparin  / sequential compression device   Code Status:  Full code  POLST: POLST is not applicable to this patient    Anticipated Length of Stay:  Patient will be admitted on an Inpatient basis with an anticipated length of stay of  greater than 2 midnights  Justification for Hospital Stay:  Patient will require greater than 2 midnights secondary to having COPD exacerbation need of IV steroids as well as antibiotics and further pulmonary monitoring    Total Time for Visit, including Counseling / Coordination of Care: 1 hour    Greater than 50% of this total time spent on direct patient counseling and coordination of care  Chief Complaint:   Shortness of breath and cough    History of Present Illness:    Juliette Caldera is a 78 y o  male who presents with shortness of breath and cough  patient states he is having shortness of breath and cough for the past couple of days getting progressively worse  Patient is to the point where he walks a few steps and he gets short of breath  The patient states that he has these coughing fits and the states he has passed out twice secondary to the coughing fits  States he is unable to bring anything up when he coughs  His wife was recently sick as well  Patient denies any body aches  He had some chills but no fevers     Patient also states that he has been wheezing quite a bit at home  He denies any weight gain or pillow orthopnea or any edema  Patient also states he has had a decreased appetite the past couple of days  Review of Systems:    Review of Systems    Past Medical and Surgical History:     Past Medical History:   Diagnosis Date    Back pain     [facility] ([facility] )     Hypertension     Prostate cancer ([facility] )        Past Surgical History:   Procedure Laterality Date    BACK SURGERY      COLON SURGERY      JOINT REPLACEMENT      PROSTATE SURGERY         Meds/Allergies:    Prior to Admission medications    Medication Sig Start Date End Date Taking?  Authorizing Provider   acetaminophen-codeine (TYLENOL #3) 300-30 mg per tablet Take 1 tablet by mouth 3 (three) times a day as needed for moderate pain   Yes Historical Provider, MD   allopurinol (ZYLOPRIM) 100 mg tablet Take 100 mg by mouth daily   4/14/15  Yes Historical Provider, MD   amLODIPine (NORVASC) 5 mg tablet Take 5 mg by mouth daily   10/24/16  Yes Historical Provider, MD   fluticasone-salmeterol (ADVAIR DISKUS) 250-50 mcg/dose inhaler Inhale 1 puff 2 (two) times a day 2/7/14  Yes Historical Provider, MD   losartan (COZAAR) 100 MG tablet Take 100 mg by mouth daily 4/12/17  Yes Historical Provider, MD   metoprolol succinate (TOPROL-XL) 25 mg 24 hr tablet Take 25 mg by mouth daily 5/19/15  Yes Historical Provider, MD   Specialty Vitamins Products (RA EAR CARE PO) Take 3 tablets by mouth daily   Yes Historical Provider, MD   tiotropium (SPIRIVA) 18 mcg inhalation capsule Place 1 capsule into inhaler and inhale daily 10/20/17  Yes Juan Antonio Mayberry MD   Vitamins-Lipotropics (LIPOFLAVONOID PO) Take 1 tablet by mouth 2 (two) times a day    Historical Provider, MD     I have reviewed home medications with patient personally      Allergies: No Known Allergies    Social History:     Marital Status: /Civil Union   Occupation:  Retired  Patient Pre-hospital Living Situation:  Lives with his wife  Patient Pre-hospital Level of Mobility:  Independent  Patient Pre-hospital Diet Restrictions:  None  Substance Use History:   History   Alcohol Use No     History   Smoking Status    Former Smoker   Smokeless Tobacco    Never Used     History   Drug Use No       Family History:    Family History   Problem Relation Age of Onset    No Known Problems Mother     No Known Problems Father        Physical Exam:     Vitals:   Blood Pressure: 109/72 (01/02/18 1051)  Pulse: (!) 106 (01/02/18 1051)  Temperature: 98 5 °F (36 9 °C) (01/02/18 0817)  Temp Source: Oral (01/02/18 0817)  Respirations: 20 (01/02/18 1051)  Height: 6' 1" (185 4 cm) (01/02/18 0817)  Weight - Scale: 113 kg (250 lb) (01/02/18 0817)  SpO2: 97 % (01/02/18 1051)    Physical Exam    (   General Appearance:    Alert, cooperative, no distress, appears stated age   Head:    Normocephalic, without obvious abnormality, atraumatic   Eyes:    PERRL, conjunctiva/corneas clear, EOM's intact,             Nose:   Nares normal, septum midline, mucosa normal   Throat:   Lips, mucosa, and tongue normal; teeth and gums normal   Neck:   Supple, symmetrical, no adenopathy;        thyroid:  No enlargement/tenderness/nodules; no carotid    bruit or JVD   Back:     Symmetric, no curvature, ROM normal, no CVA tenderness   Lungs:     Diffuse expiratory wheezes and some scattered rhonchi noted       Heart:    Regular rate and rhythm, S1 and S2 normal, no murmur, rub    or gallop   Abdomen:     Soft, non-tender, bowel sounds active all four quadrants,     no masses, no organomegaly           Extremities:   Extremities normal, atraumatic, no cyanosis or edema   Pulses:   2+ and symmetric all extremities   Skin:   Skin color, texture, turgor normal, no rashes or lesions   Lymph nodes:   Cervical, supraclavicular, and axillary nodes normal   Neurologic:   CNII-XII intact  Normal strength, sensation and reflexes       throughout       Additional Data:     Lab Results: I have personally reviewed pertinent reports  Results from last 7 days  Lab Units 01/02/18  0937 01/02/18  0756   WBC Thousand/uL 7 75 9 27   HEMOGLOBIN g/dL 15 4 15 9   HEMATOCRIT % 49 6* 51 1*   PLATELETS Thousands/uL 314 317   NEUTROS PCT %  --  74   LYMPHS PCT %  --  16   LYMPHO PCT % 13*  --    MONOS PCT %  --  9   MONO PCT MAN % 9  --    EOS PCT %  --  0   EOSINO PCT MANUAL % 2  --        Results from last 7 days  Lab Units 01/02/18  0937   SODIUM mmol/L 134*   POTASSIUM mmol/L 3 5   CHLORIDE mmol/L 98*   CO2 mmol/L 26   BUN mg/dL 36*   CREATININE mg/dL 2 31*   CALCIUM mg/dL 9 7   GLUCOSE RANDOM mg/dL 113           Imaging: I have personally reviewed pertinent reports  Xr Chest 2 Views    Result Date: 1/2/2018  Narrative: CHEST - DUAL ENERGY INDICATION:  Cough for 2 weeks COMPARISON:  None VIEWS:  PA (including soft tissue/bone algorithms) and lateral projections IMAGES:  5 FINDINGS:     Cardiomediastinal silhouette appears unremarkable  No congestion seen Hazy groundglass changes seen in the right lower lung may be due to atelectasis, less likely due to infiltrate Visualized osseous structures appear within normal limits for the patient's age       Impression: No congestion seen Hazy groundglass areas seen in the right lower lung may be due to atelectasis, less likely infiltrate  Consider follow-up radiograph in 6-8 weeks to demonstrate resolution The study was marked in EPIC for immediate notification  Workstation performed: [id number]       EKG, Pathology, and Other Studies Reviewed on Admission:   · Chest x-ray reviewed    Allscripts / Epic Records Reviewed: Yes     ** Please Note: This note has been constructed using a voice recognition system   **

## 2018-01-03 PROBLEM — R05.8 POST-TUSSIVE SYNCOPE: Status: ACTIVE | Noted: 2018-01-03

## 2018-01-03 LAB
ANION GAP SERPL CALCULATED.3IONS-SCNC: 13 MMOL/L (ref 4–13)
BUN SERPL-MCNC: 35 MG/DL (ref 5–25)
CALCIUM SERPL-MCNC: 8.7 MG/DL (ref 8.3–10.1)
CHLORIDE SERPL-SCNC: 103 MMOL/L (ref 100–108)
CO2 SERPL-SCNC: 22 MMOL/L (ref 21–32)
CREAT SERPL-MCNC: 1.94 MG/DL (ref 0.6–1.3)
GFR SERPL CREATININE-BSD FRML MDRD: 38 ML/MIN/1.73SQ M
GLUCOSE SERPL-MCNC: 170 MG/DL (ref 65–140)
MAGNESIUM SERPL-MCNC: 2.1 MG/DL (ref 1.6–2.6)
POTASSIUM SERPL-SCNC: 3.8 MMOL/L (ref 3.5–5.3)
SODIUM SERPL-SCNC: 138 MMOL/L (ref 136–145)

## 2018-01-03 PROCEDURE — 80048 BASIC METABOLIC PNL TOTAL CA: CPT | Performed by: FAMILY MEDICINE

## 2018-01-03 PROCEDURE — 94640 AIRWAY INHALATION TREATMENT: CPT

## 2018-01-03 PROCEDURE — 94150 VITAL CAPACITY TEST: CPT

## 2018-01-03 PROCEDURE — 83735 ASSAY OF MAGNESIUM: CPT | Performed by: FAMILY MEDICINE

## 2018-01-03 PROCEDURE — 94760 N-INVAS EAR/PLS OXIMETRY 1: CPT

## 2018-01-03 RX ORDER — METHYLPREDNISOLONE SODIUM SUCCINATE 40 MG/ML
40 INJECTION, POWDER, LYOPHILIZED, FOR SOLUTION INTRAMUSCULAR; INTRAVENOUS EVERY 12 HOURS SCHEDULED
Status: DISCONTINUED | OUTPATIENT
Start: 2018-01-03 | End: 2018-01-05 | Stop reason: HOSPADM

## 2018-01-03 RX ORDER — AZITHROMYCIN 250 MG/1
500 TABLET, FILM COATED ORAL EVERY 24 HOURS
Status: DISCONTINUED | OUTPATIENT
Start: 2018-01-03 | End: 2018-01-04

## 2018-01-03 RX ORDER — GUAIFENESIN 600 MG
600 TABLET, EXTENDED RELEASE 12 HR ORAL EVERY 12 HOURS SCHEDULED
Status: DISCONTINUED | OUTPATIENT
Start: 2018-01-03 | End: 2018-01-05 | Stop reason: HOSPADM

## 2018-01-03 RX ORDER — AZITHROMYCIN 250 MG/1
250 TABLET, FILM COATED ORAL EVERY 24 HOURS
Status: DISCONTINUED | OUTPATIENT
Start: 2018-01-03 | End: 2018-01-03

## 2018-01-03 RX ADMIN — Medication: at 10:05

## 2018-01-03 RX ADMIN — FLUTICASONE PROPIONATE AND SALMETEROL 1 PUFF: 50; 250 POWDER RESPIRATORY (INHALATION) at 18:09

## 2018-01-03 RX ADMIN — LEVALBUTEROL HYDROCHLORIDE 1.25 MG: 1.25 SOLUTION, CONCENTRATE RESPIRATORY (INHALATION) at 13:39

## 2018-01-03 RX ADMIN — GUAIFENESIN 600 MG: 600 TABLET, EXTENDED RELEASE ORAL at 13:32

## 2018-01-03 RX ADMIN — HEPARIN SODIUM 5000 UNITS: 5000 INJECTION, SOLUTION INTRAVENOUS; SUBCUTANEOUS at 05:04

## 2018-01-03 RX ADMIN — AZITHROMYCIN 500 MG: 250 TABLET, FILM COATED ORAL at 13:32

## 2018-01-03 RX ADMIN — LEVALBUTEROL HYDROCHLORIDE 1.25 MG: 1.25 SOLUTION, CONCENTRATE RESPIRATORY (INHALATION) at 07:51

## 2018-01-03 RX ADMIN — SODIUM CHLORIDE 125 ML/HR: 0.9 INJECTION, SOLUTION INTRAVENOUS at 02:34

## 2018-01-03 RX ADMIN — LEVALBUTEROL HYDROCHLORIDE 1.25 MG: 1.25 SOLUTION, CONCENTRATE RESPIRATORY (INHALATION) at 19:56

## 2018-01-03 RX ADMIN — METOPROLOL SUCCINATE 25 MG: 25 TABLET, FILM COATED, EXTENDED RELEASE ORAL at 10:07

## 2018-01-03 RX ADMIN — FLUTICASONE PROPIONATE AND SALMETEROL 1 PUFF: 50; 250 POWDER RESPIRATORY (INHALATION) at 10:08

## 2018-01-03 RX ADMIN — HEPARIN SODIUM 5000 UNITS: 5000 INJECTION, SOLUTION INTRAVENOUS; SUBCUTANEOUS at 15:30

## 2018-01-03 RX ADMIN — ONDANSETRON 4 MG: 2 INJECTION INTRAMUSCULAR; INTRAVENOUS at 23:04

## 2018-01-03 RX ADMIN — METHYLPREDNISOLONE SODIUM SUCCINATE 40 MG: 40 INJECTION, POWDER, FOR SOLUTION INTRAMUSCULAR; INTRAVENOUS at 05:04

## 2018-01-03 RX ADMIN — ISODIUM CHLORIDE 3 ML: 0.03 SOLUTION RESPIRATORY (INHALATION) at 19:56

## 2018-01-03 RX ADMIN — TIOTROPIUM BROMIDE 18 MCG: 18 CAPSULE ORAL; RESPIRATORY (INHALATION) at 10:08

## 2018-01-03 RX ADMIN — ISODIUM CHLORIDE 3 ML: 0.03 SOLUTION RESPIRATORY (INHALATION) at 07:51

## 2018-01-03 RX ADMIN — ISODIUM CHLORIDE 3 ML: 0.03 SOLUTION RESPIRATORY (INHALATION) at 13:39

## 2018-01-03 RX ADMIN — GUAIFENESIN 600 MG: 600 TABLET, EXTENDED RELEASE ORAL at 21:37

## 2018-01-03 RX ADMIN — HEPARIN SODIUM 5000 UNITS: 5000 INJECTION, SOLUTION INTRAVENOUS; SUBCUTANEOUS at 21:37

## 2018-01-03 RX ADMIN — AMLODIPINE BESYLATE 5 MG: 5 TABLET ORAL at 10:07

## 2018-01-03 RX ADMIN — METHYLPREDNISOLONE SODIUM SUCCINATE 40 MG: 40 INJECTION, POWDER, FOR SOLUTION INTRAMUSCULAR; INTRAVENOUS at 21:37

## 2018-01-03 NOTE — PROGRESS NOTES
Progress Note - Pulmonary   Nicanor Reed 78 y o  male MRN: [id number]  Unit/Bed#: -01 Encounter: [id number]    Assessment:  1  COPD with acute exacerbation  2  Acute tracheobronchitis  3  Lung nodules    Plan:  1  COPD with acute exacerbation secondary to Acute tracheobronchitis  -currently saturating 90 to 94% on room air; documented 98% on 2 L via nasal cannula; continue oxygen at night at 2 L via nasal cannula  -CT of chest with no evidence of acute consolidation  Complete course of azithromycin day 2 of 5  -influenza and RSV PCR negative; sputum culture to be collected; pertussis PCR pending; no leukocytosis  -continue with 40 mg q 8 of IV Solu-Medrol; can consider decrease in a m  if patient improves; monitor peak flows (200 lpm today)  -continue Spiriva and Advair; add in levalbuterol nebulizer treatments t i d   - add in Mucinex for increased chest congestion  -needs outpatient PFTs  2  Lung nodules  -stable since August 2016; will need repeat CT of chest in 1 year to demonstrate stability for 2 years    Chief Complaint:   Shortness of breath    Subjective:   Patient continues to complain of shortness of breath and chest congestion  He does report improvement  However, he feels more congested today and feels the mucus has increased in his chest     Objective:     Vitals: Blood pressure 131/79, pulse 99, temperature 97 8 °F (36 6 °C), temperature source Oral, resp  rate 18, height 6' 1" (1 854 m), weight 113 kg (250 lb), SpO2 98 %  ,Body mass index is 32 98 kg/m²        Intake/Output Summary (Last 24 hours) at 01/03/18 1131  Last data filed at 01/03/18 0234   Gross per 24 hour   Intake          2881 67 ml   Output                0 ml   Net          2881 67 ml       Invasive Devices     Peripheral Intravenous Line            Peripheral IV 01/02/18 Right Forearm 1 day                Physical Exam: /79   Pulse 99   Temp 97 8 °F (36 6 °C) (Oral)   Resp 18   Ht 6' 1" (1 854 m)   Wt 113 kg (250 lb)   SpO2 98%   BMI 32 98 kg/m²   General appearance: alert, appears stated age and cooperative  Head: Normocephalic, without obvious abnormality, atraumatic  Lungs: Poor air movement on exam, diffuse expiratory wheezes  Heart: regular rate and rhythm, S1, S2 normal, no murmur, click, rub or gallop  Extremities: extremities normal, atraumatic, no cyanosis or edema  Skin: Skin color, texture, turgor normal  No rashes or lesions  Neurologic: Mental status: Alert, oriented, thought content appropriate     Labs: I have personally reviewed pertinent lab results  Imaging and other studies: I have personally reviewed pertinent reports

## 2018-01-03 NOTE — PROGRESS NOTES
Progress Note - [facility] 78 y o  male MRN: [id number]  Unit/Bed#: -01 Encounter: [id number]    Subjective:   Vrea Constanza is feeling approximately 60% better  He is significantly less short of breath, he has been ambulatory in his room without exertion related dyspnea  Cough has improved dramatically  He denies any fever or chills  He denies chest pain  He denies dizziness or lightheadedness  His appetite is stable  He denies any lower extremity swelling  All other ROS are negative  Objective:   Vitals: Blood pressure 131/79, pulse 99, temperature 97 8 °F (36 6 °C), temperature source Oral, resp  rate 18, height 6' 1" (1 854 m), weight 113 kg (250 lb), SpO2 98 %  ,Body mass index is 32 98 kg/m²  SPO2 RA Rest    Flowsheet Row ED to Hosp-Admission (Current) from 1/2/2018 in [address] [place] [address] 3rd Floor Med Surg Unit   SpO2  98 %   SpO2 Activity  At Rest   O2 Device  Nasal cannula   O2 Flow Rate  3 L/min        I&O:   Intake/Output Summary (Last 24 hours) at 01/03/18 1135  Last data filed at 01/03/18 0234   Gross per 24 hour   Intake          2881 67 ml   Output                0 ml   Net          2881 67 ml         Physical Exam:       General Appearance:    Alert, cooperative, no distress   Head:    Normocephalic, without obvious abnormality, atraumatic   Eyes:    PERRL, conjunctiva/corneas clear, EOM's intact       Nose:   Moist mucous membranes, no drainage or sinus tenderness   Throat:   No tenderness, no exudates   Neck:   Supple, symmetrical, trachea midline, no JVD   Lungs:     Good air exchange, faint bilateral wheezes, slightly prolonged expiratory phase, respirations unlabored   Heart:    Regular rate and rhythm, S1 and S2 normal, no murmur, rub   or gallop   Abdomen: Soft, non-tender, positive bowel sounds, no masses, no organomegaly   Extremities:  No pedal edema, calf tenderness  Distal pulses palpable  Neurologic:     CNII-XII intact        Invasive Devices     Peripheral Intravenous Line            Peripheral IV 01/02/18 Right Forearm 1 day                      Social History  reviewed  Family History   Problem Relation Age of Onset    No Known Problems Mother     No Known Problems Father     reviewed    Meds:  Current Facility-Administered Medications   Medication Dose Route Frequency Provider Last Rate Last Dose    acetaminophen (TYLENOL) tablet 650 mg  650 mg Oral Q6H PRN Raúl Merritt MD        amLODIPine (NORVASC) tablet 5 mg  5 mg Oral Daily Raúl Merritt MD   5 mg at 01/03/18 1007    azithromycin (ZITHROMAX) tablet 250 mg  250 mg Oral Q24H JessicaASHLIE WhaleyC        fluticasone-salmeterol (ADVAIR) 250-50 mcg/dose inhaler 1 puff  1 puff Inhalation BID Raúl Merritt MD   1 puff at 01/03/18 1008    guaiFENesin (MUCINEX) 12 hr tablet 600 mg  600 mg Oral Q12H [address] Jessicaayana Conklin PARulaC        heparin (porcine) subcutaneous injection 5,000 Units  5,000 Units Subcutaneous Q8H [address] Raúl Merritt MD   5,000 Units at 01/03/18 0504    levalbuterol (XOPENEX) inhalation solution 1 25 mg  1 25 mg Nebulization TID JessicaASHLIE AlvarezC   1 25 mg at 01/03/18 0751    And    sodium chloride 0 9 % inhalation solution 3 mL  3 mL Nebulization TID JessicaASHLIE AlvarezC   3 mL at 01/03/18 0751    methylPREDNISolone sodium succinate (Solu-MEDROL) injection 40 mg  40 mg Intravenous Q12H [address] Jess Cedillo MD        metoprolol succinate (TOPROL-XL) 24 hr tablet 25 mg  25 mg Oral Daily Raúl Merritt MD   25 mg at 01/03/18 1007    ondansetron (ZOFRAN) injection 4 mg  4 mg Intravenous Q6H PRN Raúl Merritt MD        RA EAR CARE TABS   Oral Daily Raúl Merritt MD        tiotropium [facility]) capsule for inhaler 18 mcg  18 mcg Inhalation Daily Raúl Merritt MD   18 mcg at 01/03/18 1008      Prescriptions Prior to Admission   Medication    acetaminophen-codeine (TYLENOL #3) 300-30 mg per tablet    allopurinol (ZYLOPRIM) 100 mg tablet    amLODIPine (NORVASC) 5 mg tablet    fluticasone-salmeterol (ADVAIR DISKUS) 250-50 mcg/dose inhaler    losartan (COZAAR) 100 MG tablet    metoprolol succinate (TOPROL-XL) 25 mg 24 hr tablet    Specialty Vitamins Products (RA EAR CARE PO)    tiotropium (SPIRIVA) 18 mcg inhalation capsule    Vitamins-Lipotropics (LIPOFLAVONOID PO)       Labs:    Results from last 7 days  Lab Units 01/02/18  0937 01/02/18  0756   WBC Thousand/uL 7 75 9 27   HEMOGLOBIN g/dL 15 4 15 9   HEMATOCRIT % 49 6* 51 1*   PLATELETS Thousands/uL 314 317   NEUTROS PCT %  --  74   LYMPHS PCT %  --  16   LYMPHO PCT % 13*  --    MONOS PCT %  --  9   MONO PCT MAN % 9  --    EOS PCT %  --  0   EOSINO PCT MANUAL % 2  --        Results from last 7 days  Lab Units 01/03/18  0532 01/02/18  0937   SODIUM mmol/L 138 134*   POTASSIUM mmol/L 3 8 3 5   CHLORIDE mmol/L 103 98*   CO2 mmol/L 22 26   BUN mg/dL 35* 36*   CREATININE mg/dL 1 94* 2 31*   CALCIUM mg/dL 8 7 9 7   GLUCOSE RANDOM mg/dL 170* 113     Lab Results   Component Value Date    TROPONINI <0 02 01/02/2018    TROPONINI <0 02 10/16/2017         Lab Results   Component Value Date    BLOODCX No Growth After 5 Days  10/16/2017    BLOODCX No Growth After 5 Days  10/16/2017       Imaging:  Results for orders placed during the hospital encounter of 10/16/17   XR chest portable    Narrative CHEST     INDICATION:  Nausea and vomiting, diarrhea  History of COPD    COMPARISON:  None    VIEWS:   AP frontal    IMAGES:  2    FINDINGS:         Cardiomediastinal silhouette appears unremarkable  Appearance of the lungs is compatible with COPD  Linear changes at the lung bases suggest subsegmental atelectasis or scar  No pulmonary edema or confluent infiltrate  No pneumothorax or significant effusion  Visualized osseous structures appear within normal limits for the patient's age        Impression COPD with minor bibasilar subsegmental atelectasis or scarring      Workstation performed: [id number]       Results for orders placed during the hospital encounter of 01/02/18   XR chest 2 views    Narrative CHEST - DUAL ENERGY    INDICATION:  Cough for 2 weeks    COMPARISON:  None    VIEWS:  PA (including soft tissue/bone algorithms) and lateral projections    IMAGES:  5    FINDINGS:         Cardiomediastinal silhouette appears unremarkable  No congestion seen  Hazy groundglass changes seen in the right lower lung may be due to atelectasis, less likely due to infiltrate    Visualized osseous structures appear within normal limits for the patient's age  Impression No congestion seen  Hazy groundglass areas seen in the right lower lung may be due to atelectasis, less likely infiltrate  Consider follow-up radiograph in 6-8 weeks to demonstrate resolution    The study was marked in EPIC for immediate notification  Workstation performed: [id number]         VTE Pharmacologic Prophylaxis: Heparin      Code Status:   Level 1 - Full Code    Assessment:  Principal Problem:    Acute exacerbation of chronic obstructive pulmonary disease (COPD) ([facility] [address] )  Active Problems:    Acute bronchitis    Acute renal failure (ARF) ([facility] [address] )    Hypertension    Post-tussive syncope  Resolved Problems:    * No resolved hospital problems  *      Plan:  · COPD exacerbation-clinically improving, decrease Solu-Medrol to q 12 hours, continue Zithromax, changed to 500 mg  continue bronchodilators  Appreciate Pulmonary recommendations  · Post tussive syncope-likely vasovagal, will add telemetry monitoring  · Acute kidney injury on top of chronic kidney disease has improved with intravenous fluids  Hep-Lock IV and follow-up BMP in the a m  · Hypertension stable on present regimen  · Anticipate DC tomorrow on oral prednisone taper  Will consider PT eval prior to discharge to assure stability for discharge home        Michelle Ruiz MD  1/3/2018,11:35 AM

## 2018-01-03 NOTE — CASE MANAGEMENT
Initial Clinical Review    Admission: Date/Time/Statement: 1/2/18 @ 1128     Orders Placed This Encounter   Procedures    Inpatient Admission (expected length of stay for this patient is greater than two midnights)     Standing Status:   Standing     Number of Occurrences:   1     Order Specific Question:   Admitting Physician     Answer:   Porter Bettencourt [[id number]]     Order Specific Question:   Level of Care     Answer:   Med Surg [[id number]]     Order Specific Question:   Estimated length of stay     Answer:   More than 2 Midnights     Order Specific Question:   Certification     Answer:   I certify that inpatient services are medically necessary for this patient for a duration of greater than two midnights  See H&P and MD Progress Notes for additional information about the patient's course of treatment  ED: Date/Time/Mode of Arrival:   ED Arrival Information     Expected Arrival Acuity Means of Arrival Escorted By Service Admission Type    - 1/2/2018 08:07 Urgent Walk-In Family Member General Medicine Urgent    Arrival Complaint    COUGH          Chief Complaint:   Chief Complaint   Patient presents with    Cough     Pt stated that he has been having a cough for the past cough  Wife stated that he was coughing so much he would pass out  History of Illness:    Wu Chaudhari is a 78 y o  male who presents with shortness of breath and cough  patient states he is having shortness of breath and cough for the past couple of days getting progressively worse  Patient is to the point where he walks a few steps and he gets short of breath  The patient states that he has these coughing fits and the states he has passed out twice secondary to the coughing fits  States he is unable to bring anything up when he coughs  His wife was recently sick as well  Patient denies any body aches  He had some chills but no fevers     Patient also states that he has been wheezing quite a bit at home    He denies any weight gain or pillow orthopnea or any edema  Patient also states he has had a decreased appetite the past couple of days  ED Vital Signs:   ED Triage Vitals   Temperature Pulse Respirations Blood Pressure SpO2   01/02/18 0817 01/02/18 0817 01/02/18 0817 01/02/18 0817 01/02/18 0817   98 5 °F (36 9 °C) (!) 131 22 118/63 91 %      Temp Source Heart Rate Source Patient Position - Orthostatic VS BP Location FiO2 (%)   01/02/18 0817 01/02/18 0817 01/02/18 0817 01/02/18 0817 --   Oral Monitor Sitting Right arm       Pain Score       01/02/18 1758       No Pain        Wt Readings from Last 1 Encounters:   01/02/18 113 kg (250 lb)       Vital Signs (abnormal):  01/02/18 1828  98 4 °F (36 9 °C)  100  18  133/88  106  95 %  Nasal cannula  Lying   01/02/18 1758  --  100  19  126/76  --  95 %  Nasal cannula  Lying   01/02/18 1326  --  99  18  116/79  --  94 %  Nasal cannula  Lying   01/02/18 1051  --   106  20  109/72  --  97 %  Other (comment)  Lying   O2 Device: Heart-neb tx in progress at 01/02/18 1051   01/02/18 1010  --  --  --  --  --   89 %  --  --     Abnormal Labs/Diagnostic Test Results: H&H   15 4  49 6, d-dimer  582, Na  134, cl  98, BUN creat   36  2 31  CT chest -    No acute findings  Stable right-sided pulmonary nodules and pleural plaque      CXR -Hazy groundglass areas seen in the right lower lung may be due to atelectasis, less likely infiltrate    EKG- ST     ED Treatment:   Medication Administration from 01/02/2018 0807 to 01/02/2018 1819       Date/Time Order Dose Route Action Action by Comments     01/02/2018 1002 albuterol inhalation solution 10 mg 10 mg Nebulization Given Eugenie Kidd,       01/02/2018 1002 ipratropium (ATROVENT) 0 02 % inhalation solution 1 mg 1 mg Nebulization Given Eugenie Kidd,       01/02/2018 1002 sodium chloride 0 9 % inhalation solution 3 mL 3 mL Nebulization Given RT Mick      01/02/2018 0950 predniSONE tablet 60 mg 60 mg Oral Given Maged Qureshi RN      01/02/2018 1326 sodium chloride 0 9 % bolus 1,000 mL 0 mL Intravenous Stopped Roshan Tavarez      01/02/2018 1102 sodium chloride 0 9 % bolus 1,000 mL 1,000 mL Intravenous New Bag Roshan Tavarez      01/02/2018 1326 sodium chloride 0 9 % infusion 125 mL/hr Intravenous New Bag Symone Jacques      01/02/2018 1430 amLODIPine (NORVASC) tablet 5 mg 5 mg Oral Not Given Ele Garcia, RN pt took this AM PTA     01/02/2018 1430 metoprolol succinate (TOPROL-XL) 24 hr tablet 25 mg 25 mg Oral Not Given Ele Garcia, RN pt took this AM PTA     01/02/2018 1431 RA EAR CARE TABS   Oral Not Given Ele Garcia, RN pt took this AM PTA     01/02/2018 1431 tiotropium (SPIRIVA) capsule for inhaler 18 mcg 18 mcg Inhalation Not Given Ele Garcia, RN pt took this AM PTA     01/02/2018 1439 methylPREDNISolone sodium succinate (Solu-MEDROL) injection 40 mg 40 mg Intravenous Given Ele Garcia, RICK      01/02/2018 1439 azithromycin (ZITHROMAX) tablet 500 mg 500 mg Oral Given Ele Garcia, RICK      01/02/2018 1439 heparin (porcine) subcutaneous injection 5,000 Units 5,000 Units Subcutaneous Given Ele Garcia RN      01/02/2018 1540 levalbuterol (XOPENEX) inhalation solution 1 25 mg 1 25 mg Nebulization Given Ele Garcia, RICK      01/02/2018 1540 sodium chloride 0 9 % inhalation solution 3 mL 3 mL Nebulization Given Ele Garcia RN           Past Medical/Surgical History:    Active Ambulatory Problems     Diagnosis Date Noted    Ileus ([facility] [address] ) 10/17/2017    COPD (chronic obstructive pulmonary disease) ([facility] [address] ) 10/17/2017    History of hypertension 10/17/2017     Resolved Ambulatory Problems     Diagnosis Date Noted    Sepsis ([facility] [address] ) 10/17/2017    Acute kidney injury superimposed on chronic kidney disease ([facility] [address] ) 10/17/2017    Hyperbilirubinemia 10/17/2017    Lactic acidosis 10/17/2017    Nausea vomiting and diarrhea 10/17/2017     Past Medical History:   Diagnosis Date    Back pain     COPD (chronic obstructive pulmonary disease) ([place] [address] )     Hypertension     Prostate cancer ([place] [address] )        Admitting Diagnosis: Cough [R05]  Syncope [R55]  COPD exacerbation ([place] [address] ) [J44 1]  Acute kidney injury ([place] [address] ) [N17 9]    Age/Sex: 78 y o  male    Assessment/Plan: Hospital Problem List:      Principal Problem:    Acute exacerbation of chronic obstructive pulmonary disease (COPD) ([place] [address] )  Active Problems:    Acute bronchitis    Acute renal failure (ARF) ([place] [address] )    Hypertension   Plan for the Primary Problem(s):  · Acute asthma exacerbation:  ? Put the patient on IV steroids and monitor  Put him on respiratory protocol and have pulmonology evaluate the patient  Chest x-ray did not show any active disease      Plan for Additional Problems:   · Bronchitis with persistent cough  Although suspicion is less we will check a Bordetella since the patient did have some post-tussive syncope as well as his description for the cough  I will put him on azithromycin and monitor  · Acute renal failure with chronic kidney disease stage 3:  Patient IV fluids and monitor  There is no reason for a renal evaluation at this time unless his creatinine does not improve  Hold Cozaar  · Hypertension:  Continue Toprol  We will keep the Cozaar on hold secondary to his renal failure  Continue amlodipine  ·    ·       VTE Prophylaxis: Heparin  / sequential compression device   Code Status:  Full code  POLST: POLST is not applicable to this patient     Anticipated Length of Stay:  Patient will be admitted on an Inpatient basis with an anticipated length of stay of  greater than 2 midnights     Justification for Hospital Stay:  Patient will require greater than 2 midnights secondary to having COPD exacerbation need of IV steroids as well as antibiotics and further pulmonary monitoring       Admission Orders:  Scheduled Meds:   amLODIPine 5 mg Oral Daily   azithromycin 500 mg Oral Q24H   fluticasone-salmeterol 1 puff Inhalation BID   heparin (porcine) 5,000 Units Subcutaneous Q8H [facility]   levalbuterol 1 25 mg Nebulization TID   And      sodium chloride 3 mL Nebulization TID   methylPREDNISolone sodium succinate 40 mg Intravenous Q8H [facility]   metoprolol succinate 25 mg Oral Daily   RA EAR CARE  Oral Daily   tiotropium 18 mcg Inhalation Daily     Continuous Infusions:   sodium chloride 125 mL/hr Last Rate: 125 mL/hr (01/03/18 8064)     PRN Meds:   acetaminophen    ondansetron     Reg diet  Act as tonya   Up w/ assist   Peak flow qs  Sputum cx   SCD  Tele   pulm consult    1/3 mg , bmp    BUN creat   35  1 94, gluc 170    Pulm consult  1/2   Assessment:  1  COPD with acute exacerbation  2  Acute tracheobronchitis  3  Lung nodules   Plan:   1  COPD with acute exacerbation secondary to Acute tracheobronchitis  -currently saturating 90 to 93% on room air; documented 94% on 2 L via nasal cannula; continue oxygen at night at 2 L via nasal cannula  -reviewed chest x-ray, which has a questionable right lower lobe infiltrate versus atelectasis? Evidence of Peribronchial cuffing; would recommend obtaining CT of chest for better characterization to rule out underlying pneumonia  Will consider adjusting antibiotics based on results of CT of chest   Can continue with azithromycin for now (day 1/5)  -influenza and RSV PCR negative; sputum culture to be collected; pertussis PCR pending; no leukocytosis  -agree with 40 mg q 8 of IV Solu-Medrol; can consider decrease in a m  if patient improves; monitor peak flows  -continue Spiriva and Advair; add in levalbuterol nebulizer treatments t i d   -needs outpatient PFTs  2    Lung nodules  -stable since August 2016; will need repeat CT of chest in 1 year to demonstrate stability for 2 years

## 2018-01-03 NOTE — PLAN OF CARE
DISCHARGE PLANNING     Discharge to home or other facility with appropriate resources Progressing        DISCHARGE PLANNING - CARE MANAGEMENT     Discharge to post-acute care or home with appropriate resources Progressing        INFECTION - ADULT     Absence or prevention of progression during hospitalization Progressing     Absence of fever/infection during neutropenic period Progressing        Knowledge Deficit     Patient/family/caregiver demonstrates understanding of disease process, treatment plan, medications, and discharge instructions Progressing        Nutrition/Hydration-ADULT     Nutrient/Hydration intake appropriate for improving, restoring or maintaining nutritional needs Progressing        PAIN - ADULT     Verbalizes/displays adequate comfort level or baseline comfort level Progressing        Potential for Falls     Patient will remain free of falls Progressing        SAFETY ADULT     Patient will remain free of falls Progressing     Maintain or return to baseline ADL function Progressing     Maintain or return mobility status to optimal level Progressing

## 2018-01-04 PROBLEM — N18.30 CKD (CHRONIC KIDNEY DISEASE) STAGE 3, GFR 30-59 ML/MIN (HCC): Status: ACTIVE | Noted: 2018-01-04

## 2018-01-04 PROBLEM — E11.9 TYPE 2 DIABETES MELLITUS WITHOUT COMPLICATION (HCC): Status: ACTIVE | Noted: 2018-01-04

## 2018-01-04 LAB
ANION GAP SERPL CALCULATED.3IONS-SCNC: 9 MMOL/L (ref 4–13)
B PARAPERT DNA SPEC QL NAA+PROBE: NOT DETECTED
B PERT DNA SPEC QL NAA+PROBE: NOT DETECTED
BACTERIA SPT RESP CULT: NORMAL
BUN SERPL-MCNC: 30 MG/DL (ref 5–25)
CALCIUM SERPL-MCNC: 8.8 MG/DL (ref 8.3–10.1)
CHLORIDE SERPL-SCNC: 106 MMOL/L (ref 100–108)
CO2 SERPL-SCNC: 25 MMOL/L (ref 21–32)
CREAT SERPL-MCNC: 1.69 MG/DL (ref 0.6–1.3)
GFR SERPL CREATININE-BSD FRML MDRD: 45 ML/MIN/1.73SQ M
GLUCOSE SERPL-MCNC: 146 MG/DL (ref 65–140)
GRAM STN SPEC: NORMAL
POTASSIUM SERPL-SCNC: 4.5 MMOL/L (ref 3.5–5.3)
SODIUM SERPL-SCNC: 140 MMOL/L (ref 136–145)

## 2018-01-04 PROCEDURE — 94150 VITAL CAPACITY TEST: CPT

## 2018-01-04 PROCEDURE — 94760 N-INVAS EAR/PLS OXIMETRY 1: CPT

## 2018-01-04 PROCEDURE — 94640 AIRWAY INHALATION TREATMENT: CPT

## 2018-01-04 PROCEDURE — 80048 BASIC METABOLIC PNL TOTAL CA: CPT | Performed by: INTERNAL MEDICINE

## 2018-01-04 PROCEDURE — 87205 SMEAR GRAM STAIN: CPT | Performed by: FAMILY MEDICINE

## 2018-01-04 RX ORDER — BENZONATATE 100 MG/1
200 CAPSULE ORAL 3 TIMES DAILY PRN
Status: DISCONTINUED | OUTPATIENT
Start: 2018-01-04 | End: 2018-01-05 | Stop reason: HOSPADM

## 2018-01-04 RX ORDER — AZITHROMYCIN 250 MG/1
250 TABLET, FILM COATED ORAL EVERY 24 HOURS
Status: DISCONTINUED | OUTPATIENT
Start: 2018-01-04 | End: 2018-01-05 | Stop reason: HOSPADM

## 2018-01-04 RX ADMIN — ISODIUM CHLORIDE 3 ML: 0.03 SOLUTION RESPIRATORY (INHALATION) at 14:02

## 2018-01-04 RX ADMIN — GUAIFENESIN 600 MG: 600 TABLET, EXTENDED RELEASE ORAL at 09:18

## 2018-01-04 RX ADMIN — LEVALBUTEROL HYDROCHLORIDE 1.25 MG: 1.25 SOLUTION, CONCENTRATE RESPIRATORY (INHALATION) at 18:03

## 2018-01-04 RX ADMIN — TIOTROPIUM BROMIDE 18 MCG: 18 CAPSULE ORAL; RESPIRATORY (INHALATION) at 09:22

## 2018-01-04 RX ADMIN — BENZONATATE 200 MG: 100 CAPSULE ORAL at 13:48

## 2018-01-04 RX ADMIN — ISODIUM CHLORIDE 3 ML: 0.03 SOLUTION RESPIRATORY (INHALATION) at 18:02

## 2018-01-04 RX ADMIN — METHYLPREDNISOLONE SODIUM SUCCINATE 40 MG: 40 INJECTION, POWDER, FOR SOLUTION INTRAMUSCULAR; INTRAVENOUS at 21:10

## 2018-01-04 RX ADMIN — HEPARIN SODIUM 5000 UNITS: 5000 INJECTION, SOLUTION INTRAVENOUS; SUBCUTANEOUS at 13:48

## 2018-01-04 RX ADMIN — FLUTICASONE PROPIONATE AND SALMETEROL 1 PUFF: 50; 250 POWDER RESPIRATORY (INHALATION) at 18:47

## 2018-01-04 RX ADMIN — METHYLPREDNISOLONE SODIUM SUCCINATE 40 MG: 40 INJECTION, POWDER, FOR SOLUTION INTRAMUSCULAR; INTRAVENOUS at 09:19

## 2018-01-04 RX ADMIN — GUAIFENESIN 600 MG: 600 TABLET, EXTENDED RELEASE ORAL at 21:10

## 2018-01-04 RX ADMIN — ISODIUM CHLORIDE 3 ML: 0.03 SOLUTION RESPIRATORY (INHALATION) at 07:54

## 2018-01-04 RX ADMIN — AZITHROMYCIN 250 MG: 250 TABLET, FILM COATED ORAL at 13:48

## 2018-01-04 RX ADMIN — LEVALBUTEROL HYDROCHLORIDE 1.25 MG: 1.25 SOLUTION, CONCENTRATE RESPIRATORY (INHALATION) at 14:02

## 2018-01-04 RX ADMIN — HYDROCODONE BITARTRATE AND HOMATROPINE METHYLBROMIDE 5 ML: 5; 1.5 SOLUTION ORAL at 21:33

## 2018-01-04 RX ADMIN — HEPARIN SODIUM 5000 UNITS: 5000 INJECTION, SOLUTION INTRAVENOUS; SUBCUTANEOUS at 05:26

## 2018-01-04 RX ADMIN — METOPROLOL SUCCINATE 25 MG: 25 TABLET, FILM COATED, EXTENDED RELEASE ORAL at 09:18

## 2018-01-04 RX ADMIN — LEVALBUTEROL HYDROCHLORIDE 1.25 MG: 1.25 SOLUTION, CONCENTRATE RESPIRATORY (INHALATION) at 07:54

## 2018-01-04 RX ADMIN — HEPARIN SODIUM 5000 UNITS: 5000 INJECTION, SOLUTION INTRAVENOUS; SUBCUTANEOUS at 21:10

## 2018-01-04 RX ADMIN — Medication: at 09:19

## 2018-01-04 RX ADMIN — AMLODIPINE BESYLATE 5 MG: 5 TABLET ORAL at 09:19

## 2018-01-04 RX ADMIN — FLUTICASONE PROPIONATE AND SALMETEROL 1 PUFF: 50; 250 POWDER RESPIRATORY (INHALATION) at 09:22

## 2018-01-04 NOTE — PROGRESS NOTES
Progress Note - Pulmonary   Keith Stone 78 y o  male MRN: [id number]  Unit/Bed#: -01 Encounter: [id number]    Assessment:  1   COPD with acute exacerbation  2   Acute tracheobronchitis  3   Lung nodules     Plan:  1   COPD with acute exacerbation secondary to Acute tracheobronchitis  -currently saturating 90 to 94% on room air; documented 98% on 2 L via nasal cannula; spoke with nursing staff about titrating down to room air; continue oxygen at night at 2 L via nasal cannula  -CT of chest with no evidence of acute consolidation  Complete course of azithromycin (decreased to 250 mg) day 3 of 5  -influenza and RSV PCR negative; sputum culture in process; pertussis PCR normal; no leukocytosis  -currently on 40 mg q 12 of IV Solu-Medrol; peak flow yesterday 200 lpm, significantly improved today to 450 lpm; decrease to 40 mg q day in a m   -continue Spiriva and Advair; continue levalbuterol nebulizer treatments t i d   -continue Mucinex   -needs outpatient PFTs  -cough suppressants per primary team  2   Lung nodules  -stable since August 2016; will need repeat CT of chest in 1 year to demonstrate stability for 2 years    Anticipate discharge in the next 24-48 hours    Chief Complaint:   Cough    Subjective:   Patient reports increased cough over the last 24 hours  Increased mucus production since adding in the Mucinex and nebulizer treatment  Does feel that his breathing has improved  Complains of mild wheezing  Has been out of bed and in the chair/walking around the room without significant difficulty  He usually walks around without using oxygen  Peak flow today was 450 at best effort  Objective:     Vitals: Blood pressure 136/77, pulse 97, temperature 99 °F (37 2 °C), temperature source Oral, resp  rate 18, height 6' 1" (1 854 m), weight 113 kg (250 lb), SpO2 92 %  ,Body mass index is 32 98 kg/m²        Intake/Output Summary (Last 24 hours) at 01/04/18 1103  Last data filed at 01/03/18 2100   Gross per 24 hour   Intake              740 ml   Output                0 ml   Net              740 ml       Invasive Devices     Peripheral Intravenous Line            Peripheral IV 01/02/18 Right Forearm 2 days                Physical Exam: /77   Pulse 97   Temp 99 °F (37 2 °C) (Oral)   Resp 18   Ht 6' 1" (1 854 m)   Wt 113 kg (250 lb)   SpO2 92%   BMI 32 98 kg/m²   General appearance: alert, appears stated age, cooperative, no distress and moderately obese  Lungs: Diminished breath sounds bilaterally with mild expiratory wheezes throughout  Heart: regular rate and rhythm, S1, S2 normal, no murmur, click, rub or gallop  Extremities: extremities normal, atraumatic, no cyanosis or edema  Skin: Skin color, texture, turgor normal  No rashes or lesions  Neurologic: Mental status: Alert, oriented, thought content appropriate     Labs: I have personally reviewed pertinent lab results  CMP:   Lab Results   Component Value Date     01/04/2018    K 4 5 01/04/2018     01/04/2018    CO2 25 01/04/2018    ANIONGAP 9 01/04/2018    BUN 30 (H) 01/04/2018    CREATININE 1 69 (H) 01/04/2018    GLUCOSE 146 (H) 01/04/2018    CALCIUM 8 8 01/04/2018    EGFR 45 01/04/2018     Imaging and other studies: I have personally reviewed pertinent reports

## 2018-01-04 NOTE — PROGRESS NOTES
Progress Note - Narcisa Miranda 78 y o  male MRN: [id number]  Unit/Bed#: -01 Encounter: [id number]    Subjective:   Evelia Guthrie notes increased cough and wheezing over night  He has more exertional dyspnea today than he did yesterday  He requests cough suppressants  Denies chest pain, dizziness, weakness, lightheadedness  His strength is good and he was ambulatory in his room  All other ROS are negative  Objective:   Vitals: Blood pressure 142/94, pulse 103, temperature 98 6 °F (37 °C), temperature source Oral, resp  rate 18, height 6' 1" (1 854 m), weight 113 kg (250 lb), SpO2 92 %  ,Body mass index is 32 98 kg/m²  SPO2 RA Rest    Flowsheet Row ED to Hosp-Admission (Current) from 1/2/2018 in [address] [place] [address] 3rd Floor Med Surg Unit   SpO2  92 %   SpO2 Activity  At Rest   O2 Device  None (Room air) [pt found on room air,  pt was in bathroom  Will return to O2]   O2 Flow Rate  3 L/min        I&O:     Intake/Output Summary (Last 24 hours) at 01/04/18 1035  Last data filed at 01/03/18 2100   Gross per 24 hour   Intake              740 ml   Output                0 ml   Net              740 ml         Physical Exam:       General Appearance:    Alert, cooperative, no distress   Head:    Normocephalic, without obvious abnormality, atraumatic   Eyes:    PERRL, conjunctiva/corneas clear, EOM's intact               Neck:   Supple, symmetrical, trachea midline, no JVD   Lungs:     Scattered wheezes with prolonged expiratory phase   Heart:    Regular rate and rhythm, S1 and S2 normal, no murmur, rub   or gallop   Abdomen: Soft, non-tender, positive bowel sounds, no masses, no organomegaly   Extremities:  No pedal edema, calf tenderness  Distal pulses palpable  Neurologic:     CNII-XII intact        Invasive Devices     Peripheral Intravenous Line            Peripheral IV 01/02/18 Right Forearm 2 days                      Social History  reviewed  Family History   Problem Relation Age of Onset    No Known Problems Mother     No Known Problems Father     reviewed    Meds:  Current Facility-Administered Medications   Medication Dose Route Frequency Provider Last Rate Last Dose    acetaminophen (TYLENOL) tablet 650 mg  650 mg Oral Q6H PRN Ze Kent MD        amLODIPine (NORVASC) tablet 5 mg  5 mg Oral Daily Ze eKnt MD   5 mg at 01/04/18 0919    azithromycin (ZITHROMAX) tablet 500 mg  500 mg Oral Q24H Silvana Martinez MD   500 mg at 01/03/18 1332    benzonatate (TESSALON PERLES) capsule 200 mg  200 mg Oral TID PRN Silvana Martinez MD        fluticasone-salmeterol (ADVAIR) 250-50 mcg/dose inhaler 1 puff  1 puff Inhalation BID Ze Kent MD   1 puff at 01/04/18 0922    guaiFENesin (MUCINEX) 12 hr tablet 600 mg  600 mg Oral Q12H [facility] Oc Alvarez PA-C   600 mg at 01/04/18 2959    heparin (porcine) subcutaneous injection 5,000 Units  5,000 Units Subcutaneous [facility] Ze Kent MD   5,000 Units at 01/04/18 0526    HYDROcodone-homatropine (HYCODAN) 5-1 5 mg/5 mL oral syrup 5 mL  5 mL Oral Q4H PRN Silvana Martinez MD        levalbuterol Etheleen Coleman) inhalation solution 1 25 mg  1 25 mg Nebulization TID Oc Alvarez PA-C   1 25 mg at 01/04/18 0754    And    sodium chloride 0 9 % inhalation solution 3 mL  3 mL Nebulization TID Oc Alvarez PA-C   3 mL at 01/04/18 0754    methylPREDNISolone sodium succinate (Solu-MEDROL) injection 40 mg  40 mg Intravenous Q12H [facility] Silvana Martinez MD   40 mg at 01/04/18 0919    metoprolol succinate (TOPROL-XL) 24 hr tablet 25 mg  25 mg Oral Daily Ze Kent MD   25 mg at 01/04/18 0918    ondansetron (ZOFRAN) injection 4 mg  4 mg Intravenous Q6H PRN Ze Kent MD   4 mg at 01/03/18 2304    RA EAR CARE TABS   Oral Daily Ze Kent MD        tiotropium [facility]) capsule for inhaler 18 mcg  18 mcg Inhalation Daily Ze Kent MD   18 mcg at 01/04/18 0503      Prescriptions Prior to Admission   Medication    acetaminophen-codeine (TYLENOL #3) 300-30 mg per tablet    allopurinol (ZYLOPRIM) 100 mg tablet    amLODIPine (NORVASC) 5 mg tablet    fluticasone-salmeterol (ADVAIR DISKUS) 250-50 mcg/dose inhaler    losartan (COZAAR) 100 MG tablet    metoprolol succinate (TOPROL-XL) 25 mg 24 hr tablet    Specialty Vitamins Products (RA EAR CARE PO)    tiotropium (SPIRIVA) 18 mcg inhalation capsule    Vitamins-Lipotropics (LIPOFLAVONOID PO)       Labs:    Results from last 7 days  Lab Units 01/02/18  0937 01/02/18  0756   WBC Thousand/uL 7 75 9 27   HEMOGLOBIN g/dL 15 4 15 9   HEMATOCRIT % 49 6* 51 1*   PLATELETS Thousands/uL 314 317   NEUTROS PCT %  --  74   LYMPHS PCT %  --  16   LYMPHO PCT % 13*  --    MONOS PCT %  --  9   MONO PCT MAN % 9  --    EOS PCT %  --  0   EOSINO PCT MANUAL % 2  --        Results from last 7 days  Lab Units 01/04/18  0457 01/03/18  0532 01/02/18  0937   SODIUM mmol/L 140 138 134*   POTASSIUM mmol/L 4 5 3 8 3 5   CHLORIDE mmol/L 106 103 98*   CO2 mmol/L 25 22 26   BUN mg/dL 30* 35* 36*   CREATININE mg/dL 1 69* 1 94* 2 31*   CALCIUM mg/dL 8 8 8 7 9 7   GLUCOSE RANDOM mg/dL 146* 170* 113     Lab Results   Component Value Date    TROPONINI <0 02 01/02/2018    TROPONINI <0 02 10/16/2017         Lab Results   Component Value Date    BLOODCX No Growth After 5 Days  10/16/2017    BLOODCX No Growth After 5 Days  10/16/2017       Imaging:  Results for orders placed during the hospital encounter of 10/16/17   XR chest portable    Narrative CHEST     INDICATION:  Nausea and vomiting, diarrhea  History of COPD    COMPARISON:  None    VIEWS:   AP frontal    IMAGES:  2    FINDINGS:         Cardiomediastinal silhouette appears unremarkable  Appearance of the lungs is compatible with COPD  Linear changes at the lung bases suggest subsegmental atelectasis or scar  No pulmonary edema or confluent infiltrate  No pneumothorax or significant effusion  Visualized osseous structures appear within normal limits for the patient's age        Impression COPD with minor bibasilar subsegmental atelectasis or scarring      Workstation performed: [id number]       Results for orders placed during the hospital encounter of 01/02/18   XR chest 2 views    Narrative CHEST - DUAL ENERGY    INDICATION:  Cough for 2 weeks    COMPARISON:  None    VIEWS:  PA (including soft tissue/bone algorithms) and lateral projections    IMAGES:  5    FINDINGS:         Cardiomediastinal silhouette appears unremarkable  No congestion seen  Hazy groundglass changes seen in the right lower lung may be due to atelectasis, less likely due to infiltrate    Visualized osseous structures appear within normal limits for the patient's age  Impression No congestion seen  Hazy groundglass areas seen in the right lower lung may be due to atelectasis, less likely infiltrate  Consider follow-up radiograph in 6-8 weeks to demonstrate resolution    The study was marked in EPIC for immediate notification  Workstation performed: [id number]         VTE Pharmacologic Prophylaxis: Heparin      Code Status:   Level 1 - Full Code    Assessment:  Principal Problem:    Acute exacerbation of chronic obstructive pulmonary disease (COPD) ([facility] [address] )  Active Problems:    Acute bronchitis    Acute renal failure (ARF) ([facility])    Hypertension    Post-tussive syncope    CKD (chronic kidney disease) stage 3, GFR 30-59 ml/min    Type 2 diabetes mellitus without complication ([facility] [address] )  Resolved Problems:    * No resolved hospital problems   *      Plan:  · COPD exacerbation-continue IV Solu-Medrol, nebulizers, Zithromax to complete 3 days, Mucinex and add Tessalon Perles and Hycodan for cough  · Hypertension stable on present regimen  · Chronic kidney disease approaching baseline  · Posttussive syncope without recurrence, telemetry unremarkable  · Diabetes has been stable with diet control with last A1c 6 0, sugars less than 162 on metabolic profile, no indication for insulin  · Anticipate discharge in a m  if improved      Shamar Bui MD  1/4/2018,10:35 AM

## 2018-01-05 VITALS
WEIGHT: 250 LBS | SYSTOLIC BLOOD PRESSURE: 143 MMHG | RESPIRATION RATE: 18 BRPM | HEART RATE: 88 BPM | OXYGEN SATURATION: 91 % | BODY MASS INDEX: 33.13 KG/M2 | HEIGHT: 73 IN | DIASTOLIC BLOOD PRESSURE: 88 MMHG | TEMPERATURE: 97.8 F

## 2018-01-05 LAB
GLUCOSE SERPL-MCNC: 106 MG/DL (ref 65–140)
GLUCOSE SERPL-MCNC: 127 MG/DL (ref 65–140)
GLUCOSE SERPL-MCNC: 201 MG/DL (ref 65–140)

## 2018-01-05 PROCEDURE — 82948 REAGENT STRIP/BLOOD GLUCOSE: CPT

## 2018-01-05 PROCEDURE — 94760 N-INVAS EAR/PLS OXIMETRY 1: CPT

## 2018-01-05 PROCEDURE — 94150 VITAL CAPACITY TEST: CPT

## 2018-01-05 PROCEDURE — 94640 AIRWAY INHALATION TREATMENT: CPT

## 2018-01-05 RX ORDER — PREDNISONE 10 MG/1
TABLET ORAL
Qty: 18 TABLET | Refills: 0 | Status: SHIPPED | OUTPATIENT
Start: 2018-01-05 | End: 2018-06-12 | Stop reason: ALTCHOICE

## 2018-01-05 RX ORDER — LEVALBUTEROL 1.25 MG/.5ML
1.25 SOLUTION, CONCENTRATE RESPIRATORY (INHALATION) EVERY 4 HOURS PRN
Status: DISCONTINUED | OUTPATIENT
Start: 2018-01-05 | End: 2018-01-05 | Stop reason: HOSPADM

## 2018-01-05 RX ORDER — GUAIFENESIN 600 MG
600 TABLET, EXTENDED RELEASE 12 HR ORAL EVERY 12 HOURS SCHEDULED
Qty: 20 TABLET | Refills: 0 | Status: SHIPPED | OUTPATIENT
Start: 2018-01-05 | End: 2018-06-12 | Stop reason: ALTCHOICE

## 2018-01-05 RX ADMIN — GUAIFENESIN 600 MG: 600 TABLET, EXTENDED RELEASE ORAL at 10:19

## 2018-01-05 RX ADMIN — Medication: at 10:20

## 2018-01-05 RX ADMIN — AMLODIPINE BESYLATE 5 MG: 5 TABLET ORAL at 10:19

## 2018-01-05 RX ADMIN — AZITHROMYCIN 250 MG: 250 TABLET, FILM COATED ORAL at 16:48

## 2018-01-05 RX ADMIN — Medication 1 TABLET: at 16:48

## 2018-01-05 RX ADMIN — TIOTROPIUM BROMIDE 18 MCG: 18 CAPSULE ORAL; RESPIRATORY (INHALATION) at 10:19

## 2018-01-05 RX ADMIN — METOPROLOL SUCCINATE 25 MG: 25 TABLET, FILM COATED, EXTENDED RELEASE ORAL at 10:19

## 2018-01-05 RX ADMIN — HEPARIN SODIUM 5000 UNITS: 5000 INJECTION, SOLUTION INTRAVENOUS; SUBCUTANEOUS at 14:30

## 2018-01-05 RX ADMIN — METHYLPREDNISOLONE SODIUM SUCCINATE 40 MG: 40 INJECTION, POWDER, FOR SOLUTION INTRAMUSCULAR; INTRAVENOUS at 10:18

## 2018-01-05 RX ADMIN — HEPARIN SODIUM 5000 UNITS: 5000 INJECTION, SOLUTION INTRAVENOUS; SUBCUTANEOUS at 06:11

## 2018-01-05 RX ADMIN — ISODIUM CHLORIDE 3 ML: 0.03 SOLUTION RESPIRATORY (INHALATION) at 08:45

## 2018-01-05 RX ADMIN — LEVALBUTEROL HYDROCHLORIDE 1.25 MG: 1.25 SOLUTION, CONCENTRATE RESPIRATORY (INHALATION) at 08:45

## 2018-01-05 RX ADMIN — INSULIN LISPRO 4 UNITS: 100 INJECTION, SOLUTION INTRAVENOUS; SUBCUTANEOUS at 17:00

## 2018-01-05 RX ADMIN — FLUTICASONE PROPIONATE AND SALMETEROL 1 PUFF: 50; 250 POWDER RESPIRATORY (INHALATION) at 10:20

## 2018-01-05 RX ADMIN — LEVALBUTEROL HYDROCHLORIDE 1.25 MG: 1.25 SOLUTION, CONCENTRATE RESPIRATORY (INHALATION) at 12:19

## 2018-01-05 RX ADMIN — HYDROCODONE BITARTRATE AND HOMATROPINE METHYLBROMIDE 5 ML: 5; 1.5 SOLUTION ORAL at 04:46

## 2018-01-05 NOTE — DISCHARGE INSTRUCTIONS
Acute Bronchitis   WHAT YOU SHOULD KNOW:   Acute bronchitis is swelling and irritation in the air passages of your lungs  This irritation may cause you to cough or have other breathing problems  Acute bronchitis often starts because of another viral illness, such as a cold or the flu  The illness spreads from your nose and throat to your windpipe and airways  Bronchitis is often called a chest cold  Acute bronchitis lasts about 2 weeks and is usually not a serious illness  AFTER YOU LEAVE:   Medicines:   · Ibuprofen or acetaminophen:  These medicines help lower a fever  They are available without a doctor's order  Ask your healthcare provider which medicine is right for you  Ask how much to take and how often to take it  Follow directions  These medicines can cause stomach bleeding if not taken correctly  Ibuprofen can cause kidney damage  Do not take ibuprofen if you have kidney disease, an ulcer, or allergies to aspirin  Acetaminophen can cause liver damage  Do not drink alcohol if you take acetaminophen  · Cough medicine: This medicine helps loosen mucus in your lungs and make it easier to cough up  This can help you breathe easier  · Inhalers: You may need one or more inhalers to help you breathe easier and cough less  An inhaler gives your medicine in a mist form so that you can breathe it into your lungs  Ask your healthcare provider to show you how to use your inhaler correctly  · Steroid medicine:  Steroid medicine helps open your air passages so you can breathe easier  · Take your medicine as directed  Call your healthcare provider if you think your medicine is not helping or if you have side effects  Tell him if you are allergic to any medicine  Keep a list of the medicines, vitamins, and herbs you take  Include the amounts, and when and why you take them  Bring the list or the pill bottles to follow-up visits  Carry your medicine list with you in case of an emergency    How to use an inhaler:   · Shake the inhaler well to make sure you get the correct amount of medicine per puff  Remove the cover from your inhaler's mouthpiece  If you are using a spacer, connect your inhaler to the flat end of the spacer  · Exhale as much air from your lungs as you can  Put the mouthpiece in your mouth past your front teeth and rest it on the top of your tongue  Do not block the mouthpiece opening with your tongue  · Breathe in through your mouth at a slow and steady rate  As you do this, press the inhaler to release the puff of medicine  Finish breathing in slowly and deeply as you inhale the medicine  When your lungs are full, hold your breath for 10 seconds  Then breathe out slowly through puckered lips or through your nose  · If you need to take more puffs, wait at least 1 minute between each puff  · Rinse your mouth with water after you use the inhaler  This may keep you from getting a mouth infection or irritation  · Follow the instructions that come with your inhaler to clean it  You should clean your inhaler at least once a week  Ways to care for yourself:   · Avoid alcohol:  Alcohol dulls your urge to cough and sneeze  When you have bronchitis, you need to be able to cough and sneeze to clear your air passages  Alcohol also causes your body to lose fluid  This can make the mucus in your lungs thicker and harder to cough up  · Avoid irritants in the air:  Do not smoke or allow others to smoke around you  Avoid chemicals, fumes, and dust  Wear a face mask if you must work around dust or fumes  Stay inside on days when air pollution levels are high  If you have allergies, stay inside when pollen counts are high  Avoid aerosol products  This includes spray-on deodorant, bug spray, and hair spray  · Drink more liquids:  Most people should drink at least 8 eight-ounce cups of water a day  You may need to drink more liquids when you have acute bronchitis   Liquids help keep your air passages moist and help you cough up mucus  · Get more rest:  You may feel like resting more  Slowly start to do more each day  Rest when you feel it is needed  · Eat healthy foods:  Eat a variety healthy foods every day  Your diet should include fruits, vegetables, breads, and protein (such as chicken, fish, and beans)  Dairy products (such as milk, cheese, and ice cream) can sometimes increase the amount of mucus your body makes  Ask if you should decrease your intake of dairy products  · Use a humidifier:  Use a cool mist humidifier to increase air moisture in your home  This may make it easier for you to breathe and help decrease your cough  Decrease your risk of acute bronchitis:   · Get the vaccinations you need:  Ask your healthcare provider if you should get vaccinated against the flu or pneumonia  · Avoid things that may irritate your lungs:  Stay inside or cover your mouth and nose with a scarf when you are outside during cold weather  You should also stay inside on days when air pollution levels are high  If you have allergies, stay inside when pollen counts are high  Avoid using aerosol products in your home  This includes spray-on deodorant, bug spray, and hair spray  · Avoid the spread of germs:        [facility] your hands often with soap and water  Carry germ-killing gel with you  You can use the gel to clean your hands when there is no soap and water available  ¨ Do not touch your eyes, nose, or mouth unless you have washed your hands first     ¨ Always cover your mouth when you cough  Cough into a tissue or your shirtsleeve so you do not spread germs from your hands  ¨ Try to avoid people who have a cold or the flu  If you are sick, stay away from others as much as possible  Follow up with your healthcare provider as directed:  Write down questions you have so you will remember to ask them during your follow-up visits    Contact your healthcare provider if:   · You have a fever     · Your skin becomes itchy or you have a rash after you take your medicine  · Your breathing problems do not go away or get worse  · Your cough does not get better with treatment  · You cough up blood  · You have questions or concerns about your condition or care  Seek care immediately or call 911 if:   · You faint  · Your lips or fingernails turn blue  · You feel like you are not getting enough air when you breathe  · You have swelling of your lips, tongue, or throat that makes it hard to breathe or swallow  © 2014 [address] is for End User's use only and may not be sold, redistributed or otherwise used for commercial purposes  All illustrations and images included in CareNotes® are the copyrighted property of A D A M , Inc  or August Orellana  The above information is an  only  It is not intended as medical advice for individual conditions or treatments  Talk to your doctor, nurse or pharmacist before following any medical regimen to see if it is safe and effective for you  Acute Bronchitis   WHAT YOU NEED TO KNOW:   Acute bronchitis is swelling and irritation in the air passages of your lungs  This irritation may cause you to cough or have other breathing problems  Acute bronchitis often starts because of another illness, such as a cold or the flu  The illness spreads from your nose and throat to your windpipe and airways  Bronchitis is often called a chest cold  Acute bronchitis lasts about 3 to 6 weeks and is usually not a serious illness  Your cough can last for several weeks  DISCHARGE INSTRUCTIONS:   Return to the emergency department if:   · You cough up blood  · Your lips or fingernails turn blue  · You feel like you are not getting enough air when you breathe  Contact your healthcare provider if:   · You have a fever  · Your breathing problems do not go away or get worse      · Your cough does not get better within 4 weeks  · You have questions or concerns about your condition or care  Self-care:   · Get more rest   Rest helps your body to heal  Slowly start to do more each day  Rest when you feel it is needed  · Avoid irritants in the air  Avoid chemicals, fumes, and dust  Wear a face mask if you must work around dust or fumes  Stay inside on days when air pollution levels are high  If you have allergies, stay inside when pollen counts are high  Do not use aerosol products, such as spray-on deodorant, bug spray, and hair spray  · Do not smoke or be around others who smoke  Nicotine and other chemicals in cigarettes and cigars damages the cilia that move mucus out of your lungs  Ask your healthcare provider for information if you currently smoke and need help to quit  E-cigarettes or smokeless tobacco still contain nicotine  Talk to your healthcare provider before you use these products  · Drink liquids as directed  Liquids help keep your air passages moist and help you cough up mucus  You may need to drink more liquids when you have acute bronchitis  Ask how much liquid to drink each day and which liquids are best for you  · Use a humidifier or vaporizer  Use a cool mist humidifier or a vaporizer to increase air moisture in your home  This may make it easier for you to breathe and help decrease your cough  Decrease risk for acute bronchitis:   · Get the vaccinations you need  Ask your healthcare provider if you should get vaccinated against the flu or pneumonia  · Prevent the spread of germs  You can decrease your risk of acute bronchitis and other illnesses by doing the following:     [facility] your hands often with soap and water  Carry germ-killing hand lotion or gel with you  You can use the lotion or gel to clean your hands when soap and water are not available      ¨ Do not touch your eyes, nose, or mouth unless you have washed your hands first     ¨ Always cover your mouth when you cough to prevent the spread of germs  It is best to cough into a tissue or your shirt sleeve instead of into your hand  Ask those around you cover their mouths when they cough  ¨ Try to avoid people who have a cold or the flu  If you are sick, stay away from others as much as possible  Medicines: Your healthcare provider may  give you any of the following:  · Ibuprofen or acetaminophen  are medicines that help lower your fever  They are available without a doctor's order  Ask your healthcare provider which medicine is right for you  Ask how much to take and how often to take it  Follow directions  These medicines can cause stomach bleeding if not taken correctly  Ibuprofen can cause kidney damage  Do not take ibuprofen if you have kidney disease, an ulcer, or allergies to aspirin  Acetaminophen can cause liver damage  Do not take more than 4,000 milligrams in 24 hours  · Decongestants  help loosen mucus in your lungs and make it easier to cough up  This can help you breathe easier  · Cough suppressants  decrease your urge to cough  If your cough produces mucus, do not take a cough suppressant unless your healthcare provider tells you to  Your healthcare provider may suggest that you take a cough suppressant at night so you can rest     · Inhalers  may be given  Your healthcare provider may give you one or more inhalers to help you breathe easier and cough less  An inhaler gives your medicine to open your airways  Ask your healthcare provider to show you how to use your inhaler correctly  · Take your medicine as directed  Contact your healthcare provider if you think your medicine is not helping or if you have side effects  Tell him of her if you are allergic to any medicine  Keep a list of the medicines, vitamins, and herbs you take  Include the amounts, and when and why you take them  Bring the list or the pill bottles to follow-up visits   Carry your medicine list with you in case of an emergency  Follow up with your healthcare provider as directed:  Write down questions you have so you will remember to ask them during your follow-up visits  © 2017 [address] Information is for End User's use only and may not be sold, redistributed or otherwise used for commercial purposes  All illustrations and images included in CareNotes® are the copyrighted property of A D A M , Inc  or August Reyna  The above information is an  only  It is not intended as medical advice for individual conditions or treatments  Talk to your doctor, nurse or pharmacist before following any medical regimen to see if it is safe and effective for you  Acute Kidney Injury, Ambulatory Care   Zoraida R, Anjana Malik, & Venecia C: Acute kidney injury  L[employer]et 2012; [phone]):[phone]  Nicole CONTI: Acute Kidney Injury  In: [facility]  [address], 24th ed  [address] [place], [place], 2012  Ottoniel R & Zack L: Acute Kidney Injury  [facility] MW, ed  The 5-Minute Pediatric Consult, 6th ed  [address] [facility] [place], [place], 2012  Lakesha AA, Rasheed SD, & Selvin PM: Acute Kidney Injury  In: Qi MW, Mark Reyes PA, et al, [place]  Maxx & Rebecca The Kidney, 9th ed  [address] [facility], [place], [place], 2012 © 2014 [address] is for End User's use only and may not be sold, redistributed or otherwise used for commercial purposes  All illustrations and images included in CareNotes® are the copyrighted property of A D A M , Inc  or August Orellana  The above information is an  only  It is not intended as medical advice for individual conditions or treatments  Talk to your doctor, nurse or pharmacist before following any medical regimen to see if it is safe and effective for you

## 2018-01-05 NOTE — PLAN OF CARE
Problem: INFECTION - ADULT  Goal: Absence of fever/infection during neutropenic period  INTERVENTIONS:  - Monitor WBC  - Implement neutropenic guidelines   Outcome: Completed Date Met: 01/05/18

## 2018-01-05 NOTE — PROGRESS NOTES
Progress Note - Pulmonary   Alba Rivers 78 y o  male MRN: [id number]  Unit/Bed#: -[place] Encounter: [id number]    Addendum 14:20:  At rest, SpO2 of 92% on room air; Walked patient around with desaturation to 85-86% on room air; titrated oxygen up to 2 L via nasal cannula to maintain SpO2 greater than 88%  Advised patient to use oxygen with exertion at 2 L via nasal cannula  He states he has a oxygen concentrator at home and also has 2 portable oxygen tanks  He is requesting portable oxygen concentrator which we will attempt to get him as an outpatient  Patient is stable from a pulmonary standpoint for discharge home today  Will send in Rx for albuterol via nebulizer to patient's pharmacy  Advised patient to continue to use this t i d  for the next week until symptoms improve  Prednisone taper below  Do not feel patient was in acute respiratory failure  At baseline O2 requirement throughout hospitalization  Assessment:  1   COPD with acute exacerbation  2   Acute tracheobronchitis  3   Lung nodules     Plan:  1   COPD with acute exacerbation secondary to Acute tracheobronchitis  -currently saturating 94% on 2 L via nasal cannula, documented room air saturation of 87% shortly after exertion  Would recommend ambulatory pulse ox prior to discharge  Order placed in computer and will be completed today  Patient does have a oxygen concentrator at home for which she uses at night at 2 L via nasal cannula  -CT of chest with no evidence of acute consolidation   Complete course of azithromycin (decreased to 250 mg) day 4 of 5  -influenza and RSV PCR negative; sputum culture in process; pertussis PCR normal; no leukocytosis  -currently on 40 mg q 12 of IV Solu-Medrol; peak flow of 250 lpm today (yesterday was 450 lpm)  Will give patient nebulizer treatment and repeat peak flow  Discussed with nursing staff  If improved status post neb treatment, patient will likely be stable enough for discharge home today  Will need prednisone taper, given continued shortness of breath/wheezing would recommend starting at 50 mg x3 days, 40 mg x3 days, 20 mg x3 days, 10 mg x3  - will write order for nebulizer; please send home with albuterol via nebulizer upon discharge  -continue Spiriva and Advair; continue levalbuterol nebulizer treatments t i d   -continue Mucinex   -needs outpatient PFTs  -cough suppressants per primary team  2   Lung nodules  -stable since August 2016; will need repeat CT of chest in 1 year to demonstrate stability for 2 years    Chief Complaint:   Cough    Subjective:   Patient continues to complain of shortness of breath and cough  States whenever he eats he has been noticing increased cough  States he has been walking around and still does not feel 100% with his breathing  Mild mucus production  Denies fever, chills, chest pain  Objective:     Vitals: Blood pressure (!) 161/106, pulse 100, temperature 97 8 °F (36 6 °C), temperature source Oral, resp  rate 18, height 6' 1" (1 854 m), weight 113 kg (250 lb), SpO2 94 %  ,Body mass index is 32 98 kg/m²      No intake or output data in the 24 hours ending 01/05/18 1151    Invasive Devices     Peripheral Intravenous Line            Peripheral IV 01/02/18 Right Forearm 3 days                Physical Exam: BP (!) 161/106   Pulse 100   Temp 97 8 °F (36 6 °C) (Oral)   Resp 18   Ht 6' 1" (1 854 m)   Wt 113 kg (250 lb)   SpO2 94%   BMI 32 98 kg/m²   General appearance: alert and oriented, in no acute distress and cooperative  Head: Normocephalic, without obvious abnormality, atraumatic  Lungs: Diminished breath sounds bilaterally, upper lobe expiratory wheeze mild; harsh cough  Heart: regular rate and rhythm, S1, S2 normal, no murmur, click, rub or gallop  Extremities: extremities normal, warm and well-perfused; no cyanosis, clubbing, or edema  Skin: Skin color, texture, turgor normal  No rashes or lesions  Neurologic: Mental status: Alert, oriented, thought content appropriate     Labs: I have personally reviewed pertinent lab results  Imaging and other studies: I have personally reviewed pertinent reports

## 2018-01-05 NOTE — PLAN OF CARE
Maintains optimal cardiac output and hemodynamic stability Progressing      Discharge to home or other facility with appropriate resources Progressing      Absence or prevention of progression during hospitalization Progressing      Patient/family/caregiver demonstrates understanding of disease process, treatment plan, medications, and discharge instructions Progressing      Glucose maintained within target range Progressing      Nutrient/Hydration intake appropriate for improving, restoring or maintaining nutritional needs Progressing      Verbalizes/displays adequate comfort level or baseline comfort level Progressing      Patient will remain free of falls Progressing      Achieves optimal ventilation and oxygenation Progressing      Maintain or return to baseline ADL function Progressing      Skin integrity remains intact Progressing

## 2018-01-05 NOTE — DISCHARGE SUMMARY
Discharge Summary - Joelle Sneed 78 y o  male MRN: [id number]  Unit/Bed#: -01 Encounter: [id number]    Admission Date:    1/2/2018   Discharge Date:   01/05/18   Admitting Diagnosis:   Cough [R05]  Syncope [R55]  COPD exacerbation ([facility] [address] ) [J44 1]  Acute kidney injury [facility]) [N17 9]  Admitting Provider:   El Knutson MD  Discharge Provider:   Bennye Halsted, MD     Primary Care Physician at Discharge:   Bennye Halsted, MD,[phone]    HPI:   Joelle Sneed is a 78 y o  male who presents with shortness of breath and cough  patient states he is having shortness of breath and cough for the past couple of days getting progressively worse  Patient is to the point where he walks a few steps and he gets short of breath  The patient states that he has these coughing fits and the states he has passed out twice secondary to the coughing fits  States he is unable to bring anything up when he coughs  His wife was recently sick as well  Patient denies any body aches  He had some chills but no fevers     Patient also states that he has been wheezing quite a bit at home  He denies any weight gain or pillow orthopnea or any edema  Patient also states he has had a decreased appetite the past couple of days  Patient presented with acute kidney injury with history of chronic kidney disease stage 3, creatinine on arrival was 2 31, down to 1 69 on discharge after intravenous fluids  Patient had post-tussive syncope prior to admission but no further syncope or presyncope and telemetry was unremarkable during the hospitalization  Patient has history of diet-controlled diabetes mellitus, sugars were slightly elevated in the 150-200 range and he received sliding scale insulin        Procedures Performed:   No orders of the defined types were placed in this encounter  Hospital Course:   COPD exacerbation and resolution of the hypoxia    With acute hypoxemic respiratory failure evidenced by room air oxygen saturation 89% improved with oxygen treatment -no evidence of infiltrate on chest x-ray or CT of chest   Patient was treated with intravenous Solu-Medrol, Zithromax, nebulizers, supplemental oxygen and gradually improved throughout the hospitalization  He was ambulatory with oxygen with marked improvement in his presenting exertion related dyspnea    Consulting Providers   Pulmonary    Significant Findings, Care, Treatment and Services Provided:   CT chest:IMPRESSION:     No acute findings      Stable right-sided pulmonary nodules and pleural plaque  Complications:  None  Physical Exam:  General Appearance:    Alert, cooperative, no distress   Head:    Normocephalic, without obvious abnormality, atraumatic               Neck:   Supple, symmetrical, trachea midline, no JVD   Lungs:   Very faint end-expiratory wheezes, improved air exchange, respirations unlabored   Heart:    Regular rate and rhythm, S1 and S2 normal, no murmur, rub   or gallop   Abdomen: Soft, non-tender, positive bowel sounds, no masses, no organomegaly   Extremities:  No pedal edema, calf tenderness  Distal pulses palpable  Neurologic:     CNII-XII intact      Labs:   Lab Results   Component Value Date    WBC 7 75 01/02/2018    WBC 8 4 08/18/2015    RBC 6 50 (H) 01/02/2018    RBC 6 07 (H) 08/18/2015    HGB 15 4 01/02/2018    HGB 13 8 08/18/2015    HCT 49 6 (H) 01/02/2018    HCT 44 0 08/18/2015    MCV 76 (L) 01/02/2018    MCV 72 (L) 08/18/2015    MCH 23 7 (L) 01/02/2018    MCH 22 7 (L) 08/18/2015    RDW 16 8 (H) 01/02/2018    RDW 16 3 (H) 08/18/2015     01/02/2018     08/18/2015     Lab Results   Component Value Date    CREATININE 1 69 (H) 01/04/2018    CREATININE 1 3 08/18/2015    BUN 30 (H) 01/04/2018    BUN 9 08/18/2015     01/04/2018     08/18/2015    K 4 5 01/04/2018    K 3 8 08/18/2015     01/04/2018     08/18/2015    CO2 25 01/04/2018    CO2 25 4 08/18/2015    GLUCOSE 146 (H) 01/04/2018    GLUCOSE 97 08/18/2015 PROT 7 8 11/30/2017    PROT 8 2 08/18/2015    ALKPHOS 98 11/30/2017    ALKPHOS 125 (H) 08/18/2015    ALT 26 11/30/2017    ALT 28 08/18/2015    AST 30 11/30/2017    AST 34 08/18/2015    BILIDIR 0 19 11/30/2017    BILIDIR 0 23 (H) 08/18/2015       Treatments:  antibiotics: azithromycin    Discharge Diagnosis:   Principal Problem:    Acute [facility] disease [facility]  Active Problems:    Acute bronchitis    Acute renal failure (ARF) ([facility])    Hypertension    Post-tussive syncope    CKD (chronic kidney disease) stage 3, GFR 30-59 ml/min    Type 2 diabetes mellitus without complication ([facility] )  Resolved Problems:    * No resolved hospital problems  *      Condition at Discharge:   Good     Code Status: Level 1 - Full Code  Advance Directive and Living Will: <no information>  Power of :    POLST:      Discharge instructions/Information to patient and family:   See after visit summary for information provided to patient and family  Provisions for Follow-Up Care:  See after visit summary for information related to follow-up care and any pertinent home health orders  Disposition:   Home    Planned Readmission:   No    Discharge Statement   I spent 25 minutes discharging the patient  This time was spent on the day of discharge  I had direct contact with the patient on the day of discharge  Additional documentation is required if more than 30 minutes were spent on discharge  Greater than 50% of the total time was spent examining patient, answering all patient questions, arranging and discussing plan of care with patient as well as directly providing post-discharge instructions  Additional time then spent on discharge activities  Discharge Medications:  See after visit summary for reconciled discharge medications provided to patient and family        Christel Peralta MD  1/5/2018,5:02 PM

## 2018-01-08 ENCOUNTER — GENERIC CONVERSION - ENCOUNTER (OUTPATIENT)
Dept: OTHER | Facility: OTHER | Age: 78
End: 2018-01-08

## 2018-01-11 NOTE — MISCELLANEOUS
Assessment    1  Chronic obstructive pulmonary disease (496) (J44 9)   2  Chronic kidney disease (CKD) (585 9) (N18 9)   3  DMII (diabetes mellitus, type 2) (250 00) (E11 9)   4  Gout (274 9) (M10 9)   5  Hypertension (401 9) (I10)   6  Iron deficiency anemia (280 9) (D50 9)    Plan  Hypertension    · Chlorthalidone 25 MG Oral Tablet   Rx By: Howard Meadows; Dispense: 90 Days ; #:45 Tablet; Refill: 3; For: Hypertension; AJAY = N; Transmitted To: [facility]; Last Updated By: Cristian Light; 11/2/2015 6:01:53 PM  Nausea    · Prochlorperazine Maleate 10 MG Oral Tablet; TAKE 1 TABLET 3 times daily PRN  NAUSEA   Rx By: Cristian Light; Dispense: 0 Days ; #:30 Tablet; Refill: 1; For: Nausea; AJAY = N; Sent To: [facility] # [facility]  Unlinked    · Effervescent Pot Chloride 25 MEQ Oral Tablet Effervescent   Dispense: 0 Days ; #: Sufficient TBEF; Refill: 0; AJAY = N; Record; Last Updated By: Cristian Light; 1/30/2015 10:26:04 AM    Discussion/Summary  Discussion Summary:   Patient appears stable status post COPD exacerbation  I'm concerned for nocturnal hypoxia , Will check overnight oximetry, if normal then no further follow-up, if he is low will get supplemental oxygen and consider overnight polysomnogram     Blood pressure appears stable off of chlorthalidone, continue to monitor     Routine follow-up after lab work in August, sooner as needed  Chief Complaint  Chief Complaint Free Text Note Form: hospital f/u      History of Present Illness  TCM Communication [facility]: The patient is being contacted for follow-up after hospitalization  He was hospitalized at [facility]  The date of discharge: 5/7/16  He was discharged to home  Medications reviewed and updated today     Communication performed and completed by   HPI: Patient was hospitalized on May 5 with COPD exacerbation, he woke in the early morning hours unable to catch his breath, he was found to be hypoxic in the low 70s when in the emergency department which quickly improved with oxygen supplementation  X-ray revealed COPD changes but no infiltrate  He was treated with a course of Levaquin as well as sterile to nebulizers  Oxygen was discontinued at discharge and he ambulated ordered without hypoxia  No overnight oximetry was tested off of oxygen  Chlorthalidone was discontinued due to renal insufficiency but blood pressures remained stable  Since discharge she has a nonproductive cough on occasion but is breathing has been otherwise stable sleep has been good, no a m  headaches  Review of Systems  Complete-Male:   Constitutional: No fever or chills, feels well, no tiredness, no recent weight gain or weight loss  Eyes: No complaints of eye pain, no red eyes, no discharge from eyes, no itchy eyes  ENT: no complaints of earache, no hearing loss, no nosebleeds, no nasal discharge, no sore throat, no hoarseness  Cardiovascular: No complaints of slow heart rate, no fast heart rate, no chest pain, no palpitations, no leg claudication, no lower extremity  Respiratory: as noted in HPI  Gastrointestinal: No complaints of abdominal pain, no constipation, no nausea or vomiting, no diarrhea or bloody stools  Genitourinary: No complaints of dysuria, no incontinence, no hesitancy, no nocturia, no genital lesion, no testicular pain  Musculoskeletal: as noted in HPI  Integumentary: as noted in HPI  Neurological: No compliants of headache, no confusion, no convulsions, no numbness or tingling, no dizziness or fainting, no limb weakness, no difficulty walking  Psychiatric: Is not suicidal, no sleep disturbances, no anxiety or depression, no change in personality, no emotional problems  Endocrine: No complaints of proptosis, no hot flashes, no muscle weakness, no erectile dysfunction, no deepening of the voice, no feelings of weakness     Hematologic/Lymphatic: No complaints of swollen glands, no swollen glands in the neck, does not bleed easily, no easy bruising  Active Problems    1  Adenocarcinoma of prostate (185) (C61)   2  Changes in skin texture (782 8) (R23 4)   3  Chronic kidney disease (CKD) (585 9) (N18 9)   4  Chronic obstructive pulmonary disease (496) (J44 9)   5  Disc degeneration, 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) (M51 36)   6  DMII (diabetes mellitus, type 2) (250 00) (E11 9)   7  Esophageal reflux (530 81) (K21 9)   8  Fissure, anal (565 0) (K60 2)   9  Generalized osteoarthritis of multiple sites (715 09) (M15 9)   10  Gout (274 9) (M10 9)   11  Herniated lumbar intervertebral disc (722 10) (M51 26)   12  History of colon cancer (V10 05) (Z85 038)   13  History of prostate cancer (V10 46) (Z85 46)   14  Hypertension (401 9) (I10)   15  Iron deficiency anemia (280 9) (D50 9)   16  Lumbago (724 2) (M54 5)   17  Lumbar canal stenosis (724 02) (M48 06)   18  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)   19  Lung nodule (793 11) (R91 1)   20  Nausea (787 02) (R11 0)   21  Need for influenza vaccination (V04 81) (Z23)   22  Need for pneumococcal vaccine (V03 82) (Z23)   23  Obesity (278 00) (E66 9)   24  Pilonidal cyst (685 1) (L05 91)   25  Renal mass, right (593 9) (N28 89)   26  Tachycardia (785 0) (R00 0)   27  UTI (urinary tract infection) (599 0) (N39 0)   28  Vitamin B12 deficiency (266 2) (E53 8)   29  Vitamin D deficiency (268 9) (E55 9)   30  Weight loss, non-intentional (783 21) (R63 4)    Past Medical History    1  History of Bursitis of hip, unspecified laterality (726 5) (M70 70)   2  History of Colonoscopy (Fiberoptic)   3  History of Congestive heart failure (428 0) (I50 9)   4  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)   5  History of Malignant Oral Cavity Neoplasm M0   6  History of Open Wound Of The Toe(S) (893 0)   7  History of Prostate Cancer (V10 46)   8  History of Special screening examination for neoplasm of prostate (V76 44) (Z12 5)   9  History of Tachycardia (785 0) (R00 0)   10   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)   11  History of X-Ray UGI Small Bowel Obstruction    Surgical History    1  History of Appendectomy   2  History of CABG   3  History of Cath Stent Placement   4  History of Cholecystectomy   5  History of Incisional Hernia Repair   6  History of Partial Colectomy   7  History of Prostate Surgery  Surgical History Reviewed: The surgical history was reviewed and updated today  Family History  Mother    1  Family history of Congestive Heart Failure   2  Family history of Coronary Artery Disease (V17 49)  Father    3  Family history of Coronary Artery Disease (305 90)  Family History Reviewed: The family history was reviewed and updated today  Social History    · Denied: History of Alcohol Use (History)   · Denied: History of Drug Use   · Former smoker (V15 82) (Y95 181)   · Living Independently With Spouse   · Occupation:  Social History Reviewed: The social history was reviewed and updated today  Current Meds   1  Advair Diskus 250-50 MCG/DOSE Inhalation Aerosol Powder Breath Activated; USE ONE   INHALATION EVERY 12 HOURS; Therapy: 11JEJ5377 to (Last AO:32GCD2008)  Requested for: 71ETU9049 Ordered   2  Allopurinol 100 MG Oral Tablet; TAKE 1 TABLET DAILY; Therapy: 14Apr2015 to (Evaluate:16Dcg3604)  Requested for: 18Wtg1888; Last   Rx:86Rcl2316 Ordered   3  AmLODIPine Besylate 5 MG Oral Tablet; TAKE 1 TABLET DAILY IN THE MORNING; Therapy: 61RXC8767 to (Last Rx:14Mar2016)  Requested for: 66TFL2422 Ordered   4  Chlorthalidone 25 MG Oral Tablet; TAKE 1/2 TABLET DAILY; Therapy: 10CPP9299 to 51-30-20-57)  Requested for: 10HAG4553; Last   Rx:02Nov2015 Ordered   5  Colcrys 0 6 MG Oral Tablet; 1 po qd-tid prn; Therapy: 14Apr2015 to (Last Rx:14Apr2015) Ordered   6  Effervescent Pot Chloride 25 MEQ Oral Tablet Effervescent; Therapy: (Recorded:30Jan2015) to Recorded   7  Lipoflavonoid TABS; take 2 tabs qd Recorded   8  Losartan Potassium 100 MG Oral Tablet;  Take 1 tablet daily; Therapy: 46VQE6292 to (Last Rx:28Apr2015)  Requested for: 28Apr2015 Ordered   9  Metoprolol Succinate ER 25 MG Oral Tablet Extended Release 24 Hour; take 1 tablet by   mouth every day; Therapy: 92PLL9244 to (Evaluate:22Jun2016)  Requested for: 89Ukp3055; Last   Rx:57Onr4403 Ordered   10  Prochlorperazine Maleate 10 MG Oral Tablet; TAKE 1 TABLET 3 times daily PRN    NAUSEA; Therapy: 01TRJ9520 to (Last Rx:60Wwn4344)  Requested for: 13CUO3924 Ordered   11  Spiriva HandiHaler 18 MCG Inhalation Capsule; INHALE THE CONTENTS OF 1    CAPSULE DAILY; Therapy: 36VGL0921 to (Last Rx:02Nov2015)  Requested for: 77VXD6892 Ordered   12  TraMADol HCl - 50 MG Oral Tablet; TAKE ONE TABLET BY MOUTH FOUR TIMES DAILY    AS NEEDED FOR PAIN; Last Rx:72Sfa9778 Ordered   13  Vitamin D3 2000 UNIT Oral Tablet Chewable; 1 PO QD; Therapy: 75DMB2157 to (Last Rx:16Uxg1380) Ordered  Medication List Reviewed: The medication list was reviewed and updated today  Allergies    1  No Known Drug Allergies    Physical Exam    Constitutional   General appearance: Abnormal   obese  Eyes   Conjunctiva and lids: No swelling, erythema, or discharge  Ears, Nose, Mouth, and Throat   External inspection of ears and nose: Normal     Oropharynx: Normal with no erythema, edema, exudate or lesions  Pulmonary   Respiratory effort: No increased work of breathing or signs of respiratory distress  Auscultation of lungs: Clear to auscultation, equal breath sounds bilaterally, no wheezes, no rales, no rhonci  Cardiovascular   Auscultation of heart: Normal rate and rhythm, normal S1 and S2, without murmurs  Examination of extremities for edema and/or varicosities: Normal     Carotid pulses: Normal     Abdomen   Abdomen: Non-tender, no masses  Liver and spleen: No hepatomegaly or splenomegaly  Lymphatic   Palpation of lymph nodes in neck: No lymphadenopathy      Musculoskeletal   Gait and station: Normal     Skin   Skin and subcutaneous tissue: Normal without rashes or lesions  Neurologic   Cranial nerves: Cranial nerves 2-12 intact  Psychiatric   Orientation to person, place and time: Normal     Mood and affect: Normal          Results/Data  Results   (1) BASIC METABOLIC PROFILE 56BTU0349 10:35AM EPIC, Provider   Test ordered by: Bernie Libman Name Result Flag Reference   GLUCOSE,RANDM 99 mg/dL     If the patient is fasting, the ADA then defines impaired fasting glucose as > 100 mg/dL and diabetes as > or equal to 123 mg/dL  SODIUM 141 mmol/L  136-145   POTASSIUM 3 8 mmol/L  3 5-5 3   CHLORIDE 104 mmol/L  100-108   CARBON DIOXIDE 30 mmol/L  21-32   ANION GAP (CALC) 7 mmol/L  4-13   BLOOD UREA NITROGEN 14 mg/dL  5-25   CREATININE 1 36 mg/dL H 0 60-1 30   Standardized to IDMS reference method   CALCIUM 9 0 mg/dL  8 3-10 1   eGFR Non-African American      >60 0 ml/min/1 73sq m   [facility] Disease Education Program recommendations are as follows:  GFR calculation is accurate only with a steady state creatinine  Chronic Kidney disease less than 60 ml/min/1 73 sq  meters  Kidney failure less than 15 ml/min/1 73 sq  meters  Health Management  DMII (diabetes mellitus, type 2)   *VB-Foot Exam; every 1 year; Last 82PON3360; Next Due: 78PTR6527; Overdue  Health Maintenance   Tdap (Adacel); every 10 years; Last 43CLD8661; Next Due: 41TDH2501; Active    Future Appointments    Date/Time Provider Specialty Site   08/26/2016 10:45 AM CRISTIANA Silva   Internal Medicine [facility]  2  CT     Signatures   Electronically signed by : CRISTIANA Blakely ; May 19 2016 11:13AM EST                       (Author)

## 2018-01-13 VITALS
HEART RATE: 44 BPM | WEIGHT: 269.13 LBS | OXYGEN SATURATION: 90 % | HEIGHT: 73 IN | BODY MASS INDEX: 35.67 KG/M2 | SYSTOLIC BLOOD PRESSURE: 152 MMHG | DIASTOLIC BLOOD PRESSURE: 94 MMHG

## 2018-01-13 NOTE — RESULT NOTES
Verified Results  (1) BASIC METABOLIC PROFILE 75NTD1990 10:19AM Victor M Se Order Number: [id number]     Test Name Result Flag Reference   SODIUM 141 mmol/L  136-145   POTASSIUM 3 5 mmol/L  3 5-5 3   CHLORIDE 104 mmol/L  100-108   CARBON DIOXIDE 30 mmol/L  21-32   ANION GAP (CALC) 7 mmol/L  4-13   BLOOD UREA NITROGEN 13 mg/dL  5-25   CREATININE 1 32 mg/dL H 0 60-1 30   Standardized to IDMS reference method   CALCIUM 9 3 mg/dL  8 3-10 1   eGFR 61 ml/min/1 73sq m     National Kidney Disease Education Program recommendations are as follows:  GFR calculation is accurate only with a steady state creatinine  Chronic Kidney disease less than 60 ml/min/1 73 sq  meters  Kidney failure less than 15 ml/min/1 73 sq  meters  GLUCOSE FASTING 109 mg/dL H 65-99   Specimen collection should occur prior to Sulfasalazine administration due to the potential for falsely depressed results  Specimen collection should occur prior to Sulfapyridine administration due to the potential for falsely elevated results  (1) CBC/PLT/DIFF 43Egp4887 10:19AM Edda Bocanegra    Order Number: [id number]     Test Name Result Flag Reference   WBC COUNT 8 10 Thousand/uL  4 31-10 16   RBC COUNT 5 70 Million/uL H 3 88-5 62   HEMOGLOBIN 13 5 g/dL  12 0-17 0   HEMATOCRIT 43 5 %  36 5-49 3   MCV 76 fL L 82-98   MCH 23 7 pg L 26 8-34 3   MCHC 31 0 g/dL L 31 4-37 4   RDW 18 6 % H 11 6-15 1   MPV 10 6 fL  8 9-12 7   PLATELET COUNT 312 Thousands/uL  149-390   nRBC AUTOMATED 0 /100 WBCs     NEUTROPHILS RELATIVE PERCENT 74 %  43-75   LYMPHOCYTES RELATIVE PERCENT 18 %  14-44   MONOCYTES RELATIVE PERCENT 6 %  4-12   EOSINOPHILS RELATIVE PERCENT 1 %  0-6   BASOPHILS RELATIVE PERCENT 1 %  0-1   NEUTROPHILS ABSOLUTE COUNT 6 01 Thousands/? ??L  1 85-7 62   LYMPHOCYTES ABSOLUTE COUNT 1 47 Thousands/? ??L  0 60-4 47   MONOCYTES ABSOLUTE COUNT 0 47 Thousand/? ??L  0 17-1 22   EOSINOPHILS ABSOLUTE COUNT 0 08 Thousand/? ??L  0 00-0 61   BASOPHILS ABSOLUTE COUNT 0 04 Thousands/? ??L  0 00-0 10     (1) MICROALBUMIN CREATININE RATIO, RANDOM URINE 26Non4789 10:19AM [facility] [employer] [facility] Order Number: [id number]     Test Name Result Flag Reference   MICROALBUMIN/ CREAT R 9 mg/g creatinine  0-30   MICROALBUMIN,URINE 28 1 mg/L H 0 0-20 0   CREATININE URINE 309 0 mg/dL       (1) PHOSPHORUS 63Yhh6047 10:19AM [facility] Order Number: [id number]     Test Name Result Flag Reference   PHOSPHORUS 2 7 mg/dL  2 3-4 1     (1) PTH N-TERMINAL (INTACT) 67Xhq8069 10:19AM [facility] Order Number: [id number]     Test Name Result Flag Reference   PARATHYROID HORMONE INTACT 27 3 pg/mL  14 0-72 0     (1) URIC ACID 44Hab7887 10:19AM Sydney Curry Order Number: [id number]     Test Name Result Flag Reference   URIC ACID 6 0 mg/dL  4 2-8 0   Specimen collection should occur prior to Metamizole administration due to the potential for falsely depressed results       (1) URINALYSIS WITH MICROSCOPIC 28Apr1674 10:19AM [facility] Order Number: [id number]     Test Name Result Flag Reference   COLOR Yellow     CLARITY Clear     PH UA 5 5  4 5-8 0   LEUKOCYTE ESTERASE UA Negative  Negative   NITRITE UA Negative  Negative   PROTEIN UA Negative mg/dl  Negative   GLUCOSE UA Negative mg/dl  Negative   KETONES UA Negative mg/dl  Negative   UROBILINOGEN UA 0 2 E U /dl  0 2, 1 0 E U /dl   BILIRUBIN UA Negative  Negative   BLOOD UA Trace-Intact A Negative   SPECIFIC GRAVITY UA >=1 030  1 003-1 030   WBC UA 0-1 /hpf A None Seen, 0-5, 5-55, 5-65   RBC UA 1-2 /hpf A None Seen, 0-5   BACTERIA Moderate /hpf A None Seen, Occasional   CALCIUM OXALATE CRYSTALS /HPF IN URINE SEDIMENT BY MICROSCOPY Occasional /hpf A None Seen   EPITHELIAL CELLS Occasional /hpf  None Seen, Occasional   MUCOUS THREADS Moderate  Occasional, Moderate, Innumerable     (1) VITAMIN D 25-HYDROXY 86Dhe9077 10:19AM [facility] Order Number: [id number]     Test Name Result Flag Reference   VIT D 25-HYDROX 46 8 ng/mL  30 0-100 0   This assay is a certified procedure of the CDC Vitamin D Standardization Certification Program (VDSCP)     Deficiency <20ng/ml   Insufficiency 20-30ng/ml   Sufficient  ng/ml     *Patients undergoing fluorescein dye angiography may retain small amounts of fluorescein in the body for 48-72 hours post procedure  Samples containing fluorescein can produce falsely elevated Vitamin D values  If the patient had this procedure, a specimen should be resubmitted post fluorescein clearance  Plan  Chronic kidney disease, stage 3    · (1) BASIC METABOLIC PROFILE; Status:Active; Requested for:06Pxr2621;    · (1) BASIC METABOLIC PROFILE; Status:Complete;   Done: 30UHQ4999 10:19AM   · (1) CBC/PLT/DIFF; Status:Active; Requested for:51Nkh8062;    · (1) CBC/PLT/DIFF; Status:Complete;   Done: 83VBH1647 10:19AM   · (1) MICROALBUMIN CREATININE RATIO, RANDOM URINE; Status:Active; Requested  for:34Rte2414;    · (1) MICROALBUMIN CREATININE RATIO, RANDOM URINE; Status:Complete;   Done:  89BGU3842 10:19AM   · (1) PHOSPHORUS; Status:Active; Requested for:47Bmz0240;    · (1) PHOSPHORUS; Status:Complete;   Done: 81LFU0524 10:19AM   · (1) PTH N-TERMINAL (INTACT); Status:Active; Requested for:66Nya2925;    · (1) PTH N-TERMINAL (INTACT); Status:Complete;   Done: 36UBD7350 10:19AM   · (1) URIC ACID; Status:Active; Requested for:81Izx4247;    · (1) URIC ACID; Status:Complete;   Done: 06RSS9765 10:19AM   · (1) URINALYSIS WITH MICROSCOPIC; Status:Active; Requested for:31Kdu1493;    · (1) URINALYSIS WITH MICROSCOPIC; Status:Complete;   Done: 52XNA2971 10:19AM   · (1) VITAMIN D 25-HYDROXY; Status:Active;  Requested for:91Vdk6044;    · (1) VITAMIN D 25-HYDROXY; Status:Complete;   Done: 63UDR0155 10:19AM   · (1) VITAMIN D 25-HYDROXY; Status:Complete;   Done: 80AYA2787 10:19AM   · Follow-up visit in 3 months Evaluation and Treatment  Follow-up Status: Complete -  Scheduling  Done: 09GMH5265 12:22PM

## 2018-01-14 VITALS
BODY MASS INDEX: 35.06 KG/M2 | SYSTOLIC BLOOD PRESSURE: 132 MMHG | DIASTOLIC BLOOD PRESSURE: 88 MMHG | OXYGEN SATURATION: 87 % | HEART RATE: 87 BPM | WEIGHT: 264.5 LBS | HEIGHT: 73 IN | RESPIRATION RATE: 16 BRPM

## 2018-01-14 VITALS
SYSTOLIC BLOOD PRESSURE: 128 MMHG | DIASTOLIC BLOOD PRESSURE: 80 MMHG | BODY MASS INDEX: 34.59 KG/M2 | HEART RATE: 92 BPM | WEIGHT: 261 LBS | HEIGHT: 73 IN | RESPIRATION RATE: 18 BRPM

## 2018-01-14 VITALS
RESPIRATION RATE: 18 BRPM | WEIGHT: 256 LBS | TEMPERATURE: 98.4 F | HEART RATE: 56 BPM | SYSTOLIC BLOOD PRESSURE: 124 MMHG | BODY MASS INDEX: 33.93 KG/M2 | DIASTOLIC BLOOD PRESSURE: 68 MMHG | HEIGHT: 73 IN

## 2018-01-14 VITALS
SYSTOLIC BLOOD PRESSURE: 160 MMHG | BODY MASS INDEX: 34.77 KG/M2 | WEIGHT: 262.38 LBS | HEIGHT: 73 IN | TEMPERATURE: 98.2 F | HEART RATE: 88 BPM | DIASTOLIC BLOOD PRESSURE: 100 MMHG

## 2018-01-16 ENCOUNTER — GENERIC CONVERSION - ENCOUNTER (OUTPATIENT)
Dept: OTHER | Facility: OTHER | Age: 78
End: 2018-01-16

## 2018-01-16 NOTE — PROGRESS NOTES
Assessment   1  History of chronic kidney disease (V13 09) (Z87 448)  2  Chronic kidney disease, stage 3 (585 3) (N18 3)  3  Chronic obstructive pulmonary disease (496) (J44 9)  4  DMII (diabetes mellitus, type 2) (250 00) (E11 9)  5  Dyslipidemia (272 4) (E78 5)  6  Gout (274 9) (M10 9)  7  Hypertension (401 9) (I10)  8  Encounter for preventive health examination (V70 0) (Z00 00)    Plan  DMII (diabetes mellitus, type 2)    · (1) HEMOGLOBIN A1C; Status:Active; Requested for:11Nov2017;    · *VB - Eye Exam; Status:Canceled;    · *VB - Foot Exam; Status:Complete;   Done: 45QKN0333 11:05AM   · *VB - Foot Exam ; every 1 year; Last 81Ecb0388; Next 84Jhc8134; Status:Active  Dyslipidemia    · (1) LIPID PANEL, FASTING; Status:Active; Requested for:11Nov2017;   Gout    · (1) URIC ACID; Status:Active; Requested for:11Nov2017;   Hypertension    · (1) BASIC METABOLIC PROFILE; Status:Active; Requested for:11Nov2017;    · (1) CBC/PLT/DIFF; Status:Active; Requested for:11Nov2017;    · (1) HEPATIC FUNCTION PANEL; Status:Active; Requested for:11Nov2017;     Discussion/Summary    Depression screening reviewed and negative  Advance directives discussed-patient will update and bring copy to follow-up  Health maintenance up-to-date  Impression: Subsequent Annual Wellness Visit  Cardiovascular screening and counseling: screening is current  Diabetes screening and counseling: screening is current  Colorectal cancer screening and counseling: screening is current  Prostate cancer screening and counseling: screening is current  Osteoporosis screening and counseling: screening not indicated  Abdominal aortic aneurysm screening and counseling: screening not indicated  Glaucoma screening and counseling: screening is current  HIV screening and counseling: screening not indicated  Immunizations: influenza vaccine is up to date this year, the lifetime pneumococcal vaccine has been completed and Td vaccination up to date  Advance Directive Planning: complete and up to date  Patient Discussion: plan discussed with the patient  Chief Complaint  awv      History of Present Illness  Welcome to Medicare and Wellness Visits: The patient is being seen for the subsequent annual wellness visit  Medicare Screening and Risk Factors   Hospitalizations: no previous hospitalizations  Medicare Screening Tests Risk Questions   Mood Disorder and Cognitive Impairment Screening: PHQ-9 Depression Scale   Over the past 2 weeks, how often have you been bothered by the following problems? 1 ) Little interest or pleasure in doing things? Not at all    2 ) Feeling down, depressed or hopeless? Not at all    3 ) Trouble falling asleep or sleeping too much? Not at all    4 ) Feeling tired or having little energy? Not at all    5 ) Poor appetite or overeating? Not at all    6 ) Feeling bad about yourself, or that you are a failure, or have let yourself or your family down? Not at all    7 ) Trouble concentrating on things, such as reading a newspaper or watching television? Not at all    8 ) Moving or speaking so slowly that other people could have noticed, or the opposite, moving or speaking faster than usual? Not at all    9 ) Thoughts that you would be off dead or of hurting yourself in some way? Not at all  TOTAL SCORE: 0  Advance Directives: Advance directives: living will, durable power of  for health care directives and advance directives  end of life decisions were reviewed with the patient  Co-Managers and Medical Equipment/Suppliers: See Patient Care Team   Reviewed Updated [facility]:   Last Medicare Wellness Visit Information was reviewed, patient interviewed and updates made to the record today  Patient Care Team    Care Team Member Role Specialty Office Number   Sulema UPTON    Internal Medicine [phone]   Roseanne Aguilar, Chevy Hugo Se  Internal Medicine [phone]   Dav Sanchez MD Specialist Urology ([phone]) Ronnie TARANGO Specialist Ophthalmology [phone]     Active Problems   1  Adenocarcinoma of prostate (185) (C61)  2  Changes in skin texture (782 8) (R23 4)  3  Chronic obstructive pulmonary disease (496) (J44 9)  4  COPD with exacerbation (491 21) (J44 1)  5  Disc degeneration, 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) (M51 36)  6  DMII (diabetes mellitus, type 2) (250 00) (E11 9)  7  Dyslipidemia (272 4) (E78 5)  8  Esophageal reflux (530 81) (K21 9)  9  Fissure, anal (565 0) (K60 2)  10  Generalized osteoarthritis of multiple sites (715 09) (M15 9)  11  Gout (274 9) (M10 9)  12  Herniated lumbar intervertebral disc (722 10) (M51 26)  13  History of colon cancer (V10 05) (Z85 038)  14  History of prostate cancer (V10 46) (Z85 46)  15  Hypertension (401 9) (I10)  16  Iron deficiency anemia (280 9) (D50 9)  17  Knee pain, right (719 46) (M25 561)  18  Lumbago (724 2) (M54 5)  19  Lumbar canal stenosis (724 02) (M48 06)  20  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)  21  Lung nodule (793 11) (R91 1)  22  Nausea (787 02) (R11 0)  23  Need for influenza vaccination (V04 81) (Z23)  24  Need for pneumococcal vaccination (V03 82) (Z23)  25  Need for pneumococcal vaccine (V03 82) (Z23)  26  Obesity (278 00) (E66 9)  27  Pilonidal cyst (685 1) (L05 91)  28  Renal mass, right (593 9) (N28 89)  29  Requires oxygen therapy (V46 2) (Z99 81)  30  Screening for genitourinary condition (V81 6) (Z13 89)  31  SOB (shortness of breath) (786 05) (R06 02)  32  Tachycardia (785 0) (R00 0)  33  UTI (urinary tract infection) (599 0) (N39 0)  34  Vitamin B12 deficiency (266 2) (E53 8)  35  Vitamin D deficiency (268 9) (E55 9)  36  Weight loss, non-intentional (783 21) (R63 4)  37   Wound of skin (782 9) (R23 8)    Past Medical History    · History of Bursitis of hip, unspecified laterality   · History of Colonoscopy (Fiberoptic)   · History of Congestive heart failure (428 0) (I50 9)   · 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)   · History of Malignant Oral Cavity Neoplasm M0   · History of Open Wound Of The Toe(S) (893 0)   · History of Prostate Cancer (V10 46)   · History of Special screening examination for neoplasm of prostate (V76 44) (Z12 5)   · History of Tachycardia (785 0) (R00 0)   · 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)   · History of X-Ray UGI Small Bowel Obstruction    Surgical History    · History of Appendectomy   · History of CABG   · History of Cath Stent Placement   · History of Cholecystectomy   · History of Incisional Hernia Repair   · History of Partial Colectomy   · History of Prostate Surgery    Family History  Mother    · Family history of Congestive Heart Failure   · Family history of Coronary Artery Disease (V17 49)  Father    · Family history of Coronary Artery Disease (305 90)    Social History    · Denied: History of Alcohol Use (History)   · Denied: History of Drug Use   · Former smoker (V15 82) (C89 172)   · [address] [place] [address]   · Living Independently With Spouse   · Non smoker / tobacco user (V49 89) (Z78 9)   · Occupation:   · [email or web address]    Current Meds  1  Advair Diskus 500-50 MCG/DOSE Inhalation Aerosol Powder Breath Activated; INHALE   1 PUFF TWICE DAILY; Therapy: 11IOM2170 to (Last Rx:91Fjd9988)  Requested for: 71UJE1487; Status:   [facility] Ordered  2  Allopurinol 100 MG Oral Tablet; Take 1 tablet daily; Therapy: 83Cdu4648 to (Last Rx:65Mja2161)  Requested for: 24Apr2017 Ordered  3  AmLODIPine Besylate 5 MG Oral Tablet; TAKE 1 TABLET DAILY IN THE MORNING; Therapy: 27TFK4343 to (Last Rx:17Ktm1865)  Requested for: 24Oct2016 Ordered  4  Chlorthalidone 25 MG Oral Tablet; TAKE ONE-HALF (1/2) TABLET DAILY; Therapy: 63DXT1713 to (Last Rx:19Biv4571)  Requested for: 22GMM2462 Ordered  5  Colcrys 0 6 MG Oral Tablet; 1 po qd-tid prn; Therapy: 39Kqk7295 to (Last Rx:88Kqm4097) Ordered  6  Lipoflavonoid TABS; take 2 tabs qd; Therapy: (Aelxus Brito) to Recorded  7  Losartan Potassium 100 MG Oral Tablet; Take 1 tablet daily; Therapy: 69YRV7596 to (Last Rx:70Xdm4073)  Requested for: 69Fbd2703 Ordered  8  Metoprolol Succinate ER 25 MG Oral Tablet Extended Release 24 Hour; TAKE ONE   TABLET BY MOUTH ONCE DAILY; Therapy: 86EGS0875 to (Evaluate:17Oct2017)  Requested for: 14EUF9302; Last   Rx:16Qxz9989 Ordered  9  ProAir  (90 Base) MCG/ACT Inhalation Aerosol Solution; INHALE 2 PUFFS   EVERY 4 HOURS AS NEEDED; Therapy: 51HRX1052 to (Last Rx:27Uep5943)  Requested for: 32CLB2773 Ordered  10  Prochlorperazine Maleate 10 MG Oral Tablet; TAKE 1 TABLET 3 times daily PRN    NAUSEA; Therapy: 85XOI0634 to (Last Rx:13Rfc7223)  Requested for: 36PEL0475 Ordered  11  Spiriva HandiHaler 18 MCG Inhalation Capsule; INHALE THE CONTENTS OF 1    CAPSULE DAILY; Therapy: 27FVI0881 to (Last Rx:25Nov2016)  Requested for: 56YQI8279 Ordered  12  TraMADol HCl - 50 MG Oral Tablet; TAKE ONE TABLET BY MOUTH FOUR TIMES DAILY    AS NEEDED FOR PAIN; Last Rx:57Zsm8147 Ordered  13  Tylenol with Codeine #4 300-60 MG Oral Tablet; Therapy: 93XOZ9770 to Recorded  14  Vitamin D3 2000 UNIT Oral Tablet Chewable; 1 PO QD; Therapy: 49Tfc7188 to (Last Rx:39Htj7352) Ordered    Allergies   1  No Known Drug Allergies    Immunizations   1 2 3    Influenza  07-Oct-2014 01-Dec-2015 03-Sep-2016    PCV  25-Aug-2015 25-Aug-2015     PPSV  03-Jan-2017      Tdap  01-Jul-2011      Zoster  Never       Vitals  Signs    Heart Rate: 92  Respiration: 18  Systolic: 019  Diastolic: 80  Height: 6 ft 1 in  Weight: 261 lb   BMI Calculated: 34 44  BSA Calculated: 2 41    Results/Data  PHQ-9 Adult Depression Screening 56Meh3674 11:10AM User, Ahs     Test Name Result Flag Reference   PHQ-9 Adult Depression Score 0     Over the last two weeks, how often have you been bothered by any of the following problems?   Little interest or pleasure in doing things: Not at all - 0  Feeling down, depressed, or hopeless: Not at all - 0  Trouble falling or staying asleep, or sleeping too much: Not at all - 0  Feeling tired or having little energy: Not at all - 0  Poor appetite or over eating: Not at all - 0  Feeling bad about yourself - or that you are a failure or have let yourself or your family down: Not at all - 0  Trouble concentrating on things, such as reading the newspaper or watching television: Not at all - 0  Moving or speaking so slowly that other people could have noticed  Or the opposite -  being so fidgety or restless that you have been moving around a lot more than usual: Not at all - 0  Thoughts that you would be better off dead, or of hurting yourself in some way: Not at all - 0   PHQ-9 Adult Depression Screening Negative     PHQ-9 Difficulty Level Not difficult at all     PHQ-9 Severity No Depression       Falls Risk Assessment (Dx Z13 89 Screen for Neurologic Disorder) 29Aug2017 11:10AM User, Ahs     Test Name Result Flag Reference   Falls Risk      No falls in the past year     *VB - Foot Exam 29Aug2017 11:05AM [facility]     Test Name Result Flag Reference   FOOT EXAM 15UXN7520       *VB - Urinary Incontinence Screen (Dx Z13 89 Screen for UI) 29Aug2017 11:04AM [employer] [employer]     Test Name Result Flag Reference   Urinary Incontinence Assessment 10Ynn5453         Health Management  DMII (diabetes mellitus, type 2)   *VB - Foot Exam; every 1 year; Last 77Evs3878; Next Due: 37Xcm5648; Active  Health Maintenance   Tdap (Adacel); every 10 years; Last 26KAN0910; Next Due: 73ZXM2680; Active    Future Appointments    Date/Time Provider Specialty Site   09/12/2017 09:15 AM CRISTIANA Reardon   Nephrology [facility]     Signatures   Electronically signed by : CRISTIANA Vaca ; Aug 29 2017 11:47AM EST                       (Author)

## 2018-01-22 VITALS
TEMPERATURE: 98 F | WEIGHT: 252.38 LBS | DIASTOLIC BLOOD PRESSURE: 72 MMHG | BODY MASS INDEX: 33.3 KG/M2 | SYSTOLIC BLOOD PRESSURE: 124 MMHG | OXYGEN SATURATION: 91 % | HEART RATE: 109 BPM

## 2018-01-22 VITALS — HEART RATE: 88 BPM

## 2018-01-23 ENCOUNTER — ALLSCRIPTS OFFICE VISIT (OUTPATIENT)
Dept: OTHER | Facility: OTHER | Age: 78
End: 2018-01-23

## 2018-01-23 VITALS
RESPIRATION RATE: 18 BRPM | HEART RATE: 76 BPM | BODY MASS INDEX: 34.35 KG/M2 | DIASTOLIC BLOOD PRESSURE: 82 MMHG | WEIGHT: 260.38 LBS | SYSTOLIC BLOOD PRESSURE: 132 MMHG

## 2018-01-23 VITALS
SYSTOLIC BLOOD PRESSURE: 160 MMHG | HEIGHT: 73 IN | DIASTOLIC BLOOD PRESSURE: 100 MMHG | HEART RATE: 92 BPM | WEIGHT: 260 LBS | BODY MASS INDEX: 34.46 KG/M2 | TEMPERATURE: 98.5 F

## 2018-01-23 NOTE — MISCELLANEOUS
History of Present Illness  TCM Communication  Luke: The patient is being contacted for follow-up after hospitalization  Hospital records were reviewed  He was hospitalized at [place] [address]  The date of admission: 1/2/18, date of discharge: 1/5/18  Diagnosis: cough  He was discharged to home  Medications reviewed and updated today  Communication performed and completed by      Active Problems    1  Active advance directive (V49 89) (Z78 9)   2  Acute kidney injury superimposed on CKD (866 00,585 9) (N17 9,N18 9)   3  Adenocarcinoma of prostate (185) (C61)   4  Benign hypertension with chronic kidney disease (403 10,585 9) (I12 9)   5  Changes in skin texture (782 8) (R23 4)   6  Chronic kidney disease, stage 3 (585 3) (N18 3)   7  Chronic obstructive pulmonary disease (496) (J44 9)   8  COPD with exacerbation (491 21) (J44 1)   9  Disc degeneration, 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) (M51 36)   10  DMII (diabetes mellitus, type 2) (250 00) (E11 9)   11  Dyslipidemia (272 4) (E78 5)   12  Elevated white blood cell count (288 60) (D72 829)   13  Esophageal reflux (530 81) (K21 9)   14  Fissure, anal (565 0) (K60 2)   15  Generalized osteoarthritis of multiple sites (715 09) (M15 9)   16  Gout (274 9) (M10 9)   17  Herniated lumbar intervertebral disc (722 10) (M51 26)   18  History of colon cancer (V10 05) (Z85 038)   19  History of prostate cancer (V10 46) (Z85 46)   20  Hypertension (401 9) (I10)   21  Ileus (560 1) (K56 7)   22  Iron deficiency anemia (280 9) (D50 9)   23  Knee pain, right (719 46) (M25 561)   24  Lactic acidosis (276 2) (E87 2)   25  Living will on file (V49 89) (B62 442)   26  History of Living will on file (V49 89) (W42 855)   27  Lumbago (724 2) (M54 5)   28  Lumbar canal stenosis (724 02) (M48 061)   29  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)   30  Lung nodule (793 11) (R91 1)   31  Nausea (787 02) (R11 0)   32  Need for influenza vaccination (V04 81) (Z23)   33  Need for pneumococcal vaccination (V03 82) (Z23)   34   Need for pneumococcal vaccine (V03 82) (Z23)   35  Obesity (278 00) (E66 9)   36  Pilonidal cyst (685 1) (L05 91)   37  Renal mass, right (593 9) (N28 89)   38  Requires oxygen therapy (V46 2) (Z99 81)   39  Screening for genitourinary condition (V81 6) (Z13 89)   40  SOB (shortness of breath) (786 05) (R06 02)   41  Tachycardia (785 0) (R00 0)   42  UTI (urinary tract infection) (599 0) (N39 0)   43  Vitamin B12 deficiency (266 2) (E53 8)   44  Vitamin D deficiency (268 9) (E55 9)   45  Weight loss, non-intentional (783 21) (R63 4)   46  Wound of skin (782 9) (R23 8)    Past Medical History    1  History of Bursitis of hip, unspecified laterality   2  History of Colonoscopy (Fiberoptic)   3  History of Congestive heart failure (428 0) (I50 9)   4  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)   5  History of Malignant Oral Cavity Neoplasm M0   6  History of Open Wound Of The Toe(S) (893 0)   7  History of Prostate Cancer (V10 46)   8  History of Special screening examination for neoplasm of prostate (V76 44) (Z12 5)   9  History of Tachycardia (785 0) (R00 0)   10  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)   11  History of X-Ray UGI Small Bowel Obstruction    Surgical History    1  History of Appendectomy   2  History of Back Surgery   3  History of CABG   4  History of Cath Stent Placement   5  History of Cholecystectomy   6  History of Hip Surgery   7  History of Incisional Hernia Repair   8  History of Partial Colectomy   9  History of Prostate Surgery    Family History  Mother    1  Family history of Congestive Heart Failure   2  Family history of Coronary Artery Disease (V17 49)   3  Family history of diabetes mellitus (V18 0) (Z83 3)  Father    4  Family history of Coronary Artery Disease (305 90)  Sister    5   Family history of malignant neoplasm (V16 9) (Z80 9)    Social History    · Active advance directive (V49 89) (Z78 9)   · Denied: History of Alcohol Use (History)   · Denied: History of Drug Use   · Former smoker (Y46 17) (D58 674)   · Former tobacco use (V15 82) (Z87 891)   · Living Independently With Spouse   · Living will on file (O11 02) (Z87 898)   · History of Living will on file (V49 89) (K94 439)   · Non smoker / tobacco user (V49 89) (Z78 9)   · Occupation:    Current Meds   1  Acetaminophen-Codeine #3 300-30 MG Oral Tablet; TAKE 1 TABLET EVERY 4 TO 6   HOURS AS NEEDED FOR PAIN;   Therapy: (Recorded:41Tat9597) to Recorded   2  Advair Diskus 500-50 MCG/DOSE Inhalation Aerosol Powder Breath Activated; INHALE 1   PUFF TWICE DAILY; Therapy: 79NVN0450 to (Last Rx:31Oct2017)  Requested for: 31Oct2017 Ordered   3  Albuterol Sulfate (2 5 MG/3ML) 0 083% Inhalation Nebulization Solution; USE 1 UNIT   DOSE IN NEBULIZER EVERY 6 HOURS AS NEEDED; Therapy: 36IAV6106 to (Last Rx:08Jan2018)  Requested for: 57BJC8091 Ordered   4  Allopurinol 100 MG Oral Tablet; Take 1 tablet daily; Therapy: 61Fwh0996 to (Last Rx:24Apr2017)  Requested for: 24Apr2017 Ordered   5  AmLODIPine Besylate 5 MG Oral Tablet; TAKE 1 TABLET DAILY IN THE MORNING; Therapy: 52JYQ4426 to (Last Rx:29Oct2017)  Requested for: 29Oct2017 Ordered   6  Colcrys 0 6 MG Oral Tablet (Colchicine); 1 po qd-tid prn; Therapy: 92Qqn3562 to (Last Rx:14Apr2015) Ordered   7  Hydrocodone-Homatropine 5-1 5 MG/5ML Oral Syrup; TAKE 1 TEASPOONFUL 4 TIMES A   DAY AS NEEDED; Therapy: 19FHH0405 to (Last Rx:05Jan2018) Ordered   8  Lipoflavonoid TABS; take 2 tabs qd; Therapy: (Kathy Garcia) to Recorded   9  Losartan Potassium 100 MG Oral Tablet; Take 1 tablet daily; Therapy: 93KTK5985 to (Last Rx:29Oct2017)  Requested for: 29Oct2017 Ordered   10  Metoprolol Succinate ER 25 MG Oral Tablet Extended Release 24 Hour; TAKE ONE    TABLET BY MOUTH ONCE DAILY; Therapy: 00FUH9361 to (Evaluate:20Uki7560)  Requested for: 20Zas1598 Recorded   11  PredniSONE 10 MG Oral Tablet; 3 po qd x 3, 2 po qd x 3, 1 po qd x 3;     Therapy: 60OBK2476 to (Last OQ:57OOB7497)  Requested for: 12VGU0020; Status: ACTIVE    - [facility] Ordered   12  ProAir  (90 Base) MCG/ACT Inhalation Aerosol Solution; INHALE 2 PUFFS    EVERY 4 HOURS AS NEEDED; Therapy: 16ZYL2487 to (Last Rx:59Ymo3758)  Requested for: 13FJT4199 Ordered   13  Prochlorperazine Maleate 10 MG Oral Tablet; TAKE 1 TABLET 3 times daily PRN    NAUSEA; Therapy: 77GTG8928 to (Last Rx:33Klv4812)  Requested for: 79RXQ8317 Ordered   14  Promethazine-Codeine 6 25-10 MG/5ML Oral Syrup; TAKE ONE (1) OR TWO (2)    TEASPOONFULEVERY 6 HOURS AS NEEDED; Therapy: 74BWZ3553 to (Last Rx:05Jan2018) Ordered   15  Spiriva HandiHaler 18 MCG Inhalation Capsule; INHALE THE CONTENTS OF 1    CAPSULE DAILY; Therapy: 87BAJ4393 to (Last Rx:29Oct2017)  Requested for: 29Oct2017 Ordered   16  Vitamin D3 2000 UNIT Oral Tablet Chewable; 1 PO QD; Therapy: 51Pea8067 to (Last Rx:26Qxx0262) Ordered    Allergies    1  No Known Drug Allergies    2  No Known Environmental Allergies   3  No Known Food Allergies    Health Management  DMII (diabetes mellitus, type 2)   *VB - Foot Exam; every 1 year; Last 38Izt6634; Next Due: 15Ykd1868; Active  Health Maintenance   Tdap (Adacel); every 10 years; Last 91PZZ4524; Next Due: 87GTT2972; Active    Future Appointments    Date/Time Provider Specialty Site   06/12/2018 03:00 PM Micho[place]ano Mays, [facility] Internal Medicine [facility] AND [facility]   06/12/2018 08:30 AM CRISTIANA Calvin   Nephrology [address]     Signatures   Electronically signed by : CRISTIANA Lazo ; Jan 8 2018  3:52PM EST

## 2018-01-24 ENCOUNTER — TRANSCRIBE ORDERS (OUTPATIENT)
Dept: ADMINISTRATIVE | Facility: HOSPITAL | Age: 78
End: 2018-01-24

## 2018-01-24 VITALS
HEIGHT: 73 IN | SYSTOLIC BLOOD PRESSURE: 124 MMHG | OXYGEN SATURATION: 93 % | WEIGHT: 256 LBS | DIASTOLIC BLOOD PRESSURE: 76 MMHG | BODY MASS INDEX: 33.93 KG/M2 | HEART RATE: 110 BPM | TEMPERATURE: 98.6 F

## 2018-01-24 DIAGNOSIS — J43.2 CENTRIACINAR EMPHYSEMA (HCC): Primary | ICD-10-CM

## 2018-01-24 NOTE — PROGRESS NOTES
Assessment   1  Centrilobular emphysema (492 8) (J43 2)   2  Chronic hypoxemic respiratory failure (518 83,799 02) (J96 11)   3  Lung nodule (793 11) (R91 1)    Plan   Centrilobular emphysema    · Complete PFT with DLCO; Status:Hold For - Scheduling; Requested JZT:99PSC2461; Perform:[facility]; PEB:01CAE4727;XXLFCSC; For:Centrilobular emphysema; Ordered By:Marie Hadley;   · Home Oxygen Portability Evaluation Only; Status:Complete;   Done: 36IPY6596   Perform:Not Applicable; HUP:32BEV9749; Ordered; For:Centrilobular emphysema; Ordered By:Marie Hadley;  1-5L O2: : To include pulse oximetry at rest and with activities of daily living  Maintain oxygen saturation levels at or above 90% while on an oxygen conserving setting        range of 1-5   : Fair  of Need (# of months)(99=Lifetime): : 99   · Oxygen Delivery Equip Concentrator; Status:Complete;   Done: 29YZR5776   Perform:Not Applicable; Order Comments:Portable oxygen concentrator; QST:48YOG1002; Ordered; For:Centrilobular emphysema; Ordered By:Marie Hadley;   · Six Minute Walk Test - POC; Status:Complete;   Done: 44EHI4526   Perform: In Office; 311 606 542; Ordered; For:Centrilobular emphysema; Ordered By:Marie Hadley;   · SPIROMETRY W/O BRONCHO- POC; Status:Active - Perform Order; Requested    UUV:24GBD5258; Perform: In Office; 311 606 542; Ordered; Today; For:Centrilobular emphysema; Ordered By:Marie Hadley; Discussion/Summary   Discussion Summary:    44-year-old male former smoker who quit in early 2000s with about a 45 pack-year smoking history with significant past medical history of COPD, hypertension, prostate cancer who presents for follow-up after hospitalization from 1/2/2018 to 1/5/2018 for COPD exacerbation treated with IV steroids and azithromycin  COPD with FEV1 of 43% - completed spirometry in office with mixed restriction and obstructive pattern  FEV1 of 1 5 L or 43%, FVC of 2 36 L or 49%   FEV1/FVC ratio of 0 63  Due to variability of spirometry efforts, and no baseline PFTs with lung volumes or DLCO, would recommend proceeding with complete PFTs  Patient and wife are in agreement  Completed 6 minutes walk test with patient  On room air at rest after exerting himself, patient was 87%  On room air with exertion, patient desaturated to 83% within a few minutes  Oxygen was titrated up to 2 5 L and patient maintained SpO2 greater than 93%  Strongly encouraged patient to use oxygen at 2 5 L via nasal cannula at all times with exertion and nocturnally  Patient understands  Will attempt to get patient portable oxygen concentrator and will fax [facility]  Recommend continuing Spiriva once daily and Advair twice daily  Discussed using Spiriva in the afternoon  Continue nebulizer treatments in the a m  and p m  Recommend following up in 4 weeks or sooner if needed  Suspect patient will do much better using the oxygen continuously  Lung nodule -patient needs repeat CT of chest in August of 2018 to demonstrate stability since August of 2016, 2 year period  Active Problems   1  Active advance directive (V49 89) (Z78 9)   2  Acute kidney injury superimposed on CKD (866 00,585 9) (N17 9,N18 9)   3  Adenocarcinoma of prostate (185) (C61)   4  Benign hypertension with chronic kidney disease (403 10,585 9) (I12 9)   5  Centrilobular emphysema (492 8) (J43 2)   6  Changes in skin texture (782 8) (R23 4)   7  Chronic kidney disease, stage 3 (585 3) (N18 3)   8  COPD with exacerbation (491 21) (J44 1)   9  Disc degeneration, 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) (M51 36)   10  DMII (diabetes mellitus, type 2) (250 00) (E11 9)   11  Dyslipidemia (272 4) (E78 5)   12  Elevated white blood cell count (288 60) (D72 829)   13  Esophageal reflux (530 81) (K21 9)   14  Fissure, anal (565 0) (K60 2)   15  Generalized osteoarthritis of multiple sites (715 09) (M15 9)   16  Gout (274 9) (M10 9)   17   Herniated lumbar intervertebral disc (722 10) (M51 26)   18  History of colon cancer (V10 05) (Z85 038)   19  History of prostate cancer (V10 46) (Z85 46)   20  Hypertension (401 9) (I10)   21  Ileus (560 1) (K56 7)   22  Iron deficiency anemia (280 9) (D50 9)   23  Knee pain, right (719 46) (M25 561)   24  Lactic acidosis (276 2) (E87 2)   25  Living will on file (V49 89) (H75 419)   26  History of Living will on file (V49 89) (L32 524)   27  Lumbago (724 2) (M54 5)   28  Lumbar canal stenosis (724 02) (M48 061)   29  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)   30  Lung nodule (793 11) (R91 1)   31  Nausea (787 02) (R11 0)   32  Need for influenza vaccination (V04 81) (Z23)   33  Need for pneumococcal vaccination (V03 82) (Z23)   34  Need for pneumococcal vaccine (V03 82) (Z23)   35  Obesity (278 00) (E66 9)   36  Pilonidal cyst (685 1) (L05 91)   37  Renal mass, right (593 9) (N28 89)   38  Requires oxygen therapy (V46 2) (Z99 81)   39  Screening for genitourinary condition (V81 6) (Z13 89)   40  SOB (shortness of breath) (786 05) (R06 02)   41  Tachycardia (785 0) (R00 0)   42  UTI (urinary tract infection) (599 0) (N39 0)   43  Vitamin B12 deficiency (266 2) (E53 8)   44  Vitamin D deficiency (268 9) (E55 9)   45  Weight loss, non-intentional (783 21) (R63 4)   46  Wound of skin (782 9) (R23 8)    Chief Complaint   Chief Complaint Chronic Condition [facility]: The patient is here for an emergency room follow-up  History of Present Illness   HPI: 79-year-old male former smoker who quit in early 2000s with about a 45 pack-year smoking history with significant past medical history of COPD, hypertension, prostate cancer who presents for follow-up after hospitalization from 1/2/2018 to 1/5/2018 for COPD exacerbation treated with IV steroids and azithromycin  Overall, patient is feeling better since discharge  However, he continues to have a cough and shortness of Breath  Patient states he has shortness of breath when walking up stairs and walking longer distances   He continues to use his nebulizer machine in the a m  and p m  He has not felt the need to use his rescue inhaler  He uses his Spiriva once in the a m  and Advair twice daily  He rinses out his mouth after using the Advair  Patient does feel that he has the inspiratory capacity to use these inhalers correctly  Patient has not been using his oxygen regularly  He only uses it nocturnally  Review of Systems   Complete-Male Pulm:      Constitutional: feeling tired, but-- no fever,-- not feeling poorly-- and-- no chills  Eyes: no complaints of vision problems  ENT: no sore throat-- and-- no hoarseness  Cardiovascular: no chest pain,-- no palpitations-- and-- no extremity edema  Respiratory: shortness of breath,-- cough-- and-- shortness of breath during exertion  Gastrointestinal: no abdominal pain-- and-- no nausea  Musculoskeletal: no limb pain-- and-- no limb swelling  Neurological: no difficulty walking  Psychiatric: no emotional problems  Endocrine: no feelings of weakness  Hematologic/Lymphatic: no swollen glands-- and-- no swollen glands in the neck  ROS Reviewed:    ROS reviewed  Past Medical History   1  History of Bursitis of hip, unspecified laterality   2  History of Colonoscopy (Fiberoptic)   3  History of Congestive heart failure (428 0) (I50 9)   4  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)   5  History of Malignant Oral Cavity Neoplasm M0   6  History of Open Wound Of The Toe(S) (893 0)   7  History of Prostate Cancer (V10 46)   8  History of Special screening examination for neoplasm of prostate (V76 44) (Z12 5)   9  History of Tachycardia (785 0) (R00 0)   10  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)   11  History of X-Ray UGI Small Bowel Obstruction    Surgical History   1  History of Appendectomy   2  History of Back Surgery   3  History of CABG   4  History of Cath Stent Placement   5   History of Cholecystectomy 6  History of Hip Surgery   7  History of Incisional Hernia Repair   8  History of Partial Colectomy   9  History of Prostate Surgery  Surgical History Reviewed: The surgical history was reviewed and updated today  Family History   Mother    1  Family history of Congestive Heart Failure   2  Family history of Coronary Artery Disease (V17 49)   3  Family history of diabetes mellitus (V18 0) (Z83 3)  Father    4  Family history of Coronary Artery Disease (305 90)  Sister    5  Family history of malignant neoplasm (V16 9) (Z80 9)  Family History Reviewed: The family history was reviewed and updated today  Social History    · Active advance directive (V49 89) (Z78 9)   · Denied: History of Alcohol Use (History)   · Denied: History of Drug Use   · Former smoker (B85 64) (E89 631)   · QUIT 1987   · Former tobacco use (V15 82) (Z87 891)   · Living Independently With Spouse   · Living will on file (V49 89) (Z87 898)   · History of Living will on file (V49 89) (M52 196)   · Non smoker / tobacco user (V49 89) (Z78 9)   · Occupation:   · COOK  Social History Reviewed: The social history was reviewed and updated today  Current Meds    1  Acetaminophen-Codeine #3 300-30 MG Oral Tablet; TAKE 1 TABLET EVERY 4 TO 6     HOURS AS NEEDED FOR PAIN;     Therapy: (Recorded:77Bzl7753) to Recorded   2  Advair Diskus 500-50 MCG/DOSE Inhalation Aerosol Powder Breath Activated; INHALE 1     PUFF TWICE DAILY; Therapy: 87OFE0077 to (Last Rx:31Oct2017)  Requested for: 56Xsz6953 Ordered   3  Albuterol Sulfate (2 5 MG/3ML) 0 083% Inhalation Nebulization Solution; USE 1 UNIT     DOSE IN NEBULIZER EVERY 6 HOURS AS NEEDED; Therapy: 01FOW4803 to (Last Rx:08Jan2018)  Requested for: 11JYK5186 Ordered   4  Allopurinol 100 MG Oral Tablet; Take 1 tablet daily; Therapy: 99Vav6401 to (Last Rx:08Jan2018)  Requested for: 27QSA6816 Ordered   5   AmLODIPine Besylate 5 MG Oral Tablet; TAKE 1 TABLET DAILY IN THE MORNING; Therapy: 49ILK1493 to (Last Rx:29Oct2017)  Requested for: 29Oct2017 Ordered   6  Colcrys 0 6 MG Oral Tablet; 1 po qd-tid prn; Therapy: 22Wwp8950 to (Last Rx:14Apr2015) Ordered   7  Guaifenesin DM SYRP;     Therapy: (Recorded:16Jan2018) to Recorded   8  Hydrocodone-Homatropine 5-1 5 MG/5ML Oral Syrup; TAKE 1 TEASPOONFUL 4 TIMES A     DAY AS NEEDED; Therapy: 20DBN1984 to (Last Rx:05Jan2018) Ordered   9  Lipoflavonoid TABS; take 2 tabs qd; Therapy: (Kourtney Fitch) to Recorded   10  Losartan Potassium 100 MG Oral Tablet; Take 1 tablet daily; Therapy: 43WHL4102 to (Last Rx:29Oct2017)  Requested for: 29Oct2017 Ordered   11  Metoprolol Succinate ER 25 MG Oral Tablet Extended Release 24 Hour; TAKE ONE      TABLET BY MOUTH ONCE DAILY; Therapy: 43HIJ1741 to (Evaluate:86Bxo8191)  Requested for: 66Dcv8271 Recorded   12  Mucinex DM TB12;      Therapy: (Recorded:16Jan2018) to Recorded   13  ProAir  (90 Base) MCG/ACT Inhalation Aerosol Solution; INHALE 2 PUFFS      EVERY 4 HOURS AS NEEDED; Therapy: 09RYR6180 to (Last Rx:07Rlq8912)  Requested for: 70NOP5664 Ordered   14  Prochlorperazine Maleate 10 MG Oral Tablet; TAKE 1 TABLET 3 times daily PRN      NAUSEA; Therapy: 08XUS8395 to (Last Rx:97Fxq6362)  Requested for: 50KCO4107 Ordered   15  Promethazine-Codeine 6 25-10 MG/5ML Oral Syrup; TAKE ONE (1) OR TWO (2)      TEASPOONFULEVERY 6 HOURS AS NEEDED; Therapy: 48FQI1867 to (Last Rx:05Jan2018) Ordered   16  Spiriva HandiHaler 18 MCG Inhalation Capsule; INHALE THE CONTENTS OF 1      CAPSULE DAILY; Therapy: 23SEE5268 to (Last Rx:29Oct2017)  Requested for: 29Oct2017 Ordered   17  Vitamin D3 2000 UNIT Oral Tablet Chewable; 1 PO QD; Therapy: 29FEF1447 to (Last Rx:98Ykz4048) Ordered  Medication List Reviewed: The medication list was reviewed and updated today  Allergies   1  No Known Drug Allergies  2  No Known Environmental Allergies   3   No Known Food Allergies    Vitals Vital Signs    Recorded: 41PYY9100 02:13PM   Heart Rate 99   Systolic 995   Diastolic 74   Height 6 ft 1 2 in   Weight 258 lb    BMI Calculated 33 85   BSA Calculated 2 4   O2 Saturation 87     Physical Exam        Constitutional      General appearance: Abnormal   well developed-- and-- obese  Ears, Nose, Mouth, and Throat      External inspection of ears and nose: Normal        Lips, teeth, and gums: Normal, good dentition  Neck      Neck: Supple, symmetric, trachea midline, no masses  Pulmonary      Chest: Normal        Respiratory effort: No increased work of breathing or signs of respiratory distress  Auscultation of lungs: Abnormal  -- Diminished breath sounds bilaterally with no wheezes or rhonchi  Cardiovascular      Auscultation of heart: Normal rate and rhythm, normal S1 and S2, no murmurs  Examination of extremities for edema and/or varicosities: Normal        Abdomen      Abdomen: Abnormal  -- Rounded  Lymphatic      Palpation of lymph nodes in neck: No lymphadenopathy  Musculoskeletal      Gait and station: Normal        Digits and nails: Normal without clubbing or cyanosis  Neurologic      Mental Status: Normal  Not confused, no evidence of dementia, good comprehension, good concentration  Skin      Skin and subcutaneous tissue: Limited exam shows no rash  Psychiatric      Orientation to person, place and time: Normal        Mood and affect: Normal        Health Management   DMII (diabetes mellitus, type 2)   *VB - Foot Exam; every 1 year; Last 69Mvf0248; Next Due: 56Spy0018; Active  Health Maintenance   Tdap (Adacel); every 10 years; Last 30MPQ2696; Next Due: 40CLL2662; Active    Future Appointments      Date/Time Provider Specialty Site   06/12/2018 03:00 PM Eva Rivero Internal Medicine [facility] AND [facility]   06/12/2018 08:30 AM CRISTIANA Billy   Nephrology  [address] Electronically signed by : Leopoldo [facility], [address]; Jan 23 2018  3:37PM EST                       (Author)     Electronically signed by : CRISTIANA Martinez ; Jan 23 2018  4:38PM EST                       (Author)

## 2018-01-31 ENCOUNTER — HOSPITAL ENCOUNTER (OUTPATIENT)
Dept: PULMONOLOGY | Facility: HOSPITAL | Age: 78
Discharge: HOME | End: 2018-01-31
Payer: MEDICARE

## 2018-01-31 DIAGNOSIS — J43.2 CENTRIACINAR EMPHYSEMA (HCC): ICD-10-CM

## 2018-01-31 PROCEDURE — 94760 N-INVAS EAR/PLS OXIMETRY 1: CPT

## 2018-01-31 PROCEDURE — 94060 EVALUATION OF WHEEZING: CPT | Performed by: INTERNAL MEDICINE

## 2018-01-31 PROCEDURE — 94060 EVALUATION OF WHEEZING: CPT

## 2018-01-31 PROCEDURE — 94729 DIFFUSING CAPACITY: CPT | Performed by: INTERNAL MEDICINE

## 2018-01-31 PROCEDURE — 94726 PLETHYSMOGRAPHY LUNG VOLUMES: CPT | Performed by: INTERNAL MEDICINE

## 2018-01-31 PROCEDURE — 94729 DIFFUSING CAPACITY: CPT

## 2018-01-31 PROCEDURE — 94727 GAS DIL/WSHOT DETER LNG VOL: CPT

## 2018-01-31 RX ORDER — ALBUTEROL SULFATE 2.5 MG/3ML
2.5 SOLUTION RESPIRATORY (INHALATION) ONCE AS NEEDED
Status: COMPLETED | OUTPATIENT
Start: 2018-01-31 | End: 2018-01-31

## 2018-01-31 RX ADMIN — ALBUTEROL SULFATE 2.5 MG: 2.5 SOLUTION RESPIRATORY (INHALATION) at 07:44

## 2018-02-13 ENCOUNTER — OFFICE VISIT (OUTPATIENT)
Dept: PULMONOLOGY | Facility: CLINIC | Age: 78
End: 2018-02-13
Payer: MEDICARE

## 2018-02-13 VITALS
OXYGEN SATURATION: 92 % | HEIGHT: 73 IN | BODY MASS INDEX: 33.13 KG/M2 | SYSTOLIC BLOOD PRESSURE: 142 MMHG | HEART RATE: 94 BPM | DIASTOLIC BLOOD PRESSURE: 88 MMHG | WEIGHT: 250 LBS

## 2018-02-13 DIAGNOSIS — J96.11 CHRONIC RESPIRATORY FAILURE WITH HYPOXIA, ON HOME O2 THERAPY (HCC): ICD-10-CM

## 2018-02-13 DIAGNOSIS — J43.2 CENTRILOBULAR EMPHYSEMA (HCC): Primary | ICD-10-CM

## 2018-02-13 DIAGNOSIS — R06.00 DYSPNEA ON EXERTION: ICD-10-CM

## 2018-02-13 DIAGNOSIS — Z99.81 CHRONIC RESPIRATORY FAILURE WITH HYPOXIA, ON HOME O2 THERAPY (HCC): ICD-10-CM

## 2018-02-13 PROCEDURE — 99214 OFFICE O/P EST MOD 30 MIN: CPT | Performed by: PHYSICIAN ASSISTANT

## 2018-02-13 RX ORDER — ALBUTEROL SULFATE 2.5 MG/3ML
1 SOLUTION RESPIRATORY (INHALATION) EVERY 6 HOURS PRN
COMMUNITY
Start: 2018-01-05 | End: 2020-10-13

## 2018-02-13 NOTE — PROGRESS NOTES
Assessment:    1  Centrilobular emphysema ([facility])     2  Dyspnea on exertion  Echo complete with contrast if indicated   3  Chronic respiratory failure with hypoxia, on home O2 therapy ([facility])       CT of chest performed on 1/2/18 without contrast revealed stable 5mm RML nodule and pleural plaques  Pulmonary function tests: FEV1: 1 38L  (44 % predicted), FEV1/FVC:   68%  Severe obstructive ventilatory limitation with no response to bronchodilator  Increased RV  The DLCO is severely decreased  Plan:     Severe COPD - Patient stable with his breathing  Using the nebs BID and oxygen has helped him  Continue oxygen, explained importance of always using the oxygen with exertion and at night  Will continue with the Advair and spiriva, albuterol nebs 4 times per day as needed  Will order echo to evaluate for pulmonary hypertension given severely decreased DLCO to see if that may be contributing to his hypoxia along with the COPD  Follow up in 3 months or sooner if necessary  Subjective:     Patient ID: Virginia Ibarra is a 78 y o  male  Chief Complaint:  Patient is a 53-year-old male former smoker, quit in early 2000s with about a 39 pack-year smoking history with PMH of COPD, hypertension, prostate cancer, chronic resp failure on home O2  He was hospitalized in January for COPD exacerbation  He is here today for follow up  He has been doing well overall  He does have significant ROBLES which has been stable  He does notice when he uses the oxygen he feels better with exertion  He has been using the nebulizer twice per day which has also helped with his breathing  Review of Systems   Constitutional: Negative  HENT: Negative  Respiratory: Positive for shortness of breath  Negative for cough  Cardiovascular: Negative  Gastrointestinal: Negative  Endocrine: Negative  Genitourinary: Negative  Musculoskeletal: Negative  Neurological: Negative  Psychiatric/Behavioral: Negative  Objective:    Physical Exam   Constitutional: He is oriented to person, place, and time  He appears well-developed and well-nourished  No distress  HENT:   Mouth/Throat: Oropharynx is clear and moist    Neck: Normal range of motion  Neck supple  Cardiovascular: Normal rate and regular rhythm  Pulmonary/Chest: Effort normal  He has decreased breath sounds  He has no wheezes  He has no rhonchi  He has no rales  Musculoskeletal: He exhibits no edema  Neurological: He is alert and oriented to person, place, and time  Skin: Skin is warm and dry  Psychiatric: He has a normal mood and affect   His behavior is normal  Judgment and thought content normal

## 2018-03-27 ENCOUNTER — HOSPITAL ENCOUNTER (OUTPATIENT)
Dept: NON INVASIVE DIAGNOSTICS | Facility: HOSPITAL | Age: 78
Discharge: HOME | End: 2018-03-27
Payer: MEDICARE

## 2018-03-27 DIAGNOSIS — R06.00 DYSPNEA ON EXERTION: ICD-10-CM

## 2018-03-27 PROCEDURE — 93306 TTE W/DOPPLER COMPLETE: CPT

## 2018-03-27 PROCEDURE — 93306 TTE W/DOPPLER COMPLETE: CPT | Performed by: INTERNAL MEDICINE

## 2018-04-02 DIAGNOSIS — J42 CHRONIC BRONCHITIS, UNSPECIFIED CHRONIC BRONCHITIS TYPE (HCC): Primary | ICD-10-CM

## 2018-04-17 ENCOUNTER — OFFICE VISIT (OUTPATIENT)
Dept: PULMONOLOGY | Facility: CLINIC | Age: 78
End: 2018-04-17
Payer: MEDICARE

## 2018-04-17 VITALS
HEART RATE: 106 BPM | BODY MASS INDEX: 35.78 KG/M2 | HEIGHT: 73 IN | SYSTOLIC BLOOD PRESSURE: 140 MMHG | OXYGEN SATURATION: 87 % | DIASTOLIC BLOOD PRESSURE: 90 MMHG | WEIGHT: 270 LBS

## 2018-04-17 DIAGNOSIS — J43.2 CENTRILOBULAR EMPHYSEMA (HCC): ICD-10-CM

## 2018-04-17 DIAGNOSIS — Z99.81 CHRONIC RESPIRATORY FAILURE WITH HYPOXIA, ON HOME O2 THERAPY (HCC): Primary | ICD-10-CM

## 2018-04-17 DIAGNOSIS — J96.11 CHRONIC RESPIRATORY FAILURE WITH HYPOXIA, ON HOME O2 THERAPY (HCC): Primary | ICD-10-CM

## 2018-04-17 PROCEDURE — 99214 OFFICE O/P EST MOD 30 MIN: CPT | Performed by: PHYSICIAN ASSISTANT

## 2018-04-17 PROCEDURE — 94618 PULMONARY STRESS TESTING: CPT | Performed by: PHYSICIAN ASSISTANT

## 2018-04-17 RX ORDER — ALBUTEROL SULFATE 90 UG/1
2 AEROSOL, METERED RESPIRATORY (INHALATION) EVERY 6 HOURS PRN
Qty: 18 G | Refills: 5 | Status: SHIPPED | OUTPATIENT
Start: 2018-04-17 | End: 2019-12-27

## 2018-04-17 NOTE — PROGRESS NOTES
Assessment:    1  Chronic respiratory failure with hypoxia, on home O2 therapy (HCC)  Portable Concentrators    POCT 6 minute walk   2  Centrilobular emphysema (HCC)  POCT 6 minute walk    albuterol (PROVENTIL HFA,VENTOLIN HFA) 90 mcg/act inhaler       Plan: 60-year-old male former smoker who quit in early 2000s with about a 45 pack-year smoking history with significant past medical history of COPD, hypertension, prostate cancer who presents for oxygen recertification  Of note, patient was hospitalized from 1/2/2018 to 1/5/2018 for COPD exacerbation treated with IV steroids and azithromycin  1   COPD with FEV1 of 44% requiring 2 5 L of oxygen continuously nocturnally and with exertion - PFTs with FEV1 of 44% consistent with severe obstruction and DLCO of 45%  Echo with no evidence of pulmonary hypertension  Some mild right ventricular dilation  Completed 6 minutes walk test in office for qualification for portable oxygen concentrator  Patient would benefit from this and would be more compliant with his O2  On room air at rest, patient saturated 87%  On 2 L at rest, SpO2 improved to >90%  Patient then exerted himself on 2 L of oxygen and dropped to 88% after 1 min while walking around the office (approximately 300 feet); oxygen was then titrated to 2 5 L and he maintained SpO2 > 90% throughout the remainder of the walk  He had to stop 2/2 back pain after walking about 1200 ft  Continue to use Spiriva and Advair inhalers on a daily basis  Continue to use nebulizer b i d  and as needed  Will send in Rx for rescue inhaler   2  Lung nodule -needs repeat CT of chest in August of 2018 to demonstrate stability since August of 2016, a 2 year period  F/u in 3 months or sooner if needed  Subjective:     Patient ID: Izabela Aguilar is a 78 y o  male      Chief Complaint: SOB    HPI  60-year-old male former smoker who quit in early 2000s with about a 45 pack-year smoking history with significant past medical history of COPD, hypertension, prostate cancer who presents for oxygen recertification  Of note, patient was hospitalized from 1/2/2018 to 1/5/2018 for COPD exacerbation treated with IV steroids and azithromycin  Patient states he continues to have shortness of breath with exertion  However, he lately has been feeling that his greater limiting factor is his back pain  Wife notices he feels better with oxygen  However, patient does not always wear it when he exerts himself because of inconvenience  Patient presents today for Re certification of oxygen to receive portable oxygen concentrator  He continues to use his inhalers and nebulized medications as instructed  Review of Systems  Review of Systems   Constitution: Negative for chills, decreased appetite, fever, malaise/fatigue and weight gain  HENT: Negative for congestion  Eyes: Negative for visual disturbance  Cardiovascular: Positive for dyspnea on exertion  Negative for leg swelling and orthopnea  Respiratory: Positive for cough and shortness of breath  Negative for sleep disturbances due to breathing and wheezing  Hematologic/Lymphatic: Negative for adenopathy  Skin: Negative for rash  Musculoskeletal: Negative for muscle weakness  Gastrointestinal: Negative for abdominal pain, diarrhea, nausea and vomiting  Neurological: Negative for excessive daytime sleepiness  Psychiatric/Behavioral: Negative for altered mental status and hallucinations  Allergic/Immunologic: Negative for environmental allergies  Objective:  /90   Pulse (!) 106   Ht 6' 1" (1 854 m)   Wt 122 kg (270 lb)   SpO2 (!) 87%   BMI 35 62 kg/m²     Physical Exam   Constitutional: He is oriented to person, place, and time  He appears well-developed and well-nourished  No distress  HENT:   Head: Normocephalic and atraumatic  Neck: Normal range of motion  Cardiovascular: Normal rate and regular rhythm      Pulmonary/Chest: Effort normal  Diminished breath sounds bilaterally with no wheezes or rhonchi   Abdominal: Soft  There is no tenderness  Musculoskeletal: Normal range of motion  He exhibits no edema or tenderness  Lymphadenopathy:     He has no cervical adenopathy  Neurological: He is alert and oriented to person, place, and time  Skin: Skin is warm and dry  He is not diaphoretic  Psychiatric: He has a normal mood and affect  His behavior is normal    Nursing note and vitals reviewed  Lab/Image Review: Reviewed all pertinent labs/radiology results       Past Medical History:   Diagnosis Date    Back pain     CHF (congestive heart failure) ([facility])     EF 30%    COPD (chronic obstructive pulmonary disease) ([facility])     History of malignant neoplasm of oral cavity     M0    Hypertension     Malignant neoplasm of colon (HCC)     descending    Prostate cancer (HCC)     Tachycardia     Thalassemia trait        Social History   Substance Use Topics    Smoking status: Former Smoker     Quit date: 6/2/2004    Smokeless tobacco: Never Used      Comment: non smoker/tobacco user; quit 1987 as per [employer]    Alcohol use No       Family History   Problem Relation Age of Onset    Heart failure Mother      as per [employer]    Coronary artery disease Mother      as per [employer]    Diabetes Mother      mellitus - as per [employer]    Coronary artery disease Father      as per [employer]    Cancer Sister      malignant neoplasm       Patient Active Problem List   Diagnosis    [facility] [address] [facility]    Centrilobular emphysema ([facility] [address] [facility]    History of hypertension    Acute exacerbation of chronic obstructive pulmonary disease (COPD) ([facility] [address] [facility]    Acute bronchitis    Acute renal failure (ARF) ([facility])    Hypertension    Post-tussive syncope    CKD (chronic kidney disease) stage 3, GFR 30-59 ml/min    Type 2 diabetes mellitus without complication ([facility])    Chronic respiratory failure with hypoxia, on home O2 therapy ([facility] [address] )

## 2018-05-14 DIAGNOSIS — I10 ESSENTIAL (PRIMARY) HYPERTENSION: ICD-10-CM

## 2018-05-14 DIAGNOSIS — E11.9 TYPE 2 DIABETES MELLITUS WITHOUT COMPLICATIONS (HCC): ICD-10-CM

## 2018-05-14 DIAGNOSIS — C61 MALIGNANT NEOPLASM OF PROSTATE (HCC): ICD-10-CM

## 2018-06-04 ENCOUNTER — TELEPHONE (OUTPATIENT)
Dept: PULMONOLOGY | Facility: CLINIC | Age: 78
End: 2018-06-04

## 2018-06-05 ENCOUNTER — APPOINTMENT (OUTPATIENT)
Dept: LAB | Facility: HOSPITAL | Age: 78
End: 2018-06-05
Payer: MEDICARE

## 2018-06-05 DIAGNOSIS — I10 ESSENTIAL (PRIMARY) HYPERTENSION: ICD-10-CM

## 2018-06-05 DIAGNOSIS — C61 MALIGNANT NEOPLASM OF PROSTATE (HCC): ICD-10-CM

## 2018-06-05 DIAGNOSIS — E11.9 TYPE 2 DIABETES MELLITUS WITHOUT COMPLICATIONS (HCC): ICD-10-CM

## 2018-06-05 LAB
ALBUMIN SERPL BCP-MCNC: 4 G/DL (ref 3.5–5)
ALP SERPL-CCNC: 106 U/L (ref 46–116)
ALT SERPL W P-5'-P-CCNC: 32 U/L (ref 12–78)
ANION GAP SERPL CALCULATED.3IONS-SCNC: 8 MMOL/L (ref 4–13)
AST SERPL W P-5'-P-CCNC: 46 U/L (ref 5–45)
BILIRUB SERPL-MCNC: 1 MG/DL (ref 0.2–1)
BUN SERPL-MCNC: 12 MG/DL (ref 5–25)
CALCIUM SERPL-MCNC: 9.5 MG/DL (ref 8.3–10.1)
CHLORIDE SERPL-SCNC: 106 MMOL/L (ref 100–108)
CHOLEST SERPL-MCNC: 123 MG/DL (ref 50–200)
CO2 SERPL-SCNC: 30 MMOL/L (ref 21–32)
CREAT SERPL-MCNC: 1.4 MG/DL (ref 0.6–1.3)
ERYTHROCYTE [DISTWIDTH] IN BLOOD BY AUTOMATED COUNT: 18.6 % (ref 11.6–15.1)
EST. AVERAGE GLUCOSE BLD GHB EST-MCNC: 134 MG/DL
GFR SERPL CREATININE-BSD FRML MDRD: 49 ML/MIN/1.73SQ M
GLUCOSE P FAST SERPL-MCNC: 120 MG/DL (ref 65–99)
HBA1C MFR BLD: 6.3 % (ref 4.2–6.3)
HCT VFR BLD AUTO: 47.9 % (ref 36.5–49.3)
HDLC SERPL-MCNC: 43 MG/DL (ref 40–60)
HGB BLD-MCNC: 14.5 G/DL (ref 12–17)
LDLC SERPL CALC-MCNC: 62 MG/DL (ref 0–100)
MCH RBC QN AUTO: 23.2 PG (ref 26.8–34.3)
MCHC RBC AUTO-ENTMCNC: 30.3 G/DL (ref 31.4–37.4)
MCV RBC AUTO: 77 FL (ref 82–98)
NONHDLC SERPL-MCNC: 80 MG/DL
PLATELET # BLD AUTO: 222 THOUSANDS/UL (ref 149–390)
PMV BLD AUTO: 10.2 FL (ref 8.9–12.7)
POTASSIUM SERPL-SCNC: 4.1 MMOL/L (ref 3.5–5.3)
PROT SERPL-MCNC: 8 G/DL (ref 6.4–8.2)
PSA SERPL-MCNC: <0.1 NG/ML (ref 0–4)
RBC # BLD AUTO: 6.24 MILLION/UL (ref 3.88–5.62)
SODIUM SERPL-SCNC: 144 MMOL/L (ref 136–145)
TRIGL SERPL-MCNC: 90 MG/DL
TSH SERPL DL<=0.05 MIU/L-ACNC: 1.79 UIU/ML (ref 0.36–3.74)
WBC # BLD AUTO: 9.44 THOUSAND/UL (ref 4.31–10.16)

## 2018-06-05 PROCEDURE — 80061 LIPID PANEL: CPT

## 2018-06-05 PROCEDURE — 36415 COLL VENOUS BLD VENIPUNCTURE: CPT

## 2018-06-05 PROCEDURE — 84443 ASSAY THYROID STIM HORMONE: CPT

## 2018-06-05 PROCEDURE — 83036 HEMOGLOBIN GLYCOSYLATED A1C: CPT

## 2018-06-05 PROCEDURE — 80053 COMPREHEN METABOLIC PANEL: CPT

## 2018-06-05 PROCEDURE — 84153 ASSAY OF PSA TOTAL: CPT

## 2018-06-05 PROCEDURE — 85027 COMPLETE CBC AUTOMATED: CPT

## 2018-06-08 DIAGNOSIS — I10 HYPERTENSION, UNSPECIFIED TYPE: Primary | ICD-10-CM

## 2018-06-08 RX ORDER — LOSARTAN POTASSIUM 100 MG/1
100 TABLET ORAL DAILY
Qty: 90 TABLET | Refills: 3 | Status: SHIPPED | OUTPATIENT
Start: 2018-06-08 | End: 2018-06-12 | Stop reason: SDUPTHER

## 2018-06-11 ENCOUNTER — TELEPHONE (OUTPATIENT)
Dept: INTERNAL MEDICINE CLINIC | Facility: CLINIC | Age: 78
End: 2018-06-11

## 2018-06-12 ENCOUNTER — OFFICE VISIT (OUTPATIENT)
Dept: INTERNAL MEDICINE CLINIC | Facility: CLINIC | Age: 78
End: 2018-06-12
Payer: MEDICARE

## 2018-06-12 ENCOUNTER — OFFICE VISIT (OUTPATIENT)
Dept: NEPHROLOGY | Facility: CLINIC | Age: 78
End: 2018-06-12
Payer: MEDICARE

## 2018-06-12 VITALS
HEART RATE: 90 BPM | SYSTOLIC BLOOD PRESSURE: 132 MMHG | HEIGHT: 73 IN | WEIGHT: 270.8 LBS | BODY MASS INDEX: 35.89 KG/M2 | DIASTOLIC BLOOD PRESSURE: 80 MMHG

## 2018-06-12 VITALS
SYSTOLIC BLOOD PRESSURE: 140 MMHG | TEMPERATURE: 97.1 F | DIASTOLIC BLOOD PRESSURE: 88 MMHG | BODY MASS INDEX: 35.78 KG/M2 | HEART RATE: 108 BPM | WEIGHT: 271.2 LBS

## 2018-06-12 DIAGNOSIS — I12.9 HYPERTENSIVE KIDNEY DISEASE WITH STAGE 3 CHRONIC KIDNEY DISEASE (HCC): ICD-10-CM

## 2018-06-12 DIAGNOSIS — N18.30 CKD (CHRONIC KIDNEY DISEASE) STAGE 3, GFR 30-59 ML/MIN (HCC): Primary | ICD-10-CM

## 2018-06-12 DIAGNOSIS — E11.9 TYPE 2 DIABETES MELLITUS WITHOUT COMPLICATION, WITHOUT LONG-TERM CURRENT USE OF INSULIN (HCC): Primary | ICD-10-CM

## 2018-06-12 DIAGNOSIS — E78.5 HYPERLIPIDEMIA, UNSPECIFIED HYPERLIPIDEMIA TYPE: ICD-10-CM

## 2018-06-12 DIAGNOSIS — I10 HYPERTENSION, UNSPECIFIED TYPE: ICD-10-CM

## 2018-06-12 DIAGNOSIS — N18.30 HYPERTENSIVE KIDNEY DISEASE WITH STAGE 3 CHRONIC KIDNEY DISEASE (HCC): ICD-10-CM

## 2018-06-12 DIAGNOSIS — J43.2 CENTRILOBULAR EMPHYSEMA (HCC): ICD-10-CM

## 2018-06-12 PROCEDURE — 99214 OFFICE O/P EST MOD 30 MIN: CPT | Performed by: NURSE PRACTITIONER

## 2018-06-12 PROCEDURE — 99213 OFFICE O/P EST LOW 20 MIN: CPT | Performed by: INTERNAL MEDICINE

## 2018-06-12 RX ORDER — LOSARTAN POTASSIUM 100 MG/1
100 TABLET ORAL DAILY
Qty: 90 TABLET | Refills: 3 | Status: SHIPPED | OUTPATIENT
Start: 2018-06-12 | End: 2019-07-29 | Stop reason: SDUPTHER

## 2018-06-12 NOTE — PROGRESS NOTES
NEPHROLOGY OFFICE FOLLOW UP  Clotilde Solares 78 y o  male MRN: [id number]    Encounter: [id number] DATE: 6/12/2018    REASON FOR VISIT: Clotilde Solares is a 78 y o  male who is here on 6/12/2018 for Follow-up    HPI:    This is a 29-year-old male with a past medical history of CKD stage 3, hypertension, COPD, returns to Nephrology Clinic for further management of CKD stage 3  Upon review of old medical record patient's baseline creatinine seems to be fluctuating between 1 3-1 6  Patient has COPD and said that he is now requiring nasal oxygen  According to patient her COPD is currently under well controlled  Patient also a history of hypertension which seems to be under well control on current regimen of antihypertensive medications  Patient also a history of gout and currently taking allopurinol 100 mg PO daily on daily basis  Patient denies any gout flare-up in last few months  Patient is also taking Tylenol No   3 on p r n  basis  Patient denies use of NSAIDs  REVIEW OF SYSTEMS:    Review of Systems   Constitutional: Negative for chills and fever  HENT: Negative for nosebleeds and sore throat  Eyes: Negative for photophobia and pain  Respiratory: Negative for choking and wheezing  Cardiovascular: Negative for palpitations and leg swelling  Gastrointestinal: Negative for abdominal pain and blood in stool  Endocrine: Negative for cold intolerance and heat intolerance  Genitourinary: Negative for flank pain and hematuria  Musculoskeletal: Negative for joint swelling and neck pain  Skin: Negative for color change and pallor  Allergic/Immunologic: Negative for environmental allergies and immunocompromised state  Neurological: Negative for seizures and syncope  Hematological: Negative for adenopathy  Does not bruise/bleed easily  Psychiatric/Behavioral: Negative for confusion and suicidal ideas           PAST MEDICAL HISTORY:  Past Medical History:   Diagnosis Date    Back pain     CHF (congestive heart failure) (HCC)     EF 30%    COPD (chronic obstructive pulmonary disease) (HCC)     History of malignant neoplasm of oral cavity     M0    Hypertension     Malignant neoplasm of colon (HCC)     descending    [facility] [address] [facility]     Tachycardia     Thalassemia trait        PAST SURGICAL HISTORY:  Past Surgical History:   Procedure Laterality Date    APPENDECTOMY      BACK SURGERY      CHOLECYSTECTOMY      COLON SURGERY  10/2008    partial colectomy    CORONARY ANGIOPLASTY WITH STENT PLACEMENT      SVG TO RCA    CORONARY ARTERY BYPASS GRAFT  1987    INCISIONAL HERNIA REPAIR      JOINT REPLACEMENT      hip    PROSTATE SURGERY         SOCIAL HISTORY:  History   Alcohol Use No     History   Drug Use No     History   Smoking Status    Former Smoker    Quit date: 6/2/2004   Smokeless Tobacco    Never Used     Comment: non smoker/tobacco user; quit 1987 as per [employer]       FAMILY HISTORY:  Family History   Problem Relation Age of Onset    Heart failure Mother      as per [employer]    Coronary artery disease Mother      as per [employer]    Diabetes Mother      mellitus - as per [employer]    Coronary artery disease Father      as per [employer]    Cancer Sister      malignant neoplasm       ALLERGY:  No Known Allergies    MEDICATIONS:    Current Outpatient Prescriptions:     acetaminophen-codeine (TYLENOL #3) 300-30 mg per tablet, Take 1 tablet by mouth 3 (three) times a day as needed for moderate pain, Disp: , Rfl:     ADVAIR DISKUS 250-50 MCG/DOSE inhaler, USE 1 INHALATION EVERY 12 HOURS, Disp: 180 each, Rfl: 2    albuterol (2 5 mg/3 mL) 0 083 % nebulizer solution, Inhale 1 each every 6 (six) hours as needed, Disp: , Rfl:     albuterol (PROVENTIL HFA,VENTOLIN HFA) 90 mcg/act inhaler, Inhale 2 puffs every 6 (six) hours as needed for wheezing, Disp: 18 g, Rfl: 5    allopurinol (ZYLOPRIM) 100 mg tablet, Take 100 mg by mouth daily  , Disp: , Rfl:    amLODIPine (NORVASC) 5 mg tablet, Take 5 mg by mouth daily  , Disp: , Rfl:     losartan (COZAAR) 100 MG tablet, Take 1 tablet (100 mg total) by mouth daily, Disp: 90 tablet, Rfl: 3    metoprolol succinate (TOPROL-XL) 25 mg 24 hr tablet, Take 25 mg by mouth daily, Disp: , Rfl:     tiotropium (SPIRIVA) 18 mcg inhalation capsule, Place 1 capsule into inhaler and inhale daily, Disp: 30 capsule, Rfl: 0    PHYSICAL EXAM:  Vitals:    06/12/18 0816   BP: 140/88   BP Location: Left arm   Patient Position: Sitting   Cuff Size: Large   Pulse: (!) 108   Temp: (!) 97 1 °F (36 2 °C)   TempSrc: Oral   Weight: 123 kg (271 lb 3 2 oz)     Body mass index is 35 78 kg/m²  Physical Exam   Constitutional: He appears well-nourished  No distress  HENT:   Head: Normocephalic and atraumatic  Eyes: No scleral icterus  Right eye exhibits normal extraocular motion  Left eye exhibits normal extraocular motion  Neck: Neck supple  No JVD present  Cardiovascular: Normal rate, S1 normal and S2 normal     Pulmonary/Chest: Effort normal  No accessory muscle usage  No respiratory distress  He has no wheezes  Abdominal: Soft  He exhibits no distension  Musculoskeletal: He exhibits no edema  Right ankle: He exhibits no swelling  Left ankle: He exhibits no swelling  Right hand: He exhibits no laceration  Left hand: He exhibits no laceration  Lymphadenopathy:        Right cervical: No superficial cervical adenopathy present  Left cervical: No superficial cervical adenopathy present  Right: No supraclavicular adenopathy present  Left: No supraclavicular adenopathy present  Neurological: He is alert  He is not disoriented  Skin: Skin is warm  No laceration noted  No cyanosis  Psychiatric: He is not combative  He does not exhibit a depressed mood  He expresses no suicidal ideation         LAB RESULTS:  Results for orders placed or performed in visit on 06/05/18   PSA Total, Diagnostic Result Value Ref Range    PSA <0 1 0 0 - 4 0 ng/mL   CBC   Result Value Ref Range    WBC 9 44 4 31 - 10 16 Thousand/uL    RBC 6 24 (H) 3 88 - 5 62 Million/uL    Hemoglobin 14 5 12 0 - 17 0 g/dL    Hematocrit 47 9 36 5 - 49 3 %    MCV 77 (L) 82 - 98 fL    MCH 23 2 (L) 26 8 - 34 3 pg    MCHC 30 3 (L) 31 4 - 37 4 g/dL    RDW 18 6 (H) 11 6 - 15 1 %    Platelets 119 920 - 357 Thousands/uL    MPV 10 2 8 9 - 12 7 fL   Comprehensive metabolic panel   Result Value Ref Range    Sodium 144 136 - 145 mmol/L    Potassium 4 1 3 5 - 5 3 mmol/L    Chloride 106 100 - 108 mmol/L    CO2 30 21 - 32 mmol/L    Anion Gap 8 4 - 13 mmol/L    BUN 12 5 - 25 mg/dL    Creatinine 1 40 (H) 0 60 - 1 30 mg/dL    Glucose, Fasting 120 (H) 65 - 99 mg/dL    Calcium 9 5 8 3 - 10 1 mg/dL    AST 46 (H) 5 - 45 U/L    ALT 32 12 - 78 U/L    Alkaline Phosphatase 106 46 - 116 U/L    Total Protein 8 0 6 4 - 8 2 g/dL    Albumin 4 0 3 5 - 5 0 g/dL    Total Bilirubin 1 00 0 20 - 1 00 mg/dL    eGFR 49 ml/min/1 73sq m   Hemoglobin A1c   Result Value Ref Range    Hemoglobin A1C 6 3 4 2 - 6 3 %     mg/dl   Lipid panel   Result Value Ref Range    Cholesterol 123 50 - 200 mg/dL    Triglycerides 90 <=150 mg/dL    HDL, Direct 43 40 - 60 mg/dL    LDL Calculated 62 0 - 100 mg/dL    Non-HDL-Chol (CHOL-HDL) 80 mg/dl   TSH, 3rd generation   Result Value Ref Range    TSH 3RD GENERATON 1 787 0 358 - 3 740 uIU/mL       ASSESSMENT and PLAN:  Negrita Crews was seen today for follow-up  Diagnoses and all orders for this visit:    CKD (chronic kidney disease) stage 3, GFR 30-59 ml/min  -     Basic metabolic panel; Future  -     CBC and differential; Future  -     Microalbumin / creatinine urine ratio; Future  -     Phosphorus; Future  -     PTH, intact; Future  -     Uric acid; Future  -     Urinalysis with microscopic; Future  -     Vitamin D 25 hydroxy; Future    Hypertensive kidney disease with stage 3 chronic kidney disease  -     Basic metabolic panel; Future      1    CKD stage 3  Multifactorial and was suspected due to underlying hypertensive nephrosclerosis  Upon review of old medical record patient's baseline creatinine seems to be fluctuating between 1 3-1 6  In the interim patient's renal function has remained stable with current creatinine of 1 4 with EGFR of 49  Currently hypertension is under well control  Plan to avoid NSAID and recheck renal function before next visit  2   Hypertension in chronic kidney disease  Today's blood pressure was controlled and at goal   Plan to monitor hypertension with amlodipine 5 mg PO daily, metoprolol XL 25 mg PO daily and losartan 100 mg PO daily  Patient was also advised to follow low-salt diet  3   Proteinuria  Minimal in the past with last known urine microalbumin to creatinine ratio of 9 mg and currently patient is taking losartan 100 mg PO daily  Returns to Nephrology Clinic in 6 months  Future labs ordered  Lab (reviewed on 12/2018)  Stable creatinine at 1 48 with EGFR of 52  Hemoglobin 14 8  Urine analysis showed no dysuria  Urine microalbumin to creatinine ratio of 12 mg  Uric acid 5 3-> continue allopurinol 100 mg PO daily  Normal PTH (45), vitamin-D (62), sodium, potassium, bicarb, calcium and phosphorus    NO CHANGE    Portions of the record may have been created with voice recognition software  Occasional wrong word or "sound a like" substitutions may have occurred due to the inherent limitations of voice recognition software  Read the chart carefully and recognize, using context, where substitutions have occurred  If you have any questions, please contact the dictating provider

## 2018-06-12 NOTE — PROGRESS NOTES
Assessment/Plan:    Patient Instructions   Diabetes under excellent control    Hypertension stable on current medication    COPD following with pulmonology nebulizer twice a day and continuous O2    History of prostate cancer had been following with Urology    Chronic kidney disease following with Nephrology creatinine at the         Diagnoses and all orders for this visit:    Type 2 diabetes mellitus without complication, without long-term current use of insulin ([address] )  -     Hemoglobin A1C; Future    Hypertension, unspecified type  -     losartan (COZAAR) 100 MG tablet; Take 1 tablet (100 mg total) by mouth daily    Centrilobular emphysema (HCC)    Hypertensive kidney disease with stage 3 chronic kidney disease    Hyperlipidemia, unspecified hyperlipidemia type  -     Lipid panel; Future  -     TSH, 3rd generation;  Future         Subjective:      Patient ID: Joann Ga is a 78 y o  male    No home sugars  No home bps  Tolerating bps meds  Following w/ dr Scott Brown for pulm  Dr Casas Comment prostate ca  Dr Sai Driver for nephrology  02 continuous- nebs bid          Current Outpatient Prescriptions:     acetaminophen-codeine (TYLENOL #3) 300-30 mg per tablet, Take 1 tablet by mouth 3 (three) times a day as needed for moderate pain, Disp: , Rfl:     ADVAIR DISKUS 250-50 MCG/DOSE inhaler, USE 1 INHALATION EVERY 12 HOURS, Disp: 180 each, Rfl: 2    albuterol (2 5 mg/3 mL) 0 083 % nebulizer solution, Inhale 1 each every 6 (six) hours as needed, Disp: , Rfl:     albuterol (PROVENTIL HFA,VENTOLIN HFA) 90 mcg/act inhaler, Inhale 2 puffs every 6 (six) hours as needed for wheezing, Disp: 18 g, Rfl: 5    allopurinol (ZYLOPRIM) 100 mg tablet, Take 100 mg by mouth daily  , Disp: , Rfl:     amLODIPine (NORVASC) 5 mg tablet, Take 5 mg by mouth daily  , Disp: , Rfl:     losartan (COZAAR) 100 MG tablet, Take 1 tablet (100 mg total) by mouth daily, Disp: 90 tablet, Rfl: 3    metoprolol succinate (TOPROL-XL) 25 mg 24 hr tablet, Take 25 mg by mouth daily, Disp: , Rfl:     tiotropium (SPIRIVA) 18 mcg inhalation capsule, Place 1 capsule into inhaler and inhale daily, Disp: 30 capsule, Rfl: 0    Recent Results (from the past 1008 hour(s))   PSA Total, Diagnostic    Collection Time: 06/05/18  8:44 AM   Result Value Ref Range    PSA <0 1 0 0 - 4 0 ng/mL   CBC    Collection Time: 06/05/18  8:44 AM   Result Value Ref Range    WBC 9 44 4 31 - 10 16 Thousand/uL    RBC 6 24 (H) 3 88 - 5 62 Million/uL    Hemoglobin 14 5 12 0 - 17 0 g/dL    Hematocrit 47 9 36 5 - 49 3 %    MCV 77 (L) 82 - 98 fL    MCH 23 2 (L) 26 8 - 34 3 pg    MCHC 30 3 (L) 31 4 - 37 4 g/dL    RDW 18 6 (H) 11 6 - 15 1 %    Platelets 245 689 - 705 Thousands/uL    MPV 10 2 8 9 - 12 7 fL   Comprehensive metabolic panel    Collection Time: 06/05/18  8:44 AM   Result Value Ref Range    Sodium 144 136 - 145 mmol/L    Potassium 4 1 3 5 - 5 3 mmol/L    Chloride 106 100 - 108 mmol/L    CO2 30 21 - 32 mmol/L    Anion Gap 8 4 - 13 mmol/L    BUN 12 5 - 25 mg/dL    Creatinine 1 40 (H) 0 60 - 1 30 mg/dL    Glucose, Fasting 120 (H) 65 - 99 mg/dL    Calcium 9 5 8 3 - 10 1 mg/dL    AST 46 (H) 5 - 45 U/L    ALT 32 12 - 78 U/L    Alkaline Phosphatase 106 46 - 116 U/L    Total Protein 8 0 6 4 - 8 2 g/dL    Albumin 4 0 3 5 - 5 0 g/dL    Total Bilirubin 1 00 0 20 - 1 00 mg/dL    eGFR 49 ml/min/1 73sq m   Hemoglobin A1c    Collection Time: 06/05/18  8:44 AM   Result Value Ref Range    Hemoglobin A1C 6 3 4 2 - 6 3 %     mg/dl   Lipid panel    Collection Time: 06/05/18  8:44 AM   Result Value Ref Range    Cholesterol 123 50 - 200 mg/dL    Triglycerides 90 <=150 mg/dL    HDL, Direct 43 40 - 60 mg/dL    LDL Calculated 62 0 - 100 mg/dL    Non-HDL-Chol (CHOL-HDL) 80 mg/dl   TSH, 3rd generation    Collection Time: 06/05/18  8:44 AM   Result Value Ref Range    TSH 3RD GENERATON 1 787 0 358 - 3 740 uIU/mL       The following portions of the patient's history were reviewed and updated as appropriate: allergies, current medications, past family history, past medical history, past social history, past surgical history and problem list      Review of Systems   Constitutional: Negative for appetite change, chills, diaphoresis, fatigue, fever and unexpected weight change  HENT: Negative for postnasal drip and sneezing  Eyes: Negative for visual disturbance  Respiratory: Negative for chest tightness and shortness of breath  Cardiovascular: Negative for chest pain, palpitations and leg swelling  Gastrointestinal: Negative for abdominal pain and blood in stool  Endocrine: Negative for cold intolerance, heat intolerance, polydipsia, polyphagia and polyuria  Genitourinary: Negative for difficulty urinating, dysuria, frequency and urgency  Musculoskeletal: Negative for arthralgias and myalgias  Skin: Negative for rash and wound  Neurological: Negative for dizziness, weakness, light-headedness and headaches  Hematological: Negative for adenopathy  Psychiatric/Behavioral: Negative for confusion, dysphoric mood and sleep disturbance  The patient is not nervous/anxious  Objective:      Vitals:    06/12/18 1447   BP: 132/80   Pulse: 90          Physical Exam   Constitutional: He is oriented to person, place, and time  He appears well-developed  No distress  HENT:   Head: Normocephalic and atraumatic  Nose: Nose normal    Mouth/Throat: Oropharynx is clear and moist    Eyes: Conjunctivae and EOM are normal  Pupils are equal, round, and reactive to light  Neck: Normal range of motion  Neck supple  No JVD present  No tracheal deviation present  No thyromegaly present  Cardiovascular: Normal rate, regular rhythm, normal heart sounds and intact distal pulses  Exam reveals no gallop and no friction rub  No murmur heard  Pulmonary/Chest: Effort normal and breath sounds normal  No respiratory distress  He has no wheezes  He has no rales  Abdominal: Soft   Bowel sounds are normal  He exhibits no distension  There is no tenderness  Musculoskeletal: Normal range of motion  He exhibits no edema  Lymphadenopathy:     He has no cervical adenopathy  Neurological: He is alert and oriented to person, place, and time  No cranial nerve deficit  Skin: Skin is warm and dry  No rash noted  He is not diaphoretic  Psychiatric: He has a normal mood and affect   His behavior is normal  Judgment and thought content normal

## 2018-06-12 NOTE — LETTER
June 12, 2018     Aurora Rivera MD  [address], [address] [address]    Patient: Jasmine Patel   YOB: 1940   Date of Visit: 6/12/2018       Dear Dr Ekta Knapp: Thank you for referring Jasmine Patel to me for evaluation  Below are my notes for this consultation  If you have questions, please do not hesitate to call me  I look forward to following your patient along with you  Sincerely,        Carlie Sorensen MD        CC: No Recipients  Carlie Sorensen MD  6/12/2018  8:30 AM  Sign at close encounter  Tomas 78 y o  male MRN: [id number]    Encounter: [id number] DATE: 6/12/2018    REASON FOR VISIT: Jsamine Patel is a 78 y o  male who is here on 6/12/2018 for Follow-up    HPI:    This is a 68-year-old male with a past medical history of CKD stage 3, hypertension, COPD, returns to [facility]phrology Clinic for further management of CKD stage 3  Upon review of old medical record patient's baseline creatinine seems to be fluctuating between 1 3-1 6  Patient has COPD and said that he is now requiring nasal oxygen  According to patient her COPD is currently under well controlled  Patient also a history of hypertension which seems to be under well control on current regimen of antihypertensive medications  Patient also a history of gout and currently taking allopurinol 100 mg PO daily on daily basis  Patient denies any gout flare-up in last few months  Patient is also taking Tylenol No   3 on p r n  basis  Patient denies use of NSAIDs  REVIEW OF SYSTEMS:    Review of Systems   Constitutional: Negative for chills and fever  HENT: Negative for nosebleeds and sore throat  Eyes: Negative for photophobia and pain  Respiratory: Negative for choking and wheezing  Cardiovascular: Negative for palpitations and leg swelling  Gastrointestinal: Negative for abdominal pain and blood in stool     Endocrine: Negative for cold intolerance and heat intolerance  Genitourinary: Negative for flank pain and hematuria  Musculoskeletal: Negative for joint swelling and neck pain  Skin: Negative for color change and pallor  Allergic/Immunologic: Negative for environmental allergies and immunocompromised state  Neurological: Negative for seizures and syncope  Hematological: Negative for adenopathy  Does not bruise/bleed easily  Psychiatric/Behavioral: Negative for confusion and suicidal ideas           PAST MEDICAL HISTORY:  Past Medical History:   Diagnosis Date    Back pain     CHF (congestive heart failure) (HCC)     EF 30%    COPD (chronic obstructive pulmonary disease) (HCC)     History of malignant neoplasm of oral cavity     M0    Hypertension     Malignant neoplasm of colon (HCC)     descending    Prostate cancer (HCC)     Tachycardia     Thalassemia trait        PAST SURGICAL HISTORY:  Past Surgical History:   Procedure Laterality Date    APPENDECTOMY      BACK SURGERY      CHOLECYSTECTOMY      COLON SURGERY  10/2008    partial colectomy    CORONARY ANGIOPLASTY WITH STENT PLACEMENT      SVG TO RCA    CORONARY ARTERY BYPASS GRAFT  1987    INCISIONAL HERNIA REPAIR      JOINT REPLACEMENT      hip    PROSTATE SURGERY         SOCIAL HISTORY:  History   Alcohol Use No     History   Drug Use No     History   Smoking Status    Former Smoker    Quit date: 6/2/2004   Smokeless Tobacco    Never Used     Comment: non smoker/tobacco user; quit 1987 as per [employer]       FAMILY HISTORY:  Family History   Problem Relation Age of Onset    Heart failure Mother      as per [employer]    Coronary artery disease Mother      as per [employer]    Diabetes Mother      mellitus - as per [employer]    Coronary artery disease Father      as per [employer]    Cancer Sister      malignant neoplasm       ALLERGY:  No Known Allergies    MEDICATIONS:    Current Outpatient Prescriptions:    acetaminophen-codeine (TYLENOL #3) 300-30 mg per tablet, Take 1 tablet by mouth 3 (three) times a day as needed for moderate pain, Disp: , Rfl:     ADVAIR DISKUS 250-50 MCG/DOSE inhaler, USE 1 INHALATION EVERY 12 HOURS, Disp: 180 each, Rfl: 2    albuterol (2 5 mg/3 mL) 0 083 % nebulizer solution, Inhale 1 each every 6 (six) hours as needed, Disp: , Rfl:     albuterol (PROVENTIL HFA,VENTOLIN HFA) 90 mcg/act inhaler, Inhale 2 puffs every 6 (six) hours as needed for wheezing, Disp: 18 g, Rfl: 5    allopurinol (ZYLOPRIM) 100 mg tablet, Take 100 mg by mouth daily  , Disp: , Rfl:     amLODIPine (NORVASC) 5 mg tablet, Take 5 mg by mouth daily  , Disp: , Rfl:     losartan (COZAAR) 100 MG tablet, Take 1 tablet (100 mg total) by mouth daily, Disp: 90 tablet, Rfl: 3    metoprolol succinate (TOPROL-XL) 25 mg 24 hr tablet, Take 25 mg by mouth daily, Disp: , Rfl:     tiotropium (SPIRIVA) 18 mcg inhalation capsule, Place 1 capsule into inhaler and inhale daily, Disp: 30 capsule, Rfl: 0    PHYSICAL EXAM:  Vitals:    06/12/18 0816   BP: 140/88   BP Location: Left arm   Patient Position: Sitting   Cuff Size: Large   Pulse: (!) 108   Temp: (!) 97 1 °F (36 2 °C)   TempSrc: Oral   Weight: 123 kg (271 lb 3 2 oz)     Body mass index is 35 78 kg/m²  Physical Exam   Constitutional: He appears well-nourished  No distress  HENT:   Head: Normocephalic and atraumatic  Eyes: No scleral icterus  Right eye exhibits normal extraocular motion  Left eye exhibits normal extraocular motion  Neck: Neck supple  No JVD present  Cardiovascular: Normal rate, S1 normal and S2 normal     Pulmonary/Chest: Effort normal  No accessory muscle usage  No respiratory distress  He has no wheezes  Abdominal: Soft  He exhibits no distension  Musculoskeletal: He exhibits no edema  Right ankle: He exhibits no swelling  Left ankle: He exhibits no swelling  Right hand: He exhibits no laceration          Left hand: He exhibits no laceration  Lymphadenopathy:        Right cervical: No superficial cervical adenopathy present  Left cervical: No superficial cervical adenopathy present  Right: No supraclavicular adenopathy present  Left: No supraclavicular adenopathy present  Neurological: He is alert  He is not disoriented  Skin: Skin is warm  No laceration noted  No cyanosis  Psychiatric: He is not combative  He does not exhibit a depressed mood  He expresses no suicidal ideation         LAB RESULTS:  Results for orders placed or performed in visit on 06/05/18   PSA Total, Diagnostic   Result Value Ref Range    PSA <0 1 0 0 - 4 0 ng/mL   CBC   Result Value Ref Range    WBC 9 44 4 31 - 10 16 Thousand/uL    RBC 6 24 (H) 3 88 - 5 62 Million/uL    Hemoglobin 14 5 12 0 - 17 0 g/dL    Hematocrit 47 9 36 5 - 49 3 %    MCV 77 (L) 82 - 98 fL    MCH 23 2 (L) 26 8 - 34 3 pg    MCHC 30 3 (L) 31 4 - 37 4 g/dL    RDW 18 6 (H) 11 6 - 15 1 %    Platelets 209 645 - 066 Thousands/uL    MPV 10 2 8 9 - 12 7 fL   Comprehensive metabolic panel   Result Value Ref Range    Sodium 144 136 - 145 mmol/L    Potassium 4 1 3 5 - 5 3 mmol/L    Chloride 106 100 - 108 mmol/L    CO2 30 21 - 32 mmol/L    Anion Gap 8 4 - 13 mmol/L    BUN 12 5 - 25 mg/dL    Creatinine 1 40 (H) 0 60 - 1 30 mg/dL    Glucose, Fasting 120 (H) 65 - 99 mg/dL    Calcium 9 5 8 3 - 10 1 mg/dL    AST 46 (H) 5 - 45 U/L    ALT 32 12 - 78 U/L    Alkaline Phosphatase 106 46 - 116 U/L    Total Protein 8 0 6 4 - 8 2 g/dL    Albumin 4 0 3 5 - 5 0 g/dL    Total Bilirubin 1 00 0 20 - 1 00 mg/dL    eGFR 49 ml/min/1 73sq m   Hemoglobin A1c   Result Value Ref Range    Hemoglobin A1C 6 3 4 2 - 6 3 %     mg/dl   Lipid panel   Result Value Ref Range    Cholesterol 123 50 - 200 mg/dL    Triglycerides 90 <=150 mg/dL    HDL, Direct 43 40 - 60 mg/dL    LDL Calculated 62 0 - 100 mg/dL    Non-HDL-Chol (CHOL-HDL) 80 mg/dl   TSH, 3rd generation   Result Value Ref Range    TSH 3RD GENERATON 1 787 0 358 - 3 740 uIU/mL       ASSESSMENT and PLAN:  Rodo Guo was seen today for follow-up  Diagnoses and all orders for this visit:    CKD (chronic kidney disease) stage 3, GFR 30-59 ml/min  -     Basic metabolic panel; Future  -     CBC and differential; Future  -     Microalbumin / creatinine urine ratio; Future  -     Phosphorus; Future  -     PTH, intact; Future  -     Uric acid; Future  -     Urinalysis with microscopic; Future  -     Vitamin D 25 hydroxy; Future    Hypertensive kidney disease with stage 3 chronic kidney disease  -     Basic metabolic panel; Future      1  CKD stage 3  Multifactorial and was suspected due to underlying hypertensive nephrosclerosis  Upon review of old medical record patient's baseline creatinine seems to be fluctuating between 1 3-1 6  In the interim patient's renal function has remained stable with current creatinine of 1 4 with EGFR of 49  Currently hypertension is under well control  Plan to avoid NSAID and recheck renal function before next visit  2   Hypertension in chronic kidney disease  Today's blood pressure was controlled and at goal   Plan to monitor hypertension with amlodipine 5 mg PO daily, metoprolol XL 25 mg PO daily and losartan 100 mg PO daily  Patient was also advised to follow low-salt diet  3   Proteinuria  Minimal in the past with last known urine microalbumin to creatinine ratio of 9 mg and currently patient is taking losartan 100 mg PO daily  Returns to Nephrology Clinic in 6 months  Future labs ordered  Portions of the record may have been created with voice recognition software  Occasional wrong word or "sound a like" substitutions may have occurred due to the inherent limitations of voice recognition software  Read the chart carefully and recognize, using context, where substitutions have occurred  If you have any questions, please contact the dictating provider

## 2018-06-21 ENCOUNTER — TELEPHONE (OUTPATIENT)
Dept: INTERNAL MEDICINE CLINIC | Facility: CLINIC | Age: 78
End: 2018-06-21

## 2018-06-21 NOTE — TELEPHONE ENCOUNTER
----- Message from Eddie Uriostegui, [address] sent at 6/21/2018  5:20 PM EDT -----  I found his last colonscopy which was 2008, I think it was before his surgery  He needs to go back and be re-eval  So have him make appt with his old Gastroenterologist or he can establish w/ strohlein  And with hx of colon cancer he should have been getting colonscopy yearly or every other year

## 2018-06-22 DIAGNOSIS — Z85.038 HISTORY OF COLON CANCER: Primary | ICD-10-CM

## 2018-06-22 NOTE — TELEPHONE ENCOUNTER
Yes  I spoke to dr Beverly Ohara and with a hx of colon cancer he should still be following w/ a gastrotenterologist, I dont know if they will do a colonscopy or a sigmoidoscopy or maybe some other imaging but he still needs screened for reoccurance of cancer

## 2018-06-23 DIAGNOSIS — I10 HYPERTENSION, UNSPECIFIED TYPE: Primary | ICD-10-CM

## 2018-06-25 ENCOUNTER — TELEPHONE (OUTPATIENT)
Dept: INTERNAL MEDICINE CLINIC | Facility: CLINIC | Age: 78
End: 2018-06-25

## 2018-06-25 RX ORDER — METOPROLOL SUCCINATE 25 MG/1
TABLET, EXTENDED RELEASE ORAL
Qty: 90 TABLET | Refills: 3 | Status: SHIPPED | OUTPATIENT
Start: 2018-06-25 | End: 2018-06-26 | Stop reason: SDUPTHER

## 2018-06-25 NOTE — TELEPHONE ENCOUNTER
Patient's script for Metoprolol was sent to [facility] mail order [facility]  Patient is asking for a few pills to take until mail order supply comes through because he is out of the med  Has not taken since Saturday   Please notify patient

## 2018-06-25 NOTE — TELEPHONE ENCOUNTER
Pat called pharmacy and rx was not there yet for his Metoprolol  Please let pt know when this is done   thanks

## 2018-06-26 DIAGNOSIS — I10 HYPERTENSION, UNSPECIFIED TYPE: ICD-10-CM

## 2018-06-26 RX ORDER — METOPROLOL SUCCINATE 25 MG/1
25 TABLET, EXTENDED RELEASE ORAL DAILY
Qty: 30 TABLET | Refills: 0 | Status: SHIPPED | OUTPATIENT
Start: 2018-06-26 | End: 2019-06-18 | Stop reason: SDUPTHER

## 2018-07-17 ENCOUNTER — OFFICE VISIT (OUTPATIENT)
Dept: GASTROENTEROLOGY | Facility: CLINIC | Age: 78
End: 2018-07-17
Payer: MEDICARE

## 2018-07-17 VITALS
SYSTOLIC BLOOD PRESSURE: 136 MMHG | BODY MASS INDEX: 35 KG/M2 | WEIGHT: 265.25 LBS | DIASTOLIC BLOOD PRESSURE: 72 MMHG | HEART RATE: 54 BPM

## 2018-07-17 DIAGNOSIS — Z12.11 SCREENING FOR COLON CANCER: Primary | ICD-10-CM

## 2018-07-17 DIAGNOSIS — R19.4 CHANGE IN BOWEL HABITS: ICD-10-CM

## 2018-07-17 DIAGNOSIS — Z85.038 HISTORY OF COLON CANCER: ICD-10-CM

## 2018-07-17 PROBLEM — K56.7 ILEUS (HCC): Status: RESOLVED | Noted: 2017-10-17 | Resolved: 2018-07-17

## 2018-07-17 PROCEDURE — 99204 OFFICE O/P NEW MOD 45 MIN: CPT | Performed by: INTERNAL MEDICINE

## 2018-07-17 NOTE — LETTER
July 17, 2018     Zena Lewis MD  [address],  [address] [address]    Patient: Juani Segura   YOB: 1940   Date of Visit: 7/17/2018       Dear Dr Hutchins Crew: Thank you for referring Juani Segura to me for evaluation  Below are my notes for this consultation  If you have questions, please do not hesitate to call me  I look forward to following your patient along with you  Sincerely,        Merry Torres MD        CC: No Recipients  Merry Torres MD  7/17/2018  8:38 AM  Sign at close encounter  Consultation - [facility]  Juani Segura 1940 78 y o  male     ASSESSMENT @ PLAN:   He is a 79-year-old male with a history of colon cancer stage I or 2 that was treated with colectomy with Dr Jagdish Baugh about 10-15 years ago  His last colonoscopy was over 5 years ago  He has mild change in bowel habits    1 will do colonoscopy to investigate    Chief Complaint:  Change in bowel habits    HPI:  He is a 79-year-old male with a history of colon cancer that was treated between 10 and 15 years ago with a surgery  He does not remember what part of the colon was operated on  It had to be stage I or 2 because he did not receive chemotherapy  His last follow-up colonoscopy was over 5 years ago  He has no nausea vomiting heartburn dysphagia  He reports that he has a change in bowel habits with a bowel movement after every meal   He has no melena hematochezia  No fevers chills  REVIEW OF SYSTEMS:     CONSTITUTIONAL: Denies any fever, chills, or rigors  Good appetite, and no recent weight loss  HEENT: No earache or tinnitus  Denies hearing loss or visual disturbances  CARDIOVASCULAR: No chest pain or palpitations  RESPIRATORY: Denies any cough, hemoptysis, shortness of breath or dyspnea on exertion  GASTROINTESTINAL: As noted in the History of Present Illness  GENITOURINARY: No problems with urination   Denies any hematuria or dysuria  NEUROLOGIC: No dizziness or vertigo, denies headaches  MUSCULOSKELETAL: Denies any muscle or joint pain  SKIN: Denies skin rashes or itching  ENDOCRINE: Denies excessive thirst  Denies intolerance to heat or cold  PSYCHOSOCIAL: Denies depression or anxiety  Denies any recent memory loss       Past Medical History:   Diagnosis Date    Back pain     CHF (congestive heart failure) (HCC)     EF 30%    COPD (chronic obstructive pulmonary disease) (HCC)     History of malignant neoplasm of oral cavity     M0    Hypertension     Malignant neoplasm of colon (HCC)     descending    Prostate cancer (HCC)     Tachycardia     Thalassemia trait       Past Surgical History:   Procedure Laterality Date    APPENDECTOMY      BACK SURGERY      CHOLECYSTECTOMY      COLON SURGERY  10/2008    partial colectomy    CORONARY ANGIOPLASTY WITH STENT PLACEMENT      SVG TO RCA    CORONARY ARTERY BYPASS GRAFT  1987    INCISIONAL HERNIA REPAIR      JOINT REPLACEMENT      hip    PROSTATE SURGERY       Social History     Social History    Marital status: /Civil Union     Spouse name: N/A    Number of children: N/A    Years of education: N/A     Occupational History    Cook      Social History Main Topics    Smoking status: Former Smoker     Quit date: 6/2/2004    Smokeless tobacco: Never Used      Comment: non smoker/tobacco user; quit 1987 as per [employer]    Alcohol use No    Drug use: No    Sexual activity: Not on file     Other Topics Concern    Not on file     Social History Narrative    Active directive    Living independently with spouse    Hx of Living will on file     Family History   Problem Relation Age of Onset    Heart failure Mother         as per [employer]    Coronary artery disease Mother         as per [employer]    Diabetes Mother         mellitus - as per [employer]    Coronary artery disease Father         as per [employer]    Cancer Sister         malignant neoplasm     Patient has no known allergies  Current Outpatient Prescriptions   Medication Sig Dispense Refill    acetaminophen-codeine (TYLENOL #3) 300-30 mg per tablet Take 1 tablet by mouth 3 (three) times a day as needed for moderate pain      ADVAIR DISKUS 250-50 MCG/DOSE inhaler USE 1 INHALATION EVERY 12 HOURS 180 each 2    albuterol (2 5 mg/3 mL) 0 083 % nebulizer solution Inhale 1 each every 6 (six) hours as needed      allopurinol (ZYLOPRIM) 100 mg tablet Take 100 mg by mouth daily        amLODIPine (NORVASC) 5 mg tablet Take 5 mg by mouth daily        losartan (COZAAR) 100 MG tablet Take 1 tablet (100 mg total) by mouth daily 90 tablet 3    metoprolol succinate (TOPROL-XL) 25 mg 24 hr tablet Take 1 tablet (25 mg total) by mouth daily 30 tablet 0    tiotropium (SPIRIVA) 18 mcg inhalation capsule Place 1 capsule into inhaler and inhale daily 30 capsule 0    albuterol (PROVENTIL HFA,VENTOLIN HFA) 90 mcg/act inhaler Inhale 2 puffs every 6 (six) hours as needed for wheezing 18 g 5     No current facility-administered medications for this visit  Blood pressure 136/72, pulse (!) 54, weight 120 kg (265 lb 4 oz)  PHYSICAL EXAM:     General Appearance:   Alert, cooperative, no distress, appears stated age    HEENT:   Normocephalic, atraumatic, anicteric      Neck:  Supple, symmetrical, trachea midline, no adenopathy;    thyroid: no enlargement/tenderness/nodules; no carotid  bruit or JVD    Lungs:   Clear to auscultation bilaterally; no rales, rhonchi or wheezing; respirations unlabored    Heart[de-identified]   S1 and S2 normal; regular rate and rhythm; no murmur, rub, or gallop     Abdomen:   Soft, non-tender, non-distended; normal bowel sounds; no masses, no organomegaly    Genitalia:   Deferred    Rectal:   Deferred    Extremities:  No cyanosis, clubbing or edema    Pulses:  2+ and symmetric all extremities    Skin:  Skin color, texture, turgor normal, no rashes or lesions    Lymph nodes:  No palpable cervical, axillary or inguinal lymphadenopathy        Lab Results   Component Value Date    WBC 9 44 06/05/2018    HGB 14 5 06/05/2018    HCT 47 9 06/05/2018    MCV 77 (L) 06/05/2018     06/05/2018     Lab Results   Component Value Date    GLUCOSE 146 (H) 01/04/2018    CALCIUM 9 5 06/05/2018     06/05/2018    K 4 1 06/05/2018    CO2 30 06/05/2018     06/05/2018    BUN 12 06/05/2018    CREATININE 1 40 (H) 06/05/2018     Lab Results   Component Value Date    ALT 32 06/05/2018    AST 46 (H) 06/05/2018    ALKPHOS 106 06/05/2018    BILITOT 1 00 06/05/2018     Lab Results   Component Value Date    INR 1 10 10/16/2017    PROTIME 14 5 (H) 10/16/2017

## 2018-07-17 NOTE — PROGRESS NOTES
Consultation - [facility]  Wero Gutierrez 1940 78 y o  male     ASSESSMENT @ PLAN:   He is a 68-year-old male with a history of colon cancer stage I or 2 that was treated with colectomy with Dr Samantha Cline about 10-15 years ago  His last colonoscopy was over 5 years ago  He has mild change in bowel habits    1 will do colonoscopy to investigate    Chief Complaint:  Change in bowel habits    HPI:  He is a 68-year-old male with a history of colon cancer that was treated between 10 and 15 years ago with a surgery  He does not remember what part of the colon was operated on  It had to be stage I or 2 because he did not receive chemotherapy  His last follow-up colonoscopy was over 5 years ago  He has no nausea vomiting heartburn dysphagia  He reports that he has a change in bowel habits with a bowel movement after every meal   He has no melena hematochezia  No fevers chills  REVIEW OF SYSTEMS:     CONSTITUTIONAL: Denies any fever, chills, or rigors  Good appetite, and no recent weight loss  HEENT: No earache or tinnitus  Denies hearing loss or visual disturbances  CARDIOVASCULAR: No chest pain or palpitations  RESPIRATORY: Denies any cough, hemoptysis, shortness of breath or dyspnea on exertion  GASTROINTESTINAL: As noted in the History of Present Illness  GENITOURINARY: No problems with urination  Denies any hematuria or dysuria  NEUROLOGIC: No dizziness or vertigo, denies headaches  MUSCULOSKELETAL: Denies any muscle or joint pain  SKIN: Denies skin rashes or itching  ENDOCRINE: Denies excessive thirst  Denies intolerance to heat or cold  PSYCHOSOCIAL: Denies depression or anxiety  Denies any recent memory loss       Past Medical History:   Diagnosis Date    Back pain     CHF (congestive heart failure) (HCC)     EF 30%    COPD (chronic obstructive pulmonary disease) (HCC)     History of malignant neoplasm of oral cavity     M0    Hypertension     Malignant [facility] colon ([facility] [address] )     descending    [facility]     Tachycardia     Thalassemia trait       Past Surgical History:   Procedure Laterality Date    APPENDECTOMY      BACK SURGERY      CHOLECYSTECTOMY      COLON SURGERY  10/2008    partial colectomy    CORONARY ANGIOPLASTY WITH STENT PLACEMENT      SVG TO RCA    CORONARY ARTERY BYPASS GRAFT  1987    INCISIONAL HERNIA REPAIR      JOINT REPLACEMENT      hip    PROSTATE SURGERY       Social History     Social History    Marital status: /Civil Union     Spouse name: N/A    Number of children: N/A    Years of education: N/A     Occupational History    Cook      Social History Main Topics    Smoking status: Former Smoker     Quit date: 6/2/2004    Smokeless tobacco: Never Used      Comment: non smoker/tobacco user; quit 1987 as per [employer]    Alcohol use No    Drug use: No    Sexual activity: Not on file     Other Topics Concern    Not on file     Social History Narrative    Active directive    Living independently with spouse    Hx of Living will on file     Family History   Problem Relation Age of Onset    Heart failure Mother         as per [employer]    Coronary artery disease Mother         as per [employer]scripts    Diabetes Mother         mellitus - as per [employer]scripts    Coronary artery disease Father         as per [employer]    Cancer Sister         malignant neoplasm     Patient has no known allergies    Current Outpatient Prescriptions   Medication Sig Dispense Refill    acetaminophen-codeine (TYLENOL #3) 300-30 mg per tablet Take 1 tablet by mouth 3 (three) times a day as needed for moderate pain      ADVAIR DISKUS 250-50 MCG/DOSE inhaler USE 1 INHALATION EVERY 12 HOURS 180 each 2    albuterol (2 5 mg/3 mL) 0 083 % nebulizer solution Inhale 1 each every 6 (six) hours as needed      allopurinol (ZYLOPRIM) 100 mg tablet Take 100 mg by mouth daily        amLODIPine (NORVASC) 5 mg tablet Take 5 mg by mouth daily        losartan (COZAAR) 100 MG tablet Take 1 tablet (100 mg total) by mouth daily 90 tablet 3    metoprolol succinate (TOPROL-XL) 25 mg 24 hr tablet Take 1 tablet (25 mg total) by mouth daily 30 tablet 0    tiotropium (SPIRIVA) 18 mcg inhalation capsule Place 1 capsule into inhaler and inhale daily 30 capsule 0    albuterol (PROVENTIL HFA,VENTOLIN HFA) 90 mcg/act inhaler Inhale 2 puffs every 6 (six) hours as needed for wheezing 18 g 5     No current facility-administered medications for this visit  Blood pressure 136/72, pulse (!) 54, weight 120 kg (265 lb 4 oz)  PHYSICAL EXAM:     General Appearance:   Alert, cooperative, no distress, appears stated age    HEENT:   Normocephalic, atraumatic, anicteric      Neck:  Supple, symmetrical, trachea midline, no adenopathy;    thyroid: no enlargement/tenderness/nodules; no carotid  bruit or JVD    Lungs:   Clear to auscultation bilaterally; no rales, rhonchi or wheezing; respirations unlabored    Heart[de-identified]   S1 and S2 normal; regular rate and rhythm; no murmur, rub, or gallop     Abdomen:   Soft, non-tender, non-distended; normal bowel sounds; no masses, no organomegaly    Genitalia:   Deferred    Rectal:   Deferred    Extremities:  No cyanosis, clubbing or edema    Pulses:  2+ and symmetric all extremities    Skin:  Skin color, texture, turgor normal, no rashes or lesions    Lymph nodes:  No palpable cervical, axillary or inguinal lymphadenopathy        Lab Results   Component Value Date    WBC 9 44 06/05/2018    HGB 14 5 06/05/2018    HCT 47 9 06/05/2018    MCV 77 (L) 06/05/2018     06/05/2018     Lab Results   Component Value Date    GLUCOSE 146 (H) 01/04/2018    CALCIUM 9 5 06/05/2018     06/05/2018    K 4 1 06/05/2018    CO2 30 06/05/2018     06/05/2018    BUN 12 06/05/2018    CREATININE 1 40 (H) 06/05/2018     Lab Results   Component Value Date    ALT 32 06/05/2018    AST 46 (H) 06/05/2018    ALKPHOS 106 06/05/2018    BILITOT 1 00 06/05/2018     Lab Results   Component Value Date    INR 1 10 10/16/2017    PROTIME 14 5 (H) 10/16/2017

## 2018-07-22 ENCOUNTER — ANESTHESIA EVENT (OUTPATIENT)
Dept: PERIOP | Facility: HOSPITAL | Age: 78
End: 2018-07-22
Payer: MEDICARE

## 2018-07-22 NOTE — ANESTHESIA PREPROCEDURE EVALUATION
Review of Systems/Medical History  Patient summary reviewed  Chart reviewed  No history of anesthetic complications     Cardiovascular  Hypertension on > 1 medication, CHF ,    Pulmonary  COPD moderate- medication dependent , No shortness of breath, No recent URI , No sleep apnea , Oxygen dependent nasal cannula,   Comment: 3lpm nc     GI/Hepatic    Bowel prep       Chronic kidney disease ,        Endo/Other  Diabetes well controlled Diet controlled,   Obesity  morbid obesity   GYN       Hematology  Anemia chronic blood loss anemia and iron deficiency anemia,     Musculoskeletal  Negative musculoskeletal ROS        Neurology  Negative neurology ROS      Psychology   Negative psychology ROS              Physical Exam    Airway    Mallampati score: II  TM Distance: >3 FB  Neck ROM: full     Dental   Comment: edentuolous ,     Cardiovascular  Rhythm: regular, Rate: normal,     Pulmonary  Pulmonary exam normal     Other Findings        Anesthesia Plan  ASA Score- 3     Anesthesia Type- IV sedation with anesthesia with ASA Monitors  Additional Monitors:   Airway Plan:         Plan Factors-    Induction- intravenous  Postoperative Plan-     Informed Consent- Anesthetic plan and risks discussed with patient  I personally reviewed this patient with the CRNA  Discussed and agreed on the Anesthesia Plan with the CRNA              Lab Results   Component Value Date    WBC 9 44 06/05/2018    HGB 14 5 06/05/2018    HCT 47 9 06/05/2018    MCV 77 (L) 06/05/2018     06/05/2018     Lab Results   Component Value Date    GLUCOSE 146 (H) 01/04/2018    CALCIUM 9 5 06/05/2018     06/05/2018    K 4 1 06/05/2018    CO2 30 06/05/2018     06/05/2018    BUN 12 06/05/2018    CREATININE 1 40 (H) 06/05/2018     Lab Results   Component Value Date    INR 1 10 10/16/2017    PROTIME 14 5 (H) 10/16/2017     Lab Results   Component Value Date    PTT 31 10/16/2017       Type and Screen:  O  No results found for this or any previous visit

## 2018-07-23 ENCOUNTER — ANESTHESIA (OUTPATIENT)
Dept: PERIOP | Facility: HOSPITAL | Age: 78
End: 2018-07-23
Payer: MEDICARE

## 2018-07-23 ENCOUNTER — HOSPITAL ENCOUNTER (OUTPATIENT)
Facility: HOSPITAL | Age: 78
Setting detail: OUTPATIENT SURGERY
Discharge: HOME | End: 2018-07-23
Attending: INTERNAL MEDICINE | Admitting: INTERNAL MEDICINE
Payer: MEDICARE

## 2018-07-23 VITALS
TEMPERATURE: 98.1 F | DIASTOLIC BLOOD PRESSURE: 72 MMHG | HEART RATE: 83 BPM | SYSTOLIC BLOOD PRESSURE: 108 MMHG | HEIGHT: 73 IN | OXYGEN SATURATION: 93 % | RESPIRATION RATE: 18 BRPM | BODY MASS INDEX: 34.3 KG/M2 | WEIGHT: 258.82 LBS

## 2018-07-23 DIAGNOSIS — Z85.038 HISTORY OF COLON CANCER: ICD-10-CM

## 2018-07-23 DIAGNOSIS — Z12.11 SCREENING FOR COLON CANCER: ICD-10-CM

## 2018-07-23 PROCEDURE — 88305 TISSUE EXAM BY PATHOLOGIST: CPT | Performed by: PATHOLOGY

## 2018-07-23 PROCEDURE — 45385 COLONOSCOPY W/LESION REMOVAL: CPT | Performed by: INTERNAL MEDICINE

## 2018-07-23 RX ORDER — LIDOCAINE HYDROCHLORIDE 10 MG/ML
INJECTION, SOLUTION INFILTRATION; PERINEURAL AS NEEDED
Status: DISCONTINUED | OUTPATIENT
Start: 2018-07-23 | End: 2018-07-23 | Stop reason: SURG

## 2018-07-23 RX ORDER — SODIUM CHLORIDE, SODIUM LACTATE, POTASSIUM CHLORIDE, CALCIUM CHLORIDE 600; 310; 30; 20 MG/100ML; MG/100ML; MG/100ML; MG/100ML
75 INJECTION, SOLUTION INTRAVENOUS CONTINUOUS
Status: DISCONTINUED | OUTPATIENT
Start: 2018-07-23 | End: 2018-07-23 | Stop reason: HOSPADM

## 2018-07-23 RX ORDER — PROPOFOL 10 MG/ML
INJECTION, EMULSION INTRAVENOUS AS NEEDED
Status: DISCONTINUED | OUTPATIENT
Start: 2018-07-23 | End: 2018-07-23 | Stop reason: SURG

## 2018-07-23 RX ADMIN — LIDOCAINE HYDROCHLORIDE 50 MG: 10 INJECTION, SOLUTION INFILTRATION; PERINEURAL at 10:17

## 2018-07-23 RX ADMIN — SODIUM CHLORIDE, SODIUM LACTATE, POTASSIUM CHLORIDE, AND CALCIUM CHLORIDE 75 ML/HR: .6; .31; .03; .02 INJECTION, SOLUTION INTRAVENOUS at 09:27

## 2018-07-23 RX ADMIN — PROPOFOL 100 MG: 10 INJECTION, EMULSION INTRAVENOUS at 10:17

## 2018-07-23 RX ADMIN — PROPOFOL 50 MG: 10 INJECTION, EMULSION INTRAVENOUS at 10:21

## 2018-07-23 NOTE — ANESTHESIA POSTPROCEDURE EVALUATION
Post-Op Assessment Note      CV Status:  Stable    Mental Status:  Alert and awake    Hydration Status:  Euvolemic    PONV Controlled:  Controlled    Airway Patency:  Patent    Post Op Vitals Reviewed: Yes          Staff: CRNA           /51 (07/23/18 1030)    Temp 98 1 °F (36 7 °C) (07/23/18 1030)    Pulse (!) 45 (07/23/18 1030)   Resp (!) 24 (07/23/18 1030)    SpO2 94 % (07/23/18 1030)

## 2018-07-23 NOTE — DISCHARGE INSTRUCTIONS
Colonoscopy   WHAT YOU NEED TO KNOW:   A colonoscopy is a procedure to examine the inside of your colon (intestine) with a scope  Polyps or tissue growths may have been removed during your colonoscopy  It is normal to feel bloated and to have some abdominal discomfort  You should be passing gas  If you have hemorrhoids or you had polyps removed, you may have a small amount of bleeding  DISCHARGE INSTRUCTIONS:   Seek care immediately if:   · You have a large amount of bright red blood in your bowel movements  · Your abdomen is hard and firm and you have severe pain  · You have sudden trouble breathing  Contact your healthcare provider if:   · You develop a rash or hives  · You have a fever within 24 hours of your procedure       · You have not had a bowel movement for 3 days after your procedure  · You have questions or concerns about your condition or care  Activity:   · Do not lift, strain, or run  for 3 days after your procedure  · Rest after your procedure  You have been given medicine to relax you  Do not  drive or make important decisions until the day after your procedure  Return to your normal activity as directed  · Relieve gas and discomfort from bloating  by lying on your right side with a heating pad on your abdomen  You may need to take short walks to help the gas move out  Eat small meals until bloating is relieved  If you had polyps removed: For 7 days after your procedure:  · Do not  take aspirin  · Do not  go on long car rides  Follow up with your healthcare provider as directed:  Write down your questions so you remember to ask them during your visits  © 2017 [employer] is for End User's use only and may not be sold, redistributed or otherwise used for commercial purposes  All illustrations and images included in CareNotes® are the copyrighted property of [employer]  or [employer] Orell[employer]    The above information is an  only  It is not intended as medical advice for individual conditions or treatments  Talk to your doctor, nurse or pharmacist before following any medical regimen to see if it is safe and effective for you

## 2018-07-23 NOTE — OP NOTE
**** GI/ENDOSCOPY REPORT ****     PATIENT NAME: Delilah Spurling ------ VISIT ID:  Patient ID: [id number]   YOB: 1940     INTRODUCTION: Colonoscopy - A 70 male patient presents for an outpatient   Colonoscopy at [address] [place] [address]  PREVIOUS COLONOSCOPY: Patient's last colonoscopy was 6 years ago  INDICATIONS: Surveillance  Screening for personal history of colorectal   cancer  Screening for personal history of polyps  CONSENT:  The benefits, risks, and alternatives to the procedure were   discussed and informed consent was obtained from the patient  PREPARATION: EKG, pulse, pulse oximetry and blood pressure were monitored   throughout the procedure  The patient was identified by myself both   verbally and by visual inspection of ID band  Airway Assessment   Classification: Airway class 2 - Visualization of the soft palate, fauces   and uvula  ASA Classification: Class 3 - Patient has severe systemic   disturbance that may or may not be related to the disorder requiring   surgery  The patient was kept NPO for eight hours prior to the procedure  The patient received Nulytely in preparation for the procedure  MEDICATIONS: Anesthesia-check records MAC anesthesia  PROCEDURE:  The endoscope was passed without difficulty through the anus   under direct visualization and advanced to the cecum, confirmed by   appendiceal orifice and ileocecal valve  The scope was withdrawn and the   mucosa was carefully examined  The quality of the preparation was good  Cecal Intubation Time: 3 minutes(s) Scope Withdrawal Time: 5 minutes(s)     RECTAL EXAM: Normal rectal exam      FINDINGS:  Two sessile polyps, measuring between 10 and 20 mm in size,   were found in the rectum  All polyps were completely removed by cold snare   polypectomy  The polyps were retrieved   The rectosigmoid junction appeared   to be normal  The ileocolonic anastamosis was noted at 30cm and appeared   normal  The terminal ileum was examined and appeared normal      COMPLICATIONS: There were no complications  IMPRESSIONS: Two sessile polyps found in the rectum; all polyps removed by   cold snare polypectomy  Normal rectosigmoid junction  The ileocolonic   anastamosis was noted at 30cm and appeared normal  The terminal ileum was   examined and appeared normal      RECOMMENDATIONS: Colonoscopy recommended in 3 years  Follow-up on the   results of the biopsy specimens  ESTIMATED BLOOD LOSS: None  PATHOLOGY SPECIMENS: All polyps completely removed by cold snare   polypectomy  Yes     PROCEDURE CODES: : Colonoscopy with snare polypectomy     ICD-9 Codes: V10 05 Personal history of malignant neoplasm of large   intestine V12 72 Personal history of colonic polyps 211 4 Benign neoplasm   of rectum and anal canal     ICD-10 Codes: Y66 389 Personal history of other malignant neoplasm of   large intestine Z86 010 Personal history of colonic polyps K63 5 Polyp of   colon     PERFORMED BY: CRISTIANA Obregon  on 07/23/2018  Version 1, electronically signed by CRISTIANA Jeong  on   07/23/2018 at 10:30

## 2018-10-11 DIAGNOSIS — M10.9 GOUT, UNSPECIFIED CAUSE, UNSPECIFIED CHRONICITY, UNSPECIFIED SITE: ICD-10-CM

## 2018-10-11 DIAGNOSIS — I10 ESSENTIAL HYPERTENSION: Primary | ICD-10-CM

## 2018-10-11 RX ORDER — ALLOPURINOL 100 MG/1
TABLET ORAL
Qty: 90 TABLET | Refills: 2 | Status: SHIPPED | OUTPATIENT
Start: 2018-10-11 | End: 2019-06-18 | Stop reason: SDUPTHER

## 2018-10-11 RX ORDER — AMLODIPINE BESYLATE 5 MG/1
TABLET ORAL
Qty: 90 TABLET | Refills: 3 | Status: SHIPPED | OUTPATIENT
Start: 2018-10-11 | End: 2019-06-18 | Stop reason: SDUPTHER

## 2018-12-11 ENCOUNTER — APPOINTMENT (OUTPATIENT)
Dept: LAB | Facility: HOSPITAL | Age: 78
End: 2018-12-11
Attending: INTERNAL MEDICINE
Payer: MEDICARE

## 2018-12-11 DIAGNOSIS — I12.9 HYPERTENSIVE KIDNEY DISEASE WITH STAGE 3 CHRONIC KIDNEY DISEASE (HCC): ICD-10-CM

## 2018-12-11 DIAGNOSIS — N18.30 CKD (CHRONIC KIDNEY DISEASE) STAGE 3, GFR 30-59 ML/MIN (HCC): ICD-10-CM

## 2018-12-11 DIAGNOSIS — N18.30 HYPERTENSIVE KIDNEY DISEASE WITH STAGE 3 CHRONIC KIDNEY DISEASE (HCC): ICD-10-CM

## 2018-12-11 DIAGNOSIS — E78.5 HYPERLIPIDEMIA, UNSPECIFIED HYPERLIPIDEMIA TYPE: ICD-10-CM

## 2018-12-11 DIAGNOSIS — E11.9 TYPE 2 DIABETES MELLITUS WITHOUT COMPLICATION, WITHOUT LONG-TERM CURRENT USE OF INSULIN (HCC): ICD-10-CM

## 2018-12-11 LAB
25(OH)D3 SERPL-MCNC: 62.6 NG/ML (ref 30–100)
ANION GAP SERPL CALCULATED.3IONS-SCNC: 11 MMOL/L (ref 4–13)
BACTERIA UR QL AUTO: ABNORMAL /HPF
BASOPHILS # BLD AUTO: 0.03 THOUSANDS/ΜL (ref 0–0.1)
BASOPHILS NFR BLD AUTO: 0 % (ref 0–1)
BILIRUB UR QL STRIP: NEGATIVE
BUN SERPL-MCNC: 14 MG/DL (ref 5–25)
CALCIUM SERPL-MCNC: 9.2 MG/DL (ref 8.3–10.1)
CHLORIDE SERPL-SCNC: 104 MMOL/L (ref 100–108)
CHOLEST SERPL-MCNC: 113 MG/DL (ref 50–200)
CLARITY UR: CLEAR
CO2 SERPL-SCNC: 28 MMOL/L (ref 21–32)
COLOR UR: YELLOW
CREAT SERPL-MCNC: 1.48 MG/DL (ref 0.6–1.3)
CREAT UR-MCNC: 262 MG/DL
EOSINOPHIL # BLD AUTO: 0.11 THOUSAND/ΜL (ref 0–0.61)
EOSINOPHIL NFR BLD AUTO: 1 % (ref 0–6)
ERYTHROCYTE [DISTWIDTH] IN BLOOD BY AUTOMATED COUNT: 18.1 % (ref 11.6–15.1)
EST. AVERAGE GLUCOSE BLD GHB EST-MCNC: 131 MG/DL
GFR SERPL CREATININE-BSD FRML MDRD: 52 ML/MIN/1.73SQ M
GLUCOSE P FAST SERPL-MCNC: 102 MG/DL (ref 65–99)
GLUCOSE UR STRIP-MCNC: NEGATIVE MG/DL
HBA1C MFR BLD: 6.2 % (ref 4.2–6.3)
HCT VFR BLD AUTO: 47.3 % (ref 36.5–49.3)
HDLC SERPL-MCNC: 40 MG/DL (ref 40–60)
HGB BLD-MCNC: 14.8 G/DL (ref 12–17)
HGB UR QL STRIP.AUTO: NEGATIVE
IMM GRANULOCYTES # BLD AUTO: 0.03 THOUSAND/UL (ref 0–0.2)
IMM GRANULOCYTES NFR BLD AUTO: 0 % (ref 0–2)
KETONES UR STRIP-MCNC: NEGATIVE MG/DL
LDLC SERPL CALC-MCNC: 53 MG/DL (ref 0–100)
LEUKOCYTE ESTERASE UR QL STRIP: NEGATIVE
LYMPHOCYTES # BLD AUTO: 1.46 THOUSANDS/ΜL (ref 0.6–4.47)
LYMPHOCYTES NFR BLD AUTO: 19 % (ref 14–44)
MCH RBC QN AUTO: 23.6 PG (ref 26.8–34.3)
MCHC RBC AUTO-ENTMCNC: 31.3 G/DL (ref 31.4–37.4)
MCV RBC AUTO: 76 FL (ref 82–98)
MICROALBUMIN UR-MCNC: 30.2 MG/L (ref 0–20)
MICROALBUMIN/CREAT 24H UR: 12 MG/G CREATININE (ref 0–30)
MONOCYTES # BLD AUTO: 0.62 THOUSAND/ΜL (ref 0.17–1.22)
MONOCYTES NFR BLD AUTO: 8 % (ref 4–12)
NEUTROPHILS # BLD AUTO: 5.35 THOUSANDS/ΜL (ref 1.85–7.62)
NEUTS SEG NFR BLD AUTO: 72 % (ref 43–75)
NITRITE UR QL STRIP: NEGATIVE
NON-SQ EPI CELLS URNS QL MICRO: ABNORMAL /HPF
NONHDLC SERPL-MCNC: 73 MG/DL
NRBC BLD AUTO-RTO: 0 /100 WBCS
PH UR STRIP.AUTO: 5.5 [PH] (ref 4.5–8)
PHOSPHATE SERPL-MCNC: 3.1 MG/DL (ref 2.3–4.1)
PLATELET # BLD AUTO: 241 THOUSANDS/UL (ref 149–390)
PMV BLD AUTO: 11.4 FL (ref 8.9–12.7)
POTASSIUM SERPL-SCNC: 3.9 MMOL/L (ref 3.5–5.3)
PROT UR STRIP-MCNC: NEGATIVE MG/DL
PTH-INTACT SERPL-MCNC: 45.2 PG/ML (ref 18.4–80.1)
RBC # BLD AUTO: 6.26 MILLION/UL (ref 3.88–5.62)
RBC #/AREA URNS AUTO: ABNORMAL /HPF
SODIUM SERPL-SCNC: 143 MMOL/L (ref 136–145)
SP GR UR STRIP.AUTO: 1.02 (ref 1–1.03)
TRIGL SERPL-MCNC: 98 MG/DL
TSH SERPL DL<=0.05 MIU/L-ACNC: 1.49 UIU/ML (ref 0.36–3.74)
URATE SERPL-MCNC: 5.3 MG/DL (ref 4.2–8)
UROBILINOGEN UR QL STRIP.AUTO: 0.2 E.U./DL
WBC # BLD AUTO: 7.6 THOUSAND/UL (ref 4.31–10.16)
WBC #/AREA URNS AUTO: ABNORMAL /HPF

## 2018-12-11 PROCEDURE — 84443 ASSAY THYROID STIM HORMONE: CPT

## 2018-12-11 PROCEDURE — 82306 VITAMIN D 25 HYDROXY: CPT

## 2018-12-11 PROCEDURE — 84550 ASSAY OF BLOOD/URIC ACID: CPT

## 2018-12-11 PROCEDURE — 80048 BASIC METABOLIC PNL TOTAL CA: CPT

## 2018-12-11 PROCEDURE — 84100 ASSAY OF PHOSPHORUS: CPT

## 2018-12-11 PROCEDURE — 82570 ASSAY OF URINE CREATININE: CPT

## 2018-12-11 PROCEDURE — 85025 COMPLETE CBC W/AUTO DIFF WBC: CPT

## 2018-12-11 PROCEDURE — 83036 HEMOGLOBIN GLYCOSYLATED A1C: CPT

## 2018-12-11 PROCEDURE — 80061 LIPID PANEL: CPT

## 2018-12-11 PROCEDURE — 81001 URINALYSIS AUTO W/SCOPE: CPT

## 2018-12-11 PROCEDURE — 83970 ASSAY OF PARATHORMONE: CPT

## 2018-12-11 PROCEDURE — 36415 COLL VENOUS BLD VENIPUNCTURE: CPT

## 2018-12-11 PROCEDURE — 82043 UR ALBUMIN QUANTITATIVE: CPT

## 2018-12-18 ENCOUNTER — OFFICE VISIT (OUTPATIENT)
Dept: NEPHROLOGY | Facility: CLINIC | Age: 78
End: 2018-12-18
Payer: MEDICARE

## 2018-12-18 ENCOUNTER — OFFICE VISIT (OUTPATIENT)
Dept: INTERNAL MEDICINE CLINIC | Facility: CLINIC | Age: 78
End: 2018-12-18
Payer: MEDICARE

## 2018-12-18 VITALS
BODY MASS INDEX: 34.88 KG/M2 | RESPIRATION RATE: 16 BRPM | WEIGHT: 263.2 LBS | HEIGHT: 73 IN | HEART RATE: 82 BPM | SYSTOLIC BLOOD PRESSURE: 130 MMHG | OXYGEN SATURATION: 92 % | DIASTOLIC BLOOD PRESSURE: 84 MMHG

## 2018-12-18 VITALS
DIASTOLIC BLOOD PRESSURE: 92 MMHG | WEIGHT: 261 LBS | HEIGHT: 73 IN | SYSTOLIC BLOOD PRESSURE: 160 MMHG | HEART RATE: 104 BPM | TEMPERATURE: 97.9 F | BODY MASS INDEX: 34.59 KG/M2

## 2018-12-18 DIAGNOSIS — N18.30 CKD (CHRONIC KIDNEY DISEASE) STAGE 3, GFR 30-59 ML/MIN (HCC): Primary | ICD-10-CM

## 2018-12-18 DIAGNOSIS — Z85.038 HISTORY OF COLON CANCER: ICD-10-CM

## 2018-12-18 DIAGNOSIS — R11.0 NAUSEA: ICD-10-CM

## 2018-12-18 DIAGNOSIS — N18.30 CKD (CHRONIC KIDNEY DISEASE) STAGE 3, GFR 30-59 ML/MIN (HCC): ICD-10-CM

## 2018-12-18 DIAGNOSIS — J43.2 CENTRILOBULAR EMPHYSEMA (HCC): ICD-10-CM

## 2018-12-18 DIAGNOSIS — I12.9 HYPERTENSIVE KIDNEY DISEASE WITH STAGE 3 CHRONIC KIDNEY DISEASE (HCC): ICD-10-CM

## 2018-12-18 DIAGNOSIS — N18.30 HYPERTENSIVE KIDNEY DISEASE WITH STAGE 3 CHRONIC KIDNEY DISEASE (HCC): ICD-10-CM

## 2018-12-18 DIAGNOSIS — I10 ESSENTIAL HYPERTENSION: ICD-10-CM

## 2018-12-18 DIAGNOSIS — E11.9 TYPE 2 DIABETES MELLITUS WITHOUT COMPLICATION, WITHOUT LONG-TERM CURRENT USE OF INSULIN (HCC): Primary | ICD-10-CM

## 2018-12-18 LAB
LEFT EYE DIABETIC RETINOPATHY: NORMAL
LEFT EYE IMAGE QUALITY: NORMAL
LEFT EYE MACULAR EDEMA: NORMAL
LEFT EYE OTHER RETINOPATHY: NORMAL
RIGHT EYE DIABETIC RETINOPATHY: NORMAL
RIGHT EYE IMAGE QUALITY: NORMAL
RIGHT EYE MACULAR EDEMA: NORMAL
RIGHT EYE OTHER RETINOPATHY: NORMAL
SEVERITY (EYE EXAM): NORMAL

## 2018-12-18 PROCEDURE — 92250 FUNDUS PHOTOGRAPHY W/I&R: CPT | Performed by: NURSE PRACTITIONER

## 2018-12-18 PROCEDURE — 99214 OFFICE O/P EST MOD 30 MIN: CPT | Performed by: NURSE PRACTITIONER

## 2018-12-18 PROCEDURE — 99213 OFFICE O/P EST LOW 20 MIN: CPT | Performed by: INTERNAL MEDICINE

## 2018-12-18 RX ORDER — PROCHLORPERAZINE MALEATE 10 MG
10 TABLET ORAL EVERY 6 HOURS PRN
Qty: 30 TABLET | Refills: 0 | Status: SHIPPED | OUTPATIENT
Start: 2018-12-18 | End: 2019-06-18 | Stop reason: ALTCHOICE

## 2018-12-18 NOTE — PROGRESS NOTES
NEPHROLOGY OFFICE FOLLOW UP  Eddy Cervantes 70 y o  male MRN: [id number]    Encounter: [id number] DATE: 12/18/2018    REASON FOR VISIT: Eddy Cervantes is a 70 y o  male who is here on 12/18/2018 for Follow-up and CKD III       HPI:    This is a 53-year-old male with a past medical history of CKD stage 3, hypertension, COPD, returns to Nephrology Clinic for further management of CKD stage 3  Upon review of old medical record patient's baseline creatinine seems to be fluctuating between 1 3-1 6  Patient has COPD  Patient currently does not smoke  According to patient his breathing is currently under good control with the use of nebulizers  Patient has hypertension  Patient said that he has been checking blood pressure frequently at home and his blood pressure is under good control  Patient is compliant with his antihypertensive medication  Patient has gout and currently taking allopurinol 100 mg PO daily on daily basis  Patient denies any gout flare-up in the interim  Patient is also taking Tylenol No  3 on p r n  basis  Patient denies use of NSAIDs  Shyam Bhardwaj REVIEW OF SYSTEMS:    Review of Systems   Constitutional: Negative for chills and fever  HENT: Negative for nosebleeds and sore throat  Eyes: Negative for photophobia and pain  Respiratory: Negative for choking and wheezing  Cardiovascular: Negative for chest pain and palpitations  Gastrointestinal: Negative for abdominal pain and blood in stool  Endocrine: Negative for cold intolerance and heat intolerance  Genitourinary: Negative for flank pain and hematuria  Musculoskeletal: Negative for joint swelling and neck pain  Skin: Negative for color change and pallor  Allergic/Immunologic: Negative for environmental allergies and immunocompromised state  Neurological: Negative for seizures and syncope  Hematological: Negative for adenopathy  Does not bruise/bleed easily     Psychiatric/Behavioral: Negative for confusion and suicidal ideas  PAST MEDICAL HISTORY:  Past Medical History:   Diagnosis Date    Back pain     COPD (chronic obstructive pulmonary disease) (HCC)     History of malignant neoplasm of oral cavity     M0    Hypertension     Malignant neoplasm of colon (HCC)     descending    Prostate cancer (HCC)     Tachycardia     Thalassemia trait        PAST SURGICAL HISTORY:  Past Surgical History:   Procedure Laterality Date    BACK SURGERY      CHOLECYSTECTOMY      COLON SURGERY  10/2008    partial colectomy    INCISIONAL HERNIA REPAIR      JOINT REPLACEMENT Right     hip    GA COLONOSCOPY FLX DX W/COLLJ SPEC WHEN PFRMD N/A 7/23/2018    Procedure: COLONOSCOPY;  Surgeon: Shahla Nuno MD;  Location: [facility];   Service: Gastroenterology    PROSTATE SURGERY      TONSILLECTOMY         SOCIAL HISTORY:  History   Alcohol Use No     History   Drug Use No     History   Smoking Status    Former Smoker    Quit date: 6/2/2004   Smokeless Tobacco    Never Used     Comment: non smoker/tobacco user; quit 1987 as per [employer]       FAMILY HISTORY:  Family History   Problem Relation Age of Onset    Heart failure Mother         as per [employer]    Coronary artery disease Mother         as per [employer]    Diabetes Mother         mellitus - as per [employer]    Coronary artery disease Father         as per [employer]    Cancer Sister         malignant neoplasm       ALLERGY:  No Known Allergies    MEDICATIONS:    Current Outpatient Prescriptions:     acetaminophen-codeine (TYLENOL #3) 300-30 mg per tablet, Take 1 tablet by mouth 3 (three) times a day as needed for moderate pain, Disp: , Rfl:     ADVAIR DISKUS 250-50 MCG/DOSE inhaler, USE 1 INHALATION EVERY 12 HOURS, Disp: 180 each, Rfl: 2    albuterol (2 5 mg/3 mL) 0 083 % nebulizer solution, Inhale 1 each every 6 (six) hours as needed, Disp: , Rfl:     albuterol (PROVENTIL HFA,VENTOLIN HFA) 90 mcg/act inhaler, Inhale 2 puffs every 6 (six) hours as needed for wheezing, Disp: 18 g, Rfl: 5    allopurinol (ZYLOPRIM) 100 mg tablet, TAKE 1 TABLET DAILY, Disp: 90 tablet, Rfl: 2    amLODIPine (NORVASC) 5 mg tablet, TAKE 1 TABLET DAILY IN THE MORNING, Disp: 90 tablet, Rfl: 3    losartan (COZAAR) 100 MG tablet, Take 1 tablet (100 mg total) by mouth daily, Disp: 90 tablet, Rfl: 3    metoprolol succinate (TOPROL-XL) 25 mg 24 hr tablet, Take 1 tablet (25 mg total) by mouth daily, Disp: 30 tablet, Rfl: 0    tiotropium (SPIRIVA) 18 mcg inhalation capsule, Place 1 capsule into inhaler and inhale daily, Disp: 30 capsule, Rfl: 0    PHYSICAL EXAM:  Vitals:    12/18/18 0810   BP: 160/92   BP Location: Right arm   Patient Position: Sitting   Cuff Size: Large   Pulse: 104   Temp: 97 9 °F (36 6 °C)   TempSrc: Oral   Weight: 118 kg (261 lb)   Height: 6' 1" (1 854 m)     Body mass index is 34 43 kg/m²  Physical Exam   Constitutional: He appears well-nourished  No distress  HENT:   Head: Normocephalic and atraumatic  Eyes: No scleral icterus  Neck: Neck supple  No JVD present  Cardiovascular: Normal rate, S1 normal and S2 normal     Pulmonary/Chest: Effort normal  No accessory muscle usage  No respiratory distress  He has no wheezes  Abdominal: Soft  He exhibits no distension  Musculoskeletal:        Right ankle: He exhibits no swelling  Left ankle: He exhibits no swelling  Right hand: He exhibits no laceration  Left hand: He exhibits no laceration  Lymphadenopathy:        Right cervical: No superficial cervical adenopathy present  Left cervical: No superficial cervical adenopathy present  Neurological: He is alert  He is not disoriented  Skin: Skin is warm  No cyanosis  Psychiatric: He is not combative  He does not exhibit a depressed mood  He expresses no suicidal ideation         LAB RESULTS:  Results for orders placed or performed in visit on 50/04/87   Basic metabolic panel   Result Value Ref Range    Sodium 143 136 - 145 mmol/L    Potassium 3 9 3 5 - 5 3 mmol/L    Chloride 104 100 - 108 mmol/L    CO2 28 21 - 32 mmol/L    ANION GAP 11 4 - 13 mmol/L    BUN 14 5 - 25 mg/dL    Creatinine 1 48 (H) 0 60 - 1 30 mg/dL    Glucose, Fasting 102 (H) 65 - 99 mg/dL    Calcium 9 2 8 3 - 10 1 mg/dL    eGFR 52 ml/min/1 73sq m   CBC and differential   Result Value Ref Range    WBC 7 60 4 31 - 10 16 Thousand/uL    RBC 6 26 (H) 3 88 - 5 62 Million/uL    Hemoglobin 14 8 12 0 - 17 0 g/dL    Hematocrit 47 3 36 5 - 49 3 %    MCV 76 (L) 82 - 98 fL    MCH 23 6 (L) 26 8 - 34 3 pg    MCHC 31 3 (L) 31 4 - 37 4 g/dL    RDW 18 1 (H) 11 6 - 15 1 %    MPV 11 4 8 9 - 12 7 fL    Platelets 796 166 - 840 Thousands/uL    nRBC 0 /100 WBCs    Neutrophils Relative 72 43 - 75 %    Immat GRANS % 0 0 - 2 %    Lymphocytes Relative 19 14 - 44 %    Monocytes Relative 8 4 - 12 %    Eosinophils Relative 1 0 - 6 %    Basophils Relative 0 0 - 1 %    Neutrophils Absolute 5 35 1 85 - 7 62 Thousands/µL    Immature Grans Absolute 0 03 0 00 - 0 20 Thousand/uL    Lymphocytes Absolute 1 46 0 60 - 4 47 Thousands/µL    Monocytes Absolute 0 62 0 17 - 1 22 Thousand/µL    Eosinophils Absolute 0 11 0 00 - 0 61 Thousand/µL    Basophils Absolute 0 03 0 00 - 0 10 Thousands/µL   Microalbumin / creatinine urine ratio   Result Value Ref Range    Creatinine, Ur 262 0 mg/dL    Microalbum  ,U,Random 30 2 (H) 0 0 - 20 0 mg/L    Microalb Creat Ratio 12 0 - 30 mg/g creatinine   Phosphorus   Result Value Ref Range    Phosphorus 3 1 2 3 - 4 1 mg/dL   PTH, intact   Result Value Ref Range    PTH 45 2 18 4 - 80 1 pg/mL   Uric acid   Result Value Ref Range    Uric Acid 5 3 4 2 - 8 0 mg/dL   Urinalysis with microscopic   Result Value Ref Range    Clarity, UA Clear     Color, UA Yellow     Specific Gravity, UA 1 025 1 003 - 1 030    pH, UA 5 5 4 5 - 8 0    Glucose, UA Negative Negative mg/dl    Ketones, UA Negative Negative mg/dl    Blood, UA Negative Negative    Protein, UA Negative Negative mg/dl    Nitrite, UA Negative Negative    Bilirubin, UA Negative Negative    Urobilinogen, UA 0 2 0 2, 1 0 E U /dl E U /dl    Leukocytes, UA Negative Negative    WBC, UA 0-1 (A) None Seen, 0-5, 5-55, 5-65 /hpf    RBC, UA None Seen None Seen, 0-5 /hpf    Bacteria, UA Occasional None Seen, Occasional /hpf    Epithelial Cells Occasional None Seen, Occasional /hpf   Vitamin D 25 hydroxy   Result Value Ref Range    Vit D, 25-Hydroxy 62 6 30 0 - 100 0 ng/mL   Lipid panel   Result Value Ref Range    Cholesterol 113 50 - 200 mg/dL    Triglycerides 98 <=150 mg/dL    HDL, Direct 40 40 - 60 mg/dL    LDL Calculated 53 0 - 100 mg/dL    Non-HDL-Chol (CHOL-HDL) 73 mg/dl   Hemoglobin A1C   Result Value Ref Range    Hemoglobin A1C 6 2 4 2 - 6 3 %     mg/dl   TSH, 3rd generation   Result Value Ref Range    TSH 3RD GENERATON 1 492 0 358 - 3 740 uIU/mL       ASSESSMENT and PLAN:  Alfredo Thompson was seen today for follow-up and ckd iii  Diagnoses and all orders for this visit:    CKD (chronic kidney disease) stage 3, GFR 30-59 ml/min ([facility])  -     CBC and differential; Future  -     Basic metabolic panel; Future  -     Urinalysis with microscopic; Future  -     Microalbumin / creatinine urine ratio; Future  -     Phosphorus; Future  -     Uric acid; Future  -     PTH, intact; Future  -     Vitamin D 25 hydroxy; Future    Hypertensive kidney disease with stage 3 chronic kidney disease (HCC)  -     Basic metabolic panel; Future      1  CKD stage 3  Multifactorial and was suspected due to underlying hypertensive nephrosclerosis  Upon review of old medical record patient's baseline creatinine seems to be fluctuating between 1 3-1 6 and in the interim patient's renal function has remained stable with current creatinine of 1 48 with EGFR of 52  Patient's hypertension was slightly elevated today but patient said that he has been checking blood pressure at regular basis at home and his blood pressure is under well controlled at home      Plan to avoid NSAIDs and recheck renal function before next visit  2   Hypertension in chronic kidney disease  Today's blood pressure was slightly on the higher side but patient said that he has been checking blood pressure on regular basis and it is under good control  Advised patient to make a log of blood pressure at home for our review with the next visit  Also advised patient to contact me if noticed to have SBP > 160 for > 48 hr   Plan to monitor hypertension with amlodipine 5 mg PO daily, metoprolol XL 25 mg PO daily and losartan 100 mg PO daily  Also advised patient to follow low-salt diet  3   Proteinuria  Minimal in the past with last known urine microalbumin to creatinine ratio of 12 mg and currently patient is taking losartan 100 mg PO daily  Returns to Nephrology Clinic in 6 months  Future labs ordered  Labs (reviewed on 6/15/2019 record  Stable creatinine at 1 5 with EGFR of 52  Hemoglobin 14 1  Urine analysis showed no dysuria  Normal PTH (36), vitamin-D (67), uric acid (6 8), sodium, potassium, bicarb, calcium and phosphorus    NO CHANGE    Portions of the record may have been created with voice recognition software  Occasional wrong word or "sound a like" substitutions may have occurred due to the inherent limitations of voice recognition software  Read the chart carefully and recognize, using context, where substitutions have occurred  If you have any questions, please contact the dictating provider

## 2018-12-18 NOTE — PATIENT INSTRUCTIONS
Diabetes under excellent control    Hypertension stable on current medication    COPD following with pulmonology nebulizer twice a day and continuous O2    History of prostate cancer following with urology    Chronic kidney disease following with Nephrology     History of colon cancer following with GI just had colonoscopy    Iris completed today

## 2018-12-18 NOTE — LETTER
December 18, 2018     Lloyd Larsen MD  [address], [address] [place] [address]    Patient: Jacquelyn Blevins   YOB: 1940   Date of Visit: 12/18/2018       Dear Dr Brooks Brooke: Thank you for referring Jacquelyn Blevins to me for evaluation  Below are my notes for this consultation  If you have questions, please do not hesitate to call me  I look forward to following your patient along with you  Sincerely,        Yunior Acosta MD        CC: No Recipients  Yunior Acosta MD  12/18/2018  8:25 AM  Sign at close encounter  Tomas 70 y o  male MRN: [id number]    Encounter: [id number] DATE: 12/18/2018    REASON FOR VISIT: Jacquelyn Blevins is a 70 y o  male who is here on 12/18/2018 for Follow-up and CKD III       HPI:    This is a 17-year-old male with a past medical history of CKD stage 3, hypertension, COPD, returns to Nephrology Clinic for further management of CKD stage 3  Upon review of old medical record patient's baseline creatinine seems to be fluctuating between 1 3-1 6  Patient has COPD  Patient currently does not smoke  According to patient his breathing is currently under good control with the use of nebulizers  Patient has hypertension  Patient said that he has been checking blood pressure frequently at home and his blood pressure is under good control  Patient is compliant with his antihypertensive medication  Patient has gout and currently taking allopurinol 100 mg PO daily on daily basis  Patient denies any gout flare-up in the interim  Patient is also taking Tylenol No  3 on p r n  basis  Patient denies use of NSAIDs  Aman Britt REVIEW OF SYSTEMS:    Review of Systems   Constitutional: Negative for chills and fever  HENT: Negative for nosebleeds and sore throat  Eyes: Negative for photophobia and pain  Respiratory: Negative for choking and wheezing      Cardiovascular: Negative for chest pain and palpitations  Gastrointestinal: Negative for abdominal pain and blood in stool  Endocrine: Negative for cold intolerance and heat intolerance  Genitourinary: Negative for flank pain and hematuria  Musculoskeletal: Negative for joint swelling and neck pain  Skin: Negative for color change and pallor  Allergic/Immunologic: Negative for environmental allergies and immunocompromised state  Neurological: Negative for seizures and syncope  Hematological: Negative for adenopathy  Does not bruise/bleed easily  Psychiatric/Behavioral: Negative for confusion and suicidal ideas  PAST MEDICAL HISTORY:  Past Medical History:   Diagnosis Date    Back pain     COPD (chronic obstructive pulmonary disease) (HCC)     History of malignant neoplasm of oral cavity     M0    Hypertension     Malignant neoplasm of colon (HCC)     descending    Prostate cancer (HCC)     Tachycardia     Thalassemia trait        PAST SURGICAL HISTORY:  Past Surgical History:   Procedure Laterality Date    BACK SURGERY      CHOLECYSTECTOMY      COLON SURGERY  10/2008    partial colectomy    INCISIONAL HERNIA REPAIR      JOINT REPLACEMENT Right     hip    NM COLONOSCOPY FLX DX W/COLLJ SPEC WHEN PFRMD N/A 7/23/2018    Procedure: COLONOSCOPY;  Surgeon: Bernarda Cross MD;  Location: [facility];   Service: Gastroenterology    PROSTATE SURGERY      TONSILLECTOMY         SOCIAL HISTORY:  History   Alcohol Use No     History   Drug Use No     History   Smoking Status    Former Smoker    Quit date: 6/2/2004   Smokeless Tobacco    Never Used     Comment: non smoker/tobacco user; quit 1987 as per [employer]       FAMILY HISTORY:  Family History   Problem Relation Age of Onset    Heart failure Mother         as per [employer]    Coronary artery disease Mother         as per [employer]    Diabetes Mother         mellitus - as per [employer]    Coronary artery disease Father         as per [employer]    Cancer Sister malignant neoplasm       ALLERGY:  No Known Allergies    MEDICATIONS:    Current Outpatient Prescriptions:     acetaminophen-codeine (TYLENOL #3) 300-30 mg per tablet, Take 1 tablet by mouth 3 (three) times a day as needed for moderate pain, Disp: , Rfl:     ADVAIR DISKUS 250-50 MCG/DOSE inhaler, USE 1 INHALATION EVERY 12 HOURS, Disp: 180 each, Rfl: 2    albuterol (2 5 mg/3 mL) 0 083 % nebulizer solution, Inhale 1 each every 6 (six) hours as needed, Disp: , Rfl:     albuterol (PROVENTIL HFA,VENTOLIN HFA) 90 mcg/act inhaler, Inhale 2 puffs every 6 (six) hours as needed for wheezing, Disp: 18 g, Rfl: 5    allopurinol (ZYLOPRIM) 100 mg tablet, TAKE 1 TABLET DAILY, Disp: 90 tablet, Rfl: 2    amLODIPine (NORVASC) 5 mg tablet, TAKE 1 TABLET DAILY IN THE MORNING, Disp: 90 tablet, Rfl: 3    losartan (COZAAR) 100 MG tablet, Take 1 tablet (100 mg total) by mouth daily, Disp: 90 tablet, Rfl: 3    metoprolol succinate (TOPROL-XL) 25 mg 24 hr tablet, Take 1 tablet (25 mg total) by mouth daily, Disp: 30 tablet, Rfl: 0    tiotropium (SPIRIVA) 18 mcg inhalation capsule, Place 1 capsule into inhaler and inhale daily, Disp: 30 capsule, Rfl: 0    PHYSICAL EXAM:  Vitals:    12/18/18 0810   BP: 160/92   BP Location: Right arm   Patient Position: Sitting   Cuff Size: Large   Pulse: 104   Temp: 97 9 °F (36 6 °C)   TempSrc: Oral   Weight: 118 kg (261 lb)   Height: 6' 1" (1 854 m)     Body mass index is 34 43 kg/m²  Physical Exam   Constitutional: He appears well-nourished  No distress  HENT:   Head: Normocephalic and atraumatic  Eyes: No scleral icterus  Neck: Neck supple  No JVD present  Cardiovascular: Normal rate, S1 normal and S2 normal     Pulmonary/Chest: Effort normal  No accessory muscle usage  No respiratory distress  He has no wheezes  Abdominal: Soft  He exhibits no distension  Musculoskeletal:        Right ankle: He exhibits no swelling  Left ankle: He exhibits no swelling          Right hand: He exhibits no laceration  Left hand: He exhibits no laceration  Lymphadenopathy:        Right cervical: No superficial cervical adenopathy present  Left cervical: No superficial cervical adenopathy present  Neurological: He is alert  He is not disoriented  Skin: Skin is warm  No cyanosis  Psychiatric: He is not combative  He does not exhibit a depressed mood  He expresses no suicidal ideation  LAB RESULTS:  Results for orders placed or performed in visit on 50/98/04   Basic metabolic panel   Result Value Ref Range    Sodium 143 136 - 145 mmol/L    Potassium 3 9 3 5 - 5 3 mmol/L    Chloride 104 100 - 108 mmol/L    CO2 28 21 - 32 mmol/L    ANION GAP 11 4 - 13 mmol/L    BUN 14 5 - 25 mg/dL    Creatinine 1 48 (H) 0 60 - 1 30 mg/dL    Glucose, Fasting 102 (H) 65 - 99 mg/dL    Calcium 9 2 8 3 - 10 1 mg/dL    eGFR 52 ml/min/1 73sq m   CBC and differential   Result Value Ref Range    WBC 7 60 4 31 - 10 16 Thousand/uL    RBC 6 26 (H) 3 88 - 5 62 Million/uL    Hemoglobin 14 8 12 0 - 17 0 g/dL    Hematocrit 47 3 36 5 - 49 3 %    MCV 76 (L) 82 - 98 fL    MCH 23 6 (L) 26 8 - 34 3 pg    MCHC 31 3 (L) 31 4 - 37 4 g/dL    RDW 18 1 (H) 11 6 - 15 1 %    MPV 11 4 8 9 - 12 7 fL    Platelets 508 785 - 522 Thousands/uL    nRBC 0 /100 WBCs    Neutrophils Relative 72 43 - 75 %    Immat GRANS % 0 0 - 2 %    Lymphocytes Relative 19 14 - 44 %    Monocytes Relative 8 4 - 12 %    Eosinophils Relative 1 0 - 6 %    Basophils Relative 0 0 - 1 %    Neutrophils Absolute 5 35 1 85 - 7 62 Thousands/µL    Immature Grans Absolute 0 03 0 00 - 0 20 Thousand/uL    Lymphocytes Absolute 1 46 0 60 - 4 47 Thousands/µL    Monocytes Absolute 0 62 0 17 - 1 22 Thousand/µL    Eosinophils Absolute 0 11 0 00 - 0 61 Thousand/µL    Basophils Absolute 0 03 0 00 - 0 10 Thousands/µL   Microalbumin / creatinine urine ratio   Result Value Ref Range    Creatinine, Ur 262 0 mg/dL    Microalbum  ,U,Random 30 2 (H) 0 0 - 20 0 mg/L    Microalb Creat Ratio 12 0 - 30 mg/g creatinine   Phosphorus   Result Value Ref Range    Phosphorus 3 1 2 3 - 4 1 mg/dL   PTH, intact   Result Value Ref Range    PTH 45 2 18 4 - 80 1 pg/mL   Uric acid   Result Value Ref Range    Uric Acid 5 3 4 2 - 8 0 mg/dL   Urinalysis with microscopic   Result Value Ref Range    Clarity, UA Clear     Color, UA Yellow     Specific Gravity, UA 1 025 1 003 - 1 030    pH, UA 5 5 4 5 - 8 0    Glucose, UA Negative Negative mg/dl    Ketones, UA Negative Negative mg/dl    Blood, UA Negative Negative    Protein, UA Negative Negative mg/dl    Nitrite, UA Negative Negative    Bilirubin, UA Negative Negative    Urobilinogen, UA 0 2 0 2, 1 0 E U /dl E U /dl    Leukocytes, UA Negative Negative    WBC, UA 0-1 (A) None Seen, 0-5, 5-55, 5-65 /hpf    RBC, UA None Seen None Seen, 0-5 /hpf    Bacteria, UA Occasional None Seen, Occasional /hpf    Epithelial Cells Occasional None Seen, Occasional /hpf   Vitamin D 25 hydroxy   Result Value Ref Range    Vit D, 25-Hydroxy 62 6 30 0 - 100 0 ng/mL   Lipid panel   Result Value Ref Range    Cholesterol 113 50 - 200 mg/dL    Triglycerides 98 <=150 mg/dL    HDL, Direct 40 40 - 60 mg/dL    LDL Calculated 53 0 - 100 mg/dL    Non-HDL-Chol (CHOL-HDL) 73 mg/dl   Hemoglobin A1C   Result Value Ref Range    Hemoglobin A1C 6 2 4 2 - 6 3 %     mg/dl   TSH, 3rd generation   Result Value Ref Range    TSH 3RD GENERATON 1 492 0 358 - 3 740 uIU/mL       ASSESSMENT and PLAN:  Rey Mcghee was seen today for follow-up and ckd iii  Diagnoses and all orders for this visit:    CKD (chronic kidney disease) stage 3, GFR 30-59 ml/min ([facility])  -     CBC and differential; Future  -     Basic metabolic panel; Future  -     Urinalysis with microscopic; Future  -     Microalbumin / creatinine urine ratio; Future  -     Phosphorus; Future  -     Uric acid; Future  -     PTH, intact; Future  -     Vitamin D 25 hydroxy;  Future    Hypertensive kidney disease with stage 3 chronic kidney disease [facility])  -     Basic metabolic panel; Future      1  CKD stage 3  Multifactorial and was suspected due to underlying hypertensive nephrosclerosis  Upon review of old medical record patient's baseline creatinine seems to be fluctuating between 1 3-1 6 and in the interim patient's renal function has remained stable with current creatinine of 1 48 with EGFR of 52  Patient's hypertension was slightly elevated today but patient said that he has been checking blood pressure at regular basis at home and his blood pressure is under well controlled at home  Plan to avoid NSAIDs and recheck renal function before next visit  2   Hypertension in chronic kidney disease  Today's blood pressure was slightly on the higher side but patient said that he has been checking blood pressure on regular basis and it is under good control  Advised patient to make a log of blood pressure at home for our review with the next visit  Also advised patient to contact me if noticed to have SBP > 160 for > 48 hr   Plan to monitor hypertension with amlodipine 5 mg PO daily, metoprolol XL 25 mg PO daily and losartan 100 mg PO daily  Also advised patient to follow low-salt diet  3   Proteinuria  Minimal in the past with last known urine microalbumin to creatinine ratio of 12 mg and currently patient is taking losartan 100 mg PO daily  Returns to Nephrology Clinic in 6 months  Future labs ordered  Portions of the record may have been created with voice recognition software  Occasional wrong word or "sound a like" substitutions may have occurred due to the inherent limitations of voice recognition software  Read the chart carefully and recognize, using context, where substitutions have occurred  If you have any questions, please contact the dictating provider

## 2018-12-18 NOTE — PROGRESS NOTES
Assessment/Plan:    Patient Instructions   Diabetes under excellent control    Hypertension stable on current medication    COPD following with pulmonology nebulizer twice a day and continuous O2    History of prostate cancer following with urology    Chronic kidney disease following with Nephrology     History of colon cancer following with GI just had colonoscopy    Iris completed today         Diagnoses and all orders for this visit:    Type 2 diabetes mellitus without complication, without long-term current use of insulin ([address] )  -     Lipid panel; Future  -     Hemoglobin A1C; Future  -     Ambulatory referral to Podiatry; Future  -     POCT diabetic eye exam  -     IRIS Diabetc eye exam    Centrilobular emphysema (HCC)    Essential hypertension  -     CBC and differential; Future  -     TSH, 3rd generation; Future    CKD (chronic kidney disease) stage 3, GFR 30-59 ml/min ([facility])  -     Comprehensive metabolic panel; Future    Nausea  -     prochlorperazine (COMPAZINE) 10 mg tablet;  Take 1 tablet (10 mg total) by mouth every 6 (six) hours as needed for nausea or vomiting    History of colon cancer         Subjective:      Patient ID: Saumya Mg is a 70 y o  male    No home sugars  No home bps  Tolerating bps meds  Following w/ dr Preet Walker for pulm  Dr Soto Myrtle prostate ca  Dr Nicole Buck for nephrology  02 continuous- nebs bid          Current Outpatient Prescriptions:     acetaminophen-codeine (TYLENOL #3) 300-30 mg per tablet, Take 1 tablet by mouth 3 (three) times a day as needed for moderate pain, Disp: , Rfl:     ADVAIR DISKUS 250-50 MCG/DOSE inhaler, USE 1 INHALATION EVERY 12 HOURS, Disp: 180 each, Rfl: 2    albuterol (2 5 mg/3 mL) 0 083 % nebulizer solution, Inhale 1 each every 6 (six) hours as needed, Disp: , Rfl:     albuterol (PROVENTIL HFA,VENTOLIN HFA) 90 mcg/act inhaler, Inhale 2 puffs every 6 (six) hours as needed for wheezing, Disp: 18 g, Rfl: 5    allopurinol (ZYLOPRIM) 100 mg tablet, TAKE 1 TABLET DAILY, Disp: 90 tablet, Rfl: 2    amLODIPine (NORVASC) 5 mg tablet, TAKE 1 TABLET DAILY IN THE MORNING, Disp: 90 tablet, Rfl: 3    losartan (COZAAR) 100 MG tablet, Take 1 tablet (100 mg total) by mouth daily, Disp: 90 tablet, Rfl: 3    metoprolol succinate (TOPROL-XL) 25 mg 24 hr tablet, Take 1 tablet (25 mg total) by mouth daily, Disp: 30 tablet, Rfl: 0    tiotropium (SPIRIVA) 18 mcg inhalation capsule, Place 1 capsule into inhaler and inhale daily, Disp: 30 capsule, Rfl: 0    prochlorperazine (COMPAZINE) 10 mg tablet, Take 1 tablet (10 mg total) by mouth every 6 (six) hours as needed for nausea or vomiting, Disp: 30 tablet, Rfl: 0    Recent Results (from the past 1008 hour(s))   Basic metabolic panel    Collection Time: 12/11/18 11:30 AM   Result Value Ref Range    Sodium 143 136 - 145 mmol/L    Potassium 3 9 3 5 - 5 3 mmol/L    Chloride 104 100 - 108 mmol/L    CO2 28 21 - 32 mmol/L    ANION GAP 11 4 - 13 mmol/L    BUN 14 5 - 25 mg/dL    Creatinine 1 48 (H) 0 60 - 1 30 mg/dL    Glucose, Fasting 102 (H) 65 - 99 mg/dL    Calcium 9 2 8 3 - 10 1 mg/dL    eGFR 52 ml/min/1 73sq m   CBC and differential    Collection Time: 12/11/18 11:30 AM   Result Value Ref Range    WBC 7 60 4 31 - 10 16 Thousand/uL    RBC 6 26 (H) 3 88 - 5 62 Million/uL    Hemoglobin 14 8 12 0 - 17 0 g/dL    Hematocrit 47 3 36 5 - 49 3 %    MCV 76 (L) 82 - 98 fL    MCH 23 6 (L) 26 8 - 34 3 pg    MCHC 31 3 (L) 31 4 - 37 4 g/dL    RDW 18 1 (H) 11 6 - 15 1 %    MPV 11 4 8 9 - 12 7 fL    Platelets 569 554 - 369 Thousands/uL    nRBC 0 /100 WBCs    Neutrophils Relative 72 43 - 75 %    Immat GRANS % 0 0 - 2 %    Lymphocytes Relative 19 14 - 44 %    Monocytes Relative 8 4 - 12 %    Eosinophils Relative 1 0 - 6 %    Basophils Relative 0 0 - 1 %    Neutrophils Absolute 5 35 1 85 - 7 62 Thousands/µL    Immature Grans Absolute 0 03 0 00 - 0 20 Thousand/uL    Lymphocytes Absolute 1 46 0 60 - 4 47 Thousands/µL    Monocytes Absolute 0 62 0 17 - 1 22 Thousand/µL    Eosinophils Absolute 0 11 0 00 - 0 61 Thousand/µL    Basophils Absolute 0 03 0 00 - 0 10 Thousands/µL   Microalbumin / creatinine urine ratio    Collection Time: 12/11/18 11:30 AM   Result Value Ref Range    Creatinine, Ur 262 0 mg/dL    Microalbum  ,U,Random 30 2 (H) 0 0 - 20 0 mg/L    Microalb Creat Ratio 12 0 - 30 mg/g creatinine   Phosphorus    Collection Time: 12/11/18 11:30 AM   Result Value Ref Range    Phosphorus 3 1 2 3 - 4 1 mg/dL   PTH, intact    Collection Time: 12/11/18 11:30 AM   Result Value Ref Range    PTH 45 2 18 4 - 80 1 pg/mL   Uric acid    Collection Time: 12/11/18 11:30 AM   Result Value Ref Range    Uric Acid 5 3 4 2 - 8 0 mg/dL   Urinalysis with microscopic    Collection Time: 12/11/18 11:30 AM   Result Value Ref Range    Clarity, UA Clear     Color, UA Yellow     Specific Gravity, UA 1 025 1 003 - 1 030    pH, UA 5 5 4 5 - 8 0    Glucose, UA Negative Negative mg/dl    Ketones, UA Negative Negative mg/dl    Blood, UA Negative Negative    Protein, UA Negative Negative mg/dl    Nitrite, UA Negative Negative    Bilirubin, UA Negative Negative    Urobilinogen, UA 0 2 0 2, 1 0 E U /dl E U /dl    Leukocytes, UA Negative Negative    WBC, UA 0-1 (A) None Seen, 0-5, 5-55, 5-65 /hpf    RBC, UA None Seen None Seen, 0-5 /hpf    Bacteria, UA Occasional None Seen, Occasional /hpf    Epithelial Cells Occasional None Seen, Occasional /hpf   Vitamin D 25 hydroxy    Collection Time: 12/11/18 11:30 AM   Result Value Ref Range    Vit D, 25-Hydroxy 62 6 30 0 - 100 0 ng/mL   Lipid panel    Collection Time: 12/11/18 11:30 AM   Result Value Ref Range    Cholesterol 113 50 - 200 mg/dL    Triglycerides 98 <=150 mg/dL    HDL, Direct 40 40 - 60 mg/dL    LDL Calculated 53 0 - 100 mg/dL    Non-HDL-Chol (CHOL-HDL) 73 mg/dl   Hemoglobin A1C    Collection Time: 12/11/18 11:30 AM   Result Value Ref Range    Hemoglobin A1C 6 2 4 2 - 6 3 %     mg/dl   TSH, 3rd generation    Collection Time: 12/11/18 11:30 AM   Result Value Ref Range    TSH 3RD OPHELIA 1 492 0 358 - 3 740 uIU/mL   [facility]    Collection Time: 18  4:34 PM   Result Value Ref Range    Severity NORMAL     Right Eye Diabetic Retinopathy None     Right Eye Macular Edema None     Right Eye Other Retinopathy None     Right Eye Image Quality Gradeable Image     Left Eye Diabetic Retinopathy None     Left Eye Macular Edema None     Left Eye Other Retinopathy None     Left Eye Image Quality Not Gradeable Image     Result Retinal Study Result for SHANE RENTERIA     Result      Result       flako Olson 69 y/o, M (: 1940, MRN: [id number])    Result       presented to [facility] on [facility] for a retinal imaging study of the left and right eyes  Result      Result       Based on the findings of the study, the following is recommended for SHANE RENTERIA    Result       Not Gradable Eye(s) Found: Schedule an appointment with a retina specialist for further evaluation within 3 months  Result      Result       Interpreting Provider's Comments:  No comments provided    Result      Result Right Eye Findings:     Result Normal Result  Negative for Diabetic Retinopathy  Result      Result Left Eye Findings:     Result       Image not gradable for reasons listed:  Insufficient View of Macula    Result      Result      Result       This result was electronically signed by Waddell Snellen, MD, NPI: [id number], Taxonomy: [id number] on 2018 09:39:22 [facility] time  Result      Result       NOTE:  Any pathology noted on this diabetic retinal evaluation should be confirmed by an appropriate ophthalmic examination         The following portions of the patient's history were reviewed and updated as appropriate: allergies, current medications, past family history, past medical history, past social history, past surgical history and problem list      Review of Systems   Constitutional: Negative for appetite change, chills, diaphoresis, fatigue, fever and unexpected weight change  HENT: Negative for postnasal drip and sneezing  Eyes: Negative for visual disturbance  Respiratory: Negative for chest tightness and shortness of breath  Cardiovascular: Negative for chest pain, palpitations and leg swelling  Gastrointestinal: Negative for abdominal pain and blood in stool  Endocrine: Negative for cold intolerance, heat intolerance, polydipsia, polyphagia and polyuria  Genitourinary: Negative for difficulty urinating, dysuria, frequency and urgency  Musculoskeletal: Positive for back pain  Negative for arthralgias and myalgias  Skin: Negative for rash and wound  Neurological: Negative for dizziness, weakness, light-headedness and headaches  Hematological: Negative for adenopathy  Psychiatric/Behavioral: Negative for confusion, dysphoric mood and sleep disturbance  The patient is not nervous/anxious  Objective:      /84 (BP Location: Left arm, Patient Position: Sitting, Cuff Size: Standard)   Pulse 82   Resp 16   Ht 6' 1" (1 854 m)   Wt 119 kg (263 lb 3 2 oz)   SpO2 92%   BMI 34 73 kg/m²        Physical Exam   Constitutional: He is oriented to person, place, and time  He appears well-developed  No distress  HENT:   Head: Normocephalic and atraumatic  Nose: Nose normal    Mouth/Throat: Oropharynx is clear and moist    Eyes: Pupils are equal, round, and reactive to light  Conjunctivae and EOM are normal    Neck: Normal range of motion  Neck supple  No JVD present  No tracheal deviation present  No thyromegaly present  Cardiovascular: Normal rate, regular rhythm, normal heart sounds and intact distal pulses  Exam reveals no gallop and no friction rub  No murmur heard  Pulses:       Dorsalis pedis pulses are 2+ on the right side, and 2+ on the left side  Posterior tibial pulses are 2+ on the right side, and 2+ on the left side     Pulmonary/Chest: Effort normal and breath sounds normal  No respiratory distress  He has no wheezes  He has no rales  Abdominal: Soft  Bowel sounds are normal  He exhibits no distension  There is no tenderness  Musculoskeletal: Normal range of motion  He exhibits no edema  Feet:   Right Foot:   Skin Integrity: Negative for ulcer, skin breakdown, erythema, warmth, callus or dry skin  Left Foot:   Skin Integrity: Negative for ulcer, skin breakdown, erythema, warmth, callus or dry skin  Lymphadenopathy:     He has no cervical adenopathy  Neurological: He is alert and oriented to person, place, and time  No cranial nerve deficit  Skin: Skin is warm and dry  No rash noted  He is not diaphoretic  Psychiatric: He has a normal mood and affect  His behavior is normal  Judgment and thought content normal    Patient's shoes and socks removed  Right Foot/Ankle   Right Foot Inspection  Skin Exam: skin normal and skin intact no dry skin, no warmth, no callus, no erythema, no maceration, no abnormal color, no pre-ulcer, no ulcer and no callus                          Toe Exam: ROM and strength within normal limits  Sensory   Vibration: intact  Proprioception: intact   Monofilament testing: diminished  Vascular  Capillary refills: < 3 seconds  The right DP pulse is 2+  The right PT pulse is 2+  Left Foot/Ankle  Left Foot Inspection  Skin Exam: skin normal and skin intactno dry skin, no warmth, no erythema, no maceration, normal color, no pre-ulcer, no ulcer and no callus                         Toe Exam: ROM and strength within normal limits                   Sensory   Vibration: intact  Proprioception: intact  Monofilament: diminished  Vascular  Capillary refills: < 3 seconds  The left DP pulse is 2+  The left PT pulse is 2+     Assign Risk Category:  No deformity present; ;        Risk: 1

## 2019-01-02 ENCOUNTER — TELEPHONE (OUTPATIENT)
Dept: INTERNAL MEDICINE CLINIC | Facility: CLINIC | Age: 79
End: 2019-01-02

## 2019-01-02 NOTE — TELEPHONE ENCOUNTER
----- Message from Shandra Melendez, [address] Rubin sent at 1/2/2019  3:06 PM EST -----  Notify in office eye exam normal

## 2019-01-09 DIAGNOSIS — J43.9 PULMONARY EMPHYSEMA, UNSPECIFIED EMPHYSEMA TYPE (HCC): Primary | ICD-10-CM

## 2019-01-09 DIAGNOSIS — J42 CHRONIC BRONCHITIS, UNSPECIFIED CHRONIC BRONCHITIS TYPE (HCC): ICD-10-CM

## 2019-01-10 RX ORDER — TIOTROPIUM BROMIDE 18 UG/1
CAPSULE ORAL; RESPIRATORY (INHALATION)
Qty: 90 EACH | Refills: 2 | Status: SHIPPED | OUTPATIENT
Start: 2019-01-10 | End: 2019-06-18 | Stop reason: SDUPTHER

## 2019-01-23 ENCOUNTER — TELEPHONE (OUTPATIENT)
Dept: NEPHROLOGY | Facility: CLINIC | Age: 79
End: 2019-01-23

## 2019-01-23 NOTE — TELEPHONE ENCOUNTER
Called and left a message on machine for patient to return our call about rescheduling his appointment for Wednesday 6/19/2019 6mo follow up

## 2019-02-01 ENCOUNTER — TELEPHONE (OUTPATIENT)
Dept: INTERNAL MEDICINE CLINIC | Facility: CLINIC | Age: 79
End: 2019-02-01

## 2019-02-04 ENCOUNTER — TELEPHONE (OUTPATIENT)
Dept: INTERNAL MEDICINE CLINIC | Facility: CLINIC | Age: 79
End: 2019-02-04

## 2019-04-02 ENCOUNTER — OFFICE VISIT (OUTPATIENT)
Dept: PHYSICAL THERAPY | Facility: CLINIC | Age: 79
End: 2019-04-02
Payer: MEDICARE

## 2019-04-02 DIAGNOSIS — G89.29 CHRONIC BILATERAL LOW BACK PAIN WITH SCIATICA, SCIATICA LATERALITY UNSPECIFIED: Primary | ICD-10-CM

## 2019-04-02 DIAGNOSIS — M54.16 LUMBAR RADICULOPATHY: ICD-10-CM

## 2019-04-02 DIAGNOSIS — M54.40 CHRONIC BILATERAL LOW BACK PAIN WITH SCIATICA, SCIATICA LATERALITY UNSPECIFIED: Primary | ICD-10-CM

## 2019-04-02 PROCEDURE — 97110 THERAPEUTIC EXERCISES: CPT

## 2019-04-04 ENCOUNTER — OFFICE VISIT (OUTPATIENT)
Dept: PHYSICAL THERAPY | Facility: CLINIC | Age: 79
End: 2019-04-04
Payer: MEDICARE

## 2019-04-04 DIAGNOSIS — G89.29 CHRONIC BILATERAL LOW BACK PAIN WITH SCIATICA, SCIATICA LATERALITY UNSPECIFIED: Primary | ICD-10-CM

## 2019-04-04 DIAGNOSIS — M54.40 CHRONIC BILATERAL LOW BACK PAIN WITH SCIATICA, SCIATICA LATERALITY UNSPECIFIED: Primary | ICD-10-CM

## 2019-04-04 DIAGNOSIS — M54.16 LUMBAR RADICULOPATHY: ICD-10-CM

## 2019-04-04 PROCEDURE — 97110 THERAPEUTIC EXERCISES: CPT

## 2019-04-09 ENCOUNTER — OFFICE VISIT (OUTPATIENT)
Dept: PHYSICAL THERAPY | Facility: CLINIC | Age: 79
End: 2019-04-09
Payer: MEDICARE

## 2019-04-09 DIAGNOSIS — G89.29 CHRONIC BILATERAL LOW BACK PAIN WITH SCIATICA, SCIATICA LATERALITY UNSPECIFIED: Primary | ICD-10-CM

## 2019-04-09 DIAGNOSIS — M54.40 CHRONIC BILATERAL LOW BACK PAIN WITH SCIATICA, SCIATICA LATERALITY UNSPECIFIED: Primary | ICD-10-CM

## 2019-04-09 DIAGNOSIS — M54.16 LUMBAR RADICULOPATHY: ICD-10-CM

## 2019-04-09 PROCEDURE — 97140 MANUAL THERAPY 1/> REGIONS: CPT | Performed by: PHYSICAL THERAPIST

## 2019-04-09 PROCEDURE — 97110 THERAPEUTIC EXERCISES: CPT | Performed by: PHYSICAL THERAPIST

## 2019-04-11 ENCOUNTER — OFFICE VISIT (OUTPATIENT)
Dept: PHYSICAL THERAPY | Facility: CLINIC | Age: 79
End: 2019-04-11
Payer: MEDICARE

## 2019-04-11 DIAGNOSIS — M54.16 LUMBAR RADICULOPATHY: ICD-10-CM

## 2019-04-11 DIAGNOSIS — G89.29 CHRONIC BILATERAL LOW BACK PAIN WITH SCIATICA, SCIATICA LATERALITY UNSPECIFIED: Primary | ICD-10-CM

## 2019-04-11 DIAGNOSIS — M54.40 CHRONIC BILATERAL LOW BACK PAIN WITH SCIATICA, SCIATICA LATERALITY UNSPECIFIED: Primary | ICD-10-CM

## 2019-04-11 PROCEDURE — 97112 NEUROMUSCULAR REEDUCATION: CPT | Performed by: PHYSICAL THERAPIST

## 2019-04-11 PROCEDURE — 97110 THERAPEUTIC EXERCISES: CPT | Performed by: PHYSICAL THERAPIST

## 2019-04-16 ENCOUNTER — OFFICE VISIT (OUTPATIENT)
Dept: PHYSICAL THERAPY | Facility: CLINIC | Age: 79
End: 2019-04-16
Payer: MEDICARE

## 2019-04-16 DIAGNOSIS — G89.29 CHRONIC BILATERAL LOW BACK PAIN WITH SCIATICA, SCIATICA LATERALITY UNSPECIFIED: Primary | ICD-10-CM

## 2019-04-16 DIAGNOSIS — M54.16 LUMBAR RADICULOPATHY: ICD-10-CM

## 2019-04-16 DIAGNOSIS — M54.40 CHRONIC BILATERAL LOW BACK PAIN WITH SCIATICA, SCIATICA LATERALITY UNSPECIFIED: Primary | ICD-10-CM

## 2019-04-16 PROCEDURE — 97112 NEUROMUSCULAR REEDUCATION: CPT

## 2019-04-16 PROCEDURE — 97110 THERAPEUTIC EXERCISES: CPT

## 2019-04-18 ENCOUNTER — OFFICE VISIT (OUTPATIENT)
Dept: PHYSICAL THERAPY | Facility: CLINIC | Age: 79
End: 2019-04-18
Payer: MEDICARE

## 2019-04-18 DIAGNOSIS — M54.16 LUMBAR RADICULOPATHY: ICD-10-CM

## 2019-04-18 DIAGNOSIS — G89.29 CHRONIC BILATERAL LOW BACK PAIN WITH SCIATICA, SCIATICA LATERALITY UNSPECIFIED: Primary | ICD-10-CM

## 2019-04-18 DIAGNOSIS — M54.40 CHRONIC BILATERAL LOW BACK PAIN WITH SCIATICA, SCIATICA LATERALITY UNSPECIFIED: Primary | ICD-10-CM

## 2019-04-18 PROCEDURE — 97110 THERAPEUTIC EXERCISES: CPT | Performed by: PHYSICAL THERAPIST

## 2019-04-23 ENCOUNTER — OFFICE VISIT (OUTPATIENT)
Dept: PHYSICAL THERAPY | Facility: CLINIC | Age: 79
End: 2019-04-23
Payer: MEDICARE

## 2019-04-23 DIAGNOSIS — G89.29 CHRONIC BILATERAL LOW BACK PAIN WITH SCIATICA, SCIATICA LATERALITY UNSPECIFIED: Primary | ICD-10-CM

## 2019-04-23 DIAGNOSIS — M54.40 CHRONIC BILATERAL LOW BACK PAIN WITH SCIATICA, SCIATICA LATERALITY UNSPECIFIED: Primary | ICD-10-CM

## 2019-04-23 DIAGNOSIS — M54.16 LUMBAR RADICULOPATHY: ICD-10-CM

## 2019-04-23 PROCEDURE — 97112 NEUROMUSCULAR REEDUCATION: CPT

## 2019-04-23 PROCEDURE — 97140 MANUAL THERAPY 1/> REGIONS: CPT

## 2019-04-25 ENCOUNTER — OFFICE VISIT (OUTPATIENT)
Dept: PHYSICAL THERAPY | Facility: CLINIC | Age: 79
End: 2019-04-25
Payer: MEDICARE

## 2019-04-25 DIAGNOSIS — M54.40 CHRONIC BILATERAL LOW BACK PAIN WITH SCIATICA, SCIATICA LATERALITY UNSPECIFIED: Primary | ICD-10-CM

## 2019-04-25 DIAGNOSIS — G89.29 CHRONIC BILATERAL LOW BACK PAIN WITH SCIATICA, SCIATICA LATERALITY UNSPECIFIED: Primary | ICD-10-CM

## 2019-04-25 DIAGNOSIS — M54.16 LUMBAR RADICULOPATHY: ICD-10-CM

## 2019-04-25 PROCEDURE — 97110 THERAPEUTIC EXERCISES: CPT | Performed by: PHYSICAL THERAPIST

## 2019-05-14 ENCOUNTER — TELEPHONE (OUTPATIENT)
Dept: NEPHROLOGY | Facility: CLINIC | Age: 79
End: 2019-05-14

## 2019-05-18 ENCOUNTER — APPOINTMENT (EMERGENCY)
Dept: CT IMAGING | Facility: HOSPITAL | Age: 79
End: 2019-05-18
Payer: MEDICARE

## 2019-05-18 ENCOUNTER — HOSPITAL ENCOUNTER (EMERGENCY)
Facility: HOSPITAL | Age: 79
Discharge: HOME | End: 2019-05-18
Attending: EMERGENCY MEDICINE | Admitting: EMERGENCY MEDICINE
Payer: MEDICARE

## 2019-05-18 VITALS
SYSTOLIC BLOOD PRESSURE: 165 MMHG | WEIGHT: 251.1 LBS | BODY MASS INDEX: 33.13 KG/M2 | OXYGEN SATURATION: 90 % | DIASTOLIC BLOOD PRESSURE: 85 MMHG | RESPIRATION RATE: 16 BRPM | TEMPERATURE: 98 F | HEART RATE: 46 BPM

## 2019-05-18 DIAGNOSIS — R11.2 NAUSEA AND VOMITING: ICD-10-CM

## 2019-05-18 DIAGNOSIS — R10.31 ACUTE RIGHT LOWER QUADRANT PAIN: ICD-10-CM

## 2019-05-18 DIAGNOSIS — N20.1 RIGHT URETERAL STONE: Primary | ICD-10-CM

## 2019-05-18 LAB
ALBUMIN SERPL BCP-MCNC: 3.5 G/DL (ref 3.5–5)
ALP SERPL-CCNC: 91 U/L (ref 46–116)
ALT SERPL W P-5'-P-CCNC: 31 U/L (ref 12–78)
ANION GAP SERPL CALCULATED.3IONS-SCNC: 8 MMOL/L (ref 4–13)
AST SERPL W P-5'-P-CCNC: 39 U/L (ref 5–45)
ATRIAL RATE: 48 BPM
BACTERIA UR QL AUTO: ABNORMAL /HPF
BASOPHILS # BLD AUTO: 0.02 THOUSANDS/ΜL (ref 0–0.1)
BASOPHILS NFR BLD AUTO: 0 % (ref 0–1)
BILIRUB DIRECT SERPL-MCNC: 0.21 MG/DL (ref 0–0.2)
BILIRUB SERPL-MCNC: 1 MG/DL (ref 0.2–1)
BILIRUB UR QL STRIP: NEGATIVE
BUN SERPL-MCNC: 20 MG/DL (ref 5–25)
CALCIUM SERPL-MCNC: 9.1 MG/DL (ref 8.3–10.1)
CHLORIDE SERPL-SCNC: 108 MMOL/L (ref 100–108)
CLARITY UR: CLEAR
CO2 SERPL-SCNC: 28 MMOL/L (ref 21–32)
COLOR UR: YELLOW
CREAT SERPL-MCNC: 1.68 MG/DL (ref 0.6–1.3)
EOSINOPHIL # BLD AUTO: 0 THOUSAND/ΜL (ref 0–0.61)
EOSINOPHIL NFR BLD AUTO: 0 % (ref 0–6)
ERYTHROCYTE [DISTWIDTH] IN BLOOD BY AUTOMATED COUNT: 18 % (ref 11.6–15.1)
GFR SERPL CREATININE-BSD FRML MDRD: 45 ML/MIN/1.73SQ M
GLUCOSE SERPL-MCNC: 123 MG/DL (ref 65–140)
GLUCOSE UR STRIP-MCNC: NEGATIVE MG/DL
HCT VFR BLD AUTO: 47.6 % (ref 36.5–49.3)
HGB BLD-MCNC: 14.9 G/DL (ref 12–17)
HGB UR QL STRIP.AUTO: ABNORMAL
IMM GRANULOCYTES # BLD AUTO: 0.1 THOUSAND/UL (ref 0–0.2)
IMM GRANULOCYTES NFR BLD AUTO: 1 % (ref 0–2)
KETONES UR STRIP-MCNC: NEGATIVE MG/DL
LACTATE SERPL-SCNC: 1.4 MMOL/L (ref 0.5–2)
LACTATE SERPL-SCNC: 2.2 MMOL/L (ref 0.5–2)
LEUKOCYTE ESTERASE UR QL STRIP: NEGATIVE
LIPASE SERPL-CCNC: 100 U/L (ref 73–393)
LYMPHOCYTES # BLD AUTO: 1.1 THOUSANDS/ΜL (ref 0.6–4.47)
LYMPHOCYTES NFR BLD AUTO: 6 % (ref 14–44)
MCH RBC QN AUTO: 24.3 PG (ref 26.8–34.3)
MCHC RBC AUTO-ENTMCNC: 31.3 G/DL (ref 31.4–37.4)
MCV RBC AUTO: 78 FL (ref 82–98)
MONOCYTES # BLD AUTO: 0.93 THOUSAND/ΜL (ref 0.17–1.22)
MONOCYTES NFR BLD AUTO: 5 % (ref 4–12)
NEUTROPHILS # BLD AUTO: 15.79 THOUSANDS/ΜL (ref 1.85–7.62)
NEUTS SEG NFR BLD AUTO: 88 % (ref 43–75)
NITRITE UR QL STRIP: NEGATIVE
NON-SQ EPI CELLS URNS QL MICRO: ABNORMAL /HPF
NRBC BLD AUTO-RTO: 0 /100 WBCS
P AXIS: 75 DEGREES
PH UR STRIP.AUTO: 5.5 [PH]
PLATELET # BLD AUTO: 208 THOUSANDS/UL (ref 149–390)
PMV BLD AUTO: 10.5 FL (ref 8.9–12.7)
POTASSIUM SERPL-SCNC: 3.9 MMOL/L (ref 3.5–5.3)
PR INTERVAL: 186 MS
PROT SERPL-MCNC: 7.2 G/DL (ref 6.4–8.2)
PROT UR STRIP-MCNC: NEGATIVE MG/DL
QRS AXIS: 77 DEGREES
QRSD INTERVAL: 142 MS
QT INTERVAL: 560 MS
QTC INTERVAL: 500 MS
RBC # BLD AUTO: 6.12 MILLION/UL (ref 3.88–5.62)
RBC #/AREA URNS AUTO: ABNORMAL /HPF
SODIUM SERPL-SCNC: 144 MMOL/L (ref 136–145)
SP GR UR STRIP.AUTO: 1.01 (ref 1–1.03)
T WAVE AXIS: 70 DEGREES
TROPONIN I SERPL-MCNC: <0.02 NG/ML
UROBILINOGEN UR QL STRIP.AUTO: 0.2 E.U./DL
VENTRICULAR RATE: 48 BPM
WBC # BLD AUTO: 17.94 THOUSAND/UL (ref 4.31–10.16)
WBC #/AREA URNS AUTO: ABNORMAL /HPF

## 2019-05-18 PROCEDURE — 80048 BASIC METABOLIC PNL TOTAL CA: CPT | Performed by: EMERGENCY MEDICINE

## 2019-05-18 PROCEDURE — 99284 EMERGENCY DEPT VISIT MOD MDM: CPT

## 2019-05-18 PROCEDURE — 81001 URINALYSIS AUTO W/SCOPE: CPT | Performed by: EMERGENCY MEDICINE

## 2019-05-18 PROCEDURE — 93005 ELECTROCARDIOGRAM TRACING: CPT

## 2019-05-18 PROCEDURE — 83690 ASSAY OF LIPASE: CPT | Performed by: EMERGENCY MEDICINE

## 2019-05-18 PROCEDURE — 96374 THER/PROPH/DIAG INJ IV PUSH: CPT

## 2019-05-18 PROCEDURE — 99284 EMERGENCY DEPT VISIT MOD MDM: CPT | Performed by: EMERGENCY MEDICINE

## 2019-05-18 PROCEDURE — 83605 ASSAY OF LACTIC ACID: CPT | Performed by: EMERGENCY MEDICINE

## 2019-05-18 PROCEDURE — 85025 COMPLETE CBC W/AUTO DIFF WBC: CPT | Performed by: EMERGENCY MEDICINE

## 2019-05-18 PROCEDURE — 96375 TX/PRO/DX INJ NEW DRUG ADDON: CPT

## 2019-05-18 PROCEDURE — 36415 COLL VENOUS BLD VENIPUNCTURE: CPT | Performed by: EMERGENCY MEDICINE

## 2019-05-18 PROCEDURE — 74177 CT ABD & PELVIS W/CONTRAST: CPT

## 2019-05-18 PROCEDURE — 80076 HEPATIC FUNCTION PANEL: CPT | Performed by: EMERGENCY MEDICINE

## 2019-05-18 PROCEDURE — 93010 ELECTROCARDIOGRAM REPORT: CPT | Performed by: INTERNAL MEDICINE

## 2019-05-18 PROCEDURE — 84484 ASSAY OF TROPONIN QUANT: CPT | Performed by: EMERGENCY MEDICINE

## 2019-05-18 PROCEDURE — 96361 HYDRATE IV INFUSION ADD-ON: CPT

## 2019-05-18 RX ORDER — TAMSULOSIN HYDROCHLORIDE 0.4 MG/1
0.4 CAPSULE ORAL
Qty: 7 CAPSULE | Refills: 0 | Status: SHIPPED | OUTPATIENT
Start: 2019-05-18 | End: 2019-12-27

## 2019-05-18 RX ORDER — ONDANSETRON 2 MG/ML
4 INJECTION INTRAMUSCULAR; INTRAVENOUS ONCE
Status: COMPLETED | OUTPATIENT
Start: 2019-05-18 | End: 2019-05-18

## 2019-05-18 RX ORDER — OXYCODONE HYDROCHLORIDE AND ACETAMINOPHEN 5; 325 MG/1; MG/1
1 TABLET ORAL EVERY 4 HOURS PRN
Qty: 12 TABLET | Refills: 0 | Status: SHIPPED | OUTPATIENT
Start: 2019-05-18 | End: 2019-05-28

## 2019-05-18 RX ORDER — ONDANSETRON 4 MG/1
4 TABLET, ORALLY DISINTEGRATING ORAL EVERY 8 HOURS PRN
Qty: 16 TABLET | Refills: 0 | Status: SHIPPED | OUTPATIENT
Start: 2019-05-18 | End: 2020-07-06

## 2019-05-18 RX ORDER — TAMSULOSIN HYDROCHLORIDE 0.4 MG/1
0.4 CAPSULE ORAL ONCE
Status: COMPLETED | OUTPATIENT
Start: 2019-05-18 | End: 2019-05-18

## 2019-05-18 RX ORDER — MORPHINE SULFATE 4 MG/ML
4 INJECTION, SOLUTION INTRAMUSCULAR; INTRAVENOUS ONCE
Status: COMPLETED | OUTPATIENT
Start: 2019-05-18 | End: 2019-05-18

## 2019-05-18 RX ADMIN — IOHEXOL 100 ML: 350 INJECTION, SOLUTION INTRAVENOUS at 17:12

## 2019-05-18 RX ADMIN — SODIUM CHLORIDE 500 ML: 0.9 INJECTION, SOLUTION INTRAVENOUS at 14:37

## 2019-05-18 RX ADMIN — SODIUM CHLORIDE 500 ML: 0.9 INJECTION, SOLUTION INTRAVENOUS at 15:41

## 2019-05-18 RX ADMIN — MORPHINE SULFATE 4 MG: 4 INJECTION INTRAVENOUS at 15:37

## 2019-05-18 RX ADMIN — ONDANSETRON 4 MG: 2 INJECTION INTRAMUSCULAR; INTRAVENOUS at 14:36

## 2019-05-18 RX ADMIN — TAMSULOSIN HYDROCHLORIDE 0.4 MG: 0.4 CAPSULE ORAL at 18:41

## 2019-05-20 ENCOUNTER — TELEPHONE (OUTPATIENT)
Dept: UROLOGY | Facility: CLINIC | Age: 79
End: 2019-05-20

## 2019-05-29 ENCOUNTER — OFFICE VISIT (OUTPATIENT)
Dept: UROLOGY | Facility: CLINIC | Age: 79
End: 2019-05-29
Payer: MEDICARE

## 2019-05-29 VITALS
HEART RATE: 60 BPM | BODY MASS INDEX: 33.27 KG/M2 | DIASTOLIC BLOOD PRESSURE: 82 MMHG | WEIGHT: 251 LBS | SYSTOLIC BLOOD PRESSURE: 128 MMHG | HEIGHT: 73 IN

## 2019-05-29 DIAGNOSIS — C61 PROSTATE CANCER (HCC): ICD-10-CM

## 2019-05-29 DIAGNOSIS — N20.0 NEPHROLITHIASIS: Primary | ICD-10-CM

## 2019-05-29 PROCEDURE — 99214 OFFICE O/P EST MOD 30 MIN: CPT | Performed by: UROLOGY

## 2019-06-13 PROBLEM — E11.22 TYPE 2 DIABETES MELLITUS WITH CHRONIC KIDNEY DISEASE, WITHOUT LONG-TERM CURRENT USE OF INSULIN (HCC): Status: ACTIVE | Noted: 2018-01-04

## 2019-06-14 ENCOUNTER — TELEPHONE (OUTPATIENT)
Dept: INTERNAL MEDICINE CLINIC | Facility: CLINIC | Age: 79
End: 2019-06-14

## 2019-06-14 DIAGNOSIS — E78.5 HYPERLIPIDEMIA, UNSPECIFIED HYPERLIPIDEMIA TYPE: Primary | ICD-10-CM

## 2019-06-15 ENCOUNTER — APPOINTMENT (OUTPATIENT)
Dept: LAB | Facility: HOSPITAL | Age: 79
End: 2019-06-15
Attending: INTERNAL MEDICINE
Payer: MEDICARE

## 2019-06-15 DIAGNOSIS — N18.30 CKD (CHRONIC KIDNEY DISEASE) STAGE 3, GFR 30-59 ML/MIN (HCC): ICD-10-CM

## 2019-06-15 DIAGNOSIS — I12.9 HYPERTENSIVE KIDNEY DISEASE WITH STAGE 3 CHRONIC KIDNEY DISEASE (HCC): ICD-10-CM

## 2019-06-15 DIAGNOSIS — N18.30 HYPERTENSIVE KIDNEY DISEASE WITH STAGE 3 CHRONIC KIDNEY DISEASE (HCC): ICD-10-CM

## 2019-06-15 DIAGNOSIS — E78.5 HYPERLIPIDEMIA, UNSPECIFIED HYPERLIPIDEMIA TYPE: ICD-10-CM

## 2019-06-15 DIAGNOSIS — I10 ESSENTIAL HYPERTENSION: ICD-10-CM

## 2019-06-15 DIAGNOSIS — E11.9 TYPE 2 DIABETES MELLITUS WITHOUT COMPLICATION, WITHOUT LONG-TERM CURRENT USE OF INSULIN (HCC): ICD-10-CM

## 2019-06-15 LAB
25(OH)D3 SERPL-MCNC: 67.5 NG/ML (ref 30–100)
ALBUMIN SERPL BCP-MCNC: 3.7 G/DL (ref 3.5–5)
ALP SERPL-CCNC: 110 U/L (ref 46–116)
ALT SERPL W P-5'-P-CCNC: 26 U/L (ref 12–78)
ANION GAP SERPL CALCULATED.3IONS-SCNC: 10 MMOL/L (ref 4–13)
AST SERPL W P-5'-P-CCNC: 38 U/L (ref 5–45)
BACTERIA UR QL AUTO: ABNORMAL /HPF
BASOPHILS # BLD AUTO: 0.03 THOUSANDS/ΜL (ref 0–0.1)
BASOPHILS NFR BLD AUTO: 0 % (ref 0–1)
BILIRUB SERPL-MCNC: 1.4 MG/DL (ref 0.2–1)
BILIRUB UR QL STRIP: NEGATIVE
BUN SERPL-MCNC: 12 MG/DL (ref 5–25)
CALCIUM SERPL-MCNC: 9.5 MG/DL (ref 8.3–10.1)
CAOX CRY URNS QL MICRO: ABNORMAL /HPF
CHLORIDE SERPL-SCNC: 108 MMOL/L (ref 100–108)
CHOLEST SERPL-MCNC: 108 MG/DL (ref 50–200)
CLARITY UR: CLEAR
CO2 SERPL-SCNC: 26 MMOL/L (ref 21–32)
COLOR UR: YELLOW
CREAT SERPL-MCNC: 1.5 MG/DL (ref 0.6–1.3)
EOSINOPHIL # BLD AUTO: 0.07 THOUSAND/ΜL (ref 0–0.61)
EOSINOPHIL NFR BLD AUTO: 1 % (ref 0–6)
ERYTHROCYTE [DISTWIDTH] IN BLOOD BY AUTOMATED COUNT: 17.7 % (ref 11.6–15.1)
EST. AVERAGE GLUCOSE BLD GHB EST-MCNC: 126 MG/DL
GFR SERPL CREATININE-BSD FRML MDRD: 52 ML/MIN/1.73SQ M
GLUCOSE P FAST SERPL-MCNC: 105 MG/DL (ref 65–99)
GLUCOSE UR STRIP-MCNC: NEGATIVE MG/DL
HBA1C MFR BLD: 6 % (ref 4.2–6.3)
HCT VFR BLD AUTO: 46.5 % (ref 36.5–49.3)
HDLC SERPL-MCNC: 46 MG/DL (ref 40–60)
HGB BLD-MCNC: 14.1 G/DL (ref 12–17)
HGB UR QL STRIP.AUTO: NEGATIVE
HYALINE CASTS #/AREA URNS LPF: ABNORMAL /LPF
IMM GRANULOCYTES # BLD AUTO: 0.02 THOUSAND/UL (ref 0–0.2)
IMM GRANULOCYTES NFR BLD AUTO: 0 % (ref 0–2)
KETONES UR STRIP-MCNC: NEGATIVE MG/DL
LDLC SERPL CALC-MCNC: 45 MG/DL (ref 0–100)
LEUKOCYTE ESTERASE UR QL STRIP: NEGATIVE
LYMPHOCYTES # BLD AUTO: 1.4 THOUSANDS/ΜL (ref 0.6–4.47)
LYMPHOCYTES NFR BLD AUTO: 17 % (ref 14–44)
MCH RBC QN AUTO: 23.9 PG (ref 26.8–34.3)
MCHC RBC AUTO-ENTMCNC: 30.3 G/DL (ref 31.4–37.4)
MCV RBC AUTO: 79 FL (ref 82–98)
MONOCYTES # BLD AUTO: 0.67 THOUSAND/ΜL (ref 0.17–1.22)
MONOCYTES NFR BLD AUTO: 8 % (ref 4–12)
MUCOUS THREADS UR QL AUTO: ABNORMAL
NEUTROPHILS # BLD AUTO: 6.13 THOUSANDS/ΜL (ref 1.85–7.62)
NEUTS SEG NFR BLD AUTO: 74 % (ref 43–75)
NITRITE UR QL STRIP: NEGATIVE
NON-SQ EPI CELLS URNS QL MICRO: ABNORMAL /HPF
NONHDLC SERPL-MCNC: 62 MG/DL
NRBC BLD AUTO-RTO: 0 /100 WBCS
PH UR STRIP.AUTO: 5.5 [PH]
PHOSPHATE SERPL-MCNC: 2.1 MG/DL (ref 2.3–4.1)
PLATELET # BLD AUTO: 234 THOUSANDS/UL (ref 149–390)
PMV BLD AUTO: 11.7 FL (ref 8.9–12.7)
POTASSIUM SERPL-SCNC: 3.8 MMOL/L (ref 3.5–5.3)
PROT SERPL-MCNC: 7.9 G/DL (ref 6.4–8.2)
PROT UR STRIP-MCNC: ABNORMAL MG/DL
PTH-INTACT SERPL-MCNC: 36.8 PG/ML (ref 18.4–80.1)
RBC # BLD AUTO: 5.9 MILLION/UL (ref 3.88–5.62)
RBC #/AREA URNS AUTO: ABNORMAL /HPF
SODIUM SERPL-SCNC: 144 MMOL/L (ref 136–145)
SP GR UR STRIP.AUTO: >=1.03 (ref 1–1.03)
TRIGL SERPL-MCNC: 83 MG/DL
TSH SERPL DL<=0.05 MIU/L-ACNC: 0.93 UIU/ML (ref 0.36–3.74)
URATE SERPL-MCNC: 6.8 MG/DL (ref 4.2–8)
UROBILINOGEN UR QL STRIP.AUTO: 0.2 E.U./DL
WBC # BLD AUTO: 8.32 THOUSAND/UL (ref 4.31–10.16)
WBC #/AREA URNS AUTO: ABNORMAL /HPF

## 2019-06-15 PROCEDURE — 80061 LIPID PANEL: CPT

## 2019-06-15 PROCEDURE — 36415 COLL VENOUS BLD VENIPUNCTURE: CPT

## 2019-06-15 PROCEDURE — 84443 ASSAY THYROID STIM HORMONE: CPT

## 2019-06-15 PROCEDURE — 85025 COMPLETE CBC W/AUTO DIFF WBC: CPT

## 2019-06-15 PROCEDURE — 80053 COMPREHEN METABOLIC PANEL: CPT

## 2019-06-15 PROCEDURE — 83036 HEMOGLOBIN GLYCOSYLATED A1C: CPT

## 2019-06-15 PROCEDURE — 84550 ASSAY OF BLOOD/URIC ACID: CPT

## 2019-06-15 PROCEDURE — 81001 URINALYSIS AUTO W/SCOPE: CPT

## 2019-06-15 PROCEDURE — 84100 ASSAY OF PHOSPHORUS: CPT

## 2019-06-15 PROCEDURE — 82306 VITAMIN D 25 HYDROXY: CPT

## 2019-06-15 PROCEDURE — 83970 ASSAY OF PARATHORMONE: CPT

## 2019-06-18 ENCOUNTER — OFFICE VISIT (OUTPATIENT)
Dept: INTERNAL MEDICINE CLINIC | Facility: CLINIC | Age: 79
End: 2019-06-18
Payer: MEDICARE

## 2019-06-18 VITALS
BODY MASS INDEX: 32.47 KG/M2 | DIASTOLIC BLOOD PRESSURE: 80 MMHG | SYSTOLIC BLOOD PRESSURE: 138 MMHG | HEART RATE: 70 BPM | HEIGHT: 73 IN | WEIGHT: 245 LBS

## 2019-06-18 DIAGNOSIS — K86.2 PANCREATIC CYST: Primary | ICD-10-CM

## 2019-06-18 DIAGNOSIS — I10 ESSENTIAL HYPERTENSION: ICD-10-CM

## 2019-06-18 DIAGNOSIS — Z13.6 SCREENING FOR CARDIOVASCULAR CONDITION: ICD-10-CM

## 2019-06-18 DIAGNOSIS — N18.30 TYPE 2 DIABETES MELLITUS WITH STAGE 3 CHRONIC KIDNEY DISEASE, WITHOUT LONG-TERM CURRENT USE OF INSULIN (HCC): Primary | ICD-10-CM

## 2019-06-18 DIAGNOSIS — I12.9 HYPERTENSIVE KIDNEY DISEASE WITH STAGE 3 CHRONIC KIDNEY DISEASE (HCC): ICD-10-CM

## 2019-06-18 DIAGNOSIS — M10.9 GOUT, UNSPECIFIED CAUSE, UNSPECIFIED CHRONICITY, UNSPECIFIED SITE: ICD-10-CM

## 2019-06-18 DIAGNOSIS — Z86.79 HISTORY OF HYPERTENSION: ICD-10-CM

## 2019-06-18 DIAGNOSIS — N18.30 HYPERTENSIVE KIDNEY DISEASE WITH STAGE 3 CHRONIC KIDNEY DISEASE (HCC): ICD-10-CM

## 2019-06-18 DIAGNOSIS — J43.9 PULMONARY EMPHYSEMA, UNSPECIFIED EMPHYSEMA TYPE (HCC): ICD-10-CM

## 2019-06-18 DIAGNOSIS — I10 HYPERTENSION, UNSPECIFIED TYPE: ICD-10-CM

## 2019-06-18 DIAGNOSIS — E11.9 TYPE 2 DIABETES MELLITUS WITHOUT COMPLICATION, WITHOUT LONG-TERM CURRENT USE OF INSULIN (HCC): ICD-10-CM

## 2019-06-18 DIAGNOSIS — E11.22 TYPE 2 DIABETES MELLITUS WITH STAGE 3 CHRONIC KIDNEY DISEASE, WITHOUT LONG-TERM CURRENT USE OF INSULIN (HCC): Primary | ICD-10-CM

## 2019-06-18 DIAGNOSIS — J43.2 CENTRILOBULAR EMPHYSEMA (HCC): ICD-10-CM

## 2019-06-18 PROCEDURE — G0439 PPPS, SUBSEQ VISIT: HCPCS | Performed by: NURSE PRACTITIONER

## 2019-06-18 PROCEDURE — 99214 OFFICE O/P EST MOD 30 MIN: CPT | Performed by: NURSE PRACTITIONER

## 2019-06-18 RX ORDER — ALLOPURINOL 100 MG/1
100 TABLET ORAL DAILY
Qty: 90 TABLET | Refills: 2 | Status: SHIPPED | OUTPATIENT
Start: 2019-06-18 | End: 2020-02-07 | Stop reason: SDUPTHER

## 2019-06-18 RX ORDER — METOPROLOL SUCCINATE 25 MG/1
25 TABLET, EXTENDED RELEASE ORAL DAILY
Qty: 90 TABLET | Refills: 3 | Status: SHIPPED | OUTPATIENT
Start: 2019-06-18 | End: 2020-02-07 | Stop reason: SDUPTHER

## 2019-06-18 RX ORDER — BUDESONIDE AND FORMOTEROL FUMARATE DIHYDRATE 160; 4.5 UG/1; UG/1
2 AEROSOL RESPIRATORY (INHALATION) 2 TIMES DAILY
Qty: 3 INHALER | Refills: 3 | Status: SHIPPED | OUTPATIENT
Start: 2019-06-18 | End: 2020-06-25

## 2019-06-18 RX ORDER — AMLODIPINE BESYLATE 5 MG/1
5 TABLET ORAL EVERY MORNING
Qty: 90 TABLET | Refills: 3 | Status: SHIPPED | OUTPATIENT
Start: 2019-06-18 | End: 2020-02-07 | Stop reason: SDUPTHER

## 2019-07-29 DIAGNOSIS — I10 HYPERTENSION, UNSPECIFIED TYPE: ICD-10-CM

## 2019-07-30 RX ORDER — LOSARTAN POTASSIUM 100 MG/1
TABLET ORAL
Qty: 90 TABLET | Refills: 3 | Status: SHIPPED | OUTPATIENT
Start: 2019-07-30 | End: 2020-02-07 | Stop reason: SDUPTHER

## 2019-07-31 ENCOUNTER — OFFICE VISIT (OUTPATIENT)
Dept: NEPHROLOGY | Facility: CLINIC | Age: 79
End: 2019-07-31
Payer: MEDICARE

## 2019-07-31 VITALS
TEMPERATURE: 97 F | HEIGHT: 72 IN | SYSTOLIC BLOOD PRESSURE: 130 MMHG | BODY MASS INDEX: 32.75 KG/M2 | HEART RATE: 80 BPM | WEIGHT: 241.8 LBS | RESPIRATION RATE: 18 BRPM | DIASTOLIC BLOOD PRESSURE: 82 MMHG

## 2019-07-31 DIAGNOSIS — N18.30 HYPERTENSIVE KIDNEY DISEASE WITH STAGE 3 CHRONIC KIDNEY DISEASE (HCC): ICD-10-CM

## 2019-07-31 DIAGNOSIS — N18.30 STAGE 3 CHRONIC KIDNEY DISEASE (HCC): Primary | ICD-10-CM

## 2019-07-31 DIAGNOSIS — I12.9 HYPERTENSIVE KIDNEY DISEASE WITH STAGE 3 CHRONIC KIDNEY DISEASE (HCC): ICD-10-CM

## 2019-07-31 PROCEDURE — 99213 OFFICE O/P EST LOW 20 MIN: CPT | Performed by: INTERNAL MEDICINE

## 2019-07-31 RX ORDER — PROCHLORPERAZINE MALEATE 10 MG
10 TABLET ORAL EVERY 6 HOURS PRN
COMMUNITY
End: 2019-12-27 | Stop reason: ALTCHOICE

## 2019-07-31 RX ORDER — GABAPENTIN 300 MG/1
CAPSULE ORAL EVERY 12 HOURS PRN
COMMUNITY

## 2019-07-31 RX ORDER — MELOXICAM 15 MG/1
TABLET ORAL
COMMUNITY
End: 2019-07-31 | Stop reason: ALTCHOICE

## 2019-07-31 RX ORDER — MELATONIN
5000 DAILY
COMMUNITY
End: 2022-05-12 | Stop reason: SDUPTHER

## 2019-07-31 NOTE — PROGRESS NOTES
NEPHROLOGY OFFICE FOLLOW UP  Shereen Alexis 70 y o  male MRN: [id number]    Encounter: [id number] DATE: 7/31/2019    REASON FOR VISIT: Shereen Alexis is a 70 y o  male who is here on 7/31/2019 for further management of chronic kidney disease  HPI:    This is a 81-year-old male with a past medical history of CKD stage 3, hypertension, COPD, returns to Nephrology Clinic for further management of CKD stage 3  Upon review of old medical record patient's baseline creatinine seems to be fluctuating between 1 3-1 6  Patient has COPD  Patient currently does not smoke  According to patient his breathing is currently under good control with the use of nebulizers  Patient also uses oxygen  Patient has hypertension which seems under well controlled with the use of current antihypertensive medications  Patient has gout and currently taking allopurinol 100 mg PO daily on daily basis  Patient denies any recent gout flare-up  Patient also have chronic back pain and also been followed by pain management specialist and currently using narcotics  Patient is scheduled to undergo MRI next week for further evaluation of back pain  Patient's wife was also present at the time of consultation and history was also obtained from her and all the questions were answered  Since last visit patient was started on vitamin-D and currently taking cholecalciferol 5000 Units PO daily  In the interim patient has also spontaneously passed kidney stone and also now been followed by Dr Yakov Key     On the medication list patient was prescribed Mobic/meloxicam but according to patient he is only taking Tylenol No   3 along with gabapentin and does not use Mobic or any other NSAIDs  REVIEW OF SYSTEMS:    Review of Systems   Constitutional: Negative for chills and fever  HENT: Negative for nosebleeds and sore throat  Eyes: Negative for photophobia and pain  Respiratory: Negative for choking and wheezing  Cardiovascular: Negative for chest pain and palpitations  Gastrointestinal: Negative for abdominal pain and blood in stool  Endocrine: Negative for cold intolerance and heat intolerance  Genitourinary: Negative for flank pain and hematuria  Musculoskeletal: Positive for back pain  Negative for joint swelling and neck pain  Skin: Negative for color change and pallor  Allergic/Immunologic: Negative for environmental allergies and immunocompromised state  Neurological: Negative for seizures and syncope  Hematological: Negative for adenopathy  Does not bruise/bleed easily  Psychiatric/Behavioral: Negative for confusion and suicidal ideas  PAST MEDICAL HISTORY:  Past Medical History:   Diagnosis Date    Back pain     COPD (chronic obstructive pulmonary disease) (HCC)     History of malignant neoplasm of oral cavity     M0    HL (hearing loss)     Hypertension     Kidney stone 05/2019    Malignant neoplasm of colon (HCC)     descending    Prostate cancer (HCC)     Tachycardia     Thalassemia trait     Tinnitus        PAST SURGICAL HISTORY:  Past Surgical History:   Procedure Laterality Date    BACK SURGERY      CHOLECYSTECTOMY      COLON SURGERY  10/2008    partial colectomy    INCISIONAL HERNIA REPAIR      JOINT REPLACEMENT Right     hip    [facility] COLONOSCOPY FLX DX W/COLLJ SPEC WHEN PFRMD N/A 7/23/2018    Procedure: COLONOSCOPY;  Surgeon: Fannie Villanueva MD;  Location: [facility];   Service: Gastroenterology    PROSTATE SURGERY      TONSILLECTOMY         SOCIAL HISTORY:  Social History     Substance and Sexual Activity   Alcohol Use No     Social History     Substance and Sexual Activity   Drug Use No     Social History     Tobacco Use   Smoking Status Former Smoker    Last attempt to quit: 6/2/2004    Years since quitting: 15 1   Smokeless Tobacco Never Used   Tobacco Comment    non smoker/tobacco user; quit 1987 as per [employer]       FAMILY HISTORY:  Family History Problem Relation Age of Onset    Heart failure Mother         as per [employer]    Coronary artery disease Mother         as per [employer]    Diabetes Mother         mellitus - as per [employer]    Coronary artery disease Father         as per [employer]    Cancer Sister         malignant neoplasm       ALLERGY:  No Known Allergies    MEDICATIONS:    Current Outpatient Medications:     acetaminophen-codeine (TYLENOL #3) 300-30 mg per tablet, Take 1 tablet by mouth 3 (three) times a day as needed for moderate pain, Disp: , Rfl:     albuterol (2 5 mg/3 mL) 0 083 % nebulizer solution, Inhale 1 each every 6 (six) hours as needed, Disp: , Rfl:     albuterol (PROVENTIL HFA,VENTOLIN HFA) 90 mcg/act inhaler, Inhale 2 puffs every 6 (six) hours as needed for wheezing, Disp: 18 g, Rfl: 5    allopurinol (ZYLOPRIM) 100 mg tablet, Take 1 tablet (100 mg total) by mouth daily, Disp: 90 tablet, Rfl: 2    amLODIPine (NORVASC) 5 mg tablet, Take 1 tablet (5 mg total) by mouth every morning, Disp: 90 tablet, Rfl: 3    budesonide-formoterol (SYMBICORT) 160-4 5 mcg/act inhaler, Inhale 2 puffs 2 (two) times a day Rinse mouth after use , Disp: 3 Inhaler, Rfl: 3    cholecalciferol (VITAMIN D3) 1,000 units tablet, Take 5,000 Units by mouth daily, Disp: , Rfl:     gabapentin (NEURONTIN) 300 mg capsule, Every 12 hours, Disp: , Rfl:     losartan (COZAAR) 100 MG tablet, TAKE 1 TABLET DAILY, Disp: 90 tablet, Rfl: 3    metoprolol succinate (TOPROL-XL) 25 mg 24 hr tablet, Take 1 tablet (25 mg total) by mouth daily, Disp: 90 tablet, Rfl: 3    prochlorperazine (COMPAZINE) 10 mg tablet, Take 10 mg by mouth every 6 (six) hours as needed for nausea or vomiting, Disp: , Rfl:     tiotropium (SPIRIVA HANDIHALER) 18 mcg inhalation capsule, Place 1 capsule (18 mcg total) into inhaler and inhale daily, Disp: 90 each, Rfl: 2    ondansetron (ZOFRAN-ODT) 4 mg disintegrating tablet, Take 1 tablet (4 mg total) by mouth every 8 (eight) hours as needed for nausea or vomiting (Patient not taking: Reported on 7/31/2019), Disp: 16 tablet, Rfl: 0    tamsulosin (FLOMAX) 0 4 mg, Take 1 capsule (0 4 mg total) by mouth daily with dinner (Patient not taking: Reported on 7/31/2019), Disp: 7 capsule, Rfl: 0    PHYSICAL EXAM:  Vitals:    07/31/19 1500   BP: 130/82   BP Location: Left arm   Patient Position: Sitting   Cuff Size: Large   Pulse: 80   Resp: 18   Temp: (!) 97 °F (36 1 °C)   TempSrc: Oral   Weight: 110 kg (241 lb 12 8 oz)   Height: 5' 11 5" (1 816 m)     Body mass index is 33 25 kg/m²  Physical Exam   Constitutional: No distress  Obese   HENT:   Head: Normocephalic and atraumatic  Eyes: No scleral icterus  Neck: Neck supple  No JVD present  Cardiovascular: Normal rate, S1 normal and S2 normal    Pulmonary/Chest: Effort normal  No accessory muscle usage  No respiratory distress  He has no wheezes  Abdominal: Soft  He exhibits no distension  Musculoskeletal: He exhibits no edema  Right ankle: He exhibits no swelling  Left ankle: He exhibits no swelling  Right hand: He exhibits no laceration  Left hand: He exhibits no laceration  Lymphadenopathy:        Right cervical: No superficial cervical adenopathy present  Left cervical: No superficial cervical adenopathy present  Neurological: He is alert  He is not disoriented  Skin: Skin is warm  No cyanosis  Psychiatric: He is not combative  He does not exhibit a depressed mood  He expresses no suicidal ideation         LAB RESULTS:  Results for orders placed or performed in visit on 06/15/19   CBC and differential   Result Value Ref Range    WBC 8 32 4 31 - 10 16 Thousand/uL    RBC 5 90 (H) 3 88 - 5 62 Million/uL    Hemoglobin 14 1 12 0 - 17 0 g/dL    Hematocrit 46 5 36 5 - 49 3 %    MCV 79 (L) 82 - 98 fL    MCH 23 9 (L) 26 8 - 34 3 pg    MCHC 30 3 (L) 31 4 - 37 4 g/dL    RDW 17 7 (H) 11 6 - 15 1 %    MPV 11 7 8 9 - 12 7 fL    Platelets 872 716 - 465 Thousands/uL    nRBC 0 /100 WBCs    Neutrophils Relative 74 43 - 75 %    Immat GRANS % 0 0 - 2 %    Lymphocytes Relative 17 14 - 44 %    Monocytes Relative 8 4 - 12 %    Eosinophils Relative 1 0 - 6 %    Basophils Relative 0 0 - 1 %    Neutrophils Absolute 6 13 1 85 - 7 62 Thousands/µL    Immature Grans Absolute 0 02 0 00 - 0 20 Thousand/uL    Lymphocytes Absolute 1 40 0 60 - 4 47 Thousands/µL    Monocytes Absolute 0 67 0 17 - 1 22 Thousand/µL    Eosinophils Absolute 0 07 0 00 - 0 61 Thousand/µL    Basophils Absolute 0 03 0 00 - 0 10 Thousands/µL   Urinalysis with microscopic   Result Value Ref Range    Clarity, UA Clear     Color, UA Yellow     Specific Gravity, UA >=1 030 1 003 - 1 030    pH, UA 5 5 4 5, 5 0, 5 5, 6 0, 6 5, 7 0, 7 5, 8 0    Glucose, UA Negative Negative mg/dl    Ketones, UA Negative Negative mg/dl    Blood, UA Negative Negative    Protein, UA Trace (A) Negative mg/dl    Nitrite, UA Negative Negative    Bilirubin, UA Negative Negative    Urobilinogen, UA 0 2 0 2, 1 0 E U /dl E U /dl    Leukocytes, UA Negative Negative    WBC, UA None Seen None Seen, 0-5, 5-55, 5-65 /hpf    RBC, UA 0-1 (A) None Seen, 0-5 /hpf    Hyaline Casts, UA 0-1 (A) (none) /lpf    Bacteria, UA Occasional None Seen, Occasional /hpf    Ca Oxalate April, UA Occasional (A) None Seen /hpf    Epithelial Cells Occasional None Seen, Occasional /hpf    MUCUS THREADS Moderate (A) None Seen   Phosphorus   Result Value Ref Range    Phosphorus 2 1 (L) 2 3 - 4 1 mg/dL   Uric acid   Result Value Ref Range    Uric Acid 6 8 4 2 - 8 0 mg/dL   PTH, intact   Result Value Ref Range    PTH 36 8 18 4 - 80 1 pg/mL   Vitamin D 25 hydroxy   Result Value Ref Range    Vit D, 25-Hydroxy 67 5 30 0 - 100 0 ng/mL   Comprehensive metabolic panel   Result Value Ref Range    Sodium 144 136 - 145 mmol/L    Potassium 3 8 3 5 - 5 3 mmol/L    Chloride 108 100 - 108 mmol/L    CO2 26 21 - 32 mmol/L    ANION GAP 10 4 - 13 mmol/L    BUN 12 5 - 25 mg/dL    Creatinine 1 50 (H) 0 60 - 1 30 mg/dL    Glucose, Fasting 105 (H) 65 - 99 mg/dL    Calcium 9 5 8 3 - 10 1 mg/dL    AST 38 5 - 45 U/L    ALT 26 12 - 78 U/L    Alkaline Phosphatase 110 46 - 116 U/L    Total Protein 7 9 6 4 - 8 2 g/dL    Albumin 3 7 3 5 - 5 0 g/dL    Total Bilirubin 1 40 (H) 0 20 - 1 00 mg/dL    eGFR 52 ml/min/1 73sq m   Hemoglobin A1C   Result Value Ref Range    Hemoglobin A1C 6 0 4 2 - 6 3 %     mg/dl   TSH, 3rd generation   Result Value Ref Range    TSH 3RD GENERATON 0 931 0 358 - 3 740 uIU/mL   Lipid panel   Result Value Ref Range    Cholesterol 108 50 - 200 mg/dL    Triglycerides 83 <=150 mg/dL    HDL, Direct 46 40 - 60 mg/dL    LDL Calculated 45 0 - 100 mg/dL    Non-HDL-Chol (CHOL-HDL) 62 mg/dl       ASSESSMENT and PLAN:  Hero Hawthorne was seen today for follow-up and chronic kidney disease  Diagnoses and all orders for this visit:    Stage 3 chronic kidney disease ([address] )  -     CBC and differential; Future  -     Basic metabolic panel; Future  -     Urinalysis with microscopic; Future  -     Microalbumin / creatinine urine ratio; Future  -     Phosphorus; Future  -     Uric acid; Future  -     PTH, intact; Future  -     Vitamin D 25 hydroxy; Future    Hypertensive kidney disease with stage 3 chronic kidney disease (HCC)  -     Basic metabolic panel; Future      1  CKD stage 3  Multifactorial and was suspected due to underlying hypertensive nephrosclerosis  Upon review of old medical record patient's baseline creatinine seems to be fluctuating between 1 3-1 6 and in the interim renal function has remained stable and at baseline with current creatinine of 1 51 with EGFR of 52  Currently hypertension seems under well control  Patient was started on cholecalciferol 5000 Units PO daily in the interim by PCP  Current vitamin-D level is 67 which is at goal   Recheck vitamin-D level with the next visit  Plan to avoid NSAIDs including Mobic/meloxicam and recheck renal function before next visit      2  Hypertension in chronic kidney disease  Today's blood pressure was under well control and plan to monitor hypertension with amlodipine 5 mg PO daily, metoprolol XL 25 mg PO daily and losartan 100 mg PO daily  Also advised patient to follow low-salt diet  Returns to Nephrology Clinic in 6 months  Future labs ordered  Labs (reviewed on 1/22/2020)  Stable creatinine at 1 5 with EGFR of 51  Hemoglobin 15 3  WBC 11 4-> exact etiology is unclear  Urine analysis showed no dysuria  Urine microalbumin to creatinine ratio of 18 mg  Normal PTH (48), vitamin-D (44), uric acid (6 2), sodium, potassium, bicarb, calcium and phosphorus  Portions of the record may have been created with voice recognition software  Occasional wrong word or "sound a like" substitutions may have occurred due to the inherent limitations of voice recognition software  Read the chart carefully and recognize, using context, where substitutions have occurred  If you have any questions, please contact the dictating provider

## 2019-07-31 NOTE — LETTER
July 31, 2019     Tiara Walker MD  [address],  [place] [address] [address]    Patient: Truddie Phalen   YOB: 1940   Date of Visit: 7/31/2019       Dear Dr Meredith Singh: Thank you for referring Truddie Phalen to me for evaluation  Below are my notes for this consultation  If you have questions, please do not hesitate to call me  I look forward to following your patient along with you  Sincerely,        Levon Tarango MD        CC: No Recipients  Levon Tarango MD  7/31/2019  3:23 PM  Sign at close encounter  Tomas 70 y o  male MRN: [id number]    Encounter: [id number] DATE: 7/31/2019    REASON FOR VISIT: Truddie Phalen is a 70 y o  male who is here on 7/31/2019 for further management of chronic kidney disease  HPI:    This is a 57-year-old male with a past medical history of CKD stage 3, hypertension, COPD, returns to [facility] for further management of CKD stage 3  Upon review of old medical record patient's baseline creatinine seems to be fluctuating between 1 3-1 6  Patient has COPD  Patient currently does not smoke  According to patient his breathing is currently under good control with the use of nebulizers  Patient also uses oxygen  Patient has hypertension which seems under well controlled with the use of current antihypertensive medications  Patient has gout and currently taking allopurinol 100 mg PO daily on daily basis  Patient denies any recent gout flare-up  Patient also have chronic back pain and also been followed by pain management specialist and currently using narcotics  Patient is scheduled to undergo MRI next week for further evaluation of back pain  Patient's wife was also present at the time of consultation and history was also obtained from her and all the questions were answered      Since last visit patient was started on vitamin-D and currently taking cholecalciferol 5000 Units PO daily  In the interim patient has also spontaneously passed kidney stone and also now been followed by Dr Alma Birmingham     On the medication list patient was prescribed Mobic/meloxicam but according to patient he is only taking Tylenol No   3 along with gabapentin and does not use Mobic or any other NSAIDs  REVIEW OF SYSTEMS:    Review of Systems   Constitutional: Negative for chills and fever  HENT: Negative for nosebleeds and sore throat  Eyes: Negative for photophobia and pain  Respiratory: Negative for choking and wheezing  Cardiovascular: Negative for chest pain and palpitations  Gastrointestinal: Negative for abdominal pain and blood in stool  Endocrine: Negative for cold intolerance and heat intolerance  Genitourinary: Negative for flank pain and hematuria  Musculoskeletal: Positive for back pain  Negative for joint swelling and neck pain  Skin: Negative for color change and pallor  Allergic/Immunologic: Negative for environmental allergies and immunocompromised state  Neurological: Negative for seizures and syncope  Hematological: Negative for adenopathy  Does not bruise/bleed easily  Psychiatric/Behavioral: Negative for confusion and suicidal ideas           PAST MEDICAL HISTORY:  Past Medical History:   Diagnosis Date    Back pain     COPD (chronic obstructive pulmonary disease) (HCC)     History of malignant neoplasm of oral cavity     M0    HL (hearing loss)     Hypertension     Kidney stone 05/2019    Malignant neoplasm of colon (HCC)     descending    Prostate cancer (HCC)     Tachycardia     Thalassemia trait     Tinnitus        PAST SURGICAL HISTORY:  Past Surgical History:   Procedure Laterality Date    BACK SURGERY      CHOLECYSTECTOMY      COLON SURGERY  10/2008    partial colectomy    INCISIONAL HERNIA REPAIR      JOINT REPLACEMENT Right     hip    IA COLONOSCOPY FLX DX W/COLLJ SPEC WHEN PFRMD N/A 7/23/2018    Procedure: COLONOSCOPY;  Surgeon: Ciarra Floyd MD;  Location: [facility];   Service: Gastroenterology    PROSTATE SURGERY      TONSILLECTOMY         SOCIAL HISTORY:  Social History     Substance and Sexual Activity   Alcohol Use No     Social History     Substance and Sexual Activity   Drug Use No     Social History     Tobacco Use   Smoking Status Former Smoker    Last attempt to quit: 6/2/2004    Years since quitting: 15 1   Smokeless Tobacco Never Used   Tobacco Comment    non smoker/tobacco user; quit 1987 as per [employer]       FAMILY HISTORY:  Family History   Problem Relation Age of Onset    Heart failure Mother         as per [employer]    Coronary artery disease Mother         as per [employer]    Diabetes Mother         mellitus - as per [employer]    Coronary artery disease Father         as per [employer]    Cancer Sister         malignant neoplasm       ALLERGY:  No Known Allergies    MEDICATIONS:    Current Outpatient Medications:     acetaminophen-codeine (TYLENOL #3) 300-30 mg per tablet, Take 1 tablet by mouth 3 (three) times a day as needed for moderate pain, Disp: , Rfl:     albuterol (2 5 mg/3 mL) 0 083 % nebulizer solution, Inhale 1 each every 6 (six) hours as needed, Disp: , Rfl:     albuterol (PROVENTIL HFA,VENTOLIN HFA) 90 mcg/act inhaler, Inhale 2 puffs every 6 (six) hours as needed for wheezing, Disp: 18 g, Rfl: 5    allopurinol (ZYLOPRIM) 100 mg tablet, Take 1 tablet (100 mg total) by mouth daily, Disp: 90 tablet, Rfl: 2    amLODIPine (NORVASC) 5 mg tablet, Take 1 tablet (5 mg total) by mouth every morning, Disp: 90 tablet, Rfl: 3    budesonide-formoterol (SYMBICORT) 160-4 5 mcg/act inhaler, Inhale 2 puffs 2 (two) times a day Rinse mouth after use , Disp: 3 Inhaler, Rfl: 3    cholecalciferol (VITAMIN D3) 1,000 units tablet, Take 5,000 Units by mouth daily, Disp: , Rfl:     gabapentin (NEURONTIN) 300 mg capsule, Every 12 hours, Disp: , Rfl:     losartan (COZAAR) 100 MG tablet, TAKE 1 TABLET DAILY, Disp: 90 tablet, Rfl: 3    metoprolol succinate (TOPROL-XL) 25 mg 24 hr tablet, Take 1 tablet (25 mg total) by mouth daily, Disp: 90 tablet, Rfl: 3    prochlorperazine (COMPAZINE) 10 mg tablet, Take 10 mg by mouth every 6 (six) hours as needed for nausea or vomiting, Disp: , Rfl:     tiotropium (SPIRIVA HANDIHALER) 18 mcg inhalation capsule, Place 1 capsule (18 mcg total) into inhaler and inhale daily, Disp: 90 each, Rfl: 2    ondansetron (ZOFRAN-ODT) 4 mg disintegrating tablet, Take 1 tablet (4 mg total) by mouth every 8 (eight) hours as needed for nausea or vomiting (Patient not taking: Reported on 7/31/2019), Disp: 16 tablet, Rfl: 0    tamsulosin (FLOMAX) 0 4 mg, Take 1 capsule (0 4 mg total) by mouth daily with dinner (Patient not taking: Reported on 7/31/2019), Disp: 7 capsule, Rfl: 0    PHYSICAL EXAM:  Vitals:    07/31/19 1500   BP: 130/82   BP Location: Left arm   Patient Position: Sitting   Cuff Size: Large   Pulse: 80   Resp: 18   Temp: (!) 97 °F (36 1 °C)   TempSrc: Oral   Weight: 110 kg (241 lb 12 8 oz)   Height: 5' 11 5" (1 816 m)     Body mass index is 33 25 kg/m²  Physical Exam   Constitutional: No distress  Obese   HENT:   Head: Normocephalic and atraumatic  Eyes: No scleral icterus  Neck: Neck supple  No JVD present  Cardiovascular: Normal rate, S1 normal and S2 normal    Pulmonary/Chest: Effort normal  No accessory muscle usage  No respiratory distress  He has no wheezes  Abdominal: Soft  He exhibits no distension  Musculoskeletal: He exhibits no edema  Right ankle: He exhibits no swelling  Left ankle: He exhibits no swelling  Right hand: He exhibits no laceration  Left hand: He exhibits no laceration  Lymphadenopathy:        Right cervical: No superficial cervical adenopathy present  Left cervical: No superficial cervical adenopathy present  Neurological: He is alert  He is not disoriented  Skin: Skin is warm  No cyanosis  Psychiatric: He is not combative  He does not exhibit a depressed mood  He expresses no suicidal ideation         LAB RESULTS:  Results for orders placed or performed in visit on 06/15/19   CBC and differential   Result Value Ref Range    WBC 8 32 4 31 - 10 16 Thousand/uL    RBC 5 90 (H) 3 88 - 5 62 Million/uL    Hemoglobin 14 1 12 0 - 17 0 g/dL    Hematocrit 46 5 36 5 - 49 3 %    MCV 79 (L) 82 - 98 fL    MCH 23 9 (L) 26 8 - 34 3 pg    MCHC 30 3 (L) 31 4 - 37 4 g/dL    RDW 17 7 (H) 11 6 - 15 1 %    MPV 11 7 8 9 - 12 7 fL    Platelets 575 374 - 071 Thousands/uL    nRBC 0 /100 WBCs    Neutrophils Relative 74 43 - 75 %    Immat GRANS % 0 0 - 2 %    Lymphocytes Relative 17 14 - 44 %    Monocytes Relative 8 4 - 12 %    Eosinophils Relative 1 0 - 6 %    Basophils Relative 0 0 - 1 %    Neutrophils Absolute 6 13 1 85 - 7 62 Thousands/µL    Immature Grans Absolute 0 02 0 00 - 0 20 Thousand/uL    Lymphocytes Absolute 1 40 0 60 - 4 47 Thousands/µL    Monocytes Absolute 0 67 0 17 - 1 22 Thousand/µL    Eosinophils Absolute 0 07 0 00 - 0 61 Thousand/µL    Basophils Absolute 0 03 0 00 - 0 10 Thousands/µL   Urinalysis with microscopic   Result Value Ref Range    Clarity, UA Clear     Color, UA Yellow     Specific Gravity, UA >=1 030 1 003 - 1 030    pH, UA 5 5 4 5, 5 0, 5 5, 6 0, 6 5, 7 0, 7 5, 8 0    Glucose, UA Negative Negative mg/dl    Ketones, UA Negative Negative mg/dl    Blood, UA Negative Negative    Protein, UA Trace (A) Negative mg/dl    Nitrite, UA Negative Negative    Bilirubin, UA Negative Negative    Urobilinogen, UA 0 2 0 2, 1 0 E U /dl E U /dl    Leukocytes, UA Negative Negative    WBC, UA None Seen None Seen, 0-5, 5-55, 5-65 /hpf    RBC, UA 0-1 (A) None Seen, 0-5 /hpf    Hyaline Casts, UA 0-1 (A) (none) /lpf    Bacteria, UA Occasional None Seen, Occasional /hpf    Ca Oxalate April, UA Occasional (A) None Seen /hpf    Epithelial Cells Occasional None Seen, Occasional /hpf    MUCUS THREADS Moderate (A) None Seen   Phosphorus   Result Value Ref Range    Phosphorus 2 1 (L) 2 3 - 4 1 mg/dL   Uric acid   Result Value Ref Range    Uric Acid 6 8 4 2 - 8 0 mg/dL   PTH, intact   Result Value Ref Range    PTH 36 8 18 4 - 80 1 pg/mL   Vitamin D 25 hydroxy   Result Value Ref Range    Vit D, 25-Hydroxy 67 5 30 0 - 100 0 ng/mL   Comprehensive metabolic panel   Result Value Ref Range    Sodium 144 136 - 145 mmol/L    Potassium 3 8 3 5 - 5 3 mmol/L    Chloride 108 100 - 108 mmol/L    CO2 26 21 - 32 mmol/L    ANION GAP 10 4 - 13 mmol/L    BUN 12 5 - 25 mg/dL    Creatinine 1 50 (H) 0 60 - 1 30 mg/dL    Glucose, Fasting 105 (H) 65 - 99 mg/dL    Calcium 9 5 8 3 - 10 1 mg/dL    AST 38 5 - 45 U/L    ALT 26 12 - 78 U/L    Alkaline Phosphatase 110 46 - 116 U/L    Total Protein 7 9 6 4 - 8 2 g/dL    Albumin 3 7 3 5 - 5 0 g/dL    Total Bilirubin 1 40 (H) 0 20 - 1 00 mg/dL    eGFR 52 ml/min/1 73sq m   Hemoglobin A1C   Result Value Ref Range    Hemoglobin A1C 6 0 4 2 - 6 3 %     mg/dl   TSH, 3rd generation   Result Value Ref Range    TSH 3RD GENERATON 0 931 0 358 - 3 740 uIU/mL   Lipid panel   Result Value Ref Range    Cholesterol 108 50 - 200 mg/dL    Triglycerides 83 <=150 mg/dL    HDL, Direct 46 40 - 60 mg/dL    LDL Calculated 45 0 - 100 mg/dL    Non-HDL-Chol (CHOL-HDL) 62 mg/dl       ASSESSMENT and PLAN:  Priscila Montoya was seen today for follow-up and chronic kidney disease  Diagnoses and all orders for this visit:    Stage 3 chronic kidney disease ([address] )  -     CBC and differential; Future  -     Basic metabolic panel; Future  -     Urinalysis with microscopic; Future  -     Microalbumin / creatinine urine ratio; Future  -     Phosphorus; Future  -     Uric acid; Future  -     PTH, intact; Future  -     Vitamin D 25 hydroxy; Future    Hypertensive kidney disease with stage 3 chronic kidney disease (HCC)  -     Basic metabolic panel; Future      1  CKD stage 3    Multifactorial and was suspected due to underlying hypertensive nephrosclerosis  Upon review of old medical record patient's baseline creatinine seems to be fluctuating between 1 3-1 6 and in the interim renal function has remained stable and at baseline with current creatinine of 1 51 with EGFR of 52  Currently hypertension seems under well control  Patient was started on cholecalciferol 5000 Units PO daily in the interim by PCP  Current vitamin-D level is 67 which is at goal   Recheck vitamin-D level with the next visit  Plan to avoid NSAIDs and recheck renal function before next visit  2   Hypertension in chronic kidney disease  Today's blood pressure was under well control and plan to monitor hypertension with amlodipine 5 mg PO daily, metoprolol XL 25 mg PO daily and losartan 100 mg PO daily  Also advised patient to follow low-salt diet  Returns to Nephrology Clinic in 6 months  Future labs ordered  Portions of the record may have been created with voice recognition software  Occasional wrong word or "sound a like" substitutions may have occurred due to the inherent limitations of voice recognition software  Read the chart carefully and recognize, using context, where substitutions have occurred  If you have any questions, please contact the dictating provider

## 2019-08-01 ENCOUNTER — HOSPITAL ENCOUNTER (EMERGENCY)
Facility: HOSPITAL | Age: 79
Discharge: HOME | End: 2019-08-01
Attending: EMERGENCY MEDICINE
Payer: MEDICARE

## 2019-08-01 VITALS
SYSTOLIC BLOOD PRESSURE: 169 MMHG | HEART RATE: 66 BPM | TEMPERATURE: 98.6 F | RESPIRATION RATE: 18 BRPM | DIASTOLIC BLOOD PRESSURE: 112 MMHG | BODY MASS INDEX: 32.41 KG/M2 | WEIGHT: 235.67 LBS | OXYGEN SATURATION: 94 %

## 2019-08-01 DIAGNOSIS — M54.32 SCIATICA OF LEFT SIDE: Primary | ICD-10-CM

## 2019-08-01 PROCEDURE — 99283 EMERGENCY DEPT VISIT LOW MDM: CPT | Performed by: EMERGENCY MEDICINE

## 2019-08-01 PROCEDURE — 99283 EMERGENCY DEPT VISIT LOW MDM: CPT

## 2019-08-01 RX ORDER — PREDNISONE 20 MG/1
40 TABLET ORAL DAILY
Qty: 8 TABLET | Refills: 0 | Status: SHIPPED | OUTPATIENT
Start: 2019-08-01 | End: 2019-08-05

## 2019-08-01 RX ORDER — OXYCODONE HYDROCHLORIDE 10 MG/1
10 TABLET ORAL ONCE
Status: COMPLETED | OUTPATIENT
Start: 2019-08-01 | End: 2019-08-01

## 2019-08-01 RX ORDER — PREDNISONE 20 MG/1
40 TABLET ORAL ONCE
Status: COMPLETED | OUTPATIENT
Start: 2019-08-01 | End: 2019-08-01

## 2019-08-01 RX ORDER — OXYCODONE HYDROCHLORIDE 5 MG/1
5 TABLET ORAL EVERY 6 HOURS PRN
Qty: 15 TABLET | Refills: 0 | Status: SHIPPED | OUTPATIENT
Start: 2019-08-01 | End: 2019-08-06

## 2019-08-01 RX ADMIN — OXYCODONE HYDROCHLORIDE 10 MG: 10 TABLET ORAL at 21:51

## 2019-08-01 RX ADMIN — PREDNISONE 40 MG: 20 TABLET ORAL at 21:51

## 2019-08-02 NOTE — DISCHARGE INSTRUCTIONS
Stop taking Tylenol #4 and start taking oxycodone for your pain  Do not take these medications together

## 2019-08-02 NOTE — ED PROVIDER NOTES
History  Chief Complaint   Patient presents with    Back Pain     pt has hx of sciatica, pt has been c/o worsening pain  pt scheduled for MRI monday  pt was supposed to get a shot last week but could not lay on his stomach so he couldn't get the shot  pt states pain is now unbearable  HPI     63-year-old male with remote history of prostate cancer status post prostatectomy, COPD on 2 L of home oxygen, chronic low back pain and with sciatica followed by pain management, who presents for evaluation of left lower back pain radiating down the back of his left leg  Patient was seen by pain management on July 29th, was supposed to have an injection for his lumbar radiculopathy at that time but was unable to stay still long enough for the injection  He was prescribed Tylenol 4s as well as gabapentin, but stops taking the gabapentin because it does not help    Per his wife, he is also not using his TENS machine as prescribed  He has an MRI scheduled for 3 days from now for further evaluation  He states that his current pain has been present since March, and but he decided to come in to the ED tonight because he couldn't sleep  It radiates down the entirety of his left lower leg and makes difficult for him to sleep  He denies any bowel or bladder incontinence or saddle anesthesia  No fevers or chills  He is able to ambulate with his cane and has not fallen  Denies any weakness in his legs  Prior to Admission Medications   Prescriptions Last Dose Informant Patient Reported?  Taking?   acetaminophen-codeine (TYLENOL #3) 300-30 mg per tablet  Spouse/Significant Other Yes No   Sig: Take 1 tablet by mouth 3 (three) times a day as needed for moderate pain   albuterol (2 5 mg/3 mL) 0 083 % nebulizer solution  Spouse/Significant Other Yes Yes   Sig: Inhale 1 each every 6 (six) hours as needed   albuterol (PROVENTIL HFA,VENTOLIN HFA) 90 mcg/act inhaler  Spouse/Significant Other No Yes   Sig: Inhale 2 puffs every 6 (six) hours as needed for wheezing   allopurinol (ZYLOPRIM) 100 mg tablet  Spouse/Significant Other No Yes   Sig: Take 1 tablet (100 mg total) by mouth daily   amLODIPine (NORVASC) 5 mg tablet  Spouse/Significant Other No Yes   Sig: Take 1 tablet (5 mg total) by mouth every morning   budesonide-formoterol (SYMBICORT) 160-4 5 mcg/act inhaler  Spouse/Significant Other No Yes   Sig: Inhale 2 puffs 2 (two) times a day Rinse mouth after use     cholecalciferol (VITAMIN D3) 1,000 units tablet  Spouse/Significant Other Yes Yes   Sig: Take 5,000 Units by mouth daily   gabapentin (NEURONTIN) 300 mg capsule  Spouse/Significant Other Yes Yes   Sig: Every 12 hours   losartan (COZAAR) 100 MG tablet  Spouse/Significant Other No Yes   Sig: TAKE 1 TABLET DAILY   metoprolol succinate (TOPROL-XL) 25 mg 24 hr tablet  Spouse/Significant Other No Yes   Sig: Take 1 tablet (25 mg total) by mouth daily   ondansetron (ZOFRAN-ODT) 4 mg disintegrating tablet Not Taking at Unknown time Spouse/Significant Other No No   Sig: Take 1 tablet (4 mg total) by mouth every 8 (eight) hours as needed for nausea or vomiting   Patient not taking: Reported on 7/31/2019   prochlorperazine (COMPAZINE) 10 mg tablet  Spouse/Significant Other Yes Yes   Sig: Take 10 mg by mouth every 6 (six) hours as needed for nausea or vomiting   tamsulosin (FLOMAX) 0 4 mg Not Taking at Unknown time Spouse/Significant Other No No   Sig: Take 1 capsule (0 4 mg total) by mouth daily with dinner   Patient not taking: Reported on 7/31/2019   tiotropium (SPIRIVA HANDIHALER) 18 mcg inhalation capsule  Spouse/Significant Other No Yes   Sig: Place 1 capsule (18 mcg total) into inhaler and inhale daily      Facility-Administered Medications: None       Past Medical History:   Diagnosis Date    Back pain     COPD (chronic obstructive pulmonary disease) (HCC)     History of malignant neoplasm of oral cavity     M0    HL (hearing loss)     Hypertension     Kidney stone 05/2019    Malignant neoplasm of colon (HCC)     descending    Prostate cancer ([facility] [address] )     Tachycardia     Thalassemia trait     Tinnitus        Past Surgical History:   Procedure Laterality Date    BACK SURGERY      CHOLECYSTECTOMY      COLON SURGERY  10/2008    partial colectomy    INCISIONAL HERNIA REPAIR      JOINT REPLACEMENT Right     hip    [facility] COLONOSCOPY FLX DX W/COLLJ SPEC WHEN PFRMD N/A 7/23/2018    Procedure: COLONOSCOPY;  Surgeon: Fannie Villanueva MD;  Location: [facility]; Service: Gastroenterology    PROSTATE SURGERY      TONSILLECTOMY         Family History   Problem Relation Age of Onset    Heart failure Mother         as per [employer]    Coronary artery disease Mother         as per [employer]    Diabetes Mother         mellitus - as per [employer]    Coronary artery disease Father         as per [employer]    Cancer Sister         malignant neoplasm     I have reviewed and agree with the history as documented  Social History     Tobacco Use    Smoking status: Former Smoker     Last attempt to quit: 6/2/2004     Years since quitting: 15 1    Smokeless tobacco: Never Used    Tobacco comment: non smoker/tobacco user; quit 1987 as per [employer]   Substance Use Topics    Alcohol use: No    Drug use: No        Review of Systems   Constitutional: Negative for chills and fever  HENT: Negative for congestion  Eyes: Negative for visual disturbance  Respiratory: Negative for cough and shortness of breath  Cardiovascular: Negative for chest pain and leg swelling  Gastrointestinal: Negative for abdominal pain, diarrhea, nausea and vomiting  Genitourinary: Negative for difficulty urinating, dysuria and frequency  Musculoskeletal: Positive for back pain  Negative for arthralgias, neck pain and neck stiffness  Skin: Negative for rash  Neurological: Negative for weakness, numbness and headaches     Psychiatric/Behavioral: Negative for agitation, behavioral problems and confusion  Physical Exam  Physical Exam   Constitutional: He is oriented to person, place, and time  He appears well-developed and well-nourished  No distress  HENT:   Head: Normocephalic and atraumatic  Right Ear: External ear normal    Left Ear: External ear normal    Nose: Nose normal    Mouth/Throat: Oropharynx is clear and moist    Eyes: Conjunctivae are normal    Neck: Normal range of motion  Neck supple  Cardiovascular: Normal rate, regular rhythm, normal heart sounds and intact distal pulses  Exam reveals no gallop and no friction rub  No murmur heard  Pulmonary/Chest: Effort normal and breath sounds normal  No respiratory distress  He has no wheezes  He has no rales  Abdominal: Soft  Bowel sounds are normal  He exhibits no distension  There is no tenderness  There is no guarding  Abdomen benign   Musculoskeletal: Normal range of motion  He exhibits no edema (No swelling to the LEs) or deformity  No midline TTP to the C, T, or L spine  TTP to the L perispinous muscles of the lower lumbar spine  Neurological: He is alert and oriented to person, place, and time  He exhibits normal muscle tone  5/5 strength to the proximal and distal bilateral LEs with intact sensation to light touch  2+ patellar reflexes bilaterally  No ankle clonus  Skin: Skin is warm and dry  He is not diaphoretic         Vital Signs  ED Triage Vitals [08/01/19 2133]   Temperature Pulse Respirations Blood Pressure SpO2   98 6 °F (37 °C) 66 18 (!) 169/112 94 %      Temp src Heart Rate Source Patient Position - Orthostatic VS BP Location FiO2 (%)   -- -- -- -- --      Pain Score       Worst Possible Pain           Vitals:    08/01/19 2133   BP: (!) 169/112   Pulse: 66         Visual Acuity      ED Medications  Medications   oxyCODONE (ROXICODONE) immediate release tablet 10 mg (10 mg Oral Given 8/1/19 2151)   predniSONE tablet 40 mg (40 mg Oral Given 8/1/19 2151)       Diagnostic Studies  Results Reviewed     None No orders to display              Procedures  Procedures       ED Course           Identification of Seniors at Risk      Most Recent Value   (ISAR) Identification of Seniors at Risk   Before the illness or injury that brought you to the Emergency, did you need someone to help you on a regular basis? 0 Filed at: 08/01/2019 2132   In the last 24 hours, have you needed more help than usual?  0 Filed at: 08/01/2019 2132   Have you been hospitalized for one or more nights during the past 6 months? 0 Filed at: 08/01/2019 2132   In general, do you see well?  0 Filed at: 08/01/2019 2132   In general, do you have serious problems with your memory? 0 Filed at: 08/01/2019 2132   Do you take more than three different medications every day? 1 Filed at: 08/01/2019 2132   ISAR Score  1 Filed at: 08/01/2019 2132                          MDM  Number of Diagnoses or Management Options  Sciatica of left side:   Diagnosis management comments: Well appearing  Afebrile and hemodynamically stable  No midline tenderness to palpation over the C, T, or L-spine to necessitate acute imaging  Patient's pain is to the left paraspinous muscles of the lower lumbar spine and radiates down the back of the left leg, is consistent with sciatica  Neurovascularly intact to the lower extremities as above without red flag symptoms of cauda equina, conus medullaris, or spinal cord mass lesion  Will escalate to oxycodone and give prescription for prednisone  Patient has CKD so cannot give Toradol  Recommend discontinuing Tylenol 4 and starting oxycodone  Patient has follow-up for his MRI Monday  He was discharged good condition with return precautions discussed for red flag symptoms         Amount and/or Complexity of Data Reviewed  Review and summarize past medical records: yes    Patient Progress  Patient progress: stable        Disposition  Final diagnoses:   Sciatica of left side     Time reflects when diagnosis was documented in both MDM as applicable and the Disposition within this note     Time User Action Codes Description Comment    8/1/2019  9:52 PM Javon Blake Add [M54 32] Sciatica of left side       ED Disposition     ED Disposition Condition Date/Time Comment    Discharge Stable Thu Aug 1, 2019  9:52 PM Kinza Cohn discharge to home/self care  Follow-up Information     Follow up With Specialties Details Why Contact Info Additional [address] Gareth Florez MD Internal Medicine, Palliative Care  For further management of your low back pain  [facility] [address], [place] [address] Emergency Department Emergency Medicine  Return to the Emergency Department immediately for numbness or weakness in your legs, inability to walk, bowel or bladder incontinence, or numbness in your groin region   [address] [facility] [id number] [phone] [place] [facility], [address] [place] [address], [id number]          Discharge Medication List as of 8/1/2019  9:55 PM      START taking these medications    Details   oxyCODONE (ROXICODONE) 5 mg immediate release tablet Take 1 tablet (5 mg total) by mouth every 6 (six) hours as needed for severe pain for up to 5 daysMax Daily Amount: 20 mg, Starting Thu 8/1/2019, Until Tue 8/6/2019, Print      predniSONE 20 mg tablet Take 2 tablets (40 mg total) by mouth daily for 4 days, Starting Thu 8/1/2019, Until Mon 8/5/2019, Print         CONTINUE these medications which have NOT CHANGED    Details   albuterol (2 5 mg/3 mL) 0 083 % nebulizer solution Inhale 1 each every 6 (six) hours as needed, Starting Fri 1/5/2018, Historical Med      albuterol (PROVENTIL HFA,VENTOLIN HFA) 90 mcg/act inhaler Inhale 2 puffs every 6 (six) hours as needed for wheezing, Starting Tue 4/17/2018, Normal      allopurinol (ZYLOPRIM) 100 mg tablet Take 1 tablet (100 mg total) by mouth daily, Starting Tue 6/18/2019, Normal      amLODIPine (NORVASC) 5 mg tablet Take 1 tablet (5 mg total) by mouth every morning, Starting Tue 6/18/2019, Normal      budesonide-formoterol (SYMBICORT) 160-4 5 mcg/act inhaler Inhale 2 puffs 2 (two) times a day Rinse mouth after use , Starting Tue 6/18/2019, Normal      cholecalciferol (VITAMIN D3) 1,000 units tablet Take 5,000 Units by mouth daily, Historical Med      gabapentin (NEURONTIN) 300 mg capsule Every 12 hours, Historical Med      losartan (COZAAR) 100 MG tablet TAKE 1 TABLET DAILY, Normal      metoprolol succinate (TOPROL-XL) 25 mg 24 hr tablet Take 1 tablet (25 mg total) by mouth daily, Starting Tue 6/18/2019, Normal      ondansetron (ZOFRAN-ODT) 4 mg disintegrating tablet Take 1 tablet (4 mg total) by mouth every 8 (eight) hours as needed for nausea or vomiting, Starting Sat 5/18/2019, Print      prochlorperazine (COMPAZINE) 10 mg tablet Take 10 mg by mouth every 6 (six) hours as needed for nausea or vomiting, Historical Med      tamsulosin (FLOMAX) 0 4 mg Take 1 capsule (0 4 mg total) by mouth daily with dinner, Starting Sat 5/18/2019, Print      tiotropium (SPIRIVA HANDIHALER) 18 mcg inhalation capsule Place 1 capsule (18 mcg total) into inhaler and inhale daily, Starting Tue 6/18/2019, Normal         STOP taking these medications       acetaminophen-codeine (TYLENOL #3) 300-30 mg per tablet Comments:   Reason for Stopping:             No discharge procedures on file      ED Provider  Electronically Signed by           Christin Shanks MD  08/02/19 [id number]

## 2019-08-25 ENCOUNTER — HOSPITAL ENCOUNTER (EMERGENCY)
Facility: HOSPITAL | Age: 79
Discharge: HOME | End: 2019-08-25
Attending: EMERGENCY MEDICINE
Payer: MEDICARE

## 2019-08-25 VITALS
TEMPERATURE: 98.2 F | WEIGHT: 230 LBS | OXYGEN SATURATION: 98 % | RESPIRATION RATE: 18 BRPM | HEART RATE: 99 BPM | HEIGHT: 72 IN | BODY MASS INDEX: 31.15 KG/M2 | SYSTOLIC BLOOD PRESSURE: 157 MMHG | DIASTOLIC BLOOD PRESSURE: 105 MMHG

## 2019-08-25 DIAGNOSIS — M54.50 ACUTE EXACERBATION OF CHRONIC LOW BACK PAIN: Primary | ICD-10-CM

## 2019-08-25 DIAGNOSIS — G89.29 ACUTE EXACERBATION OF CHRONIC LOW BACK PAIN: Primary | ICD-10-CM

## 2019-08-25 PROCEDURE — 96372 THER/PROPH/DIAG INJ SC/IM: CPT

## 2019-08-25 PROCEDURE — 99285 EMERGENCY DEPT VISIT HI MDM: CPT | Performed by: PHYSICIAN ASSISTANT

## 2019-08-25 PROCEDURE — 99283 EMERGENCY DEPT VISIT LOW MDM: CPT

## 2019-08-25 RX ORDER — OXYCODONE HYDROCHLORIDE 5 MG/1
5 TABLET ORAL EVERY 6 HOURS PRN
Qty: 20 TABLET | Refills: 0 | Status: SHIPPED | OUTPATIENT
Start: 2019-08-25 | End: 2019-08-30

## 2019-08-25 RX ORDER — HYDROMORPHONE HCL/PF 1 MG/ML
1 SYRINGE (ML) INJECTION ONCE
Status: COMPLETED | OUTPATIENT
Start: 2019-08-25 | End: 2019-08-25

## 2019-08-25 RX ADMIN — HYDROMORPHONE HYDROCHLORIDE 1 MG: 1 INJECTION, SOLUTION INTRAMUSCULAR; INTRAVENOUS; SUBCUTANEOUS at 09:40

## 2019-08-25 NOTE — ED NOTES
Discharge instructions reviewed, patient has no new complaints or concerns at time of discharge  Patient taken out of department in wheelchair, patient accompanied out of department by his wife        Thedora Osgood, RN  08/25/19 2802

## 2019-08-25 NOTE — ED PROVIDER NOTES
History  Chief Complaint   Patient presents with    Back Pain     Patient comes to department c/o left sided sciatic pain  Patients wife states he was seen in our department for same complaint on August 1st   Patient wife states he was insistant on coming back for reevauation  53-year-old male with past medical history significant for COPD, hypertension, colon cancer and chronic back pain presents to the emergency department with chief complaint of left lower back pain with radiation down the left leg  Onset of symptoms:  Patient reports he has been having back pain symptoms for over the past year however recently he has developed increasingly worsening left lower back pain that radiates down the left leg  It has been worse over the past 1 week  Location is reported as the left lower back with radiation down the left leg  Quality is reported as sharp shooting pain  Severity is reported as severe listed as 9/10 on the pain scale  Associated symptoms:  Denies urinary retention  Denies bowel or bladder incontinence  Positive for paresthesias down the left leg  Denies paralysis  Denies abdominal pain  Denies fevers  Modifying factors:  Patient reports that movement, bending and twisting exacerbates pain  Context:  Patient currently under the care of orthopedic surgery and pain management at [facility]  He underwent an MRI of his lumbar spine 2 days ago  He was set to undergo transforaminal epidural steroid injections last week however was unable to tolerate the positioning hence was sent for an MRI for further evaluation  He reports he has a follow-up with them this Wednesday for further evaluation of his MRI and further treatment but states that his pain has gotten worse over the past few days  He reports he is treated at home with Tylenol No   4 and they have given him prednisone in the past   He denies any interval fall injury or trauma    He reports his symptoms are more severe and feels like he cannot wait until he is seen this Wednesday by Orthopedics  I reviewed past visits via epic:  Patient was last seen on August 1, 2019 for evaluation of back pain  History provided by:  Patient and spouse   used: No        Prior to Admission Medications   Prescriptions Last Dose Informant Patient Reported? Taking? albuterol (2 5 mg/3 mL) 0 083 % nebulizer solution  Spouse/Significant Other Yes No   Sig: Inhale 1 each every 6 (six) hours as needed   albuterol (PROVENTIL HFA,VENTOLIN HFA) 90 mcg/act inhaler  Spouse/Significant Other No No   Sig: Inhale 2 puffs every 6 (six) hours as needed for wheezing   allopurinol (ZYLOPRIM) 100 mg tablet  Spouse/Significant Other No No   Sig: Take 1 tablet (100 mg total) by mouth daily   amLODIPine (NORVASC) 5 mg tablet  Spouse/Significant Other No No   Sig: Take 1 tablet (5 mg total) by mouth every morning   budesonide-formoterol (SYMBICORT) 160-4 5 mcg/act inhaler  Spouse/Significant Other No No   Sig: Inhale 2 puffs 2 (two) times a day Rinse mouth after use     cholecalciferol (VITAMIN D3) 1,000 units tablet  Spouse/Significant Other Yes No   Sig: Take 5,000 Units by mouth daily   gabapentin (NEURONTIN) 300 mg capsule  Spouse/Significant Other Yes No   Sig: Every 12 hours   losartan (COZAAR) 100 MG tablet  Spouse/Significant Other No No   Sig: TAKE 1 TABLET DAILY   metoprolol succinate (TOPROL-XL) 25 mg 24 hr tablet  Spouse/Significant Other No No   Sig: Take 1 tablet (25 mg total) by mouth daily   ondansetron (ZOFRAN-ODT) 4 mg disintegrating tablet  Spouse/Significant Other No No   Sig: Take 1 tablet (4 mg total) by mouth every 8 (eight) hours as needed for nausea or vomiting   Patient not taking: Reported on 7/31/2019   prochlorperazine (COMPAZINE) 10 mg tablet  Spouse/Significant Other Yes No   Sig: Take 10 mg by mouth every 6 (six) hours as needed for nausea or vomiting   tamsulosin (FLOMAX) 0 4 mg  Spouse/Significant Other No No   Sig: Take 1 capsule (0 4 mg total) by mouth daily with dinner   Patient not taking: Reported on 7/31/2019   tiotropium (SPIRIVA HANDIHALER) 18 mcg inhalation capsule  Spouse/Significant Other No No   Sig: Place 1 capsule (18 mcg total) into inhaler and inhale daily      Facility-Administered Medications: None       Past Medical History:   Diagnosis Date    Back pain     COPD (chronic obstructive pulmonary disease) (HCC)     History of malignant neoplasm of oral cavity     M0    HL (hearing loss)     Hypertension     Kidney stone 05/2019    Malignant neoplasm of colon (HCC)     descending    Prostate cancer ([address] )     Tachycardia     Thalassemia trait     Tinnitus        Past Surgical History:   Procedure Laterality Date    BACK SURGERY      CHOLECYSTECTOMY      COLON SURGERY  10/2008    partial colectomy    INCISIONAL HERNIA REPAIR      JOINT REPLACEMENT Right     hip    OK COLONOSCOPY FLX DX W/COLLJ SPEC WHEN PFRMD N/A 7/23/2018    Procedure: COLONOSCOPY;  Surgeon: Soumya Jean MD;  Location: [facility]; Service: Gastroenterology    PROSTATE SURGERY      TONSILLECTOMY         Family History   Problem Relation Age of Onset    Heart failure Mother         as per [employer]    Coronary artery disease Mother         as per [employer]    Diabetes Mother         mellitus - as per [employer]    Coronary artery disease Father         as per [employer]    Cancer Sister         malignant neoplasm     I have reviewed and agree with the history as documented      Social History     Tobacco Use    Smoking status: Former Smoker     Last attempt to quit: 6/2/2004     Years since quitting: 15 2    Smokeless tobacco: Never Used    Tobacco comment: non smoker/tobacco user; quit 1987 as per [employer]   Substance Use Topics    Alcohol use: No    Drug use: No        Review of Systems   Constitutional: Negative for activity change, appetite change, chills, diaphoresis, fatigue, fever and unexpected weight change  HENT: Negative for congestion, dental problem, drooling, ear discharge, ear pain, facial swelling, hearing loss, mouth sores, nosebleeds, postnasal drip, rhinorrhea, sinus pressure, sinus pain, sneezing, sore throat, tinnitus, trouble swallowing and voice change  Eyes: Negative for photophobia, pain, discharge, redness, itching and visual disturbance  Respiratory: Negative for apnea, cough, choking, chest tightness, shortness of breath, wheezing and stridor  Cardiovascular: Negative for chest pain, palpitations and leg swelling  Gastrointestinal: Negative for abdominal distention, abdominal pain, anal bleeding, blood in stool, constipation, diarrhea, nausea, rectal pain and vomiting  Endocrine: Negative for cold intolerance, heat intolerance, polydipsia, polyphagia and polyuria  Genitourinary: Negative for decreased urine volume, difficulty urinating, dysuria, flank pain, frequency, hematuria and urgency  Musculoskeletal: Positive for back pain  Negative for arthralgias, gait problem, joint swelling, myalgias, neck pain and neck stiffness  Skin: Negative for color change, pallor, rash and wound  Allergic/Immunologic: Negative for environmental allergies, food allergies and immunocompromised state  Neurological: Positive for numbness  Negative for dizziness, tremors, seizures, syncope, facial asymmetry, speech difficulty, weakness, light-headedness and headaches  Hematological: Negative for adenopathy  Does not bruise/bleed easily  Psychiatric/Behavioral: Negative for agitation, confusion, hallucinations, self-injury, sleep disturbance and suicidal ideas  The patient is not nervous/anxious  All other systems reviewed and are negative  Physical Exam  Physical Exam   Constitutional: He is oriented to person, place, and time  He appears well-developed and well-nourished  No distress     BP (!) 157/105 (BP Location: Right arm)   Pulse 99   Temp 98 2 °F (36 8 °C) (Oral)   Resp 18   Ht 6' (1 829 m)   Wt 104 kg (230 lb)   SpO2 98%   BMI 31 19 kg/m²    HENT:   Head: Normocephalic and atraumatic  Right Ear: External ear normal    Left Ear: External ear normal    Nose: Nose normal    Mouth/Throat: Oropharynx is clear and moist  No oropharyngeal exudate  Eyes: Pupils are equal, round, and reactive to light  Conjunctivae and EOM are normal  Right eye exhibits no discharge  Left eye exhibits no discharge  No scleral icterus  Neck: Normal range of motion  Neck supple  No tracheal deviation present  No thyromegaly present  Cardiovascular: Normal rate, regular rhythm and intact distal pulses  Pulmonary/Chest: Effort normal and breath sounds normal  No stridor  No respiratory distress  He has no wheezes  He has no rales  He exhibits no tenderness  Abdominal: Soft  Bowel sounds are normal  He exhibits no distension and no mass  There is no tenderness  There is no rebound and no guarding  Musculoskeletal: He exhibits tenderness  He exhibits no edema or deformity  There is no midline thoracic or lumbar spinal tenderness to palpation  Midline surgical scar noted in lumbar region  No bony step offs or deformities on palpation  No saddle anesthesia  Tender over the left lumbar paraspinal muscles at L2-L4 levels  Nontender over the costovertebral angle bilaterally  Bilateral lower extremities: The patient is neurovascularly intact in the superficial and deep peroneal, sural, tibial, and saphenous nerve distributions there is normal sensation and good capillary refill within the toes  Strength 5/5 normal to bilateral lower extremities  Patient with limited ROM to flexion and extension lumbar spine  FROM throughout BLE  No posterior calf pain or palpable cords  Lymphadenopathy:     He has no cervical adenopathy  Neurological: He is alert and oriented to person, place, and time  He displays normal reflexes  No cranial nerve deficit or sensory deficit   He exhibits normal muscle tone  Gait abnormal  Coordination normal    Skin: Skin is warm and dry  Capillary refill takes less than 2 seconds  No rash noted  He is not diaphoretic  No erythema  No pallor  Psychiatric: He has a normal mood and affect  His behavior is normal  Judgment and thought content normal    Nursing note and vitals reviewed  Vital Signs  ED Triage Vitals [08/25/19 0821]   Temperature Pulse Respirations Blood Pressure SpO2   98 2 °F (36 8 °C) 99 18 (!) 157/105 98 %      Temp Source Heart Rate Source Patient Position - Orthostatic VS BP Location FiO2 (%)   Oral Monitor Sitting Right arm --      Pain Score       9           Vitals:    08/25/19 0821   BP: (!) 157/105   Pulse: 99   Patient Position - Orthostatic VS: Sitting         Visual Acuity      ED Medications  Medications   HYDROmorphone (DILAUDID) injection 1 mg (1 mg Intramuscular Given 8/25/19 0940)       Diagnostic Studies  Results Reviewed     None                 No orders to display              Procedures  Procedures       ED Course                               MDM  Number of Diagnoses or Management Options  Acute exacerbation of chronic low back pain: established and worsening  Diagnosis management comments: ddx includes but is not limited to:  Myofascial pain, diskogenic pain, radicular pain, muscle spasm, vertebral compression fracture, spinal stenosis, spondylosis, cancer, osteoporosis, OA, RA, consider but doubt epidural abscess, cauda equina  MRI results from [facility] on 8/23/2019 reviewed: Findings: For the purposes of this dictation, the lumbar vertebrae  are labeled from a caudal to cranial direction, the first vertebra  with lumbar morphology is labeled as L5  Conus and lower thoracic cord: Within normal limits    Marrow and Alignment: Within normal limits    Mild scoliosis  L1-L2: Mild degenerative changes in endplates and facet joints  L2-L3: Moderate degenerative changes in endplates and facet joints    Small right posterolateral disc protrusion  L3-L4: Moderate to marked degenerative changes in endplates  Mild  right asymmetric disc bulging  Moderate facet hypertrophy  L4-L5: Postsurgical and marked degenerative changes in endplates  Marked facet hypertrophy  It appears the patient is status post left  hemilaminectomy  L5-S1: Moderate degenerative changes in endplates  Mild disc  bulging  Moderate facet hypertrophy  There are pedicle fixation screws at L4 and L5  There is minimal central stenosis at L2-L3, mild to moderate right  paracentral stenosis at L3-L4  There is right foramina stenosis at L2-L3, L3-L4, L4-L5, L5-S1  There is left foramina stenosis at L1-L2, L2-L3, L3-L4, L4-L5,  L5-S1  Other Result Information  Interface, Rad Results In - 08/23/2019 11:34 AM EDT      History: Chronic low back pain    Procedure: MRI of the lumbar spine was obtained with the following  sequences: Sagittal T1, sagittal T2, sagittal STIR and axial T2  weighted images  Comparison: No prior MRI scan    Findings: For the purposes of this dictation, the lumbar vertebrae  are labeled from a caudal to cranial direction, the first vertebra  with lumbar morphology is labeled as L5  Conus and lower thoracic cord: Within normal limits    Marrow and Alignment: Within normal limits    Mild scoliosis  L1-L2: Mild degenerative changes in endplates and facet joints  L2-L3: Moderate degenerative changes in endplates and facet joints  Small right posterolateral disc protrusion  L3-L4: Moderate to marked degenerative changes in endplates  Mild  right asymmetric disc bulging  Moderate facet hypertrophy  L4-L5: Postsurgical and marked degenerative changes in endplates  Marked facet hypertrophy  It appears the patient is status post left  hemilaminectomy  L5-S1: Moderate degenerative changes in endplates  Mild disc  bulging  Moderate facet hypertrophy      There are pedicle fixation screws at L4 and L5     There is minimal central stenosis at L2-L3, mild to moderate right  paracentral stenosis at L3-L4  There is right foramina stenosis at L2-L3, L3-L4, L4-L5, L5-S1  There is left foramina stenosis at L1-L2, L2-L3, L3-L4, L4-L5,  L5-S1  IMPRESSION:  Impression:  1  Post surgical changes  2  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  See above  3  Mild to moderate right paracentral stenosis at L3-L4  Long discussion with patient and significant other at bedside  Patient reports worsening pain from establish source  He read denies any interval fall injury or trauma to the area  He has an appointment with his established spine specialist in 3 days  Will augment current analgesic regiment to include oxycodone 5 mg by mouth every 6 hours as needed for pain  Standard narcotic precautions were given  Patient reports that he has been given prednisone in the past however given his reported pending procedure for epidural steroid injection will avoid steroids in this setting  Discussed plan for augmented pain control regiment with outpatient follow-up already scheduled with the patient and his significant other and they are agreeable with this plan  No red flag symptoms regarding patient's back pain  MRI results from 2 days ago reviewed - patient instructed to follow up with ordering provider for full review of results  Reviewed reasons to return to ed  Patient verbalized understanding of diagnosis and agreement with discharge plan of care as well as understanding of reasons to return to ed                Amount and/or Complexity of Data Reviewed  Tests in the radiology section of CPT®: reviewed  Discussion of test results with the performing providers: yes  Obtain history from someone other than the patient: yes (Significant other)  Review and summarize past medical records: yes  Independent visualization of images, tracings, or specimens: yes    Patient Progress  Patient progress: stable      Disposition  Final diagnoses:   Acute exacerbation of chronic low back pain     Time reflects when diagnosis was documented in both MDM as applicable and the Disposition within this note     Time User Action Codes Description Comment    8/25/2019  9:30 AM Pascual Karimi Add [M54 5,  G89 29] Acute exacerbation of chronic low back pain       ED Disposition     ED Disposition Condition Date/Time Comment    Discharge Stable Sun Aug 25, 2019  9:30 AM Wero Matt discharge to home/self care              Follow-up Information     Follow up With Specialties Details Why Contact Info Additional Information    Tequila Gardner MD Internal Medicine, Palliative Care Call in 1 day for further evaluation of symptoms [address] [facility] [address] [facility], [address] [facility] [address] Emergency Department Emergency Medicine Go to  If symptoms worsen [address] [facility] [address] [phone] [place] [facility], [address], [place], [facility] Pattie Hugo MD  Go in 5 days for further evaluation of symptoms [address] [facility] [address]  [address] Orthopedic Surgery Go in 5 days for further evaluation of symptoms [facility] [address]             Discharge Medication List as of 8/25/2019  9:33 AM      START taking these medications    Details   oxyCODONE (ROXICODONE) 5 mg immediate release tablet Take 1 tablet (5 mg total) by mouth every 6 (six) hours as needed for severe pain (back pain/ongoing rx) for up to 5 daysMax Daily Amount: 20 mg, Starting Sun 8/25/2019, Until Fri 8/30/2019, Print         CONTINUE these medications which have NOT CHANGED    Details   albuterol (2 5 mg/3 mL) 0 083 % nebulizer solution Inhale 1 each every 6 (six) hours as needed, Starting Fri 1/5/2018, Historical Med      albuterol (PROVENTIL HFA,VENTOLIN HFA) 90 mcg/act inhaler Inhale 2 puffs every 6 (six) hours as needed for wheezing, Starting Tue 4/17/2018, Normal      allopurinol (ZYLOPRIM) 100 mg tablet Take 1 tablet (100 mg total) by mouth daily, Starting Tue 6/18/2019, Normal      amLODIPine (NORVASC) 5 mg tablet Take 1 tablet (5 mg total) by mouth every morning, Starting Tue 6/18/2019, Normal      budesonide-formoterol (SYMBICORT) 160-4 5 mcg/act inhaler Inhale 2 puffs 2 (two) times a day Rinse mouth after use , Starting Tue 6/18/2019, Normal      cholecalciferol (VITAMIN D3) 1,000 units tablet Take 5,000 Units by mouth daily, Historical Med      gabapentin (NEURONTIN) 300 mg capsule Every 12 hours, Historical Med      losartan (COZAAR) 100 MG tablet TAKE 1 TABLET DAILY, Normal      metoprolol succinate (TOPROL-XL) 25 mg 24 hr tablet Take 1 tablet (25 mg total) by mouth daily, Starting Tue 6/18/2019, Normal      ondansetron (ZOFRAN-ODT) 4 mg disintegrating tablet Take 1 tablet (4 mg total) by mouth every 8 (eight) hours as needed for nausea or vomiting, Starting Sat 5/18/2019, Print      prochlorperazine (COMPAZINE) 10 mg tablet Take 10 mg by mouth every 6 (six) hours as needed for nausea or vomiting, Historical Med      tamsulosin (FLOMAX) 0 4 mg Take 1 capsule (0 4 mg total) by mouth daily with dinner, Starting Sat 5/18/2019, Print      tiotropium (SPIRIVA HANDIHALER) 18 mcg inhalation capsule Place 1 capsule (18 mcg total) into inhaler and inhale daily, Starting Tue 6/18/2019, Normal           No discharge procedures on file      ED Provider  Electronically Signed by           Jeimy Bocanegra PA-C  08/25/19 4050

## 2019-09-17 ENCOUNTER — EVALUATION (OUTPATIENT)
Dept: PHYSICAL THERAPY | Facility: CLINIC | Age: 79
End: 2019-09-17
Payer: MEDICARE

## 2019-09-17 DIAGNOSIS — M54.32 SCIATICA OF LEFT SIDE WITHOUT BACK PAIN: Primary | ICD-10-CM

## 2019-09-17 PROCEDURE — 97162 PT EVAL MOD COMPLEX 30 MIN: CPT | Performed by: PHYSICAL THERAPIST

## 2019-09-17 PROCEDURE — 97110 THERAPEUTIC EXERCISES: CPT | Performed by: PHYSICAL THERAPIST

## 2019-09-17 NOTE — LETTER
2019    Kiki Garcia MD  [address] [facility] [address]    Patient: Radha Quiñones   YOB: 1940   Date of Visit: 2019     Encounter Diagnosis     ICD-10-CM    1  Sciatica of left side without back pain M54 32        Dear Dr Tonya Du: Thank you for your recent referral of Radha Quiñones  Please review the attached evaluation summary from [place]'s recent visit  Please verify that you agree with the plan of care by signing the attached order  If you have any questions or concerns, please do not hesitate to call  I sincerely appreciate the opportunity to share in the care of one of your patients and hope to have another opportunity to work with you in the near future  Sincerely,    Sanjay Nina      Referring Provider:      I certify that I have read the below Plan of Care and certify the need for these services furnished under this plan of treatment while under my care  Kiki Garcia MD  [address] [facility] [address]  VIA Facsimile: [phone]          PT Evaluation     Today's date: 2019  Patient name: Radha Quiñones  : 1940  MRN: [id number]  Referring provider: Theo Glover MD  Dx:   Encounter Diagnosis     ICD-10-CM    1  Sciatica of left side without back pain M54 32        Start Time: 0840  Stop Time: 930  Total time in clinic (min): 50 minutes    Assessment  Assessment details: Pt is a 70year old male with a history of lumbar fusion and recent development of insidious sciatica over the last 4 months  He presents with complaints of pain the the lateral L calf and in the bootom of the foot  He has received injections for the pain 2 weeks ago that have helped  He is limitied in lumbar extension and rotation along with weakness in the L LE  He presents with an abnormal gait pattern of forward flexion of the trunk with lateral flexion   Pt has bilateral weakness in the LE with the L worse than the R  Pt also has an absent L patellar reflex  Supine bridging provoked symptoms in the L leg  Pt is heavily pronated on the L foot with weight bearing  Pt is unable to complete chores at home due to fatigue and pain in the L leg  Pt needs skilled PT to increase LE strength, improve posture and endurance in order to complete chores at home and run the  in the winter without fatigue or pain  Impairments: abnormal gait, impaired physical strength, pain with function and poor posture   Understanding of Dx/Px/POC: good   Prognosis: good    Goals  STG - 3 weeks  1  Decrease L LE pain by 25% at worst  2  Compliance with HEP    LTG - 6 weeks  1  Ambulate for 15 minutes without fatigue in order to walk the length of the driveway for snow blowing  2  Decrease pain in L leg by 50% for extended standing and walking    Plan  Patient would benefit from: skilled physical therapy  Planned modality interventions: ultrasound, thermotherapy: hydrocollator packs, cryotherapy and TENS  Planned therapy interventions: manual therapy, massage, neuromuscular re-education, patient education, postural training, flexibility, functional ROM exercises, gait training, home exercise program, therapeutic activities, therapeutic exercise, stretching and strengthening  Frequency: 2x week  Duration in weeks: 6        Subjective Evaluation    History of Present Illness  Onset date: 4 months ago  Mechanism of injury: Insidious onset about 4 months ago after previous therapy treatment  Has received a shot for it about 3 weeks ago     Pain  Current pain ratin  At best pain ratin  At worst pain ratin  Location: outside of L leg  Quality: needle-like (needle like on the bottom of the L foot )  Relieving factors: rest and change in position  Aggravating factors: walking  Progression: improved (better now because of the shot  )    Social Support  Steps to enter house: yes  Stairs in house: yes   Lives in: multiple-level home  Lives with: spouse      Diagnostic Tests  Abnormal MRI: unsure of the results  has previous back operation (fusion)  Treatments  Previous treatment: injection treatment  Patient Goals  Patient goals for therapy: decreased pain and improved balance  Patient goal: use the  in the winter without pain and getting out of breath  Objective     Concurrent Complaints  Negative for bladder dysfunction and bowel dysfunction    Postural Observations  Seated posture: poor  Standing posture: poor  Correction of posture: makes symptoms worse    Additional Postural Observation Details  When seated, leans to the left and remains in flexed position  Flexed and leans to the L when standing and walking       Tenderness     Additional Tenderness Details  No tenderness present with anterior translation of Lumbar spinous processes     Neurological Testing     Sensation     Lumbar   Left   Intact: light touch    Right   Intact: light touch    Active Range of Motion   Cervical/Thoracic Spine       Cervical    Flexion:  WFL  Extension:  WFL  Left rotation:  WFL  Right rotation:  WFL  Left Shoulder   Flexion: 150 degrees   Abduction: 120 degrees     Right Shoulder   Flexion: 150 degrees   Abduction: 120 degrees     Lumbar   Flexion:  Restriction level: minimal  Extension:  with pain Restriction level: maximal  Left rotation:  with pain Restriction level: moderate  Right rotation:  with pain Restriction level: moderate    Additional Active Range of Motion Details  Pain goes down the leg with lumbar rotation    Strength/Myotome Testing     Left Hip   Planes of Motion   Flexion: 3+  Abduction: 4+  Adduction: 4+    Right Hip   Planes of Motion   Flexion: 4  Abduction: 4+  Adduction: 4+    Left Knee   Flexion: 3+  Extension: 3+    Right Knee   Flexion: 4  Extension: 4    Left Ankle/Foot   Dorsiflexion: 4  Plantar flexion: 3+  Inversion: 4  Eversion: 4    Right Ankle/Foot   Dorsiflexion: 4  Plantar flexion: 4  Inversion: 4  Eversion: 4    Additional Strength Details  Pt unable to stand and lift toes off the ground, but could rise on to toes    Tests     Lumbar     Left   Negative crossed SLR and passive SLR  Right   Negative crossed SLR and passive SLR  Ambulation     Ambulation: Level Surfaces   Ambulation without assistive device: independent    Observational Gait   Gait: crouched     Additional Observational Gait Details  R foot ER with L foot decreased dorsiflexion  Forward flexed and L lateral flexion during gait    Functional Assessment      Squat    Trunk lean left and sitting toward left side       General Comments:      Hip Comments   L 3+/5 hip flex, R 4/5    Knee Comments  L knee ext 3+/5, R 4+/5    L knee flex 3+/5, R 4/5    Ankle/Foot Comments   L ankle dorsiflex 4/5, R ankle 4/5    L ankle plantarflex 3+/5, R ankle 4/5             Precautions: Use of O2 must wear with exercise, lumbar fusion      Manual              MFR             PROM ankle/toes                                                        Exercise Diary  9/17            HEP 15'            Sit to stand             Hip 4 way             CS/HR             Step ups             Kash Dior [address] on ball             Seated toe raise             Prone on elbows                                                                                                                                      Modalities              US             Ice/Heat             STIM

## 2019-09-17 NOTE — PROGRESS NOTES
PT Evaluation     Today's date: 2019  Patient name: Wero Gutierrez  : 1940  MRN: [id number]  Referring provider: Elisabeth Goodell, MD  Dx:   Encounter Diagnosis     ICD-10-CM    1  Sciatica of left side without back pain M54 32        Start Time: 840  Stop Time: 930  Total time in clinic (min): 50 minutes    Assessment  Assessment details: Pt is a 70year old male with a history of lumbar fusion and recent development of insidious sciatica over the last 4 months  He presents with complaints of pain the the lateral L calf and in the bootom of the foot  He has received injections for the pain 2 weeks ago that have helped  He is limitied in lumbar extension and rotation along with weakness in the L LE  He presents with an abnormal gait pattern of forward flexion of the trunk with lateral flexion  Pt has bilateral weakness in the LE with the L worse than the R  Pt also has an absent L patellar reflex  Supine bridging provoked symptoms in the L leg  Pt is heavily pronated on the L foot with weight bearing  Pt is unable to complete chores at home due to fatigue and pain in the L leg  Pt needs skilled PT to increase LE strength, improve posture and endurance in order to complete chores at home and run the  in the winter without fatigue or pain  Impairments: abnormal gait, impaired physical strength, pain with function and poor posture   Understanding of Dx/Px/POC: good   Prognosis: good    Goals  STG - 3 weeks  1  Decrease L LE pain by 25% at worst  2  Compliance with HEP    LTG - 6 weeks  1  Ambulate for 15 minutes without fatigue in order to walk the length of the driveway for snow blowing  2   Decrease pain in L leg by 50% for extended standing and walking    Plan  Patient would benefit from: skilled physical therapy  Planned modality interventions: ultrasound, thermotherapy: hydrocollator packs, cryotherapy and TENS  Planned therapy interventions: manual therapy, massage, neuromuscular re-education, patient education, postural training, flexibility, functional ROM exercises, gait training, home exercise program, therapeutic activities, therapeutic exercise, stretching and strengthening  Frequency: 2x week  Duration in weeks: 6        Subjective Evaluation    History of Present Illness  Onset date: 4 months ago  Mechanism of injury: Insidious onset about 4 months ago after previous therapy treatment  Has received a shot for it about 3 weeks ago  Pain  Current pain ratin  At best pain ratin  At worst pain ratin  Location: outside of L leg  Quality: needle-like (needle like on the bottom of the L foot )  Relieving factors: rest and change in position  Aggravating factors: walking  Progression: improved (better now because of the shot  )    Social Support  Steps to enter house: yes  Stairs in house: yes   Lives in: multiple-level home  Lives with: spouse      Diagnostic Tests  Abnormal MRI: unsure of the results  has previous back operation (fusion)  Treatments  Previous treatment: injection treatment  Patient Goals  Patient goals for therapy: decreased pain and improved balance  Patient goal: use the  in the winter without pain and getting out of breath  Objective     Concurrent Complaints  Negative for bladder dysfunction and bowel dysfunction    Postural Observations  Seated posture: poor  Standing posture: poor  Correction of posture: makes symptoms worse    Additional Postural Observation Details  When seated, leans to the left and remains in flexed position  Flexed and leans to the L when standing and walking       Tenderness     Additional Tenderness Details  No tenderness present with anterior translation of Lumbar spinous processes     Neurological Testing     Sensation     Lumbar   Left   Intact: light touch    Right   Intact: light touch    Active Range of Motion   Cervical/Thoracic Spine       Cervical    Flexion:  WFL  Extension:  WFL  Left rotation: WFL  Right rotation:  WFL  Left Shoulder   Flexion: 150 degrees   Abduction: 120 degrees     Right Shoulder   Flexion: 150 degrees   Abduction: 120 degrees     Lumbar   Flexion:  Restriction level: minimal  Extension:  with pain Restriction level: maximal  Left rotation:  with pain Restriction level: moderate  Right rotation:  with pain Restriction level: moderate    Additional Active Range of Motion Details  Pain goes down the leg with lumbar rotation    Strength/Myotome Testing     Left Hip   Planes of Motion   Flexion: 3+  Abduction: 4+  Adduction: 4+    Right Hip   Planes of Motion   Flexion: 4  Abduction: 4+  Adduction: 4+    Left Knee   Flexion: 3+  Extension: 3+    Right Knee   Flexion: 4  Extension: 4    Left Ankle/Foot   Dorsiflexion: 4  Plantar flexion: 3+  Inversion: 4  Eversion: 4    Right Ankle/Foot   Dorsiflexion: 4  Plantar flexion: 4  Inversion: 4  Eversion: 4    Additional Strength Details  Pt unable to stand and lift toes off the ground, but could rise on to toes    Tests     Lumbar     Left   Negative crossed SLR and passive SLR  Right   Negative crossed SLR and passive SLR  Ambulation     Ambulation: Level Surfaces   Ambulation without assistive device: independent    Observational Gait   Gait: crouched     Additional Observational Gait Details  R foot ER with L foot decreased dorsiflexion  Forward flexed and L lateral flexion during gait    Functional Assessment      Squat    Trunk lean left and sitting toward left side       General Comments:      Hip Comments   L 3+/5 hip flex, R 4/5    Knee Comments  L knee ext 3+/5, R 4+/5    L knee flex 3+/5, R 4/5    Ankle/Foot Comments   L ankle dorsiflex 4/5, R ankle 4/5    L ankle plantarflex 3+/5, R ankle 4/5             Precautions: Use of O2 must wear with exercise, lumbar fusion      Manual              MFR             PROM ankle/toes                                                        Exercise Diary  9/17            HEP 15'            Sit to stand             Hip 4 way             CS/HR             Step ups             Kash Dior 83 on ball             Seated toe raise             Prone on elbows                                                                                                                                      Modalities              US             Ice/Heat             STIM

## 2019-09-17 NOTE — LETTER
2019    Anais Camacho MD  [address] [facility] [address]    Patient: Katelyn Villa   YOB: 1940   Date of Visit: 2019     Encounter Diagnosis     ICD-10-CM    1  Sciatica of left side without back pain M54 32        Dear Dr Carole Jasmine: Thank you for your recent referral of Katelyn Villa  Please review the attached evaluation summary from [place]'s recent visit  Please verify that you agree with the plan of care by signing the attached order  If you have any questions or concerns, please do not hesitate to call  I sincerely appreciate the opportunity to share in the care of one of your patients and hope to have another opportunity to work with you in the near future  Sincerely,    Ana Cristina Erickson      Referring Provider:      I certify that I have read the below Plan of Care and certify the need for these services furnished under this plan of treatment while under my care  Anais Camacho MD  [address] [facility] [address]  VIA Facsimile: [phone]          PT Evaluation     Today's date: 2019  Patient name: Katelyn Villa  : 1940  MRN: [id number]  Referring provider: Serge Khan MD  Dx: No diagnosis found  Assessment/Plan    Subjective Evaluation    History of Present Illness  Onset date: 4 months ago  Mechanism of injury: Insidious onset about 4 months ago after previous therapy treatment  Has received a shot for it about 3 weeks ago     Pain  Current pain ratin  At best pain ratin  At worst pain ratin  Location: outside of L leg  Quality: needle-like (needle like on the bottom of the L foot )  Relieving factors: rest and change in position  Aggravating factors: walking  Progression: improved (better now because of the shot  )    Social Support  Steps to enter house: yes  Stairs in house: yes   Lives in: multiple-level home  Lives with: spouse      Diagnostic Tests  Abnormal MRI: unsure of the results  has previous back operation (fusion)  Treatments  Previous treatment: injection treatment  Patient Goals  Patient goals for therapy: decreased pain and improved balance  Patient goal: use the  in the winter without pain and getting out of breath           Objective           Precautions: ***      Manual                                                                                   Exercise Diary                                                                                                                                                                                                                                                                                      Modalities

## 2019-09-20 ENCOUNTER — OFFICE VISIT (OUTPATIENT)
Dept: PHYSICAL THERAPY | Facility: CLINIC | Age: 79
End: 2019-09-20
Payer: MEDICARE

## 2019-09-20 DIAGNOSIS — M54.32 SCIATICA OF LEFT SIDE WITHOUT BACK PAIN: Primary | ICD-10-CM

## 2019-09-20 PROCEDURE — 97110 THERAPEUTIC EXERCISES: CPT | Performed by: PHYSICAL THERAPIST

## 2019-09-20 PROCEDURE — 97530 THERAPEUTIC ACTIVITIES: CPT | Performed by: PHYSICAL THERAPIST

## 2019-09-20 PROCEDURE — 97140 MANUAL THERAPY 1/> REGIONS: CPT | Performed by: PHYSICAL THERAPIST

## 2019-09-20 NOTE — PROGRESS NOTES
Daily Note     Today's date: 2019  Patient name: Tico Saavedra  : 1940  MRN: [id number]  Referring provider: Elvis Coles MD  Dx:   Encounter Diagnosis     ICD-10-CM    1  Sciatica of left side without back pain M54 32                   Subjective: Pt reports soreness across the low back today at about a 4/10 pain after taking a pain pill this morning  Objective: See treatment diary below      Assessment:  Pt tolerated treatment well with minimal increase in back pain  Pt required continuous verbal cueing for posture and control with Hip 4-way, especially abduction to keep toe pointed forward  MFR of bilateral paraspinals to decrease muscular tightness  US over L SIJ region to decrease inflammation and increase healing around the bursa  Pt tolerated all treatment with decrease in back pain post-session  Pt continues to need skilled PT to improve gait, increase endurance and decrease muscular tightness in order ot walk around his home and in the community safely  Treated by Jarrod Borden, SPT, supervised by hCemo Acosta, PT, MPT  Plan: Continue per plan of care  Complete prone on elbows next session to decrease flexed posture when walking        Precautions: Use of O2 must wear with exercise, lumbar fusion      Manual              MFR 10'            PROM ankle/toes                                                        Exercise Diary             HEP 15' ---           Sit to stand  ---           Hip 4 way  20x ea leg           CS/HR  10x10           Step ups  6" 2x10           Bike  10'           Circuit  ---           Pulleys  30x           Bridge on ball  ---           Seated toe raise  ---           Prone on elbows  ---                                                                                                                                    Modalities              US 10'            Ice/Heat             STIM

## 2019-09-23 ENCOUNTER — OFFICE VISIT (OUTPATIENT)
Dept: PHYSICAL THERAPY | Facility: CLINIC | Age: 79
End: 2019-09-23
Payer: MEDICARE

## 2019-09-23 DIAGNOSIS — M54.32 SCIATICA OF LEFT SIDE WITHOUT BACK PAIN: Primary | ICD-10-CM

## 2019-09-23 PROCEDURE — 97110 THERAPEUTIC EXERCISES: CPT | Performed by: PHYSICAL THERAPIST

## 2019-09-23 PROCEDURE — 97140 MANUAL THERAPY 1/> REGIONS: CPT | Performed by: PHYSICAL THERAPIST

## 2019-09-23 NOTE — PROGRESS NOTES
Daily Note     Today's date: 2019  Patient name: Abhinav Martinez  : 1940  MRN: [id number]  Referring provider: Andrea Floyd MD  Dx:   Encounter Diagnosis     ICD-10-CM    1  Sciatica of left side without back pain M54 32        Start Time: 915  Stop Time: 101  Total time in clinic (min): 60 minutes    Subjective: patient reports hip pain 4/10 today       Objective: See treatment diary below      Assessment: tolerated prone on elbows with verbal and tactile cueing for proper mechanics, decreased pain post manual therapy       Plan: Continue per plan of care  Complete prone on elbows next session to decrease flexed posture when walking        Precautions: Use of O2 must wear with exercise, lumbar fusion      Manual             MFR 10' 15'           PROM ankle/toes                                                        Exercise Diary            HEP 15' ---           Sit to stand  ---           Hip 4 way  20x ea leg 20x  Ea leg          CS/HR  10x10 10x10          Step ups  6" 2x10 6"  2x10          Bike  10' 15'          Circuit  ---           Pulleys  30x 30x          Bridge on ball  ---           Seated toe raise  ---           Prone on elbows  --- x5 10" hold                                                                                                                                   Modalities              US 10'            Ice/Heat             STIM

## 2019-09-25 ENCOUNTER — OFFICE VISIT (OUTPATIENT)
Dept: PHYSICAL THERAPY | Facility: CLINIC | Age: 79
End: 2019-09-25
Payer: MEDICARE

## 2019-09-25 DIAGNOSIS — M54.32 SCIATICA OF LEFT SIDE WITHOUT BACK PAIN: Primary | ICD-10-CM

## 2019-09-25 PROCEDURE — 97110 THERAPEUTIC EXERCISES: CPT

## 2019-09-25 PROCEDURE — 97140 MANUAL THERAPY 1/> REGIONS: CPT

## 2019-09-25 NOTE — PROGRESS NOTES
Daily Note     Today's date: 2019  Patient name: Derick Setting  : 1940  MRN: [id number]  Referring provider: Gab Chino MD  Dx:   Encounter Diagnosis     ICD-10-CM    1  Sciatica of left side without back pain M54 32        Start Time: 0900  Stop Time: 1000  Total time in clinic (min): 60 minutes    Subjective: Derrick Allan reports taking a pain pill this morning and he rates his leg/calf pain at a "3/10"  Objective: See treatment diary below    Assessment: Patient required mod cueing for proper form and intensity during assigned TE  No change in symptoms post PT today  Progress as able  Plan: Continue per plan of care  Complete prone on elbows next session to decrease flexed posture when walking        Precautions: Use of O2 must wear with exercise, lumbar fusion    Manual            MFR 10' 15 10'          PROM ankle/toes                                                      Exercise Diary           HEP 15' ---           Sit to stand  ---           Hip 4 way  20x ea leg 20x  Ea leg 20x ea         CS/HR  10x10 10x10 10"  x10         Step ups  6" 2x10 6"  2x10 6"  2x10         Bike  10' 15 15'         Circuit  ---           Pulleys  30x 30x 30x         Bridge on ball  ---           Seated toe raise  ---           Prone on elbows  --- x5 10" hold x5  10"  hold                                                                                                                                Modalities              US 10'            Ice/Heat             STIM

## 2019-09-30 ENCOUNTER — OFFICE VISIT (OUTPATIENT)
Dept: PHYSICAL THERAPY | Facility: CLINIC | Age: 79
End: 2019-09-30
Payer: MEDICARE

## 2019-09-30 DIAGNOSIS — M54.32 SCIATICA OF LEFT SIDE WITHOUT BACK PAIN: Primary | ICD-10-CM

## 2019-09-30 PROCEDURE — 97110 THERAPEUTIC EXERCISES: CPT

## 2019-09-30 PROCEDURE — 97140 MANUAL THERAPY 1/> REGIONS: CPT

## 2019-09-30 NOTE — PROGRESS NOTES
Daily Note     Today's date: 2019  Patient name: Truddie Phalen  : 1940  MRN: [id number]  Referring provider: Tara Lopez MD  Dx:   Encounter Diagnosis     ICD-10-CM    1  Sciatica of left side without back pain M54 32                   Subjective: Pt reports 0/10 pain just discomfort in his left lumbar spine region  Objective: See treatment diary below      Assessment: Tolerated treatment well having no increases in pain  Pt needs both verbal and visual cueing for proper exercise technique  Tenderness experienced along left lumbar paraspinals during MFR         Plan: Continue with current POC      Precautions: Use of O2 must wear with exercise, lumbar fusion    Manual           MFR 10' 15' 10' 10'         PROM ankle/toes                                                      Exercise Diary          HEP 15' ---           Sit to stand  ---           Hip 4 way  20x ea leg 20x  Ea leg 20x ea 20x ea        CS/HR  10x10 10x10 10"  x10 10"x10        Step ups  6" 2x10 6"  2x10 6"  2x10 6" 10x ea        Bike  10' 15' 15' 15'        Circuit  ---           Pulleys  30x 30x 30x 30x        Bridge on ball  ---           Seated toe raise  ---           Prone on elbows  --- x5 10" hold x5  10"  hold 10"x5 hold                                                                                                                                Modalities               10'            Ice/Heat             STIM

## 2019-10-02 ENCOUNTER — OFFICE VISIT (OUTPATIENT)
Dept: PHYSICAL THERAPY | Facility: CLINIC | Age: 79
End: 2019-10-02
Payer: MEDICARE

## 2019-10-02 DIAGNOSIS — M54.32 SCIATICA OF LEFT SIDE WITHOUT BACK PAIN: Primary | ICD-10-CM

## 2019-10-02 PROCEDURE — 97110 THERAPEUTIC EXERCISES: CPT

## 2019-10-02 PROCEDURE — 97140 MANUAL THERAPY 1/> REGIONS: CPT

## 2019-10-02 NOTE — PROGRESS NOTES
Daily Note     Today's date: 10/2/2019  Patient name: Nan Khan  : 1940  MRN: [id number]  Referring provider: Katy Santana MD  Dx:   Encounter Diagnosis     ICD-10-CM    1  Sciatica of left side without back pain M54 32                   Subjective: Pt states that he is not experiencing any pain today and "feels pretty good "      Objective: See treatment diary below      Assessment: Tolerated treatment well today having no increases in pain or irritation to the lumbar spine  Verbal cueing and tactile cueing needed for proper exercise technique  Rest breaks are needed due to SOB  Pt has more tissue tenderness on L lumbar paraspinals versus R during STM  Plan: Continue with current POC to address patient deficits        Precautions: Use of O2 must wear with exercise, lumbar fusion    Manual  9/20 9/23 9/25 9/30 10/2        MFR 10' 15' 10' 10' 15'        PROM ankle/toes                                                      Exercise Diary  9/17 9/20 9/23 9/25 9/30 10/2       HEP 15' ---           Sit to stand  ---           Hip 4 way  20x ea leg 20x  Ea leg 20x ea 20x ea 20x ea       CS/HR  10x10 10x10 10"  x10 10"x10 10"x10       Step ups  6" 2x10 6"  2x10 6"  2x10 6" 10x ea 6" 2x10 ea       Bike  10' 15' 15' 15' 15'       Circuit  ---           Pulleys  30x 30x 30x 30x 30x       Bridge on ball  ---           Seated toe raise  ---           Prone on elbows  --- x5 10" hold x5  10"  hold 10"x5 hold                                                                                                                                Modalities              US 10'            Ice/Heat             STIM

## 2019-10-07 ENCOUNTER — OFFICE VISIT (OUTPATIENT)
Dept: PHYSICAL THERAPY | Facility: CLINIC | Age: 79
End: 2019-10-07
Payer: MEDICARE

## 2019-10-07 DIAGNOSIS — M54.32 SCIATICA OF LEFT SIDE WITHOUT BACK PAIN: Primary | ICD-10-CM

## 2019-10-07 PROCEDURE — 97140 MANUAL THERAPY 1/> REGIONS: CPT

## 2019-10-07 PROCEDURE — 97110 THERAPEUTIC EXERCISES: CPT

## 2019-10-07 NOTE — PROGRESS NOTES
Daily Note     Today's date: 10/7/2019  Patient name: Sharon Person  : 1940  MRN: [id number]  Referring provider: Xin Goldstein MD  Dx:   Encounter Diagnosis     ICD-10-CM    1  Sciatica of left side without back pain M54 32                   Subjective: Pt states that he is not in pain today and has no increased stiffness in lumbar spine with no sciatica symptoms  Objective: See treatment diary below      Assessment: Tolerated treatment well today with no increases in pain  Added LE circuit exercises today with demonstration and verbal cueing needed  Pt was educated that he may feel soreness from new exercises and that is normal  Pt continues to have tenderness to L lumbar paraspinals to palpation during MFR but it is tolerable  Plan: Continue with current POC to address pt deficits        Precautions: Use of O2 must wear with exercise, lumbar fusion    Manual  9/20 9/23 9/25 9/30 10/2 10/7       MFR 10' 15' 10' 10' 15' 10'       PROM ankle/toes                                                      Exercise Diary  9/17 9/20 9/23 9/25 9/30 10/2 10/7      HEP 15' ---           Sit to stand  ---           Hip 4 way  20x ea leg 20x  Ea leg 20x ea 20x ea 20x ea       CS/HR  10x10 10x10 10"  x10 10"x10 10"x10 10"x10      Step ups  6" 2x10 6"  2x10 6"  2x10 6" 10x ea 6" 2x10 ea 6" 2x10 ea      Bike  10' 15' 15' 15' 15' 15'      Circuit  ---     15x legs only      Pulleys  30x 30x 30x 30x 30x 30x      Bridge on ball  ---           Seated toe raise  ---           Prone on elbows  --- x5 10" hold x5  10"  hold 10"x5 hold                                                                                                                                Modalities              US 10'            Ice/Heat             STIM

## 2019-10-09 ENCOUNTER — OFFICE VISIT (OUTPATIENT)
Dept: PHYSICAL THERAPY | Facility: CLINIC | Age: 79
End: 2019-10-09
Payer: MEDICARE

## 2019-10-09 DIAGNOSIS — M54.32 SCIATICA OF LEFT SIDE WITHOUT BACK PAIN: Primary | ICD-10-CM

## 2019-10-09 PROCEDURE — 97110 THERAPEUTIC EXERCISES: CPT | Performed by: PHYSICAL THERAPIST

## 2019-10-09 PROCEDURE — 97140 MANUAL THERAPY 1/> REGIONS: CPT | Performed by: PHYSICAL THERAPIST

## 2019-10-09 NOTE — PROGRESS NOTES
Daily Note     Today's date: 10/9/2019  Patient name: Deyvi Kelly  : 1940  MRN: [id number]  Referring provider: Laura Vásquez MD  Dx:   Encounter Diagnosis     ICD-10-CM    1  Sciatica of left side without back pain M54 32                   Subjective: patient reports his pain is not bad today rating it a 2/10 pre-treatment  Objective: See treatment diary below      Assessment: patient tolerated treatment well  Demonstrates and reports fatigue post treatment  Reports benefit from [facility]  He will benefit from continued PT  Plan: Continue per plan of care  Progress treatment as tolerated         Precautions: Use of O2 must wear with exercise, lumbar fusion    Manual  9/20 9/23 9/25 9/30 10/2 10/7 10/9      MFR 10' 15' 10' 10' 15' 10' 10'      PROM ankle/toes                                                      Exercise Diary  9/17 9/20 9/23 9/25 9/30 10/2 10/7 10/9     HEP 15' ---           Sit to stand  ---           Hip 4 way  20x ea leg 20x  Ea leg 20x ea 20x ea 20x ea       CS/HR  10x10 10x10 10"  x10 10"x10 10"x10 10"x10 10"x10     Step ups  6" 2x10 6"  2x10 6"  2x10 6" 10x ea 6" 2x10 ea 6" 2x10 ea 6" 2x10 ea     Bike  10' 15' 15' 15' 15' 15' 15'      Circuit  ---     15x legs only 15x legs only     Pulleys  30x 30x 30x 30x 30x 30x 30x     Bridge on ball  ---           Seated toe raise  ---           Prone on elbows  --- x5 10" hold x5  10"  hold 10"x5 hold                                                                                                                                Modalities              US 10'            Ice/Heat             STIM

## 2019-10-14 ENCOUNTER — OFFICE VISIT (OUTPATIENT)
Dept: PHYSICAL THERAPY | Facility: CLINIC | Age: 79
End: 2019-10-14
Payer: MEDICARE

## 2019-10-14 DIAGNOSIS — M54.32 SCIATICA OF LEFT SIDE WITHOUT BACK PAIN: Primary | ICD-10-CM

## 2019-10-14 PROCEDURE — 97110 THERAPEUTIC EXERCISES: CPT | Performed by: PHYSICAL THERAPIST

## 2019-10-14 NOTE — PROGRESS NOTES
Daily Note     Today's date: 10/14/2019  Patient name: Marco Shaw  : 1940  MRN: [id number]  Referring provider: Mk Johnson MD  Dx:   Encounter Diagnosis     ICD-10-CM    1  Sciatica of left side without back pain M54 32                   Subjective: patient reports minimal pain      Objective: See treatment diary below      Assessment: patient tolerated treatment well  Increased reps on circuit to 100 to build endurance and strength  Plan: Continue per plan of care  Progress treatment as tolerated         Precautions: Use of O2 must wear with exercise, lumbar fusion    Manual  9/20 9/23 9/25 9/30 10/2 10/7 10/9      MFR 10' 15' 10' 10' 15' 10' 10'      PROM ankle/toes                                                      Exercise Diary  9/17 9/20 9/23 9/25 9/30 10/2 10/7 10/9 10/14    HEP 15' ---           Sit to stand  ---           Hip 4 way  20x ea leg 20x  Ea leg 20x ea 20x ea 20x ea       CS/HR  10x10 10x10 10"  x10 10"x10 10"x10 10"x10 10"x10     Step ups  6" 2x10 6"  2x10 6"  2x10 6" 10x ea 6" 2x10 ea 6" 2x10 ea 6" 2x10 ea     Bike  10' 15' 15' 15' 15' 15' 15'  20    Circuit  ---     15x legs only 15x legs only 100x full    Pulleys  30x 30x 30x 30x 30x 30x 30x 5'    Bridge on ball  ---           Seated toe raise  ---           Prone on elbows  --- x5 10" hold x5  10"  hold 10"x5 hold                                                                                                                                Modalities               10'            Ice/Heat             STIM

## 2019-10-16 ENCOUNTER — OFFICE VISIT (OUTPATIENT)
Dept: PHYSICAL THERAPY | Facility: CLINIC | Age: 79
End: 2019-10-16
Payer: MEDICARE

## 2019-10-16 DIAGNOSIS — M54.32 SCIATICA OF LEFT SIDE WITHOUT BACK PAIN: Primary | ICD-10-CM

## 2019-10-16 PROCEDURE — 97110 THERAPEUTIC EXERCISES: CPT | Performed by: PHYSICAL THERAPIST

## 2019-10-16 NOTE — PROGRESS NOTES
Progress Note    Today's date: 10/16/2019  Patient name: Rica Keith  : 1940  MRN: [id number]  Referring provider: Thad Mendoza MD  Dx:   Encounter Diagnosis     ICD-10-CM    1  Sciatica of left side without back pain M54 32                   Subjective: patient reports MD wants him to continue therapy      Objective: See treatment diary below      Assessment: Patient has partially met long and short term goals for walking on an incline to complete snow blowing this winter  His strength and endurance are improving  Patient reports no radicular pain with most activities  He reports increased back pain with extended standing in the kitchen for cooking  NEW GOAL:  Able to stand for 20 minutes to cook without increased pain      Plan: Continue per plan of care  Progress treatment as tolerated    Trial TM continue PT 2x per week x 2 weeks     Precautions: Use of O2 must wear with exercise, lumbar fusion    Manual  9/20 9/23 9/25 9/30 10/2 10/7 10/9      MFR 10' 15' 10' 10' 15' 10 10'      PROM ankle/toes                                                      Exercise Diary  9/17 9/20 9/23 9/25 9/30 10/2 10/7 10/9 10/14 10/16   HEP 15' ---           Sit to stand  ---           Hip 4 way  20x ea leg 20x  Ea leg 20x ea 20x ea 20x ea       CS/HR  10x10 10x10 10"  x10 10"x10 10"x10 10"x10 10"x10     Step ups  6" 2x10 6"  2x10 6"  2x10 6" 10x ea 6" 2x10 ea 6" 2x10 ea 6" 2x10 ea     Bike  10' 15' 15' 15' 15' 15' 15'  20 15   Circuit  ---     15x legs only 15x legs only 100x full 50x   Pulleys  30x 30x 30x 30x 30x 30x 30x 5' 5'   Bridge on ball  ---           Seated toe raise  ---           Prone on elbows  --- x5 10" hold x5  10"  hold 10"x5 hold                                                                                                                                Modalities              US 10'            Ice/Heat             STIM

## 2019-10-21 ENCOUNTER — OFFICE VISIT (OUTPATIENT)
Dept: PHYSICAL THERAPY | Facility: CLINIC | Age: 79
End: 2019-10-21
Payer: MEDICARE

## 2019-10-21 DIAGNOSIS — M54.32 SCIATICA OF LEFT SIDE WITHOUT BACK PAIN: Primary | ICD-10-CM

## 2019-10-21 PROCEDURE — 97116 GAIT TRAINING THERAPY: CPT

## 2019-10-21 PROCEDURE — 97110 THERAPEUTIC EXERCISES: CPT

## 2019-10-21 NOTE — PROGRESS NOTES
Daily Note     Today's date: 10/21/2019  Patient name: Yulia Cook  : 1940  MRN: [id number]  Referring provider: Aiden Luciano MD  Dx:   Encounter Diagnosis     ICD-10-CM    1  Sciatica of left side without back pain M54 32                   Subjective: Pt reports feeling "so, so" today  He has minimal pain in lumbar spine  Objective: See treatment diary below      Assessment: Began gait training today; verbal and visual cueing needed to ensure feet aren't too close together, heel to toe emphasis, upright posture, and to bring hips underneath torso  Pt was hesitant of treadmill and may be able to increase speed next session  Tolerated rest of program well today; no increase in pain  Verbal and tactile cueing needed for correct exercise technique  Less breaks were needed today  Plan: Continue with current POC to address pt deficits        Precautions: Use of O2 must wear with exercise, lumbar fusion    Manual  9/20 9/23 9/25 9/30 10/2 10/7 10/9      MFR 10' 15' 10' 10' 15' 10 10'      PROM ankle/toes                                                      Exercise Diary  10/21 9/20 9/23 9/25 9/30 10/2 10/7 10/9 10/14 10/16   HEP  ---           Sit to stand  ---           Hip 4 way  20x ea leg 20x  Ea leg 20x ea 20x ea 20x ea       CS/HR  10x10 10x10 10"  x10 10"x10 10"x10 10"x10 10"x10     Step ups  6" 2x10 6"  2x10 6"  2x10 6" 10x ea 6" 2x10 ea 6" 2x10 ea 6" 2x10 ea     Bike 10' 10' 15' 15' 15' 15' 15' 15'  20 15   Circuit 50x ---     15x legs only 15x legs only 100x full 50x   Pulleys 5' 30x 30x 30x 30x 30x 30x 30x 5' 5'   Bridge on ball  ---           Seated toe raise  ---           Prone on elbows  --- x5 10" hold x5  10"  hold 10"x5 hold         TM gait training 8' education on treadmill use, safety, posture                                                                                                                      Modalities              US 10'            Ice/Heat STIM

## 2019-10-23 ENCOUNTER — OFFICE VISIT (OUTPATIENT)
Dept: PHYSICAL THERAPY | Facility: CLINIC | Age: 79
End: 2019-10-23
Payer: MEDICARE

## 2019-10-23 DIAGNOSIS — M54.32 SCIATICA OF LEFT SIDE WITHOUT BACK PAIN: Primary | ICD-10-CM

## 2019-10-23 PROCEDURE — 97110 THERAPEUTIC EXERCISES: CPT

## 2019-10-23 PROCEDURE — 97116 GAIT TRAINING THERAPY: CPT

## 2019-10-23 NOTE — PROGRESS NOTES
Daily Note     Today's date: 10/23/2019  Patient name: Alvaro House  : 1940  MRN: [id number]  Referring provider: Baldomero Avalos MD  Dx:   Encounter Diagnosis     ICD-10-CM    1  Sciatica of left side without back pain M54 32        Start Time: 0900  Stop Time: 1000  Total time in clinic (min): 60 minutes    Subjective: Patient reports feeling pretty good today  No pain  Objective: See treatment diary below      Assessment: Tolerated treatment well  Patient would benefit from continued PT to improve strength, endurance, and function  Patient had good endurance throughout session  Cueing for posture while ambulating on TM with cueing for heel-toe  Plan: Continue per plan of care        Precautions: Use of O2 must wear with exercise, lumbar fusion    Manual  9/20 9/23 9/25 9/30 10/2 10/7 10/9      MFR 10' 15' 10' 10' 15' 10' 10'      PROM ankle/toes                                                      Exercise Diary  10/21 10/23           HEP             Sit to stand             Hip 4 way             CS/HR             Step ups             Bike 10' 10'           Circuit 50x 50x           Pulleys 5' 5'           Bridge on ball             Seated toe raise             Prone on elbows             TM gait training 8' education on treadmill use, safety, posture 10' education on TM for gait and posture                                                                                                                     Modalities               10'            Ice/Heat             STIM

## 2019-10-28 ENCOUNTER — OFFICE VISIT (OUTPATIENT)
Dept: PHYSICAL THERAPY | Facility: CLINIC | Age: 79
End: 2019-10-28
Payer: MEDICARE

## 2019-10-28 DIAGNOSIS — M54.32 SCIATICA OF LEFT SIDE WITHOUT BACK PAIN: Primary | ICD-10-CM

## 2019-10-28 PROCEDURE — 97110 THERAPEUTIC EXERCISES: CPT

## 2019-10-28 PROCEDURE — 97116 GAIT TRAINING THERAPY: CPT

## 2019-10-28 NOTE — PROGRESS NOTES
Daily Note     Today's date: 10/28/2019  Patient name: Amelie Zafar  : 1940  MRN: [id number]  Referring provider: Sammie Linares MD  Dx:   Encounter Diagnosis     ICD-10-CM    1  Sciatica of left side without back pain M54 32                   Subjective: Pt reported 0/10 pain today in lumbar spine  Objective: See treatment diary below      Assessment: Pt tolerated treatment well today having no increases in pain or discomfort  Verbal and visual cueing needed to ensure upright posture, heel to toe gait pattern, and feet two inches apart  Only able to complete 6 minutes of TM today due to an increase in back pain  Plan: Continue with current POC to address pt deficits        Precautions: Use of O2 must wear with exercise, lumbar fusion    Manual  9/20 9/23 9/25 9/30 10/2 10/7 10/9      MFR 10' 15' 10' 10' 15' 10' 10'      PROM ankle/toes                                                      Exercise Diary  10/21 10/23 10/28          HEP             Sit to stand             Hip 4 way             CS/HR             Step ups             Bike 10' 10' 10'          Circuit 50x 50x 50x          Pulleys 5' 5' 5'          Bridge on ball             Seated toe raise             Prone on elbows             TM gait training 8' education on treadmill use, safety, posture 10' education on TM for gait and posture 8' plus education on TM for gait and postural awareness                                                                                                                    Modalities               10'            Ice/Heat             STIM

## 2019-10-30 ENCOUNTER — OFFICE VISIT (OUTPATIENT)
Dept: PHYSICAL THERAPY | Facility: CLINIC | Age: 79
End: 2019-10-30
Payer: MEDICARE

## 2019-10-30 DIAGNOSIS — M54.32 SCIATICA OF LEFT SIDE WITHOUT BACK PAIN: Primary | ICD-10-CM

## 2019-10-30 PROCEDURE — 97110 THERAPEUTIC EXERCISES: CPT

## 2019-10-30 PROCEDURE — 97116 GAIT TRAINING THERAPY: CPT

## 2019-10-30 NOTE — PROGRESS NOTES
Discharge Note     Today's date: 10/30/2019  Patient name: Kunal Delgado  : 1940  MRN: [id number]  Referring provider: Ina Quarles MD  Dx:   Encounter Diagnosis     ICD-10-CM    1  Sciatica of left side without back pain M54 32        Start Time: 0900  Stop Time: 1000  Total time in clinic (min): 60 minutes    Assessment: Patient has been seen in therapy for 14 sessions and have made progress towards all goals  Patient has met most goals  Patient still has difficulty ambulating long distances due to fatigue and endurance  Patient has improved his endurance on TM  Patient tolerated exercises well without difficulty  Cueing for posture while ambulating on TM  Plan: Conferred with primary PT to discharge patient from therapy  Objective: See treatment diary below    Subjective: Patient reports feeling pretty good today         Precautions: Use of O2 must wear with exercise, lumbar fusion    Manual  9/20 9/23 9/25 9/30 10/2 10/7 10/9      MFR 10' 15' 10' 10' 15' 10' 10'      PROM ankle/toes                                                      Exercise Diary  10/21 10/23 10/28 10/30         HEP             Sit to stand             Hip 4 way             CS/HR             Step ups             Bike 10' 10' 10' 10'         Circuit 50x 50x 50x 50x         Pulleys 5' 5' 5' 5'         Bridge on ball             Seated toe raise             Prone on elbows             TM gait training 8' education on treadmill use, safety, posture 10' education on TM for gait and posture 8' plus education on TM for gait and postural awareness 8'                                                                                                                   Modalities               10'            Ice/Heat             STIM

## 2019-12-12 ENCOUNTER — OFFICE VISIT (OUTPATIENT)
Dept: PULMONOLOGY | Facility: CLINIC | Age: 79
End: 2019-12-12
Payer: MEDICARE

## 2019-12-12 VITALS
WEIGHT: 234 LBS | HEART RATE: 48 BPM | DIASTOLIC BLOOD PRESSURE: 90 MMHG | HEIGHT: 72 IN | OXYGEN SATURATION: 93 % | BODY MASS INDEX: 31.69 KG/M2 | SYSTOLIC BLOOD PRESSURE: 164 MMHG

## 2019-12-12 DIAGNOSIS — J43.2 CENTRILOBULAR EMPHYSEMA (HCC): ICD-10-CM

## 2019-12-12 DIAGNOSIS — J96.11 CHRONIC RESPIRATORY FAILURE WITH HYPOXIA (HCC): Primary | ICD-10-CM

## 2019-12-12 DIAGNOSIS — R91.8 PULMONARY NODULES: ICD-10-CM

## 2019-12-12 PROCEDURE — 99214 OFFICE O/P EST MOD 30 MIN: CPT | Performed by: PHYSICIAN ASSISTANT

## 2019-12-12 RX ORDER — ALBUTEROL SULFATE 90 UG/1
2 AEROSOL, METERED RESPIRATORY (INHALATION) EVERY 6 HOURS PRN
Qty: 18 G | Refills: 3 | Status: SHIPPED | OUTPATIENT
Start: 2019-12-12

## 2019-12-12 NOTE — PATIENT INSTRUCTIONS
Follow-up for your next appointment in approximately 5 months  Please do not hesitate to call the office prior to your next appointment should you have any questions or concerns  Worrisome symptoms that should prompt a call to 911 or visit to the ER include chest pain, severe shortness of breath, cyanosis (blue discoloration of the skin), dizziness/light-headedness, passing out  Continue to follow up with your Primary Care Provider and any other specialists you see

## 2019-12-12 NOTE — PROGRESS NOTES
Assessment:    1  Chronic respiratory failure with hypoxia (HCC)  Home Oxygen with Portability   2  Centrilobular emphysema (HCC)  albuterol (VENTOLIN HFA) 90 mcg/act inhaler   3  Pulmonary nodules  CT chest without contrast       Plan:   Patient presents today for follow-up and recertification of his home oxygen  He is at his baseline respiratory status  Patient was initially saturating 93% on room air at rest, and then desaturated to 87% on room air with exertion  Patient was then placed on 2 L of oxygen by nasal cannula and then made to ambulate, oxygen saturations remained at 92% on exertion with the 2 L  Discussed with the patient to continue with 2 L of oxygen by nasal cannula continuously with exertion and at nighttime  PFTs with severe obstructive pattern, no appreciable response to the bronchodilator and severely decreased DLCO  FEV1 41%  Continue Symbicort, Spiriva, albuterol as needed    Reviewed last chest CT done which was in January 2018 with stable pulmonary nodules and pleural plaques  Would continue follow-up given the significant smoking history  CT of the chest ordered  All of the patient's and his wife's questions were answered to their satisfaction and understanding, which was verbalized  Patient was advised to call the office prior to next appointment should they have any questions or concerns prior  Patient made aware of worrisome symptoms that should prompt a visit to the ER or call to 911 such as chest pain, severe shortness of breath, cyanosis, throat closing, syncope, etc     Subjective:     Patient ID: Blake Clayton is a 70 y o  male  Chief Complaint:  Jeronimo Osborn is a pleasant 62-year-old male former smoker who quit in the early 2000s with about a 45 pack year history and past medical history including COPD, chronic respiratory failure on home oxygen, pulmonary nodules, pleural plaques, hypertension, prostate cancer  He presents today for follow-up    Patient reports that his breathing has overall remained stable  He continues on his same oxygen requirements and on the same pulmonary regimen  He uses the nebulizer twice a day  primary symptoms   Associated symptoms include myalgias  Pertinent negatives include no chest pain, fever, headaches or sore throat  The following portions of the patient's history were reviewed in this encounter and updated as appropriate: Past medical, social, surgical, family, allergies    Review of Systems   Constitutional: Negative for appetite change and fever  HENT: Negative for ear pain, postnasal drip, rhinorrhea, sneezing, sore throat and trouble swallowing  Respiratory: Positive for shortness of breath  Cardiovascular: Negative for chest pain  Musculoskeletal: Positive for myalgias  Neurological: Negative for headaches  All other systems reviewed and are negative  Objective:    Physical Exam   Constitutional: He is oriented to person, place, and time  He appears well-developed and well-nourished  No distress  HENT:   Head: Normocephalic and atraumatic  Right Ear: External ear normal    Left Ear: External ear normal    Nose: Nose normal    Mouth/Throat: Oropharynx is clear and moist  No oropharyngeal exudate  Eyes: Conjunctivae and EOM are normal  Right eye exhibits no discharge  Left eye exhibits no discharge  No scleral icterus  Neck: Normal range of motion  Neck supple  No tracheal deviation present  Cardiovascular: Normal rate, regular rhythm and normal heart sounds  Exam reveals no gallop and no friction rub  No murmur heard  Pulmonary/Chest: Effort normal  No stridor  No respiratory distress  He has no wheezes  Decreased breath sounds   Abdominal: He exhibits no distension  There is no guarding  Musculoskeletal: He exhibits no edema, tenderness or deformity  There is deconditioning   Neurological: He is alert and oriented to person, place, and time  No cranial nerve deficit   He exhibits normal muscle tone    Skin: Skin is warm and dry  No rash noted  He is not diaphoretic  No erythema  No pallor  Psychiatric: He has a normal mood and affect  His behavior is normal  Judgment and thought content normal    Nursing note and vitals reviewed  Lab Review:   No visits with results within 2 Month(s) from this visit     Latest known visit with results is:   Appointment on 06/15/2019   Component Date Value    WBC 06/15/2019 8 32     RBC 06/15/2019 5 90*    Hemoglobin 06/15/2019 14 1     Hematocrit 06/15/2019 46 5     MCV 06/15/2019 79*    MCH 06/15/2019 23 9*    MCHC 06/15/2019 30 3*    RDW 06/15/2019 17 7*    MPV 06/15/2019 11 7     Platelets 71/20/1089 234     nRBC 06/15/2019 0     Neutrophils Relative 06/15/2019 74     Immat GRANS % 06/15/2019 0     Lymphocytes Relative 06/15/2019 17     Monocytes Relative 06/15/2019 8     Eosinophils Relative 06/15/2019 1     Basophils Relative 06/15/2019 0     Neutrophils Absolute 06/15/2019 6 13     Immature Grans Absolute 06/15/2019 0 02     Lymphocytes Absolute 06/15/2019 1 40     Monocytes Absolute 06/15/2019 0 67     Eosinophils Absolute 06/15/2019 0 07     Basophils Absolute 06/15/2019 0 03     Clarity, UA 06/15/2019 Clear     Color, UA 06/15/2019 Yellow     Specific Gravity, UA 06/15/2019 >=1 030     pH, UA 06/15/2019 5 5     Glucose, UA 06/15/2019 Negative     Ketones, UA 06/15/2019 Negative     Blood, UA 06/15/2019 Negative     Protein, UA 06/15/2019 Trace*    Nitrite, UA 06/15/2019 Negative     Bilirubin, UA 06/15/2019 Negative     Urobilinogen, UA 06/15/2019 0 2     Leukocytes, UA 06/15/2019 Negative     WBC, UA 06/15/2019 None Seen     RBC, UA 06/15/2019 0-1*    Hyaline Casts, UA 06/15/2019 0-1*    Bacteria, UA 06/15/2019 Occasional     Ca Oxalate April, UA 06/15/2019 Occasional*    Epithelial Cells 06/15/2019 Occasional     MUCUS THREADS 06/15/2019 Moderate*    Phosphorus 06/15/2019 2 1*    Uric Acid 06/15/2019 6 8     PTH 06/15/2019 36 8     Vit D, 25-Hydroxy 06/15/2019 67 5     Sodium 06/15/2019 144     Potassium 06/15/2019 3 8     Chloride 06/15/2019 108     CO2 06/15/2019 26     ANION GAP 06/15/2019 10     BUN 06/15/2019 12     Creatinine 06/15/2019 1 50*    Glucose, Fasting 06/15/2019 105*    Calcium 06/15/2019 9 5     AST 06/15/2019 38     ALT 06/15/2019 26     Alkaline Phosphatase 06/15/2019 110     Total Protein 06/15/2019 7 9     Albumin 06/15/2019 3 7     Total Bilirubin 06/15/2019 1 40*    eGFR 06/15/2019 52     Hemoglobin A1C 06/15/2019 6 0     EAG 06/15/2019 126     TSH 3RD GENERATON 06/15/2019 0 931     Cholesterol 06/15/2019 108     Triglycerides 06/15/2019 83     HDL, Direct 06/15/2019 46     LDL Calculated 06/15/2019 45     Non-HDL-Chol (CHOL-HDL) 06/15/2019 62

## 2019-12-20 ENCOUNTER — APPOINTMENT (OUTPATIENT)
Dept: LAB | Facility: HOSPITAL | Age: 79
End: 2019-12-20
Payer: MEDICARE

## 2019-12-20 DIAGNOSIS — I10 ESSENTIAL HYPERTENSION: ICD-10-CM

## 2019-12-20 DIAGNOSIS — Z13.6 SCREENING FOR CARDIOVASCULAR CONDITION: ICD-10-CM

## 2019-12-20 DIAGNOSIS — N18.30 TYPE 2 DIABETES MELLITUS WITH STAGE 3 CHRONIC KIDNEY DISEASE, WITHOUT LONG-TERM CURRENT USE OF INSULIN (HCC): ICD-10-CM

## 2019-12-20 DIAGNOSIS — E11.9 TYPE 2 DIABETES MELLITUS WITHOUT COMPLICATION, WITHOUT LONG-TERM CURRENT USE OF INSULIN (HCC): ICD-10-CM

## 2019-12-20 DIAGNOSIS — E11.22 TYPE 2 DIABETES MELLITUS WITH STAGE 3 CHRONIC KIDNEY DISEASE, WITHOUT LONG-TERM CURRENT USE OF INSULIN (HCC): ICD-10-CM

## 2019-12-20 LAB
ALBUMIN SERPL BCP-MCNC: 4 G/DL (ref 3.5–5)
ALP SERPL-CCNC: 135 U/L (ref 46–116)
ALT SERPL W P-5'-P-CCNC: 29 U/L (ref 12–78)
ANION GAP SERPL CALCULATED.3IONS-SCNC: 8 MMOL/L (ref 4–13)
AST SERPL W P-5'-P-CCNC: 50 U/L (ref 5–45)
BASOPHILS # BLD AUTO: 0.04 THOUSANDS/ΜL (ref 0–0.1)
BASOPHILS NFR BLD AUTO: 0 % (ref 0–1)
BILIRUB SERPL-MCNC: 0.8 MG/DL (ref 0.2–1)
BUN SERPL-MCNC: 15 MG/DL (ref 5–25)
CALCIUM SERPL-MCNC: 9.6 MG/DL (ref 8.3–10.1)
CHLORIDE SERPL-SCNC: 105 MMOL/L (ref 100–108)
CHOLEST SERPL-MCNC: 111 MG/DL (ref 50–200)
CO2 SERPL-SCNC: 30 MMOL/L (ref 21–32)
CREAT SERPL-MCNC: 1.45 MG/DL (ref 0.6–1.3)
EOSINOPHIL # BLD AUTO: 0.12 THOUSAND/ΜL (ref 0–0.61)
EOSINOPHIL NFR BLD AUTO: 1 % (ref 0–6)
ERYTHROCYTE [DISTWIDTH] IN BLOOD BY AUTOMATED COUNT: 16.7 % (ref 11.6–15.1)
EST. AVERAGE GLUCOSE BLD GHB EST-MCNC: 114 MG/DL
GFR SERPL CREATININE-BSD FRML MDRD: 53 ML/MIN/1.73SQ M
GLUCOSE P FAST SERPL-MCNC: 93 MG/DL (ref 65–99)
HBA1C MFR BLD: 5.6 % (ref 4.2–6.3)
HCT VFR BLD AUTO: 49.5 % (ref 36.5–49.3)
HDLC SERPL-MCNC: 40 MG/DL
HGB BLD-MCNC: 14.7 G/DL (ref 12–17)
IMM GRANULOCYTES # BLD AUTO: 0.03 THOUSAND/UL (ref 0–0.2)
IMM GRANULOCYTES NFR BLD AUTO: 0 % (ref 0–2)
LDLC SERPL CALC-MCNC: 42 MG/DL (ref 0–100)
LYMPHOCYTES # BLD AUTO: 1.94 THOUSANDS/ΜL (ref 0.6–4.47)
LYMPHOCYTES NFR BLD AUTO: 21 % (ref 14–44)
MCH RBC QN AUTO: 23.6 PG (ref 26.8–34.3)
MCHC RBC AUTO-ENTMCNC: 29.7 G/DL (ref 31.4–37.4)
MCV RBC AUTO: 80 FL (ref 82–98)
MONOCYTES # BLD AUTO: 0.85 THOUSAND/ΜL (ref 0.17–1.22)
MONOCYTES NFR BLD AUTO: 9 % (ref 4–12)
NEUTROPHILS # BLD AUTO: 6.37 THOUSANDS/ΜL (ref 1.85–7.62)
NEUTS SEG NFR BLD AUTO: 69 % (ref 43–75)
NONHDLC SERPL-MCNC: 71 MG/DL
NRBC BLD AUTO-RTO: 0 /100 WBCS
PLATELET # BLD AUTO: 281 THOUSANDS/UL (ref 149–390)
PMV BLD AUTO: 12.6 FL (ref 8.9–12.7)
POTASSIUM SERPL-SCNC: 4.2 MMOL/L (ref 3.5–5.3)
PROT SERPL-MCNC: 8.2 G/DL (ref 6.4–8.2)
RBC # BLD AUTO: 6.22 MILLION/UL (ref 3.88–5.62)
SODIUM SERPL-SCNC: 143 MMOL/L (ref 136–145)
TRIGL SERPL-MCNC: 147 MG/DL
TSH SERPL DL<=0.05 MIU/L-ACNC: 2.07 UIU/ML (ref 0.36–3.74)
WBC # BLD AUTO: 9.35 THOUSAND/UL (ref 4.31–10.16)

## 2019-12-20 PROCEDURE — 36415 COLL VENOUS BLD VENIPUNCTURE: CPT

## 2019-12-20 PROCEDURE — 84443 ASSAY THYROID STIM HORMONE: CPT

## 2019-12-20 PROCEDURE — 85025 COMPLETE CBC W/AUTO DIFF WBC: CPT

## 2019-12-20 PROCEDURE — 80053 COMPREHEN METABOLIC PANEL: CPT

## 2019-12-20 PROCEDURE — 83036 HEMOGLOBIN GLYCOSYLATED A1C: CPT

## 2019-12-20 PROCEDURE — 80061 LIPID PANEL: CPT

## 2019-12-27 ENCOUNTER — TELEPHONE (OUTPATIENT)
Dept: INTERNAL MEDICINE CLINIC | Facility: CLINIC | Age: 79
End: 2019-12-27

## 2019-12-27 ENCOUNTER — OFFICE VISIT (OUTPATIENT)
Dept: INTERNAL MEDICINE CLINIC | Facility: CLINIC | Age: 79
End: 2019-12-27
Payer: MEDICARE

## 2019-12-27 VITALS
BODY MASS INDEX: 33.97 KG/M2 | WEIGHT: 250.8 LBS | HEIGHT: 72 IN | SYSTOLIC BLOOD PRESSURE: 130 MMHG | HEART RATE: 78 BPM | DIASTOLIC BLOOD PRESSURE: 82 MMHG | RESPIRATION RATE: 16 BRPM

## 2019-12-27 DIAGNOSIS — I10 HYPERTENSION, UNSPECIFIED TYPE: ICD-10-CM

## 2019-12-27 DIAGNOSIS — N18.30 CKD (CHRONIC KIDNEY DISEASE) STAGE 3, GFR 30-59 ML/MIN (HCC): ICD-10-CM

## 2019-12-27 DIAGNOSIS — M10.9 GOUT, UNSPECIFIED CAUSE, UNSPECIFIED CHRONICITY, UNSPECIFIED SITE: ICD-10-CM

## 2019-12-27 DIAGNOSIS — N18.30 HYPERTENSIVE KIDNEY DISEASE WITH STAGE 3 CHRONIC KIDNEY DISEASE (HCC): ICD-10-CM

## 2019-12-27 DIAGNOSIS — I12.9 HYPERTENSIVE KIDNEY DISEASE WITH STAGE 3 CHRONIC KIDNEY DISEASE (HCC): ICD-10-CM

## 2019-12-27 DIAGNOSIS — J43.2 CENTRILOBULAR EMPHYSEMA (HCC): ICD-10-CM

## 2019-12-27 DIAGNOSIS — R74.8 ELEVATED LIVER ENZYMES: ICD-10-CM

## 2019-12-27 DIAGNOSIS — E11.9 TYPE 2 DIABETES MELLITUS WITHOUT COMPLICATION, WITHOUT LONG-TERM CURRENT USE OF INSULIN (HCC): Primary | ICD-10-CM

## 2019-12-27 DIAGNOSIS — E78.5 HYPERLIPIDEMIA, UNSPECIFIED HYPERLIPIDEMIA TYPE: ICD-10-CM

## 2019-12-27 DIAGNOSIS — E11.22 TYPE 2 DIABETES MELLITUS WITH STAGE 3 CHRONIC KIDNEY DISEASE, WITHOUT LONG-TERM CURRENT USE OF INSULIN (HCC): ICD-10-CM

## 2019-12-27 DIAGNOSIS — N18.30 TYPE 2 DIABETES MELLITUS WITH STAGE 3 CHRONIC KIDNEY DISEASE, WITHOUT LONG-TERM CURRENT USE OF INSULIN (HCC): ICD-10-CM

## 2019-12-27 PROCEDURE — 99214 OFFICE O/P EST MOD 30 MIN: CPT | Performed by: NURSE PRACTITIONER

## 2019-12-27 RX ORDER — ACETAMINOPHEN AND CODEINE PHOSPHATE 300; 60 MG/1; MG/1
1 TABLET ORAL EVERY 6 HOURS PRN
COMMUNITY
End: 2022-07-19 | Stop reason: SDUPTHER

## 2019-12-27 RX ORDER — METHYLPREDNISOLONE 4 MG/1
4 TABLET ORAL
COMMUNITY
End: 2019-12-27 | Stop reason: ALTCHOICE

## 2019-12-27 NOTE — PROGRESS NOTES
Assessment/Plan:    Patient Instructions   Type 2 diabetes mellitus under excellent control continue lifestyle modifications-patient has excellent A1c in addition to excellent LDL without statin discussed the benefit of adding a cholesterol medication specifically to reduce risk of heart attack and stroke patient declined at this time  Also consider baby aspirin every other day    COPD 02 dependent insurance will no longer pay for Advair switch to Symbicort    Acute on chronic lower back pain with sciatica following with [facility] orthopedic pain management    Status post kidney stone following with Urology    Chronic kidney disease stage 3  with Nephrology    Pancreatic cyst follow-up MRI MRCP in 2021     history of prostate cancer stable following with Urology     gout no recent flare           Diagnoses and all orders for this visit:    Type 2 diabetes mellitus without complication, without long-term current use of insulin (HCC)  -     IRIS Diabetic eye exam  -     Hemoglobin A1C; Future  -     Cancel: Microalbumin / creatinine urine ratio; Future    Hypertension, unspecified type  -     CBC and differential; Future  -     Comprehensive metabolic panel; Future    Hyperlipidemia, unspecified hyperlipidemia type  -     Lipid panel; Future  -     TSH, 3rd generation with Free T4 reflex; Future    CKD (chronic kidney disease) stage 3, GFR 30-59 ml/min (HCC)    Type 2 diabetes mellitus with stage 3 chronic kidney disease, without long-term current use of insulin (HCC)    Centrilobular emphysema (HCC)    Hypertensive kidney disease with stage 3 chronic kidney disease (HCC)    Gout, unspecified cause, unspecified chronicity, unspecified site  -     Uric acid; Future    Elevated liver enzymes  -     Hepatic function panel; Future    Other orders  -     acetaminophen-codeine (TYLENOL with CODEINE #4) 300-60 MG per tablet;  Take 1 tablet by mouth every 6 (six) hours as needed for moderate pain  -     Discontinue: methylprednisolone (MEDROL) 4 mg tablet;  Take 4 mg by mouth daily with breakfast  -     Cancel: Microalbumin / creatinine urine ratio         Subjective:      Patient ID: Xochitl Iraheta is a 70 y o  male    Patient had been going through physical therapy with pain management for chronic lower back pain Last month he developed kidney stone was treated by Urology currently stable  History of prostate cancer stable  No home sugars not really watching what he eats no formal exercise but his wife is getting him back to the gym now  Wears O2 continuously and should wear continuously  No home blood pressures but taking blood pressure medication   no recent gout flare        Current Outpatient Medications:     acetaminophen-codeine (TYLENOL with CODEINE #4) 300-60 MG per tablet, Take 1 tablet by mouth every 6 (six) hours as needed for moderate pain, Disp: , Rfl:     albuterol (2 5 mg/3 mL) 0 083 % nebulizer solution, Inhale 1 each every 6 (six) hours as needed, Disp: , Rfl:     albuterol (VENTOLIN HFA) 90 mcg/act inhaler, Inhale 2 puffs every 6 (six) hours as needed for wheezing or shortness of breath, Disp: 18 g, Rfl: 3    allopurinol (ZYLOPRIM) 100 mg tablet, Take 1 tablet (100 mg total) by mouth daily, Disp: 90 tablet, Rfl: 2    amLODIPine (NORVASC) 5 mg tablet, Take 1 tablet (5 mg total) by mouth every morning, Disp: 90 tablet, Rfl: 3    budesonide-formoterol (SYMBICORT) 160-4 5 mcg/act inhaler, Inhale 2 puffs 2 (two) times a day Rinse mouth after use , Disp: 3 Inhaler, Rfl: 3    cholecalciferol (VITAMIN D3) 1,000 units tablet, Take 5,000 Units by mouth daily, Disp: , Rfl:     gabapentin (NEURONTIN) 300 mg capsule, Every 12 hours, Disp: , Rfl:     losartan (COZAAR) 100 MG tablet, TAKE 1 TABLET DAILY, Disp: 90 tablet, Rfl: 3    metoprolol succinate (TOPROL-XL) 25 mg 24 hr tablet, Take 1 tablet (25 mg total) by mouth daily, Disp: 90 tablet, Rfl: 3    ondansetron (ZOFRAN-ODT) 4 mg disintegrating tablet, Take 1 tablet (4 mg total) by mouth every 8 (eight) hours as needed for nausea or vomiting, Disp: 16 tablet, Rfl: 0    tiotropium (SPIRIVA HANDIHALER) 18 mcg inhalation capsule, Place 1 capsule (18 mcg total) into inhaler and inhale daily, Disp: 90 each, Rfl: 2    Recent Results (from the past 1008 hour(s))   CBC and differential    Collection Time: 12/20/19 10:29 AM   Result Value Ref Range    WBC 9 35 4 31 - 10 16 Thousand/uL    RBC 6 22 (H) 3 88 - 5 62 Million/uL    Hemoglobin 14 7 12 0 - 17 0 g/dL    Hematocrit 49 5 (H) 36 5 - 49 3 %    MCV 80 (L) 82 - 98 fL    MCH 23 6 (L) 26 8 - 34 3 pg    MCHC 29 7 (L) 31 4 - 37 4 g/dL    RDW 16 7 (H) 11 6 - 15 1 %    MPV 12 6 8 9 - 12 7 fL    Platelets 404 547 - 837 Thousands/uL    nRBC 0 /100 WBCs    Neutrophils Relative 69 43 - 75 %    Immat GRANS % 0 0 - 2 %    Lymphocytes Relative 21 14 - 44 %    Monocytes Relative 9 4 - 12 %    Eosinophils Relative 1 0 - 6 %    Basophils Relative 0 0 - 1 %    Neutrophils Absolute 6 37 1 85 - 7 62 Thousands/µL    Immature Grans Absolute 0 03 0 00 - 0 20 Thousand/uL    Lymphocytes Absolute 1 94 0 60 - 4 47 Thousands/µL    Monocytes Absolute 0 85 0 17 - 1 22 Thousand/µL    Eosinophils Absolute 0 12 0 00 - 0 61 Thousand/µL    Basophils Absolute 0 04 0 00 - 0 10 Thousands/µL   Comprehensive metabolic panel    Collection Time: 12/20/19 10:29 AM   Result Value Ref Range    Sodium 143 136 - 145 mmol/L    Potassium 4 2 3 5 - 5 3 mmol/L    Chloride 105 100 - 108 mmol/L    CO2 30 21 - 32 mmol/L    ANION GAP 8 4 - 13 mmol/L    BUN 15 5 - 25 mg/dL    Creatinine 1 45 (H) 0 60 - 1 30 mg/dL    Glucose, Fasting 93 65 - 99 mg/dL    Calcium 9 6 8 3 - 10 1 mg/dL    AST 50 (H) 5 - 45 U/L    ALT 29 12 - 78 U/L    Alkaline Phosphatase 135 (H) 46 - 116 U/L    Total Protein 8 2 6 4 - 8 2 g/dL    Albumin 4 0 3 5 - 5 0 g/dL    Total Bilirubin 0 80 0 20 - 1 00 mg/dL    eGFR 53 ml/min/1 73sq m   Lipid panel    Collection Time: 12/20/19 10:29 AM   Result Value Ref Range Cholesterol 111 50 - 200 mg/dL    Triglycerides 147 <=150 mg/dL    HDL, Direct 40 >=40 mg/dL    LDL Calculated 42 0 - 100 mg/dL    Non-HDL-Chol (CHOL-HDL) 71 mg/dl   Hemoglobin A1C    Collection Time: 12/20/19 10:29 AM   Result Value Ref Range    Hemoglobin A1C 5 6 4 2 - 6 3 %     mg/dl   TSH, 3rd generation with Free T4 reflex    Collection Time: 12/20/19 10:29 AM   Result Value Ref Range    TSH 3RD GENERATON 2 072 0 358 - 3 740 uIU/mL       The following portions of the patient's history were reviewed and updated as appropriate: allergies, current medications, past family history, past medical history, past social history, past surgical history and problem list      Review of Systems      Objective:      /82 (BP Location: Left arm, Patient Position: Sitting, Cuff Size: Standard)   Pulse 78   Resp 16   Ht 6' (1 829 m)   Wt 114 kg (250 lb 12 8 oz)   BMI 34 01 kg/m²        Physical Exam   Constitutional: He is oriented to person, place, and time  He appears well-developed  No distress  HENT:   Head: Normocephalic and atraumatic  Nose: Nose normal    Mouth/Throat: Oropharynx is clear and moist    Eyes: Pupils are equal, round, and reactive to light  Conjunctivae and EOM are normal    Neck: Normal range of motion  Neck supple  No JVD present  No tracheal deviation present  No thyromegaly present  Cardiovascular: Normal rate, regular rhythm, normal heart sounds and intact distal pulses  Exam reveals no gallop and no friction rub  No murmur heard  Pulses:       Dorsalis pedis pulses are 2+ on the right side, and 2+ on the left side  Posterior tibial pulses are 2+ on the right side, and 2+ on the left side  Pulmonary/Chest: Effort normal and breath sounds normal  No respiratory distress  He has no wheezes  He has no rales  Abdominal: Soft  Bowel sounds are normal  He exhibits no distension  There is no tenderness  Musculoskeletal: Normal range of motion  He exhibits no edema  Feet:   Right Foot:   Skin Integrity: Positive for dry skin  Negative for ulcer, skin breakdown, erythema, warmth or callus  Left Foot:   Skin Integrity: Positive for dry skin  Negative for ulcer, skin breakdown, erythema, warmth or callus  Lymphadenopathy:     He has no cervical adenopathy  Neurological: He is alert and oriented to person, place, and time  No cranial nerve deficit  Skin: Skin is warm and dry  No rash noted  He is not diaphoretic  Psychiatric: He has a normal mood and affect  His behavior is normal  Judgment and thought content normal        Diabetic Foot Exam    Patient's shoes and socks removed  Right Foot/Ankle   Right Foot Inspection  Skin Exam: skin normal, skin intact and dry skin no warmth, no callus, no erythema, no maceration, no abnormal color, no pre-ulcer, no ulcer and no callus                          Toe Exam: ROM and strength within normal limits  Sensory   Vibration: intact  Proprioception: intact   Monofilament testing: intact  Vascular  Capillary refills: < 3 seconds  The right DP pulse is 2+  The right PT pulse is 2+  Left Foot/Ankle  Left Foot Inspection  Skin Exam: skin normal, skin intact and dry skinno warmth, no erythema, no maceration, normal color, no pre-ulcer, no ulcer and no callus                         Toe Exam: ROM and strength within normal limits                   Sensory   Vibration: intact  Proprioception: intact  Monofilament: intact  Vascular  Capillary refills: < 3 seconds  The left DP pulse is 2+  The left PT pulse is 2+     Assign Risk Category:  No deformity present; ;        Risk: 0

## 2019-12-27 NOTE — TELEPHONE ENCOUNTER
LMTCB, patient came in today and I was given a medication prescription plan for [facility]  The paper that was given to us isnt for us to fill out   The patient needs to fill out the information and mail it into them or call them and set something up

## 2019-12-27 NOTE — PATIENT INSTRUCTIONS
Type 2 diabetes mellitus under excellent control continue lifestyle modifications-patient has excellent A1c in addition to excellent LDL without statin discussed the benefit of adding a cholesterol medication specifically to reduce risk of heart attack and stroke patient declined at this time    Also consider baby aspirin every other day    COPD 02 dependent insurance will no longer pay for Advair switch to Symbicort    Acute on chronic lower back pain with sciatica following with [facility] orthopedic pain management    Status post kidney stone following with Urology    Chronic kidney disease stage 3  with Nephrology    Pancreatic cyst follow-up MRI MRCP in 2021     history of prostate cancer stable following with Urology     gout no recent flare

## 2019-12-27 NOTE — TELEPHONE ENCOUNTER
WIFE CALLED BACK AND STATED THEY HAD [facility] AND NOW HAVE [facility] NEED TO GO THERE

## 2019-12-30 ENCOUNTER — HOSPITAL ENCOUNTER (OUTPATIENT)
Dept: CT IMAGING | Facility: HOSPITAL | Age: 79
Discharge: HOME | End: 2019-12-30
Payer: MEDICARE

## 2019-12-30 DIAGNOSIS — R91.8 PULMONARY NODULES: ICD-10-CM

## 2019-12-30 PROCEDURE — 71250 CT THORAX DX C-: CPT

## 2020-01-03 ENCOUNTER — TELEPHONE (OUTPATIENT)
Dept: PULMONOLOGY | Facility: CLINIC | Age: 80
End: 2020-01-03

## 2020-01-09 ENCOUNTER — TELEPHONE (OUTPATIENT)
Dept: PULMONOLOGY | Facility: CLINIC | Age: 80
End: 2020-01-09

## 2020-01-09 NOTE — TELEPHONE ENCOUNTER
----- Message from Nikita Newton PA-C sent at 1/7/2020  3:09 PM EST -----  Please let patient know that his pulmonary nodules are stable and we can continue with annual follow-up  I am also forwarding results to patient's PCP, Dr Randy Rothman, regarding the mildly enlarged ascending aortic aneurysm      Thank you

## 2020-01-21 ENCOUNTER — APPOINTMENT (OUTPATIENT)
Dept: LAB | Facility: HOSPITAL | Age: 80
End: 2020-01-21
Attending: INTERNAL MEDICINE
Payer: COMMERCIAL

## 2020-01-21 DIAGNOSIS — I12.9 HYPERTENSIVE KIDNEY DISEASE WITH STAGE 3 CHRONIC KIDNEY DISEASE (HCC): ICD-10-CM

## 2020-01-21 DIAGNOSIS — N18.30 STAGE 3 CHRONIC KIDNEY DISEASE (HCC): ICD-10-CM

## 2020-01-21 DIAGNOSIS — N18.30 HYPERTENSIVE KIDNEY DISEASE WITH STAGE 3 CHRONIC KIDNEY DISEASE (HCC): ICD-10-CM

## 2020-01-21 LAB
25(OH)D3 SERPL-MCNC: 44.5 NG/ML (ref 30–100)
AMORPH URATE CRY URNS QL MICRO: ABNORMAL /HPF
ANION GAP SERPL CALCULATED.3IONS-SCNC: 7 MMOL/L (ref 4–13)
BACTERIA UR QL AUTO: ABNORMAL /HPF
BASOPHILS # BLD AUTO: 0.03 THOUSANDS/ΜL (ref 0–0.1)
BASOPHILS NFR BLD AUTO: 0 % (ref 0–1)
BILIRUB UR QL STRIP: NEGATIVE
BUN SERPL-MCNC: 16 MG/DL (ref 5–25)
CALCIUM SERPL-MCNC: 9.4 MG/DL (ref 8.3–10.1)
CHLORIDE SERPL-SCNC: 105 MMOL/L (ref 100–108)
CLARITY UR: CLEAR
CO2 SERPL-SCNC: 31 MMOL/L (ref 21–32)
COARSE GRAN CASTS URNS QL MICRO: ABNORMAL /LPF
COLOR UR: YELLOW
CREAT SERPL-MCNC: 1.5 MG/DL (ref 0.6–1.3)
CREAT UR-MCNC: 221 MG/DL
EOSINOPHIL # BLD AUTO: 0.08 THOUSAND/ΜL (ref 0–0.61)
EOSINOPHIL NFR BLD AUTO: 1 % (ref 0–6)
ERYTHROCYTE [DISTWIDTH] IN BLOOD BY AUTOMATED COUNT: 18.4 % (ref 11.6–15.1)
GFR SERPL CREATININE-BSD FRML MDRD: 51 ML/MIN/1.73SQ M
GLUCOSE P FAST SERPL-MCNC: 103 MG/DL (ref 65–99)
GLUCOSE UR STRIP-MCNC: NEGATIVE MG/DL
HCT VFR BLD AUTO: 50.1 % (ref 36.5–49.3)
HGB BLD-MCNC: 15.3 G/DL (ref 12–17)
HGB UR QL STRIP.AUTO: NEGATIVE
HYALINE CASTS #/AREA URNS LPF: ABNORMAL /LPF
IMM GRANULOCYTES # BLD AUTO: 0.08 THOUSAND/UL (ref 0–0.2)
IMM GRANULOCYTES NFR BLD AUTO: 1 % (ref 0–2)
KETONES UR STRIP-MCNC: NEGATIVE MG/DL
LEUKOCYTE ESTERASE UR QL STRIP: NEGATIVE
LYMPHOCYTES # BLD AUTO: 1.76 THOUSANDS/ΜL (ref 0.6–4.47)
LYMPHOCYTES NFR BLD AUTO: 15 % (ref 14–44)
MCH RBC QN AUTO: 24 PG (ref 26.8–34.3)
MCHC RBC AUTO-ENTMCNC: 30.5 G/DL (ref 31.4–37.4)
MCV RBC AUTO: 79 FL (ref 82–98)
MICROALBUMIN UR-MCNC: 39.8 MG/L (ref 0–20)
MICROALBUMIN/CREAT 24H UR: 18 MG/G CREATININE (ref 0–30)
MONOCYTES # BLD AUTO: 0.79 THOUSAND/ΜL (ref 0.17–1.22)
MONOCYTES NFR BLD AUTO: 7 % (ref 4–12)
NEUTROPHILS # BLD AUTO: 8.74 THOUSANDS/ΜL (ref 1.85–7.62)
NEUTS SEG NFR BLD AUTO: 76 % (ref 43–75)
NITRITE UR QL STRIP: NEGATIVE
NON-SQ EPI CELLS URNS QL MICRO: ABNORMAL /HPF
NRBC BLD AUTO-RTO: 0 /100 WBCS
PH UR STRIP.AUTO: 5.5 [PH]
PHOSPHATE SERPL-MCNC: 3 MG/DL (ref 2.3–4.1)
PLATELET # BLD AUTO: 240 THOUSANDS/UL (ref 149–390)
PMV BLD AUTO: 12 FL (ref 8.9–12.7)
POTASSIUM SERPL-SCNC: 4.2 MMOL/L (ref 3.5–5.3)
PROT UR STRIP-MCNC: NEGATIVE MG/DL
PTH-INTACT SERPL-MCNC: 48.2 PG/ML (ref 18.4–80.1)
RBC # BLD AUTO: 6.37 MILLION/UL (ref 3.88–5.62)
RBC #/AREA URNS AUTO: ABNORMAL /HPF
SODIUM SERPL-SCNC: 143 MMOL/L (ref 136–145)
SP GR UR STRIP.AUTO: 1.02 (ref 1–1.03)
URATE SERPL-MCNC: 6.2 MG/DL (ref 4.2–8)
UROBILINOGEN UR QL STRIP.AUTO: 0.2 E.U./DL
WBC # BLD AUTO: 11.48 THOUSAND/UL (ref 4.31–10.16)
WBC #/AREA URNS AUTO: ABNORMAL /HPF

## 2020-01-21 PROCEDURE — 82043 UR ALBUMIN QUANTITATIVE: CPT

## 2020-01-21 PROCEDURE — 80048 BASIC METABOLIC PNL TOTAL CA: CPT

## 2020-01-21 PROCEDURE — 36415 COLL VENOUS BLD VENIPUNCTURE: CPT

## 2020-01-21 PROCEDURE — 83970 ASSAY OF PARATHORMONE: CPT

## 2020-01-21 PROCEDURE — 82306 VITAMIN D 25 HYDROXY: CPT

## 2020-01-21 PROCEDURE — 81001 URINALYSIS AUTO W/SCOPE: CPT

## 2020-01-21 PROCEDURE — 82570 ASSAY OF URINE CREATININE: CPT

## 2020-01-21 PROCEDURE — 84100 ASSAY OF PHOSPHORUS: CPT

## 2020-01-21 PROCEDURE — 85025 COMPLETE CBC W/AUTO DIFF WBC: CPT

## 2020-01-21 PROCEDURE — 84550 ASSAY OF BLOOD/URIC ACID: CPT

## 2020-02-03 ENCOUNTER — OFFICE VISIT (OUTPATIENT)
Dept: NEPHROLOGY | Facility: CLINIC | Age: 80
End: 2020-02-03
Payer: COMMERCIAL

## 2020-02-03 VITALS
TEMPERATURE: 98 F | HEIGHT: 72 IN | SYSTOLIC BLOOD PRESSURE: 152 MMHG | WEIGHT: 249.4 LBS | HEART RATE: 74 BPM | RESPIRATION RATE: 16 BRPM | DIASTOLIC BLOOD PRESSURE: 80 MMHG | BODY MASS INDEX: 33.78 KG/M2

## 2020-02-03 DIAGNOSIS — N18.30 STAGE 3 CHRONIC KIDNEY DISEASE (HCC): Primary | ICD-10-CM

## 2020-02-03 DIAGNOSIS — I12.9 HYPERTENSIVE KIDNEY DISEASE WITH STAGE 3 CHRONIC KIDNEY DISEASE (HCC): ICD-10-CM

## 2020-02-03 DIAGNOSIS — N18.30 HYPERTENSIVE KIDNEY DISEASE WITH STAGE 3 CHRONIC KIDNEY DISEASE (HCC): ICD-10-CM

## 2020-02-03 DIAGNOSIS — E55.9 VITAMIN D DEFICIENCY: ICD-10-CM

## 2020-02-03 PROCEDURE — 1036F TOBACCO NON-USER: CPT | Performed by: INTERNAL MEDICINE

## 2020-02-03 PROCEDURE — 99214 OFFICE O/P EST MOD 30 MIN: CPT | Performed by: INTERNAL MEDICINE

## 2020-02-03 PROCEDURE — 3079F DIAST BP 80-89 MM HG: CPT | Performed by: INTERNAL MEDICINE

## 2020-02-03 PROCEDURE — 1160F RVW MEDS BY RX/DR IN RCRD: CPT | Performed by: INTERNAL MEDICINE

## 2020-02-03 PROCEDURE — 3066F NEPHROPATHY DOC TX: CPT | Performed by: INTERNAL MEDICINE

## 2020-02-03 PROCEDURE — 4040F PNEUMOC VAC/ADMIN/RCVD: CPT | Performed by: INTERNAL MEDICINE

## 2020-02-03 PROCEDURE — 3060F POS MICROALBUMINURIA REV: CPT | Performed by: INTERNAL MEDICINE

## 2020-02-03 PROCEDURE — 3077F SYST BP >= 140 MM HG: CPT | Performed by: INTERNAL MEDICINE

## 2020-02-03 PROCEDURE — 3008F BODY MASS INDEX DOCD: CPT | Performed by: INTERNAL MEDICINE

## 2020-02-03 NOTE — LETTER
February 3, 2020     Yesy Bliss MD  [address], [place] [address]    Patient: Bin Goel   YOB: 1940   Date of Visit: 2/3/2020       Dear Dr Carmona Prime: Thank you for referring Bin Goel to me for evaluation  Below are my notes for this consultation  If you have questions, please do not hesitate to call me  I look forward to following your patient along with you  Sincerely,        Porsche Grimm MD        CC: No Recipients  Porsche Grimm MD  2/3/2020  3:32 PM  Sign at close encounter  Tomas 70 y o  male MRN: [id number]    Encounter: [id number] DATE: 2/3/2020    REASON FOR VISIT: Bin Goel is a 70 y o  male who is here on 2/3/2020 for further management of chronic kidney disease  HPI:    This is a 32-year-old male with a past medical history of CKD stage 3, hypertension, COPD, returns to Nephrology Clinic for further management of CKD stage 3  Upon review of old medical record patient's baseline creatinine seems to be fluctuating between 1 3-1 6  Patient's wife was also present at the time of consultation and history was also obtained from her and all the questions were answered  Patient has COPD  Patient currently does not smoke  According to patient his breathing is currently under good control with the use of nebulizers  Patient also uses oxygen  Patient has hypertension which seems under well controlled with the use of current antihypertensive medications  Patient has gout and currently taking allopurinol 100 mg PO daily on daily basis  Patient denies any recent gout flare-up  Patient also have chronic back pain and also been followed by pain management specialist and currently using narcotics  According to patient he was found to have low vitamin-D level in the past and currently taking cholecalciferol 5000 Units PO daily      Patient also have kidney stone and was also seen by Dr Griffin Duvall     According to patient he does not use NSAIDs including Mobic  REVIEW OF SYSTEMS:    Review of Systems   Constitutional: Negative for chills and fever  HENT: Negative for nosebleeds and sore throat  Eyes: Negative for photophobia and pain  Respiratory: Negative for choking and wheezing  Cardiovascular: Negative for chest pain and palpitations  Gastrointestinal: Negative for abdominal pain and blood in stool  Endocrine: Negative for cold intolerance and heat intolerance  Genitourinary: Negative for flank pain and hematuria  Musculoskeletal: Negative for joint swelling and neck pain  Skin: Negative for color change and pallor  Allergic/Immunologic: Negative for environmental allergies and immunocompromised state  Neurological: Negative for seizures and syncope  Hematological: Negative for adenopathy  Does not bruise/bleed easily  Psychiatric/Behavioral: Negative for confusion and suicidal ideas  PAST MEDICAL HISTORY:  Past Medical History:   Diagnosis Date    Back pain     COPD (chronic obstructive pulmonary disease) (HCC)     History of malignant neoplasm of oral cavity     M0    HL (hearing loss)     Hypertension     Kidney stone 05/2019    Malignant neoplasm of colon (HCC)     descending    Prostate cancer (HCC)     Tachycardia     Thalassemia trait     Tinnitus        PAST SURGICAL HISTORY:  Past Surgical History:   Procedure Laterality Date    BACK SURGERY      CHOLECYSTECTOMY      COLON SURGERY  10/2008    partial colectomy    INCISIONAL HERNIA REPAIR      JOINT REPLACEMENT Right     hip    SD COLONOSCOPY FLX DX W/COLLJ SPEC WHEN PFRMD N/A 7/23/2018    Procedure: COLONOSCOPY;  Surgeon: Magaly Burrell MD;  Location: [facility];   Service: Gastroenterology    PROSTATE SURGERY      TONSILLECTOMY         SOCIAL HISTORY:  Social History     Substance and Sexual Activity   Alcohol Use Not Currently     Social History     Substance and Sexual Activity   Drug Use No     Social History     Tobacco Use   Smoking Status Former Smoker    Packs/day: 1 00    Years: 25 00    Pack years: 25 00    Types: Cigarettes    Last attempt to quit: 6/2/2004    Years since quitting: 15 6   Smokeless Tobacco Never Used   Tobacco Comment    non smoker/tobacco user; quit 1987 as per [employer]       FAMILY HISTORY:  Family History   Problem Relation Age of Onset    Heart failure Mother         as per [employer]    Coronary [facility] disease [facility]    Diabetes Mother         mellitus - as per [employer]    Coronary artery disease Father         [facility]    Cancer Sister         malignant neoplasm       ALLERGY:  No Known Allergies    MEDICATIONS:    Current Outpatient Medications:     acetaminophen-codeine (TYLENOL with CODEINE #4) 300-60 MG per tablet, Take 1 tablet by mouth every 6 (six) hours as needed for moderate pain, Disp: , Rfl:     albuterol (2 5 mg/3 mL) 0 083 % nebulizer solution, Inhale 1 each every 6 (six) hours as needed, Disp: , Rfl:     allopurinol (ZYLOPRIM) 100 mg tablet, Take 1 tablet (100 mg total) by mouth daily, Disp: 90 tablet, Rfl: 2    amLODIPine (NORVASC) 5 mg tablet, Take 1 tablet (5 mg total) by mouth every morning, Disp: 90 tablet, Rfl: 3    cholecalciferol (VITAMIN D3) 1,000 units tablet, Take 5,000 Units by mouth daily, Disp: , Rfl:     gabapentin (NEURONTIN) 300 mg capsule, Every 12 hours, Disp: , Rfl:     losartan (COZAAR) 100 MG tablet, TAKE 1 TABLET DAILY, Disp: 90 tablet, Rfl: 3    metoprolol succinate (TOPROL-XL) 25 mg 24 hr tablet, Take 1 tablet (25 mg total) by mouth daily, Disp: 90 tablet, Rfl: 3    tiotropium (SPIRIVA HANDIHALER) 18 mcg inhalation capsule, Place 1 capsule (18 mcg total) into inhaler and inhale daily, Disp: 90 each, Rfl: 2    albuterol (VENTOLIN HFA) 90 mcg/act inhaler, Inhale 2 puffs every 6 (six) hours as needed for wheezing or shortness of breath, Disp: 18 g, Rfl: 3    budesonide-formoterol (SYMBICORT) 160-4 5 mcg/act inhaler, Inhale 2 puffs 2 (two) times a day Rinse mouth after use  (Patient not taking: Reported on 2/3/2020), Disp: 3 Inhaler, Rfl: 3    ondansetron (ZOFRAN-ODT) 4 mg disintegrating tablet, Take 1 tablet (4 mg total) by mouth every 8 (eight) hours as needed for nausea or vomiting (Patient not taking: Reported on 2/3/2020), Disp: 16 tablet, Rfl: 0    PHYSICAL EXAM:  Vitals:    02/03/20 1515   BP: 152/80   BP Location: Left arm   Patient Position: Sitting   Cuff Size: Standard   Pulse: 74   Resp: 16   Temp: 98 °F (36 7 °C)   TempSrc: Oral   Weight: 113 kg (249 lb 6 4 oz)   Height: 6' (1 829 m)     Body mass index is 33 82 kg/m²  Physical Exam   Constitutional: He appears well-nourished  No distress  Obese  Uses nasal oxygen   HENT:   Head: Normocephalic and atraumatic  Eyes: No scleral icterus  Neck: Neck supple  No JVD present  Cardiovascular: Normal rate, S1 normal and S2 normal    Pulmonary/Chest: Effort normal  No accessory muscle usage  No respiratory distress  He has no wheezes  Abdominal: Soft  He exhibits no distension  Musculoskeletal: He exhibits no edema  Right ankle: He exhibits no swelling  Left ankle: He exhibits no swelling  Right hand: He exhibits no laceration  Left hand: He exhibits no laceration  Lymphadenopathy:        Right cervical: No superficial cervical adenopathy present  Left cervical: No superficial cervical adenopathy present  Neurological: He is alert  He is not disoriented  Skin: Skin is warm  No cyanosis  Psychiatric: He is not combative  He does not exhibit a depressed mood  He expresses no suicidal ideation         LAB RESULTS:  Results for orders placed or performed in visit on 01/21/20   CBC and differential   Result Value Ref Range    WBC 11 48 (H) 4 31 - 10 16 Thousand/uL    RBC 6 37 (H) 3 88 - 5 62 Million/uL    Hemoglobin 15 3 12 0 - 17 0 g/dL    Hematocrit 50 1 (H) 36 5 - 49 3 %    MCV 79 (L) 82 - 98 fL    MCH 24 0 (L) 26 8 - 34 3 pg    MCHC 30 5 (L) 31 4 - 37 4 g/dL    RDW 18 4 (H) 11 6 - 15 1 %    MPV 12 0 8 9 - 12 7 fL    Platelets 513 638 - 758 Thousands/uL    nRBC 0 /100 WBCs    Neutrophils Relative 76 (H) 43 - 75 %    Immat GRANS % 1 0 - 2 %    Lymphocytes Relative 15 14 - 44 %    Monocytes Relative 7 4 - 12 %    Eosinophils Relative 1 0 - 6 %    Basophils Relative 0 0 - 1 %    Neutrophils Absolute 8 74 (H) 1 85 - 7 62 Thousands/µL    Immature Grans Absolute 0 08 0 00 - 0 20 Thousand/uL    Lymphocytes Absolute 1 76 0 60 - 4 47 Thousands/µL    Monocytes Absolute 0 79 0 17 - 1 22 Thousand/µL    Eosinophils Absolute 0 08 0 00 - 0 61 Thousand/µL    Basophils Absolute 0 03 0 00 - 0 10 Thousands/µL   Basic metabolic panel   Result Value Ref Range    Sodium 143 136 - 145 mmol/L    Potassium 4 2 3 5 - 5 3 mmol/L    Chloride 105 100 - 108 mmol/L    CO2 31 21 - 32 mmol/L    ANION GAP 7 4 - 13 mmol/L    BUN 16 5 - 25 mg/dL    Creatinine 1 50 (H) 0 60 - 1 30 mg/dL    Glucose, Fasting 103 (H) 65 - 99 mg/dL    Calcium 9 4 8 3 - 10 1 mg/dL    eGFR 51 ml/min/1 73sq m   Urinalysis with microscopic   Result Value Ref Range    Clarity, UA Clear     Color, UA Yellow     Specific [place], UA 1 020 1 003 - 1 030    pH, UA 5 5 4 5, 5 0, 5 5, 6 0, 6 5, 7 0, 7 5, 8 0    Glucose, UA Negative Negative mg/dl    Ketones, UA Negative Negative mg/dl    Blood, UA Negative Negative    Protein, UA Negative Negative mg/dl    Nitrite, UA Negative Negative    Bilirubin, UA Negative Negative    Urobilinogen, UA 0 2 0 2, 1 0 E U /dl E U /dl    Leukocytes, UA Negative Negative    WBC, UA None Seen None Seen, 0-5, 5-55, 5-65 /hpf    RBC, UA 0-1 (A) None Seen, 0-5 /hpf    Hyaline Casts, UA 0-1 (A) (none) /lpf    Bacteria, UA None Seen None Seen, Occasional /hpf    COARSE GRANULAR CASTS 0-1 /lpf    AMORPH URATES Occasional /hpf    Epithelial Cells Occasional None Seen, Occasional /hpf   Phosphorus   Result Value Ref Range    Phosphorus 3 0 2 3 - 4 1 mg/dL   Uric acid   Result Value Ref Range    Uric Acid 6 2 4 2 - 8 0 mg/dL   PTH, intact   Result Value Ref Range    PTH 48 2 18 4 - 80 1 pg/mL   Vitamin D 25 hydroxy   Result Value Ref Range    Vit D, 25-Hydroxy 44 5 30 0 - 100 0 ng/mL       ASSESSMENT and PLAN:  Piero Beach was seen today for chronic kidney disease and follow-up  Diagnoses and all orders for this visit:    Stage 3 chronic kidney disease ([address] )  -     CBC and differential; Future  -     Basic metabolic panel; Future  -     Urinalysis with microscopic; Future  -     Microalbumin / creatinine urine ratio; Future  -     Phosphorus; Future  -     Uric acid; Future  -     PTH, intact; Future  -     Vitamin D 25 hydroxy; Future    Hypertensive kidney disease with stage 3 chronic kidney disease (HCC)  -     Basic metabolic panel; Future    Vitamin D deficiency  -     Vitamin D 25 hydroxy; Future      1  CKD stage 3  Multifactorial and was suspected due to underlying hypertensive nephrosclerosis  Upon review of old medical record patient's baseline creatinine seems to be fluctuating between 1 3-1 6 and in the interim renal function has remained stable with current creatinine of 1 5 with EGFR of 51  Management of hypertension as mentioned below  Plan to avoid NSAIDs including Mobic/meloxicam and recheck renal function before next visit  2   Hypertension in chronic kidney disease  Today's blood pressure was slightly on the higher side but according to patient his hypertension is under good control  For time being monitor hypertension with amlodipine 5 mg PO daily, metoprolol XL 25 mg PO daily and losartan 100 mg PO daily  Also advised patient to follow low-salt diet  3  Vitamin-D deficiency    Currently patient is taking cholecalciferol 5000 Units PO daily and current vitamin-D level is 44 which is at goal and plan to continue cholecalciferol 5000 Units PO daily and recheck vitamin-D level with the next visit  Returns to Nephrology Clinic in 6 months  Future labs ordered  Portions of the record may have been created with voice recognition software  Occasional wrong word or "sound a like" substitutions may have occurred due to the inherent limitations of voice recognition software  Read the chart carefully and recognize, using context, where substitutions have occurred  If you have any questions, please contact the dictating provider

## 2020-02-03 NOTE — PROGRESS NOTES
NEPHROLOGY OFFICE FOLLOW UP  Ravi Abdul 70 y o  male MRN: [id number]    Encounter: [id number] DATE: 2/3/2020    REASON FOR VISIT: Ravi Abdul is a 70 y o  male who is here on 2/3/2020 for further management of chronic kidney disease  HPI:    This is a 77-year-old male with a past medical history of CKD stage 3, hypertension, COPD, returns to Nephrology Clinic for further management of CKD stage 3  Upon review of old medical record patient's baseline creatinine seems to be fluctuating between 1 3-1 6  Patient's wife was also present at the time of consultation and history was also obtained from her and all the questions were answered  Patient has COPD  Patient currently does not smoke  According to patient his breathing is currently under good control with the use of nebulizers  Patient also uses oxygen  Patient has hypertension which seems under well controlled with the use of current antihypertensive medications  Patient has gout and currently taking allopurinol 100 mg PO daily on daily basis  Patient denies any recent gout flare-up  Patient also have chronic back pain and also been followed by pain management specialist and currently using narcotics  According to patient he was found to have low vitamin-D level in the past and currently taking cholecalciferol 5000 Units PO daily  Patient also have kidney stone and was also seen by Dr Kev Luis     According to patient he does not use NSAIDs including Mobic  REVIEW OF SYSTEMS:    Review of Systems   Constitutional: Negative for chills and fever  HENT: Negative for nosebleeds and sore throat  Eyes: Negative for photophobia and pain  Respiratory: Negative for choking and wheezing  Cardiovascular: Negative for chest pain and palpitations  Gastrointestinal: Negative for abdominal pain and blood in stool  Endocrine: Negative for cold intolerance and heat intolerance     Genitourinary: Negative for flank pain and hematuria  Musculoskeletal: Negative for joint swelling and neck pain  Skin: Negative for color change and pallor  Allergic/Immunologic: Negative for environmental allergies and immunocompromised state  Neurological: Negative for seizures and syncope  Hematological: Negative for adenopathy  Does not bruise/bleed easily  Psychiatric/Behavioral: Negative for confusion and suicidal ideas  PAST MEDICAL HISTORY:  Past Medical History:   Diagnosis Date    Back pain     COPD (chronic obstructive pulmonary disease) (HCC)     History of malignant neoplasm of oral cavity     M0    HL (hearing loss)     Hypertension     Kidney stone 05/2019    Malignant neoplasm of colon (HCC)     descending    Prostate cancer (HCC)     Tachycardia     Thalassemia trait     Tinnitus        PAST SURGICAL HISTORY:  Past Surgical History:   Procedure Laterality Date    BACK SURGERY      CHOLECYSTECTOMY      COLON SURGERY  10/2008    partial colectomy    INCISIONAL HERNIA REPAIR      JOINT REPLACEMENT Right     hip    TN COLONOSCOPY FLX DX W/COLLJ SPEC WHEN PFRMD N/A 7/23/2018    Procedure: COLONOSCOPY;  Surgeon: Molly Hammonds MD;  Location: [facility];   Service: Gastroenterology    PROSTATE SURGERY      TONSILLECTOMY         SOCIAL HISTORY:  Social History     Substance and Sexual Activity   Alcohol Use Not Currently     Social History     Substance and Sexual Activity   Drug Use No     Social History     Tobacco Use   Smoking Status Former Smoker    Packs/day: 1 00    Years: 25 00    Pack years: 25 00    Types: Cigarettes    Last attempt to quit: 6/2/2004    Years since quitting: 15 6   Smokeless Tobacco Never Used   Tobacco Comment    non smoker/tobacco user; quit 1987 as per [employer]       FAMILY HISTORY:  Family History   Problem Relation Age of Onset    Heart failure Mother         as per [employer]    Coronary artery disease Mother         as per [employer]    [facility]    Coronary artery disease Father         as per [facility]    Cancer Sister         malignant neoplasm       ALLERGY:  No Known Allergies    MEDICATIONS:    Current Outpatient Medications:     acetaminophen-codeine (TYLENOL with CODEINE #4) 300-60 MG per tablet, Take 1 tablet by mouth every 6 (six) hours as needed for moderate pain, Disp: , Rfl:     albuterol (2 5 mg/3 mL) 0 083 % nebulizer solution, Inhale 1 each every 6 (six) hours as needed, Disp: , Rfl:     allopurinol (ZYLOPRIM) 100 mg tablet, Take 1 tablet (100 mg total) by mouth daily, Disp: 90 tablet, Rfl: 2    amLODIPine (NORVASC) 5 mg tablet, Take 1 tablet (5 mg total) by mouth every morning, Disp: 90 tablet, Rfl: 3    cholecalciferol (VITAMIN D3) 1,000 units tablet, Take 5,000 Units by mouth daily, Disp: , Rfl:     gabapentin (NEURONTIN) 300 mg capsule, Every 12 hours, Disp: , Rfl:     losartan (COZAAR) 100 MG tablet, TAKE 1 TABLET DAILY, Disp: 90 tablet, Rfl: 3    metoprolol succinate (TOPROL-XL) 25 mg 24 hr tablet, Take 1 tablet (25 mg total) by mouth daily, Disp: 90 tablet, Rfl: 3    tiotropium (SPIRIVA HANDIHALER) 18 mcg inhalation capsule, Place 1 capsule (18 mcg total) into inhaler and inhale daily, Disp: 90 each, Rfl: 2    albuterol (VENTOLIN HFA) 90 mcg/act inhaler, Inhale 2 puffs every 6 (six) hours as needed for wheezing or shortness of breath, Disp: 18 g, Rfl: 3    budesonide-formoterol (SYMBICORT) 160-4 5 mcg/act inhaler, Inhale 2 puffs 2 (two) times a day Rinse mouth after use   (Patient not taking: Reported on 2/3/2020), Disp: 3 Inhaler, Rfl: 3    ondansetron (ZOFRAN-ODT) 4 mg disintegrating tablet, Take 1 tablet (4 mg total) by mouth every 8 (eight) hours as needed for nausea or vomiting (Patient not taking: Reported on 2/3/2020), Disp: 16 tablet, Rfl: 0    PHYSICAL EXAM:  Vitals:    02/03/20 1515   BP: 152/80   BP Location: Left arm   Patient Position: Sitting   Cuff Size: Standard Pulse: 74   Resp: 16   Temp: 98 °F (36 7 °C)   TempSrc: Oral   Weight: 113 kg (249 lb 6 4 oz)   Height: 6' (1 829 m)     Body mass index is 33 82 kg/m²  Physical Exam   Constitutional: He appears well-nourished  No distress  Obese  Uses nasal oxygen   HENT:   Head: Normocephalic and atraumatic  Eyes: No scleral icterus  Neck: Neck supple  No JVD present  Cardiovascular: Normal rate, S1 normal and S2 normal    Pulmonary/Chest: Effort normal  No accessory muscle usage  No respiratory distress  He has no wheezes  Abdominal: Soft  He exhibits no distension  Musculoskeletal: He exhibits no edema  Right ankle: He exhibits no swelling  Left ankle: He exhibits no swelling  Right hand: He exhibits no laceration  Left hand: He exhibits no laceration  Lymphadenopathy:        Right cervical: No superficial cervical adenopathy present  Left cervical: No superficial cervical adenopathy present  Neurological: He is alert  He is not disoriented  Skin: Skin is warm  No cyanosis  Psychiatric: He is not combative  He does not exhibit a depressed mood  He expresses no suicidal ideation         LAB RESULTS:  Results for orders placed or performed in visit on 01/21/20   CBC and differential   Result Value Ref Range    WBC 11 48 (H) 4 31 - 10 16 Thousand/uL    RBC 6 37 (H) 3 88 - 5 62 Million/uL    Hemoglobin 15 3 12 0 - 17 0 g/dL    Hematocrit 50 1 (H) 36 5 - 49 3 %    MCV 79 (L) 82 - 98 fL    MCH 24 0 (L) 26 8 - 34 3 pg    MCHC 30 5 (L) 31 4 - 37 4 g/dL    RDW 18 4 (H) 11 6 - 15 1 %    MPV 12 0 8 9 - 12 7 fL    Platelets 906 643 - 882 Thousands/uL    nRBC 0 /100 WBCs    Neutrophils Relative 76 (H) 43 - 75 %    Immat GRANS % 1 0 - 2 %    Lymphocytes Relative 15 14 - 44 %    Monocytes Relative 7 4 - 12 %    Eosinophils Relative 1 0 - 6 %    Basophils Relative 0 0 - 1 %    Neutrophils Absolute 8 74 (H) 1 85 - 7 62 Thousands/µL    Immature Grans Absolute 0 08 0 00 - 0 20 Thousand/uL Lymphocytes Absolute 1 76 0 60 - 4 47 Thousands/µL    Monocytes Absolute 0 79 0 17 - 1 22 Thousand/µL    Eosinophils Absolute 0 08 0 00 - 0 61 Thousand/µL    Basophils Absolute 0 03 0 00 - 0 10 Thousands/µL   Basic metabolic panel   Result Value Ref Range    Sodium 143 136 - 145 mmol/L    Potassium 4 2 3 5 - 5 3 mmol/L    Chloride 105 100 - 108 mmol/L    CO2 31 21 - 32 mmol/L    ANION GAP 7 4 - 13 mmol/L    BUN 16 5 - 25 mg/dL    Creatinine 1 50 (H) 0 60 - 1 30 mg/dL    Glucose, Fasting 103 (H) 65 - 99 mg/dL    Calcium 9 4 8 3 - 10 1 mg/dL    eGFR 51 ml/min/1 73sq m   Urinalysis with microscopic   Result Value Ref Range    Clarity, UA Clear     Color, UA Yellow     Specific [place], UA 1 020 1 003 - 1 030    pH, UA 5 5 4 5, 5 0, 5 5, 6 0, 6 5, 7 0, 7 5, 8 0    Glucose, UA Negative Negative mg/dl    Ketones, UA Negative Negative mg/dl    Blood, UA Negative Negative    Protein, UA Negative Negative mg/dl    Nitrite, UA Negative Negative    Bilirubin, UA Negative Negative    Urobilinogen, UA 0 2 0 2, 1 0 E U /dl E U /dl    Leukocytes, UA Negative Negative    WBC, UA None Seen None Seen, 0-5, 5-55, 5-65 /hpf    RBC, UA 0-1 (A) None Seen, 0-5 /hpf    Hyaline Casts, UA 0-1 (A) (none) /lpf    Bacteria, UA None Seen None Seen, Occasional /hpf    COARSE GRANULAR CASTS 0-1 /lpf    AMORPH URATES Occasional /hpf    Epithelial Cells Occasional None Seen, Occasional /hpf   Phosphorus   Result Value Ref Range    Phosphorus 3 0 2 3 - 4 1 mg/dL   Uric acid   Result Value Ref Range    Uric Acid 6 2 4 2 - 8 0 mg/dL   PTH, intact   Result Value Ref Range    PTH 48 2 18 4 - 80 1 pg/mL   Vitamin D 25 hydroxy   Result Value Ref Range    Vit D, 25-Hydroxy 44 5 30 0 - 100 0 ng/mL       ASSESSMENT and PLAN:  Chelsea Salazar was seen today for chronic kidney disease and follow-up  Diagnoses and all orders for this visit:    Stage 3 chronic kidney disease ([facility] [address] )  -     CBC and differential; Future  -     Basic metabolic panel;  Future  - Urinalysis with microscopic; Future  -     Microalbumin / creatinine urine ratio; Future  -     Phosphorus; Future  -     Uric acid; Future  -     PTH, intact; Future  -     Vitamin D 25 hydroxy; Future    Hypertensive kidney disease with stage 3 chronic kidney disease (HCC)  -     Basic metabolic panel; Future    Vitamin D deficiency  -     Vitamin D 25 hydroxy; Future      1  CKD stage 3  Multifactorial and was suspected due to underlying hypertensive nephrosclerosis  Upon review of old medical record patient's baseline creatinine seems to be fluctuating between 1 3-1 6 and in the interim renal function has remained stable with current creatinine of 1 5 with EGFR of 51  Management of hypertension as mentioned below  Plan to avoid NSAIDs including Mobic/meloxicam and recheck renal function before next visit  2   Hypertension in chronic kidney disease  Today's blood pressure was slightly on the higher side but according to patient his hypertension is under good control  For time being monitor hypertension with amlodipine 5 mg PO daily, metoprolol XL 25 mg PO daily and losartan 100 mg PO daily  Also advised patient to follow low-salt diet  3  Vitamin-D deficiency  Currently patient is taking cholecalciferol 5000 Units PO daily and current vitamin-D level is 44 which is at goal and plan to continue cholecalciferol 5000 Units PO daily and recheck vitamin-D level with the next visit  Returns to Nephrology Clinic in 6 months  Future labs ordered  Labs (reviewed on 10/5/2020)  Stable creatinine at 1 38 with EGFR of 56  Hemoglobin 14 1  Urine analysis showed no dysuria  Urine microalbumin to creatinine ratio of 22 mg  Normal vitamin-D (48), PTH (38), uric acid (6 0), sodium, potassium, bicarb, calcium and phosphorus    NO CHANGE  Portions of the record may have been created with voice recognition software   Occasional wrong word or "sound a like" substitutions may have occurred due to the inherent limitations of voice recognition software  Read the chart carefully and recognize, using context, where substitutions have occurred  If you have any questions, please contact the dictating provider

## 2020-02-07 DIAGNOSIS — M10.9 GOUT, UNSPECIFIED CAUSE, UNSPECIFIED CHRONICITY, UNSPECIFIED SITE: ICD-10-CM

## 2020-02-07 DIAGNOSIS — I10 HYPERTENSION, UNSPECIFIED TYPE: ICD-10-CM

## 2020-02-07 DIAGNOSIS — I10 ESSENTIAL HYPERTENSION: ICD-10-CM

## 2020-02-07 RX ORDER — METOPROLOL SUCCINATE 25 MG/1
25 TABLET, EXTENDED RELEASE ORAL DAILY
Qty: 90 TABLET | Refills: 2 | Status: SHIPPED | OUTPATIENT
Start: 2020-02-07 | End: 2020-10-25 | Stop reason: SDUPTHER

## 2020-02-07 RX ORDER — LOSARTAN POTASSIUM 100 MG/1
100 TABLET ORAL DAILY
Qty: 90 TABLET | Refills: 2 | Status: SHIPPED | OUTPATIENT
Start: 2020-02-07 | End: 2020-10-25 | Stop reason: SDUPTHER

## 2020-02-07 RX ORDER — ALLOPURINOL 100 MG/1
100 TABLET ORAL DAILY
Qty: 90 TABLET | Refills: 2 | Status: SHIPPED | OUTPATIENT
Start: 2020-02-07 | End: 2020-10-25 | Stop reason: SDUPTHER

## 2020-02-07 RX ORDER — AMLODIPINE BESYLATE 5 MG/1
5 TABLET ORAL EVERY MORNING
Qty: 90 TABLET | Refills: 2 | Status: SHIPPED | OUTPATIENT
Start: 2020-02-07 | End: 2020-07-06 | Stop reason: SDUPTHER

## 2020-02-07 NOTE — TELEPHONE ENCOUNTER
PATIENTS WIFE CALLED AND SAID THAT HIS PHARMACY IS CHANGING TO [address] MAIL ORDER  PATIENT WANTS TO KNOW WHAT IS IS SUPPOSE TO DO  PLEASE CALL HER -[phone]

## 2020-02-07 NOTE — TELEPHONE ENCOUNTER
----- Message from [facility] sent at 2/7/2020  4:07 PM EST -----  Regarding: Prescription Question  Contact: [phone]  Need refills on prescriptions no longer using [facility]   Need to order from [employer] phone [phone] fax [phone]  Allopurinol  [address]  [address] figure out how to add pharmacy to [facility]ll  Please advise

## 2020-03-04 DIAGNOSIS — R91.8 PULMONARY NODULES: Primary | ICD-10-CM

## 2020-03-18 ENCOUNTER — TELEMEDICINE (OUTPATIENT)
Dept: PULMONOLOGY | Facility: CLINIC | Age: 80
End: 2020-03-18

## 2020-03-18 DIAGNOSIS — Z87.891 FORMER SMOKER: ICD-10-CM

## 2020-03-18 DIAGNOSIS — J43.2 CENTRILOBULAR EMPHYSEMA (HCC): ICD-10-CM

## 2020-03-18 DIAGNOSIS — Z99.81 CHRONIC RESPIRATORY FAILURE WITH HYPOXIA, ON HOME O2 THERAPY (HCC): Primary | ICD-10-CM

## 2020-03-18 DIAGNOSIS — R91.8 PULMONARY NODULES: ICD-10-CM

## 2020-03-18 DIAGNOSIS — J96.11 CHRONIC RESPIRATORY FAILURE WITH HYPOXIA, ON HOME O2 THERAPY (HCC): Primary | ICD-10-CM

## 2020-03-18 PROCEDURE — G2012 BRIEF CHECK IN BY MD/QHP: HCPCS | Performed by: PHYSICIAN ASSISTANT

## 2020-03-18 NOTE — PROGRESS NOTES
Virtual Check-in Visit    Reason for visit is follow-up, shortness of breath    This virtual check-in was done via telephone  Encounter provider Santa Chun PA-C    Provider located at [address] [address]      Recent Visits  No visits were found meeting these conditions  Showing recent visits within past 7 days and meeting all other requirements     Future Appointments  No visits were found meeting these conditions  Showing future appointments within next 150 days and meeting all other requirements        Patient agrees to participate in a virtual check in via telephone or video visit instead of presenting to the office to address urgent/immediate medical needs  Patient is aware this is a billable service  After connecting through telephone, the patient was identified by name and date of birth  Sho Mccullough was informed that this was a telemedicine visit and that the exam was being conducted confidentially over secure lines  My office door was closed  No one else was in the room  He acknowledged consent and understanding of privacy and security of the virtual check-in visit  I informed the patient that I have reviewed his record in Epic and presented the opportunity for him to ask any questions regarding the visit today  The patient initiated communication and agreed to participate  Subjective  Sho Mccullough is a 70 y o  male former smoker quit less than 20 years ago with approximately 45 pack year history and past medical history including COPD, chronic hypoxemic respiratory failure, pulmonary nodules, pleural plaques, hypertension, prostate cancer  He presents today for follow-up  Patient reports that he has shortness of breath with exertion which seems to be progressively worsening for years  There are no acute issues  He admits to not being compliant with his oxygen    He is supposed to be using 2 L by nasal cannula with exertion and often does not use it  There is not much coughing  He occasionally has some wheezing  There have been no fevers or sick contacts  Past Medical History:   Diagnosis Date    Back pain     COPD (chronic obstructive pulmonary disease) (HCC)     History of malignant neoplasm of oral cavity     M0    HL (hearing loss)     Hypertension     Kidney stone 05/2019    Malignant neoplasm of colon (HCC)     descending    Prostate cancer ([address] )     Tachycardia     Thalassemia trait     Tinnitus        Past Surgical History:   Procedure Laterality Date    BACK SURGERY      CHOLECYSTECTOMY      COLON SURGERY  10/2008    partial colectomy    INCISIONAL HERNIA REPAIR      JOINT REPLACEMENT Right     hip    TN COLONOSCOPY FLX DX W/COLLJ SPEC WHEN PFRMD N/A 7/23/2018    Procedure: COLONOSCOPY;  Surgeon: Magaly Burrell MD;  Location: [facility]; Service: Gastroenterology    PROSTATE SURGERY      TONSILLECTOMY         Current Outpatient Medications   Medication Sig Dispense Refill    acetaminophen-codeine (TYLENOL with CODEINE #4) 300-60 MG per tablet Take 1 tablet by mouth every 6 (six) hours as needed for moderate pain      albuterol (2 5 mg/3 mL) 0 083 % nebulizer solution Inhale 1 each every 6 (six) hours as needed      albuterol (VENTOLIN HFA) 90 mcg/act inhaler Inhale 2 puffs every 6 (six) hours as needed for wheezing or shortness of breath 18 g 3    allopurinol (ZYLOPRIM) 100 mg tablet Take 1 tablet (100 mg total) by mouth daily 90 tablet 2    amLODIPine (NORVASC) 5 mg tablet Take 1 tablet (5 mg total) by mouth every morning 90 tablet 2    budesonide-formoterol (SYMBICORT) 160-4 5 mcg/act inhaler Inhale 2 puffs 2 (two) times a day Rinse mouth after use   (Patient not taking: Reported on 2/3/2020) 3 Inhaler 3    cholecalciferol (VITAMIN D3) 1,000 units tablet Take 5,000 Units by mouth daily      gabapentin (NEURONTIN) 300 mg capsule Every 12 hours      losartan (COZAAR) 100 MG tablet Take 1 tablet (100 mg total) by mouth daily 90 tablet 2    metoprolol succinate (TOPROL-XL) 25 mg 24 hr tablet Take 1 tablet (25 mg total) by mouth daily 90 tablet 2    ondansetron (ZOFRAN-ODT) 4 mg disintegrating tablet Take 1 tablet (4 mg total) by mouth every 8 (eight) hours as needed for nausea or vomiting (Patient not taking: Reported on 2/3/2020) 16 tablet 0    tiotropium (SPIRIVA HANDIHALER) 18 mcg inhalation capsule Place 1 capsule (18 mcg total) into inhaler and inhale daily 90 each 2     No current facility-administered medications for this visit  No Known Allergies    Assessment    [place] telephone assessment is Patient does not appear to be in any acute distress         -chronic hypoxemic respiratory failure, noncompliant with oxygen therapy  -severe COPD/emphysema  -pulmonary nodules  -former smoker    Plan:  -begin using the 2 L oxygen by nasal cannula continuously with exertion  -continue Symbicort and Spiriva  -albuterol p r n   -continued chest CT follow-up    Disposition:    Good    I have personally spent 10 minutes reviewing the chart and imaging prior to the visit  I have personally spent 16 minutes on the phone with the patient  I have personally spent 5 minutes reviewing imaging, laboratory results and other testing results with the patient

## 2020-05-11 ENCOUNTER — TELEPHONE (OUTPATIENT)
Dept: NEPHROLOGY | Facility: CLINIC | Age: 80
End: 2020-05-11

## 2020-05-18 ENCOUNTER — HOSPITAL ENCOUNTER (OUTPATIENT)
Dept: RADIOLOGY | Facility: HOSPITAL | Age: 80
Discharge: HOME | End: 2020-05-18
Attending: UROLOGY
Payer: COMMERCIAL

## 2020-05-18 ENCOUNTER — APPOINTMENT (OUTPATIENT)
Dept: LAB | Facility: HOSPITAL | Age: 80
End: 2020-05-18
Attending: UROLOGY
Payer: COMMERCIAL

## 2020-05-18 DIAGNOSIS — C61 PROSTATE CANCER (HCC): ICD-10-CM

## 2020-05-18 DIAGNOSIS — N20.0 NEPHROLITHIASIS: ICD-10-CM

## 2020-05-18 LAB — PSA SERPL-MCNC: <0.1 NG/ML (ref 0–4)

## 2020-05-18 PROCEDURE — G0103 PSA SCREENING: HCPCS

## 2020-05-18 PROCEDURE — 74018 RADEX ABDOMEN 1 VIEW: CPT

## 2020-05-18 PROCEDURE — 36415 COLL VENOUS BLD VENIPUNCTURE: CPT

## 2020-05-29 ENCOUNTER — TELEMEDICINE (OUTPATIENT)
Dept: UROLOGY | Facility: CLINIC | Age: 80
End: 2020-05-29
Payer: COMMERCIAL

## 2020-05-29 DIAGNOSIS — C61 PROSTATE CANCER (HCC): Primary | ICD-10-CM

## 2020-05-29 DIAGNOSIS — N20.0 CALCULUS OF KIDNEY: ICD-10-CM

## 2020-05-29 PROCEDURE — 99441 PR PHYS/QHP TELEPHONE EVALUATION 5-10 MIN: CPT | Performed by: PHYSICIAN ASSISTANT

## 2020-06-24 ENCOUNTER — TELEPHONE (OUTPATIENT)
Dept: PULMONOLOGY | Facility: CLINIC | Age: 80
End: 2020-06-24

## 2020-06-25 ENCOUNTER — OFFICE VISIT (OUTPATIENT)
Dept: PULMONOLOGY | Facility: CLINIC | Age: 80
End: 2020-06-25
Payer: COMMERCIAL

## 2020-06-25 VITALS
TEMPERATURE: 98 F | HEART RATE: 50 BPM | OXYGEN SATURATION: 96 % | BODY MASS INDEX: 34.1 KG/M2 | SYSTOLIC BLOOD PRESSURE: 144 MMHG | WEIGHT: 251.8 LBS | HEIGHT: 72 IN | DIASTOLIC BLOOD PRESSURE: 84 MMHG

## 2020-06-25 DIAGNOSIS — R91.8 PULMONARY NODULES: ICD-10-CM

## 2020-06-25 DIAGNOSIS — Z99.81 CHRONIC RESPIRATORY FAILURE WITH HYPOXIA, ON HOME O2 THERAPY (HCC): ICD-10-CM

## 2020-06-25 DIAGNOSIS — J96.11 CHRONIC RESPIRATORY FAILURE WITH HYPOXIA, ON HOME O2 THERAPY (HCC): ICD-10-CM

## 2020-06-25 DIAGNOSIS — J44.9 COPD, SEVERE (HCC): Primary | ICD-10-CM

## 2020-06-25 DIAGNOSIS — Z87.891 FORMER SMOKER: ICD-10-CM

## 2020-06-25 PROCEDURE — 3079F DIAST BP 80-89 MM HG: CPT | Performed by: PHYSICIAN ASSISTANT

## 2020-06-25 PROCEDURE — 3066F NEPHROPATHY DOC TX: CPT | Performed by: PHYSICIAN ASSISTANT

## 2020-06-25 PROCEDURE — 99214 OFFICE O/P EST MOD 30 MIN: CPT | Performed by: PHYSICIAN ASSISTANT

## 2020-06-25 PROCEDURE — 4040F PNEUMOC VAC/ADMIN/RCVD: CPT | Performed by: PHYSICIAN ASSISTANT

## 2020-06-25 PROCEDURE — 3008F BODY MASS INDEX DOCD: CPT | Performed by: PHYSICIAN ASSISTANT

## 2020-06-25 PROCEDURE — 1160F RVW MEDS BY RX/DR IN RCRD: CPT | Performed by: PHYSICIAN ASSISTANT

## 2020-06-25 PROCEDURE — 3077F SYST BP >= 140 MM HG: CPT | Performed by: PHYSICIAN ASSISTANT

## 2020-06-25 PROCEDURE — 1036F TOBACCO NON-USER: CPT | Performed by: PHYSICIAN ASSISTANT

## 2020-06-25 PROCEDURE — 3060F POS MICROALBUMINURIA REV: CPT | Performed by: PHYSICIAN ASSISTANT

## 2020-06-29 ENCOUNTER — APPOINTMENT (OUTPATIENT)
Dept: LAB | Facility: HOSPITAL | Age: 80
End: 2020-06-29
Payer: COMMERCIAL

## 2020-06-29 DIAGNOSIS — M10.9 GOUT, UNSPECIFIED CAUSE, UNSPECIFIED CHRONICITY, UNSPECIFIED SITE: ICD-10-CM

## 2020-06-29 DIAGNOSIS — I10 HYPERTENSION, UNSPECIFIED TYPE: ICD-10-CM

## 2020-06-29 DIAGNOSIS — R74.8 ELEVATED LIVER ENZYMES: ICD-10-CM

## 2020-06-29 DIAGNOSIS — C61 PROSTATE CANCER (HCC): ICD-10-CM

## 2020-06-29 DIAGNOSIS — E11.9 TYPE 2 DIABETES MELLITUS WITHOUT COMPLICATION, WITHOUT LONG-TERM CURRENT USE OF INSULIN (HCC): ICD-10-CM

## 2020-06-29 DIAGNOSIS — E78.5 HYPERLIPIDEMIA, UNSPECIFIED HYPERLIPIDEMIA TYPE: ICD-10-CM

## 2020-06-29 LAB
ALBUMIN SERPL BCP-MCNC: 3.8 G/DL (ref 3.5–5)
ALP SERPL-CCNC: 111 U/L (ref 46–116)
ALT SERPL W P-5'-P-CCNC: 25 U/L (ref 12–78)
ANION GAP SERPL CALCULATED.3IONS-SCNC: 10 MMOL/L (ref 4–13)
AST SERPL W P-5'-P-CCNC: 39 U/L (ref 5–45)
BASOPHILS # BLD AUTO: 0.04 THOUSANDS/ΜL (ref 0–0.1)
BASOPHILS NFR BLD AUTO: 0 % (ref 0–1)
BILIRUB DIRECT SERPL-MCNC: 0.26 MG/DL (ref 0–0.2)
BILIRUB SERPL-MCNC: 1.1 MG/DL (ref 0.2–1)
BUN SERPL-MCNC: 17 MG/DL (ref 5–25)
CALCIUM SERPL-MCNC: 9.1 MG/DL (ref 8.3–10.1)
CHLORIDE SERPL-SCNC: 110 MMOL/L (ref 100–108)
CHOLEST SERPL-MCNC: 120 MG/DL (ref 50–200)
CO2 SERPL-SCNC: 28 MMOL/L (ref 21–32)
CREAT SERPL-MCNC: 1.3 MG/DL (ref 0.6–1.3)
CREAT UR-MCNC: 231 MG/DL
EOSINOPHIL # BLD AUTO: 0.09 THOUSAND/ΜL (ref 0–0.61)
EOSINOPHIL NFR BLD AUTO: 1 % (ref 0–6)
ERYTHROCYTE [DISTWIDTH] IN BLOOD BY AUTOMATED COUNT: 17.6 % (ref 11.6–15.1)
EST. AVERAGE GLUCOSE BLD GHB EST-MCNC: 126 MG/DL
GFR SERPL CREATININE-BSD FRML MDRD: 61 ML/MIN/1.73SQ M
GLUCOSE P FAST SERPL-MCNC: 101 MG/DL (ref 65–99)
HBA1C MFR BLD: 6 %
HCT VFR BLD AUTO: 47.2 % (ref 36.5–49.3)
HDLC SERPL-MCNC: 49 MG/DL
HGB BLD-MCNC: 14.2 G/DL (ref 12–17)
IMM GRANULOCYTES # BLD AUTO: 0.04 THOUSAND/UL (ref 0–0.2)
IMM GRANULOCYTES NFR BLD AUTO: 0 % (ref 0–2)
LDLC SERPL CALC-MCNC: 53 MG/DL (ref 0–100)
LYMPHOCYTES # BLD AUTO: 1.68 THOUSANDS/ΜL (ref 0.6–4.47)
LYMPHOCYTES NFR BLD AUTO: 18 % (ref 14–44)
MCH RBC QN AUTO: 23.9 PG (ref 26.8–34.3)
MCHC RBC AUTO-ENTMCNC: 30.1 G/DL (ref 31.4–37.4)
MCV RBC AUTO: 80 FL (ref 82–98)
MICROALBUMIN UR-MCNC: 57.8 MG/L (ref 0–20)
MICROALBUMIN/CREAT 24H UR: 25 MG/G CREATININE (ref 0–30)
MONOCYTES # BLD AUTO: 0.74 THOUSAND/ΜL (ref 0.17–1.22)
MONOCYTES NFR BLD AUTO: 8 % (ref 4–12)
NEUTROPHILS # BLD AUTO: 7.03 THOUSANDS/ΜL (ref 1.85–7.62)
NEUTS SEG NFR BLD AUTO: 73 % (ref 43–75)
NONHDLC SERPL-MCNC: 71 MG/DL
NRBC BLD AUTO-RTO: 0 /100 WBCS
PLATELET # BLD AUTO: 225 THOUSANDS/UL (ref 149–390)
POTASSIUM SERPL-SCNC: 4.5 MMOL/L (ref 3.5–5.3)
PROT SERPL-MCNC: 7.6 G/DL (ref 6.4–8.2)
RBC # BLD AUTO: 5.94 MILLION/UL (ref 3.88–5.62)
SODIUM SERPL-SCNC: 148 MMOL/L (ref 136–145)
TRIGL SERPL-MCNC: 88 MG/DL
TSH SERPL DL<=0.05 MIU/L-ACNC: 1.51 UIU/ML (ref 0.36–3.74)
URATE SERPL-MCNC: 6.2 MG/DL (ref 4.2–8)
WBC # BLD AUTO: 9.62 THOUSAND/UL (ref 4.31–10.16)

## 2020-06-29 PROCEDURE — 84550 ASSAY OF BLOOD/URIC ACID: CPT

## 2020-06-29 PROCEDURE — 82043 UR ALBUMIN QUANTITATIVE: CPT

## 2020-06-29 PROCEDURE — 84443 ASSAY THYROID STIM HORMONE: CPT

## 2020-06-29 PROCEDURE — 36415 COLL VENOUS BLD VENIPUNCTURE: CPT

## 2020-06-29 PROCEDURE — 82248 BILIRUBIN DIRECT: CPT

## 2020-06-29 PROCEDURE — 80053 COMPREHEN METABOLIC PANEL: CPT

## 2020-06-29 PROCEDURE — 83036 HEMOGLOBIN GLYCOSYLATED A1C: CPT

## 2020-06-29 PROCEDURE — 85025 COMPLETE CBC W/AUTO DIFF WBC: CPT

## 2020-06-29 PROCEDURE — 82570 ASSAY OF URINE CREATININE: CPT

## 2020-06-29 PROCEDURE — 80061 LIPID PANEL: CPT

## 2020-06-30 ENCOUNTER — TELEPHONE (OUTPATIENT)
Dept: INTERNAL MEDICINE CLINIC | Facility: CLINIC | Age: 80
End: 2020-06-30

## 2020-07-02 ENCOUNTER — TELEPHONE (OUTPATIENT)
Dept: INTERNAL MEDICINE CLINIC | Facility: CLINIC | Age: 80
End: 2020-07-02

## 2020-07-02 NOTE — TELEPHONE ENCOUNTER
1  Do you presently have a fever or flu-like symptoms? No  2  Do you have symptoms of an upper respiratory infection like runny nose, sore throat, or cough? No  3  Are you suffering from new headache that you have not had in the past?  No  4  Do you have/have you experienced any new shortness of breath recently? Yes COPD  5  Do you have any new diarrhea, nausea or vomiting? No  6  Have you been in contact with anyone who has been sick or diagnosed with COVID-19? No    Patient wife is going with patient to appt    She answered all no

## 2020-07-06 ENCOUNTER — OFFICE VISIT (OUTPATIENT)
Dept: INTERNAL MEDICINE CLINIC | Facility: CLINIC | Age: 80
End: 2020-07-06
Payer: COMMERCIAL

## 2020-07-06 VITALS
TEMPERATURE: 98.1 F | SYSTOLIC BLOOD PRESSURE: 154 MMHG | RESPIRATION RATE: 16 BRPM | DIASTOLIC BLOOD PRESSURE: 84 MMHG | HEIGHT: 72 IN | WEIGHT: 252.4 LBS | BODY MASS INDEX: 34.19 KG/M2 | HEART RATE: 52 BPM

## 2020-07-06 DIAGNOSIS — I10 ESSENTIAL HYPERTENSION: ICD-10-CM

## 2020-07-06 DIAGNOSIS — J43.2 CENTRILOBULAR EMPHYSEMA (HCC): ICD-10-CM

## 2020-07-06 DIAGNOSIS — Z99.81 CHRONIC RESPIRATORY FAILURE WITH HYPOXIA, ON HOME O2 THERAPY (HCC): ICD-10-CM

## 2020-07-06 DIAGNOSIS — N18.30 TYPE 2 DIABETES MELLITUS WITH STAGE 3 CHRONIC KIDNEY DISEASE, WITHOUT LONG-TERM CURRENT USE OF INSULIN (HCC): Primary | ICD-10-CM

## 2020-07-06 DIAGNOSIS — E86.0 DEHYDRATION: ICD-10-CM

## 2020-07-06 DIAGNOSIS — Z85.46 HISTORY OF PROSTATE CANCER: ICD-10-CM

## 2020-07-06 DIAGNOSIS — N18.30 STAGE 3 CHRONIC KIDNEY DISEASE (HCC): ICD-10-CM

## 2020-07-06 DIAGNOSIS — E11.22 TYPE 2 DIABETES MELLITUS WITH STAGE 3 CHRONIC KIDNEY DISEASE, WITHOUT LONG-TERM CURRENT USE OF INSULIN (HCC): Primary | ICD-10-CM

## 2020-07-06 DIAGNOSIS — J96.11 CHRONIC RESPIRATORY FAILURE WITH HYPOXIA, ON HOME O2 THERAPY (HCC): ICD-10-CM

## 2020-07-06 DIAGNOSIS — C61 PROSTATE CANCER (HCC): ICD-10-CM

## 2020-07-06 PROBLEM — N17.9 ACUTE RENAL FAILURE (ARF) (HCC): Status: RESOLVED | Noted: 2018-01-02 | Resolved: 2020-07-06

## 2020-07-06 PROBLEM — Z86.79 HISTORY OF HYPERTENSION: Status: RESOLVED | Noted: 2017-10-17 | Resolved: 2020-07-06

## 2020-07-06 PROCEDURE — 1160F RVW MEDS BY RX/DR IN RCRD: CPT | Performed by: INTERNAL MEDICINE

## 2020-07-06 PROCEDURE — 3008F BODY MASS INDEX DOCD: CPT | Performed by: INTERNAL MEDICINE

## 2020-07-06 PROCEDURE — 3079F DIAST BP 80-89 MM HG: CPT | Performed by: INTERNAL MEDICINE

## 2020-07-06 PROCEDURE — 1036F TOBACCO NON-USER: CPT | Performed by: INTERNAL MEDICINE

## 2020-07-06 PROCEDURE — 4040F PNEUMOC VAC/ADMIN/RCVD: CPT | Performed by: INTERNAL MEDICINE

## 2020-07-06 PROCEDURE — 3044F HG A1C LEVEL LT 7.0%: CPT | Performed by: INTERNAL MEDICINE

## 2020-07-06 PROCEDURE — 3060F POS MICROALBUMINURIA REV: CPT | Performed by: INTERNAL MEDICINE

## 2020-07-06 PROCEDURE — 99214 OFFICE O/P EST MOD 30 MIN: CPT | Performed by: INTERNAL MEDICINE

## 2020-07-06 PROCEDURE — G0439 PPPS, SUBSEQ VISIT: HCPCS | Performed by: INTERNAL MEDICINE

## 2020-07-06 PROCEDURE — 3066F NEPHROPATHY DOC TX: CPT | Performed by: INTERNAL MEDICINE

## 2020-07-06 PROCEDURE — 1125F AMNT PAIN NOTED PAIN PRSNT: CPT | Performed by: INTERNAL MEDICINE

## 2020-07-06 PROCEDURE — 1170F FXNL STATUS ASSESSED: CPT | Performed by: INTERNAL MEDICINE

## 2020-07-06 PROCEDURE — 4010F ACE/ARB THERAPY RXD/TAKEN: CPT | Performed by: INTERNAL MEDICINE

## 2020-07-06 PROCEDURE — 3077F SYST BP >= 140 MM HG: CPT | Performed by: INTERNAL MEDICINE

## 2020-07-06 RX ORDER — AMLODIPINE BESYLATE 5 MG/1
10 TABLET ORAL EVERY MORNING
Qty: 90 TABLET | Refills: 2 | Status: SHIPPED | OUTPATIENT
Start: 2020-07-06 | End: 2020-10-25 | Stop reason: SDUPTHER

## 2020-07-06 NOTE — PROGRESS NOTES
Assessment/Plan:    Diagnoses and all orders for this visit:    Type 2 diabetes mellitus with stage 3 chronic kidney disease, without long-term current use of insulin (HCC)  -     Hemoglobin A1C; Future    Essential hypertension  -     amLODIPine (NORVASC) 5 mg tablet; Take 2 tablets (10 mg total) by mouth every morning  -     Comprehensive metabolic panel; Future  -     CBC and differential; Future  -     Lipid panel; Future  -     TSH, 3rd generation with Free T4 reflex; Future    Centrilobular emphysema (HCC)    Chronic respiratory failure with hypoxia, on home O2 therapy (HCC)    Stage 3 chronic kidney disease (HCC)  -     Uric acid; Future    History of prostate cancer    Dehydration    Prostate cancer ([facility] [address] )    Other orders  -     Polyvinyl Alcohol-Povidone (REFRESH OP); Apply to eye         Patient Instructions   Lab data reviewed in detail and compared prior    Type 2 diabetes mellitus-A1c stable 6 0  Continue with dietary modification and weight loss as able    Hypertension-suboptimally controlled today and over the last 3 visits  Increase amlodipine to 10 mg  Continue 100 mg losartan and 25 mg Toprol  Monitor heart rate and blood pressure prior to nephrology follow-up next month  Gout stable without flare on allopurinol    History of prostate cancer status post prostatectomy stable with undetectable PSA following with Urology    COPD stable following with Pulmonary    Chronic kidney disease stage 3-now with hypernatremia/dehydration-the importance of increased water intake was stressed  Patient has nephrology follow-up after labs next month  Health maintenance up-to-date    Routine follow-up after labs in 6 months, sooner as needed      Subjective:      Patient ID: Wendy Perez is a 70 y o  male    F/u mmp, review labs, AWV  Lisha adds some hx  Lumbar radiculopathy-did well w/ trial for SCC, but procedure at [facility] was postponed    COPD-stable, recently changed to trelogy, O2 hs and prn  DM-not really following diet, no regular exercise  HTN-taking rx, but no home bp's  CKD-f/b neph, not really drinking much water    Gout-taking allopurinol, no recent flares  H/o prostate ca s/p prostatectomy '05, psa undetectable in May, f/b urology  H/o kidney stones, neg KUB in May        Current Outpatient Medications:     acetaminophen-codeine (TYLENOL with CODEINE #4) 300-60 MG per tablet, Take 1 tablet by mouth every 6 (six) hours as needed for moderate pain, Disp: , Rfl:     albuterol (2 5 mg/3 mL) 0 083 % nebulizer solution, Inhale 1 each every 6 (six) hours as needed, Disp: , Rfl:     albuterol (VENTOLIN HFA) 90 mcg/act inhaler, Inhale 2 puffs every 6 (six) hours as needed for wheezing or shortness of breath, Disp: 18 g, Rfl: 3    allopurinol (ZYLOPRIM) 100 mg tablet, Take 1 tablet (100 mg total) by mouth daily, Disp: 90 tablet, Rfl: 2    amLODIPine (NORVASC) 5 mg tablet, Take 2 tablets (10 mg total) by mouth every morning, Disp: 90 tablet, Rfl: 2    cholecalciferol (VITAMIN D3) 1,000 units tablet, Take 5,000 Units by mouth daily, Disp: , Rfl:     fluticasone-umeclidinium-vilanterol (TRELEGY) 100-62 5-25 MCG/INH inhaler, Inhale 1 puff daily Rinse mouth after use , Disp: 3 Inhaler, Rfl: 3    gabapentin (NEURONTIN) 300 mg capsule, Every 12 hours, Disp: , Rfl:     losartan (COZAAR) 100 MG tablet, Take 1 tablet (100 mg total) by mouth daily, Disp: 90 tablet, Rfl: 2    metoprolol succinate (TOPROL-XL) 25 mg 24 hr tablet, Take 1 tablet (25 mg total) by mouth daily, Disp: 90 tablet, Rfl: 2    Polyvinyl Alcohol-Povidone (REFRESH OP), Apply to eye, Disp: , Rfl:     Recent Results (from the past 1008 hour(s))   Comprehensive metabolic panel    Collection Time: 06/29/20  9:29 AM   Result Value Ref Range    Sodium 148 (H) 136 - 145 mmol/L    Potassium 4 5 3 5 - 5 3 mmol/L    Chloride 110 (H) 100 - 108 mmol/L    CO2 28 21 - 32 mmol/L    ANION GAP 10 4 - 13 mmol/L    BUN 17 5 - 25 mg/dL    Creatinine 1 30 0 60 - 1 30 mg/dL    Glucose, Fasting 101 (H) 65 - 99 mg/dL    Calcium 9 1 8 3 - 10 1 mg/dL    AST 39 5 - 45 U/L    ALT 25 12 - 78 U/L    Alkaline Phosphatase 111 46 - 116 U/L    Total Protein 7 6 6 4 - 8 2 g/dL    Albumin 3 8 3 5 - 5 0 g/dL    Total Bilirubin 1 10 (H) 0 20 - 1 00 mg/dL    eGFR 61 ml/min/1 73sq m   Lipid panel    Collection Time: 06/29/20  9:29 AM   Result Value Ref Range    Cholesterol 120 50 - 200 mg/dL    Triglycerides 88 <=150 mg/dL    HDL, Direct 49 >=40 mg/dL    LDL Calculated 53 0 - 100 mg/dL    Non-HDL-Chol (CHOL-HDL) 71 mg/dl   Hemoglobin A1C    Collection Time: 06/29/20  9:29 AM   Result Value Ref Range    Hemoglobin A1C 6 0 (H) Normal 3 8-5 6%; PreDiabetic 5 7-6 4%;  Diabetic >=6 5%; Glycemic control for adults with diabetes <7 0% %     mg/dl   TSH, 3rd generation with Free T4 reflex    Collection Time: 06/29/20  9:29 AM   Result Value Ref Range    TSH 3RD GENERATON 1 514 0 358 - 3 740 uIU/mL   Uric acid    Collection Time: 06/29/20  9:29 AM   Result Value Ref Range    Uric Acid 6 2 4 2 - 8 0 mg/dL   CBC and differential    Collection Time: 06/29/20  9:29 AM   Result Value Ref Range    WBC 9 62 4 31 - 10 16 Thousand/uL    RBC 5 94 (H) 3 88 - 5 62 Million/uL    Hemoglobin 14 2 12 0 - 17 0 g/dL    Hematocrit 47 2 36 5 - 49 3 %    MCV 80 (L) 82 - 98 fL    MCH 23 9 (L) 26 8 - 34 3 pg    MCHC 30 1 (L) 31 4 - 37 4 g/dL    RDW 17 6 (H) 11 6 - 15 1 %    Platelets 136 111 - 351 Thousands/uL    nRBC 0 /100 WBCs    Neutrophils Relative 73 43 - 75 %    Immat GRANS % 0 0 - 2 %    Lymphocytes Relative 18 14 - 44 %    Monocytes Relative 8 4 - 12 %    Eosinophils Relative 1 0 - 6 %    Basophils Relative 0 0 - 1 %    Neutrophils Absolute 7 03 1 85 - 7 62 Thousands/µL    Immature Grans Absolute 0 04 0 00 - 0 20 Thousand/uL    Lymphocytes Absolute 1 68 0 60 - 4 47 Thousands/µL    Monocytes Absolute 0 74 0 17 - 1 22 Thousand/µL    Eosinophils Absolute 0 09 0 00 - 0 61 Thousand/µL    Basophils Absolute 0 04 0 00 - 0 10 Thousands/µL   Microalbumin / creatinine urine ratio    Collection Time: 06/29/20  9:29 AM   Result Value Ref Range    Creatinine, Ur 231 0 mg/dL    Microalbum  ,U,Random 57 8 (H) 0 0 - 20 0 mg/L    Microalb Creat Ratio 25 0 - 30 mg/g creatinine   Bilirubin, direct    Collection Time: 06/29/20  9:29 AM   Result Value Ref Range    Bilirubin, Direct 0 26 (H) 0 00 - 0 20 mg/dL       The following portions of the patient's history were reviewed and updated as appropriate: allergies, current medications, past family history, past medical history, past social history, past surgical history and problem list      Review of Systems   Constitutional: Negative for appetite change, chills, diaphoresis, fatigue, fever and unexpected weight change  HENT: Negative for congestion, hearing loss and rhinorrhea  Eyes: Negative for visual disturbance  Respiratory: Positive for shortness of breath  Negative for cough, chest tightness and wheezing  Cardiovascular: Negative for chest pain, palpitations and leg swelling  Gastrointestinal: Negative for abdominal pain and blood in stool  Endocrine: Negative for cold intolerance, heat intolerance, polydipsia and polyuria  Genitourinary: Negative for difficulty urinating, dysuria, frequency and urgency  Musculoskeletal: Negative for arthralgias and myalgias  Skin: Negative for rash  Neurological: Negative for dizziness, weakness, light-headedness and headaches  Hematological: Does not bruise/bleed easily  Psychiatric/Behavioral: Negative for dysphoric mood and sleep disturbance  Objective:      Vitals:    07/06/20 0932   BP: 154/84   Pulse: (!) 52   Resp: 16   Temp: 98 1 °F (36 7 °C)          Physical Exam   Constitutional: He is oriented to person, place, and time  He appears well-developed and well-nourished  HENT:   Head: Normocephalic and atraumatic     Nose: Nose normal    Mouth/Throat: Oropharynx is clear and moist    Eyes: Pupils are equal, round, and reactive to light  Conjunctivae and EOM are normal  No scleral icterus  Neck: Normal range of motion  Neck supple  No JVD present  No tracheal deviation present  No thyromegaly present  Cardiovascular: Normal rate, regular rhythm and intact distal pulses  Exam reveals no gallop and no friction rub  No murmur heard  Pulmonary/Chest: Effort normal and breath sounds normal  No respiratory distress  He has no wheezes  He has no rales  Musculoskeletal: He exhibits no edema or deformity  Lymphadenopathy:     He has no cervical adenopathy  Neurological: He is alert and oriented to person, place, and time  No cranial nerve deficit  Skin: Skin is warm and dry  No rash noted  No erythema  No pallor  Psychiatric: He has a normal mood and affect   His behavior is normal  Judgment and thought content normal

## 2020-07-06 NOTE — PATIENT INSTRUCTIONS
Lab data reviewed in detail and compared prior    Type 2 diabetes mellitus-A1c stable 6 0  Continue with dietary modification and weight loss as able    Hypertension-suboptimally controlled today and over the last 3 visits  Increase amlodipine to 10 mg  Continue 100 mg losartan and 25 mg Toprol  Monitor heart rate and blood pressure prior to nephrology follow-up next month  Gout stable without flare on allopurinol    History of prostate cancer status post prostatectomy stable with undetectable PSA following with Urology    COPD stable following with Pulmonary    Chronic kidney disease stage 3-now with hypernatremia/dehydration-the importance of increased water intake was stressed  Patient has nephrology follow-up after labs next month      Health maintenance up-to-date    Routine follow-up after labs in 6 months, sooner as needed

## 2020-07-06 NOTE — LETTER
July 6, 2020     Patient: Saumya Mg   YOB: 1940   Date of Visit: 7/6/2020       To Whom it May Concern:    Saumya Mg is under my professional care  He was seen in my office on 7/6/2020  He has underlying lung disease and advanced age making him high risk for serious or critical illness if infected with COVID-19  I am asking that you allow him to suspend his gym membership until the [place] crisis has resolved  If you have any questions or concerns, please don't hesitate to call           Sincerely,          Jose Ramon Garcia MD        CC: No Recipients

## 2020-07-06 NOTE — PROGRESS NOTES
Assessment and Plan:     Problem List Items Addressed This Visit     None        BMI Counseling: Body mass index is 34 23 kg/m²  The BMI is above normal  Nutrition recommendations include decreasing portion sizes, consuming healthier snacks and limiting drinks that contain sugar  Exercise recommendations include moderate physical activity 150 minutes/week  Preventive health issues were discussed with patient, and age appropriate screening tests were ordered as noted in patient's After Visit Summary  Personalized health advice and appropriate referrals for health education or preventive services given if needed, as noted in patient's After Visit Summary       History of Present Illness:     Patient presents for Medicare Annual Wellness visit    Patient Care Team:  Silvana Martinez MD as PCP - General (Internal Medicine)  Maty Gan MD as Consulting Physician (Nephrology)  Luca Reddy MD as Consulting Physician (Urology)  Kana Jamil as Nurse Practitioner (Internal Medicine)  Marco Antonio Birmingham MD as Endoscopist     Problem List:     Patient Active Problem List   Diagnosis    Centrilobular emphysema ([facility] [address] )    History of hypertension    Acute exacerbation of chronic obstructive pulmonary disease (COPD) ([facility] [address] )    Acute bronchitis    [facility]    Hypertension    Post-tussive syncope    Stage 3 chronic kidney disease ([facility]    Type 2 diabetes mellitus with chronic kidney disease, without long-term current use of insulin ([facility] )    Chronic respiratory failure with hypoxia, on home O2 therapy ([facility] )    Hypertensive kidney disease with stage 3 chronic kidney disease ([facility] )    History of colon cancer    Screening for colon cancer    Change in bowel habits    Vitamin D deficiency      Past Medical and Surgical History:     Past Medical History:   Diagnosis Date    Back pain     COPD (chronic obstructive pulmonary disease) ([facility] [address] )     History of malignant neoplasm of oral cavity     M0    HL (hearing loss)     Hypertension     Kidney stone 2019    Malignant neoplasm of colon (HCC)     descending    Prostate cancer (HCC)     Tachycardia     Thalassemia trait     Tinnitus      Past Surgical History:   Procedure Laterality Date    BACK SURGERY      CHOLECYSTECTOMY      COLON SURGERY  10/2008    partial colectomy    INCISIONAL HERNIA REPAIR      JOINT REPLACEMENT Right     hip    [place] COLONOSCOPY FLX DX W/COLLJ SPEC WHEN PFRMD N/A 2018    Procedure: COLONOSCOPY;  Surgeon: Demar Zuniga MD;  Location: [facility];   Service: Gastroenterology    PROSTATE SURGERY      TONSILLECTOMY        Family History:     Family History   Problem Relation Age of Onset    Heart failure Mother         as per [employer]    Coronary artery disease Mother         as per [employer]    Diabetes Mother         mellitus - as per [employer]    Coronary artery disease Father         as per [employer]    Cancer Sister         malignant neoplasm      Social History:     E-Cigarette/Vaping    E-Cigarette Use Never User      E-Cigarette/Vaping Substances    Nicotine No     THC No     CBD No     Flavoring No     Other No     Unknown No      Social History     Socioeconomic History    Marital status: /Civil Union     Spouse name: None    Number of children: None    Years of education: None    Highest education level: None   Occupational History    Occupation: Cook   Social Needs    Financial resource strain: None    Food insecurity:     Worry: None     Inability: None    Transportation needs:     Medical: None     Non-medical: None   Tobacco Use    Smoking status: Former Smoker     Packs/day: 1 00     Years: 25 00     Pack years: 25 00     Types: Cigarettes     Last attempt to quit: 2004     Years since quittin 1    Smokeless tobacco: Never Used    Tobacco comment: non smoker/tobacco user; quit  as per [employer]   Substance and Sexual Activity  Alcohol use: Not Currently    Drug use: No    Sexual activity: Not Currently     Partners: Female   Lifestyle    Physical activity:     Days per week: 0 days     Minutes per session: 0 min    Stress: Not at all   Relationships    Social connections:     Talks on phone: None     Gets together: None     Attends [employer] service: None     Active member of club or organization: None     Attends meetings of clubs or organizations: None     Relationship status: None    Intimate partner violence:     Fear of current or ex partner: None     Emotionally abused: None     Physically abused: None     Forced sexual activity: None   Other Topics Concern    None   Social History Narrative    Active directive    Living independently with spouse    Hx of Living will on file      Medications and Allergies:     Current Outpatient Medications   Medication Sig Dispense Refill    acetaminophen-codeine (TYLENOL with CODEINE #4) 300-60 MG per tablet Take 1 tablet by mouth every 6 (six) hours as needed for moderate pain      albuterol (2 5 mg/3 mL) 0 083 % nebulizer solution Inhale 1 each every 6 (six) hours as needed      allopurinol (ZYLOPRIM) 100 mg tablet Take 1 tablet (100 mg total) by mouth daily 90 tablet 2    amLODIPine (NORVASC) 5 mg tablet Take 1 tablet (5 mg total) by mouth every morning 90 tablet 2    cholecalciferol (VITAMIN D3) 1,000 units tablet Take 5,000 Units by mouth daily      fluticasone-umeclidinium-vilanterol (TRELEGY) 100-62 5-25 MCG/INH inhaler Inhale 1 puff daily Rinse mouth after use   3 Inhaler 3    gabapentin (NEURONTIN) 300 mg capsule Every 12 hours      losartan (COZAAR) 100 MG tablet Take 1 tablet (100 mg total) by mouth daily 90 tablet 2    metoprolol succinate (TOPROL-XL) 25 mg 24 hr tablet Take 1 tablet (25 mg total) by mouth daily 90 tablet 2    Polyvinyl Alcohol-Povidone (REFRESH OP) Apply to eye      albuterol (VENTOLIN HFA) 90 mcg/act inhaler Inhale 2 puffs every 6 (six) hours as needed for wheezing or shortness of breath (Patient not taking: Reported on 7/6/2020) 18 g 3    ondansetron (ZOFRAN-ODT) 4 mg disintegrating tablet Take 1 tablet (4 mg total) by mouth every 8 (eight) hours as needed for nausea or vomiting (Patient not taking: Reported on 2/3/2020) 16 tablet 0     No current facility-administered medications for this visit  No Known Allergies   Immunizations:     Immunization History   Administered Date(s) Administered    INFLUENZA 10/26/2012, 10/22/2013, 12/01/2015, 10/07/2016, 09/18/2018, 11/19/2019    Influenza Split High Dose Preservative Free IM 10/07/2014, 10/01/2017    Influenza TIV (IM) 10/26/2012, 10/22/2013, 12/01/2015, 09/03/2016    Pneumococcal Conjugate 13-Valent 08/25/2015, 08/25/2015    Pneumococcal Polysaccharide PPV23 01/03/2017    Tdap 07/01/2011    Zoster 1940    Zoster Vaccine Recombinant 12/18/2018, 06/04/2019      Health Maintenance:         Topic Date Due    CRC Screening: Colonoscopy  07/23/2028         Topic Date Due    Influenza Vaccine  07/01/2020      Medicare Health Risk Assessment:     /84 (BP Location: Left arm, Patient Position: Sitting)   Pulse (!) 52   Temp 98 1 °F (36 7 °C) (Tympanic)   Resp 16   Ht 6' (1 829 m)   Wt 114 kg (252 lb 6 4 oz)   BMI 34 23 kg/m²      Luiz Heart is here for his Subsequent Wellness visit  Last Medicare Wellness visit information reviewed, patient interviewed and updates made to the record today  Health Risk Assessment:   Patient rates overall health as good  Patient feels that their physical health rating is same  Eyesight was rated as slightly worse  Hearing was rated as slightly worse  Patient feels that their emotional and mental health rating is same  Pain experienced in the last 7 days has been a lot  Patient's pain rating has been 8/10  Patient states that he has experienced no weight loss or gain in last 6 months       Depression Screening:   PHQ-2 Score: 0      Fall Risk Screening: In the past year, patient has experienced: no history of falling in past year      Home Safety:  Patient does not have trouble with stairs inside or outside of their home  Patient has working smoke alarms and has working carbon monoxide detector  Home safety hazards include: none  Nutrition:   Current diet is Regular and Frequent junk food  Medications:   Patient is currently taking over-the-counter supplements  OTC medications include: see medication list  Patient is able to manage medications  Activities of Daily Living (ADLs)/Instrumental Activities of Daily Living (IADLs):   Walk and transfer into and out of bed and chair?: Yes  Dress and groom yourself?: Yes    Bathe or shower yourself?: Yes    Feed yourself? Yes  Do your laundry/housekeeping?: Yes  Manage your money, pay your bills and track your expenses?: No  Make your own meals?: Yes    Do your own shopping?: No    Previous Hospitalizations:   Any hospitalizations or ED visits within the last 12 months?: No      Advance Care Planning:   Living will: Yes    Durable POA for healthcare:  Yes    Advanced directive: Yes      CommentsModesta Spectkena  [address] [place] [address]  [phone]    Cognitive Screening:   Provider or family/friend/caregiver concerned regarding cognition?: No    PREVENTIVE SCREENINGS      Cardiovascular Screening:    General: Screening Not Indicated and History Lipid Disorder      Diabetes Screening:     General: Screening Not Indicated and History Diabetes      Colorectal Cancer Screening:     General: Screening Current and History Colorectal Cancer      Prostate Cancer Screening:    General: History Prostate Cancer and Screening Not Indicated      Osteoporosis Screening:    General: Screening Not Indicated      Abdominal Aortic Aneurysm (AAA) Screening:    Risk factors include: tobacco use        Lung Cancer Screening:     General: Screening Not Indicated      Hepatitis C Screening:    General: Screening Not Chito Soares MD

## 2020-07-07 ENCOUNTER — TELEPHONE (OUTPATIENT)
Dept: ADMINISTRATIVE | Facility: OTHER | Age: 80
End: 2020-07-07

## 2020-07-07 NOTE — TELEPHONE ENCOUNTER
----- Message from Jeremy Bamberger sent at 7/6/2020  9:25 AM EDT -----  Regarding: Eye Exam [facility]  Contact: [phone]  07/06/20 9:25 AM    Hello, our patient Abhay Lo has had Diabetic Eye Exam completed/performed  Please assist in updating the patient chart by pulling the document from the Media Tab  The date of service is 7/1/2020       Thank you,  Jeremy Bamberger [address]

## 2020-07-07 NOTE — LETTER
Diabetic Eye Exam Form    Date Requested: 20  Patient: Jeny Carnes  Patient : 1940   Referring Provider: Christel Peralta MD    Dilated Retinal Exam, Optomap-Iris Exam, or Fundus Photography Done         Yes ([place] one above)         No     Date of Diabetic Eye Exam ______________________________  Left Eye      Exam did show retinopathy    Exam did not show retinopathy         Mild       Moderate       None       Proliferative       Severe     Right Eye     Exam did show retinopathy    Exam did not show retinopathy         Mild       Moderate       None       Proliferative       Severe     Comments __________________________________________________________    Practice Providing Exam ______________________________________________    Exam Performed By (print name) _______________________________________      Provider Signature ___________________________________________________      These reports are needed for  compliance    Please fax this completed form and a copy of the Diabetic Eye Exam report to our office located at [place] [address] as soon as possible to [phone] attention Mary: Phone [phone]    We thank you for your assistance in treating our mutual patient

## 2020-07-07 NOTE — LETTER
Diabetic Eye Exam Form    Date Requested: 20  Patient: Magaly Arciniega  Patient : 1940   Referring Provider: Shyam Lemon MD    Dilated Retinal Exam, Optomap-Iris Exam, or Fundus Photography Done         Yes ([place] one above)         No     Date of Diabetic Eye Exam ______________________________  Left Eye      Exam did show retinopathy    Exam did not show retinopathy         Mild       Moderate       None       Proliferative       Severe     Right Eye     Exam did show retinopathy    Exam did not show retinopathy         Mild       Moderate       None       Proliferative       Severe     Comments __________________________________________________________    Practice Providing Exam ______________________________________________    Exam Performed By (print name) _______________________________________      Provider Signature ___________________________________________________      These reports are needed for  compliance    Please fax this completed form and a copy of the Diabetic Eye Exam report to our office located at [place] [address] as soon as possible to [phone] attention Mary: Phone [phone]    We thank you for your assistance in treating our mutual patient

## 2020-07-14 NOTE — TELEPHONE ENCOUNTER
As a follow-up, a second attempt has been made for outreach via fax, please see Contacts section for details  A third and final attempt will be made within 5 business days      Thank you  Tio Donis

## 2020-07-21 NOTE — TELEPHONE ENCOUNTER
As a final attempt, a third outreach has been made via telephone call  Please see Contacts section for details  This encounter will be closed and completed by end of day  Should we receive the requested information because of previous outreach attempts, the requested patient's chart will be updated appropriately       Thank you  Raúl Ohms

## 2020-07-23 ENCOUNTER — CLINICAL SUPPORT (OUTPATIENT)
Dept: PULMONOLOGY | Age: 80
End: 2020-07-23

## 2020-07-23 DIAGNOSIS — J44.9 COPD, SEVERE (HCC): ICD-10-CM

## 2020-07-23 NOTE — PROGRESS NOTES
PULMONARY REHAB ASSESSMENT    Today's date: 2020   Patient name: Shereen Langley      : 1940       MRN: [id number]  PCP: Kris Peña MD  Pulmonologist: Janeth Yee MD  Dx:   Encounter Diagnosis   Name Primary?  COPD, severe ([facility] )      Date of onset: diagnosed   Cultural needs: n/a    Height:   73"  Weight:       Medical History:   Past Medical History:   Diagnosis Date    Back pain     COPD (chronic obstructive pulmonary disease) (HCC)     History of malignant neoplasm of oral cavity     M0    HL (hearing loss)     Hypertension     Kidney stone 2019    Malignant neoplasm of colon (HCC)     descending    Prostate cancer ([facility] )     Tachycardia     Thalassemia trait     Tinnitus        Physical Limitations: sciatica/back pain, due for spinal cord stimulator    Risk Factors   Smoking: quit 15 years ago; 35 years x 1ppd  HTN: controlled, 139/67 this AM  DM: n/a  Obesity: yes   Inactivity: was going to the gym prior to [facility], using bike and weights  Family History:   Family History   Problem Relation Age of Onset    Heart failure Mother         as per [employer]    Coronary artery disease Mother         as per [employer]    Diabetes Mother         mellitus - as per [employer]scripts    Coronary artery disease Father         as per [employer]    Cancer Sister         malignant neoplasm     Allergies: No Known Allergies  Other: has stationary bike at home but not currently using on own    Current Medications:   Current Outpatient Medications   Medication Sig Dispense Refill    acetaminophen-codeine (TYLENOL with CODEINE #4) 300-60 MG per tablet Take 1 tablet by mouth every 6 (six) hours as needed for moderate pain      albuterol (2 5 mg/3 mL) 0 083 % nebulizer solution Inhale 1 each every 6 (six) hours as needed      albuterol (VENTOLIN HFA) 90 mcg/act inhaler Inhale 2 puffs every 6 (six) hours as needed for wheezing or shortness of breath 18 g 3    allopurinol (ZYLOPRIM) 100 mg tablet Take 1 tablet (100 mg total) by mouth daily 90 tablet 2    amLODIPine (NORVASC) 5 mg tablet Take 2 tablets (10 mg total) by mouth every morning 90 tablet 2    cholecalciferol (VITAMIN D3) 1,000 units tablet Take 5,000 Units by mouth daily      fluticasone-umeclidinium-vilanterol (TRELEGY) 100-62 5-25 MCG/INH inhaler Inhale 1 puff daily Rinse mouth after use  3 Inhaler 3    gabapentin (NEURONTIN) 300 mg capsule Every 12 hours      losartan (COZAAR) 100 MG tablet Take 1 tablet (100 mg total) by mouth daily 90 tablet 2    metoprolol succinate (TOPROL-XL) 25 mg 24 hr tablet Take 1 tablet (25 mg total) by mouth daily 90 tablet 2    Polyvinyl Alcohol-Povidone (REFRESH OP) Apply to eye       No current facility-administered medications for this visit          Functional Status Prior to Diagnosis for Treatment   Occupation: retired [employer]  Recreation: DIY around the [place], cooking  ADLs: still independent with self-care and able to help around house  [place]: lives with wife  Exercise: Retro fitness twice per week before quarantine/closure    Short Term Program Goals: Decrease shortness of breath, improve stamina, increase strength   Long Term Goals: Be able to participate in [address] activities around house, care for the house/yard/projects, and enjoy family, friends without breathing difficulties    Comments:difficulty walking to [facility], taking out trash    Ability to reach goals/rehabilitation potential: medium    Projected return to function: Partial    Emotional/Social    Marital status:     Life Stressors: frustrated with COVID, can't go places like shopping and visiting friends, gets frustrated with wife because of this

## 2020-07-23 NOTE — PROGRESS NOTES
Pulmonary Rehabilitation Plan of Care   Care Plan           Today's date: 2020   Total visits to date: initial  Patient name: Dayton Longo      : 1940  Age: 68 y o  MRN: [id number]  Referring Physician: Mariel Hansen MD  Provider: Sam Donaldson  Clinician: Diego Santos RRT    Dx:   Encounter Diagnosis   Name Primary?  COPD, severe ([facility] )      Date of onset: diagnosed with COPD 2017 after had pneumonia    COMMENTS:  Kayla Pimentel presented today to pulmonary rehab for evaluation and six minute walk test  Kayla Pimentel was diagnosed with COPD in 2017 after being hospitalized for pneumonia  Since then his breathing has become progressively worse  He is currently on oxygen at 3L, which he is supposed to use around the clock, but he currently only uses as needed and for sleep  He is a retired cook, lives with his wife  He is in good spirits aside from the frustration of the quarantine  He misses visiting friends and neighbors and going shopping, especially to the grocery store because he likes to cook and get his own ingredients  He is also into doing DIY/carpentry projects around the house but these have become more challenging over the past few years  He is still independent with his self care and can help around the house  He has several projects he would like to do around the house but due to breathing he is not sure he will be able to complete  Prior to quarantine he would go to the gym at [employer] with his wofe twice per week where he would do the recumbent bike and some strength training  His wife would like to eventually get him back to this when it is safe again  He also has a recumbent bike at home but his wife states that she can not convince him to use it at home  He has many options for exercise once rehab is complete  He performed a six minute walk but had to take lengthy breaks  He walked for 1 min 20 sec, and then stopped and sat for over 2 minutes   He walked for another 1 minute but did not resume  He walked 360 feet total for an estimated 1 5 METS  He was on 3L portable concentrator and he desaturated to 89%      Medication compliance: Yes   Comments: always compliant  Fall Risk: Low   Comments: no hx falling    EXERCISE/ACTIVITY    Cardiopulmonary Goals:   Min: 40-50 min aerobic exercise per session   HR: 20-30 bpm greater than resting HR   RPE: 4-6  (moderate to moderately hard exercise)   O2 sat: >90%    Modalities: Treadmill, UBE, NuStep and Recumbent bike  Strength trainin-3 days / week, 12-15 repitations  and 1-2 sets per modality    Modalities: Leg press, Chest press, Lateral pull down , Lateral raise, Overhead press, Arm curl and Seated Row    Exercise Progression: goal continued exercise progression throughout [place] program      RPE 3-5  Home activity: recumbent bike  Goals: 10% improvement in functional capacity, home exercise days opposite [place] and >150 mins of exercise/wk  Education: Benefit of exercise, home exercise, pursed lip breathing, RPE scale and O2 saturation monitoring   Plan:home exercise target 30 mins, 2 days opposite [place]  Readiness to change: Preparation:  (Getting ready to change)     NUTRITION    Weight control:    Starting weight: 254        Current Weight:     Diabetes: denies diabetes but our records indicate he is diabetic, he does not monitor  Goals:decreased body fat% and Improved Rate Your Plate score  Education:More frequent meals, smaller portions  hydration  diabetes management and exercise  weight loss  healthy choices while dining out  portion control    Label Reading  Plan: Education Video- Diet and Nutrition and Education Class: Healthy Eating  Readiness to change: Contemplation:  (Acknowledging that there is a problem but not yet ready or sure of wanting to make a change)    PSYCHOSOCIAL    Emotional:  0 =No Depression  Social support: Very Good  Goals: Reduce perceived stress to 1-3/10, [facility] Score < 3, Overall Health in [facility] Score < 3, Increased interest in doing things, improved sleep, Improved appetite, improved concentration, improved positive thoughts of well being and increased energy  Education: signs/sxs of depression  benefits of positive support system  coping mechanisms  depression and chronic disease  Plan: Education Class: Stress and Your Lungs, Relaxation and Mindfulness and Anxiety and Lung Disease  Readiness to change: Action:  (Changing behavior)    OTHER CORE COMPONENTS     Tobacco:   Social History     Tobacco Use   Smoking Status Former Smoker    Packs/day:  00    Years: 25     Pack years: 25     Types: Cigarettes    Last attempt to quit: 2004    Years since quittin 1   Smokeless Tobacco Never Used   Tobacco Comment    non smoker/tobacco user; quit  as per [employer]     Oxygen: 3L at rest and with exertion, supposed to wear O2 always but he uses as he feels he needs it; 94% 3L at rest, desat to 89% with 6MWT 3L  Blood pressure:    Restin/88   Exercise:  162/84  Goals: reduced dietary sodium <2300mg, consistent exercise >150 mins/wk and medication compliance  Education: Pulmonary Disease, physiology, Exercise, strength, flexibility, Conservation of energy, oxygen, breathing techniques, Mental Health, Diet,nutrition, Medication and Avoiding Infections  Plan: Causes of lung disease, Prevention and treatment, Exercise benefits and Proper nutrition  Readiness to change: Preparation:  (Getting ready to change)

## 2020-07-24 ENCOUNTER — TELEPHONE (OUTPATIENT)
Dept: PULMONOLOGY | Facility: CLINIC | Age: 80
End: 2020-07-24

## 2020-07-24 NOTE — TELEPHONE ENCOUNTER
COVID Pre-Visit Screening     1  Is this a family member screening? /NO  2  Have you traveled outside of your state in the past 2 weeks? {/No  3  Do you presently have a fever or flu-like symptoms? {/NO  4  Do you have symptoms of an upper respiratory infection like runny nose, sore throat, or cough? {/NO:  5  Are you suffering from new headache that you have not had in the past?  {NO:  6  Do you have/have you experienced any new shortness of breath recently? {/NO  7  Do you have any new diarrhea, nausea or vomiting? /NO  8  Have you been in contact with anyone who has been sick or diagnosed with COVID-19?/NO  9  Do you have any new loss of taste or smell? /NO  10  Are you able to wear a mask without a valve for the entire visit?  {YES

## 2020-07-27 ENCOUNTER — OFFICE VISIT (OUTPATIENT)
Dept: PULMONOLOGY | Facility: CLINIC | Age: 80
End: 2020-07-27

## 2020-07-27 DIAGNOSIS — J44.9 COPD, SEVERE (HCC): Primary | ICD-10-CM

## 2020-07-27 DIAGNOSIS — J96.11 CHRONIC RESPIRATORY FAILURE WITH HYPOXIA, ON HOME O2 THERAPY (HCC): ICD-10-CM

## 2020-07-27 DIAGNOSIS — Z99.81 CHRONIC RESPIRATORY FAILURE WITH HYPOXIA, ON HOME O2 THERAPY (HCC): ICD-10-CM

## 2020-07-27 PROCEDURE — RECHECK: Performed by: INTERNAL MEDICINE

## 2020-07-27 NOTE — PROGRESS NOTES
Medical Director Initial Pulmonary Rehabilitation Review    I certify that I have met with Virginia Ibarra face-to face to provide an inital review of his pulmonary rehabilitation program at [facility]  I have reviewed the most recent individualized treatment plan (ITP), outcomes assessment, and provided opportunity for discussion with the patient    Comments: The patient is a good candidate for pulmonary rehab  I agree with the rehab plan      Please provide the following modifications to the current treatment plan: YOBANY Nowak MD    Answers for HPI/ROS submitted by the patient on 7/23/2020   Primary symptoms  Do you have shortness of breath?: Yes  Chronicity: recurrent  When did you first notice your symptoms?: more than 1 year ago  How often do your symptoms occur?: intermittently  Since you first noticed this problem, how has it changed?: gradually worsening  Have you had a change in appetite?: No  Do you have chest pain?: No  Do you have shortness of breath that occurs with effort or exertion?: Yes  Do you have ear congestion?: No  Do you have ear pain?: No  Do you have a fever?: No  Do you have headaches?: No  Do you have heartburn?: No  Do you have fatigue?: No  Do you have muscle pain?: No  Do you have nasal congestion?: No  Do you have shortness of breath when lying flat?: No  Do you have shortness of breath when you wake up?: No  Do you have post-nasal drip?: No  Do you have a runny nose?: No  Do you have sneezing?: No  Do you have a sore throat?: No  Do you have sweats?: No  Do you have trouble swallowing?: No  Have you experienced weight loss?: No  Which of the following makes your symptoms worse?: climbing stairs, exercise, strenuous activity  Which of the following makes your symptoms better?: rest, steroid inhaler

## 2020-07-29 ENCOUNTER — TELEPHONE (OUTPATIENT)
Dept: PULMONOLOGY | Facility: CLINIC | Age: 80
End: 2020-07-29

## 2020-07-29 ENCOUNTER — TELEPHONE (OUTPATIENT)
Dept: INTERNAL MEDICINE CLINIC | Facility: CLINIC | Age: 80
End: 2020-07-29

## 2020-07-29 NOTE — TELEPHONE ENCOUNTER
Patient called to let you know that he is having spinal surgery on 8/3 at [facility]    Call back # [phone]

## 2020-07-29 NOTE — TELEPHONE ENCOUNTER
Suzanne from [facility] pain specialty called to get a ok for patient to have general anesthesia for his spinal cord surgery that is coming up  She stated if you put it in Epic they will be able to see it on there end  Please call her with any concerns or questions at [phone]

## 2020-07-29 NOTE — PROGRESS NOTES
Patient was last seen in June 2020 for a follow-up visit, he was at his baseline respiratory status at the time  He does have severe COPD on bronchodilators and on home O2  Last had a PFT done in 2018 with severe COPD FEV1 41%, severely decreased DLCO  He is moderate risk for pulmonary complications given his severe COPD and chronic respiratory failure for upcoming procedure under general anesthesia  Can continue with his bronchodilators, oxygen  Does not need any further pulmonary workup prior to surgery

## 2020-07-30 ENCOUNTER — TELEPHONE (OUTPATIENT)
Dept: NEPHROLOGY | Facility: CLINIC | Age: 80
End: 2020-07-30

## 2020-07-30 NOTE — TELEPHONE ENCOUNTER
Patient wife of Dr Markie Monk sent a message through the patient MyChart to cancel his appointment for 8/12/2020 because of the patient wife working  I called and spoke to the patient wife and reschedule the patient appointment to 10/8/2020  The patient wife understood and was okay with the day and time of the patient appointment, and patient wife also understands that the patient needs to have blood work done before this appointment   Ruthie Burgess,

## 2020-07-31 NOTE — TELEPHONE ENCOUNTER
Suzanne from  called back today for a update  Patient is scheduled for surgery on Monday  Can you please address? Thank you

## 2020-10-01 ENCOUNTER — APPOINTMENT (OUTPATIENT)
Dept: LAB | Facility: HOSPITAL | Age: 80
End: 2020-10-01
Attending: INTERNAL MEDICINE
Payer: COMMERCIAL

## 2020-10-01 DIAGNOSIS — I12.9 HYPERTENSIVE KIDNEY DISEASE WITH STAGE 3 CHRONIC KIDNEY DISEASE (HCC): ICD-10-CM

## 2020-10-01 DIAGNOSIS — E55.9 VITAMIN D DEFICIENCY: ICD-10-CM

## 2020-10-01 DIAGNOSIS — N18.30 STAGE 3 CHRONIC KIDNEY DISEASE (HCC): ICD-10-CM

## 2020-10-01 DIAGNOSIS — N18.30 HYPERTENSIVE KIDNEY DISEASE WITH STAGE 3 CHRONIC KIDNEY DISEASE (HCC): ICD-10-CM

## 2020-10-01 LAB
25(OH)D3 SERPL-MCNC: 48.5 NG/ML (ref 30–100)
ANION GAP SERPL CALCULATED.3IONS-SCNC: 9 MMOL/L (ref 4–13)
BACTERIA UR QL AUTO: ABNORMAL /HPF
BASOPHILS # BLD AUTO: 0.04 THOUSANDS/ΜL (ref 0–0.1)
BASOPHILS NFR BLD AUTO: 0 % (ref 0–1)
BILIRUB UR QL STRIP: NEGATIVE
BUN SERPL-MCNC: 16 MG/DL (ref 5–25)
CALCIUM SERPL-MCNC: 9.8 MG/DL (ref 8.3–10.1)
CHLORIDE SERPL-SCNC: 105 MMOL/L (ref 100–108)
CLARITY UR: CLEAR
CO2 SERPL-SCNC: 30 MMOL/L (ref 21–32)
COLOR UR: YELLOW
CREAT SERPL-MCNC: 1.38 MG/DL (ref 0.6–1.3)
CREAT UR-MCNC: 262 MG/DL
EOSINOPHIL # BLD AUTO: 0.08 THOUSAND/ΜL (ref 0–0.61)
EOSINOPHIL NFR BLD AUTO: 1 % (ref 0–6)
ERYTHROCYTE [DISTWIDTH] IN BLOOD BY AUTOMATED COUNT: 18.3 % (ref 11.6–15.1)
GFR SERPL CREATININE-BSD FRML MDRD: 56 ML/MIN/1.73SQ M
GLUCOSE P FAST SERPL-MCNC: 114 MG/DL (ref 65–99)
GLUCOSE UR STRIP-MCNC: NEGATIVE MG/DL
HCT VFR BLD AUTO: 46.9 % (ref 36.5–49.3)
HGB BLD-MCNC: 14.1 G/DL (ref 12–17)
HGB UR QL STRIP.AUTO: NEGATIVE
IMM GRANULOCYTES # BLD AUTO: 0.03 THOUSAND/UL (ref 0–0.2)
IMM GRANULOCYTES NFR BLD AUTO: 0 % (ref 0–2)
KETONES UR STRIP-MCNC: NEGATIVE MG/DL
LEUKOCYTE ESTERASE UR QL STRIP: NEGATIVE
LYMPHOCYTES # BLD AUTO: 1.67 THOUSANDS/ΜL (ref 0.6–4.47)
LYMPHOCYTES NFR BLD AUTO: 18 % (ref 14–44)
MCH RBC QN AUTO: 23.5 PG (ref 26.8–34.3)
MCHC RBC AUTO-ENTMCNC: 30.1 G/DL (ref 31.4–37.4)
MCV RBC AUTO: 78 FL (ref 82–98)
MICROALBUMIN UR-MCNC: 57.6 MG/L (ref 0–20)
MICROALBUMIN/CREAT 24H UR: 22 MG/G CREATININE (ref 0–30)
MONOCYTES # BLD AUTO: 0.61 THOUSAND/ΜL (ref 0.17–1.22)
MONOCYTES NFR BLD AUTO: 7 % (ref 4–12)
MUCOUS THREADS UR QL AUTO: ABNORMAL
NEUTROPHILS # BLD AUTO: 6.99 THOUSANDS/ΜL (ref 1.85–7.62)
NEUTS SEG NFR BLD AUTO: 74 % (ref 43–75)
NITRITE UR QL STRIP: NEGATIVE
NON-SQ EPI CELLS URNS QL MICRO: ABNORMAL /HPF
NRBC BLD AUTO-RTO: 0 /100 WBCS
PH UR STRIP.AUTO: 5.5 [PH]
PHOSPHATE SERPL-MCNC: 3.2 MG/DL (ref 2.3–4.1)
PLATELET # BLD AUTO: 215 THOUSANDS/UL (ref 149–390)
POTASSIUM SERPL-SCNC: 3.9 MMOL/L (ref 3.5–5.3)
PROT UR STRIP-MCNC: NEGATIVE MG/DL
PTH-INTACT SERPL-MCNC: 38 PG/ML (ref 18.4–80.1)
RBC # BLD AUTO: 6 MILLION/UL (ref 3.88–5.62)
RBC #/AREA URNS AUTO: ABNORMAL /HPF
SODIUM SERPL-SCNC: 144 MMOL/L (ref 136–145)
SP GR UR STRIP.AUTO: >=1.03 (ref 1–1.03)
URATE SERPL-MCNC: 6 MG/DL (ref 4.2–8)
UROBILINOGEN UR QL STRIP.AUTO: 0.2 E.U./DL
WBC # BLD AUTO: 9.42 THOUSAND/UL (ref 4.31–10.16)
WBC #/AREA URNS AUTO: ABNORMAL /HPF

## 2020-10-01 PROCEDURE — 84100 ASSAY OF PHOSPHORUS: CPT

## 2020-10-01 PROCEDURE — 81001 URINALYSIS AUTO W/SCOPE: CPT

## 2020-10-01 PROCEDURE — 82306 VITAMIN D 25 HYDROXY: CPT

## 2020-10-01 PROCEDURE — 80048 BASIC METABOLIC PNL TOTAL CA: CPT

## 2020-10-01 PROCEDURE — 82570 ASSAY OF URINE CREATININE: CPT

## 2020-10-01 PROCEDURE — 84550 ASSAY OF BLOOD/URIC ACID: CPT

## 2020-10-01 PROCEDURE — 83970 ASSAY OF PARATHORMONE: CPT

## 2020-10-01 PROCEDURE — 36415 COLL VENOUS BLD VENIPUNCTURE: CPT

## 2020-10-01 PROCEDURE — 85025 COMPLETE CBC W/AUTO DIFF WBC: CPT

## 2020-10-01 PROCEDURE — 82043 UR ALBUMIN QUANTITATIVE: CPT

## 2020-10-06 ENCOUNTER — TELEPHONE (OUTPATIENT)
Dept: PULMONOLOGY | Facility: CLINIC | Age: 80
End: 2020-10-06

## 2020-10-06 ENCOUNTER — OFFICE VISIT (OUTPATIENT)
Dept: PULMONOLOGY | Facility: CLINIC | Age: 80
End: 2020-10-06
Payer: COMMERCIAL

## 2020-10-06 VITALS
HEART RATE: 50 BPM | WEIGHT: 249 LBS | SYSTOLIC BLOOD PRESSURE: 126 MMHG | HEIGHT: 72 IN | DIASTOLIC BLOOD PRESSURE: 84 MMHG | OXYGEN SATURATION: 94 % | BODY MASS INDEX: 33.72 KG/M2 | TEMPERATURE: 97.7 F

## 2020-10-06 DIAGNOSIS — Z99.81 CHRONIC RESPIRATORY FAILURE WITH HYPOXIA, ON HOME O2 THERAPY (HCC): ICD-10-CM

## 2020-10-06 DIAGNOSIS — J43.2 CENTRILOBULAR EMPHYSEMA (HCC): Primary | ICD-10-CM

## 2020-10-06 DIAGNOSIS — R91.1 LUNG NODULE: ICD-10-CM

## 2020-10-06 DIAGNOSIS — J96.11 CHRONIC RESPIRATORY FAILURE WITH HYPOXIA, ON HOME O2 THERAPY (HCC): ICD-10-CM

## 2020-10-06 PROCEDURE — 99214 OFFICE O/P EST MOD 30 MIN: CPT | Performed by: PHYSICIAN ASSISTANT

## 2020-10-06 PROCEDURE — 3079F DIAST BP 80-89 MM HG: CPT | Performed by: PHYSICIAN ASSISTANT

## 2020-10-07 DIAGNOSIS — J43.2 CENTRILOBULAR EMPHYSEMA (HCC): ICD-10-CM

## 2020-10-08 ENCOUNTER — OFFICE VISIT (OUTPATIENT)
Dept: NEPHROLOGY | Facility: CLINIC | Age: 80
End: 2020-10-08
Payer: COMMERCIAL

## 2020-10-08 VITALS
SYSTOLIC BLOOD PRESSURE: 148 MMHG | DIASTOLIC BLOOD PRESSURE: 72 MMHG | TEMPERATURE: 96.7 F | WEIGHT: 247.6 LBS | HEIGHT: 72 IN | HEART RATE: 49 BPM | BODY MASS INDEX: 33.54 KG/M2 | RESPIRATION RATE: 16 BRPM

## 2020-10-08 DIAGNOSIS — N18.30 STAGE 3 CHRONIC KIDNEY DISEASE, UNSPECIFIED WHETHER STAGE 3A OR 3B CKD (HCC): Primary | ICD-10-CM

## 2020-10-08 DIAGNOSIS — E55.9 VITAMIN D DEFICIENCY: ICD-10-CM

## 2020-10-08 DIAGNOSIS — N18.30 HYPERTENSIVE KIDNEY DISEASE WITH STAGE 3 CHRONIC KIDNEY DISEASE, UNSPECIFIED WHETHER STAGE 3A OR 3B CKD (HCC): ICD-10-CM

## 2020-10-08 DIAGNOSIS — I12.9 HYPERTENSIVE KIDNEY DISEASE WITH STAGE 3 CHRONIC KIDNEY DISEASE, UNSPECIFIED WHETHER STAGE 3A OR 3B CKD (HCC): ICD-10-CM

## 2020-10-08 PROCEDURE — 1160F RVW MEDS BY RX/DR IN RCRD: CPT | Performed by: INTERNAL MEDICINE

## 2020-10-08 PROCEDURE — 99214 OFFICE O/P EST MOD 30 MIN: CPT | Performed by: INTERNAL MEDICINE

## 2020-10-08 PROCEDURE — 1036F TOBACCO NON-USER: CPT | Performed by: INTERNAL MEDICINE

## 2020-10-13 DIAGNOSIS — J43.2 CENTRILOBULAR EMPHYSEMA (HCC): Primary | ICD-10-CM

## 2020-10-13 RX ORDER — ALBUTEROL SULFATE 2.5 MG/3ML
SOLUTION RESPIRATORY (INHALATION)
Qty: 120 VIAL | Refills: 11 | Status: SHIPPED | OUTPATIENT
Start: 2020-10-13 | End: 2021-08-13

## 2020-10-14 ENCOUNTER — TELEPHONE (OUTPATIENT)
Dept: PULMONOLOGY | Facility: CLINIC | Age: 80
End: 2020-10-14

## 2020-10-19 ENCOUNTER — TELEPHONE (OUTPATIENT)
Dept: PULMONOLOGY | Facility: CLINIC | Age: 80
End: 2020-10-19

## 2020-10-19 DIAGNOSIS — J43.2 CENTRILOBULAR EMPHYSEMA (HCC): Primary | ICD-10-CM

## 2020-10-20 DIAGNOSIS — J43.2 CENTRILOBULAR EMPHYSEMA (HCC): Primary | ICD-10-CM

## 2020-10-20 DIAGNOSIS — J96.11 CHRONIC HYPOXEMIC RESPIRATORY FAILURE (HCC): ICD-10-CM

## 2020-10-25 DIAGNOSIS — I10 HYPERTENSION, UNSPECIFIED TYPE: ICD-10-CM

## 2020-10-25 DIAGNOSIS — M10.9 GOUT, UNSPECIFIED CAUSE, UNSPECIFIED CHRONICITY, UNSPECIFIED SITE: ICD-10-CM

## 2020-10-25 DIAGNOSIS — I10 ESSENTIAL HYPERTENSION: ICD-10-CM

## 2020-10-26 DIAGNOSIS — M10.9 GOUT, UNSPECIFIED CAUSE, UNSPECIFIED CHRONICITY, UNSPECIFIED SITE: ICD-10-CM

## 2020-10-26 DIAGNOSIS — I10 HYPERTENSION, UNSPECIFIED TYPE: ICD-10-CM

## 2020-10-26 DIAGNOSIS — J43.2 CENTRILOBULAR EMPHYSEMA (HCC): ICD-10-CM

## 2020-10-26 DIAGNOSIS — I10 ESSENTIAL HYPERTENSION: ICD-10-CM

## 2020-10-26 RX ORDER — AMLODIPINE BESYLATE 5 MG/1
10 TABLET ORAL EVERY MORNING
Qty: 90 TABLET | Refills: 3 | Status: SHIPPED | OUTPATIENT
Start: 2020-10-26 | End: 2020-11-03 | Stop reason: SDUPTHER

## 2020-10-26 RX ORDER — AMLODIPINE BESYLATE 5 MG/1
10 TABLET ORAL EVERY MORNING
Qty: 90 TABLET | Refills: 2 | Status: SHIPPED | OUTPATIENT
Start: 2020-10-26 | End: 2021-01-07

## 2020-10-26 RX ORDER — ALLOPURINOL 100 MG/1
100 TABLET ORAL DAILY
Qty: 90 TABLET | Refills: 3 | Status: SHIPPED | OUTPATIENT
Start: 2020-10-26 | End: 2021-01-07

## 2020-10-26 RX ORDER — ALLOPURINOL 100 MG/1
100 TABLET ORAL DAILY
Qty: 90 TABLET | Refills: 2 | Status: SHIPPED | OUTPATIENT
Start: 2020-10-26 | End: 2021-01-07

## 2020-10-26 RX ORDER — METOPROLOL SUCCINATE 25 MG/1
TABLET, EXTENDED RELEASE ORAL
Qty: 90 TABLET | Refills: 2 | OUTPATIENT
Start: 2020-10-26

## 2020-10-26 RX ORDER — LOSARTAN POTASSIUM 100 MG/1
100 TABLET ORAL DAILY
Qty: 90 TABLET | Refills: 2 | Status: SHIPPED | OUTPATIENT
Start: 2020-10-26 | End: 2021-01-07

## 2020-10-26 RX ORDER — METOPROLOL SUCCINATE 25 MG/1
25 TABLET, EXTENDED RELEASE ORAL DAILY
Qty: 90 TABLET | Refills: 2 | Status: SHIPPED | OUTPATIENT
Start: 2020-10-26 | End: 2021-01-07

## 2020-10-26 RX ORDER — LOSARTAN POTASSIUM 100 MG/1
TABLET ORAL
Qty: 90 TABLET | Refills: 2 | OUTPATIENT
Start: 2020-10-26

## 2020-10-26 RX ORDER — LOSARTAN POTASSIUM 100 MG/1
100 TABLET ORAL DAILY
Qty: 90 TABLET | Refills: 3 | Status: SHIPPED | OUTPATIENT
Start: 2020-10-26 | End: 2021-01-07

## 2020-10-26 RX ORDER — METOPROLOL SUCCINATE 25 MG/1
25 TABLET, EXTENDED RELEASE ORAL DAILY
Qty: 90 TABLET | Refills: 3 | Status: SHIPPED | OUTPATIENT
Start: 2020-10-26 | End: 2021-01-07

## 2020-10-26 RX ORDER — ALLOPURINOL 100 MG/1
TABLET ORAL
Qty: 90 TABLET | Refills: 2 | OUTPATIENT
Start: 2020-10-26

## 2020-11-03 ENCOUNTER — TELEPHONE (OUTPATIENT)
Dept: INTERNAL MEDICINE CLINIC | Facility: CLINIC | Age: 80
End: 2020-11-03

## 2020-11-03 DIAGNOSIS — I10 ESSENTIAL HYPERTENSION: ICD-10-CM

## 2020-11-03 RX ORDER — AMLODIPINE BESYLATE 5 MG/1
10 TABLET ORAL EVERY MORNING
Qty: 90 TABLET | Refills: 0 | Status: SHIPPED | OUTPATIENT
Start: 2020-11-03 | End: 2021-04-30 | Stop reason: SDUPTHER

## 2020-11-24 ENCOUNTER — TELEPHONE (OUTPATIENT)
Dept: INTERNAL MEDICINE CLINIC | Facility: CLINIC | Age: 80
End: 2020-11-24

## 2020-11-24 DIAGNOSIS — R11.0 NAUSEA: Primary | ICD-10-CM

## 2020-11-24 RX ORDER — PROCHLORPERAZINE MALEATE 10 MG
10 TABLET ORAL EVERY 6 HOURS PRN
Qty: 30 TABLET | Refills: 0 | Status: SHIPPED | OUTPATIENT
Start: 2020-11-24

## 2020-12-15 DIAGNOSIS — M10.9 GOUT, UNSPECIFIED CAUSE, UNSPECIFIED CHRONICITY, UNSPECIFIED SITE: ICD-10-CM

## 2020-12-15 DIAGNOSIS — F17.211 NICOTINE DEPENDENCE, CIGARETTES, IN REMISSION: Primary | ICD-10-CM

## 2020-12-15 DIAGNOSIS — I10 HYPERTENSION, UNSPECIFIED TYPE: ICD-10-CM

## 2020-12-16 RX ORDER — LOSARTAN POTASSIUM 100 MG/1
TABLET ORAL
Qty: 90 TABLET | Refills: 2 | Status: SHIPPED | OUTPATIENT
Start: 2020-12-16 | End: 2021-01-20 | Stop reason: SDUPTHER

## 2020-12-16 RX ORDER — ALLOPURINOL 100 MG/1
TABLET ORAL
Qty: 90 TABLET | Refills: 2 | Status: SHIPPED | OUTPATIENT
Start: 2020-12-16 | End: 2021-08-12

## 2020-12-16 RX ORDER — METOPROLOL SUCCINATE 25 MG/1
TABLET, EXTENDED RELEASE ORAL
Qty: 90 TABLET | Refills: 2 | Status: SHIPPED | OUTPATIENT
Start: 2020-12-16 | End: 2021-08-12

## 2020-12-20 LAB
LEFT EYE DIABETIC RETINOPATHY: NORMAL
RIGHT EYE DIABETIC RETINOPATHY: NORMAL

## 2021-01-04 ENCOUNTER — LAB (OUTPATIENT)
Dept: LAB | Facility: HOSPITAL | Age: 81
End: 2021-01-04
Attending: INTERNAL MEDICINE
Payer: COMMERCIAL

## 2021-01-04 DIAGNOSIS — E11.22 TYPE 2 DIABETES MELLITUS WITH STAGE 3 CHRONIC KIDNEY DISEASE, WITHOUT LONG-TERM CURRENT USE OF INSULIN (HCC): ICD-10-CM

## 2021-01-04 DIAGNOSIS — N18.30 TYPE 2 DIABETES MELLITUS WITH STAGE 3 CHRONIC KIDNEY DISEASE, WITHOUT LONG-TERM CURRENT USE OF INSULIN (HCC): ICD-10-CM

## 2021-01-04 DIAGNOSIS — I10 ESSENTIAL HYPERTENSION: ICD-10-CM

## 2021-01-04 DIAGNOSIS — N18.30 STAGE 3 CHRONIC KIDNEY DISEASE (HCC): ICD-10-CM

## 2021-01-04 LAB
ALBUMIN SERPL BCP-MCNC: 3.9 G/DL (ref 3.5–5)
ALP SERPL-CCNC: 123 U/L (ref 46–116)
ALT SERPL W P-5'-P-CCNC: 31 U/L (ref 12–78)
ANION GAP SERPL CALCULATED.3IONS-SCNC: 11 MMOL/L (ref 4–13)
AST SERPL W P-5'-P-CCNC: 43 U/L (ref 5–45)
BASOPHILS # BLD AUTO: 0.03 THOUSANDS/ΜL (ref 0–0.1)
BASOPHILS NFR BLD AUTO: 0 % (ref 0–1)
BILIRUB SERPL-MCNC: 0.9 MG/DL (ref 0.2–1)
BUN SERPL-MCNC: 17 MG/DL (ref 5–25)
CALCIUM SERPL-MCNC: 9.3 MG/DL (ref 8.3–10.1)
CHLORIDE SERPL-SCNC: 105 MMOL/L (ref 100–108)
CHOLEST SERPL-MCNC: 115 MG/DL (ref 50–200)
CO2 SERPL-SCNC: 27 MMOL/L (ref 21–32)
CREAT SERPL-MCNC: 1.65 MG/DL (ref 0.6–1.3)
EOSINOPHIL # BLD AUTO: 0.07 THOUSAND/ΜL (ref 0–0.61)
EOSINOPHIL NFR BLD AUTO: 1 % (ref 0–6)
ERYTHROCYTE [DISTWIDTH] IN BLOOD BY AUTOMATED COUNT: 17.6 % (ref 11.6–15.1)
EST. AVERAGE GLUCOSE BLD GHB EST-MCNC: 126 MG/DL
GFR SERPL CREATININE-BSD FRML MDRD: 45 ML/MIN/1.73SQ M
GLUCOSE P FAST SERPL-MCNC: 104 MG/DL (ref 65–99)
HBA1C MFR BLD: 6 %
HCT VFR BLD AUTO: 45.5 % (ref 36.5–49.3)
HDLC SERPL-MCNC: 42 MG/DL
HGB BLD-MCNC: 14.2 G/DL (ref 12–17)
IMM GRANULOCYTES # BLD AUTO: 0.04 THOUSAND/UL (ref 0–0.2)
IMM GRANULOCYTES NFR BLD AUTO: 0 % (ref 0–2)
LDLC SERPL CALC-MCNC: 56 MG/DL (ref 0–100)
LYMPHOCYTES # BLD AUTO: 1.66 THOUSANDS/ΜL (ref 0.6–4.47)
LYMPHOCYTES NFR BLD AUTO: 17 % (ref 14–44)
MCH RBC QN AUTO: 23.2 PG (ref 26.8–34.3)
MCHC RBC AUTO-ENTMCNC: 31.2 G/DL (ref 31.4–37.4)
MCV RBC AUTO: 75 FL (ref 82–98)
MONOCYTES # BLD AUTO: 0.62 THOUSAND/ΜL (ref 0.17–1.22)
MONOCYTES NFR BLD AUTO: 6 % (ref 4–12)
NEUTROPHILS # BLD AUTO: 7.53 THOUSANDS/ΜL (ref 1.85–7.62)
NEUTS SEG NFR BLD AUTO: 76 % (ref 43–75)
NONHDLC SERPL-MCNC: 73 MG/DL
NRBC BLD AUTO-RTO: 0 /100 WBCS
PLATELET # BLD AUTO: 259 THOUSANDS/UL (ref 149–390)
PMV BLD AUTO: 11.3 FL (ref 8.9–12.7)
POTASSIUM SERPL-SCNC: 4.3 MMOL/L (ref 3.5–5.3)
PROT SERPL-MCNC: 8 G/DL (ref 6.4–8.2)
RBC # BLD AUTO: 6.11 MILLION/UL (ref 3.88–5.62)
SODIUM SERPL-SCNC: 143 MMOL/L (ref 136–145)
TRIGL SERPL-MCNC: 87 MG/DL
TSH SERPL DL<=0.05 MIU/L-ACNC: 2.27 UIU/ML (ref 0.36–3.74)
URATE SERPL-MCNC: 6.3 MG/DL (ref 4.2–8)
WBC # BLD AUTO: 9.95 THOUSAND/UL (ref 4.31–10.16)

## 2021-01-04 PROCEDURE — 85025 COMPLETE CBC W/AUTO DIFF WBC: CPT

## 2021-01-04 PROCEDURE — 84550 ASSAY OF BLOOD/URIC ACID: CPT

## 2021-01-04 PROCEDURE — 36415 COLL VENOUS BLD VENIPUNCTURE: CPT

## 2021-01-04 PROCEDURE — 84443 ASSAY THYROID STIM HORMONE: CPT

## 2021-01-04 PROCEDURE — 83036 HEMOGLOBIN GLYCOSYLATED A1C: CPT

## 2021-01-04 PROCEDURE — 80053 COMPREHEN METABOLIC PANEL: CPT

## 2021-01-04 PROCEDURE — 80061 LIPID PANEL: CPT

## 2021-01-07 ENCOUNTER — TELEPHONE (OUTPATIENT)
Dept: ADMINISTRATIVE | Facility: OTHER | Age: 81
End: 2021-01-07

## 2021-01-07 ENCOUNTER — OFFICE VISIT (OUTPATIENT)
Dept: INTERNAL MEDICINE CLINIC | Facility: CLINIC | Age: 81
End: 2021-01-07
Payer: COMMERCIAL

## 2021-01-07 VITALS
SYSTOLIC BLOOD PRESSURE: 138 MMHG | WEIGHT: 257.6 LBS | HEIGHT: 72 IN | BODY MASS INDEX: 34.89 KG/M2 | TEMPERATURE: 98.2 F | HEART RATE: 56 BPM | RESPIRATION RATE: 14 BRPM | DIASTOLIC BLOOD PRESSURE: 78 MMHG

## 2021-01-07 DIAGNOSIS — I10 ESSENTIAL HYPERTENSION: ICD-10-CM

## 2021-01-07 DIAGNOSIS — C61 PROSTATE CANCER (HCC): ICD-10-CM

## 2021-01-07 DIAGNOSIS — N18.31 STAGE 3A CHRONIC KIDNEY DISEASE (HCC): ICD-10-CM

## 2021-01-07 DIAGNOSIS — J96.11 CHRONIC RESPIRATORY FAILURE WITH HYPOXIA, ON HOME O2 THERAPY (HCC): ICD-10-CM

## 2021-01-07 DIAGNOSIS — N18.32 TYPE 2 DIABETES MELLITUS WITH STAGE 3B CHRONIC KIDNEY DISEASE, WITHOUT LONG-TERM CURRENT USE OF INSULIN (HCC): Primary | ICD-10-CM

## 2021-01-07 DIAGNOSIS — E66.01 OBESITY, MORBID (HCC): ICD-10-CM

## 2021-01-07 DIAGNOSIS — Z99.81 CHRONIC RESPIRATORY FAILURE WITH HYPOXIA, ON HOME O2 THERAPY (HCC): ICD-10-CM

## 2021-01-07 DIAGNOSIS — E11.22 TYPE 2 DIABETES MELLITUS WITH STAGE 3B CHRONIC KIDNEY DISEASE, WITHOUT LONG-TERM CURRENT USE OF INSULIN (HCC): Primary | ICD-10-CM

## 2021-01-07 DIAGNOSIS — J43.2 CENTRILOBULAR EMPHYSEMA (HCC): ICD-10-CM

## 2021-01-07 PROCEDURE — 1036F TOBACCO NON-USER: CPT | Performed by: INTERNAL MEDICINE

## 2021-01-07 PROCEDURE — 3078F DIAST BP <80 MM HG: CPT | Performed by: INTERNAL MEDICINE

## 2021-01-07 PROCEDURE — 99214 OFFICE O/P EST MOD 30 MIN: CPT | Performed by: INTERNAL MEDICINE

## 2021-01-07 PROCEDURE — 3075F SYST BP GE 130 - 139MM HG: CPT | Performed by: INTERNAL MEDICINE

## 2021-01-07 PROCEDURE — 1160F RVW MEDS BY RX/DR IN RCRD: CPT | Performed by: INTERNAL MEDICINE

## 2021-01-07 PROCEDURE — 3725F SCREEN DEPRESSION PERFORMED: CPT | Performed by: INTERNAL MEDICINE

## 2021-01-07 PROCEDURE — 1101F PT FALLS ASSESS-DOCD LE1/YR: CPT | Performed by: INTERNAL MEDICINE

## 2021-01-07 PROCEDURE — 3288F FALL RISK ASSESSMENT DOCD: CPT | Performed by: INTERNAL MEDICINE

## 2021-01-07 NOTE — TELEPHONE ENCOUNTER
----- Message from Jess Valladares sent at 1/7/2021 10:54 AM EST -----  Regarding: Eye Exam [facility]  Contact: [phone]  01/07/21 10:54 AM    Hello, our patient Gurmeet Ríos has had Diabetic Eye Exam completed/performed  Please assist in updating the patient chart by making an External outreach to Dr Shannon Duarte facility located in [place]  The date of service is 12/29/2020      Thank you,  Jess Victor 64

## 2021-01-07 NOTE — LETTER
To whom it May concern,     Sharon Person is under my medical care  Unfortunately his complex and chronic medical conditions preclude regular exercise  Please excuse him from his gym membership               Sincerely,          Millie Brownlee MD

## 2021-01-07 NOTE — TELEPHONE ENCOUNTER
----- Message from Parminder Berry sent at 1/7/2021 10:52 AM EST -----  Regarding: Eye Exam [facility]  01/07/21 10:52 AM    Hello, our patient Jojo Dorsey has had Diabetic Eye Exam completed/performed  Please assist in updating the patient chart by making an External outreach to Dr Sheree Iniguez from [address] located in [facility]  The date of service is 12/2/2020      Thank you,  Parminder Victor 64

## 2021-01-07 NOTE — PATIENT INSTRUCTIONS
Lab data reviewed in detail and compared prior    Chronic kidney disease stage 3-slight decrease in GFR  Patient encouraged to keep well-hydrated follow with Nephrology as scheduled    Gout stable without flare  Large subcutaneous nodular density above 1st MTP likely related to tophi  Continue on allopurinol  Continue to monitor lesion  Type 2 diabetes mellitus stable with diet control    Hypertension stable on present regimen    Oxygen-dependent COPD stable on current regimen    History of prostate cancer following with Urology    Routine follow-up after labs in 6 months, sooner as needed

## 2021-01-07 NOTE — LETTER
Diabetic Eye Exam Form    Date Requested: 21  Patient: Sho Mccullough  Patient : 1940   Referring Provider: Rach Oconnell MD    Dilated Retinal Exam, Optomap-Iris Exam, or Fundus Photography Done         Yes ([place] one above)         No     Date of Diabetic Eye Exam ______________________________  Left Eye      Exam did show retinopathy    Exam did not show retinopathy         Mild       Moderate       None       Proliferative       Severe     Right Eye     Exam did show retinopathy    Exam did not show retinopathy         Mild       Moderate       None       Proliferative       Severe     Comments __________________________________________________________    Practice Providing Exam ______________________________________________    Exam Performed By (print name) _______________________________________      Provider Signature ___________________________________________________      These reports are needed for  compliance    Please fax this completed form and a copy of the Diabetic Eye Exam report to our office located at [place] [address] as soon as possible to [phone] attention Chastity: Phone [phone]    We thank you for your assistance in treating our mutual patient     (sent to [facility])

## 2021-01-07 NOTE — PROGRESS NOTES
Assessment/Plan:    There are no diagnoses linked to this encounter  BMI Counseling: Body mass index is 35 43 kg/m²  The BMI is above normal  Nutrition recommendations include decreasing portion sizes, decreasing fast food intake, consuming healthier snacks, limiting drinks that contain sugar, moderation in carbohydrate intake and reducing intake of saturated and trans fat  Exercise recommendations include moderate physical activity 150 minutes/week  No pharmacotherapy was ordered  There are no Patient Instructions on file for this visit  Subjective:      Patient ID: Paramjit Clayton is a 68 y o  male    F/u mmp, review labs, here w/ Naz Hanson who adds some hx  Lumbar radiculopathy-s/p SCC, now having some differenct pains, more midline  COPD-stable, recently changed to trelogy, he continues w/ O2 hs and prn, always when he leaves house  DM-not really following diet, no regular exercise  HTN-taking rx as directed, home bp's daily have all been nl b/t 105-137/70's  CKD-f/b neph, doing better keeping hydrated     Gout-taking allopurinol, no recent flares  H/o prostate ca s/p prostatectomy '05, psa undetectable in May, f/b urology  H/o kidney stones, neg KUB in May        Current Outpatient Medications:     acetaminophen-codeine (TYLENOL with CODEINE #4) 300-60 MG per tablet, Take 1 tablet by mouth every 6 (six) hours as needed for moderate pain, Disp: , Rfl:     albuterol (2 5 mg/3 mL) 0 083 % nebulizer solution, USE 1 VIAL IN NEBULIZER EVERY 6 HOURS, Disp: 120 vial, Rfl: 11    albuterol (VENTOLIN HFA) 90 mcg/act inhaler, Inhale 2 puffs every 6 (six) hours as needed for wheezing or shortness of breath, Disp: 18 g, Rfl: 3    allopurinol (ZYLOPRIM) 100 mg tablet, TAKE 1 TABLET DAILY, Disp: 90 tablet, Rfl: 2    amLODIPine (NORVASC) 5 mg tablet, Take 2 tablets (10 mg total) by mouth every morning, Disp: 90 tablet, Rfl: 0    cholecalciferol (VITAMIN D3) 1,000 units tablet, Take 5,000 Units by mouth daily, Disp: , Rfl:     fluticasone-salmeterol (ADVAIR, WIXELA) 250-50 mcg/dose inhaler, Inhale 1 puff 2 (two) times a day Rinse mouth after use , Disp: 3 Inhaler, Rfl: 2    gabapentin (NEURONTIN) 300 mg capsule, Every 12 hours, Disp: , Rfl:     losartan (COZAAR) 100 MG tablet, TAKE 1 TABLET DAILY, Disp: 90 tablet, Rfl: 2    metoprolol succinate (TOPROL-XL) 25 mg 24 hr tablet, TAKE 1 TABLET DAILY, Disp: 90 tablet, Rfl: 2    Polyvinyl Alcohol-Povidone (REFRESH OP), Apply to eye, Disp: , Rfl:     prochlorperazine (COMPAZINE) 10 mg tablet, Take 1 tablet (10 mg total) by mouth every 6 (six) hours as needed for nausea or vomiting, Disp: 30 tablet, Rfl: 0    umeclidinium bromide (INCRUSE ELLIPTA) 62 5 mcg/inh AEPB inhaler, Inhale 1 puff daily, Disp: 3 Inhaler, Rfl: 2    Recent Results (from the past 1008 hour(s))   Comprehensive metabolic panel    Collection Time: 01/04/21  1:41 PM   Result Value Ref Range    Sodium 143 136 - 145 mmol/L    Potassium 4 3 3 5 - 5 3 mmol/L    Chloride 105 100 - 108 mmol/L    CO2 27 21 - 32 mmol/L    ANION GAP 11 4 - 13 mmol/L    BUN 17 5 - 25 mg/dL    Creatinine 1 65 (H) 0 60 - 1 30 mg/dL    Glucose, Fasting 104 (H) 65 - 99 mg/dL    Calcium 9 3 8 3 - 10 1 mg/dL    AST 43 5 - 45 U/L    ALT 31 12 - 78 U/L    Alkaline Phosphatase 123 (H) 46 - 116 U/L    Total Protein 8 0 6 4 - 8 2 g/dL    Albumin 3 9 3 5 - 5 0 g/dL    Total Bilirubin 0 90 0 20 - 1 00 mg/dL    eGFR 45 ml/min/1 73sq m   CBC and differential    Collection Time: 01/04/21  1:41 PM   Result Value Ref Range    WBC 9 95 4 31 - 10 16 Thousand/uL    RBC 6 11 (H) 3 88 - 5 62 Million/uL    Hemoglobin 14 2 12 0 - 17 0 g/dL    Hematocrit 45 5 36 5 - 49 3 %    MCV 75 (L) 82 - 98 fL    MCH 23 2 (L) 26 8 - 34 3 pg    MCHC 31 2 (L) 31 4 - 37 4 g/dL    RDW 17 6 (H) 11 6 - 15 1 %    MPV 11 3 8 9 - 12 7 fL    Platelets 198 495 - 265 Thousands/uL    nRBC 0 /100 WBCs    Neutrophils Relative 76 (H) 43 - 75 %    Immat GRANS % 0 0 - 2 % Lymphocytes Relative 17 14 - 44 %    Monocytes Relative 6 4 - 12 %    Eosinophils Relative 1 0 - 6 %    Basophils Relative 0 0 - 1 %    Neutrophils Absolute 7 53 1 85 - 7 62 Thousands/µL    Immature Grans Absolute 0 04 0 00 - 0 20 Thousand/uL    Lymphocytes Absolute 1 66 0 60 - 4 47 Thousands/µL    Monocytes Absolute 0 62 0 17 - 1 22 Thousand/µL    Eosinophils Absolute 0 07 0 00 - 0 61 Thousand/µL    Basophils Absolute 0 03 0 00 - 0 10 Thousands/µL   Lipid panel    Collection Time: 01/04/21  1:41 PM   Result Value Ref Range    Cholesterol 115 50 - 200 mg/dL    Triglycerides 87 <=150 mg/dL    HDL, Direct 42 >=40 mg/dL    LDL Calculated 56 0 - 100 mg/dL    Non-HDL-Chol (CHOL-HDL) 73 mg/dl   Hemoglobin A1C    Collection Time: 01/04/21  1:41 PM   Result Value Ref Range    Hemoglobin A1C 6 0 (H) Normal 3 8-5 6%; PreDiabetic 5 7-6 4%; Diabetic >=6 5%; Glycemic control for adults with diabetes <7 0% %     mg/dl   TSH, 3rd generation with Free T4 reflex    Collection Time: 01/04/21  1:41 PM   Result Value Ref Range    TSH 3RD GENERATON 2 268 0 358 - 3 740 uIU/mL   Uric acid    Collection Time: 01/04/21  1:41 PM   Result Value Ref Range    Uric Acid 6 3 4 2 - 8 0 mg/dL       The following portions of the patient's history were reviewed and updated as appropriate: allergies, current medications, past family history, past medical history, past social history, past surgical history and problem list      Review of Systems   Constitutional: Negative for appetite change, chills, diaphoresis, fatigue, fever and unexpected weight change  HENT: Negative for congestion, hearing loss and rhinorrhea  Eyes: Negative for visual disturbance  Respiratory: Positive for shortness of breath  Negative for cough, chest tightness and wheezing  Cardiovascular: Negative for chest pain, palpitations and leg swelling  Gastrointestinal: Negative for abdominal pain and blood in stool     Endocrine: Negative for cold intolerance, heat intolerance, polydipsia and polyuria  Genitourinary: Negative for difficulty urinating, dysuria, frequency and urgency  Musculoskeletal: Positive for arthralgias and back pain  Negative for myalgias  Skin: Negative for rash  Neurological: Negative for dizziness, weakness, light-headedness and headaches  Hematological: Does not bruise/bleed easily  Psychiatric/Behavioral: Negative for dysphoric mood and sleep disturbance  Objective:      Vitals:    01/07/21 1048   BP: 138/78   Pulse: 56   Resp: 14   Temp: 98 2 °F (36 8 °C)          Physical Exam  Constitutional:       Appearance: He is well-developed  HENT:      Head: Normocephalic and atraumatic  Nose: Nose normal    Eyes:      General: No scleral icterus  Conjunctiva/sclera: Conjunctivae normal       Pupils: Pupils are equal, round, and reactive to light  Neck:      Musculoskeletal: Normal range of motion and neck supple  Thyroid: No thyromegaly  Vascular: No JVD  Trachea: No tracheal deviation  Cardiovascular:      Rate and Rhythm: Normal rate and regular rhythm  Pulses: no weak pulses          Dorsalis pedis pulses are 1+ on the right side and 1+ on the left side  Posterior tibial pulses are 1+ on the right side and 1+ on the left side  Heart sounds: No murmur  No friction rub  No gallop  Pulmonary:      Effort: Pulmonary effort is normal  No respiratory distress  Breath sounds: Normal breath sounds  No wheezing or rales  Musculoskeletal:         General: No deformity  Feet:       Comments: 3 x 4 cm subcutaneous nodular density over left 1st MTP   Feet:      Right foot:      Skin integrity: No ulcer, skin breakdown, erythema, warmth, callus or dry skin  Left foot:      Skin integrity: No ulcer, skin breakdown, erythema, warmth, callus or dry skin  Lymphadenopathy:      Cervical: No cervical adenopathy  Skin:     General: Skin is warm and dry        Coloration: Skin is not pale       Findings: No erythema or rash  Neurological:      Mental Status: He is alert and oriented to person, place, and time  Cranial Nerves: No cranial nerve deficit  Psychiatric:         Behavior: Behavior normal          Thought Content: Thought content normal          Judgment: Judgment normal      Diabetic Foot Exam    Patient's shoes and socks removed  Right Foot/Ankle   Right Foot Inspection  Skin Exam: skin normal and skin intact no dry skin, no warmth, no callus, no erythema, no maceration, no abnormal color, no pre-ulcer, no ulcer and no callus                          Toe Exam: ROM and strength within normal limits  Sensory     Proprioception: intact   Monofilament testing: intact  Vascular  Capillary refills: < 3 seconds  The right DP pulse is 1+  The right PT pulse is 1+  Left Foot/Ankle  Left Foot Inspection  Skin Exam: skin normal and skin intactno dry skin, no warmth, no erythema, no maceration, normal color, no pre-ulcer, no ulcer and no callus                         Toe Exam: ROM and strength within normal limits                   Sensory     Proprioception: intact  Monofilament: intact  Vascular  Capillary refills: < 3 seconds  The left DP pulse is 1+  The left PT pulse is 1+  Assign Risk Category:  Deformity present; No loss of protective sensation;  No weak pulses       Risk: 0

## 2021-01-08 NOTE — TELEPHONE ENCOUNTER
Upon review of the In Basket request and the patient's chart, initial outreach has been made via fax, please see Contacts section for details       Thank you  Debra Liriano MA

## 2021-01-08 NOTE — TELEPHONE ENCOUNTER
Upon review of the In Basket request we were able to locate, review, and update the patient chart as requested for Diabetic Eye Exam     Any additional questions or concerns should be emailed to the Practice Liaisons via [email or web address] email, please do not reply via In Basket      Thank you  Ade Rosenberg MA

## 2021-01-19 ENCOUNTER — TELEPHONE (OUTPATIENT)
Dept: INTERNAL MEDICINE CLINIC | Facility: CLINIC | Age: 81
End: 2021-01-19

## 2021-01-19 NOTE — TELEPHONE ENCOUNTER
Pt called stating he received a letter from [address] [place] [address] stating they will no longer refill losartan for him  Letter did not specify why  Asked if he called vasyl but the letter says to contact pcp       [phone]

## 2021-01-20 DIAGNOSIS — Z23 ENCOUNTER FOR IMMUNIZATION: ICD-10-CM

## 2021-01-20 DIAGNOSIS — I10 HYPERTENSION, UNSPECIFIED TYPE: ICD-10-CM

## 2021-01-20 RX ORDER — LOSARTAN POTASSIUM 100 MG/1
100 TABLET ORAL DAILY
Qty: 90 TABLET | Refills: 2 | Status: SHIPPED | OUTPATIENT
Start: 2021-01-20 | End: 2022-02-11 | Stop reason: SDUPTHER

## 2021-01-26 ENCOUNTER — IMMUNIZATIONS (OUTPATIENT)
Dept: FAMILY MEDICINE CLINIC | Facility: HOSPITAL | Age: 81
End: 2021-01-26

## 2021-01-26 DIAGNOSIS — Z23 ENCOUNTER FOR IMMUNIZATION: Primary | ICD-10-CM

## 2021-01-26 PROCEDURE — 91301 SARS-COV-2 / COVID-19 MRNA VACCINE (MODERNA) 100 MCG: CPT

## 2021-01-26 PROCEDURE — 0011A SARS-COV-2 / COVID-19 MRNA VACCINE (MODERNA) 100 MCG: CPT

## 2021-02-16 ENCOUNTER — TELEMEDICINE (OUTPATIENT)
Dept: INTERNAL MEDICINE CLINIC | Facility: CLINIC | Age: 81
End: 2021-02-16
Payer: COMMERCIAL

## 2021-02-16 VITALS — TEMPERATURE: 98.5 F

## 2021-02-16 DIAGNOSIS — B34.9 VIRAL INFECTION, UNSPECIFIED: Primary | ICD-10-CM

## 2021-02-16 PROCEDURE — 1160F RVW MEDS BY RX/DR IN RCRD: CPT | Performed by: NURSE PRACTITIONER

## 2021-02-16 PROCEDURE — 1036F TOBACCO NON-USER: CPT | Performed by: NURSE PRACTITIONER

## 2021-02-16 PROCEDURE — 99213 OFFICE O/P EST LOW 20 MIN: CPT | Performed by: NURSE PRACTITIONER

## 2021-02-16 NOTE — PROGRESS NOTES
COVID-19 Virtual Visit     Assessment/Plan:    Problem List Items Addressed This Visit     None      Visit Diagnoses     Viral infection, unspecified    -  Primary    Relevant Orders    Novel Coronavirus (Covid-19),PCR [facility] - Collected in Office         Disposition:     I recommended the patient to come to our office to perform PCR testing for COVID-19  Vickie Camacho   Is a 74-year-old male with a 4 day history of a fever  The patient states that he was out shopping at the end of last week and on Sunday he developed a fever  His highest temperature has been 101  This morning again it was 101 and currently it is 98 5  Patient has been taking Coricidin D which does have an antipyretic in it  He denies any symptoms of cough, shortness of breath, chest pain, nausea, vomiting, diarrhea, headache or any other associated symptoms  I did recommend that we test him for COVID due to his ongoing fever  I also recommended to the patient that he take Tylenol as needed for his fever as he is not having any other symptoms  The patient does have underlying COPD and states his cough has not increased  Patient has been made aware that these results can take anywhere from 48 hours to 7 days to be final   During that time the patient has been instructed that they should self isolate and self quarantine at home  Management of  symptoms was discussed with the patient  They will be contacted with the COVID-19 results  The patient was also provided with the instructions for testing at our office tomorrow at 11:00 am      I have spent 15 minutes directly with the patient  Greater than 50% of this time was spent in counseling/coordination of care regarding: instructions for management     COVID-19 testing, self quarantine, symptom management,       Encounter provider HUA Mcdowell    Provider located at [address] [place] [address]    Recent Visits  No visits were found meeting these conditions  Showing recent visits within past 7 days and meeting all other requirements     Today's Visits  Date Type Provider Dept   02/16/21 Telemedicine Vivek De La Fuente, [facility] today's visits and meeting all other requirements     Future Appointments  No visits were found meeting these conditions  Showing future appointments within next 150 days and meeting all other requirements      This virtual check-in was done via [employer] and patient was informed that this is not a secure, HIPAA-compliant platform  He agrees to proceed  Patient agrees to participate in a virtual check in via telephone or video visit instead of presenting to the office to address urgent/immediate medical needs  Patient is aware this is a billable service  After connecting through [facility], the patient was identified by name and date of birth  Darrell Rivera was informed that this was a telemedicine visit and that the exam was being conducted confidentially over secure lines  My office door was closed  No one else was in the room  Darrell Rivera acknowledged consent and understanding of privacy and security of the telemedicine visit  I informed the patient that I have reviewed his record in Epic and presented the opportunity for him to ask any questions regarding the visit today  The patient agreed to participate  Subjective:   Darrell Rivera is a 68 y o  male who is concerned about COVID-19  Patient's symptoms include fever  Patient denies fatigue, congestion, rhinorrhea, shortness of breath, myalgias and headaches       Date of symptom onset: 2/14/2021    Exposure:   Contact with a person who is under investigation (PUI) for or who is positive for COVID-19 within the last 14 days?: No    Hospitalized recently for fever and/or lower respiratory symptoms?: No      Currently a healthcare worker that is involved in direct patient care?: No      Works in a special setting where the risk of COVID-19 transmission may be high? (this may include long-term care, correctional and senior care facilities; homeless shelters; assisted-living facilities and group homes ): No      Resident in a special setting where the risk of COVID-19 transmission may be high? (this may include long-term care, correctional and senior care facilities; homeless shelters; assisted-living facilities and group homes ): No      No results found for: Gildardosabi Fermin, CHRISTIANO, [address]  Past Medical History:   Diagnosis Date    Back pain     Chronic kidney disease     [facility] obstructive [facility]) [facility]     History of malignant neoplasm of oral cavity     M0    HL (hearing loss)     Hypertension     Kidney stone 05/2019    [facility]     descending    Pro[facility] cancer ([facility] )     Tachycardia     Thalassemia trait     Tinnitus      Past Surgical History:   Procedure Laterality Date    BACK SURGERY      CHOLECYSTECTOMY      COLON SURGERY  10/2008    partial colectomy    INCISIONAL HERNIA REPAIR      JOINT REPLACEMENT Right     hip    FL COLONOSCOPY FLX DX W/COLLJ SPEC WHEN PFRMD N/A 7/23/2018    Procedure: COLONOSCOPY;  Surgeon: Stacey Mckinley MD;  Location: [facility];   Service: Gastroenterology    PROSTATE SURGERY      SPINAL CORD STIMULATOR IMPLANT      TONSILLECTOMY       Current Outpatient Medications   Medication Sig Dispense Refill    acetaminophen-codeine (TYLENOL with CODEINE #4) 300-60 MG per tablet Take 1 tablet by mouth every 6 (six) hours as needed for moderate pain      albuterol (VENTOLIN HFA) 90 mcg/act inhaler Inhale 2 puffs every 6 (six) hours as needed for wheezing or shortness of breath 18 g 3    allopurinol (ZYLOPRIM) 100 mg tablet TAKE 1 TABLET DAILY 90 tablet 2    amLODIPine (NORVASC) 5 mg tablet Take 2 tablets (10 mg total) by mouth every morning 90 tablet 0    cholecalciferol (VITAMIN D3) 1,000 units tablet Take 5,000 Units by mouth daily      fluticasone-salmeterol (ADVAIR, WIXELA) 250-50 mcg/dose inhaler Inhale 1 puff 2 (two) times a day Rinse mouth after use  3 Inhaler 2    gabapentin (NEURONTIN) 300 mg capsule Every 12 hours      losartan (COZAAR) 100 MG tablet Take 1 tablet (100 mg total) by mouth daily 90 tablet 2    metoprolol succinate (TOPROL-XL) 25 mg 24 hr tablet TAKE 1 TABLET DAILY 90 tablet 2    Polyvinyl Alcohol-Povidone (REFRESH OP) Apply to eye      prochlorperazine (COMPAZINE) 10 mg tablet Take 1 tablet (10 mg total) by mouth every 6 (six) hours as needed for nausea or vomiting 30 tablet 0    umeclidinium bromide (INCRUSE ELLIPTA) 62 5 mcg/inh AEPB inhaler Inhale 1 puff daily 3 Inhaler 2    albuterol (2 5 mg/3 mL) 0 083 % nebulizer solution USE 1 VIAL IN NEBULIZER EVERY 6 HOURS 120 vial 11     No current facility-administered medications for this visit  No Known Allergies    Review of Systems   Constitutional: Positive for fever  Negative for fatigue  HENT: Negative  Negative for congestion, postnasal drip, rhinorrhea and trouble swallowing  Eyes: Negative  Negative for visual disturbance  Respiratory: Negative  Negative for choking and shortness of breath  Cardiovascular: Negative  Negative for chest pain  Gastrointestinal: Negative  Endocrine: Negative  Genitourinary: Negative  Musculoskeletal: Negative  Negative for arthralgias, back pain, myalgias and neck pain  Skin: Negative  Neurological: Negative for dizziness and headaches  Psychiatric/Behavioral: Negative  Objective: There were no vitals filed for this visit  Physical Exam  Vitals signs reviewed  Constitutional:       General: He is not in acute distress  Appearance: He is well-developed  HENT:      Head: Normocephalic  Eyes:      Pupils: Pupils are equal, round, and reactive to light  Neck:      Musculoskeletal: Normal range of motion     Pulmonary:      Effort: Pulmonary effort is normal    Neurological:      Mental Status: He is oriented to person, place, and time  Psychiatric:         Mood and Affect: Mood normal          Behavior: Behavior normal          Thought Content: Thought content normal          Judgment: Judgment normal        VIRTUAL VISIT DISCLAIMER    Xochitl Esequiel acknowledges that he has consented to an online visit or consultation  He understands that the online visit is based solely on information provided by him, and that, in the absence of a face-to-face physical evaluation by the physician, the diagnosis he receives is both limited and provisional in terms of accuracy and completeness  This is not intended to replace a full medical face-to-face evaluation by the physician  Xochitl Iraheta understands and accepts these terms

## 2021-02-17 PROCEDURE — U0003 INFECTIOUS AGENT DETECTION BY NUCLEIC ACID (DNA OR RNA); SEVERE ACUTE RESPIRATORY SYNDROME CORONAVIRUS 2 (SARS-COV-2) (CORONAVIRUS DISEASE [COVID-19]), AMPLIFIED PROBE TECHNIQUE, MAKING USE OF HIGH THROUGHPUT TECHNOLOGIES AS DESCRIBED BY CMS-2020-01-R: HCPCS | Performed by: NURSE PRACTITIONER

## 2021-02-17 PROCEDURE — U0005 INFEC AGEN DETEC AMPLI PROBE: HCPCS | Performed by: NURSE PRACTITIONER

## 2021-02-18 LAB — SARS-COV-2 RNA RESP QL NAA+PROBE: NEGATIVE

## 2021-02-22 ENCOUNTER — IMMUNIZATIONS (OUTPATIENT)
Dept: FAMILY MEDICINE CLINIC | Facility: HOSPITAL | Age: 81
End: 2021-02-22

## 2021-02-22 ENCOUNTER — TELEPHONE (OUTPATIENT)
Dept: INTERNAL MEDICINE CLINIC | Facility: CLINIC | Age: 81
End: 2021-02-22

## 2021-02-22 DIAGNOSIS — Z23 ENCOUNTER FOR IMMUNIZATION: Primary | ICD-10-CM

## 2021-02-22 PROCEDURE — 91301 SARS-COV-2 / COVID-19 MRNA VACCINE (MODERNA) 100 MCG: CPT

## 2021-02-22 PROCEDURE — 0012A SARS-COV-2 / COVID-19 MRNA VACCINE (MODERNA) 100 MCG: CPT

## 2021-02-22 NOTE — TELEPHONE ENCOUNTER
----- Message from Elvis Crum, [address] sent at 2/22/2021 10:26 AM EST -----  Please call  Jacqui Huerta and let him know that his COVID-19 swab was negative  Advise him to continue social distancing procedures

## 2021-02-22 NOTE — RESULT ENCOUNTER NOTE
Please call  Nirali Barriga and let him know that his COVID-19 swab was negative  Advise him to continue social distancing procedures

## 2021-04-01 ENCOUNTER — APPOINTMENT (OUTPATIENT)
Dept: LAB | Facility: HOSPITAL | Age: 81
End: 2021-04-01
Attending: INTERNAL MEDICINE
Payer: COMMERCIAL

## 2021-04-01 DIAGNOSIS — N18.30 HYPERTENSIVE KIDNEY DISEASE WITH STAGE 3 CHRONIC KIDNEY DISEASE, UNSPECIFIED WHETHER STAGE 3A OR 3B CKD (HCC): ICD-10-CM

## 2021-04-01 DIAGNOSIS — I12.9 HYPERTENSIVE KIDNEY DISEASE WITH STAGE 3 CHRONIC KIDNEY DISEASE, UNSPECIFIED WHETHER STAGE 3A OR 3B CKD (HCC): ICD-10-CM

## 2021-04-01 DIAGNOSIS — N18.30 STAGE 3 CHRONIC KIDNEY DISEASE, UNSPECIFIED WHETHER STAGE 3A OR 3B CKD (HCC): ICD-10-CM

## 2021-04-01 DIAGNOSIS — E55.9 VITAMIN D DEFICIENCY: ICD-10-CM

## 2021-04-01 LAB
25(OH)D3 SERPL-MCNC: 55 NG/ML (ref 30–100)
ANION GAP SERPL CALCULATED.3IONS-SCNC: 11 MMOL/L (ref 4–13)
BACTERIA UR QL AUTO: ABNORMAL /HPF
BASOPHILS # BLD AUTO: 0.04 THOUSANDS/ΜL (ref 0–0.1)
BASOPHILS NFR BLD AUTO: 1 % (ref 0–1)
BILIRUB UR QL STRIP: NEGATIVE
BUN SERPL-MCNC: 15 MG/DL (ref 5–25)
CALCIUM SERPL-MCNC: 9.2 MG/DL (ref 8.3–10.1)
CAOX CRY URNS QL MICRO: ABNORMAL /HPF
CHLORIDE SERPL-SCNC: 107 MMOL/L (ref 100–108)
CLARITY UR: CLEAR
CO2 SERPL-SCNC: 26 MMOL/L (ref 21–32)
COLOR UR: YELLOW
CREAT SERPL-MCNC: 1.63 MG/DL (ref 0.6–1.3)
CREAT UR-MCNC: 314 MG/DL
EOSINOPHIL # BLD AUTO: 0.15 THOUSAND/ΜL (ref 0–0.61)
EOSINOPHIL NFR BLD AUTO: 2 % (ref 0–6)
ERYTHROCYTE [DISTWIDTH] IN BLOOD BY AUTOMATED COUNT: 18.4 % (ref 11.6–15.1)
GFR SERPL CREATININE-BSD FRML MDRD: 46 ML/MIN/1.73SQ M
GLUCOSE SERPL-MCNC: 100 MG/DL (ref 65–140)
GLUCOSE UR STRIP-MCNC: NEGATIVE MG/DL
HCT VFR BLD AUTO: 46.7 % (ref 36.5–49.3)
HGB BLD-MCNC: 14.2 G/DL (ref 12–17)
HGB UR QL STRIP.AUTO: NEGATIVE
IMM GRANULOCYTES # BLD AUTO: 0.03 THOUSAND/UL (ref 0–0.2)
IMM GRANULOCYTES NFR BLD AUTO: 0 % (ref 0–2)
KETONES UR STRIP-MCNC: NEGATIVE MG/DL
LEUKOCYTE ESTERASE UR QL STRIP: NEGATIVE
LYMPHOCYTES # BLD AUTO: 1.71 THOUSANDS/ΜL (ref 0.6–4.47)
LYMPHOCYTES NFR BLD AUTO: 20 % (ref 14–44)
MCH RBC QN AUTO: 23.5 PG (ref 26.8–34.3)
MCHC RBC AUTO-ENTMCNC: 30.4 G/DL (ref 31.4–37.4)
MCV RBC AUTO: 77 FL (ref 82–98)
MICROALBUMIN UR-MCNC: 103 MG/L (ref 0–20)
MICROALBUMIN/CREAT 24H UR: 33 MG/G CREATININE (ref 0–30)
MONOCYTES # BLD AUTO: 0.67 THOUSAND/ΜL (ref 0.17–1.22)
MONOCYTES NFR BLD AUTO: 8 % (ref 4–12)
NEUTROPHILS # BLD AUTO: 5.87 THOUSANDS/ΜL (ref 1.85–7.62)
NEUTS SEG NFR BLD AUTO: 69 % (ref 43–75)
NITRITE UR QL STRIP: NEGATIVE
NON-SQ EPI CELLS URNS QL MICRO: ABNORMAL /HPF
NRBC BLD AUTO-RTO: 0 /100 WBCS
PH UR STRIP.AUTO: 5 [PH]
PHOSPHATE SERPL-MCNC: 3.4 MG/DL (ref 2.3–4.1)
PLATELET # BLD AUTO: 281 THOUSANDS/UL (ref 149–390)
PMV BLD AUTO: 11.8 FL (ref 8.9–12.7)
POTASSIUM SERPL-SCNC: 3.8 MMOL/L (ref 3.5–5.3)
PROT UR STRIP-MCNC: ABNORMAL MG/DL
PTH-INTACT SERPL-MCNC: 35.6 PG/ML (ref 18.4–80.1)
RBC # BLD AUTO: 6.04 MILLION/UL (ref 3.88–5.62)
RBC #/AREA URNS AUTO: ABNORMAL /HPF
SODIUM SERPL-SCNC: 144 MMOL/L (ref 136–145)
SP GR UR STRIP.AUTO: >=1.03 (ref 1–1.03)
URATE SERPL-MCNC: 6.5 MG/DL (ref 4.2–8)
UROBILINOGEN UR QL STRIP.AUTO: 0.2 E.U./DL
WBC # BLD AUTO: 8.47 THOUSAND/UL (ref 4.31–10.16)
WBC #/AREA URNS AUTO: ABNORMAL /HPF

## 2021-04-01 PROCEDURE — 81001 URINALYSIS AUTO W/SCOPE: CPT

## 2021-04-01 PROCEDURE — 36415 COLL VENOUS BLD VENIPUNCTURE: CPT

## 2021-04-01 PROCEDURE — 83970 ASSAY OF PARATHORMONE: CPT

## 2021-04-01 PROCEDURE — 80048 BASIC METABOLIC PNL TOTAL CA: CPT

## 2021-04-01 PROCEDURE — 82043 UR ALBUMIN QUANTITATIVE: CPT

## 2021-04-01 PROCEDURE — 82570 ASSAY OF URINE CREATININE: CPT

## 2021-04-01 PROCEDURE — 84100 ASSAY OF PHOSPHORUS: CPT

## 2021-04-01 PROCEDURE — 84550 ASSAY OF BLOOD/URIC ACID: CPT

## 2021-04-01 PROCEDURE — 85025 COMPLETE CBC W/AUTO DIFF WBC: CPT

## 2021-04-01 PROCEDURE — 82306 VITAMIN D 25 HYDROXY: CPT

## 2021-04-08 ENCOUNTER — OFFICE VISIT (OUTPATIENT)
Dept: NEPHROLOGY | Facility: CLINIC | Age: 81
End: 2021-04-08
Payer: COMMERCIAL

## 2021-04-08 VITALS
HEIGHT: 72 IN | WEIGHT: 250 LBS | BODY MASS INDEX: 33.86 KG/M2 | RESPIRATION RATE: 16 BRPM | DIASTOLIC BLOOD PRESSURE: 80 MMHG | HEART RATE: 64 BPM | SYSTOLIC BLOOD PRESSURE: 150 MMHG | TEMPERATURE: 97.8 F

## 2021-04-08 DIAGNOSIS — N18.30 STAGE 3 CHRONIC KIDNEY DISEASE, UNSPECIFIED WHETHER STAGE 3A OR 3B CKD (HCC): Primary | ICD-10-CM

## 2021-04-08 DIAGNOSIS — N18.30 HYPERTENSIVE KIDNEY DISEASE WITH STAGE 3 CHRONIC KIDNEY DISEASE, UNSPECIFIED WHETHER STAGE 3A OR 3B CKD (HCC): ICD-10-CM

## 2021-04-08 DIAGNOSIS — I12.9 HYPERTENSIVE KIDNEY DISEASE WITH STAGE 3 CHRONIC KIDNEY DISEASE, UNSPECIFIED WHETHER STAGE 3A OR 3B CKD (HCC): ICD-10-CM

## 2021-04-08 DIAGNOSIS — E55.9 VITAMIN D DEFICIENCY: ICD-10-CM

## 2021-04-08 PROCEDURE — 1036F TOBACCO NON-USER: CPT | Performed by: INTERNAL MEDICINE

## 2021-04-08 PROCEDURE — 99214 OFFICE O/P EST MOD 30 MIN: CPT | Performed by: INTERNAL MEDICINE

## 2021-04-08 PROCEDURE — 1160F RVW MEDS BY RX/DR IN RCRD: CPT | Performed by: INTERNAL MEDICINE

## 2021-04-08 PROCEDURE — 3077F SYST BP >= 140 MM HG: CPT | Performed by: INTERNAL MEDICINE

## 2021-04-08 PROCEDURE — 3079F DIAST BP 80-89 MM HG: CPT | Performed by: INTERNAL MEDICINE

## 2021-04-08 NOTE — LETTER
April 8, 2021     Brii Canela MD  [address] [place] [address],  [facility] [address] [place] [address]    Patient: Lulu Rossi   YOB: 1940   Date of Visit: 4/8/2021       Dear Dr Zuleyma Barton: Thank you for referring Lulu Rossi to me for evaluation  Below are my notes for this consultation  If you have questions, please do not hesitate to call me  I look forward to following your patient along with you  Sincerely,        Eden Younger MD        CC: No Recipients  Eden Younger MD  4/8/2021  3:46 PM  Sign when Signing Visit  NEPHROLOGY OFFICE FOLLOW UP  Lulu Rossi 68 y o  male MRN: [id number]    Encounter: [id number] DATE: 4/8/2021    REASON FOR VISIT: Lulu Rossi is a 68 y o  male who is here on 4/8/2021 for further management of chronic kidney disease  HPI:    This is a 61-year-old male with a past medical history of CKD stage 3, hypertension, COPD, returns to Nephrology Clinic for further management of CKD stage 3  Upon review of old medical record patient's baseline creatinine seems to be fluctuating between 1 3-1 6  Patient's wife was also present at the time of consultation and history was also obtained from her and all the questions were answered  Patient has COPD  Patient currently does not smoke  According to patient his breathing is currently under good control with the use of nebulizers  Patient also uses oxygen 3 liters/minute  Patient has hypertension which seems under well controlled with the use of current antihypertensive medications  Patient checks blood pressure on regular basis at home  Patient has gout and currently taking allopurinol 100 mg PO daily on daily basis  Patient denies any recent gout flare-up  Patient also have chronic back pain and also been followed by pain management specialist and currently using narcotics        According to patient he was found to have low vitamin-D level in the past and currently taking cholecalciferol 5000 Units PO daily  Patient also have kidney stone and was also seen by Dr Eunice Capone in the past     According to patient he does not use NSAIDs including Mobic  REVIEW OF SYSTEMS:    Review of Systems   Constitutional: Negative for chills and fever  HENT: Negative for nosebleeds and sore throat  Eyes: Negative for photophobia and pain  Respiratory: Negative for choking and wheezing  Cardiovascular: Negative for chest pain and palpitations  Gastrointestinal: Negative for abdominal pain and blood in stool  Endocrine: Negative for heat intolerance  Genitourinary: Negative for flank pain and hematuria  Musculoskeletal: Negative for joint swelling and neck pain  Skin: Negative for color change and pallor  Neurological: Negative for seizures and syncope  Psychiatric/Behavioral: Negative for confusion and suicidal ideas  PAST MEDICAL HISTORY:  Past Medical History:   Diagnosis Date    Back pain     Chronic kidney disease     COPD (chronic obstructive pulmonary disease) (HCC)     History of malignant neoplasm of oral cavity     M0    HL (hearing loss)     Hypertension     Kidney stone 05/2019    Malignant neoplasm of colon (HCC)     descending    Prostate cancer (HCC)     Tachycardia     Thalassemia trait     Tinnitus        PAST SURGICAL HISTORY:  Past Surgical History:   Procedure Laterality Date    BACK SURGERY      CHOLECYSTECTOMY      COLON SURGERY  10/2008    partial colectomy    INCISIONAL HERNIA REPAIR      JOINT REPLACEMENT Right     hip    NV COLONOSCOPY FLX DX W/COLLJ SPEC WHEN PFRMD N/A 7/23/2018    Procedure: COLONOSCOPY;  Surgeon: Leonela Resendez MD;  Location: [facility];   Service: Gastroenterology    PROSTATE SURGERY      SPINAL CORD STIMULATOR IMPLANT      TONSILLECTOMY         SOCIAL HISTORY:  Social History     Substance and Sexual Activity   Alcohol Use Not Currently     Social History     Substance and Sexual Activity   Drug Use No     Social History     Tobacco Use   Smoking Status Former Smoker    Packs/day: 1 00    Years: 25 00    Pack years: 25 00    Types: Cigarettes    Quit date: 2004    Years since quittin 8   Smokeless Tobacco Never Used   Tobacco Comment    non smoker/tobacco user; quit  as per [employer]       FAMILY HISTORY:  Family History   Problem Relation Age of Onset    Heart failure Mother         as per [employer]    Coronary artery disease Mother         [facility]    Diabetes Mother         mellitus - [facility]    Coronary artery disease Father         as [facility]    Cancer Sister         malignant neoplasm       ALLERGY:  No Known Allergies    MEDICATIONS:    Current Outpatient Medications:     acetaminophen-codeine (TYLENOL with CODEINE #4) 300-60 MG per tablet, Take 1 tablet by mouth every 6 (six) hours as needed for moderate pain, Disp: , Rfl:     albuterol (2 5 mg/3 mL) 0 083 % nebulizer solution, USE 1 VIAL IN NEBULIZER EVERY 6 HOURS, Disp: 120 vial, Rfl: 11    albuterol (VENTOLIN HFA) 90 mcg/act inhaler, Inhale 2 puffs every 6 (six) hours as needed for wheezing or shortness of breath, Disp: 18 g, Rfl: 3    allopurinol (ZYLOPRIM) 100 mg tablet, TAKE 1 TABLET DAILY, Disp: 90 tablet, Rfl: 2    amLODIPine (NORVASC) 5 mg tablet, Take 2 tablets (10 mg total) by mouth every morning, Disp: 90 tablet, Rfl: 0    cholecalciferol (VITAMIN D3) 1,000 units tablet, Take 5,000 Units by mouth daily, Disp: , Rfl:     fluticasone-salmeterol (ADVAIR, WIXELA) 250-50 mcg/dose inhaler, Inhale 1 puff 2 (two) times a day Rinse mouth after use , Disp: 3 Inhaler, Rfl: 2    gabapentin (NEURONTIN) 300 mg capsule, Every 12 hours, Disp: , Rfl:     losartan (COZAAR) 100 MG tablet, Take 1 tablet (100 mg total) by mouth daily, Disp: 90 tablet, Rfl: 2    metoprolol succinate (TOPROL-XL) 25 mg 24 hr tablet, TAKE 1 TABLET DAILY, Disp: 90 tablet, Rfl: 2    Polyvinyl Alcohol-Povidone (REFRESH OP), Apply to eye, Disp: , Rfl:     prochlorperazine (COMPAZINE) 10 mg tablet, Take 1 tablet (10 mg total) by mouth every 6 (six) hours as needed for nausea or vomiting, Disp: 30 tablet, Rfl: 0    umeclidinium bromide (INCRUSE ELLIPTA) 62 5 mcg/inh AEPB inhaler, Inhale 1 puff daily, Disp: 3 Inhaler, Rfl: 2    PHYSICAL EXAM:  Vitals:    04/08/21 1535   BP: 150/80   BP Location: Left arm   Patient Position: Sitting   Cuff Size: Large   Pulse: 64   Resp: 16   Temp: 97 8 °F (36 6 °C)   TempSrc: Temporal   Weight: 113 kg (250 lb)   Height: 5' 11 5" (1 816 m)     Body mass index is 34 38 kg/m²  Physical Exam   Constitutional: He appears well-nourished  No distress  HENT:   Head: Normocephalic and atraumatic  Eyes: No scleral icterus  Neck: Neck supple  No JVD present  Cardiovascular: Normal rate, S1 normal and S2 normal    Pulmonary/Chest: Effort normal  No accessory muscle usage  No respiratory distress  He has no wheezes  Abdominal: Soft  He exhibits no distension  Musculoskeletal:         General: No edema  Right ankle: He exhibits no swelling  Left ankle: He exhibits no swelling  Comments: Moving all extremities   Neurological: He is alert  He is not disoriented  Facial symmetry  Speech is clear   Skin: Skin is warm  No cyanosis  No jaundice    Psychiatric: He is not combative  He is communicative         LAB RESULTS:  Results for orders placed or performed in visit on 04/01/21   CBC and differential   Result Value Ref Range    WBC 8 47 4 31 - 10 16 Thousand/uL    RBC 6 04 (H) 3 88 - 5 62 Million/uL    Hemoglobin 14 2 12 0 - 17 0 g/dL    Hematocrit 46 7 36 5 - 49 3 %    MCV 77 (L) 82 - 98 fL    MCH 23 5 (L) 26 8 - 34 3 pg    MCHC 30 4 (L) 31 4 - 37 4 g/dL    RDW 18 4 (H) 11 6 - 15 1 %    MPV 11 8 8 9 - 12 7 fL    Platelets 975 127 - 972 Thousands/uL    nRBC 0 /100 WBCs    Neutrophils Relative 69 43 - 75 %    Immat GRANS % 0 0 - 2 %    Lymphocytes Relative 20 14 - 44 %    Monocytes Relative 8 4 - 12 %    Eosinophils Relative 2 0 - 6 %    Basophils Relative 1 0 - 1 %    Neutrophils Absolute 5 87 1 85 - 7 62 Thousands/µL    Immature Grans Absolute 0 03 0 00 - 0 20 Thousand/uL    Lymphocytes Absolute 1 71 0 60 - 4 47 Thousands/µL    Monocytes Absolute 0 67 0 17 - 1 22 Thousand/µL    Eosinophils Absolute 0 15 0 00 - 0 61 Thousand/µL    Basophils Absolute 0 04 0 00 - 0 10 Thousands/µL   Basic metabolic panel   Result Value Ref Range    Sodium 144 136 - 145 mmol/L    Potassium 3 8 3 5 - 5 3 mmol/L    Chloride 107 100 - 108 mmol/L    CO2 26 21 - 32 mmol/L    ANION GAP 11 4 - 13 mmol/L    BUN 15 5 - 25 mg/dL    Creatinine 1 63 (H) 0 60 - 1 30 mg/dL    Glucose 100 65 - 140 mg/dL    Calcium 9 2 8 3 - 10 1 mg/dL    eGFR 46 ml/min/1 73sq m   Urinalysis with microscopic   Result Value Ref Range    Clarity, UA Clear     Color, UA Yellow     Specific Gravity, UA >=1 030 1 003 - 1 030    pH, UA 5 0 4 5, 5 0, 5 5, 6 0, 6 5, 7 0, 7 5, 8 0    Glucose, UA Negative Negative mg/dl    Ketones, UA Negative Negative mg/dl    Blood, UA Negative Negative    Protein, UA Trace (A) Negative mg/dl    Nitrite, UA Negative Negative    Bilirubin, UA Negative Negative    Urobilinogen, UA 0 2 0 2, 1 0 E U /dl E U /dl    Leukocytes, UA Negative Negative    WBC, UA None Seen None Seen, 2-4 /hpf    RBC, UA None Seen None Seen, 2-4 /hpf    Bacteria, UA Occasional None Seen, Occasional /hpf    Ca Oxalate April, UA Moderate (A) None Seen /hpf    Epithelial Cells Occasional None Seen, Occasional /hpf   Microalbumin / creatinine urine ratio   Result Value Ref Range    Creatinine, Ur 314 0 mg/dL    Microalbum  ,U,Random 103 0 (H) 0 0 - 20 0 mg/L    Microalb Creat Ratio 33 (H) 0 - 30 mg/g creatinine   Phosphorus   Result Value Ref Range    Phosphorus 3 4 2 3 - 4 1 mg/dL   Uric acid   Result Value Ref Range    Uric Acid 6 5 4 2 - 8 0 mg/dL   PTH, intact   Result Value Ref Range    PTH 35 6 18 4 - 80 1 pg/mL   Vitamin D 25 hydroxy   Result Value Ref Range    Vit D, 25-Hydroxy 55 0 30 0 - 100 0 ng/mL       ASSESSMENT and PLAN:  Diagnoses and all orders for this visit:    Stage 3 chronic kidney disease, unspecified whether stage 3a or 3b CKD  -     CBC and differential; Future  -     Basic metabolic panel; Future  -     Urinalysis with microscopic; Future  -     Microalbumin / creatinine urine ratio; Future  -     Phosphorus; Future  -     Uric acid; Future  -     PTH, intact; Future  -     Vitamin D 25 hydroxy; Future    Hypertensive kidney disease with stage 3 chronic kidney disease, unspecified whether stage 3a or 3b CKD  -     Basic metabolic panel; Future  -     Urinalysis with microscopic; Future  -     Microalbumin / creatinine urine ratio; Future    Vitamin D deficiency  -     Vitamin D 25 hydroxy; Future      1  CKD stage 3  Multifactorial and was suspected due to underlying hypertensive nephrosclerosis  Upon review of old medical record patient's baseline creatinine seems to be fluctuating between 1 3-1 6 and in the interim renal function has remained relatively stable with current creatinine of 1 63 with EGFR of 46   Clinically patient is not in volume overload state and advised patient to drink at least 2 L of fluid on daily basis to ensure staying well hydrated   Management of hypertension as mentioned below  Plan to avoid NSAIDs including Mobic/meloxicam and recheck renal function before next visit  2   Hypertension in chronic kidney disease  Today's blood pressure was slightly on the higher side but personally reviewed patient's home log of blood pressure reading suggesting hypertension is under well control at home and for time being monitor hypertension with amlodipine 10 mg PO daily, metoprolol XL 25 mg PO daily and losartan 100 mg PO daily  Also advised patient to follow low-salt diet   3  Vitamin-D deficiency  Currently patient is taking cholecalciferol 5000 Units PO daily    Current vitamin-D level is 55  Calcium level is also at goal   Plan to continue cholecalciferol at current dose and recheck vitamin-D level with the next visit  Returns to Nephrology Clinic in 6 months  Future labs ordered  Portions of the record may have been created with voice recognition software  Occasional wrong word or "sound a like" substitutions may have occurred due to the inherent limitations of voice recognition software  Read the chart carefully and recognize, using context, where substitutions have occurred  If you have any questions, please contact the dictating provider

## 2021-04-08 NOTE — PROGRESS NOTES
NEPHROLOGY OFFICE FOLLOW UP  Yadira Barrios 68 y o  male MRN: [id number]    Encounter: [id number] DATE: 4/8/2021    REASON FOR VISIT: Yadira Barrios is a 68 y o  male who is here on 4/8/2021 for further management of chronic kidney disease  HPI:    This is a 68-year-old male with a past medical history of CKD stage 3, hypertension, COPD, returns to Nephrology Clinic for further management of CKD stage 3  Upon review of old medical record patient's baseline creatinine seems to be fluctuating between 1 3-1 6  Patient's wife was also present at the time of consultation and history was also obtained from her and all the questions were answered  Patient has COPD  Patient currently does not smoke  According to patient his breathing is currently under good control with the use of nebulizers  Patient also uses oxygen 3 liters/minute  Patient has hypertension which seems under well controlled with the use of current antihypertensive medications  Patient checks blood pressure on regular basis at home  Patient has gout and currently taking allopurinol 100 mg PO daily on daily basis  Patient denies any recent gout flare-up  Patient also have chronic back pain and also been followed by pain management specialist and currently using narcotics  According to patient he was found to have low vitamin-D level in the past and currently taking cholecalciferol 5000 Units PO daily  Patient also have kidney stone and was also seen by Dr Krishan Vdial in the past     According to patient he does not use NSAIDs including Mobic  REVIEW OF SYSTEMS:    Review of Systems   Constitutional: Negative for chills and fever  HENT: Negative for nosebleeds and sore throat  Eyes: Negative for photophobia and pain  Respiratory: Negative for choking and wheezing  Cardiovascular: Negative for chest pain and palpitations  Gastrointestinal: Negative for abdominal pain and blood in stool     Endocrine: Negative for heat intolerance  Genitourinary: Negative for flank pain and hematuria  Musculoskeletal: Negative for joint swelling and neck pain  Skin: Negative for color change and pallor  Neurological: Negative for seizures and syncope  Psychiatric/Behavioral: Negative for confusion and suicidal ideas  PAST MEDICAL HISTORY:  Past Medical History:   Diagnosis Date    Back pain     Chronic kidney disease     COPD (chronic obstructive pulmonary disease) (HCC)     History of malignant neoplasm of oral cavity     M0    HL (hearing loss)     Hypertension     Kidney stone 2019    Malignant neoplasm of colon (HCC)     descending    Prostate cancer (HCC)     Tachycardia     Thalassemia trait     Tinnitus        PAST SURGICAL HISTORY:  Past Surgical History:   Procedure Laterality Date    BACK SURGERY      CHOLECYSTECTOMY      COLON SURGERY  10/2008    partial colectomy    INCISIONAL HERNIA REPAIR      JOINT REPLACEMENT Right     hip    UT COLONOSCOPY FLX DX W/COLLJ SPEC WHEN PFRMD N/A 2018    Procedure: COLONOSCOPY;  Surgeon: Dale Aguila MD;  Location: [facility];   Service: Gastroenterology    PROSTATE SURGERY      SPINAL CORD STIMULATOR IMPLANT      TONSILLECTOMY         SOCIAL HISTORY:  Social History     Substance and Sexual Activity   Alcohol Use Not Currently     Social History     Substance and Sexual Activity   Drug Use No     Social History     Tobacco Use   Smoking Status Former Smoker    Packs/day: 1 00    Years: 25 00    Pack years: 25 00    Types: Cigarettes    Quit date: 2004    Years since quittin 8   Smokeless Tobacco Never Used   Tobacco Comment    non smoker/tobacco user; quit  as per [employer]       FAMILY HISTORY:  Family History   Problem Relation Age of Onset    Heart failure Mother         as per [employer]    Coronary artery disease Mother         as per [employer]    Diabetes Mother         mellitus - as per [employer]    Coronary artery disease Father         as per [facility]    Cancer Sister         malignant neoplasm       ALLERGY:  No Known Allergies    MEDICATIONS:    Current Outpatient Medications:     acetaminophen-codeine (TYLENOL with CODEINE #4) 300-60 MG per tablet, Take 1 tablet by mouth every 6 (six) hours as needed for moderate pain, Disp: , Rfl:     albuterol (2 5 mg/3 mL) 0 083 % nebulizer solution, USE 1 VIAL IN NEBULIZER EVERY 6 HOURS, Disp: 120 vial, Rfl: 11    albuterol (VENTOLIN HFA) 90 mcg/act inhaler, Inhale 2 puffs every 6 (six) hours as needed for wheezing or shortness of breath, Disp: 18 g, Rfl: 3    allopurinol (ZYLOPRIM) 100 mg tablet, TAKE 1 TABLET DAILY, Disp: 90 tablet, Rfl: 2    amLODIPine (NORVASC) 5 mg tablet, Take 2 tablets (10 mg total) by mouth every morning, Disp: 90 tablet, Rfl: 0    cholecalciferol (VITAMIN D3) 1,000 units tablet, Take 5,000 Units by mouth daily, Disp: , Rfl:     fluticasone-salmeterol (ADVAIR, WIXELA) 250-50 mcg/dose inhaler, Inhale 1 puff 2 (two) times a day Rinse mouth after use , Disp: 3 Inhaler, Rfl: 2    gabapentin (NEURONTIN) 300 mg capsule, Every 12 hours, Disp: , Rfl:     losartan (COZAAR) 100 MG tablet, Take 1 tablet (100 mg total) by mouth daily, Disp: 90 tablet, Rfl: 2    metoprolol succinate (TOPROL-XL) 25 mg 24 hr tablet, TAKE 1 TABLET DAILY, Disp: 90 tablet, Rfl: 2    Polyvinyl Alcohol-Povidone (REFRESH OP), Apply to eye, Disp: , Rfl:     prochlorperazine (COMPAZINE) 10 mg tablet, Take 1 tablet (10 mg total) by mouth every 6 (six) hours as needed for nausea or vomiting, Disp: 30 tablet, Rfl: 0    umeclidinium bromide (INCRUSE ELLIPTA) 62 5 mcg/inh AEPB inhaler, Inhale 1 puff daily, Disp: 3 Inhaler, Rfl: 2    PHYSICAL EXAM:  Vitals:    04/08/21 1535   BP: 150/80   BP Location: Left arm   Patient Position: Sitting   Cuff Size: Large   Pulse: 64   Resp: 16   Temp: 97 8 °F (36 6 °C)   TempSrc: Temporal   Weight: 113 kg (250 lb)   Height: 5' 11 5" (1 816 m)     Body mass index is 34 38 kg/m²  Physical Exam   Constitutional: He appears well-nourished  No distress  HENT:   Head: Normocephalic and atraumatic  Eyes: No scleral icterus  Neck: Neck supple  No JVD present  Cardiovascular: Normal rate, S1 normal and S2 normal    Pulmonary/Chest: Effort normal  No accessory muscle usage  No respiratory distress  He has no wheezes  Abdominal: Soft  He exhibits no distension  Musculoskeletal:         General: No edema  Right ankle: He exhibits no swelling  Left ankle: He exhibits no swelling  Comments: Moving all extremities   Neurological: He is alert  He is not disoriented  Facial symmetry  Speech is clear   Skin: Skin is warm  No cyanosis  No jaundice    Psychiatric: He is not combative  He is communicative         LAB RESULTS:  Results for orders placed or performed in visit on 04/01/21   CBC and differential   Result Value Ref Range    WBC 8 47 4 31 - 10 16 Thousand/uL    RBC 6 04 (H) 3 88 - 5 62 Million/uL    Hemoglobin 14 2 12 0 - 17 0 g/dL    Hematocrit 46 7 36 5 - 49 3 %    MCV 77 (L) 82 - 98 fL    MCH 23 5 (L) 26 8 - 34 3 pg    MCHC 30 4 (L) 31 4 - 37 4 g/dL    RDW 18 4 (H) 11 6 - 15 1 %    MPV 11 8 8 9 - 12 7 fL    Platelets 558 096 - 130 Thousands/uL    nRBC 0 /100 WBCs    Neutrophils Relative 69 43 - 75 %    Immat GRANS % 0 0 - 2 %    Lymphocytes Relative 20 14 - 44 %    Monocytes Relative 8 4 - 12 %    Eosinophils Relative 2 0 - 6 %    Basophils Relative 1 0 - 1 %    Neutrophils Absolute 5 87 1 85 - 7 62 Thousands/µL    Immature Grans Absolute 0 03 0 00 - 0 20 Thousand/uL    Lymphocytes Absolute 1 71 0 60 - 4 47 Thousands/µL    Monocytes Absolute 0 67 0 17 - 1 22 Thousand/µL    Eosinophils Absolute 0 15 0 00 - 0 61 Thousand/µL    Basophils Absolute 0 04 0 00 - 0 10 Thousands/µL   Basic metabolic panel   Result Value Ref Range    Sodium 144 136 - 145 mmol/L    Potassium 3 8 3 5 - 5 3 mmol/L    Chloride 107 100 - 108 mmol/L CO2 26 21 - 32 mmol/L    ANION GAP 11 4 - 13 mmol/L    BUN 15 5 - 25 mg/dL    Creatinine 1 63 (H) 0 60 - 1 30 mg/dL    Glucose 100 65 - 140 mg/dL    Calcium 9 2 8 3 - 10 1 mg/dL    eGFR 46 ml/min/1 73sq m   Urinalysis with microscopic   Result Value Ref Range    Clarity, UA Clear     Color, UA Yellow     Specific Gravity, UA >=1 030 1 003 - 1 030    pH, UA 5 0 4 5, 5 0, 5 5, 6 0, 6 5, 7 0, 7 5, 8 0    Glucose, UA Negative Negative mg/dl    Ketones, UA Negative Negative mg/dl    Blood, UA Negative Negative    Protein, UA Trace (A) Negative mg/dl    Nitrite, UA Negative Negative    Bilirubin, UA Negative Negative    Urobilinogen, UA 0 2 0 2, 1 0 E U /dl E U /dl    Leukocytes, UA Negative Negative    WBC, UA None Seen None Seen, 2-4 /hpf    RBC, UA None Seen None Seen, 2-4 /hpf    Bacteria, UA Occasional None Seen, Occasional /hpf    Ca Oxalate April, UA Moderate (A) None Seen /hpf    Epithelial Cells Occasional None Seen, Occasional /hpf   Microalbumin / creatinine urine ratio   Result Value Ref Range    Creatinine, Ur 314 0 mg/dL    Microalbum  ,U,Random 103 0 (H) 0 0 - 20 0 mg/L    Microalb Creat Ratio 33 (H) 0 - 30 mg/g creatinine   Phosphorus   Result Value Ref Range    Phosphorus 3 4 2 3 - 4 1 mg/dL   Uric acid   Result Value Ref Range    Uric Acid 6 5 4 2 - 8 0 mg/dL   PTH, intact   Result Value Ref Range    PTH 35 6 18 4 - 80 1 pg/mL   Vitamin D 25 hydroxy   Result Value Ref Range    Vit D, 25-Hydroxy 55 0 30 0 - 100 0 ng/mL       ASSESSMENT and PLAN:  Diagnoses and all orders for this visit:    Stage 3 chronic kidney disease, unspecified whether stage 3a or 3b CKD  -     CBC and differential; Future  -     Basic metabolic panel; Future  -     Urinalysis with microscopic; Future  -     Microalbumin / creatinine urine ratio; Future  -     Phosphorus; Future  -     Uric acid; Future  -     PTH, intact; Future  -     Vitamin D 25 hydroxy;  Future    Hypertensive kidney disease with stage 3 chronic kidney disease, unspecified whether stage 3a or 3b CKD  -     Basic metabolic panel; Future  -     Urinalysis with microscopic; Future  -     Microalbumin / creatinine urine ratio; Future    Vitamin D deficiency  -     Vitamin D 25 hydroxy; Future      1  CKD stage 3  Multifactorial and was suspected due to underlying hypertensive nephrosclerosis  Upon review of old medical record patient's baseline creatinine seems to be fluctuating between 1 3-1 6 and in the interim renal function has remained relatively stable with current creatinine of 1 63 with EGFR of 46   Clinically patient is not in volume overload state and advised patient to drink at least 2 L of fluid on daily basis to ensure staying well hydrated   Management of hypertension as mentioned below  Plan to avoid NSAIDs including Mobic/meloxicam and recheck renal function before next visit  2   Hypertension in chronic kidney disease  Today's blood pressure was slightly on the higher side but personally reviewed patient's home log of blood pressure reading suggesting hypertension is under well control at home and for time being monitor hypertension with amlodipine 10 mg PO daily, metoprolol XL 25 mg PO daily and losartan 100 mg PO daily  Also advised patient to follow low-salt diet   3  Vitamin-D deficiency  Currently patient is taking cholecalciferol 5000 Units PO daily  Current vitamin-D level is 55  Calcium level is also at goal   Plan to continue cholecalciferol at current dose and recheck vitamin-D level with the next visit  Returns to Nephrology Clinic in 6 months  Future labs ordered  Labs ( reviewed on 10/5/2021 )   Creatinine 1 69 with EGFR of 44  Hemoglobin 14 3   Urine analysis showed no dysuria  Urine microalbumin : creatinine ratio of 22 mg  Vitamin- D 70 -> continue cholecalciferol  Normal PTH ( 51 ), uric acid ( 5 9 ), sodium, potassium, bicarb, calcium and phosphorus       Portions of the record may have been created with voice recognition software  Occasional wrong word or "sound a like" substitutions may have occurred due to the inherent limitations of voice recognition software  Read the chart carefully and recognize, using context, where substitutions have occurred  If you have any questions, please contact the dictating provider

## 2021-04-30 DIAGNOSIS — I10 ESSENTIAL HYPERTENSION: ICD-10-CM

## 2021-04-30 RX ORDER — AMLODIPINE BESYLATE 5 MG/1
10 TABLET ORAL EVERY MORNING
Qty: 90 TABLET | Refills: 1 | Status: SHIPPED | OUTPATIENT
Start: 2021-04-30 | End: 2021-08-12

## 2021-05-03 NOTE — ED PROVIDER NOTES
History  Chief Complaint   Patient presents with    Diarrhea     patient is c/o diarrhea that began this morning  approx 15 episodes  denies abd pain      Patient is a pleasant 60-year-old male that reports to the emergency department with 10-15 episodes of diarrhea, nonbloody that started this morning  Of note, he was recently hospitalized with kidney injury after an episode of ileus with nausea, vomiting, diarrhea and he was told explicitly by his doctor that if he ever has any sort of nausea vomiting that he should return immediately to the emergency department  Currently he denies on multiple accounts any sort of abdominal pain or discomfort  No dysuria, hematuria  No fevers, chills, sweats  No vomiting  He reports that he does not feel similar to the last time that he was here with an ileus  I discussed the possibility of CT scan with the patient but given that he has no abdominal tenderness, no pain, no rebound, no guarding we will defer from CT imaging at this time, patient given very strict return precautions and follow-up instructions that if these symptoms do not get better that he can come back to the emergency department  Cdiff neg on 10/17  HR 88 during my exam      Prior to Admission Medications   Prescriptions Last Dose Informant Patient Reported? Taking?    Vitamins-Lipotropics (LIPOFLAVONOID PO)   Yes No   Sig: Take 1 tablet by mouth 2 (two) times a day   allopurinol (ZYLOPRIM) 100 mg tablet   Yes No   Sig: Take 1 tablet by mouth daily   amLODIPine (NORVASC) 5 mg tablet   Yes No   Sig: Take 1 tablet by mouth daily   fluticasone-salmeterol (ADVAIR DISKUS) 250-50 mcg/dose inhaler   Yes No   Sig: Inhale 1 puff 2 (two) times a day   losartan (COZAAR) 100 MG tablet   Yes No   Sig: Take 100 mg by mouth daily   metoprolol succinate (TOPROL-XL) 25 mg 24 hr tablet   Yes No   Sig: Take 25 mg by mouth daily   tiotropium (SPIRIVA) 18 mcg inhalation capsule   No No   Sig: Place 1 capsule Transferred pt from ED cot to a hospital bed. Installed waffle mattress for comfort.       Marjan Kemp RN  05/03/21 0104 into inhaler and inhale daily      Facility-Administered Medications: None       Past Medical History:   Diagnosis Date    COPD (chronic obstructive pulmonary disease) ([facility] [address] )     Hypertension        Past Surgical History:   Procedure Laterality Date    JOINT REPLACEMENT         History reviewed  No pertinent family history  I have reviewed and agree with the history as documented  Social History   Substance Use Topics    Smoking status: Former Smoker    Smokeless tobacco: Never Used    Alcohol use No        Review of Systems   Constitutional: Negative for chills and fever  HENT: Negative for ear pain and hearing loss  Respiratory: Negative for chest tightness and shortness of breath  Cardiovascular: Negative for chest pain and leg swelling  Gastrointestinal: Positive for diarrhea  Negative for abdominal pain and nausea  Genitourinary: Negative for dysuria and hematuria  Musculoskeletal: Negative for joint swelling and neck stiffness  Skin: Negative for rash  Neurological: Negative for seizures and headaches  Psychiatric/Behavioral: Negative for hallucinations and suicidal ideas  All other systems reviewed and are negative  Physical Exam  ED Triage Vitals [10/26/17 1748]   Temperature Pulse Respirations Blood Pressure SpO2   (!) 97 °F (36 1 °C) (!) 110 18 128/80 91 %      Temp Source Heart Rate Source Patient Position - Orthostatic VS BP Location FiO2 (%)   Temporal Monitor Sitting Right arm --      Pain Score       No Pain           Orthostatic Vital Signs  Vitals:    10/26/17 1748 10/26/17 2211   BP: 128/80 108/68   Pulse: (!) 110 92   Patient Position - Orthostatic VS: Sitting Lying       Physical Exam   Constitutional: He is oriented to person, place, and time  He appears well-developed and well-nourished  HENT:   Head: Normocephalic and atraumatic  Eyes: EOM are normal  Pupils are equal, round, and reactive to light  Neck: Normal range of motion  Neck supple  Cardiovascular: Normal rate, regular rhythm and normal heart sounds  No murmur heard  Pulmonary/Chest: Effort normal and breath sounds normal  No respiratory distress  He has no wheezes  Abdominal: Soft  Bowel sounds are normal  He exhibits no distension  There is no tenderness  Musculoskeletal: Normal range of motion  He exhibits no edema or tenderness  Neurological: He is alert and oriented to person, place, and time  No cranial nerve deficit  Coordination normal    Skin: Skin is warm and dry  He is not diaphoretic  No erythema  Psychiatric: He has a normal mood and affect  His behavior is normal    Nursing note and vitals reviewed  ED Medications  Medications   ondansetron (ZOFRAN) injection 4 mg (4 mg Intravenous Given 10/26/17 1941)   sodium chloride 0 9 % bolus 500 mL (0 mL Intravenous Stopped 10/26/17 2207)       Diagnostic Studies  Results Reviewed     Procedure Component Value Units Date/Time    Clostridium difficile toxin by PCR [[id number]]  (Normal) Collected:  10/26/17 2117    Lab Status:  Final result Specimen:  Stool from Per Rectum Updated:  10/27/17 1140     C  difficile toxin by PCR NEGATIVE for C difficle toxin by PCR       Comprehensive metabolic panel [[id number]]  (Abnormal) Collected:  10/26/17 1943    Lab Status:  Final result Specimen:  Blood from Arm, Right Updated:  10/26/17 2020     Sodium 140 mmol/L      Potassium 5 2 mmol/L      Chloride 101 mmol/L      CO2 28 mmol/L      Anion Gap 11 mmol/L      BUN 13 mg/dL      Creatinine 1 68 (H) mg/dL      Glucose 101 mg/dL      Calcium 9 3 mg/dL      AST 67 (H) U/L      ALT 49 U/L      Alkaline Phosphatase 92 U/L      Total Protein 8 4 (H) g/dL      Albumin 3 9 g/dL      Total Bilirubin 1 40 (H) mg/dL      eGFR 45 ml/min/1 73sq m     Narrative:         National Kidney Disease Education Program recommendations are as follows:  GFR calculation is accurate only with a steady state creatinine  Chronic Kidney disease less than 60 ml/min/1 73 sq  meters  Kidney failure less than 15 ml/min/1 73 sq  meters  CBC and differential [[id number]]  (Abnormal) Collected:  10/26/17 1943    Lab Status:  Final result Specimen:  Blood from Arm, Right Updated:  10/26/17 2001     WBC 14 61 (H) Thousand/uL      RBC 6 25 (H) Million/uL      Hemoglobin 14 9 g/dL      Hematocrit 48 4 %      MCV 77 (L) fL      MCH 23 8 (L) pg      MCHC 30 8 (L) g/dL      RDW 17 5 (H) %      MPV 12 2 fL      Platelets 005 Thousands/uL      nRBC 0 /100 WBCs      Neutrophils Relative 84 (H) %      Lymphocytes Relative 10 (L) %      Monocytes Relative 5 %      Eosinophils Relative 0 %      Basophils Relative 0 %      Neutrophils Absolute 12 27 (H) Thousands/µL      Lymphocytes Absolute 1 49 Thousands/µL      Monocytes Absolute 0 74 Thousand/µL      Eosinophils Absolute 0 02 Thousand/µL      Basophils Absolute 0 02 Thousands/µL                  No orders to display              Procedures  Procedures       Phone Contacts  ED Phone Contact    ED Course  ED Course as of Oct 27 1142   Thu Oct 26, 2017   2030 Patient feeling better in the emergency department, spoke with him regarding is slight bump in his creatinine, also discussed the elevated white blood cell count  Given his current lack of fevers, chills, sweats, nausea, vomiting, no need to image abdomen  He denies any dysuria, hematuria to suggest that his diarrhea is related to UTI  This may, however, be related to C diff given his recent hospitalization despite the recent negative C diff testing, I will test for C diff                                  [facility]  CritCare Time    Disposition  Final diagnoses:   Diarrhea     Time reflects when diagnosis was documented in both MDM as applicable and the Disposition within this note     Time User Action Codes Description Comment    10/26/2017  8:50 PM Hussein Cage, [address] [R19 7] Diarrhea       ED Disposition     ED Disposition Condition Comment    Discharge  [address] [facility] discharge to home/self care  Condition at discharge: Good        Follow-up Information    None       Discharge Medication List as of 10/26/2017  8:51 PM      CONTINUE these medications which have NOT CHANGED    Details   allopurinol (ZYLOPRIM) 100 mg tablet Take 1 tablet by mouth daily, Starting Tue 4/14/2015, Historical Med      amLODIPine (NORVASC) 5 mg tablet Take 1 tablet by mouth daily, Starting Mon 10/24/2016, Historical Med      fluticasone-salmeterol (ADVAIR DISKUS) 250-50 mcg/dose inhaler Inhale 1 puff 2 (two) times a day, Starting Fri 2/7/2014, Historical Med      losartan (COZAAR) 100 MG tablet Take 100 mg by mouth daily, Starting Wed 4/12/2017, Historical Med      metoprolol succinate (TOPROL-XL) 25 mg 24 hr tablet Take 25 mg by mouth daily, Starting Tue 5/19/2015, Historical Med      tiotropium (SPIRIVA) 18 mcg inhalation capsule Place 1 capsule into inhaler and inhale daily, Starting Fri 10/20/2017, Normal      Vitamins-Lipotropics (LIPOFLAVONOID PO) Take 1 tablet by mouth 2 (two) times a day, Historical Med             Outpatient Discharge Orders  Clostridium difficile toxin by PCR   Standing Status: Future  Standing Exp   Date: 10/26/18         ED Provider  Electronically Signed by           Claude Lindau, MD  10/27/17 0352

## 2021-05-12 ENCOUNTER — APPOINTMENT (OUTPATIENT)
Dept: LAB | Facility: HOSPITAL | Age: 81
End: 2021-05-12
Payer: COMMERCIAL

## 2021-05-12 ENCOUNTER — TRANSCRIBE ORDERS (OUTPATIENT)
Dept: ADMINISTRATIVE | Facility: HOSPITAL | Age: 81
End: 2021-05-12

## 2021-05-12 DIAGNOSIS — G89.4 CHRONIC PAIN SYNDROME: ICD-10-CM

## 2021-05-12 DIAGNOSIS — G89.4 CHRONIC PAIN SYNDROME: Primary | ICD-10-CM

## 2021-05-12 LAB
BUN SERPL-MCNC: 12 MG/DL (ref 5–25)
CREAT SERPL-MCNC: 1.79 MG/DL (ref 0.6–1.3)
GFR SERPL CREATININE-BSD FRML MDRD: 41 ML/MIN/1.73SQ M

## 2021-05-12 PROCEDURE — 84520 ASSAY OF UREA NITROGEN: CPT

## 2021-05-12 PROCEDURE — 82565 ASSAY OF CREATININE: CPT

## 2021-05-12 PROCEDURE — 36415 COLL VENOUS BLD VENIPUNCTURE: CPT

## 2021-05-24 ENCOUNTER — TELEPHONE (OUTPATIENT)
Dept: GASTROENTEROLOGY | Facility: CLINIC | Age: 81
End: 2021-05-24

## 2021-05-25 ENCOUNTER — TELEPHONE (OUTPATIENT)
Dept: GASTROENTEROLOGY | Facility: CLINIC | Age: 81
End: 2021-05-25

## 2021-05-25 NOTE — TELEPHONE ENCOUNTER
Patients wife called to schedule a colonoscopy based upon a recall letter they received  The patient is on oxygen and was in 2018 when he had the last colonoscopy  The wife also said he had a spinal cord stimulator put in last August 2020  Just want to confirm if you need to see this patient first or it is ok to direct schedule his colonoscopy

## 2021-05-28 ENCOUNTER — TELEPHONE (OUTPATIENT)
Dept: UROLOGY | Facility: MEDICAL CENTER | Age: 81
End: 2021-05-28

## 2021-05-28 ENCOUNTER — TRANSCRIBE ORDERS (OUTPATIENT)
Dept: NEUROSURGERY | Facility: CLINIC | Age: 81
End: 2021-05-28

## 2021-05-28 ENCOUNTER — APPOINTMENT (OUTPATIENT)
Dept: LAB | Facility: HOSPITAL | Age: 81
End: 2021-05-28
Payer: COMMERCIAL

## 2021-05-28 DIAGNOSIS — C61 PROSTATE CANCER (HCC): ICD-10-CM

## 2021-05-28 DIAGNOSIS — M54.50 LOWER BACK PAIN: Primary | ICD-10-CM

## 2021-05-28 DIAGNOSIS — C61 PROSTATE CANCER (HCC): Primary | ICD-10-CM

## 2021-05-28 LAB — PSA SERPL-MCNC: <0.1 NG/ML (ref 0–4)

## 2021-05-28 PROCEDURE — 84153 ASSAY OF PSA TOTAL: CPT

## 2021-05-28 NOTE — TELEPHONE ENCOUNTER
Pt managed by Bela Laurent is at [facility] lab for psa,needs updated physician order entered asap

## 2021-06-03 NOTE — PROGRESS NOTES
6/4/2021      Chief Complaint   Patient presents with    Follow-up    Prostate Cancer    Nephrolithiasis         Assessment and Plan    68 y o  male -- Dr Sumner Bel    1  Prostate cancer s/p prostatectomy at an outside facility (2005)  - PSA remains undetectable   - Denies incontinence  - Not interested in treatment for ED at this time  - Will return in one year with PSA prior        History of Present Illness  Eliane Dobbs is a 68 y o  male patient with a history of prostate cancer s/p prostatectomy at an outside facility (2005) here for follow up  Most recent PSA undetectable  Patient states he is doing well overall  Denies any urinary symptoms or incontinence  Patient does state he has erectile dysfunction since the procedure, however, is not interested in therapies  Denies frequency, urgency, dysuria, gross hematuria, flank pain, suprapubic pain, fevers, chills  Review of Systems   Constitutional: Negative for activity change, appetite change, chills and fever  HENT: Negative for congestion and trouble swallowing  Respiratory: Negative for cough and shortness of breath  Cardiovascular: Negative for chest pain, palpitations and leg swelling  Gastrointestinal: Negative for abdominal pain, constipation, diarrhea, nausea and vomiting  Genitourinary: Negative for difficulty urinating, dysuria, flank pain, frequency, hematuria and urgency  Erectile Dysfunction   Musculoskeletal: Negative for back pain and gait problem  Skin: Negative for wound  Allergic/Immunologic: Negative for immunocompromised state  Neurological: Negative for dizziness and syncope  Hematological: Does not bruise/bleed easily  Psychiatric/Behavioral: Negative for confusion  All other systems reviewed and are negative                 Vitals  Vitals:    06/04/21 0957   BP: 138/78   Pulse: (!) 49   Weight: 114 kg (251 lb 12 8 oz)   Height: 5' 11 5" (1 816 m)       Physical Exam  Constitutional: Appearance: Normal appearance  HENT:      Head: Normocephalic  Neck:      Musculoskeletal: Normal range of motion  Pulmonary:      Effort: Pulmonary effort is normal    Skin:     General: Skin is warm and dry  Neurological:      General: No focal deficit present  Mental Status: He is alert and oriented to person, place, and time  Psychiatric:         Mood and Affect: Mood normal          Behavior: Behavior normal          Thought Content:  Thought content normal          Judgment: Judgment normal            Past History  Past Medical History:   Diagnosis Date    Back pain     Chronic kidney disease     COPD (chronic obstructive pulmonary disease) (HCC)     History of malignant neoplasm of oral cavity     M0    HL (hearing loss)     Hypertension     Kidney stone 2019    Malignant neoplasm of colon (HCC)     descending    Prostate cancer ([facility] [address] )     Tachycardia     Thalassemia trait     Tinnitus      Social History     Socioeconomic History    Marital status: /Civil Union     Spouse name: None    Number of children: None    Years of education: None    Highest education level: None   Occupational History    Occupation: [employer]   Social Needs    Financial resource strain: None    Food insecurity     Worry: None     Inability: None    Transportation needs     Medical: None     Non-medical: None   Tobacco Use    Smoking status: Former Smoker     Packs/day: 1 00     Years: 25 00     Pack years: 25 00     Types: Cigarettes     Quit date: 2004     Years since quittin 0    Smokeless tobacco: Never Used    Tobacco comment: non smoker/tobacco user; quit  as per [employer]   Substance and Sexual Activity    Alcohol use: Not Currently    Drug use: No    Sexual activity: Not Currently     Partners: Female   Lifestyle    Physical activity     Days per week: 0 days     Minutes per session: 0 min    Stress: Not at all   Relationships    Social connections     Talks on phone: None     Gets together: None     Attends [employer] service: None     Active member of club or organization: None     Attends meetings of clubs or organizations: None     Relationship status: None    Intimate partner violence     Fear of current or ex partner: None     Emotionally abused: None     Physically abused: None     Forced sexual activity: None   Other Topics Concern    None   Social History Narrative    Active directive    Living independently with spouse    Hx of Living will on file     Social History     Tobacco Use   Smoking Status Former Smoker    Packs/day: 1 00    Years: 25 00    Pack years: 25 00    Types: Cigarettes    Quit date: 2004    Years since quittin 0   Smokeless Tobacco Never Used   Tobacco Comment    non smoker/tobacco user; quit  as per [employer]     Family History   Problem Relation Age of Onset    Heart failure Mother         as per [employer]    Coronary artery disease Mother         as per [employer]    Diabetes Mother         mellitus - as per [employer]    Coronary artery disease Father         as per [employer]    Cancer Sister         malignant neoplasm       The following portions of the patient's history were reviewed and updated as appropriate: allergies, current medications, past medical history, past social history, past surgical history and problem list     Results  No results found for this or any previous visit (from the past 1 hour(s)) ]  Lab Results   Component Value Date    PSA <0 1 2021    PSA <0 1 2020    PSA <0 1 2018    PSA <0 1 2016     Lab Results   Component Value Date    GLUCOSE 97 2015    CALCIUM 9 2 2021     2015    K 3 8 2021    CO2 26 2021     2021    BUN 12 2021    CREATININE 1 79 (H) 2021     Lab Results   Component Value Date    WBC 8 47 2021    HGB 14 2 2021    HCT 46 7 2021    MCV 77 (L) 2021     2021 Giancarlo Pearson PA-C

## 2021-06-04 ENCOUNTER — OFFICE VISIT (OUTPATIENT)
Dept: UROLOGY | Facility: CLINIC | Age: 81
End: 2021-06-04
Payer: COMMERCIAL

## 2021-06-04 VITALS
BODY MASS INDEX: 34.1 KG/M2 | DIASTOLIC BLOOD PRESSURE: 78 MMHG | HEART RATE: 49 BPM | HEIGHT: 72 IN | WEIGHT: 251.8 LBS | SYSTOLIC BLOOD PRESSURE: 138 MMHG

## 2021-06-04 DIAGNOSIS — Z90.79 HX OF PROSTATECTOMY: ICD-10-CM

## 2021-06-04 DIAGNOSIS — C61 PROSTATE CANCER (HCC): Primary | ICD-10-CM

## 2021-06-04 PROCEDURE — 3078F DIAST BP <80 MM HG: CPT | Performed by: PHYSICIAN ASSISTANT

## 2021-06-04 PROCEDURE — 3075F SYST BP GE 130 - 139MM HG: CPT | Performed by: PHYSICIAN ASSISTANT

## 2021-06-04 PROCEDURE — 99213 OFFICE O/P EST LOW 20 MIN: CPT | Performed by: PHYSICIAN ASSISTANT

## 2021-06-15 ENCOUNTER — OFFICE VISIT (OUTPATIENT)
Dept: NEUROSURGERY | Facility: CLINIC | Age: 81
End: 2021-06-15
Payer: COMMERCIAL

## 2021-06-15 ENCOUNTER — TELEPHONE (OUTPATIENT)
Dept: NEUROSURGERY | Facility: CLINIC | Age: 81
End: 2021-06-15

## 2021-06-15 VITALS — RESPIRATION RATE: 18 BRPM | HEIGHT: 72 IN | BODY MASS INDEX: 34 KG/M2 | WEIGHT: 251 LBS | TEMPERATURE: 98 F

## 2021-06-15 DIAGNOSIS — M54.50 LOWER BACK PAIN: ICD-10-CM

## 2021-06-15 DIAGNOSIS — Z96.89 STATUS POST INSERTION OF SPINAL CORD STIMULATOR: ICD-10-CM

## 2021-06-15 DIAGNOSIS — L92.3 FOREIGN BODY GRANULOMA OF SKIN: Primary | ICD-10-CM

## 2021-06-15 DIAGNOSIS — T81.89XA NON-HEALING SURGICAL WOUND, INITIAL ENCOUNTER: ICD-10-CM

## 2021-06-15 PROCEDURE — 1160F RVW MEDS BY RX/DR IN RCRD: CPT | Performed by: NEUROLOGICAL SURGERY

## 2021-06-15 PROCEDURE — 1036F TOBACCO NON-USER: CPT | Performed by: NEUROLOGICAL SURGERY

## 2021-06-15 PROCEDURE — 99203 OFFICE O/P NEW LOW 30 MIN: CPT | Performed by: NEUROLOGICAL SURGERY

## 2021-06-15 NOTE — LETTER
Sally 15, 2021     MD [address]  [place] [place] [address]    Patient: Eddy Cervantes   YOB: 1940   Date of Visit: 6/15/2021       Dear Dr Todd Polk: Thank you for referring Eddy Cervantes to me for evaluation  Below are my notes for this consultation  If you have questions, please do not hesitate to call me  I look forward to following your patient along with you  Sincerely,        Eliza Noonan MD        CC: Garlan Bamberger, MD Nora Loots, PA-C  6/15/2021 12:57 PM  Sign when Signing Visit  Patient ID: Eddy Cervantes is a 68 y o  male  Diagnoses and all orders for this visit:    Foreign body granuloma of skin    Status post insertion of spinal cord stimulator    Non-healing surgical wound, initial encounter    Lower back pain  -     Ambulatory referral to Neurosurgery    Other orders  -     Vitamins-Lipotropics (LIPO-FLAVONOID PLUS PO); Take by mouth          Assessment/Plan:    Very pleasant 47-year-old male, accompanied by his wife, referred by pain management, Dr Marylen Clinton to evaluate spinal cord stimulator paddle insertion site, midline upper back  He is status post dorsal column stimulator placement [facility]) by Dr Collin Goel, 8/3/20, [facility], [employer]       The patient reports the spinal cord stimulator is working effectively  He does report some discomfort at the superior aspect of his incision site mid thoracic spine  His pain management provider observe the incision site and felt as though there was a possible infectious process or some other abnormality and requested surgical consultation  Dr Collin Goel is no longer practicing in the area, consultation with [facility] was advised  On arrival today the patient is awake, alert and orient x3, he indicates the midline thoracic incision, his wife reports the reddened area that was noted by Dr Marylen Clinton, the superior 1/8 to 1/4 of the incision    Very is flat, there is no surrounding erythema, the scar is widened at this area, and there is a small punctate opening which when compressed with a cotton swab root elicited a very small amount of blood  There are no fluctuant areas on palpation, and no palpable masses, falling bodies or other abnormality was noted subcutaneously upon palpation  The spinal cord generator is located in the right buttocks superiorly is easily palpable the surgical incision is well healed and mature  The balance of physical examination was otherwise unremarkable  Please see photo at the bottom of this note  This is felt to be most likely a retained foreign body (foreign body granuloma) such as suture which is attempting to be spit by the body  There is no sign of acute infection, as noted the balance of the incision is otherwise maturing well healed  At this juncture observation is advised, his wife was instructed to observe this site periodically if she noticed any foreign body erupting, any change in drainage such as purulence, or any surrounding at erythema or edema she was to call and return to Neurosurgery  No other treatment is indicated this time  These findings, impressions and recommendations reviewed in great detail with the patient and his wife they expressed understanding and agreement  Return if symptoms worsen or fail to improve  Chief Complaint  Presents to evaluate surgical incision site thoracic spine some tenderness is noted superiorly  HPI       The following portions of the patient's history were reviewed and updated as appropriate: allergies, current medications, past family history, past medical history, past social history, past surgical history and problem list     Review of Systems   Constitutional: Negative  HENT: Negative  Eyes: Negative  Respiratory: Positive for shortness of breath  H/o COPD   Cardiovascular: Negative  Gastrointestinal: Negative      Endocrine: Negative  Stage 3 chronic kidney disease (HCC)  Hypertensive kidney disease with stage 3 chronic kidney disease (HCC)  Prostate cancer      Genitourinary: Negative  Musculoskeletal: Positive for back pain (non radiating ) and gait problem  STIM --8/2020-- Dr Ernandez Ee @ [facility]   Skin: Negative  Allergic/Immunologic: Negative  Hematological: Negative  Psychiatric/Behavioral: Negative for sleep disturbance  All other systems reviewed and are negative  Objective:    Physical Exam  Constitutional:       Appearance: He is well-developed  Comments: Obese male, BMI 34 5   HENT:      Head: Normocephalic and atraumatic  Eyes:      Pupils: Pupils are equal, round, and reactive to light  Cardiovascular:      Rate and Rhythm: Normal rate  Pulmonary:      Effort: Pulmonary effort is normal       Breath sounds: Normal breath sounds  Skin:     General: Skin is warm and dry  Neurological:      Mental Status: He is alert and oriented to person, place, and time  Neurologic Exam     Mental Status   Oriented to person, place, and time  Cranial Nerves     CN III, IV, VI   Pupils are equal, round, and reactive to light

## 2021-06-15 NOTE — PATIENT INSTRUCTIONS
Continue to observe wound should you notice any foreign body, evidence of stimulator paddle, suture or other foreign body call and return sooner for reassessment and management recommendation  Continue to follow with pain management, Dr Judson Rivera per his protocols  Continue follow your primary care provider relative to his protocols        Call return sooner should you any questions

## 2021-06-15 NOTE — PROGRESS NOTES
Patient ID: Wilder Reynolds is a 68 y o  male  Diagnoses and all orders for this visit:    Foreign body granuloma of skin    Status post insertion of spinal cord stimulator    Non-healing surgical wound, initial encounter    Lower back pain  -     Ambulatory referral to Neurosurgery    Other orders  -     Vitamins-Lipotropics (LIPO-FLAVONOID PLUS PO); Take by mouth          Assessment/Plan:    Very pleasant 37-year-old male, accompanied by his wife, referred by pain management, Dr Dick Dc to evaluate spinal cord stimulator paddle insertion site, midline upper back  He is status post dorsal column stimulator placement [facility]) by Dr Branden Yancey, 8/3/20, [facility], [facility]       The patient reports the spinal cord stimulator is working effectively  He does report some discomfort at the superior aspect of his incision site mid thoracic spine  His pain management provider observe the incision site and felt as though there was a possible infectious process or some other abnormality and requested surgical consultation  Dr Branden Yancey is no longer practicing in the area, consultation with [facility]surgery was advised  On arrival today the patient is awake, alert and orient x3, he indicates the midline thoracic incision, his wife reports the reddened area that was noted by Dr Dick Dc, the superior 1/8 to 1/4 of the incision  Very is flat, there is no surrounding erythema, the scar is widened at this area, and there is a small punctate opening which when compressed with a cotton swab root elicited a very small amount of blood  There are no fluctuant areas on palpation, and no palpable masses, falling bodies or other abnormality was noted subcutaneously upon palpation  The spinal cord generator is located in the right buttocks superiorly is easily palpable the surgical incision is well healed and mature  The balance of physical examination was otherwise unremarkable      Please see photo at the bottom of this note  This is felt to be most likely a retained foreign body (foreign body granuloma) such as suture which is attempting to be spit by the body  There is no sign of acute infection, as noted the balance of the incision is otherwise maturing well healed  At this juncture observation is advised, his wife was instructed to observe this site periodically if she noticed any foreign body erupting, any change in drainage such as purulence, or any surrounding at erythema or edema she was to call and return to Neurosurgery  No other treatment is indicated this time  These findings, impressions and recommendations reviewed in great detail with the patient and his wife they expressed understanding and agreement  Return if symptoms worsen or fail to improve  Chief Complaint  Presents to evaluate surgical incision site thoracic spine some tenderness is noted superiorly  HPI       The following portions of the patient's history were reviewed and updated as appropriate: allergies, current medications, past family history, past medical history, past social history, past surgical history and problem list     Review of Systems   Constitutional: Negative  HENT: Negative  Eyes: Negative  Respiratory: Positive for shortness of breath  H/o COPD   Cardiovascular: Negative  Gastrointestinal: Negative  Endocrine: Negative  Stage 3 chronic kidney disease (HCC)  Hypertensive kidney disease with stage 3 chronic kidney disease (HCC)  Prostate cancer      Genitourinary: Negative  Musculoskeletal: Positive for back pain (non radiating ) and gait problem  STIM --8/2020-- Dr Zoila Sanabria @ [facility]   Skin: Negative  Allergic/Immunologic: Negative  Hematological: Negative  Psychiatric/Behavioral: Negative for sleep disturbance  All other systems reviewed and are negative  Objective:    Physical Exam  Constitutional:       Appearance: He is well-developed        Comments: Obese male, BMI 34 5   HENT:      Head: Normocephalic and atraumatic  Eyes:      Pupils: Pupils are equal, round, and reactive to light  Cardiovascular:      Rate and Rhythm: Normal rate  Pulmonary:      Effort: Pulmonary effort is normal       Breath sounds: Normal breath sounds  Skin:     General: Skin is warm and dry  Neurological:      Mental Status: He is alert and oriented to person, place, and time  Neurologic Exam     Mental Status   Oriented to person, place, and time  Cranial Nerves     CN III, IV, VI   Pupils are equal, round, and reactive to light

## 2021-06-17 DIAGNOSIS — J43.2 CENTRILOBULAR EMPHYSEMA (HCC): ICD-10-CM

## 2021-07-07 ENCOUNTER — RA CDI HCC (OUTPATIENT)
Dept: OTHER | Facility: HOSPITAL | Age: 81
End: 2021-07-07

## 2021-07-07 NOTE — PROGRESS NOTES
[facility]  coding opportunities          Chart reviewed, no opportunity found: CHART REVIEWED, NO OPPORTUNITY FOUND                     Patients insurance company: [facility] [address]  (Medicare Advantage and Commercial)

## 2021-07-08 ENCOUNTER — APPOINTMENT (OUTPATIENT)
Dept: LAB | Facility: HOSPITAL | Age: 81
End: 2021-07-08
Attending: INTERNAL MEDICINE
Payer: COMMERCIAL

## 2021-07-08 DIAGNOSIS — E11.22 TYPE 2 DIABETES MELLITUS WITH STAGE 3B CHRONIC KIDNEY DISEASE, WITHOUT LONG-TERM CURRENT USE OF INSULIN (HCC): ICD-10-CM

## 2021-07-08 DIAGNOSIS — N18.32 TYPE 2 DIABETES MELLITUS WITH STAGE 3B CHRONIC KIDNEY DISEASE, WITHOUT LONG-TERM CURRENT USE OF INSULIN (HCC): ICD-10-CM

## 2021-07-08 LAB
ALBUMIN SERPL BCP-MCNC: 3.9 G/DL (ref 3.5–5)
ALP SERPL-CCNC: 131 U/L (ref 46–116)
ALT SERPL W P-5'-P-CCNC: 29 U/L (ref 12–78)
ANION GAP SERPL CALCULATED.3IONS-SCNC: 13 MMOL/L (ref 4–13)
AST SERPL W P-5'-P-CCNC: 40 U/L (ref 5–45)
BASOPHILS # BLD AUTO: 0.03 THOUSANDS/ΜL (ref 0–0.1)
BASOPHILS NFR BLD AUTO: 0 % (ref 0–1)
BILIRUB SERPL-MCNC: 0.72 MG/DL (ref 0.2–1)
BUN SERPL-MCNC: 9 MG/DL (ref 5–25)
CALCIUM SERPL-MCNC: 9.4 MG/DL (ref 8.3–10.1)
CHLORIDE SERPL-SCNC: 107 MMOL/L (ref 100–108)
CHOLEST SERPL-MCNC: 97 MG/DL (ref 50–200)
CO2 SERPL-SCNC: 25 MMOL/L (ref 21–32)
CREAT SERPL-MCNC: 1.61 MG/DL (ref 0.6–1.3)
EOSINOPHIL # BLD AUTO: 0.11 THOUSAND/ΜL (ref 0–0.61)
EOSINOPHIL NFR BLD AUTO: 1 % (ref 0–6)
ERYTHROCYTE [DISTWIDTH] IN BLOOD BY AUTOMATED COUNT: 17.1 % (ref 11.6–15.1)
EST. AVERAGE GLUCOSE BLD GHB EST-MCNC: 126 MG/DL
GFR SERPL CREATININE-BSD FRML MDRD: 47 ML/MIN/1.73SQ M
GLUCOSE P FAST SERPL-MCNC: 101 MG/DL (ref 65–99)
HBA1C MFR BLD: 6 %
HCT VFR BLD AUTO: 44.5 % (ref 36.5–49.3)
HDLC SERPL-MCNC: 36 MG/DL
HGB BLD-MCNC: 13.5 G/DL (ref 12–17)
IMM GRANULOCYTES # BLD AUTO: 0.02 THOUSAND/UL (ref 0–0.2)
IMM GRANULOCYTES NFR BLD AUTO: 0 % (ref 0–2)
LDLC SERPL CALC-MCNC: 44 MG/DL (ref 0–100)
LYMPHOCYTES # BLD AUTO: 1.74 THOUSANDS/ΜL (ref 0.6–4.47)
LYMPHOCYTES NFR BLD AUTO: 22 % (ref 14–44)
MCH RBC QN AUTO: 23.5 PG (ref 26.8–34.3)
MCHC RBC AUTO-ENTMCNC: 30.3 G/DL (ref 31.4–37.4)
MCV RBC AUTO: 77 FL (ref 82–98)
MONOCYTES # BLD AUTO: 0.56 THOUSAND/ΜL (ref 0.17–1.22)
MONOCYTES NFR BLD AUTO: 7 % (ref 4–12)
NEUTROPHILS # BLD AUTO: 5.41 THOUSANDS/ΜL (ref 1.85–7.62)
NEUTS SEG NFR BLD AUTO: 70 % (ref 43–75)
NONHDLC SERPL-MCNC: 61 MG/DL
NRBC BLD AUTO-RTO: 0 /100 WBCS
PLATELET # BLD AUTO: 275 THOUSANDS/UL (ref 149–390)
PMV BLD AUTO: 11.7 FL (ref 8.9–12.7)
POTASSIUM SERPL-SCNC: 3.6 MMOL/L (ref 3.5–5.3)
PROT SERPL-MCNC: 8 G/DL (ref 6.4–8.2)
RBC # BLD AUTO: 5.75 MILLION/UL (ref 3.88–5.62)
SODIUM SERPL-SCNC: 145 MMOL/L (ref 136–145)
TRIGL SERPL-MCNC: 87 MG/DL
TSH SERPL DL<=0.05 MIU/L-ACNC: 1.63 UIU/ML (ref 0.36–3.74)
WBC # BLD AUTO: 7.87 THOUSAND/UL (ref 4.31–10.16)

## 2021-07-08 PROCEDURE — 80053 COMPREHEN METABOLIC PANEL: CPT

## 2021-07-08 PROCEDURE — 84443 ASSAY THYROID STIM HORMONE: CPT

## 2021-07-08 PROCEDURE — 83036 HEMOGLOBIN GLYCOSYLATED A1C: CPT

## 2021-07-08 PROCEDURE — 85025 COMPLETE CBC W/AUTO DIFF WBC: CPT

## 2021-07-08 PROCEDURE — 36415 COLL VENOUS BLD VENIPUNCTURE: CPT

## 2021-07-08 PROCEDURE — 80061 LIPID PANEL: CPT

## 2021-07-12 ENCOUNTER — OFFICE VISIT (OUTPATIENT)
Dept: INTERNAL MEDICINE CLINIC | Facility: CLINIC | Age: 81
End: 2021-07-12
Payer: COMMERCIAL

## 2021-07-12 VITALS
TEMPERATURE: 98.6 F | DIASTOLIC BLOOD PRESSURE: 82 MMHG | BODY MASS INDEX: 33.67 KG/M2 | WEIGHT: 248.6 LBS | SYSTOLIC BLOOD PRESSURE: 138 MMHG | HEART RATE: 53 BPM | HEIGHT: 72 IN | OXYGEN SATURATION: 95 %

## 2021-07-12 DIAGNOSIS — I12.9 HYPERTENSIVE KIDNEY DISEASE WITH STAGE 3 CHRONIC KIDNEY DISEASE, UNSPECIFIED WHETHER STAGE 3A OR 3B CKD (HCC): ICD-10-CM

## 2021-07-12 DIAGNOSIS — J96.11 CHRONIC RESPIRATORY FAILURE WITH HYPOXIA, ON HOME O2 THERAPY (HCC): ICD-10-CM

## 2021-07-12 DIAGNOSIS — E11.22 TYPE 2 DIABETES MELLITUS WITH STAGE 3B CHRONIC KIDNEY DISEASE, WITHOUT LONG-TERM CURRENT USE OF INSULIN (HCC): Primary | ICD-10-CM

## 2021-07-12 DIAGNOSIS — N18.32 TYPE 2 DIABETES MELLITUS WITH STAGE 3B CHRONIC KIDNEY DISEASE, WITHOUT LONG-TERM CURRENT USE OF INSULIN (HCC): Primary | ICD-10-CM

## 2021-07-12 DIAGNOSIS — Z85.038 HISTORY OF COLON CANCER: ICD-10-CM

## 2021-07-12 DIAGNOSIS — Z85.46 HISTORY OF PROSTATE CANCER: ICD-10-CM

## 2021-07-12 DIAGNOSIS — N18.31 STAGE 3A CHRONIC KIDNEY DISEASE (HCC): ICD-10-CM

## 2021-07-12 DIAGNOSIS — I10 ESSENTIAL HYPERTENSION: ICD-10-CM

## 2021-07-12 DIAGNOSIS — C61 PROSTATE CANCER (HCC): ICD-10-CM

## 2021-07-12 DIAGNOSIS — Z96.89 STATUS POST INSERTION OF SPINAL CORD STIMULATOR: ICD-10-CM

## 2021-07-12 DIAGNOSIS — J43.2 CENTRILOBULAR EMPHYSEMA (HCC): ICD-10-CM

## 2021-07-12 DIAGNOSIS — E78.5 HYPERLIPIDEMIA, UNSPECIFIED HYPERLIPIDEMIA TYPE: ICD-10-CM

## 2021-07-12 DIAGNOSIS — N18.30 HYPERTENSIVE KIDNEY DISEASE WITH STAGE 3 CHRONIC KIDNEY DISEASE, UNSPECIFIED WHETHER STAGE 3A OR 3B CKD (HCC): ICD-10-CM

## 2021-07-12 DIAGNOSIS — Z99.81 CHRONIC RESPIRATORY FAILURE WITH HYPOXIA, ON HOME O2 THERAPY (HCC): ICD-10-CM

## 2021-07-12 PROCEDURE — 3725F SCREEN DEPRESSION PERFORMED: CPT | Performed by: INTERNAL MEDICINE

## 2021-07-12 PROCEDURE — 99214 OFFICE O/P EST MOD 30 MIN: CPT | Performed by: INTERNAL MEDICINE

## 2021-07-12 PROCEDURE — 3079F DIAST BP 80-89 MM HG: CPT | Performed by: INTERNAL MEDICINE

## 2021-07-12 PROCEDURE — 1036F TOBACCO NON-USER: CPT | Performed by: INTERNAL MEDICINE

## 2021-07-12 PROCEDURE — 3288F FALL RISK ASSESSMENT DOCD: CPT | Performed by: INTERNAL MEDICINE

## 2021-07-12 PROCEDURE — 3075F SYST BP GE 130 - 139MM HG: CPT | Performed by: INTERNAL MEDICINE

## 2021-07-12 PROCEDURE — 1160F RVW MEDS BY RX/DR IN RCRD: CPT | Performed by: INTERNAL MEDICINE

## 2021-07-12 PROCEDURE — G0439 PPPS, SUBSEQ VISIT: HCPCS | Performed by: INTERNAL MEDICINE

## 2021-07-12 PROCEDURE — 1170F FXNL STATUS ASSESSED: CPT | Performed by: INTERNAL MEDICINE

## 2021-07-12 PROCEDURE — 1125F AMNT PAIN NOTED PAIN PRSNT: CPT | Performed by: INTERNAL MEDICINE

## 2021-07-12 NOTE — PATIENT INSTRUCTIONS
Lab data reviewed in detail and compared prior    Type 2 diabetes mellitus remained stable with diet with A1c 6 0  Hyperlipidemia at goal LDL 44    Hypertension stable on current regimen    Chronic kidney disease stage IIIA has improved somewhat following with Nephrology    History of prostate cancer stable following with Urology    History of colon cancer-advised no further colonoscopy needed    Lumbar spinal stenosis following with pain management is status post spinal cord stimulator    COPD clinically stable on present regimen    Routine follow-up after labs in 6 months, sooner as needed  Health maintenance up-to-date  Medicare Preventive Visit Patient Instructions  Thank you for completing your Welcome to Medicare Visit or Medicare Annual Wellness Visit today  Your next wellness visit will be due in one year (7/13/2022)  The screening/preventive services that you may require over the next 5-10 years are detailed below  Some tests may not apply to you based off risk factors and/or age  Screening tests ordered at today's visit but not completed yet may show as past due  Also, please note that scanned in results may not display below  Preventive Screenings:  Service Recommendations Previous Testing/Comments   Colorectal Cancer Screening  · Colonoscopy    · Fecal Occult Blood Test (FOBT)/Fecal Immunochemical Test (FIT)  · Fecal DNA/Cologuard Test  · Flexible Sigmoidoscopy Age: 54-65 years old   Colonoscopy: every 10 years (May be performed more frequently if at higher risk)  OR  FOBT/FIT: every 1 year  OR  Cologuard: every 3 years  OR  Sigmoidoscopy: every 5 years  Screening may be recommended earlier than age 48 if at higher risk for colorectal cancer  Also, an individualized decision between you and your healthcare provider will decide whether screening between the ages of 74-80 would be appropriate   Colonoscopy: 07/21/2008  FOBT/FIT: Not on file  Cologuard: Not on file  Sigmoidoscopy: Not on file    History Colorectal Cancer     Prostate Cancer Screening Individualized decision between patient and health care provider in men between ages of 53-78   Medicare will cover every 12 months beginning on the day after your 50th birthday PSA: <0 1 ng/mL     History Prostate Cancer  Screening Not Indicated     Hepatitis C Screening Once for adults born between 1945 and 1965  More frequently in patients at high risk for Hepatitis C Hep C Antibody: Not on file        Diabetes Screening 1-2 times per year if you're at risk for diabetes or have pre-diabetes Fasting glucose: 101 mg/dL   A1C: 6 0 %    Screening Not Indicated  History Diabetes   Cholesterol Screening Once every 5 years if you don't have a lipid disorder  May order more often based on risk factors  Lipid panel: 07/08/2021    Screening Current      Other Preventive Screenings Covered by Medicare:  1  Abdominal Aortic Aneurysm (AAA) Screening: covered once if your at risk  You're considered to be at risk if you have a family history of AAA or a male between the age of 73-68 who smoking at least 100 cigarettes in your lifetime  2  Lung Cancer Screening: covers low dose CT scan once per year if you meet all of the following conditions: (1) Age 50-69; (2) No signs or symptoms of lung cancer; (3) Current smoker or have quit smoking within the last 15 years; (4) You have a tobacco smoking history of at least 30 pack years (packs per day x number of years you smoked); (5) You get a written order from a healthcare provider  3  Glaucoma Screening: covered annually if you're considered high risk: (1) You have diabetes OR (2) Family history of glaucoma OR (3)  aged 48 and older OR (3)  American aged 72 and older  3   Osteoporosis Screening: covered every 2 years if you meet one of the following conditions: (1) Have a vertebral abnormality; (2) On glucocorticoid therapy for more than 3 months; (3) Have primary hyperparathyroidism; (4) On osteoporosis medications and need to assess response to drug therapy  5  HIV Screening: covered annually if you're between the age of 12-76  Also covered annually if you are younger than 13 and older than 72 with risk factors for HIV infection  For pregnant patients, it is covered up to 3 times per pregnancy  Immunizations:  Immunization Recommendations   Influenza Vaccine Annual influenza vaccination during flu season is recommended for all persons aged >= 6 months who do not have contraindications   Pneumococcal Vaccine (Prevnar and Pneumovax)  * Prevnar = PCV13  * Pneumovax = PPSV23 Adults 25-60 years old: 1-3 doses may be recommended based on certain risk factors  Adults 72 years old: Prevnar (PCV13) vaccine recommended followed by Pneumovax (PPSV23) vaccine  If already received PPSV23 since turning 65, then PCV13 recommended at least one year after PPSV23 dose  Hepatitis B Vaccine 3 dose series if at intermediate or high risk (ex: diabetes, end stage renal disease, liver disease)   Tetanus (Td) Vaccine - COST NOT COVERED BY MEDICARE PART B Following completion of primary series, a booster dose should be given every 10 years to maintain immunity against tetanus  Td may also be given as tetanus wound prophylaxis  Tdap Vaccine - COST NOT COVERED BY MEDICARE PART B Recommended at least once for all adults  For pregnant patients, recommended with each pregnancy  Shingles Vaccine (Shingrix) - COST NOT COVERED BY MEDICARE PART B  2 shot series recommended in those aged 48 and above     Health Maintenance Due:      Topic Date Due    Hepatitis C Screening  Never done     Immunizations Due:      Topic Date Due    DTaP,Tdap,and Td Vaccines (2 - Td or Tdap) 07/01/2021    Influenza Vaccine (1) 09/01/2021     Advance Directives   What are advance directives? Advance directives are legal documents that state your wishes and plans for medical care   These plans are made ahead of time in case you lose your ability to make decisions for yourself  Advance directives can apply to any medical decision, such as the treatments you want, and if you want to donate organs  What are the types of advance directives? There are many types of advance directives, and each state has rules about how to use them  You may choose a combination of any of the following:  · Living will: This is a written record of the treatment you want  You can also choose which treatments you do not want, which to limit, and which to stop at a certain time  This includes surgery, medicine, IV fluid, and tube feedings  · Durable power of  for healthcare [facility]): This is a written record that states who you want to make healthcare choices for you when you are unable to make them for yourself  This person, called a proxy, is usually a family member or a friend  You may choose more than 1 proxy  · Do not resuscitate (DNR) order:  A DNR order is used in case your heart stops beating or you stop breathing  It is a request not to have certain forms of treatment, such as CPR  A DNR order may be included in other types of advance directives  · Medical directive: This covers the care that you want if you are in a coma, near death, or unable to make decisions for yourself  You can list the treatments you want for each condition  Treatment may include pain medicine, surgery, blood transfusions, dialysis, IV or tube feedings, and a ventilator (breathing machine)  · Values history: This document has questions about your views, beliefs, and how you feel and think about life  This information can help others choose the care that you would choose  Why are advance directives important? An advance directive helps you control your care  Although spoken wishes may be used, it is better to have your wishes written down  Spoken wishes can be misunderstood, or not followed  Treatments may be given even if you do not want them   An advance directive may make it easier for your family to make difficult choices about your care  Fall Prevention    Fall prevention  includes ways to make your home and other areas safer  It also includes ways you can move more carefully to prevent a fall  Health conditions that cause changes in your blood pressure, vision, or muscle strength and coordination may increase your risk for falls  Medicines may also increase your risk for falls if they make you dizzy, weak, or sleepy  Fall prevention tips:   · Stand or sit up slowly  · Use assistive devices as directed  · Wear shoes that fit well and have soles that   · Wear a personal alarm  · Stay active  · Manage your medical conditions  Home Safety Tips:  · Add items to prevent falls in the bathroom  · Keep paths clear  · Install bright lights in your home  · Keep items you use often on shelves within reach  · Paint or place reflective tape on the edges of your stairs  Weight Management   Why it is important to manage your weight:  Being overweight increases your risk of health conditions such as heart disease, high blood pressure, type 2 diabetes, and certain types of cancer  It can also increase your risk for osteoarthritis, sleep apnea, and other respiratory problems  Aim for a slow, steady weight loss  Even a small amount of weight loss can lower your risk of health problems  How to lose weight safely:  A safe and healthy way to lose weight is to eat fewer calories and get regular exercise  You can lose up about 1 pound a week by decreasing the number of calories you eat by 500 calories each day  Healthy meal plan for weight management:  A healthy meal plan includes a variety of foods, contains fewer calories, and helps you stay healthy  A healthy meal plan includes the following:  · Eat whole-grain foods more often  A healthy meal plan should contain fiber  Fiber is the part of grains, fruits, and vegetables that is not broken down by your body  Whole-grain foods are healthy and provide extra fiber in your diet  Some examples of whole-grain foods are whole-wheat breads and pastas, oatmeal, brown rice, and bulgur  · Eat a variety of vegetables every day  Include dark, leafy greens such as spinach, kale, june greens, and mustard greens  Eat yellow and orange vegetables such as carrots, sweet potatoes, and winter squash  · Eat a variety of fruits every day  Choose fresh or canned fruit (canned in its own juice or light syrup) instead of juice  Fruit juice has very little or no fiber  · Eat low-fat dairy foods  Drink fat-free (skim) milk or 1% milk  Eat fat-free yogurt and low-fat cottage cheese  Try low-fat cheeses such as mozzarella and other reduced-fat cheeses  · Choose meat and other protein foods that are low in fat  Choose beans or other legumes such as split peas or lentils  Choose fish, skinless poultry (chicken or turkey), or lean cuts of red meat (beef or pork)  Before you cook meat or poultry, cut off any visible fat  · Use less fat and oil  Try baking foods instead of frying them  Add less fat, such as margarine, sour cream, regular salad dressing and mayonnaise to foods  Eat fewer high-fat foods  Some examples of high-fat foods include french fries, doughnuts, ice cream, and cakes  · Eat fewer sweets  Limit foods and drinks that are high in sugar  This includes candy, cookies, regular soda, and sweetened drinks  Exercise:  Exercise at least 30 minutes per day on most days of the week  Some examples of exercise include walking, biking, dancing, and swimming  You can also fit in more physical activity by taking the stairs instead of the elevator or parking farther away from stores  Ask your healthcare provider about the best exercise plan for you  © Copyright [employer] 2018 Information is for End User's use only and may not be sold, redistributed or otherwise used for commercial purposes   All illustrations and images included in [facility] [address] are the copyrighted property of [employer]  or [facility] Kole Rubin

## 2021-07-12 NOTE — PROGRESS NOTES
Assessment/Plan:    Diagnoses and all orders for this visit:    Type 2 diabetes mellitus with stage 3b chronic kidney disease, without long-term current use of insulin (HCC)  -     Hemoglobin A1C; Future    Centrilobular emphysema (HCC)    Chronic respiratory failure with hypoxia, on home O2 therapy (HCC)    Essential hypertension    Stage 3a chronic kidney disease (HCC)    Hypertensive kidney disease with stage 3 chronic kidney disease, unspecified whether stage 3a or 3b CKD (HCC)  -     CBC and differential; Future  -     Comprehensive metabolic panel; Future    Prostate cancer ([facility] [address] )    History of colon cancer    History of prostate cancer    Status post insertion of spinal cord stimulator    Hyperlipidemia, unspecified hyperlipidemia type  -     Lipid panel; Future              Patient Instructions     Lab data reviewed in detail and compared prior    Type 2 diabetes mellitus remained stable with diet with A1c 6 0  Hyperlipidemia at goal LDL 44    Hypertension stable on current regimen    Chronic kidney disease stage IIIA has improved somewhat following with Nephrology    History of prostate cancer stable following with Urology    History of colon cancer-advised no further colonoscopy needed    Lumbar spinal stenosis following with pain management is status post spinal cord stimulator    COPD clinically stable on present regimen    Routine follow-up after labs in 6 months, sooner as needed  Health maintenance up-to-date  Medicare Preventive Visit Patient Instructions  Thank you for completing your Welcome to Medicare Visit or Medicare Annual Wellness Visit today  Your next wellness visit will be due in one year (7/13/2022)  The screening/preventive services that you may require over the next 5-10 years are detailed below  Some tests may not apply to you based off risk factors and/or age  Screening tests ordered at today's visit but not completed yet may show as past due   Also, please note that scanned in results may not display below  Preventive Screenings:  Service Recommendations Previous Testing/Comments   Colorectal Cancer Screening  · Colonoscopy    · Fecal Occult Blood Test (FOBT)/Fecal Immunochemical Test (FIT)  · Fecal DNA/Cologuard Test  · Flexible Sigmoidoscopy Age: 54-65 years old   Colonoscopy: every 10 years (May be performed more frequently if at higher risk)  OR  FOBT/FIT: every 1 year  OR  Cologuard: every 3 years  OR  Sigmoidoscopy: every 5 years  Screening may be recommended earlier than age 48 if at higher risk for colorectal cancer  Also, an individualized decision between you and your healthcare provider will decide whether screening between the ages of 74-80 would be appropriate  Colonoscopy: 07/21/2008  FOBT/FIT: Not on file  Cologuard: Not on file  Sigmoidoscopy: Not on file    History Colorectal Cancer     Prostate Cancer Screening Individualized decision between patient and health care provider in men between ages of 53-78   Medicare will cover every 12 months beginning on the day after your 50th birthday PSA: <0 1 ng/mL     History Prostate Cancer  Screening Not Indicated     Hepatitis C Screening Once for adults born between 1945 and 1965  More frequently in patients at high risk for Hepatitis C Hep C Antibody: Not on file        Diabetes Screening 1-2 times per year if you're at risk for diabetes or have pre-diabetes Fasting glucose: 101 mg/dL   A1C: 6 0 %    Screening Not Indicated  History Diabetes   Cholesterol Screening Once every 5 years if you don't have a lipid disorder  May order more often based on risk factors  Lipid panel: 07/08/2021    Screening Current      Other Preventive Screenings Covered by Medicare:  1  Abdominal Aortic Aneurysm (AAA) Screening: covered once if your at risk  You're considered to be at risk if you have a family history of AAA or a male between the age of 73-68 who smoking at least 100 cigarettes in your lifetime    2  Lung Cancer Screening: covers low dose CT scan once per year if you meet all of the following conditions: (1) Age 50-69; (2) No signs or symptoms of lung cancer; (3) Current smoker or have quit smoking within the last 15 years; (4) You have a tobacco smoking history of at least 30 pack years (packs per day x number of years you smoked); (5) You get a written order from a healthcare provider  3  Glaucoma Screening: covered annually if you're considered high risk: (1) You have diabetes OR (2) Family history of glaucoma OR (3)  aged 48 and older OR (3)  American aged 72 and older  3  Osteoporosis Screening: covered every 2 years if you meet one of the following conditions: (1) Have a vertebral abnormality; (2) On glucocorticoid therapy for more than 3 months; (3) Have primary hyperparathyroidism; (4) On osteoporosis medications and need to assess response to drug therapy  5  HIV Screening: covered annually if you're between the age of 12-76  Also covered annually if you are younger than 13 and older than 72 with risk factors for HIV infection  For pregnant patients, it is covered up to 3 times per pregnancy  Immunizations:  Immunization Recommendations   Influenza Vaccine Annual influenza vaccination during flu season is recommended for all persons aged >= 6 months who do not have contraindications   Pneumococcal Vaccine (Prevnar and Pneumovax)  * Prevnar = PCV13  * Pneumovax = PPSV23 Adults 25-60 years old: 1-3 doses may be recommended based on certain risk factors  Adults 72 years old: Prevnar (PCV13) vaccine recommended followed by Pneumovax (PPSV23) vaccine  If already received PPSV23 since turning 65, then PCV13 recommended at least one year after PPSV23 dose     Hepatitis B Vaccine 3 dose series if at intermediate or high risk (ex: diabetes, end stage renal disease, liver disease)   Tetanus (Td) Vaccine - COST NOT COVERED BY MEDICARE PART B Following completion of primary series, a booster dose should be given every 10 years to maintain immunity against tetanus  Td may also be given as tetanus wound prophylaxis  Tdap Vaccine - COST NOT COVERED BY MEDICARE PART B Recommended at least once for all adults  For pregnant patients, recommended with each pregnancy  Shingles Vaccine (Shingrix) - COST NOT COVERED BY MEDICARE PART B  2 shot series recommended in those aged 48 and above     Health Maintenance Due:      Topic Date Due    Hepatitis C Screening  Never done     Immunizations Due:      Topic Date Due    DTaP,Tdap,and Td Vaccines (2 - Td or Tdap) 07/01/2021    Influenza Vaccine (1) 09/01/2021     Advance Directives   What are advance directives? Advance directives are legal documents that state your wishes and plans for medical care  These plans are made ahead of time in case you lose your ability to make decisions for yourself  Advance directives can apply to any medical decision, such as the treatments you want, and if you want to donate organs  What are the types of advance directives? There are many types of advance directives, and each state has rules about how to use them  You may choose a combination of any of the following:  · Living will: This is a written record of the treatment you want  You can also choose which treatments you do not want, which to limit, and which to stop at a certain time  This includes surgery, medicine, IV fluid, and tube feedings  · Durable power of  for healthcare [place] [facility]): This is a written record that states who you want to make healthcare choices for you when you are unable to make them for yourself  This person, called a proxy, is usually a family member or a friend  You may choose more than 1 proxy  · Do not resuscitate (DNR) order:  A DNR order is used in case your heart stops beating or you stop breathing  It is a request not to have certain forms of treatment, such as CPR  A DNR order may be included in other types of advance directives    · Medical directive: This covers the care that you want if you are in a coma, near death, or unable to make decisions for yourself  You can list the treatments you want for each condition  Treatment may include pain medicine, surgery, blood transfusions, dialysis, IV or tube feedings, and a ventilator (breathing machine)  · Values history: This document has questions about your views, beliefs, and how you feel and think about life  This information can help others choose the care that you would choose  Why are advance directives important? An advance directive helps you control your care  Although spoken wishes may be used, it is better to have your wishes written down  Spoken wishes can be misunderstood, or not followed  Treatments may be given even if you do not want them  An advance directive may make it easier for your family to make difficult choices about your care  Fall Prevention    Fall prevention  includes ways to make your home and other areas safer  It also includes ways you can move more carefully to prevent a fall  Health conditions that cause changes in your blood pressure, vision, or muscle strength and coordination may increase your risk for falls  Medicines may also increase your risk for falls if they make you dizzy, weak, or sleepy  Fall prevention tips:   · Stand or sit up slowly  · Use assistive devices as directed  · Wear shoes that fit well and have soles that   · Wear a personal alarm  · Stay active  · Manage your medical conditions  Home Safety Tips:  · Add items to prevent falls in the bathroom  · Keep paths clear  · Install bright lights in your home  · Keep items you use often on shelves within reach  · Paint or place reflective tape on the edges of your stairs      Weight Management   Why it is important to manage your weight:  Being overweight increases your risk of health conditions such as heart disease, high blood pressure, type 2 diabetes, and certain types of cancer  It can also increase your risk for osteoarthritis, sleep apnea, and other respiratory problems  Aim for a slow, steady weight loss  Even a small amount of weight loss can lower your risk of health problems  How to lose weight safely:  A safe and healthy way to lose weight is to eat fewer calories and get regular exercise  You can lose up about 1 pound a week by decreasing the number of calories you eat by 500 calories each day  Healthy meal plan for weight management:  A healthy meal plan includes a variety of foods, contains fewer calories, and helps you stay healthy  A healthy meal plan includes the following:  · Eat whole-grain foods more often  A healthy meal plan should contain fiber  Fiber is the part of grains, fruits, and vegetables that is not broken down by your body  Whole-grain foods are healthy and provide extra fiber in your diet  Some examples of whole-grain foods are whole-wheat breads and pastas, oatmeal, brown rice, and bulgur  · Eat a variety of vegetables every day  Include dark, leafy greens such as spinach, kale, june greens, and mustard greens  Eat yellow and orange vegetables such as carrots, sweet potatoes, and winter squash  · Eat a variety of fruits every day  Choose fresh or canned fruit (canned in its own juice or light syrup) instead of juice  Fruit juice has very little or no fiber  · Eat low-fat dairy foods  Drink fat-free (skim) milk or 1% milk  Eat fat-free yogurt and low-fat cottage cheese  Try low-fat cheeses such as mozzarella and other reduced-fat cheeses  · Choose meat and other protein foods that are low in fat  Choose beans or other legumes such as split peas or lentils  Choose fish, skinless poultry (chicken or turkey), or lean cuts of red meat (beef or pork)  Before you cook meat or poultry, cut off any visible fat  · Use less fat and oil  Try baking foods instead of frying them   Add less fat, such as margarine, sour cream, regular salad dressing and mayonnaise to foods  Eat fewer high-fat foods  Some examples of high-fat foods include french fries, doughnuts, ice cream, and cakes  · Eat fewer sweets  Limit foods and drinks that are high in sugar  This includes candy, cookies, regular soda, and sweetened drinks  Exercise:  Exercise at least 30 minutes per day on most days of the week  Some examples of exercise include walking, biking, dancing, and swimming  You can also fit in more physical activity by taking the stairs instead of the elevator or parking farther away from stores  Ask your healthcare provider about the best exercise plan for you  © Copyright [employer] 2018 Information is for End User's use only and may not be sold, redistributed or otherwise used for commercial purposes  All illustrations and images included in CareNotes® are the copyrighted property of [employer]  or [employer]       Subjective:      Patient ID: Derick Setting is a 68 y o  male    F/u mmp, review labs, and AWV here w/ Renata Tee who helps w/ hx   Lumbar radiculopathy-s/p SCC, not using it all the time, usually just b/f shopping  F/b PM      COPD-stable, recently changed to trelogy, he continues w/ O2 hs and prn, always when he leaves house  DM-not really following diet, no regular exercise  HTN-taking rx as directed, home bp's daily have all been nl b/t 110-140/70's  CKD-f/b neph, doing better keeping hydrated     Gout-taking allopurinol, no recent flares  H/o prostate ca s/p prostatectomy '05, psa undetectable in May, f/b urology  H/o kidney stones, neg KUB in May        Current Outpatient Medications:     acetaminophen-codeine (TYLENOL with CODEINE #4) 300-60 MG per tablet, Take 1 tablet by mouth every 6 (six) hours as needed for moderate pain, Disp: , Rfl:     Advair Diskus 250-50 MCG/DOSE inhaler, USE 1 INHALATION ORALLY    TWICE DAILY; RINSE MOUTH   AFTER USE, Disp: 60 blister, Rfl: 2    albuterol (2 5 mg/3 mL) 0 083 % nebulizer solution, USE 1 VIAL IN NEBULIZER EVERY 6 HOURS (Patient taking differently: 2 (two) times a day ), Disp: 120 vial, Rfl: 11    albuterol (VENTOLIN HFA) 90 mcg/act inhaler, Inhale 2 puffs every 6 (six) hours as needed for wheezing or shortness of breath, Disp: 18 g, Rfl: 3    allopurinol (ZYLOPRIM) 100 mg tablet, TAKE 1 TABLET DAILY, Disp: 90 tablet, Rfl: 2    amLODIPine (NORVASC) 5 mg tablet, Take 2 tablets (10 mg total) by mouth every morning, Disp: 90 tablet, Rfl: 1    cholecalciferol (VITAMIN D3) 1,000 units tablet, Take 5,000 Units by mouth daily, Disp: , Rfl:     gabapentin (NEURONTIN) 300 mg capsule, every 12 (twelve) hours as needed , Disp: , Rfl:     losartan (COZAAR) 100 MG tablet, Take 1 tablet (100 mg total) by mouth daily, Disp: 90 tablet, Rfl: 2    metoprolol succinate (TOPROL-XL) 25 mg 24 hr tablet, TAKE 1 TABLET DAILY, Disp: 90 tablet, Rfl: 2    Polyvinyl Alcohol-Povidone (REFRESH OP), Apply to eye as needed , Disp: , Rfl:     prochlorperazine (COMPAZINE) 10 mg tablet, Take 1 tablet (10 mg total) by mouth every 6 (six) hours as needed for nausea or vomiting, Disp: 30 tablet, Rfl: 0    umeclidinium bromide (INCRUSE ELLIPTA) 62 5 mcg/inh AEPB inhaler, Inhale 1 puff daily, Disp: 3 Inhaler, Rfl: 2    Vitamins-Lipotropics (LIPO-FLAVONOID PLUS PO), Take by mouth, Disp: , Rfl:     Recent Results (from the past 1008 hour(s))   CBC and differential    Collection Time: 07/08/21 10:49 AM   Result Value Ref Range    WBC 7 87 4 31 - 10 16 Thousand/uL    RBC 5 75 (H) 3 88 - 5 62 Million/uL    Hemoglobin 13 5 12 0 - 17 0 g/dL    Hematocrit 44 5 36 5 - 49 3 %    MCV 77 (L) 82 - 98 fL    MCH 23 5 (L) 26 8 - 34 3 pg    MCHC 30 3 (L) 31 4 - 37 4 g/dL    RDW 17 1 (H) 11 6 - 15 1 %    MPV 11 7 8 9 - 12 7 fL    Platelets 957 613 - 168 Thousands/uL    nRBC 0 /100 WBCs    Neutrophils Relative 70 43 - 75 %    Immat GRANS % 0 0 - 2 %    Lymphocytes Relative 22 14 - 44 %    Monocytes Relative 7 4 - 12 %    Eosinophils Relative 1 0 - 6 %    Basophils Relative 0 0 - 1 %    Neutrophils Absolute 5 41 1 85 - 7 62 Thousands/µL    Immature Grans Absolute 0 02 0 00 - 0 20 Thousand/uL    Lymphocytes Absolute 1 74 0 60 - 4 47 Thousands/µL    Monocytes Absolute 0 56 0 17 - 1 22 Thousand/µL    Eosinophils Absolute 0 11 0 00 - 0 61 Thousand/µL    Basophils Absolute 0 03 0 00 - 0 10 Thousands/µL   Comprehensive metabolic panel    Collection Time: 07/08/21 10:49 AM   Result Value Ref Range    Sodium 145 136 - 145 mmol/L    Potassium 3 6 3 5 - 5 3 mmol/L    Chloride 107 100 - 108 mmol/L    CO2 25 21 - 32 mmol/L    ANION GAP 13 4 - 13 mmol/L    BUN 9 5 - 25 mg/dL    Creatinine 1 61 (H) 0 60 - 1 30 mg/dL    Glucose, Fasting 101 (H) 65 - 99 mg/dL    Calcium 9 4 8 3 - 10 1 mg/dL    AST 40 5 - 45 U/L    ALT 29 12 - 78 U/L    Alkaline Phosphatase 131 (H) 46 - 116 U/L    Total Protein 8 0 6 4 - 8 2 g/dL    Albumin 3 9 3 5 - 5 0 g/dL    Total Bilirubin 0 72 0 20 - 1 00 mg/dL    eGFR 47 ml/min/1 73sq m   Lipid panel    Collection Time: 07/08/21 10:49 AM   Result Value Ref Range    Cholesterol 97 50 - 200 mg/dL    Triglycerides 87 <=150 mg/dL    HDL, Direct 36 (L) >=40 mg/dL    LDL Calculated 44 0 - 100 mg/dL    Non-HDL-Chol (CHOL-HDL) 61 mg/dl   Hemoglobin A1C    Collection Time: 07/08/21 10:49 AM   Result Value Ref Range    Hemoglobin A1C 6 0 (H) Normal 3 8-5 6%; PreDiabetic 5 7-6 4%;  Diabetic >=6 5%; Glycemic control for adults with diabetes <7 0% %     mg/dl   TSH, 3rd generation with Free T4 reflex    Collection Time: 07/08/21 10:49 AM   Result Value Ref Range    TSH 3RD GENERATON 1 632 0 358 - 3 740 uIU/mL       The following portions of the patient's history were reviewed and updated as appropriate: allergies, current medications, past family history, past medical history, past social history, past surgical history and problem list      Review of Systems   Constitutional: Negative for appetite change, chills, diaphoresis, fatigue, fever and unexpected weight change  HENT: Negative for congestion, hearing loss and rhinorrhea  Eyes: Negative for visual disturbance  Respiratory: Negative for cough, chest tightness, shortness of breath and wheezing  Cardiovascular: Negative for chest pain, palpitations and leg swelling  Gastrointestinal: Negative for abdominal pain and blood in stool  Endocrine: Negative for cold intolerance, heat intolerance, polydipsia and polyuria  Genitourinary: Negative for difficulty urinating, dysuria, frequency and urgency  Musculoskeletal: Negative for arthralgias and myalgias  Skin: Negative for rash  Neurological: Negative for dizziness, weakness, light-headedness and headaches  Hematological: Does not bruise/bleed easily  Psychiatric/Behavioral: Negative for dysphoric mood and sleep disturbance  Objective:      Vitals:    07/12/21 1601   BP: 138/82   Pulse: (!) 53   Temp: 98 6 °F (37 °C)   SpO2: 95%          Physical Exam  Constitutional:       Appearance: He is well-developed  HENT:      Head: Normocephalic and atraumatic  Nose: Nose normal    Eyes:      General: No scleral icterus  Conjunctiva/sclera: Conjunctivae normal       Pupils: Pupils are equal, round, and reactive to light  Neck:      Thyroid: No thyromegaly  Vascular: No JVD  Trachea: No tracheal deviation  Cardiovascular:      Rate and Rhythm: Normal rate and regular rhythm  Heart sounds: No murmur heard  No friction rub  No gallop  Pulmonary:      Effort: Pulmonary effort is normal  No respiratory distress  Breath sounds: Normal breath sounds  No wheezing or rales  Musculoskeletal:         General: No deformity  Cervical back: Normal range of motion and neck supple  Lymphadenopathy:      Cervical: No cervical adenopathy  Skin:     General: Skin is warm and dry  Coloration: Skin is not pale  Findings: No erythema or rash     Neurological:      Mental Status: He is alert and oriented to person, place, and time  Cranial Nerves: No cranial nerve deficit  Psychiatric:         Behavior: Behavior normal  Behavior is cooperative  Thought Content:  Thought content normal          Judgment: Judgment normal

## 2021-07-12 NOTE — PROGRESS NOTES
Assessment and Plan:     Problem List Items Addressed This Visit     None           Preventive health issues were discussed with patient, and age appropriate screening tests were ordered as noted in patient's After Visit Summary  Personalized health advice and appropriate referrals for health education or preventive services given if needed, as noted in patient's After Visit Summary       History of Present Illness:     Patient presents for Medicare Annual Wellness visit    Patient Care Team:  Ivonne Ocampo MD as PCP - General (Internal Medicine)  Ankit Metzger MD as Consulting Physician (Nephrology)  Ania Jose MD as Consulting Physician (Urology)  Elizabeth Freeman as Nurse Practitioner (Internal Medicine)  Fannie Villanueva MD as Endoscopist     Problem List:     Patient Active Problem List   Diagnosis    [facility]rilob[facility]    Acute exacerbation of chronic obstructive pulmonary disease (COPD) ([facility]    Acute bronchitis    Hypertension    Post-tussive syncope    Stage 3 chronic kidney disease ([facility]    Type 2 diabetes mellitus with chronic kidney disease, without long-term current use of insulin ([facility] )    Chronic respiratory failure with hypoxia[facility]    Hypertensive kidney disease with stage 3 chronic kidney disease ([facility]    History of colon cancer    Screening for colon cancer    Change in bowel habits    Vitamin D deficiency    History of prostate cancer    Prostate cancer ([facility]    Obesity, morbid ([facility]    Hx of prostatectomy    Foreign body granuloma of skin    Nonhealing surgical wound    Status post insertion of spinal cord stimulator      Past Medical and Surgical History:     Past Medical History:   Diagnosis Date    Back pain     Chronic kidney disease     COPD (chronic obstructive pulmonary disease) ([facility]     History of malignant neoplasm of oral cavity     M0    HL (hearing loss)     Hypertension     Kidney stone 05/2019    Malignant neoplasm of colon (HCC)     descending    Prostate cancer [facility]     Tachycardia     Thalassemia trait     Tinnitus      Past Surgical History:   Procedure Laterality Date    BACK SURGERY      CHOLECYSTECTOMY      COLON SURGERY  10/2008    partial colectomy    INCISIONAL HERNIA REPAIR      JOINT REPLACEMENT Right     hip    CA COLONOSCOPY FLX DX W/COLLJ SPEC WHEN PFRMD N/A 2018    Procedure: COLONOSCOPY;  Surgeon: Gian Jones MD;  Location: [facility];   Service: Gastroenterology    PROSTATE SURGERY      SPINAL CORD STIMULATOR IMPLANT      TONSILLECTOMY        Family History:     Family History   Problem Relation Age of Onset    Heart failure Mother         as per [employer]    Coronary artery disease Mother         as per [employer]    Diabetes Mother         mellitus - as per [employer]    Coronary artery disease Father         as per [employer]    Cancer Sister         malignant neoplasm      Social History:     Social History     Socioeconomic History    Marital status: /Civil Union     Spouse name: None    Number of children: None    Years of education: None    Highest education level: None   Occupational History    Occupation: Cook   Tobacco Use    Smoking status: Former Smoker     Packs/day: 1 00     Years: 25 00     Pack years: 25 00     Types: Cigarettes     Quit date: 2004     Years since quittin     Smokeless tobacco: Never Used    Tobacco comment: non smoker/tobacco user; quit  as per [employer]   Vaping Use    Vaping Use: Never used   Substance and Sexual Activity    Alcohol use: Not Currently    Drug use: No    Sexual activity: Not Currently     Partners: Female   Other Topics Concern    None   Social History Narrative    Active directive    Living independently with spouse    Hx of Living will on file     Social Determinants of Health     Financial Resource Strain:     Difficulty of Paying Living Expenses:    Food Insecurity:  Worried About [facility] in the Last Year:    [address] in the Last Year:    Transportation Needs:     Lack of Transportation (Medical):      Lack of Transportation (Non-Medical):    Physical Activity: Inactive    Days of Exercise per Week: 0 days    Minutes of Exercise per Session: 0 min   Stress: No Stress Concern Present    Feeling of Stress : Not at all   Social Connections:     Frequency of Communication with Friends and Family:     Frequency of Social Gatherings with Friends and Family:     Attends Confucianist Services:     Active Member of Clubs or Organizations:     Attends Club or Organization Meetings:     Marital Status:    Intimate Partner Violence:     Fear of Current or Ex-Partner:     Emotionally Abused:     Physically Abused:     Sexually Abused:       Medications and Allergies:     Current Outpatient Medications   Medication Sig Dispense Refill    acetaminophen-codeine (TYLENOL with CODEINE #4) 300-60 MG per tablet Take 1 tablet by mouth every 6 (six) hours as needed for moderate pain      Advair Diskus 250-50 MCG/DOSE inhaler USE 1 INHALATION ORALLY    TWICE DAILY; RINSE MOUTH   AFTER USE 60 blister 2    albuterol (2 5 mg/3 mL) 0 083 % nebulizer solution USE 1 VIAL IN NEBULIZER EVERY 6 HOURS (Patient taking differently: 2 (two) times a day ) 120 vial 11    albuterol (VENTOLIN HFA) 90 mcg/act inhaler Inhale 2 puffs every 6 (six) hours as needed for wheezing or shortness of breath 18 g 3    allopurinol (ZYLOPRIM) 100 mg tablet TAKE 1 TABLET DAILY 90 tablet 2    amLODIPine (NORVASC) 5 mg tablet Take 2 tablets (10 mg total) by mouth every morning 90 tablet 1    cholecalciferol (VITAMIN D3) 1,000 units tablet Take 5,000 Units by mouth daily      gabapentin (NEURONTIN) 300 mg capsule every 12 (twelve) hours as needed       losartan (COZAAR) 100 MG tablet Take 1 tablet (100 mg total) by mouth daily 90 tablet 2    metoprolol succinate (TOPROL-XL) 25 mg 24 hr tablet TAKE 1 TABLET DAILY 90 tablet 2    Polyvinyl Alcohol-Povidone (REFRESH OP) Apply to eye as needed       prochlorperazine (COMPAZINE) 10 mg tablet Take 1 tablet (10 mg total) by mouth every 6 (six) hours as needed for nausea or vomiting 30 tablet 0    umeclidinium bromide (INCRUSE ELLIPTA) 62 5 mcg/inh AEPB inhaler Inhale 1 puff daily 3 Inhaler 2    Vitamins-Lipotropics (LIPO-FLAVONOID PLUS PO) Take by mouth       No current facility-administered medications for this visit  No Known Allergies   Immunizations:     Immunization History   Administered Date(s) Administered    INFLUENZA 10/26/2012, 10/22/2013, 12/01/2015, 10/07/2016, 09/18/2018, 11/19/2019    Influenza Split High Dose Preservative Free IM 10/07/2014, 10/01/2017    Influenza, high dose seasonal 0 7 mL 10/08/2020    Influenza, seasonal, injectable 10/26/2012, 10/22/2013, 12/01/2015, 09/03/2016    Pneumococcal Conjugate 13-Valent 08/25/2015, 08/25/2015    Pneumococcal Polysaccharide PPV23 01/03/2017    SARS-CoV-2 / COVID-19 mRNA IM (Moderna) 01/26/2021, 02/22/2021    Tdap 07/01/2011    Zoster 1942    Zoster Vaccine Recombinant 12/18/2018, 06/04/2019      Health Maintenance:         Topic Date Due    Hepatitis C Screening  Never done         Topic Date Due    DTaP,Tdap,and Td Vaccines (2 - Td or Tdap) 07/01/2021    Influenza Vaccine (1) 09/01/2021      Medicare Health Risk Assessment:     /82 (BP Location: Left arm, Patient Position: Sitting, Cuff Size: Large)   Pulse (!) 53   Temp 98 6 °F (37 °C) (Tympanic)   Ht 5' 11 5" (1 816 m)   Wt 113 kg (248 lb 9 6 oz)   SpO2 95% Comment: 3L/o2 via n/c  BMI 34 19 kg/m²      Archana Avila is here for his Subsequent Wellness visit  Health Risk Assessment:   Patient rates overall health as fair  Patient feels that their physical health rating is slightly worse  Patient is satisfied with their life  Eyesight was rated as same  Hearing was rated as slightly worse   Patient feels that their emotional and mental health rating is same  Patients states they are sometimes angry  Patient states they are sometimes unusually tired/fatigued  Pain experienced in the last 7 days has been some  Patient's pain rating has been 4/10  Patient states that he has experienced no weight loss or gain in last 6 months  Depression Screening:   PHQ-2 Score: 0      Fall Risk Screening: In the past year, patient has experienced: history of falling in past year    Number of falls: 1  Injured during fall?: No    Feels unsteady when standing or walking?: No    Worried about falling?: No      Home Safety:  Patient does not have trouble with stairs inside or outside of their home  Patient has working smoke alarms and has working carbon monoxide detector  Home safety hazards include: none  Nutrition:   Current diet is Regular  Medications:   Patient is currently taking over-the-counter supplements  OTC medications include: see medication list  Patient is able to manage medications  Activities of Daily Living (ADLs)/Instrumental Activities of Daily Living (IADLs):   Walk and transfer into and out of bed and chair?: Yes  Dress and groom yourself?: Yes    Bathe or shower yourself?: Yes    Feed yourself?  Yes  Do your laundry/housekeeping?: Yes  Manage your money, pay your bills and track your expenses?: No  Make your own meals?: Yes    Do your own shopping?: Yes    Previous Hospitalizations:   Any hospitalizations or ED visits within the last 12 months?: Yes    How many hospitalizations have you had in the last year?: 1-2    Advance Care Planning:   Living will: Yes    Advanced directive: Yes      Cognitive Screening:   Provider or family/friend/caregiver concerned regarding cognition?: No    PREVENTIVE SCREENINGS      Cardiovascular Screening:    General: Screening Current      Diabetes Screening:     General: Screening Not Indicated and History Diabetes      Colorectal Cancer Screening:     General: History Colorectal Cancer      Prostate Cancer Screening:    General: History Prostate Cancer and Screening Not Indicated      Osteoporosis Screening:    General: Screening Not Indicated      Abdominal Aortic Aneurysm (AAA) Screening:    Risk factors include: tobacco use        Lung Cancer Screening:     General: Screening Not Indicated      Hepatitis C Screening:    General: Screening Not Indicated    Screening, Brief Intervention, and Referral to Treatment (SBIRT)    Screening  Typical number of drinks in a day: 0  Typical number of drinks in a week: 0  Interpretation: Low risk drinking behavior      Single Item Drug Screening:  How often have you used an illegal drug (including marijuana) or a prescription medication for non-medical reasons in the past year? never    Single Item Drug Screen Score: 0  Interpretation: Negative screen for possible drug use disorder      Sony Anglin MD

## 2021-07-13 ENCOUNTER — TELEPHONE (OUTPATIENT)
Dept: PULMONOLOGY | Facility: CLINIC | Age: 81
End: 2021-07-13

## 2021-08-12 DIAGNOSIS — J43.2 CENTRILOBULAR EMPHYSEMA (HCC): ICD-10-CM

## 2021-08-12 DIAGNOSIS — I10 HYPERTENSION, UNSPECIFIED TYPE: ICD-10-CM

## 2021-08-12 DIAGNOSIS — I10 ESSENTIAL HYPERTENSION: ICD-10-CM

## 2021-08-12 DIAGNOSIS — M10.9 GOUT, UNSPECIFIED CAUSE, UNSPECIFIED CHRONICITY, UNSPECIFIED SITE: ICD-10-CM

## 2021-08-12 RX ORDER — METOPROLOL SUCCINATE 25 MG/1
TABLET, EXTENDED RELEASE ORAL
Qty: 90 TABLET | Refills: 2 | Status: SHIPPED | OUTPATIENT
Start: 2021-08-12

## 2021-08-12 RX ORDER — ALLOPURINOL 100 MG/1
TABLET ORAL
Qty: 90 TABLET | Refills: 2 | Status: SHIPPED | OUTPATIENT
Start: 2021-08-12

## 2021-08-12 RX ORDER — AMLODIPINE BESYLATE 5 MG/1
TABLET ORAL
Qty: 90 TABLET | Refills: 1 | Status: SHIPPED | OUTPATIENT
Start: 2021-08-12 | End: 2021-11-11

## 2021-08-13 DIAGNOSIS — J43.2 CENTRILOBULAR EMPHYSEMA (HCC): ICD-10-CM

## 2021-08-13 RX ORDER — UMECLIDINIUM 62.5 UG/1
AEROSOL, POWDER ORAL
Qty: 30 BLISTER | Refills: 2 | Status: SHIPPED | OUTPATIENT
Start: 2021-08-13 | End: 2021-08-20 | Stop reason: SDUPTHER

## 2021-08-13 RX ORDER — ALBUTEROL SULFATE 2.5 MG/3ML
2.5 SOLUTION RESPIRATORY (INHALATION) EVERY 6 HOURS PRN
Qty: 360 ML | Refills: 11 | Status: SHIPPED | OUTPATIENT
Start: 2021-08-13 | End: 2021-11-11 | Stop reason: SDUPTHER

## 2021-08-30 DIAGNOSIS — J43.2 CENTRILOBULAR EMPHYSEMA (HCC): ICD-10-CM

## 2021-09-09 ENCOUNTER — TELEPHONE (OUTPATIENT)
Dept: PULMONOLOGY | Facility: CLINIC | Age: 81
End: 2021-09-09

## 2021-09-09 NOTE — TELEPHONE ENCOUNTER
Lm for pt's wife to make appt to come in on 9/13 at 11am  As per Jessenia Coronel he should be seen

## 2021-09-13 ENCOUNTER — OFFICE VISIT (OUTPATIENT)
Dept: PULMONOLOGY | Facility: CLINIC | Age: 81
End: 2021-09-13
Payer: COMMERCIAL

## 2021-09-13 VITALS
HEIGHT: 72 IN | WEIGHT: 248 LBS | HEART RATE: 47 BPM | TEMPERATURE: 97 F | SYSTOLIC BLOOD PRESSURE: 126 MMHG | BODY MASS INDEX: 33.59 KG/M2 | DIASTOLIC BLOOD PRESSURE: 80 MMHG | OXYGEN SATURATION: 93 %

## 2021-09-13 DIAGNOSIS — J44.9 COPD, SEVERE (HCC): ICD-10-CM

## 2021-09-13 DIAGNOSIS — J43.2 CENTRILOBULAR EMPHYSEMA (HCC): ICD-10-CM

## 2021-09-13 DIAGNOSIS — R91.8 PULMONARY NODULES: ICD-10-CM

## 2021-09-13 DIAGNOSIS — Z87.891 FORMER SMOKER: ICD-10-CM

## 2021-09-13 DIAGNOSIS — J96.11 CHRONIC HYPOXEMIC RESPIRATORY FAILURE (HCC): ICD-10-CM

## 2021-09-13 DIAGNOSIS — Z99.81 CHRONIC RESPIRATORY FAILURE WITH HYPOXIA, ON HOME O2 THERAPY (HCC): ICD-10-CM

## 2021-09-13 DIAGNOSIS — R06.02 SHORTNESS OF BREATH: Primary | ICD-10-CM

## 2021-09-13 DIAGNOSIS — J96.11 CHRONIC RESPIRATORY FAILURE WITH HYPOXIA, ON HOME O2 THERAPY (HCC): ICD-10-CM

## 2021-09-13 DIAGNOSIS — R91.1 LUNG NODULE: ICD-10-CM

## 2021-09-13 PROCEDURE — 1160F RVW MEDS BY RX/DR IN RCRD: CPT | Performed by: INTERNAL MEDICINE

## 2021-09-13 PROCEDURE — 99214 OFFICE O/P EST MOD 30 MIN: CPT | Performed by: INTERNAL MEDICINE

## 2021-09-13 PROCEDURE — 3074F SYST BP LT 130 MM HG: CPT | Performed by: INTERNAL MEDICINE

## 2021-09-13 PROCEDURE — 1036F TOBACCO NON-USER: CPT | Performed by: INTERNAL MEDICINE

## 2021-09-13 PROCEDURE — 3079F DIAST BP 80-89 MM HG: CPT | Performed by: INTERNAL MEDICINE

## 2021-09-13 RX ORDER — BUDESONIDE 0.5 MG/2ML
0.5 INHALANT ORAL 2 TIMES DAILY
Qty: 360 ML | Refills: 1 | Status: SHIPPED | OUTPATIENT
Start: 2021-09-13 | End: 2021-12-13

## 2021-09-13 RX ORDER — FORMOTEROL FUMARATE 20 UG/2ML
20 SOLUTION RESPIRATORY (INHALATION) 2 TIMES DAILY
Qty: 360 ML | Refills: 1 | Status: SHIPPED | OUTPATIENT
Start: 2021-09-13 | End: 2021-10-01 | Stop reason: SDUPTHER

## 2021-09-13 NOTE — PATIENT INSTRUCTIONS
USE BUDESONIDE AND PERFOROMIST VIA THE NEBULIZER TWICE DAILY  RINSE MOUTH AFTER  USE INCRUSE ONE PUFF DAILY  USE ALBUTEROL VIA THE NEBULIZER TWICE DAILY  CONTINUE WITH OXYGEN 3 LTS CONTINUOUS   GET THE pft DONE   AND WILL FOLLOW UP IN 3 MONTHS

## 2021-09-13 NOTE — PROGRESS NOTES
Assessment/Plan:   Diagnoses and all orders for this visit:    Shortness of breath    Centrilobular emphysema (HCC)  -     Complete PFT with post Bronchodilator and Six Minute walk; Future  -     budesonide (PULMICORT) 0 5 mg/2 mL nebulizer solution; Take 2 mL (0 5 mg total) by nebulization 2 (two) times a day Rinse mouth after use  -     formoterol (PERFOROMIST) 20 MCG/2ML nebulizer solution; Take 2 mL (20 mcg total) by nebulization 2 (two) times a day    Chronic hypoxemic respiratory failure (HCC)    Chronic respiratory failure with hypoxia, on home O2 therapy (HCC)    Lung nodule    COPD, severe ([address] )    Pulmonary nodules    Former smoker        PFTs in 2018 with severe obstructive airflow limitation with no appreciable response to the bronchodilator the FEV1 was 41% then, severely decreased DLCO at 45%  Will repeat  PFTs with bronchodilator and a 6 minute walk test and follow-up   Currently patient has bumped up his oxygen to 3 L continuous which he has been using  And he also states his the portable oxygen concentrator is not working well as before he is taking it now to the [facility] for a check  He  had a CT scan done in December of 2019,with stable lung nodules, does have emphysema bilaterally      Given worsening shortness of breath I have discussed with him regarding getting a repeat CT of the chest which he is currently declining   Also discussed with the patient given poor inspiratory capacity will switch his Advair to budesonide and Perforomist via nebulizer   He will use Perforomist and budesonide via nebulizer twice daily   Continue with albuterol twice daily with the nebulizer as needed   Continue with Incruse 1 puff daily   Continue with 3 L of oxygen by nasal cannula continuous   Call if any worsening symptoms otherwise will follow-up with the after the above testing  I had a long discussion with the patient as well as the patient's wife who has accompanied this office visit for following up and at an [address] [place] [address] for and for evaluation for endobronchial valve placement which there currently declining  No follow-ups on file  All questions are answered to the patient's satisfaction and understanding  He verbalizes understanding  He is encouraged to call with any further questions or concerns  Portions of the record may have been created with voice recognition software  Occasional wrong word or "sound a like" substitutions may have occurred due to the inherent limitations of voice recognition software  Read the chart carefully and recognize, using context, where substitutions have occurred  Electronically Signed by Esperanza Jackson MD    ______________________________________________________________________    Chief Complaint:   Chief Complaint   Patient presents with    Shortness of Breath       Patient ID: Hero Hawthorne is a 80 y o  y o  male has a past medical history of Back pain, Chronic kidney disease, COPD (chronic obstructive pulmonary disease) ([address] ), History of malignant neoplasm of oral cavity, HL (hearing loss), Hypertension, Kidney stone (05/2019), Malignant neoplasm of colon ([address] ), Prostate cancer ([address] ), SOB (shortness of breath), Tachycardia, Thalassemia trait, and Tinnitus  9/13/2021  Patient presents today for follow-up visit  Patient is a 29-year-old male former smoker quit less than 20 years ago with 45 pack year smoking history with past medical history of COPD, chronic respiratory failure on home O2, lung nodules, pleural plaques, hypertension, prostate cancer      He is here today for follow-up  He states his shortness of breath and dyspnea on exertion has been gradually getting worse, and he states his Advair and Spiriva has not been working well, he was also placed on Trelegy before which he states did not work  primary symptoms  Associated symptoms include coughing   Pertinent negatives include no chest pain, fever, headaches, myalgias or sore throat  Review of Systems   Constitutional: Negative  Negative for appetite change and fever  HENT: Positive for rhinorrhea  Negative for ear pain, postnasal drip, sneezing, sore throat and trouble swallowing  Eyes: Negative  Respiratory: Positive for cough and shortness of breath  Cardiovascular: Negative  Negative for chest pain  Gastrointestinal: Negative  Endocrine: Negative  Genitourinary: Negative  Musculoskeletal: Negative  Negative for myalgias  Allergic/Immunologic: Negative  Neurological: Negative for headaches  Hematological: Negative  Psychiatric/Behavioral: Negative  Smoking history: He reports that he quit smoking about 17 years ago  His smoking use included cigarettes  He started smoking about 61 years ago  He has a 44 00 pack-year smoking history   He has never used smokeless tobacco     The following portions of the patient's history were reviewed and updated as appropriate: allergies, current medications, past family history, past medical history, past social history, past surgical history and problem list     Immunization History   Administered Date(s) Administered    INFLUENZA 10/26/2012, 10/22/2013, 12/01/2015, 10/07/2016, 09/18/2018, 11/19/2019    Influenza Split High Dose Preservative Free IM 10/07/2014, 10/01/2017    Influenza, high dose seasonal 0 7 mL 10/08/2020    Influenza, seasonal, injectable 10/26/2012, 10/22/2013, 12/01/2015, 09/03/2016    Pneumococcal Conjugate 13-Valent 08/25/2015, 08/25/2015    Pneumococcal Polysaccharide PPV23 01/03/2017    SARS-CoV-2 / COVID-19 mRNA IM (Moderna) 01/26/2021, 02/22/2021    Tdap 07/01/2011    Zoster 1940    Zoster Vaccine Recombinant 12/18/2018, 06/04/2019     Current Outpatient Medications   Medication Sig Dispense Refill    acetaminophen-codeine (TYLENOL with CODEINE #4) 300-60 MG per tablet Take 1 tablet by mouth every 6 (six) hours as needed for moderate pain      albuterol (2 5 mg/3 mL) 0 083 % nebulizer solution Take 3 mL (2 5 mg total) by nebulization every 6 (six) hours as needed for wheezing or shortness of breath 360 mL 11    albuterol (VENTOLIN HFA) 90 mcg/act inhaler Inhale 2 puffs every 6 (six) hours as needed for wheezing or shortness of breath 18 g 3    allopurinol (ZYLOPRIM) 100 mg tablet TAKE 1 TABLET DAILY 90 tablet 2    amLODIPine (NORVASC) 5 mg tablet TAKE 2 TABLETS EVERY       MORNING 90 tablet 1    cholecalciferol (VITAMIN D3) 1,000 units tablet Take 5,000 Units by mouth daily      gabapentin (NEURONTIN) 300 mg capsule every 12 (twelve) hours as needed       losartan (COZAAR) 100 MG tablet Take 1 tablet (100 mg total) by mouth daily 90 tablet 2    metoprolol succinate (TOPROL-XL) 25 mg 24 hr tablet TAKE 1 TABLET DAILY 90 tablet 2    Polyvinyl Alcohol-Povidone (REFRESH OP) Apply to eye as needed       prochlorperazine (COMPAZINE) 10 mg tablet Take 1 tablet (10 mg total) by mouth every 6 (six) hours as needed for nausea or vomiting 30 tablet 0    umeclidinium bromide (Incruse Ellipta) 62 5 mcg/inh AEPB inhaler Inhale 1 puff daily 30 blister 5    Vitamins-Lipotropics (LIPO-FLAVONOID PLUS PO) Take by mouth      budesonide (PULMICORT) 0 5 mg/2 mL nebulizer solution Take 2 mL (0 5 mg total) by nebulization 2 (two) times a day Rinse mouth after use  360 mL 1    formoterol (PERFOROMIST) 20 MCG/2ML nebulizer solution Take 2 mL (20 mcg total) by nebulization 2 (two) times a day 360 mL 1     No current facility-administered medications for this visit  Allergies: Patient has no known allergies  Objective:  Vitals:    09/13/21 1103   BP: 126/80   Pulse: (!) 47   Temp: (!) 97 °F (36 1 °C)   SpO2: 93%   Weight: 112 kg (248 lb)   Height: 5' 11 5" (1 816 m)   Oxygen Therapy  SpO2: 93 % (with 3 lts of oxygen)      Wt Readings from Last 3 Encounters:   09/13/21 112 kg (248 lb)   07/12/21 113 kg (248 lb 9 6 oz)   06/15/21 114 kg (251 lb)     Body mass index is 34 11 kg/m²  Physical Exam  Vitals and nursing note reviewed  Constitutional:       Appearance: He is well-developed  HENT:      Head: Normocephalic and atraumatic  Eyes:      Conjunctiva/sclera: Conjunctivae normal       Pupils: Pupils are equal, round, and reactive to light  Neck:      Thyroid: No thyromegaly  Vascular: No JVD  Cardiovascular:      Rate and Rhythm: Normal rate and regular rhythm  Heart sounds: Normal heart sounds  No murmur heard  No friction rub  No gallop  Pulmonary:      Effort: Pulmonary effort is normal  No respiratory distress  Breath sounds: Normal breath sounds  No wheezing or rales  Chest:      Chest wall: No tenderness  Musculoskeletal:         General: No tenderness or deformity  Normal range of motion  Cervical back: Normal range of motion and neck supple  Lymphadenopathy:      Cervical: No cervical adenopathy  Skin:     General: Skin is warm and dry  Neurological:      Mental Status: He is alert and oriented to person, place, and time             Diagnostics:  I have personally reviewed pertinent films in PACS    Answers for HPI/ROS submitted by the patient on 9/10/2021  Chronicity: recurrent  When did you first notice your symptoms?: 1 to 4 weeks ago  How often do your symptoms occur?: intermittently  Since you first noticed this problem, how has it changed?: unchanged  Do you have shortness of breath that occurs with effort or exertion?: Yes  Do you have ear congestion?: No  Do you have heartburn?: No  Do you have fatigue?: Yes  Do you have nasal congestion?: No  Do you have shortness of breath when lying flat?: No  Do you have shortness of breath when you wake up?: No  Do you have sweats?: No  Have you experienced weight loss?: No  Which of the following makes your symptoms worse?: climbing stairs, exercise, minimal activity, strenuous activity  Which of the following makes your symptoms better?: rest

## 2021-09-22 ENCOUNTER — TELEPHONE (OUTPATIENT)
Dept: PULMONOLOGY | Facility: CLINIC | Age: 81
End: 2021-09-22

## 2021-10-04 ENCOUNTER — APPOINTMENT (OUTPATIENT)
Dept: LAB | Facility: HOSPITAL | Age: 81
End: 2021-10-04
Attending: INTERNAL MEDICINE
Payer: COMMERCIAL

## 2021-10-04 DIAGNOSIS — C61 PROSTATE CANCER (HCC): ICD-10-CM

## 2021-10-04 DIAGNOSIS — I12.9 HYPERTENSIVE KIDNEY DISEASE WITH STAGE 3 CHRONIC KIDNEY DISEASE, UNSPECIFIED WHETHER STAGE 3A OR 3B CKD (HCC): ICD-10-CM

## 2021-10-04 DIAGNOSIS — N18.30 STAGE 3 CHRONIC KIDNEY DISEASE, UNSPECIFIED WHETHER STAGE 3A OR 3B CKD (HCC): ICD-10-CM

## 2021-10-04 DIAGNOSIS — E55.9 VITAMIN D DEFICIENCY: ICD-10-CM

## 2021-10-04 DIAGNOSIS — N18.30 HYPERTENSIVE KIDNEY DISEASE WITH STAGE 3 CHRONIC KIDNEY DISEASE, UNSPECIFIED WHETHER STAGE 3A OR 3B CKD (HCC): ICD-10-CM

## 2021-10-04 LAB
25(OH)D3 SERPL-MCNC: 70.1 NG/ML (ref 30–100)
ANION GAP SERPL CALCULATED.3IONS-SCNC: 11 MMOL/L (ref 4–13)
BACTERIA UR QL AUTO: NORMAL /HPF
BASOPHILS # BLD AUTO: 0.03 THOUSANDS/ΜL (ref 0–0.1)
BASOPHILS NFR BLD AUTO: 0 % (ref 0–1)
BILIRUB UR QL STRIP: NEGATIVE
BUN SERPL-MCNC: 17 MG/DL (ref 5–25)
CALCIUM SERPL-MCNC: 9.7 MG/DL (ref 8.3–10.1)
CHLORIDE SERPL-SCNC: 107 MMOL/L (ref 100–108)
CLARITY UR: CLEAR
CO2 SERPL-SCNC: 27 MMOL/L (ref 21–32)
COLOR UR: YELLOW
CREAT SERPL-MCNC: 1.69 MG/DL (ref 0.6–1.3)
CREAT UR-MCNC: 250 MG/DL
EOSINOPHIL # BLD AUTO: 0.09 THOUSAND/ΜL (ref 0–0.61)
EOSINOPHIL NFR BLD AUTO: 1 % (ref 0–6)
ERYTHROCYTE [DISTWIDTH] IN BLOOD BY AUTOMATED COUNT: 18.5 % (ref 11.6–15.1)
GFR SERPL CREATININE-BSD FRML MDRD: 44 ML/MIN/1.73SQ M
GLUCOSE P FAST SERPL-MCNC: 106 MG/DL (ref 65–99)
GLUCOSE UR STRIP-MCNC: NEGATIVE MG/DL
HCT VFR BLD AUTO: 46.1 % (ref 36.5–49.3)
HGB BLD-MCNC: 14.3 G/DL (ref 12–17)
HGB UR QL STRIP.AUTO: NEGATIVE
IMM GRANULOCYTES # BLD AUTO: 0.03 THOUSAND/UL (ref 0–0.2)
IMM GRANULOCYTES NFR BLD AUTO: 0 % (ref 0–2)
KETONES UR STRIP-MCNC: NEGATIVE MG/DL
LEUKOCYTE ESTERASE UR QL STRIP: NEGATIVE
LYMPHOCYTES # BLD AUTO: 1.62 THOUSANDS/ΜL (ref 0.6–4.47)
LYMPHOCYTES NFR BLD AUTO: 18 % (ref 14–44)
MCH RBC QN AUTO: 24.2 PG (ref 26.8–34.3)
MCHC RBC AUTO-ENTMCNC: 31 G/DL (ref 31.4–37.4)
MCV RBC AUTO: 78 FL (ref 82–98)
MICROALBUMIN UR-MCNC: 54.4 MG/L (ref 0–20)
MICROALBUMIN/CREAT 24H UR: 22 MG/G CREATININE (ref 0–30)
MONOCYTES # BLD AUTO: 0.6 THOUSAND/ΜL (ref 0.17–1.22)
MONOCYTES NFR BLD AUTO: 7 % (ref 4–12)
NEUTROPHILS # BLD AUTO: 6.61 THOUSANDS/ΜL (ref 1.85–7.62)
NEUTS SEG NFR BLD AUTO: 74 % (ref 43–75)
NITRITE UR QL STRIP: NEGATIVE
NON-SQ EPI CELLS URNS QL MICRO: NORMAL /HPF
NRBC BLD AUTO-RTO: 0 /100 WBCS
PH UR STRIP.AUTO: 5.5 [PH]
PHOSPHATE SERPL-MCNC: 2.9 MG/DL (ref 2.3–4.1)
PLATELET # BLD AUTO: 279 THOUSANDS/UL (ref 149–390)
PMV BLD AUTO: 10.8 FL (ref 8.9–12.7)
POTASSIUM SERPL-SCNC: 3.9 MMOL/L (ref 3.5–5.3)
PROT UR STRIP-MCNC: NEGATIVE MG/DL
PSA SERPL-MCNC: <0.1 NG/ML (ref 0–4)
PTH-INTACT SERPL-MCNC: 51.9 PG/ML (ref 18.4–80.1)
RBC # BLD AUTO: 5.92 MILLION/UL (ref 3.88–5.62)
RBC #/AREA URNS AUTO: NORMAL /HPF
SODIUM SERPL-SCNC: 145 MMOL/L (ref 136–145)
SP GR UR STRIP.AUTO: >=1.03 (ref 1–1.03)
URATE SERPL-MCNC: 5.9 MG/DL (ref 4.2–8)
UROBILINOGEN UR QL STRIP.AUTO: 0.2 E.U./DL
WBC # BLD AUTO: 8.98 THOUSAND/UL (ref 4.31–10.16)
WBC #/AREA URNS AUTO: NORMAL /HPF

## 2021-10-04 PROCEDURE — 84153 ASSAY OF PSA TOTAL: CPT

## 2021-10-04 PROCEDURE — 83970 ASSAY OF PARATHORMONE: CPT

## 2021-10-04 PROCEDURE — 81001 URINALYSIS AUTO W/SCOPE: CPT

## 2021-10-04 PROCEDURE — 85025 COMPLETE CBC W/AUTO DIFF WBC: CPT

## 2021-10-04 PROCEDURE — 84100 ASSAY OF PHOSPHORUS: CPT

## 2021-10-04 PROCEDURE — 36415 COLL VENOUS BLD VENIPUNCTURE: CPT

## 2021-10-04 PROCEDURE — 82043 UR ALBUMIN QUANTITATIVE: CPT

## 2021-10-04 PROCEDURE — 80048 BASIC METABOLIC PNL TOTAL CA: CPT

## 2021-10-04 PROCEDURE — 82306 VITAMIN D 25 HYDROXY: CPT

## 2021-10-04 PROCEDURE — 82570 ASSAY OF URINE CREATININE: CPT

## 2021-10-04 PROCEDURE — 84550 ASSAY OF BLOOD/URIC ACID: CPT

## 2021-10-05 ENCOUNTER — TELEPHONE (OUTPATIENT)
Dept: PULMONOLOGY | Facility: CLINIC | Age: 81
End: 2021-10-05

## 2021-10-05 ENCOUNTER — TELEPHONE (OUTPATIENT)
Dept: OTHER | Facility: OTHER | Age: 81
End: 2021-10-05

## 2021-10-07 ENCOUNTER — HOSPITAL ENCOUNTER (OUTPATIENT)
Dept: PULMONOLOGY | Facility: HOSPITAL | Age: 81
Discharge: HOME | End: 2021-10-07
Attending: INTERNAL MEDICINE
Payer: COMMERCIAL

## 2021-10-07 ENCOUNTER — NURSE TRIAGE (OUTPATIENT)
Dept: OTHER | Facility: OTHER | Age: 81
End: 2021-10-07

## 2021-10-07 DIAGNOSIS — J43.2 CENTRILOBULAR EMPHYSEMA (HCC): ICD-10-CM

## 2021-10-07 PROCEDURE — 94060 EVALUATION OF WHEEZING: CPT | Performed by: INTERNAL MEDICINE

## 2021-10-07 PROCEDURE — 94727 GAS DIL/WSHOT DETER LNG VOL: CPT | Performed by: INTERNAL MEDICINE

## 2021-10-07 PROCEDURE — 94060 EVALUATION OF WHEEZING: CPT

## 2021-10-07 PROCEDURE — 94729 DIFFUSING CAPACITY: CPT

## 2021-10-07 PROCEDURE — 94761 N-INVAS EAR/PLS OXIMETRY MLT: CPT

## 2021-10-07 PROCEDURE — 94727 GAS DIL/WSHOT DETER LNG VOL: CPT

## 2021-10-07 PROCEDURE — 94618 PULMONARY STRESS TESTING: CPT | Performed by: INTERNAL MEDICINE

## 2021-10-07 PROCEDURE — 94729 DIFFUSING CAPACITY: CPT | Performed by: INTERNAL MEDICINE

## 2021-10-07 RX ORDER — ALBUTEROL SULFATE 2.5 MG/3ML
2.5 SOLUTION RESPIRATORY (INHALATION) ONCE
Status: COMPLETED | OUTPATIENT
Start: 2021-10-07 | End: 2021-10-07

## 2021-10-07 RX ORDER — FORMOTEROL FUMARATE 20 UG/2ML
20 SOLUTION RESPIRATORY (INHALATION) 2 TIMES DAILY
Qty: 360 ML | Refills: 0 | Status: SHIPPED | OUTPATIENT
Start: 2021-10-07 | End: 2021-11-10 | Stop reason: SDUPTHER

## 2021-10-07 RX ADMIN — ALBUTEROL SULFATE 2.5 MG: 2.5 SOLUTION RESPIRATORY (INHALATION) at 10:31

## 2021-10-08 ENCOUNTER — TELEPHONE (OUTPATIENT)
Dept: PULMONOLOGY | Facility: CLINIC | Age: 81
End: 2021-10-08

## 2021-10-08 ENCOUNTER — TELEPHONE (OUTPATIENT)
Dept: NEPHROLOGY | Facility: CLINIC | Age: 81
End: 2021-10-08

## 2021-10-11 ENCOUNTER — OFFICE VISIT (OUTPATIENT)
Dept: NEPHROLOGY | Facility: CLINIC | Age: 81
End: 2021-10-11
Payer: COMMERCIAL

## 2021-10-11 VITALS
RESPIRATION RATE: 16 BRPM | WEIGHT: 248.8 LBS | DIASTOLIC BLOOD PRESSURE: 84 MMHG | HEART RATE: 43 BPM | SYSTOLIC BLOOD PRESSURE: 152 MMHG | HEIGHT: 72 IN | TEMPERATURE: 97.2 F | BODY MASS INDEX: 33.7 KG/M2

## 2021-10-11 DIAGNOSIS — I12.9 HYPERTENSIVE KIDNEY DISEASE WITH STAGE 3 CHRONIC KIDNEY DISEASE, UNSPECIFIED WHETHER STAGE 3A OR 3B CKD (HCC): ICD-10-CM

## 2021-10-11 DIAGNOSIS — E55.9 VITAMIN D DEFICIENCY: ICD-10-CM

## 2021-10-11 DIAGNOSIS — N18.30 HYPERTENSIVE KIDNEY DISEASE WITH STAGE 3 CHRONIC KIDNEY DISEASE, UNSPECIFIED WHETHER STAGE 3A OR 3B CKD (HCC): ICD-10-CM

## 2021-10-11 DIAGNOSIS — N18.30 STAGE 3 CHRONIC KIDNEY DISEASE, UNSPECIFIED WHETHER STAGE 3A OR 3B CKD (HCC): Primary | ICD-10-CM

## 2021-10-11 PROCEDURE — 3079F DIAST BP 80-89 MM HG: CPT | Performed by: INTERNAL MEDICINE

## 2021-10-11 PROCEDURE — 1160F RVW MEDS BY RX/DR IN RCRD: CPT | Performed by: INTERNAL MEDICINE

## 2021-10-11 PROCEDURE — 3077F SYST BP >= 140 MM HG: CPT | Performed by: INTERNAL MEDICINE

## 2021-10-11 PROCEDURE — 99214 OFFICE O/P EST MOD 30 MIN: CPT | Performed by: INTERNAL MEDICINE

## 2021-10-11 PROCEDURE — 1036F TOBACCO NON-USER: CPT | Performed by: INTERNAL MEDICINE

## 2021-11-10 DIAGNOSIS — J43.2 CENTRILOBULAR EMPHYSEMA (HCC): ICD-10-CM

## 2021-11-10 RX ORDER — FORMOTEROL FUMARATE 20 UG/2ML
20 SOLUTION RESPIRATORY (INHALATION) 2 TIMES DAILY
Qty: 360 ML | Refills: 0 | Status: SHIPPED | OUTPATIENT
Start: 2021-11-10 | End: 2021-12-13

## 2021-11-11 DIAGNOSIS — I10 ESSENTIAL HYPERTENSION: ICD-10-CM

## 2021-11-11 RX ORDER — AMLODIPINE BESYLATE 5 MG/1
TABLET ORAL
Qty: 90 TABLET | Refills: 1 | Status: SHIPPED | OUTPATIENT
Start: 2021-11-11 | End: 2022-02-11 | Stop reason: SDUPTHER

## 2021-12-13 ENCOUNTER — OFFICE VISIT (OUTPATIENT)
Dept: PULMONOLOGY | Facility: CLINIC | Age: 81
End: 2021-12-13
Payer: COMMERCIAL

## 2021-12-13 VITALS
SYSTOLIC BLOOD PRESSURE: 140 MMHG | HEART RATE: 73 BPM | HEIGHT: 72 IN | WEIGHT: 251.8 LBS | DIASTOLIC BLOOD PRESSURE: 84 MMHG | TEMPERATURE: 97.9 F | OXYGEN SATURATION: 95 % | BODY MASS INDEX: 34.1 KG/M2

## 2021-12-13 DIAGNOSIS — Z87.891 FORMER SMOKER: ICD-10-CM

## 2021-12-13 DIAGNOSIS — F17.211 NICOTINE DEPENDENCE, CIGARETTES, IN REMISSION: ICD-10-CM

## 2021-12-13 DIAGNOSIS — J43.2 CENTRILOBULAR EMPHYSEMA (HCC): ICD-10-CM

## 2021-12-13 DIAGNOSIS — R06.02 SHORTNESS OF BREATH: Primary | ICD-10-CM

## 2021-12-13 DIAGNOSIS — J44.9 COPD, SEVERE (HCC): ICD-10-CM

## 2021-12-13 DIAGNOSIS — J96.11 CHRONIC HYPOXEMIC RESPIRATORY FAILURE (HCC): ICD-10-CM

## 2021-12-13 PROCEDURE — 3079F DIAST BP 80-89 MM HG: CPT | Performed by: INTERNAL MEDICINE

## 2021-12-13 PROCEDURE — 1160F RVW MEDS BY RX/DR IN RCRD: CPT | Performed by: INTERNAL MEDICINE

## 2021-12-13 PROCEDURE — 3077F SYST BP >= 140 MM HG: CPT | Performed by: INTERNAL MEDICINE

## 2021-12-13 PROCEDURE — 1036F TOBACCO NON-USER: CPT | Performed by: INTERNAL MEDICINE

## 2021-12-13 PROCEDURE — 99214 OFFICE O/P EST MOD 30 MIN: CPT | Performed by: INTERNAL MEDICINE

## 2021-12-13 RX ORDER — UMECLIDINIUM 62.5 UG/1
1 AEROSOL, POWDER ORAL DAILY
Qty: 90 BLISTER | Refills: 1 | Status: SHIPPED | OUTPATIENT
Start: 2021-12-13 | End: 2022-01-19

## 2021-12-13 RX ORDER — ALBUTEROL SULFATE 2.5 MG/3ML
2.5 SOLUTION RESPIRATORY (INHALATION) EVERY 6 HOURS
Qty: 1080 ML | Refills: 3 | Status: SHIPPED | OUTPATIENT
Start: 2021-12-13 | End: 2022-06-20 | Stop reason: SDUPTHER

## 2022-01-06 ENCOUNTER — RA CDI HCC (OUTPATIENT)
Dept: OTHER | Facility: HOSPITAL | Age: 82
End: 2022-01-06

## 2022-01-06 NOTE — PROGRESS NOTES
[facility]  coding opportunities          Number of diagnosis code(s) already on the problem list added to FYI fla     Chart Reviewed * (Number of) Inbasket suggestions sent to Provider: 1            Number of suggestions used: 4      Number of suggestions NOT actually used: 1     Patients insurance company: [address]  (Medicare Advantage and Commercial)     Visit status: Patient arrived for their scheduled appointment        [facility]  coding opportunities          Number of diagnosis code(s) already on the problem list added to FYI fla     Chart Reviewed * (Number of) Inbasket suggestions sent to Provider: 1    Based on clinical documentation indicated in your record, it appears that the patient may have the following conditions;  E11 22  N18 32, I12 9 T2DM with CKD stage 3b, and hypertension ([address] )  J44 9 COPD   J96  11Chronic respiratory failure with hypoxia ([address] )    F11 20 Opioid dependence, uncomplicated ([address] )     If this is correct, please document and assess at your next visit 22                    Patients insurance company: [address]  (Medicare Advantage and Commercial)

## 2022-01-10 ENCOUNTER — APPOINTMENT (OUTPATIENT)
Dept: LAB | Facility: HOSPITAL | Age: 82
End: 2022-01-10
Payer: COMMERCIAL

## 2022-01-10 DIAGNOSIS — N18.30 HYPERTENSIVE KIDNEY DISEASE WITH STAGE 3 CHRONIC KIDNEY DISEASE, UNSPECIFIED WHETHER STAGE 3A OR 3B CKD (HCC): ICD-10-CM

## 2022-01-10 DIAGNOSIS — E11.22 TYPE 2 DIABETES MELLITUS WITH STAGE 3B CHRONIC KIDNEY DISEASE, WITHOUT LONG-TERM CURRENT USE OF INSULIN (HCC): ICD-10-CM

## 2022-01-10 DIAGNOSIS — N18.32 TYPE 2 DIABETES MELLITUS WITH STAGE 3B CHRONIC KIDNEY DISEASE, WITHOUT LONG-TERM CURRENT USE OF INSULIN (HCC): ICD-10-CM

## 2022-01-10 DIAGNOSIS — E78.5 HYPERLIPIDEMIA, UNSPECIFIED HYPERLIPIDEMIA TYPE: ICD-10-CM

## 2022-01-10 DIAGNOSIS — I12.9 HYPERTENSIVE KIDNEY DISEASE WITH STAGE 3 CHRONIC KIDNEY DISEASE, UNSPECIFIED WHETHER STAGE 3A OR 3B CKD (HCC): ICD-10-CM

## 2022-01-10 LAB
ALBUMIN SERPL BCP-MCNC: 4.2 G/DL (ref 3.5–5)
ALP SERPL-CCNC: 106 U/L (ref 46–116)
ALT SERPL W P-5'-P-CCNC: 32 U/L (ref 12–78)
ANION GAP SERPL CALCULATED.3IONS-SCNC: 9 MMOL/L (ref 4–13)
AST SERPL W P-5'-P-CCNC: 48 U/L (ref 5–45)
BASOPHILS # BLD AUTO: 0.04 THOUSANDS/ΜL (ref 0–0.1)
BASOPHILS NFR BLD AUTO: 0 % (ref 0–1)
BILIRUB SERPL-MCNC: 1.63 MG/DL (ref 0.2–1)
BUN SERPL-MCNC: 18 MG/DL (ref 5–25)
CALCIUM SERPL-MCNC: 9.6 MG/DL (ref 8.3–10.1)
CHLORIDE SERPL-SCNC: 104 MMOL/L (ref 100–108)
CHOLEST SERPL-MCNC: 127 MG/DL
CO2 SERPL-SCNC: 29 MMOL/L (ref 21–32)
CREAT SERPL-MCNC: 1.67 MG/DL (ref 0.6–1.3)
EOSINOPHIL # BLD AUTO: 0.08 THOUSAND/ΜL (ref 0–0.61)
EOSINOPHIL NFR BLD AUTO: 1 % (ref 0–6)
ERYTHROCYTE [DISTWIDTH] IN BLOOD BY AUTOMATED COUNT: 17.1 % (ref 11.6–15.1)
GFR SERPL CREATININE-BSD FRML MDRD: 38 ML/MIN/1.73SQ M
GLUCOSE P FAST SERPL-MCNC: 100 MG/DL (ref 65–99)
HCT VFR BLD AUTO: 45.6 % (ref 36.5–49.3)
HDLC SERPL-MCNC: 50 MG/DL
HGB BLD-MCNC: 14.2 G/DL (ref 12–17)
IMM GRANULOCYTES # BLD AUTO: 0.04 THOUSAND/UL (ref 0–0.2)
IMM GRANULOCYTES NFR BLD AUTO: 0 % (ref 0–2)
LDLC SERPL CALC-MCNC: 63 MG/DL (ref 0–100)
LYMPHOCYTES # BLD AUTO: 1.37 THOUSANDS/ΜL (ref 0.6–4.47)
LYMPHOCYTES NFR BLD AUTO: 13 % (ref 14–44)
MCH RBC QN AUTO: 24.5 PG (ref 26.8–34.3)
MCHC RBC AUTO-ENTMCNC: 31.1 G/DL (ref 31.4–37.4)
MCV RBC AUTO: 79 FL (ref 82–98)
MONOCYTES # BLD AUTO: 0.62 THOUSAND/ΜL (ref 0.17–1.22)
MONOCYTES NFR BLD AUTO: 6 % (ref 4–12)
NEUTROPHILS # BLD AUTO: 8.11 THOUSANDS/ΜL (ref 1.85–7.62)
NEUTS SEG NFR BLD AUTO: 80 % (ref 43–75)
NONHDLC SERPL-MCNC: 77 MG/DL
NRBC BLD AUTO-RTO: 0 /100 WBCS
PLATELET # BLD AUTO: 257 THOUSANDS/UL (ref 149–390)
PMV BLD AUTO: 11.3 FL (ref 8.9–12.7)
POTASSIUM SERPL-SCNC: 4.1 MMOL/L (ref 3.5–5.3)
PROT SERPL-MCNC: 8.3 G/DL (ref 6.4–8.2)
RBC # BLD AUTO: 5.79 MILLION/UL (ref 3.88–5.62)
SODIUM SERPL-SCNC: 142 MMOL/L (ref 136–145)
TRIGL SERPL-MCNC: 69 MG/DL
WBC # BLD AUTO: 10.26 THOUSAND/UL (ref 4.31–10.16)

## 2022-01-10 PROCEDURE — 83036 HEMOGLOBIN GLYCOSYLATED A1C: CPT

## 2022-01-10 PROCEDURE — 80053 COMPREHEN METABOLIC PANEL: CPT

## 2022-01-10 PROCEDURE — 85025 COMPLETE CBC W/AUTO DIFF WBC: CPT

## 2022-01-10 PROCEDURE — 80061 LIPID PANEL: CPT

## 2022-01-10 PROCEDURE — 36415 COLL VENOUS BLD VENIPUNCTURE: CPT

## 2022-01-11 LAB
EST. AVERAGE GLUCOSE BLD GHB EST-MCNC: 123 MG/DL
HBA1C MFR BLD: 5.9 %

## 2022-01-19 ENCOUNTER — OFFICE VISIT (OUTPATIENT)
Dept: INTERNAL MEDICINE CLINIC | Facility: CLINIC | Age: 82
End: 2022-01-19
Payer: COMMERCIAL

## 2022-01-19 VITALS
BODY MASS INDEX: 34.65 KG/M2 | SYSTOLIC BLOOD PRESSURE: 138 MMHG | DIASTOLIC BLOOD PRESSURE: 76 MMHG | HEART RATE: 58 BPM | HEIGHT: 72 IN | OXYGEN SATURATION: 99 % | WEIGHT: 255.8 LBS

## 2022-01-19 DIAGNOSIS — N18.32 TYPE 2 DIABETES MELLITUS WITH STAGE 3B CHRONIC KIDNEY DISEASE, WITHOUT LONG-TERM CURRENT USE OF INSULIN (HCC): Primary | ICD-10-CM

## 2022-01-19 DIAGNOSIS — N18.32 HYPERTENSIVE KIDNEY DISEASE WITH STAGE 3B CHRONIC KIDNEY DISEASE (HCC): ICD-10-CM

## 2022-01-19 DIAGNOSIS — Z99.81 CHRONIC RESPIRATORY FAILURE WITH HYPOXIA, ON HOME O2 THERAPY (HCC): ICD-10-CM

## 2022-01-19 DIAGNOSIS — E11.22 TYPE 2 DIABETES MELLITUS WITH STAGE 3B CHRONIC KIDNEY DISEASE, WITHOUT LONG-TERM CURRENT USE OF INSULIN (HCC): Primary | ICD-10-CM

## 2022-01-19 DIAGNOSIS — E66.01 OBESITY, MORBID (HCC): ICD-10-CM

## 2022-01-19 DIAGNOSIS — I12.9 HYPERTENSIVE KIDNEY DISEASE WITH STAGE 3B CHRONIC KIDNEY DISEASE (HCC): ICD-10-CM

## 2022-01-19 DIAGNOSIS — I10 PRIMARY HYPERTENSION: ICD-10-CM

## 2022-01-19 DIAGNOSIS — J43.2 CENTRILOBULAR EMPHYSEMA (HCC): ICD-10-CM

## 2022-01-19 DIAGNOSIS — J96.11 CHRONIC RESPIRATORY FAILURE WITH HYPOXIA, ON HOME O2 THERAPY (HCC): ICD-10-CM

## 2022-01-19 DIAGNOSIS — N18.32 STAGE 3B CHRONIC KIDNEY DISEASE (HCC): ICD-10-CM

## 2022-01-19 PROCEDURE — 1036F TOBACCO NON-USER: CPT | Performed by: INTERNAL MEDICINE

## 2022-01-19 PROCEDURE — 3078F DIAST BP <80 MM HG: CPT | Performed by: INTERNAL MEDICINE

## 2022-01-19 PROCEDURE — 99214 OFFICE O/P EST MOD 30 MIN: CPT | Performed by: INTERNAL MEDICINE

## 2022-01-19 PROCEDURE — 3075F SYST BP GE 130 - 139MM HG: CPT | Performed by: INTERNAL MEDICINE

## 2022-01-19 PROCEDURE — 3725F SCREEN DEPRESSION PERFORMED: CPT | Performed by: INTERNAL MEDICINE

## 2022-01-19 PROCEDURE — 1160F RVW MEDS BY RX/DR IN RCRD: CPT | Performed by: INTERNAL MEDICINE

## 2022-01-19 RX ORDER — FLUTICASONE FUROATE, UMECLIDINIUM BROMIDE AND VILANTEROL TRIFENATATE 200; 62.5; 25 UG/1; UG/1; UG/1
1 POWDER RESPIRATORY (INHALATION) DAILY
Qty: 60 BLISTER | Refills: 0 | Status: SHIPPED | OUTPATIENT
Start: 2022-01-19 | End: 2022-06-20

## 2022-01-19 NOTE — PROGRESS NOTES
Assessment/Plan:    Diagnoses and all orders for this visit:    Type 2 diabetes mellitus with stage 3b chronic kidney disease, without long-term current use of insulin ([facility])  -     CBC and differential; Future  -     Comprehensive metabolic panel; Future  -     Lipid panel; Future  -     Hemoglobin A1C; Future    Centrilobular emphysema (HCC)  -     fluticasone-umeclidinium-vilanterol (Trelegy Ellipta) 200-62 5-25 MCG/INH AEPB inhaler; Inhale 1 puff daily Rinse mouth after use  Chronic respiratory failure with hypoxia, on home O2 therapy ([facility])    Primary hypertension    Stage 3b chronic kidney disease ([facility] [address] )    Hypertensive kidney disease with stage 3b chronic kidney disease ([facility] [address] )    Obesity, morbid ([facility] )              Patient Instructions   Lab data reviewed in detail and compared prior    COPD-patient encouraged to lose weight, continue with incentive spirometry, get into pulmonary rehab when able  Trial of Trelegy to see if that works better than the Advair and Incruse, do not take Advair or Incruse when Trelegy becomes available and monitor symptoms closely  Type 2 diabetes mellitus remains stable with diet alone    Hypertension stable on present regimen    Chronic kidney disease followed by Nephrology stable GFR    Gout stable without recent flare    History of prostate cancer followed by Urology    Routine follow-up after labs in 6 months, sooner as needed  Subjective:      Patient ID: Rocio Nash is a 80 y o  male    F/u mmp, review labs, here w/ Jori Cleveland who helps w/ hx   Lumbar radiculopathy-s/p SCC, F/b PM, scheduled for rhizotomy tomorrow  COPD-not happy w/ breathing, changed to performist and budesonide but expensive and didn't help, so back on advair and incruise  He can't recall how he did w/ Trilogy  He continues w/ O2 hs and prn, always when he leaves house  Not routinely doing incentive spirometer  Hasn't been able to schedule pulm rehab     DM-not really following diet, no regular exercise  HTN-taking rx as directed, home bp's daily have all been nl b/t 110-140/70's  CKD-f/b neph, doing better keeping hydrated     Gout-taking allopurinol, no recent flares  H/o prostate ca s/p prostatectomy '05, psa undetectable in May, f/b urology  H/o kidney stones, neg KUB in May        Current Outpatient Medications:     acetaminophen-codeine (TYLENOL with CODEINE #4) 300-60 MG per tablet, Take 1 tablet by mouth every 6 (six) hours as needed for moderate pain, Disp: , Rfl:     albuterol (2 5 mg/3 mL) 0 083 % nebulizer solution, Take 3 mL (2 5 mg total) by nebulization every 6 (six) hours, Disp: 1080 mL, Rfl: 3    albuterol (VENTOLIN HFA) 90 mcg/act inhaler, Inhale 2 puffs every 6 (six) hours as needed for wheezing or shortness of breath, Disp: 18 g, Rfl: 3    allopurinol (ZYLOPRIM) 100 mg tablet, TAKE 1 TABLET DAILY, Disp: 90 tablet, Rfl: 2    amLODIPine (NORVASC) 5 mg tablet, TAKE 2 TABLETS EVERY       MORNING, Disp: 90 tablet, Rfl: 1    cholecalciferol (VITAMIN D3) 1,000 units tablet, Take 5,000 Units by mouth daily, Disp: , Rfl:     gabapentin (NEURONTIN) 300 mg capsule, every 12 (twelve) hours as needed , Disp: , Rfl:     losartan (COZAAR) 100 MG tablet, Take 1 tablet (100 mg total) by mouth daily, Disp: 90 tablet, Rfl: 2    metoprolol succinate (TOPROL-XL) 25 mg 24 hr tablet, TAKE 1 TABLET DAILY, Disp: 90 tablet, Rfl: 2    Polyvinyl Alcohol-Povidone (REFRESH OP), Apply to eye as needed , Disp: , Rfl:     prochlorperazine (COMPAZINE) 10 mg tablet, Take 1 tablet (10 mg total) by mouth every 6 (six) hours as needed for nausea or vomiting, Disp: 30 tablet, Rfl: 0    fluticasone-umeclidinium-vilanterol (Trelegy Ellipta) 200-62 5-25 MCG/INH AEPB inhaler, Inhale 1 puff daily Rinse mouth after use , Disp: 60 blister, Rfl: 0    Vitamins-Lipotropics (LIPO-FLAVONOID PLUS PO), Take by mouth, Disp: , Rfl:     Recent Results (from the past 1008 hour(s))   CBC and differential    Collection Time: 01/10/22  2:04 PM   Result Value Ref Range    WBC 10 26 (H) 4 31 - 10 16 Thousand/uL    RBC 5 79 (H) 3 88 - 5 62 Million/uL    Hemoglobin 14 2 12 0 - 17 0 g/dL    Hematocrit 45 6 36 5 - 49 3 %    MCV 79 (L) 82 - 98 fL    MCH 24 5 (L) 26 8 - 34 3 pg    MCHC 31 1 (L) 31 4 - 37 4 g/dL    RDW 17 1 (H) 11 6 - 15 1 %    MPV 11 3 8 9 - 12 7 fL    Platelets 524 469 - 660 Thousands/uL    nRBC 0 /100 WBCs    Neutrophils Relative 80 (H) 43 - 75 %    Immat GRANS % 0 0 - 2 %    Lymphocytes Relative 13 (L) 14 - 44 %    Monocytes Relative 6 4 - 12 %    Eosinophils Relative 1 0 - 6 %    Basophils Relative 0 0 - 1 %    Neutrophils Absolute 8 11 (H) 1 85 - 7 62 Thousands/µL    Immature Grans Absolute 0 04 0 00 - 0 20 Thousand/uL    Lymphocytes Absolute 1 37 0 60 - 4 47 Thousands/µL    Monocytes Absolute 0 62 0 17 - 1 22 Thousand/µL    Eosinophils Absolute 0 08 0 00 - 0 61 Thousand/µL    Basophils Absolute 0 04 0 00 - 0 10 Thousands/µL   Comprehensive metabolic panel    Collection Time: 01/10/22  2:04 PM   Result Value Ref Range    Sodium 142 136 - 145 mmol/L    Potassium 4 1 3 5 - 5 3 mmol/L    Chloride 104 100 - 108 mmol/L    CO2 29 21 - 32 mmol/L    ANION GAP 9 4 - 13 mmol/L    BUN 18 5 - 25 mg/dL    Creatinine 1 67 (H) 0 60 - 1 30 mg/dL    Glucose, Fasting 100 (H) 65 - 99 mg/dL    Calcium 9 6 8 3 - 10 1 mg/dL    AST 48 (H) 5 - 45 U/L    ALT 32 12 - 78 U/L    Alkaline Phosphatase 106 46 - 116 U/L    Total Protein 8 3 (H) 6 4 - 8 2 g/dL    Albumin 4 2 3 5 - 5 0 g/dL    Total Bilirubin 1 63 (H) 0 20 - 1 00 mg/dL    eGFR 38 ml/min/1 73sq m   Lipid panel    Collection Time: 01/10/22  2:04 PM   Result Value Ref Range    Cholesterol 127 See Comment mg/dL    Triglycerides 69 See Comment mg/dL    HDL, Direct 50 >=40 mg/dL    LDL Calculated 63 0 - 100 mg/dL    Non-HDL-Chol (CHOL-HDL) 77 mg/dl   Hemoglobin A1C    Collection Time: 01/10/22  2:04 PM   Result Value Ref Range    Hemoglobin A1C 5 9 (H) Normal 3 8-5 6%;  PreDiabetic 5 7-6 4%; Diabetic >=6 5%; Glycemic control for adults with diabetes <7 0% %     mg/dl       The following portions of the patient's history were reviewed and updated as appropriate: allergies, current medications, past family history, past medical history, past social history, past surgical history and problem list      Review of Systems   Constitutional: Negative for appetite change, chills, diaphoresis, fatigue, fever and unexpected weight change  HENT: Negative for congestion, hearing loss and rhinorrhea  Eyes: Negative for visual disturbance  Respiratory: Positive for shortness of breath  Negative for cough, chest tightness and wheezing  Cardiovascular: Negative for chest pain, palpitations and leg swelling  Gastrointestinal: Negative for abdominal pain and blood in stool  Endocrine: Negative for cold intolerance, heat intolerance, polydipsia and polyuria  Genitourinary: Negative for difficulty urinating, dysuria, frequency and urgency  Musculoskeletal: Positive for back pain  Negative for arthralgias and myalgias  Skin: Negative for rash  Neurological: Negative for dizziness, weakness, light-headedness and headaches  Hematological: Does not bruise/bleed easily  Psychiatric/Behavioral: Negative for dysphoric mood and sleep disturbance  Objective:      Vitals:    01/19/22 1555   BP: 138/76   Pulse: 58   SpO2: 99%          Physical Exam  Constitutional:       Appearance: He is well-developed  HENT:      Head: Normocephalic and atraumatic  Nose: Nose normal    Eyes:      General: No scleral icterus  Conjunctiva/sclera: Conjunctivae normal       Pupils: Pupils are equal, round, and reactive to light  Neck:      Thyroid: No thyromegaly  Vascular: No JVD  Trachea: No tracheal deviation  Cardiovascular:      Rate and Rhythm: Normal rate and regular rhythm  Pulses: no weak pulses          Dorsalis pedis pulses are 1+ on the right side and 1+ on the left side  Posterior tibial pulses are 1+ on the right side and 1+ on the left side  Heart sounds: No murmur heard  No friction rub  No gallop  Pulmonary:      Effort: Pulmonary effort is normal  No respiratory distress  Breath sounds: Normal breath sounds  No wheezing or rales  Musculoskeletal:         General: No deformity  Cervical back: Normal range of motion and neck supple  Feet:      Right foot:      Skin integrity: No ulcer, skin breakdown, erythema, warmth, callus or dry skin  Left foot:      Skin integrity: No ulcer, skin breakdown, erythema, warmth, callus or dry skin  Lymphadenopathy:      Cervical: No cervical adenopathy  Skin:     General: Skin is warm and dry  Coloration: Skin is not pale  Findings: No erythema or rash  Neurological:      Mental Status: He is alert and oriented to person, place, and time  Cranial Nerves: No cranial nerve deficit  Psychiatric:         Behavior: Behavior normal          Thought Content: Thought content normal          Judgment: Judgment normal      Diabetic Foot Exam    Patient's shoes and socks removed  Right Foot/Ankle   Right Foot Inspection  Skin Exam: skin normal and skin intact  No dry skin, no warmth, no callus, no erythema, no maceration, no abnormal color, no pre-ulcer, no ulcer and no callus  Toe Exam: ROM and strength within normal limits  Sensory   Proprioception: intact  Monofilament testing: intact    Vascular  Capillary refills: < 3 seconds  The right DP pulse is 1+  The right PT pulse is 1+  Left Foot/Ankle  Left Foot Inspection  Skin Exam: skin normal and skin intact  No dry skin, no warmth, no erythema, no maceration, normal color, no pre-ulcer, no ulcer and no callus  Toe Exam: ROM and strength within normal limits  Sensory   Proprioception: intact  Monofilament testing: intact    Vascular  Capillary refills: < 3 seconds  The left DP pulse is 1+  The left PT pulse is 1+       Assign Risk Category  No deformity present  No loss of protective sensation  No weak pulses  Risk: 0

## 2022-01-19 NOTE — PATIENT INSTRUCTIONS
Lab data reviewed in detail and compared prior    COPD-patient encouraged to lose weight, continue with incentive spirometry, get into pulmonary rehab when able  Trial of Trelegy to see if that works better than the Advair and Incruse, do not take Advair or Incruse when Trelegy becomes available and monitor symptoms closely  Type 2 diabetes mellitus remains stable with diet alone    Hypertension stable on present regimen    Chronic kidney disease followed by Nephrology stable GFR    Gout stable without recent flare    History of prostate cancer followed by Urology    Routine follow-up after labs in 6 months, sooner as needed

## 2022-02-02 ENCOUNTER — TELEPHONE (OUTPATIENT)
Dept: INTERNAL MEDICINE CLINIC | Facility: CLINIC | Age: 82
End: 2022-02-02

## 2022-02-02 NOTE — TELEPHONE ENCOUNTER
Patient states he had been taking Albuterol inhaler and using the nebulizer solution  The Trelegy Ellipta inhaler was delivered to him today  He wants to know if he should stop the albuterol nebulizer and inhaler and just use the Trelegy  Advise patient   [phone]

## 2022-02-11 DIAGNOSIS — I10 HYPERTENSION, UNSPECIFIED TYPE: ICD-10-CM

## 2022-02-11 DIAGNOSIS — I10 ESSENTIAL HYPERTENSION: ICD-10-CM

## 2022-02-11 RX ORDER — LOSARTAN POTASSIUM 100 MG/1
100 TABLET ORAL DAILY
Qty: 90 TABLET | Refills: 2 | Status: SHIPPED | OUTPATIENT
Start: 2022-02-11

## 2022-02-11 RX ORDER — AMLODIPINE BESYLATE 5 MG/1
10 TABLET ORAL EVERY MORNING
Qty: 90 TABLET | Refills: 0 | Status: SHIPPED | OUTPATIENT
Start: 2022-02-11 | End: 2022-04-03

## 2022-02-11 NOTE — TELEPHONE ENCOUNTER
----- Message from [facility] sent at 2/11/2022  2:27 PM EST -----  Regarding: Prescription refill  I called and they said a new prescription was needed  Last bottle we received was as noted in September   I will call again if you say refill was sent 1/20/22

## 2022-02-25 ENCOUNTER — TELEPHONE (OUTPATIENT)
Dept: NEPHROLOGY | Facility: CLINIC | Age: 82
End: 2022-02-25

## 2022-04-02 DIAGNOSIS — I10 ESSENTIAL HYPERTENSION: ICD-10-CM

## 2022-04-03 RX ORDER — AMLODIPINE BESYLATE 5 MG/1
TABLET ORAL
Qty: 90 TABLET | Refills: 0 | Status: SHIPPED | OUTPATIENT
Start: 2022-04-03 | End: 2022-05-20 | Stop reason: SDUPTHER

## 2022-05-02 ENCOUNTER — TELEPHONE (OUTPATIENT)
Dept: NEPHROLOGY | Facility: CLINIC | Age: 82
End: 2022-05-02

## 2022-05-05 ENCOUNTER — APPOINTMENT (OUTPATIENT)
Dept: LAB | Facility: HOSPITAL | Age: 82
End: 2022-05-05
Payer: COMMERCIAL

## 2022-05-05 DIAGNOSIS — I12.9 HYPERTENSIVE KIDNEY DISEASE WITH STAGE 3 CHRONIC KIDNEY DISEASE, UNSPECIFIED WHETHER STAGE 3A OR 3B CKD (HCC): ICD-10-CM

## 2022-05-05 DIAGNOSIS — E55.9 VITAMIN D DEFICIENCY: ICD-10-CM

## 2022-05-05 DIAGNOSIS — N18.30 HYPERTENSIVE KIDNEY DISEASE WITH STAGE 3 CHRONIC KIDNEY DISEASE, UNSPECIFIED WHETHER STAGE 3A OR 3B CKD (HCC): ICD-10-CM

## 2022-05-05 DIAGNOSIS — N18.30 STAGE 3 CHRONIC KIDNEY DISEASE, UNSPECIFIED WHETHER STAGE 3A OR 3B CKD (HCC): ICD-10-CM

## 2022-05-05 LAB
25(OH)D3 SERPL-MCNC: 86.3 NG/ML (ref 30–100)
ANION GAP SERPL CALCULATED.3IONS-SCNC: 12 MMOL/L (ref 4–13)
BACTERIA UR QL AUTO: ABNORMAL /HPF
BASOPHILS # BLD AUTO: 0.04 THOUSANDS/ΜL (ref 0–0.1)
BASOPHILS NFR BLD AUTO: 0 % (ref 0–1)
BILIRUB UR QL STRIP: ABNORMAL
BUN SERPL-MCNC: 16 MG/DL (ref 5–25)
CALCIUM SERPL-MCNC: 9.7 MG/DL (ref 8.3–10.1)
CAOX CRY URNS QL MICRO: ABNORMAL /HPF
CHLORIDE SERPL-SCNC: 104 MMOL/L (ref 100–108)
CLARITY UR: CLEAR
CO2 SERPL-SCNC: 25 MMOL/L (ref 21–32)
COLOR UR: YELLOW
CREAT SERPL-MCNC: 1.85 MG/DL (ref 0.6–1.3)
CREAT UR-MCNC: 485 MG/DL
EOSINOPHIL # BLD AUTO: 0.07 THOUSAND/ΜL (ref 0–0.61)
EOSINOPHIL NFR BLD AUTO: 1 % (ref 0–6)
ERYTHROCYTE [DISTWIDTH] IN BLOOD BY AUTOMATED COUNT: 16.9 % (ref 11.6–15.1)
GFR SERPL CREATININE-BSD FRML MDRD: 33 ML/MIN/1.73SQ M
GLUCOSE P FAST SERPL-MCNC: 101 MG/DL (ref 65–99)
GLUCOSE UR STRIP-MCNC: NEGATIVE MG/DL
HCT VFR BLD AUTO: 45.6 % (ref 36.5–49.3)
HGB BLD-MCNC: 14 G/DL (ref 12–17)
HGB UR QL STRIP.AUTO: NEGATIVE
HYALINE CASTS #/AREA URNS LPF: ABNORMAL /LPF
IMM GRANULOCYTES # BLD AUTO: 0.02 THOUSAND/UL (ref 0–0.2)
IMM GRANULOCYTES NFR BLD AUTO: 0 % (ref 0–2)
KETONES UR STRIP-MCNC: NEGATIVE MG/DL
LEUKOCYTE ESTERASE UR QL STRIP: NEGATIVE
LYMPHOCYTES # BLD AUTO: 1.69 THOUSANDS/ΜL (ref 0.6–4.47)
LYMPHOCYTES NFR BLD AUTO: 16 % (ref 14–44)
MCH RBC QN AUTO: 23.9 PG (ref 26.8–34.3)
MCHC RBC AUTO-ENTMCNC: 30.7 G/DL (ref 31.4–37.4)
MCV RBC AUTO: 78 FL (ref 82–98)
MICROALBUMIN UR-MCNC: 134 MG/L (ref 0–20)
MICROALBUMIN/CREAT 24H UR: 28 MG/G CREATININE (ref 0–30)
MONOCYTES # BLD AUTO: 0.7 THOUSAND/ΜL (ref 0.17–1.22)
MONOCYTES NFR BLD AUTO: 7 % (ref 4–12)
NEUTROPHILS # BLD AUTO: 8.3 THOUSANDS/ΜL (ref 1.85–7.62)
NEUTS SEG NFR BLD AUTO: 76 % (ref 43–75)
NITRITE UR QL STRIP: NEGATIVE
NON-SQ EPI CELLS URNS QL MICRO: ABNORMAL /HPF
NRBC BLD AUTO-RTO: 0 /100 WBCS
PH UR STRIP.AUTO: 5.5 [PH]
PHOSPHATE SERPL-MCNC: 3.6 MG/DL (ref 2.3–4.1)
PLATELET # BLD AUTO: 264 THOUSANDS/UL (ref 149–390)
PMV BLD AUTO: 12 FL (ref 8.9–12.7)
POTASSIUM SERPL-SCNC: 4.2 MMOL/L (ref 3.5–5.3)
PROT UR STRIP-MCNC: ABNORMAL MG/DL
PTH-INTACT SERPL-MCNC: 34.3 PG/ML (ref 18.4–80.1)
RBC # BLD AUTO: 5.86 MILLION/UL (ref 3.88–5.62)
RBC #/AREA URNS AUTO: ABNORMAL /HPF
SODIUM SERPL-SCNC: 141 MMOL/L (ref 136–145)
SP GR UR STRIP.AUTO: >=1.03 (ref 1–1.03)
URATE SERPL-MCNC: 7.1 MG/DL (ref 4.2–8)
UROBILINOGEN UR QL STRIP.AUTO: 0.2 E.U./DL
WBC # BLD AUTO: 10.82 THOUSAND/UL (ref 4.31–10.16)
WBC #/AREA URNS AUTO: ABNORMAL /HPF

## 2022-05-05 PROCEDURE — 82043 UR ALBUMIN QUANTITATIVE: CPT

## 2022-05-05 PROCEDURE — 82570 ASSAY OF URINE CREATININE: CPT

## 2022-05-05 PROCEDURE — 82306 VITAMIN D 25 HYDROXY: CPT

## 2022-05-05 PROCEDURE — 80048 BASIC METABOLIC PNL TOTAL CA: CPT

## 2022-05-05 PROCEDURE — 36415 COLL VENOUS BLD VENIPUNCTURE: CPT

## 2022-05-05 PROCEDURE — 84550 ASSAY OF BLOOD/URIC ACID: CPT

## 2022-05-05 PROCEDURE — 81001 URINALYSIS AUTO W/SCOPE: CPT

## 2022-05-05 PROCEDURE — 85025 COMPLETE CBC W/AUTO DIFF WBC: CPT

## 2022-05-05 PROCEDURE — 83970 ASSAY OF PARATHORMONE: CPT

## 2022-05-05 PROCEDURE — 84100 ASSAY OF PHOSPHORUS: CPT

## 2022-05-12 ENCOUNTER — OFFICE VISIT (OUTPATIENT)
Dept: NEPHROLOGY | Facility: CLINIC | Age: 82
End: 2022-05-12
Payer: COMMERCIAL

## 2022-05-12 VITALS
HEIGHT: 72 IN | SYSTOLIC BLOOD PRESSURE: 140 MMHG | OXYGEN SATURATION: 94 % | TEMPERATURE: 97.3 F | HEART RATE: 51 BPM | WEIGHT: 250 LBS | DIASTOLIC BLOOD PRESSURE: 80 MMHG | BODY MASS INDEX: 33.86 KG/M2 | RESPIRATION RATE: 16 BRPM

## 2022-05-12 DIAGNOSIS — I12.9 HYPERTENSIVE KIDNEY DISEASE WITH STAGE 3B CHRONIC KIDNEY DISEASE (HCC): ICD-10-CM

## 2022-05-12 DIAGNOSIS — E55.9 VITAMIN D DEFICIENCY: ICD-10-CM

## 2022-05-12 DIAGNOSIS — N18.32 STAGE 3B CHRONIC KIDNEY DISEASE (HCC): Primary | ICD-10-CM

## 2022-05-12 DIAGNOSIS — N18.32 HYPERTENSIVE KIDNEY DISEASE WITH STAGE 3B CHRONIC KIDNEY DISEASE (HCC): ICD-10-CM

## 2022-05-12 PROCEDURE — 3077F SYST BP >= 140 MM HG: CPT | Performed by: INTERNAL MEDICINE

## 2022-05-12 PROCEDURE — 3079F DIAST BP 80-89 MM HG: CPT | Performed by: INTERNAL MEDICINE

## 2022-05-12 PROCEDURE — 99214 OFFICE O/P EST MOD 30 MIN: CPT | Performed by: INTERNAL MEDICINE

## 2022-05-12 PROCEDURE — 1036F TOBACCO NON-USER: CPT | Performed by: INTERNAL MEDICINE

## 2022-05-12 PROCEDURE — 1160F RVW MEDS BY RX/DR IN RCRD: CPT | Performed by: INTERNAL MEDICINE

## 2022-05-12 RX ORDER — MELATONIN
5000 EVERY OTHER DAY
Start: 2022-05-12

## 2022-05-12 NOTE — PROGRESS NOTES
NEPHROLOGY OFFICE FOLLOW UP  Sho Mccullough 80 y o  male MRN: [id number]    Encounter: [id number] DATE: 5/12/2022    REASON FOR VISIT: Sho Mccullough is a 80 y o  male who is here on 5/12/2022 for further management of chronic kidney disease  HPI:    This is a 81-year-old male with a past medical history of CKD stage 3, hypertension, COPD, returns to Nephrology Clinic for further management of CKD stage 3  Upon review of old medical records,  Patient's new baseline creatinine seems to be fluctuating around 1 6-1 7  Patient's wife was also present at the time of consultation and history was also obtained from her and all the questions were answered  Patient has COPD  Patient currently does not smoke  According to patient his breathing is currently under good control with the use of nebulizers  Patient also uses oxygen 3 liters/minute  Patient has hypertension which seems under well controlled with the use of current antihypertensive medications  Patient checks blood pressure on regular basis at home  Patient has gout and currently taking allopurinol 100 mg PO daily on daily basis  Patient denies any recent gout flare-up  Patient also have chronic back pain and also been followed by pain management specialist and currently using narcotics  According to patient he was found to have low vitamin-D level in the past and currently taking cholecalciferol  Patient also have kidney stone and was also seen by Dr Krish Williamson in the past     According to patient he does not use any NSAIDs including Mobic  REVIEW OF SYSTEMS:    Review of Systems   Constitutional: Negative for chills and fever  HENT: Negative for nosebleeds and sore throat  Eyes: Negative for photophobia and pain  Respiratory: Negative for choking and wheezing  Cardiovascular: Negative for chest pain and palpitations  Gastrointestinal: Negative for blood in stool  Genitourinary: Negative for hematuria  Musculoskeletal: Negative for neck stiffness  Skin: Negative for pallor  Neurological: Negative for seizures and syncope  Psychiatric/Behavioral: Negative for confusion and suicidal ideas  PAST MEDICAL HISTORY:  Past Medical History:   Diagnosis Date    Back pain     Chronic kidney disease     COPD (chronic obstructive pulmonary disease) (HCC)     History of malignant neoplasm of oral cavity     M0    HL (hearing loss)     Hypertension     Kidney stone 2019    Malignant neoplasm of colon (HCC)     descending    Prostate cancer (HCC)     SOB (shortness of breath)     Tachycardia     Thalassemia trait     Tinnitus        PAST SURGICAL HISTORY:  Past Surgical History:   Procedure Laterality Date    BACK SURGERY      CHOLECYSTECTOMY      COLON SURGERY  10/2008    partial colectomy    INCISIONAL HERNIA REPAIR      JOINT REPLACEMENT Right     hip    SC COLONOSCOPY FLX DX W/COLLJ SPEC WHEN PFRMD N/A 2018    Procedure: COLONOSCOPY;  Surgeon: Zuhair Reynolds MD;  Location: [facility];   Service: Gastroenterology    PROSTATE SURGERY      SPINAL CORD STIMULATOR IMPLANT      TONSILLECTOMY         SOCIAL HISTORY:  Social History     Substance and Sexual Activity   Alcohol Use Not Currently     Social History     Substance and Sexual Activity   Drug Use No     Social History     Tobacco Use   Smoking Status Former Smoker    Packs/day: 1 00    Years: 44 00    Pack years: 44 00    Types: Cigarettes    Start date:     Quit date: 2004    Years since quittin 9   Smokeless Tobacco Never Used   Tobacco Comment    non smoker/tobacco user; quit  as per [employer]       FAMILY HISTORY:  Family History   Problem Relation Age of Onset    Heart failure Mother         as per [employer]    Coronary artery disease Mother         as per [employer]    Diabetes Mother         mellitus - as per [employer]    Coronary artery disease Father         as per [employer]    Cancer Sister         malignant neoplasm       ALLERGY:  No Known Allergies    MEDICATIONS:    Current Outpatient Medications:     acetaminophen-codeine (TYLENOL with CODEINE #4) 300-60 MG per tablet, Take 1 tablet by mouth every 6 (six) hours as needed for moderate pain, Disp: , Rfl:     albuterol (2 5 mg/3 mL) 0 083 % nebulizer solution, Take 3 mL (2 5 mg total) by nebulization every 6 (six) hours, Disp: 1080 mL, Rfl: 3    albuterol (VENTOLIN HFA) 90 mcg/act inhaler, Inhale 2 puffs every 6 (six) hours as needed for wheezing or shortness of breath, Disp: 18 g, Rfl: 3    allopurinol (ZYLOPRIM) 100 mg tablet, TAKE 1 TABLET DAILY, Disp: 90 tablet, Rfl: 2    amLODIPine (NORVASC) 5 mg tablet, TAKE 2 TABLETS EVERY       MORNING, Disp: 90 tablet, Rfl: 0    cholecalciferol (VITAMIN D3) 1,000 units tablet, Take 5 tablets (5,000 Units total) by mouth every other day, Disp: , Rfl:     gabapentin (NEURONTIN) 300 mg capsule, every 12 (twelve) hours as needed , Disp: , Rfl:     losartan (COZAAR) 100 MG tablet, Take 1 tablet (100 mg total) by mouth daily, Disp: 90 tablet, Rfl: 2    metoprolol succinate (TOPROL-XL) 25 mg 24 hr tablet, TAKE 1 TABLET DAILY, Disp: 90 tablet, Rfl: 2    Polyvinyl Alcohol-Povidone (REFRESH OP), Apply to eye as needed , Disp: , Rfl:     prochlorperazine (COMPAZINE) 10 mg tablet, Take 1 tablet (10 mg total) by mouth every 6 (six) hours as needed for nausea or vomiting, Disp: 30 tablet, Rfl: 0    Vitamins-Lipotropics (LIPO-FLAVONOID PLUS PO), Take by mouth, Disp: , Rfl:     fluticasone-umeclidinium-vilanterol (Trelegy Ellipta) 200-62 5-25 MCG/INH AEPB inhaler, Inhale 1 puff daily Rinse mouth after use   (Patient not taking: Reported on 5/12/2022), Disp: 60 blister, Rfl: 0    PHYSICAL EXAM:  Vitals:    05/12/22 1354   BP: 140/80   BP Location: Left arm   Patient Position: Sitting   Cuff Size: Large   Pulse: (!) 51   Resp: 16   Temp: (!) 97 3 °F (36 3 °C)   TempSrc: Temporal   SpO2: 94%   Weight: 113 kg (250 lb)   Height: 5' 11 5" (1 816 m)     Body mass index is 34 38 kg/m²  Physical Exam  Constitutional:       General: He is not in acute distress  Appearance: He is obese  Comments: Uses nasal oxygen 3 liters/minute   HENT:      Right Ear: External ear normal    Eyes:      General: No scleral icterus  Conjunctiva/sclera:      Right eye: No hemorrhage  Neck:      Thyroid: No thyromegaly  Vascular: No JVD  Cardiovascular:      Rate and Rhythm: Normal rate  Pulmonary:      Effort: Pulmonary effort is normal  No accessory muscle usage or respiratory distress  Breath sounds: No wheezing  Abdominal:      General: There is no distension  Musculoskeletal:      Right ankle: No swelling  Left ankle: No swelling  Skin:     General: Skin is warm  Coloration: Skin is not jaundiced  Neurological:      Mental Status: He is alert  He is not disoriented  Psychiatric:         Speech: He is communicative  Behavior: Behavior is not combative           LAB RESULTS:  Results for orders placed or performed in visit on 05/05/22   CBC and differential   Result Value Ref Range    WBC 10 82 (H) 4 31 - 10 16 Thousand/uL    RBC 5 86 (H) 3 88 - 5 62 Million/uL    Hemoglobin 14 0 12 0 - 17 0 g/dL    Hematocrit 45 6 36 5 - 49 3 %    MCV 78 (L) 82 - 98 fL    MCH 23 9 (L) 26 8 - 34 3 pg    MCHC 30 7 (L) 31 4 - 37 4 g/dL    RDW 16 9 (H) 11 6 - 15 1 %    MPV 12 0 8 9 - 12 7 fL    Platelets 163 557 - 559 Thousands/uL    nRBC 0 /100 WBCs    Neutrophils Relative 76 (H) 43 - 75 %    Immat GRANS % 0 0 - 2 %    Lymphocytes Relative 16 14 - 44 %    Monocytes Relative 7 4 - 12 %    Eosinophils Relative 1 0 - 6 %    Basophils Relative 0 0 - 1 %    Neutrophils Absolute 8 30 (H) 1 85 - 7 62 Thousands/µL    Immature Grans Absolute 0 02 0 00 - 0 20 Thousand/uL    Lymphocytes Absolute 1 69 0 60 - 4 47 Thousands/µL    Monocytes Absolute 0 70 0 17 - 1 22 Thousand/µL    Eosinophils Absolute 0 07 0 00 - 0 61 Thousand/µL    Basophils Absolute 0 04 0 00 - 0 10 Thousands/µL   Basic metabolic panel   Result Value Ref Range    Sodium 141 136 - 145 mmol/L    Potassium 4 2 3 5 - 5 3 mmol/L    Chloride 104 100 - 108 mmol/L    CO2 25 21 - 32 mmol/L    ANION GAP 12 4 - 13 mmol/L    BUN 16 5 - 25 mg/dL    Creatinine 1 85 (H) 0 60 - 1 30 mg/dL    Glucose, Fasting 101 (H) 65 - 99 mg/dL    Calcium 9 7 8 3 - 10 1 mg/dL    eGFR 33 ml/min/1 73sq m   Urinalysis with microscopic   Result Value Ref Range    Clarity, UA Clear     Color, UA Yellow     Specific Gravity, UA >=1 030 1 003 - 1 030    pH, UA 5 5 4 5, 5 0, 5 5, 6 0, 6 5, 7 0, 7 5, 8 0    Glucose, UA Negative Negative mg/dl    Ketones, UA Negative Negative mg/dl    Blood, UA Negative Negative    Protein, UA 30 (1+) (A) Negative mg/dl    Nitrite, UA Negative Negative    Bilirubin, UA Small (A) Negative    Urobilinogen, UA 0 2 0 2, 1 0 E U /dl E U /dl    Leukocytes, UA Negative Negative    WBC, UA None Seen None Seen, 2-4, 5-60 /hpf    RBC, UA None Seen None Seen, 2-4 /hpf    Hyaline Casts, UA 10-20 (A) (none) /lpf    Bacteria, UA Occasional None Seen, Occasional /hpf    Ca Oxalate April, UA Occasional (A) None Seen /hpf    Epithelial Cells Occasional None Seen, Occasional /hpf   Microalbumin / creatinine urine ratio   Result Value Ref Range    Creatinine, Ur 485 0 mg/dL    Microalbum  ,U,Random 134 0 (H) 0 0 - 20 0 mg/L    Microalb Creat Ratio 28 0 - 30 mg/g creatinine   Phosphorus   Result Value Ref Range    Phosphorus 3 6 2 3 - 4 1 mg/dL   Uric acid   Result Value Ref Range    Uric Acid 7 1 4 2 - 8 0 mg/dL   PTH, intact   Result Value Ref Range    PTH 34 3 18 4 - 80 1 pg/mL   Vitamin D 25 hydroxy   Result Value Ref Range    Vit D, 25-Hydroxy 86 3 30 0 - 100 0 ng/mL       ASSESSMENT and PLAN:  Tavia Gutierrez was seen today for chronic kidney disease and follow-up      Diagnoses and all orders for this visit:    Stage 3b chronic kidney disease ([facility] [address] )  -     CBC and differential; Future  - Basic metabolic panel; Future  -     Urinalysis with microscopic; Future  -     Microalbumin / creatinine urine ratio; Future  -     Phosphorus; Future  -     Uric acid; Future  -     PTH, intact; Future  -     Vitamin D 25 hydroxy; Future    Hypertensive kidney disease with stage 3b chronic kidney disease ([facility] [address] )  -     Basic metabolic panel; Future  -     Urinalysis with microscopic; Future  -     Microalbumin / creatinine urine ratio; Future    Vitamin D deficiency  -     Vitamin D 25 hydroxy; Future  -     cholecalciferol (VITAMIN D3) 1,000 units tablet; Take 5 tablets (5,000 Units total) by mouth every other day      1  CKD stage 3  Multifactorial and was suspected due to underlying hypertensive nephrosclerosis  Upon review of old medical records, new baseline creatinine seems to be fluctuating around 1 6-1 7 and in the interim renal function has slightly worsened to current creatinine of 1 85 with EGFR of 35  Clinically patient is not in volume overload state and advised patient to drink at least 2 L of fluid on daily basis to ensure staying well hydrated  Management of hypertension as mentioned below  Plan to avoid NSAIDs including Mobic/meloxicam and recheck renal function before next visit  2   Hypertension in chronic kidney disease  Today's blood pressure was under well control and plan to monitor hypertension with amlodipine 10 mg PO daily, metoprolol XL 25 mg PO daily and losartan 100 mg PO daily  Also advised patient to follow low-salt diet   3  Vitamin-D deficiency  Currently patient is taking cholecalciferol 5000 Units PO daily  Current vitamin-D level is 86 which is on the higher side of normal and plan to decrease cholecalciferol to 5000 Units every other day and once he finished the bottle, and then take 2000 Units on daily basis  Recheck vitamin-D level with the next visit per    Returns to Nephrology Clinic in 6 months  Future labs ordered      Portions of the record may have been created with voice recognition software  Occasional wrong word or "sound a like" substitutions may have occurred due to the inherent limitations of voice recognition software  Read the chart carefully and recognize, using context, where substitutions have occurred  If you have any questions, please contact the dictating provider

## 2022-05-20 ENCOUNTER — PATIENT MESSAGE (OUTPATIENT)
Dept: INTERNAL MEDICINE CLINIC | Facility: CLINIC | Age: 82
End: 2022-05-20

## 2022-05-20 DIAGNOSIS — I10 ESSENTIAL HYPERTENSION: ICD-10-CM

## 2022-05-20 RX ORDER — AMLODIPINE BESYLATE 5 MG/1
10 TABLET ORAL EVERY MORNING
Qty: 90 TABLET | Refills: 0 | Status: SHIPPED | OUTPATIENT
Start: 2022-05-20 | End: 2022-07-04

## 2022-06-20 ENCOUNTER — OFFICE VISIT (OUTPATIENT)
Dept: PULMONOLOGY | Facility: CLINIC | Age: 82
End: 2022-06-20
Payer: COMMERCIAL

## 2022-06-20 VITALS
BODY MASS INDEX: 33.18 KG/M2 | DIASTOLIC BLOOD PRESSURE: 64 MMHG | TEMPERATURE: 97.7 F | HEART RATE: 51 BPM | WEIGHT: 245 LBS | HEIGHT: 72 IN | SYSTOLIC BLOOD PRESSURE: 124 MMHG | OXYGEN SATURATION: 91 %

## 2022-06-20 DIAGNOSIS — J96.11 CHRONIC HYPOXEMIC RESPIRATORY FAILURE (HCC): ICD-10-CM

## 2022-06-20 DIAGNOSIS — R06.02 SHORTNESS OF BREATH: Primary | ICD-10-CM

## 2022-06-20 DIAGNOSIS — Z87.891 FORMER SMOKER: ICD-10-CM

## 2022-06-20 DIAGNOSIS — R91.8 PULMONARY NODULES: ICD-10-CM

## 2022-06-20 DIAGNOSIS — F17.211 NICOTINE DEPENDENCE, CIGARETTES, IN REMISSION: ICD-10-CM

## 2022-06-20 DIAGNOSIS — J43.2 CENTRILOBULAR EMPHYSEMA (HCC): ICD-10-CM

## 2022-06-20 DIAGNOSIS — J44.9 COPD, SEVERE (HCC): ICD-10-CM

## 2022-06-20 PROCEDURE — 1036F TOBACCO NON-USER: CPT | Performed by: INTERNAL MEDICINE

## 2022-06-20 PROCEDURE — 3074F SYST BP LT 130 MM HG: CPT | Performed by: INTERNAL MEDICINE

## 2022-06-20 PROCEDURE — 3078F DIAST BP <80 MM HG: CPT | Performed by: INTERNAL MEDICINE

## 2022-06-20 PROCEDURE — 99214 OFFICE O/P EST MOD 30 MIN: CPT | Performed by: INTERNAL MEDICINE

## 2022-06-20 PROCEDURE — 1160F RVW MEDS BY RX/DR IN RCRD: CPT | Performed by: INTERNAL MEDICINE

## 2022-06-20 RX ORDER — UMECLIDINIUM 62.5 UG/1
1 AEROSOL, POWDER ORAL DAILY
Qty: 90 BLISTER | Refills: 1 | Status: SHIPPED | OUTPATIENT
Start: 2022-06-20 | End: 2022-09-18

## 2022-06-20 RX ORDER — ALBUTEROL SULFATE 2.5 MG/3ML
2.5 SOLUTION RESPIRATORY (INHALATION) EVERY 6 HOURS
Qty: 1080 ML | Refills: 3 | Status: SHIPPED | OUTPATIENT
Start: 2022-06-20

## 2022-06-20 NOTE — PROGRESS NOTES
Assessment/Plan:   Diagnoses and all orders for this visit:    Shortness of breath    COPD, severe (HCC)  -     umeclidinium bromide (Incruse Ellipta) 62 5 mcg/inh AEPB inhaler; Inhale 1 puff daily  -     fluticasone-salmeterol (Advair) 250-50 mcg/dose inhaler; Inhale 1 puff 2 (two) times a day Rinse mouth after use  Centrilobular emphysema (HCC)  -     albuterol (2 5 mg/3 mL) 0 083 % nebulizer solution; Take 3 mL (2 5 mg total) by nebulization every 6 (six) hours    Chronic hypoxemic respiratory failure ([facility] [address] )    Former smoker    Nicotine dependence, cigarettes, in remission    Pulmonary nodules      PFTs in 2018 with severe obstructive airflow limitation with no appreciable response to the bronchodilator FEV1 was 41% with severely decreased DLCO at 45%  Repeat PFTs recently done also demonstrating FEV1 of 50% and DLCO was 46% his lung volumes are 71% predicted with residual volume of 77% predicted  Currently he is on 3 L of oxygen continues but he the patient states he has not been using it continuously  Uses it as needed again had a long discussion with the patient with regards to the compliance with the oxygen given the severity of his COPD under consequences of chronic hypoxemia discussed understands and verbalizes   Given poor inspiratory capacity the Advair was initially switch to budesonide and Perforomist via nebulizer but he does not want to use them given the high co-pay and then he is also his PCP has given him Trelegy which he states has not made any difference in his breathing he only feels that the Advair is working  He is back on his Advair discussed with the patient to continue with Advair 250/51 puff twice daily  Rinse mouth after use   Again discussed the need to be on the Incruse as well will send in the prescription Incruse 1 puff daily   Continue with albuterol via nebulizer 4 times daily as needed   Continue with 3 L of oxygen by nasal cannula continuous      Not a candidate for endobronchial valve placement given the lung volumes are 71%  If any significant worsening of shortness of breath he will go into the ER understands and verbalizes   Recommend weight loss   Follow-up in 3 months or p r n  earlier as needed  No follow-ups on file  All questions are answered to the patient's satisfaction and understanding  He verbalizes understanding  He is encouraged to call with any further questions or concerns  Portions of the record may have been created with voice recognition software  Occasional wrong word or "sound a like" substitutions may have occurred due to the inherent limitations of voice recognition software  Read the chart carefully and recognize, using context, where substitutions have occurred  Electronically Signed by Yessenia Michel MD    ______________________________________________________________________    Chief Complaint:   Chief Complaint   Patient presents with    Follow-up       Patient ID: Annemarie Mayen is a 80 y o  y o  male has a past medical history of Back pain, Chronic kidney disease, COPD (chronic obstructive pulmonary disease) ([address] ), History of malignant neoplasm of oral cavity, HL (hearing loss), Hypertension, Kidney stone (05/2019), Malignant neoplasm of colon ([address] ), Prostate cancer ([address] ), SOB (shortness of breath), Tachycardia, Thalassemia trait, and Tinnitus  6/20/2022  Patient presents today for follow-up visit  Patient is a 44-year-old male former smoker quit less than 20 years ago with 45 pack year smoking history with past medical history of COPD, chronic respiratory failure on home O2, lung nodules, pleural plaques, hypertension, prostate cancer      He is here today for follow-up    He states his shortness of breath and dyspnea on exertion has been gradually getting worse, and he states his Advair and Spiriva has not been working well, he was also placed on Trelegy before which he states did not work  Finally he was on Advair and Incruse but given poor inspiratory effort we had switched him to long-acting medications via nebulizer with Perforomist and budesonide which he states is currently expensive he is paying a co-pay of almost 300 dollars a month also Trelegy ordered by his primary care doctor which he states did not make any difference and he has stopped it  He is back back on Advair he states  But he has not restarted the Incruse        Pertinent negatives include no chest pain, fever, headaches, myalgias or sore throat  Review of Systems   Constitutional: Negative for appetite change and fever  HENT: Positive for sneezing  Negative for ear pain, postnasal drip, rhinorrhea, sore throat and trouble swallowing  Eyes: Negative  Respiratory: Positive for shortness of breath  He states his shortness of breath and dyspnea on exertion is stable no worsening from before,   Cardiovascular: Negative for chest pain  Gastrointestinal: Negative  Endocrine: Negative  Genitourinary: Negative  Musculoskeletal: Negative  Negative for myalgias  Allergic/Immunologic: Negative  Neurological: Negative  Negative for headaches  Hematological: Negative  Psychiatric/Behavioral: Negative  Smoking history: He reports that he quit smoking about 18 years ago  His smoking use included cigarettes  He started smoking about 62 years ago  He has a 44 00 pack-year smoking history   He has never used smokeless tobacco     The following portions of the patient's history were reviewed and updated as appropriate: allergies, current medications, past family history, past medical history, past social history, past surgical history and problem list     Immunization History   Administered Date(s) Administered    COVID-19 MODERNA VACC 0 5 ML IM 01/26/2021, 02/22/2021, 10/27/2021    INFLUENZA 10/26/2012, 10/22/2013, 12/01/2015, 10/07/2016, 09/18/2018, 11/19/2019, 10/04/2021    Influenza Split High Dose Preservative Free IM 10/07/2014, 10/01/2017    Influenza, high dose seasonal 0 7 mL 10/08/2020    Influenza, seasonal, injectable 10/26/2012, 10/22/2013, 12/01/2015, 09/03/2016    Pneumococcal Conjugate 13-Valent 08/25/2015, 08/25/2015    Pneumococcal Polysaccharide PPV23 01/03/2017    Tdap 07/01/2011    Zoster 1940    Zoster Vaccine Recombinant 12/18/2018, 06/04/2019     Current Outpatient Medications   Medication Sig Dispense Refill    acetaminophen-codeine (TYLENOL with CODEINE #4) 300-60 MG per tablet Take 1 tablet by mouth every 6 (six) hours as needed for moderate pain      albuterol (2 5 mg/3 mL) 0 083 % nebulizer solution Take 3 mL (2 5 mg total) by nebulization every 6 (six) hours 1080 mL 3    albuterol (VENTOLIN HFA) 90 mcg/act inhaler Inhale 2 puffs every 6 (six) hours as needed for wheezing or shortness of breath 18 g 3    allopurinol (ZYLOPRIM) 100 mg tablet TAKE 1 TABLET DAILY 90 tablet 2    amLODIPine (NORVASC) 5 mg tablet Take 2 tablets (10 mg total) by mouth every morning 90 tablet 0    cholecalciferol (VITAMIN D3) 1,000 units tablet Take 5 tablets (5,000 Units total) by mouth every other day      fluticasone-salmeterol (Advair) 250-50 mcg/dose inhaler Inhale 1 puff 2 (two) times a day Rinse mouth after use   180 blister 1    gabapentin (NEURONTIN) 300 mg capsule every 12 (twelve) hours as needed       losartan (COZAAR) 100 MG tablet Take 1 tablet (100 mg total) by mouth daily 90 tablet 2    metoprolol succinate (TOPROL-XL) 25 mg 24 hr tablet TAKE 1 TABLET DAILY 90 tablet 2    Polyvinyl Alcohol-Povidone (REFRESH OP) Apply to eye as needed       prochlorperazine (COMPAZINE) 10 mg tablet Take 1 tablet (10 mg total) by mouth every 6 (six) hours as needed for nausea or vomiting 30 tablet 0    umeclidinium bromide (Incruse Ellipta) 62 5 mcg/inh AEPB inhaler Inhale 1 puff daily 90 blister 1    Vitamins-Lipotropics (LIPO-FLAVONOID PLUS PO) Take by mouth       No current facility-administered medications for this visit  Allergies: Patient has no known allergies  Objective:  Vitals:    06/20/22 1011   BP: 124/64   Pulse: (!) 51   Temp: 97 7 °F (36 5 °C)   SpO2: 91%   Weight: 111 kg (245 lb)   Height: 5' 11 5" (1 816 m)   Oxygen Therapy  SpO2: 91 % (with 3 lts of oxygen)    Wt Readings from Last 3 Encounters:   06/20/22 111 kg (245 lb)   05/12/22 113 kg (250 lb)   01/19/22 116 kg (255 lb 12 8 oz)     Body mass index is 33 69 kg/m²  Physical Exam  Constitutional:       Appearance: He is well-developed  HENT:      Head: Normocephalic and atraumatic  Eyes:      Pupils: Pupils are equal, round, and reactive to light  Cardiovascular:      Rate and Rhythm: Normal rate and regular rhythm  Pulmonary:      Effort: Pulmonary effort is normal       Breath sounds: No wheezing or rales  Chest:      Chest wall: No tenderness  Musculoskeletal:         General: Normal range of motion  Cervical back: Normal range of motion and neck supple  Skin:     General: Skin is warm and dry  Neurological:      Mental Status: He is alert and oriented to person, place, and time     Psychiatric:         Behavior: Behavior normal            Diagnostics:  I have personally reviewed pertinent films in PACS    Answers for HPI/ROS submitted by the patient on 6/19/2022  Chronicity: recurrent  When did you first notice your symptoms?: more than 1 year ago  How often do your symptoms occur?: intermittently  Since you first noticed this problem, how has it changed?: gradually worsening  Do you have shortness of breath that occurs with effort or exertion?: Yes  Do you have ear congestion?: No  Do you have heartburn?: No  Do you have fatigue?: Yes  Do you have nasal congestion?: No  Do you have shortness of breath when lying flat?: No  Do you have shortness of breath when you wake up?: No  Do you have sweats?: No  Have you experienced weight loss?: No  Which of the following makes your symptoms worse?: climbing stairs, exercise, strenuous activity  Which of the following makes your symptoms better?: rest, steroid inhaler

## 2022-07-03 DIAGNOSIS — I10 ESSENTIAL HYPERTENSION: ICD-10-CM

## 2022-07-04 RX ORDER — AMLODIPINE BESYLATE 5 MG/1
TABLET ORAL
Qty: 90 TABLET | Refills: 0 | Status: SHIPPED | OUTPATIENT
Start: 2022-07-04 | End: 2022-07-19 | Stop reason: SDUPTHER

## 2022-07-13 ENCOUNTER — APPOINTMENT (OUTPATIENT)
Dept: LAB | Facility: HOSPITAL | Age: 82
End: 2022-07-13
Payer: COMMERCIAL

## 2022-07-13 DIAGNOSIS — N18.32 TYPE 2 DIABETES MELLITUS WITH STAGE 3B CHRONIC KIDNEY DISEASE, WITHOUT LONG-TERM CURRENT USE OF INSULIN (HCC): ICD-10-CM

## 2022-07-13 DIAGNOSIS — E11.22 TYPE 2 DIABETES MELLITUS WITH STAGE 3B CHRONIC KIDNEY DISEASE, WITHOUT LONG-TERM CURRENT USE OF INSULIN (HCC): ICD-10-CM

## 2022-07-13 LAB
ALBUMIN SERPL BCP-MCNC: 4.2 G/DL (ref 3.5–5)
ALP SERPL-CCNC: 100 U/L (ref 46–116)
ALT SERPL W P-5'-P-CCNC: 27 U/L (ref 12–78)
ANION GAP SERPL CALCULATED.3IONS-SCNC: 12 MMOL/L (ref 4–13)
AST SERPL W P-5'-P-CCNC: 44 U/L (ref 5–45)
BASOPHILS # BLD AUTO: 0.04 THOUSANDS/ΜL (ref 0–0.1)
BASOPHILS NFR BLD AUTO: 0 % (ref 0–1)
BILIRUB SERPL-MCNC: 1.74 MG/DL (ref 0.2–1)
BUN SERPL-MCNC: 12 MG/DL (ref 5–25)
CALCIUM SERPL-MCNC: 9.4 MG/DL (ref 8.3–10.1)
CHLORIDE SERPL-SCNC: 102 MMOL/L (ref 100–108)
CHOLEST SERPL-MCNC: 130 MG/DL
CO2 SERPL-SCNC: 28 MMOL/L (ref 21–32)
CREAT SERPL-MCNC: 1.65 MG/DL (ref 0.6–1.3)
EOSINOPHIL # BLD AUTO: 0.14 THOUSAND/ΜL (ref 0–0.61)
EOSINOPHIL NFR BLD AUTO: 1 % (ref 0–6)
ERYTHROCYTE [DISTWIDTH] IN BLOOD BY AUTOMATED COUNT: 18 % (ref 11.6–15.1)
GFR SERPL CREATININE-BSD FRML MDRD: 38 ML/MIN/1.73SQ M
GLUCOSE P FAST SERPL-MCNC: 119 MG/DL (ref 65–99)
HCT VFR BLD AUTO: 45.2 % (ref 36.5–49.3)
HDLC SERPL-MCNC: 47 MG/DL
HGB BLD-MCNC: 13.9 G/DL (ref 12–17)
IMM GRANULOCYTES # BLD AUTO: 0.03 THOUSAND/UL (ref 0–0.2)
IMM GRANULOCYTES NFR BLD AUTO: 0 % (ref 0–2)
LDLC SERPL CALC-MCNC: 62 MG/DL (ref 0–100)
LYMPHOCYTES # BLD AUTO: 2.15 THOUSANDS/ΜL (ref 0.6–4.47)
LYMPHOCYTES NFR BLD AUTO: 20 % (ref 14–44)
MCH RBC QN AUTO: 23.6 PG (ref 26.8–34.3)
MCHC RBC AUTO-ENTMCNC: 30.8 G/DL (ref 31.4–37.4)
MCV RBC AUTO: 77 FL (ref 82–98)
MONOCYTES # BLD AUTO: 0.68 THOUSAND/ΜL (ref 0.17–1.22)
MONOCYTES NFR BLD AUTO: 6 % (ref 4–12)
NEUTROPHILS # BLD AUTO: 7.91 THOUSANDS/ΜL (ref 1.85–7.62)
NEUTS SEG NFR BLD AUTO: 73 % (ref 43–75)
NONHDLC SERPL-MCNC: 83 MG/DL
NRBC BLD AUTO-RTO: 0 /100 WBCS
PLATELET # BLD AUTO: 241 THOUSANDS/UL (ref 149–390)
PMV BLD AUTO: 11.1 FL (ref 8.9–12.7)
POTASSIUM SERPL-SCNC: 3.9 MMOL/L (ref 3.5–5.3)
PROT SERPL-MCNC: 8.6 G/DL (ref 6.4–8.2)
RBC # BLD AUTO: 5.9 MILLION/UL (ref 3.88–5.62)
SODIUM SERPL-SCNC: 142 MMOL/L (ref 136–145)
TRIGL SERPL-MCNC: 106 MG/DL
WBC # BLD AUTO: 10.95 THOUSAND/UL (ref 4.31–10.16)

## 2022-07-13 PROCEDURE — 80053 COMPREHEN METABOLIC PANEL: CPT

## 2022-07-13 PROCEDURE — 80061 LIPID PANEL: CPT

## 2022-07-13 PROCEDURE — 36415 COLL VENOUS BLD VENIPUNCTURE: CPT

## 2022-07-13 PROCEDURE — 85025 COMPLETE CBC W/AUTO DIFF WBC: CPT

## 2022-07-13 PROCEDURE — 83036 HEMOGLOBIN GLYCOSYLATED A1C: CPT

## 2022-07-14 LAB
EST. AVERAGE GLUCOSE BLD GHB EST-MCNC: 120 MG/DL
HBA1C MFR BLD: 5.8 %

## 2022-07-19 ENCOUNTER — OFFICE VISIT (OUTPATIENT)
Dept: INTERNAL MEDICINE CLINIC | Facility: CLINIC | Age: 82
End: 2022-07-19
Payer: COMMERCIAL

## 2022-07-19 VITALS
SYSTOLIC BLOOD PRESSURE: 115 MMHG | TEMPERATURE: 98.2 F | HEART RATE: 60 BPM | HEIGHT: 71 IN | DIASTOLIC BLOOD PRESSURE: 74 MMHG | OXYGEN SATURATION: 96 % | BODY MASS INDEX: 35.14 KG/M2 | RESPIRATION RATE: 22 BRPM | WEIGHT: 251 LBS

## 2022-07-19 DIAGNOSIS — Z96.89 STATUS POST INSERTION OF SPINAL CORD STIMULATOR: ICD-10-CM

## 2022-07-19 DIAGNOSIS — M54.16 LUMBAR RADICULOPATHY: ICD-10-CM

## 2022-07-19 DIAGNOSIS — Z85.46 HISTORY OF PROSTATE CANCER: ICD-10-CM

## 2022-07-19 DIAGNOSIS — I10 ESSENTIAL HYPERTENSION: ICD-10-CM

## 2022-07-19 DIAGNOSIS — Z85.038 HISTORY OF COLON CANCER: ICD-10-CM

## 2022-07-19 DIAGNOSIS — N18.32 STAGE 3B CHRONIC KIDNEY DISEASE (HCC): ICD-10-CM

## 2022-07-19 DIAGNOSIS — J43.2 CENTRILOBULAR EMPHYSEMA (HCC): ICD-10-CM

## 2022-07-19 DIAGNOSIS — J96.11 CHRONIC RESPIRATORY FAILURE WITH HYPOXIA, ON HOME O2 THERAPY (HCC): ICD-10-CM

## 2022-07-19 DIAGNOSIS — I12.9 HYPERTENSIVE KIDNEY DISEASE WITH STAGE 3B CHRONIC KIDNEY DISEASE (HCC): ICD-10-CM

## 2022-07-19 DIAGNOSIS — N18.32 TYPE 2 DIABETES MELLITUS WITH STAGE 3B CHRONIC KIDNEY DISEASE, WITHOUT LONG-TERM CURRENT USE OF INSULIN (HCC): ICD-10-CM

## 2022-07-19 DIAGNOSIS — Z11.59 NEED FOR HEPATITIS C SCREENING TEST: Primary | ICD-10-CM

## 2022-07-19 DIAGNOSIS — F11.20 CONTINUOUS OPIOID DEPENDENCE (HCC): ICD-10-CM

## 2022-07-19 DIAGNOSIS — N18.32 HYPERTENSIVE KIDNEY DISEASE WITH STAGE 3B CHRONIC KIDNEY DISEASE (HCC): ICD-10-CM

## 2022-07-19 DIAGNOSIS — Z99.81 CHRONIC RESPIRATORY FAILURE WITH HYPOXIA, ON HOME O2 THERAPY (HCC): ICD-10-CM

## 2022-07-19 DIAGNOSIS — E11.22 TYPE 2 DIABETES MELLITUS WITH STAGE 3B CHRONIC KIDNEY DISEASE, WITHOUT LONG-TERM CURRENT USE OF INSULIN (HCC): ICD-10-CM

## 2022-07-19 DIAGNOSIS — C61 PROSTATE CANCER (HCC): ICD-10-CM

## 2022-07-19 DIAGNOSIS — M76.892 TENDINITIS OF LEFT HIP FLEXOR: ICD-10-CM

## 2022-07-19 DIAGNOSIS — I10 PRIMARY HYPERTENSION: ICD-10-CM

## 2022-07-19 PROBLEM — J44.1 ACUTE EXACERBATION OF CHRONIC OBSTRUCTIVE PULMONARY DISEASE (COPD) (HCC): Status: RESOLVED | Noted: 2018-01-02 | Resolved: 2022-07-19

## 2022-07-19 PROBLEM — L92.3 FOREIGN BODY GRANULOMA OF SKIN: Status: RESOLVED | Noted: 2021-06-15 | Resolved: 2022-07-19

## 2022-07-19 PROCEDURE — 1160F RVW MEDS BY RX/DR IN RCRD: CPT | Performed by: INTERNAL MEDICINE

## 2022-07-19 PROCEDURE — 3074F SYST BP LT 130 MM HG: CPT | Performed by: INTERNAL MEDICINE

## 2022-07-19 PROCEDURE — 3288F FALL RISK ASSESSMENT DOCD: CPT | Performed by: INTERNAL MEDICINE

## 2022-07-19 PROCEDURE — 99214 OFFICE O/P EST MOD 30 MIN: CPT | Performed by: INTERNAL MEDICINE

## 2022-07-19 PROCEDURE — 1101F PT FALLS ASSESS-DOCD LE1/YR: CPT | Performed by: INTERNAL MEDICINE

## 2022-07-19 PROCEDURE — 3725F SCREEN DEPRESSION PERFORMED: CPT | Performed by: INTERNAL MEDICINE

## 2022-07-19 PROCEDURE — 3078F DIAST BP <80 MM HG: CPT | Performed by: INTERNAL MEDICINE

## 2022-07-19 RX ORDER — AMLODIPINE BESYLATE 10 MG/1
10 TABLET ORAL EVERY MORNING
Qty: 90 TABLET | Refills: 3 | Status: SHIPPED | OUTPATIENT
Start: 2022-07-19

## 2022-07-19 RX ORDER — ACETAMINOPHEN AND CODEINE PHOSPHATE 300; 60 MG/1; MG/1
1 TABLET ORAL EVERY 6 HOURS PRN
Qty: 60 TABLET | Refills: 0 | Status: SHIPPED | OUTPATIENT
Start: 2022-07-19

## 2022-07-19 NOTE — PROGRESS NOTES
Assessment/Plan:    Diagnoses and all orders for this visit:    Need for hepatitis C screening test  -     Hepatitis C Antibody ([facility]); Future    Essential hypertension  -     amLODIPine (NORVASC) 10 mg tablet; Take 1 tablet (10 mg total) by mouth every morning    Type 2 diabetes mellitus with stage 3b chronic kidney disease, without long-term current use of insulin (HCC)  -     CBC and differential; Future  -     Comprehensive metabolic panel; Future  -     Lipid panel; Future  -     Hemoglobin A1C; Future  -     TSH, 3rd generation with Free T4 reflex; Future    Centrilobular emphysema (HCC)    Chronic respiratory failure with hypoxia, on home O2 therapy (HCC)    Primary hypertension    Hypertensive kidney disease with stage 3b chronic kidney disease (HCC)    Prostate cancer (HCC)    Stage 3b chronic kidney disease ([facility] [address] )    History of colon cancer  -     CEA; Future    History of prostate cancer  -     PSA Total, Diagnostic; Future    Status post insertion of spinal cord stimulator    Lumbar radiculopathy  -     acetaminophen-codeine (TYLENOL with CODEINE #4) 300-60 MG per tablet; Take 1 tablet by mouth every 6 (six) hours as needed for moderate pain    Continuous opioid dependence (HCC)    Tendinitis of left hip flexor            Patient Instructions   Lab data reviewed in detail and compared prior    Type 2 diabetes mellitus stable with diet control    Hypertension stable on present regimen    Lumbar radiculopathy-refill Tylenol No   4, PDMP reviewed, 60 tablets has lasted since November    Chronic kidney disease stage 3 remains stable    Gout stable on allopurinol with no flares    History of prostate cancer-PSA undetectable last October, recheck prior to follow-up    History of colon cancer-check CEA level      COPD remains clinically stable, patient advised to reach out to [facility] to get into their pulmonary rehab program    Routine follow-up after labs in 6 months, sooner as needed  Subjective:      Patient ID: Hitesh Garcia is a 80 y o  male    F/u mmp, review labs, here w/ Analia Mehta who helps w/ hx   Lumbar radiculopathy-s/p SCC and rhizotomy, had been PM, but exhausted options, needs t4's from me, only using when he leaves the house  He had pain limiting ability to lift left leg in order to put his pants on several days ago but this only occurred 1 time  Since that time he has had less pain and more mobility but still needs to use his hand to hold his leg up when dressing  COPD-fairly stable, back on advair and incruise  He continues w/ continuous O2 at 2lpm   Hasn't been able to schedule pulm rehab  DM-not really following diet, no regular exercise  HTN-taking rx as directed, home bp's daily have all been nl b/t 110-140/70's  CKD-f/b neph, keeping hydrated     Gout-taking allopurinol, no recent flares  H/o prostate ca s/p prostatectomy '05, psa undetectable in May, f/b urology  H/o kidney stones, neg KUB in May        Current Outpatient Medications:     acetaminophen-codeine (TYLENOL with CODEINE #4) 300-60 MG per tablet, Take 1 tablet by mouth every 6 (six) hours as needed for moderate pain, Disp: 60 tablet, Rfl: 0    albuterol (2 5 mg/3 mL) 0 083 % nebulizer solution, Take 3 mL (2 5 mg total) by nebulization every 6 (six) hours, Disp: 1080 mL, Rfl: 3    albuterol (VENTOLIN HFA) 90 mcg/act inhaler, Inhale 2 puffs every 6 (six) hours as needed for wheezing or shortness of breath, Disp: 18 g, Rfl: 3    allopurinol (ZYLOPRIM) 100 mg tablet, TAKE 1 TABLET DAILY, Disp: 90 tablet, Rfl: 2    amLODIPine (NORVASC) 10 mg tablet, Take 1 tablet (10 mg total) by mouth every morning, Disp: 90 tablet, Rfl: 3    cholecalciferol (VITAMIN D3) 1,000 units tablet, Take 5 tablets (5,000 Units total) by mouth every other day, Disp: , Rfl:     fluticasone-salmeterol (Advair) 250-50 mcg/dose inhaler, Inhale 1 puff 2 (two) times a day Rinse mouth after use , Disp: 180 blister, Rfl: 1   gabapentin (NEURONTIN) 300 mg capsule, every 12 (twelve) hours as needed , Disp: , Rfl:     losartan (COZAAR) 100 MG tablet, Take 1 tablet (100 mg total) by mouth daily, Disp: 90 tablet, Rfl: 2    metoprolol succinate (TOPROL-XL) 25 mg 24 hr tablet, TAKE 1 TABLET DAILY, Disp: 90 tablet, Rfl: 2    Polyvinyl Alcohol-Povidone (REFRESH OP), Apply to eye as needed , Disp: , Rfl:     prochlorperazine (COMPAZINE) 10 mg tablet, Take 1 tablet (10 mg total) by mouth every 6 (six) hours as needed for nausea or vomiting, Disp: 30 tablet, Rfl: 0    umeclidinium bromide (Incruse Ellipta) 62 5 mcg/inh AEPB inhaler, Inhale 1 puff daily, Disp: 90 blister, Rfl: 1    Vitamins-Lipotropics (LIPO-FLAVONOID PLUS PO), Take by mouth, Disp: , Rfl:     Recent Results (from the past 1008 hour(s))   CBC and differential    Collection Time: 07/13/22  9:40 AM   Result Value Ref Range    WBC 10 95 (H) 4 31 - 10 16 Thousand/uL    RBC 5 90 (H) 3 88 - 5 62 Million/uL    Hemoglobin 13 9 12 0 - 17 0 g/dL    Hematocrit 45 2 36 5 - 49 3 %    MCV 77 (L) 82 - 98 fL    MCH 23 6 (L) 26 8 - 34 3 pg    MCHC 30 8 (L) 31 4 - 37 4 g/dL    RDW 18 0 (H) 11 6 - 15 1 %    MPV 11 1 8 9 - 12 7 fL    Platelets 396 995 - 982 Thousands/uL    nRBC 0 /100 WBCs    Neutrophils Relative 73 43 - 75 %    Immat GRANS % 0 0 - 2 %    Lymphocytes Relative 20 14 - 44 %    Monocytes Relative 6 4 - 12 %    Eosinophils Relative 1 0 - 6 %    Basophils Relative 0 0 - 1 %    Neutrophils Absolute 7 91 (H) 1 85 - 7 62 Thousands/µL    Immature Grans Absolute 0 03 0 00 - 0 20 Thousand/uL    Lymphocytes Absolute 2 15 0 60 - 4 47 Thousands/µL    Monocytes Absolute 0 68 0 17 - 1 22 Thousand/µL    Eosinophils Absolute 0 14 0 00 - 0 61 Thousand/µL    Basophils Absolute 0 04 0 00 - 0 10 Thousands/µL   Comprehensive metabolic panel    Collection Time: 07/13/22  9:40 AM   Result Value Ref Range    Sodium 142 136 - 145 mmol/L    Potassium 3 9 3 5 - 5 3 mmol/L    Chloride 102 100 - 108 mmol/L CO2 28 21 - 32 mmol/L    ANION GAP 12 4 - 13 mmol/L    BUN 12 5 - 25 mg/dL    Creatinine 1 65 (H) 0 60 - 1 30 mg/dL    Glucose, Fasting 119 (H) 65 - 99 mg/dL    Calcium 9 4 8 3 - 10 1 mg/dL    AST 44 5 - 45 U/L    ALT 27 12 - 78 U/L    Alkaline Phosphatase 100 46 - 116 U/L    Total Protein 8 6 (H) 6 4 - 8 2 g/dL    Albumin 4 2 3 5 - 5 0 g/dL    Total Bilirubin 1 74 (H) 0 20 - 1 00 mg/dL    eGFR 38 ml/min/1 73sq m   Lipid panel    Collection Time: 07/13/22  9:40 AM   Result Value Ref Range    Cholesterol 130 See Comment mg/dL    Triglycerides 106 See Comment mg/dL    HDL, Direct 47 >=40 mg/dL    LDL Calculated 62 0 - 100 mg/dL    Non-HDL-Chol (CHOL-HDL) 83 mg/dl   Hemoglobin A1C    Collection Time: 07/13/22  9:40 AM   Result Value Ref Range    Hemoglobin A1C 5 8 (H) Normal 3 8-5 6%; PreDiabetic 5 7-6 4%; Diabetic >=6 5%; Glycemic control for adults with diabetes <7 0% %     mg/dl       The following portions of the patient's history were reviewed and updated as appropriate: allergies, current medications, past family history, past medical history, past social history, past surgical history and problem list      Review of Systems      Objective:      Vitals:    07/19/22 1542   BP: 115/74   Pulse:    Resp:    Temp:    SpO2:           Physical Exam  Constitutional:       Appearance: He is well-developed  HENT:      Head: Normocephalic and atraumatic  Nose: Nose normal    Eyes:      General: No scleral icterus  Conjunctiva/sclera: Conjunctivae normal       Pupils: Pupils are equal, round, and reactive to light  Neck:      Thyroid: No thyromegaly  Vascular: No JVD  Trachea: No tracheal deviation  Cardiovascular:      Rate and Rhythm: Normal rate and regular rhythm  Heart sounds: No murmur heard  No friction rub  No gallop  Pulmonary:      Effort: Pulmonary effort is normal  No respiratory distress  Breath sounds: Normal breath sounds  No wheezing or rales     Abdominal: Comments: Diminished breath sounds bases   Musculoskeletal:         General: No deformity  Cervical back: Normal range of motion and neck supple  Comments: Tender to palpate left inguinal region  Pain in hip flexors when raising leg against resistance, 5/5 strength, no pain with passive range of motion of the hip   Lymphadenopathy:      Cervical: No cervical adenopathy  Skin:     General: Skin is warm and dry  Coloration: Skin is not pale  Findings: No erythema or rash  Neurological:      Mental Status: He is alert and oriented to person, place, and time  Cranial Nerves: No cranial nerve deficit  Psychiatric:         Behavior: Behavior normal          Thought Content:  Thought content normal          Judgment: Judgment normal

## 2022-07-19 NOTE — PATIENT INSTRUCTIONS
Lab data reviewed in detail and compared prior    Type 2 diabetes mellitus stable with diet control    Hypertension stable on present regimen    Lumbar radiculopathy-refill Tylenol No   4, PDMP reviewed, 60 tablets has lasted since November    Chronic kidney disease stage 3 remains stable    Gout stable on allopurinol with no flares    History of prostate cancer-PSA undetectable last October, recheck prior to follow-up    History of colon cancer-check CEA level  COPD remains clinically stable, patient advised to reach out to [facility] to get into their pulmonary rehab program    Routine follow-up after labs in 6 months, sooner as needed

## 2022-09-20 ENCOUNTER — OFFICE VISIT (OUTPATIENT)
Dept: PULMONOLOGY | Facility: CLINIC | Age: 82
End: 2022-09-20
Payer: COMMERCIAL

## 2022-09-20 VITALS
WEIGHT: 251 LBS | SYSTOLIC BLOOD PRESSURE: 139 MMHG | DIASTOLIC BLOOD PRESSURE: 68 MMHG | HEIGHT: 71 IN | HEART RATE: 49 BPM | OXYGEN SATURATION: 90 % | TEMPERATURE: 97 F | RESPIRATION RATE: 16 BRPM | BODY MASS INDEX: 35.14 KG/M2

## 2022-09-20 DIAGNOSIS — J44.9 COPD, SEVERE (HCC): ICD-10-CM

## 2022-09-20 DIAGNOSIS — J96.11 CHRONIC HYPOXEMIC RESPIRATORY FAILURE (HCC): ICD-10-CM

## 2022-09-20 DIAGNOSIS — R06.02 SHORTNESS OF BREATH: Primary | ICD-10-CM

## 2022-09-20 DIAGNOSIS — J43.2 CENTRILOBULAR EMPHYSEMA (HCC): ICD-10-CM

## 2022-09-20 PROCEDURE — 3078F DIAST BP <80 MM HG: CPT | Performed by: INTERNAL MEDICINE

## 2022-09-20 PROCEDURE — 3075F SYST BP GE 130 - 139MM HG: CPT | Performed by: INTERNAL MEDICINE

## 2022-09-20 PROCEDURE — 1160F RVW MEDS BY RX/DR IN RCRD: CPT | Performed by: INTERNAL MEDICINE

## 2022-09-20 PROCEDURE — 99214 OFFICE O/P EST MOD 30 MIN: CPT | Performed by: INTERNAL MEDICINE

## 2022-09-20 NOTE — PROGRESS NOTES
Assessment/Plan:   Diagnoses and all orders for this visit:    Shortness of breath    COPD, severe ([facility] [address] )    Chronic hypoxemic respiratory failure (HCC)    Centrilobular emphysema ([facility] [address] )        PFTs in 2018 with severe obstructive airflow limitation with no appreciable response to the bronchodilator FEV1 was 41% with severely decreased DLCO at 45%  Repeat PFTs recently done also demonstrating FEV1 of 50% and DLCO was 46% his lung volumes are 71% predicted with residual volume of 77% predicted  Currently he is on 3 L of oxygen continues but he the patient states he has not been using it continuously  Uses it as needed again had a long discussion with the patient with regards to the compliance with the oxygen given the severity of his COPD under consequences of chronic hypoxemia discussed understands and verbalizes   Given poor inspiratory capacity the Advair was initially switch to budesonide and Perforomist via nebulizer but he does not want to use them given the high co-pay and then he is also his PCP has given him Trelegy which he states has not made any difference in his breathing he only feels that the Advair is working  He is back on his Advair discussed with the patient to continue with Advair 250/51 puff twice daily  Rinse mouth after use   And continue with Incruse 1 puff daily  Continue with albuterol via nebulizer 4 times daily as needed   Continue with 3 L of oxygen by nasal cannula continuous  Not a candidate for endobronchial valve placement given the lung volumes are 71%      Also discussed regarding pulmonary rehab sessions the do not want to go too far from the place with their living currently transportation has been a problem the stay  If any significant worsening of shortness of breath he will go into the ER understands and verbalizes   Recommend weight loss   Recommend flu shot for the fall of 2022   Also discussed regarding the new COVID vaccine and to be spaced 2 weeks apart between the 2 vaccines understands and verbalizes  Will see him back in 6 months or p r n  earlier as needed  Return in about 6 months (around 3/20/2023)  All questions are answered to the patient's satisfaction and understanding  He verbalizes understanding  He is encouraged to call with any further questions or concerns  Portions of the record may have been created with voice recognition software  Occasional wrong word or "sound a like" substitutions may have occurred due to the inherent limitations of voice recognition software  Read the chart carefully and recognize, using context, where substitutions have occurred  Electronically Signed by Joby Moon MD    ______________________________________________________________________    Chief Complaint:   Chief Complaint   Patient presents with    Follow-up       Patient ID: Alonzo Anguiano is a [de-identified] y o  y o  male has a past medical history of Back pain, Bronchiectasis ([address] ), Cataract, Chronic kidney disease, COPD (chronic obstructive pulmonary disease) ([address] ), History of malignant neoplasm of oral cavity, HL (hearing loss), Hypertension, Kidney stone (05/2019), Malignant neoplasm of colon ([address] ), Prostate cancer ([address] ), SOB (shortness of breath), Tachycardia, Thalassemia trait, and Tinnitus  9/20/2022  Patient presents today for follow-up visit  Patient is a 53-year-old male former smoker quit less than 20 years ago with 45 pack year smoking history with past medical history of COPD, chronic respiratory failure on home O2, lung nodules, pleural plaques, hypertension, prostate cancer      He is here today for follow-up    He states his shortness of breath and dyspnea on exertion has been gradually getting worse, and he states his Advair and Spiriva has not been working well, he was also placed on Trelegy before which he states did not work  Finally he was on Advair and Incruse but given poor inspiratory effort we had switched him to long-acting medications via nebulizer with Perforomist and budesonide which he states is currently expensive he is paying a co-pay of almost 300 dollars a month also Trelegy ordered by his primary care doctor which he states did not make any difference and he has stopped it  He is back back on Advair and Incruse and using his albuterol via nebulizer twice a day,         Associated symptoms include coughing  Pertinent negatives include no chest pain, fever, headaches, myalgias or sore throat  Review of Systems   Constitutional: Negative for appetite change and fever  HENT: Positive for sneezing  Negative for ear pain, postnasal drip, rhinorrhea, sore throat and trouble swallowing  Eyes: Negative  Respiratory: Positive for cough and shortness of breath  He states his shortness of breath and dyspnea on exertion is stable no worsening from before,   Cardiovascular: Negative for chest pain  Gastrointestinal: Negative  Endocrine: Negative  Genitourinary: Negative  Musculoskeletal: Negative  Negative for myalgias  Allergic/Immunologic: Negative  Neurological: Negative  Negative for headaches  Hematological: Negative  Psychiatric/Behavioral: Negative  Smoking history: He reports that he quit smoking about 18 years ago  His smoking use included cigarettes  He started smoking about 62 years ago  He has a 3 75 pack-year smoking history   He has never used smokeless tobacco     The following portions of the patient's history were reviewed and updated as appropriate: allergies, current medications, past family history, past medical history, past social history, past surgical history and problem list     Immunization History   Administered Date(s) Administered    COVID-19 MODERNA VACC 0 5 ML IM 01/26/2021, 02/22/2021, 10/27/2021    INFLUENZA 10/26/2012, 10/22/2013, 12/01/2015, 10/07/2016, 09/18/2018, 11/19/2019, 10/04/2021    Influenza Split High Dose Preservative Free IM 10/07/2014, 10/01/2017    Influenza, high dose seasonal 0 7 mL 10/08/2020    Influenza, seasonal, injectable 10/26/2012, 10/22/2013, 12/01/2015, 09/03/2016    Pneumococcal Conjugate 13-Valent 08/25/2015, 08/25/2015    Pneumococcal Polysaccharide PPV23 01/03/2017    Tdap 07/01/2011    Zoster 1940    Zoster Vaccine Recombinant 12/18/2018, 06/04/2019     Current Outpatient Medications   Medication Sig Dispense Refill    acetaminophen-codeine (TYLENOL with CODEINE #4) 300-60 MG per tablet Take 1 tablet by mouth every 6 (six) hours as needed for moderate pain 60 tablet 0    albuterol (2 5 mg/3 mL) 0 083 % nebulizer solution Take 3 mL (2 5 mg total) by nebulization every 6 (six) hours 1080 mL 3    albuterol (VENTOLIN HFA) 90 mcg/act inhaler Inhale 2 puffs every 6 (six) hours as needed for wheezing or shortness of breath 18 g 3    allopurinol (ZYLOPRIM) 100 mg tablet TAKE 1 TABLET DAILY 90 tablet 2    amLODIPine (NORVASC) 10 mg tablet Take 1 tablet (10 mg total) by mouth every morning 90 tablet 3    cholecalciferol (VITAMIN D3) 1,000 units tablet Take 5 tablets (5,000 Units total) by mouth every other day      gabapentin (NEURONTIN) 300 mg capsule every 12 (twelve) hours as needed       losartan (COZAAR) 100 MG tablet Take 1 tablet (100 mg total) by mouth daily 90 tablet 2    metoprolol succinate (TOPROL-XL) 25 mg 24 hr tablet TAKE 1 TABLET DAILY 90 tablet 2    Polyvinyl Alcohol-Povidone (REFRESH OP) Apply to eye as needed       prochlorperazine (COMPAZINE) 10 mg tablet Take 1 tablet (10 mg total) by mouth every 6 (six) hours as needed for nausea or vomiting 30 tablet 0    Vitamins-Lipotropics (LIPO-FLAVONOID PLUS PO) Take by mouth      fluticasone-salmeterol (Advair) 250-50 mcg/dose inhaler Inhale 1 puff 2 (two) times a day Rinse mouth after use  180 blister 1    umeclidinium bromide (Incruse Ellipta) 62 5 mcg/inh AEPB inhaler Inhale 1 puff daily 90 blister 1     No current facility-administered medications for this visit       Allergies: Patient has no known allergies  Objective:  Vitals:    09/20/22 1321 09/20/22 1323   BP: 139/68    BP Location: Left arm    Patient Position: Sitting    Cuff Size: Large    Pulse: (!) 49    Resp: 16    Temp: (!) 97 °F (36 1 °C)    TempSrc: Tympanic    SpO2: 90% 90%   Weight: 114 kg (251 lb)    Height: 5' 11" (1 803 m)    Oxygen Therapy  SpO2: 90 %  Oxygen Therapy: Supplemental oxygen    Wt Readings from Last 3 Encounters:   09/20/22 114 kg (251 lb)   07/19/22 114 kg (251 lb)   06/20/22 111 kg (245 lb)     Body mass index is 35 01 kg/m²  Physical Exam  Constitutional:       Appearance: He is well-developed  HENT:      Head: Normocephalic and atraumatic  Eyes:      Pupils: Pupils are equal, round, and reactive to light  Cardiovascular:      Rate and Rhythm: Normal rate and regular rhythm  Pulmonary:      Effort: Pulmonary effort is normal       Breath sounds: No wheezing or rales  Chest:      Chest wall: No tenderness  Musculoskeletal:         General: Normal range of motion  Cervical back: Normal range of motion and neck supple  Skin:     General: Skin is warm and dry  Neurological:      Mental Status: He is alert and oriented to person, place, and time     Psychiatric:         Behavior: Behavior normal            Diagnostics:  I have personally reviewed pertinent films in PACS    Answers for HPI/ROS submitted by the patient on 9/13/2022  Chronicity: recurrent  When did you first notice your symptoms?: in the past 7 days  How often do your symptoms occur?: intermittently  Since you first noticed this problem, how has it changed?: unchanged  Do you have shortness of breath that occurs with effort or exertion?: Yes  Do you have ear congestion?: No  Do you have heartburn?: No  Do you have fatigue?: Yes  Do you have nasal congestion?: No  Do you have shortness of breath when lying flat?: No  Do you have shortness of breath when you wake up?: No  Do you have sweats?: No  Have you experienced weight loss?: No  Which of the following makes your symptoms worse?: any activity, climbing stairs, strenuous activity  Which of the following makes your symptoms better?: rest  Risk factors for lung disease: animal exposure

## 2022-10-07 DIAGNOSIS — I10 HYPERTENSION, UNSPECIFIED TYPE: ICD-10-CM

## 2022-10-09 RX ORDER — METOPROLOL SUCCINATE 25 MG/1
25 TABLET, EXTENDED RELEASE ORAL DAILY
Qty: 90 TABLET | Refills: 1 | Status: SHIPPED | OUTPATIENT
Start: 2022-10-09

## 2022-10-24 DIAGNOSIS — M10.9 GOUT, UNSPECIFIED CAUSE, UNSPECIFIED CHRONICITY, UNSPECIFIED SITE: ICD-10-CM

## 2022-10-24 RX ORDER — ALLOPURINOL 100 MG/1
TABLET ORAL
Qty: 90 TABLET | Refills: 2 | Status: SHIPPED | OUTPATIENT
Start: 2022-10-24

## 2022-11-07 ENCOUNTER — TELEPHONE (OUTPATIENT)
Dept: NEPHROLOGY | Facility: CLINIC | Age: 82
End: 2022-11-07

## 2022-11-07 DIAGNOSIS — M54.16 LUMBAR RADICULOPATHY: ICD-10-CM

## 2022-11-07 RX ORDER — ACETAMINOPHEN AND CODEINE PHOSPHATE 300; 60 MG/1; MG/1
1 TABLET ORAL EVERY 6 HOURS PRN
Qty: 60 TABLET | Refills: 0 | Status: SHIPPED | OUTPATIENT
Start: 2022-11-07

## 2022-11-09 ENCOUNTER — APPOINTMENT (OUTPATIENT)
Dept: LAB | Facility: HOSPITAL | Age: 82
End: 2022-11-09

## 2022-11-09 DIAGNOSIS — E55.9 VITAMIN D DEFICIENCY: ICD-10-CM

## 2022-11-09 DIAGNOSIS — I12.9 HYPERTENSIVE KIDNEY DISEASE WITH STAGE 3B CHRONIC KIDNEY DISEASE (HCC): ICD-10-CM

## 2022-11-09 DIAGNOSIS — N18.32 STAGE 3B CHRONIC KIDNEY DISEASE (HCC): ICD-10-CM

## 2022-11-09 DIAGNOSIS — N18.32 HYPERTENSIVE KIDNEY DISEASE WITH STAGE 3B CHRONIC KIDNEY DISEASE (HCC): ICD-10-CM

## 2022-11-09 LAB
25(OH)D3 SERPL-MCNC: 61.2 NG/ML (ref 30–100)
ANION GAP SERPL CALCULATED.3IONS-SCNC: 8 MMOL/L (ref 4–13)
BACTERIA UR QL AUTO: ABNORMAL /HPF
BASOPHILS # BLD AUTO: 0.04 THOUSANDS/ÂΜL (ref 0–0.1)
BASOPHILS NFR BLD AUTO: 0 % (ref 0–1)
BILIRUB UR QL STRIP: NEGATIVE
BUN SERPL-MCNC: 11 MG/DL (ref 5–25)
CALCIUM SERPL-MCNC: 9.4 MG/DL (ref 8.3–10.1)
CHLORIDE SERPL-SCNC: 105 MMOL/L (ref 96–108)
CLARITY UR: ABNORMAL
CO2 SERPL-SCNC: 30 MMOL/L (ref 21–32)
COLOR UR: YELLOW
CREAT SERPL-MCNC: 1.67 MG/DL (ref 0.6–1.3)
CREAT UR-MCNC: 444 MG/DL
EOSINOPHIL # BLD AUTO: 0.22 THOUSAND/ÂΜL (ref 0–0.61)
EOSINOPHIL NFR BLD AUTO: 2 % (ref 0–6)
ERYTHROCYTE [DISTWIDTH] IN BLOOD BY AUTOMATED COUNT: 18 % (ref 11.6–15.1)
GFR SERPL CREATININE-BSD FRML MDRD: 38 ML/MIN/1.73SQ M
GLUCOSE P FAST SERPL-MCNC: 112 MG/DL (ref 65–99)
GLUCOSE UR STRIP-MCNC: NEGATIVE MG/DL
HCT VFR BLD AUTO: 46.5 % (ref 36.5–49.3)
HGB BLD-MCNC: 14.4 G/DL (ref 12–17)
HGB UR QL STRIP.AUTO: NEGATIVE
IMM GRANULOCYTES # BLD AUTO: 0.04 THOUSAND/UL (ref 0–0.2)
IMM GRANULOCYTES NFR BLD AUTO: 0 % (ref 0–2)
KETONES UR STRIP-MCNC: NEGATIVE MG/DL
LEUKOCYTE ESTERASE UR QL STRIP: NEGATIVE
LYMPHOCYTES # BLD AUTO: 1.71 THOUSANDS/ÂΜL (ref 0.6–4.47)
LYMPHOCYTES NFR BLD AUTO: 18 % (ref 14–44)
MCH RBC QN AUTO: 24.2 PG (ref 26.8–34.3)
MCHC RBC AUTO-ENTMCNC: 31 G/DL (ref 31.4–37.4)
MCV RBC AUTO: 78 FL (ref 82–98)
MICROALBUMIN UR-MCNC: 164 MG/L (ref 0–20)
MICROALBUMIN/CREAT 24H UR: 37 MG/G CREATININE (ref 0–30)
MONOCYTES # BLD AUTO: 0.7 THOUSAND/ÂΜL (ref 0.17–1.22)
MONOCYTES NFR BLD AUTO: 7 % (ref 4–12)
MUCOUS THREADS UR QL AUTO: ABNORMAL
NEUTROPHILS # BLD AUTO: 6.86 THOUSANDS/ÂΜL (ref 1.85–7.62)
NEUTS SEG NFR BLD AUTO: 73 % (ref 43–75)
NITRITE UR QL STRIP: NEGATIVE
NON-SQ EPI CELLS URNS QL MICRO: ABNORMAL /HPF
NRBC BLD AUTO-RTO: 0 /100 WBCS
PH UR STRIP.AUTO: 5.5 [PH]
PHOSPHATE SERPL-MCNC: 3.3 MG/DL (ref 2.3–4.1)
PLATELET # BLD AUTO: 252 THOUSANDS/UL (ref 149–390)
PMV BLD AUTO: 11.3 FL (ref 8.9–12.7)
POTASSIUM SERPL-SCNC: 3.9 MMOL/L (ref 3.5–5.3)
PROT UR STRIP-MCNC: ABNORMAL MG/DL
PTH-INTACT SERPL-MCNC: 45.9 PG/ML (ref 18.4–80.1)
RBC # BLD AUTO: 5.95 MILLION/UL (ref 3.88–5.62)
RBC #/AREA URNS AUTO: ABNORMAL /HPF
SODIUM SERPL-SCNC: 143 MMOL/L (ref 135–147)
SP GR UR STRIP.AUTO: 1.03 (ref 1–1.03)
URATE SERPL-MCNC: 6.1 MG/DL (ref 3.5–8.5)
UROBILINOGEN UR STRIP-ACNC: <2 MG/DL
WBC # BLD AUTO: 9.57 THOUSAND/UL (ref 4.31–10.16)
WBC #/AREA URNS AUTO: ABNORMAL /HPF

## 2022-11-14 ENCOUNTER — TELEPHONE (OUTPATIENT)
Dept: NEPHROLOGY | Facility: CLINIC | Age: 82
End: 2022-11-14

## 2022-11-15 ENCOUNTER — OFFICE VISIT (OUTPATIENT)
Dept: NEPHROLOGY | Facility: CLINIC | Age: 82
End: 2022-11-15

## 2022-11-15 VITALS
TEMPERATURE: 97.1 F | DIASTOLIC BLOOD PRESSURE: 90 MMHG | WEIGHT: 262 LBS | HEART RATE: 65 BPM | OXYGEN SATURATION: 96 % | SYSTOLIC BLOOD PRESSURE: 160 MMHG | BODY MASS INDEX: 36.68 KG/M2 | RESPIRATION RATE: 16 BRPM | HEIGHT: 71 IN

## 2022-11-15 DIAGNOSIS — I12.9 HYPERTENSIVE KIDNEY DISEASE WITH STAGE 3B CHRONIC KIDNEY DISEASE (HCC): ICD-10-CM

## 2022-11-15 DIAGNOSIS — N18.32 STAGE 3B CHRONIC KIDNEY DISEASE (HCC): Primary | ICD-10-CM

## 2022-11-15 DIAGNOSIS — E55.9 VITAMIN D DEFICIENCY: ICD-10-CM

## 2022-11-15 DIAGNOSIS — N18.32 HYPERTENSIVE KIDNEY DISEASE WITH STAGE 3B CHRONIC KIDNEY DISEASE (HCC): ICD-10-CM

## 2022-11-15 NOTE — PROGRESS NOTES
NEPHROLOGY OFFICE FOLLOW UP  Eddy Cervantes [de-identified] y o  male MRN: [id number]    Encounter: [id number] DATE: 11/15/2022    REASON FOR VISIT: Eddy Cervantes is a [de-identified] y o  male who is here on 11/15/2022 for further management of chronic kidney disease  HPI:    This is a 29-year-old male with a past medical history of CKD stage 3, hypertension, COPD, returns to Nephrology Clinic for further management of CKD stage 3  Upon review of old medical records,  Patient's new baseline creatinine seems to be fluctuating around 1 6-1 7  Patient's wife was also present at the time of consultation and history was also obtained from her and all the questions were answered  Patient has COPD  Patient currently does not smoke  According to patient his breathing is currently under good control with the use of nebulizers  Patient also uses oxygen 3 liters/minute  Patient has hypertension which seems under well controlled with the use of current antihypertensive medications  Patient checks blood pressure on regular basis at home  Patient has gout and currently taking allopurinol 100 mg PO daily on daily basis  Patient denies any recent gout flare-up  Patient also have chronic back pain and also been followed by pain management specialist and currently using narcotics  Patient was also found to have vitamin-D deficiency and currently taking cholecalciferol  Patient also have kidney stone and was also seen by Dr Eunice Capone in the past     According to patient he does not use any NSAIDs including Mobic  REVIEW OF SYSTEMS:    Review of Systems   Constitutional: Negative for chills and fever  HENT: Negative for nosebleeds and sore throat  Eyes: Negative for photophobia and pain  Respiratory: Negative for choking and wheezing  Cardiovascular: Negative for chest pain and palpitations  Gastrointestinal: Negative for blood in stool  Genitourinary: Negative for hematuria     Musculoskeletal: Negative for neck stiffness  Skin: Negative for pallor  Neurological: Negative for seizures and syncope  Psychiatric/Behavioral: Negative for confusion and suicidal ideas  PAST MEDICAL HISTORY:  Past Medical History:   Diagnosis Date   • Back pain    • Bronchiectasis ([address] )    • Cataract    • Chronic kidney disease    • COPD (chronic obstructive pulmonary disease) (HCC)    • History of malignant neoplasm of oral cavity     M0   • HL (hearing loss)    • Hypertension    • Kidney stone 2019   • Malignant neoplasm of colon (HCC)     descending   • Prostate cancer (HCC)    • SOB (shortness of breath)    • Tachycardia    • Thalassemia trait    • Tinnitus        PAST SURGICAL HISTORY:  Past Surgical History:   Procedure Laterality Date   • BACK SURGERY     • CHOLECYSTECTOMY     • COLON SURGERY  10/2008    partial colectomy   • EYE SURGERY     • INCISIONAL HERNIA REPAIR     • JOINT REPLACEMENT Right     hip   • CA COLONOSCOPY FLX DX W/COLLJ SPEC WHEN PFRMD N/A 2018    Procedure: COLONOSCOPY;  Surgeon: Bronson Waterman MD;  Location: [facility];   Service: Gastroenterology   • PROSTATE SURGERY     • SPINAL CORD STIMULATOR IMPLANT     • TONSILLECTOMY         SOCIAL HISTORY:  Social History     Substance and Sexual Activity   Alcohol Use Not Currently     Social History     Substance and Sexual Activity   Drug Use No     Social History     Tobacco Use   Smoking Status Former Smoker   • Packs/day: 0 25   • Years: 15 00   • Pack years: 3 75   • Types: Cigarettes   • Start date: 58   • Quit date: 2004   • Years since quittin 4   Smokeless Tobacco Never Used   Tobacco Comment    non smoker/tobacco user; quit  as per [employer]       FAMILY HISTORY:  Family History   Problem Relation Age of Onset   • Heart failure Mother         as per [employer]   • Coronary artery disease Mother         as per [employer]   • Diabetes Mother         mellitus - as per [employer]   • Coronary artery disease Father         as per [employer]   • Cancer Sister         malignant neoplasm       ALLERGY:  No Known Allergies    MEDICATIONS:    Current Outpatient Medications:   •  acetaminophen-codeine (TYLENOL with CODEINE #4) 300-60 MG per tablet, Take 1 tablet by mouth every 6 (six) hours as needed for moderate pain, Disp: 60 tablet, Rfl: 0  •  albuterol (2 5 mg/3 mL) 0 083 % nebulizer solution, Take 3 mL (2 5 mg total) by nebulization every 6 (six) hours, Disp: 1080 mL, Rfl: 3  •  albuterol (VENTOLIN HFA) 90 mcg/act inhaler, Inhale 2 puffs every 6 (six) hours as needed for wheezing or shortness of breath, Disp: 18 g, Rfl: 3  •  allopurinol (ZYLOPRIM) 100 mg tablet, TAKE 1 TABLET DAILY, Disp: 90 tablet, Rfl: 2  •  amLODIPine (NORVASC) 10 mg tablet, Take 1 tablet (10 mg total) by mouth every morning, Disp: 90 tablet, Rfl: 3  •  cholecalciferol (VITAMIN D3) 1,000 units tablet, Take 5 tablets (5,000 Units total) by mouth every other day, Disp: , Rfl:   •  fluticasone-salmeterol (Advair) 250-50 mcg/dose inhaler, Inhale 1 puff 2 (two) times a day Rinse mouth after use , Disp: 180 blister, Rfl: 1  •  gabapentin (NEURONTIN) 300 mg capsule, every 12 (twelve) hours as needed , Disp: , Rfl:   •  losartan (COZAAR) 100 MG tablet, Take 1 tablet (100 mg total) by mouth daily, Disp: 90 tablet, Rfl: 2  •  metoprolol succinate (TOPROL-XL) 25 mg 24 hr tablet, Take 1 tablet (25 mg total) by mouth daily, Disp: 90 tablet, Rfl: 1  •  Polyvinyl Alcohol-Povidone (REFRESH OP), Apply to eye as needed , Disp: , Rfl:   •  prochlorperazine (COMPAZINE) 10 mg tablet, Take 1 tablet (10 mg total) by mouth every 6 (six) hours as needed for nausea or vomiting, Disp: 30 tablet, Rfl: 0  •  umeclidinium bromide (Incruse Ellipta) 62 5 mcg/inh AEPB inhaler, Inhale 1 puff daily, Disp: 90 blister, Rfl: 1  •  Vitamins-Lipotropics (LIPO-FLAVONOID PLUS PO), Take by mouth, Disp: , Rfl:     PHYSICAL EXAM:  Vitals:    11/15/22 0951   BP: 160/90   BP Location: Left arm Patient Position: Sitting   Cuff Size: Standard   Pulse: 65   Resp: 16   Temp: (!) 97 1 °F (36 2 °C)   TempSrc: Temporal   SpO2: 96%   Weight: 119 kg (262 lb)   Height: 5' 11" (1 803 m)     Body mass index is 36 54 kg/m²  Physical Exam  Constitutional:       General: He is not in acute distress  HENT:      Right Ear: External ear normal    Eyes:      General: No scleral icterus  Conjunctiva/sclera:      Right eye: No hemorrhage  Neck:      Thyroid: No thyromegaly  Vascular: No JVD  Cardiovascular:      Rate and Rhythm: Normal rate  Pulmonary:      Effort: Pulmonary effort is normal  No accessory muscle usage or respiratory distress  Breath sounds: No wheezing  Abdominal:      General: There is no distension  Musculoskeletal:      Right ankle: No swelling  Left ankle: No swelling  Skin:     General: Skin is warm  Coloration: Skin is not jaundiced  Neurological:      Mental Status: He is alert  He is not disoriented  Psychiatric:         Speech: He is communicative  Behavior: Behavior is not combative           LAB RESULTS:  Results for orders placed or performed in visit on 11/09/22   CBC and differential   Result Value Ref Range    WBC 9 57 4 31 - 10 16 Thousand/uL    RBC 5 95 (H) 3 88 - 5 62 Million/uL    Hemoglobin 14 4 12 0 - 17 0 g/dL    Hematocrit 46 5 36 5 - 49 3 %    MCV 78 (L) 82 - 98 fL    MCH 24 2 (L) 26 8 - 34 3 pg    MCHC 31 0 (L) 31 4 - 37 4 g/dL    RDW 18 0 (H) 11 6 - 15 1 %    MPV 11 3 8 9 - 12 7 fL    Platelets 088 981 - 588 Thousands/uL    nRBC 0 /100 WBCs    Neutrophils Relative 73 43 - 75 %    Immat GRANS % 0 0 - 2 %    Lymphocytes Relative 18 14 - 44 %    Monocytes Relative 7 4 - 12 %    Eosinophils Relative 2 0 - 6 %    Basophils Relative 0 0 - 1 %    Neutrophils Absolute 6 86 1 85 - 7 62 Thousands/µL    Immature Grans Absolute 0 04 0 00 - 0 20 Thousand/uL    Lymphocytes Absolute 1 71 0 60 - 4 47 Thousands/µL    Monocytes Absolute 0 70 0 17 - 1 22 Thousand/µL    Eosinophils Absolute 0 22 0 00 - 0 61 Thousand/µL    Basophils Absolute 0 04 0 00 - 0 10 Thousands/µL   Basic metabolic panel   Result Value Ref Range    Sodium 143 135 - 147 mmol/L    Potassium 3 9 3 5 - 5 3 mmol/L    Chloride 105 96 - 108 mmol/L    CO2 30 21 - 32 mmol/L    ANION GAP 8 4 - 13 mmol/L    BUN 11 5 - 25 mg/dL    Creatinine 1 67 (H) 0 60 - 1 30 mg/dL    Glucose, Fasting 112 (H) 65 - 99 mg/dL    Calcium 9 4 8 3 - 10 1 mg/dL    eGFR 38 ml/min/1 73sq m   Urinalysis with microscopic   Result Value Ref Range    Color, UA Yellow     Clarity, UA Turbid     Specific [place], UA 1 026 1 003 - 1 030    pH, UA 5 5 4 5, 5 0, 5 5, 6 0, 6 5, 7 0, 7 5, 8 0    Leukocytes, UA Negative Negative    Nitrite, UA Negative Negative    Protein, UA 30 (1+) (A) Negative mg/dl    Glucose, UA Negative Negative mg/dl    Ketones, UA Negative Negative mg/dl    Urobilinogen, UA <2 0 <2 0 mg/dl mg/dl    Bilirubin, UA Negative Negative    Occult Blood, UA Negative Negative    RBC, UA 2-4 (A) None Seen, 1-2 /hpf    WBC, UA 2-4 (A) None Seen, 1-2 /hpf    Epithelial Cells Occasional None Seen, Occasional /hpf    Bacteria, UA Occasional None Seen, Occasional /hpf    MUCUS THREADS Moderate (A) None Seen   Microalbumin / creatinine urine ratio   Result Value Ref Range    Creatinine, Ur 444 0 mg/dL    Microalbum  ,U,Random 164 0 (H) 0 0 - 20 0 mg/L    Microalb Creat Ratio 37 (H) 0 - 30 mg/g creatinine   Phosphorus   Result Value Ref Range    Phosphorus 3 3 2 3 - 4 1 mg/dL   Uric acid   Result Value Ref Range    Uric Acid 6 1 3 5 - 8 5 mg/dL   PTH, intact   Result Value Ref Range    PTH 45 9 18 4 - 80 1 pg/mL   Vitamin D 25 hydroxy   Result Value Ref Range    Vit D, 25-Hydroxy 61 2 30 0 - 100 0 ng/mL       ASSESSMENT and PLAN:  Lorenzo Jack was seen today for chronic kidney disease and follow-up      Diagnoses and all orders for this visit:    Stage 3b chronic kidney disease ([facility] [address] )  -     CBC and differential; Future  -     Basic metabolic panel; Future  -     Urinalysis with microscopic; Future  -     Microalbumin / creatinine urine ratio; Future  -     Phosphorus; Future  -     Uric acid; Future  -     PTH, intact; Future  -     Vitamin D 25 hydroxy; Future    Hypertensive kidney disease with stage 3b chronic kidney disease ([facility] [address] )  -     Basic metabolic panel; Future  -     Urinalysis with microscopic; Future  -     Microalbumin / creatinine urine ratio; Future    Vitamin D deficiency  -     Vitamin D 25 hydroxy; Future      1  CKD stage 3  Multifactorial and was suspected due to underlying hypertensive nephrosclerosis  Upon review of old medical records, new baseline creatinine seems to be fluctuating around 1 6-1 7 and in the interim renal function has remained stable with current creatinine of 1 67 with EGFR of 38  Clinically patient is not in volume overload state and advised patient to drink at least 2 L of fluid on daily basis to ensure staying well hydrated  Management of hypertension as mentioned below  Plan to avoid NSAIDs including Mobic/meloxicam and recheck renal function before next visit  2   Hypertension in chronic kidney disease  Today's blood pressure was slightly on the higher side and and according to patient, he checks blood pressure on regular basis at home and blood pressure is under well control at home and for the time being plan to monitor hypertension with amlodipine 10 mg PO daily, metoprolol XL 25 mg PO daily and losartan 100 mg PO daily  Also advised patient to follow low-salt diet   3  Vitamin-D deficiency  Patient is currently taking cholecalciferol 5000 Units every other day  Current vitamin-D level is 61 which is at goal and plan to continue cholecalciferol at current dose and recheck vitamin-D level with the next visit  Returns to Nephrology Clinic in 6 months  Future labs ordered  Portions of the record may have been created with voice recognition software   Occasional wrong word or "sound a like" substitutions may have occurred due to the inherent limitations of voice recognition software  Read the chart carefully and recognize, using context, where substitutions have occurred  If you have any questions, please contact the dictating provider

## 2022-11-22 ENCOUNTER — TELEPHONE (OUTPATIENT)
Dept: NEPHROLOGY | Facility: CLINIC | Age: 82
End: 2022-11-22

## 2022-11-22 NOTE — TELEPHONE ENCOUNTER
I called and left a message on machine for patient to return our call about rescheduling his appointment for 5/23/2023 nephrology follow up with Dr Jacqui Davies, because of Dr Jacqui Davies on hospital call

## 2022-11-27 DIAGNOSIS — I10 HYPERTENSION, UNSPECIFIED TYPE: ICD-10-CM

## 2022-11-28 RX ORDER — LOSARTAN POTASSIUM 100 MG/1
TABLET ORAL
Qty: 90 TABLET | Refills: 2 | Status: SHIPPED | OUTPATIENT
Start: 2022-11-28

## 2023-01-17 ENCOUNTER — APPOINTMENT (OUTPATIENT)
Dept: LAB | Facility: HOSPITAL | Age: 83
End: 2023-01-17

## 2023-01-17 DIAGNOSIS — Z85.46 HISTORY OF PROSTATE CANCER: ICD-10-CM

## 2023-01-17 DIAGNOSIS — N18.32 TYPE 2 DIABETES MELLITUS WITH STAGE 3B CHRONIC KIDNEY DISEASE, WITHOUT LONG-TERM CURRENT USE OF INSULIN (HCC): ICD-10-CM

## 2023-01-17 DIAGNOSIS — Z85.038 HISTORY OF COLON CANCER: ICD-10-CM

## 2023-01-17 DIAGNOSIS — E11.22 TYPE 2 DIABETES MELLITUS WITH STAGE 3B CHRONIC KIDNEY DISEASE, WITHOUT LONG-TERM CURRENT USE OF INSULIN (HCC): ICD-10-CM

## 2023-01-17 DIAGNOSIS — Z11.59 NEED FOR HEPATITIS C SCREENING TEST: ICD-10-CM

## 2023-01-17 LAB
ALBUMIN SERPL BCP-MCNC: 4.1 G/DL (ref 3.5–5)
ALP SERPL-CCNC: 107 U/L (ref 46–116)
ALT SERPL W P-5'-P-CCNC: 27 U/L (ref 12–78)
ANION GAP SERPL CALCULATED.3IONS-SCNC: 10 MMOL/L (ref 4–13)
AST SERPL W P-5'-P-CCNC: 47 U/L (ref 5–45)
BASOPHILS # BLD AUTO: 0.04 THOUSANDS/ÂΜL (ref 0–0.1)
BASOPHILS NFR BLD AUTO: 0 % (ref 0–1)
BILIRUB SERPL-MCNC: 1.56 MG/DL (ref 0.2–1)
BUN SERPL-MCNC: 16 MG/DL (ref 5–25)
CALCIUM SERPL-MCNC: 9.3 MG/DL (ref 8.3–10.1)
CEA SERPL-MCNC: 3.5 NG/ML (ref 0–3)
CHLORIDE SERPL-SCNC: 104 MMOL/L (ref 96–108)
CHOLEST SERPL-MCNC: 110 MG/DL
CO2 SERPL-SCNC: 28 MMOL/L (ref 21–32)
CREAT SERPL-MCNC: 1.68 MG/DL (ref 0.6–1.3)
EOSINOPHIL # BLD AUTO: 0.11 THOUSAND/ÂΜL (ref 0–0.61)
EOSINOPHIL NFR BLD AUTO: 1 % (ref 0–6)
ERYTHROCYTE [DISTWIDTH] IN BLOOD BY AUTOMATED COUNT: 17.7 % (ref 11.6–15.1)
EST. AVERAGE GLUCOSE BLD GHB EST-MCNC: 120 MG/DL
GFR SERPL CREATININE-BSD FRML MDRD: 37 ML/MIN/1.73SQ M
GLUCOSE P FAST SERPL-MCNC: 112 MG/DL (ref 65–99)
HBA1C MFR BLD: 5.8 %
HCT VFR BLD AUTO: 45.8 % (ref 36.5–49.3)
HCV AB SER QL: NORMAL
HDLC SERPL-MCNC: 48 MG/DL
HGB BLD-MCNC: 14.2 G/DL (ref 12–17)
IMM GRANULOCYTES # BLD AUTO: 0.03 THOUSAND/UL (ref 0–0.2)
IMM GRANULOCYTES NFR BLD AUTO: 0 % (ref 0–2)
LDLC SERPL CALC-MCNC: 45 MG/DL (ref 0–100)
LYMPHOCYTES # BLD AUTO: 1.81 THOUSANDS/ÂΜL (ref 0.6–4.47)
LYMPHOCYTES NFR BLD AUTO: 19 % (ref 14–44)
MCH RBC QN AUTO: 23.8 PG (ref 26.8–34.3)
MCHC RBC AUTO-ENTMCNC: 31 G/DL (ref 31.4–37.4)
MCV RBC AUTO: 77 FL (ref 82–98)
MONOCYTES # BLD AUTO: 0.57 THOUSAND/ÂΜL (ref 0.17–1.22)
MONOCYTES NFR BLD AUTO: 6 % (ref 4–12)
NEUTROPHILS # BLD AUTO: 7.08 THOUSANDS/ÂΜL (ref 1.85–7.62)
NEUTS SEG NFR BLD AUTO: 74 % (ref 43–75)
NONHDLC SERPL-MCNC: 62 MG/DL
NRBC BLD AUTO-RTO: 0 /100 WBCS
PLATELET # BLD AUTO: 229 THOUSANDS/UL (ref 149–390)
PMV BLD AUTO: 10.2 FL (ref 8.9–12.7)
POTASSIUM SERPL-SCNC: 4.1 MMOL/L (ref 3.5–5.3)
PROT SERPL-MCNC: 8.5 G/DL (ref 6.4–8.4)
PSA SERPL-MCNC: <0.1 NG/ML (ref 0–4)
RBC # BLD AUTO: 5.96 MILLION/UL (ref 3.88–5.62)
SODIUM SERPL-SCNC: 142 MMOL/L (ref 135–147)
TRIGL SERPL-MCNC: 87 MG/DL
TSH SERPL DL<=0.05 MIU/L-ACNC: 2.33 UIU/ML (ref 0.45–4.5)
WBC # BLD AUTO: 9.64 THOUSAND/UL (ref 4.31–10.16)

## 2023-01-20 ENCOUNTER — RA CDI HCC (OUTPATIENT)
Dept: OTHER | Facility: HOSPITAL | Age: 83
End: 2023-01-20

## 2023-01-20 NOTE — PROGRESS NOTES
[facility]  coding opportunities          Chart Reviewed number of suggestions sent to Provider: 2   [id number] [phone]  [id number] [phone]    Patients Insurance     Medicare Insurance: [employer] Ad[employer]age

## 2023-01-24 ENCOUNTER — OFFICE VISIT (OUTPATIENT)
Dept: INTERNAL MEDICINE CLINIC | Facility: CLINIC | Age: 83
End: 2023-01-24

## 2023-01-24 VITALS
SYSTOLIC BLOOD PRESSURE: 140 MMHG | WEIGHT: 259 LBS | HEART RATE: 50 BPM | TEMPERATURE: 97.9 F | HEIGHT: 71 IN | RESPIRATION RATE: 18 BRPM | DIASTOLIC BLOOD PRESSURE: 80 MMHG | OXYGEN SATURATION: 96 % | BODY MASS INDEX: 36.26 KG/M2

## 2023-01-24 DIAGNOSIS — R11.0 NAUSEA: ICD-10-CM

## 2023-01-24 DIAGNOSIS — E66.01 OBESITY, MORBID (HCC): ICD-10-CM

## 2023-01-24 DIAGNOSIS — J43.2 CENTRILOBULAR EMPHYSEMA (HCC): ICD-10-CM

## 2023-01-24 DIAGNOSIS — R97.0 ELEVATED CEA: ICD-10-CM

## 2023-01-24 DIAGNOSIS — C61 PROSTATE CANCER (HCC): ICD-10-CM

## 2023-01-24 DIAGNOSIS — R27.0 ATAXIA: ICD-10-CM

## 2023-01-24 DIAGNOSIS — Z85.46 HISTORY OF PROSTATE CANCER: ICD-10-CM

## 2023-01-24 DIAGNOSIS — M54.16 LUMBAR RADICULOPATHY: ICD-10-CM

## 2023-01-24 DIAGNOSIS — N18.32 HYPERTENSIVE KIDNEY DISEASE WITH STAGE 3B CHRONIC KIDNEY DISEASE (HCC): ICD-10-CM

## 2023-01-24 DIAGNOSIS — I71.21 ANEURYSM OF ASCENDING AORTA WITHOUT RUPTURE: ICD-10-CM

## 2023-01-24 DIAGNOSIS — Z99.81 CHRONIC RESPIRATORY FAILURE WITH HYPOXIA, ON HOME O2 THERAPY (HCC): ICD-10-CM

## 2023-01-24 DIAGNOSIS — K86.9 PANCREATIC LESION: ICD-10-CM

## 2023-01-24 DIAGNOSIS — N18.32 STAGE 3B CHRONIC KIDNEY DISEASE (HCC): ICD-10-CM

## 2023-01-24 DIAGNOSIS — E11.22 TYPE 2 DIABETES MELLITUS WITH STAGE 3B CHRONIC KIDNEY DISEASE, WITHOUT LONG-TERM CURRENT USE OF INSULIN (HCC): ICD-10-CM

## 2023-01-24 DIAGNOSIS — N18.32 TYPE 2 DIABETES MELLITUS WITH STAGE 3B CHRONIC KIDNEY DISEASE, WITHOUT LONG-TERM CURRENT USE OF INSULIN (HCC): ICD-10-CM

## 2023-01-24 DIAGNOSIS — Z85.038 HISTORY OF COLON CANCER: ICD-10-CM

## 2023-01-24 DIAGNOSIS — F11.20 CONTINUOUS OPIOID DEPENDENCE (HCC): ICD-10-CM

## 2023-01-24 DIAGNOSIS — J96.11 CHRONIC RESPIRATORY FAILURE WITH HYPOXIA, ON HOME O2 THERAPY (HCC): ICD-10-CM

## 2023-01-24 DIAGNOSIS — I12.9 HYPERTENSIVE KIDNEY DISEASE WITH STAGE 3B CHRONIC KIDNEY DISEASE (HCC): ICD-10-CM

## 2023-01-24 DIAGNOSIS — Z00.00 MEDICARE ANNUAL WELLNESS VISIT, SUBSEQUENT: Primary | ICD-10-CM

## 2023-01-24 DIAGNOSIS — R91.8 PULMONARY NODULES: ICD-10-CM

## 2023-01-24 DIAGNOSIS — I10 PRIMARY HYPERTENSION: ICD-10-CM

## 2023-01-24 RX ORDER — PROCHLORPERAZINE MALEATE 10 MG
10 TABLET ORAL EVERY 6 HOURS PRN
Qty: 30 TABLET | Refills: 0 | Status: SHIPPED | OUTPATIENT
Start: 2023-01-24

## 2023-01-24 RX ORDER — ACETAMINOPHEN AND CODEINE PHOSPHATE 300; 60 MG/1; MG/1
1 TABLET ORAL EVERY 6 HOURS PRN
Qty: 60 TABLET | Refills: 0 | Status: SHIPPED | OUTPATIENT
Start: 2023-01-24

## 2023-01-24 NOTE — PROGRESS NOTES
Assessment and Plan:     Problem List Items Addressed This Visit        Endocrine    Type 2 diabetes mellitus with chronic kidney disease, without long-term current use of insulin ([facility] )    Relevant Orders    CBC and differential    Comprehensive metabolic panel    Lipid panel    Hemoglobin A1C    TSH, 3rd generation with Free T4 reflex       Respiratory    Centrilobular emphysema ([facility] )    Relevant Orders    CT chest abdomen pelvis wo contrast    Chronic respiratory failure with hypoxia, on home O2 therapy ([facility] )       Cardiovascular and Mediastinum    Hypertension       Nervous and Auditory    Lumbar radiculopathy    Relevant Medications    acetaminophen-codeine (TYLENOL with CODEINE #4) 300-60 MG per tablet       Genitourinary    Stage 3 chronic kidney disease (HCC)    Hypertensive kidney disease with stage 3 chronic kidney disease (HCC)    Prostate cancer ([facility] )       Other    History of colon cancer    Relevant Orders    CT chest abdomen pelvis wo contrast    CEA    History of prostate cancer    Relevant Orders    CT chest abdomen pelvis wo contrast    Obesity, morbid ([facility] )    Continuous opioid dependence ([facility] )   Other Visit Diagnoses     Medicare annual wellness visit, subsequent    -  Primary    Nausea        Relevant Medications    prochlorperazine (COMPAZINE) 10 mg tablet    Pancreatic lesion        Relevant Orders    CT chest abdomen pelvis wo contrast    Pulmonary nodules        Relevant Orders    CT chest abdomen pelvis wo contrast    Aneurysm of ascending aorta without rupture        Relevant Orders    CT chest abdomen pelvis wo contrast    Elevated CEA        Relevant Orders    CT chest abdomen pelvis wo contrast    CEA    Ataxia        Relevant Orders    Ambulatory Referral to Physical Therapy           Preventive health issues were discussed with patient, and age appropriate screening tests were ordered as noted in patient's After Visit Summary    Personalized health advice and appropriate referrals for health education or preventive services given if needed, as noted in patient's After Visit Summary  History of Present Illness:     Patient presents for a Medicare Wellness Visit    F/u mmp, review labs, here w/ Arnulfo Gusman who helps w/ hx   Lumbar radiculopathy-s/p SCC and rhizotomy, had been f/b PM, but exhausted options, using T4's sparingly, only using when he leaves the house  COPD-fairly stable, back on advair and incruise  He is less compliant w/ continuous O2 at 2lpm at home, but always when he goes out  Pulm nodules, never scheduled CT f/u  Ascending aortic aneurysm-4 1 cm in '19, didn't do CT f/u  DM-not really following diet, no regular exercise  HTN-taking rx as directed, home bp's daily have all been nl b/t 120-140/60-70's  CKD-f/b neph, keeping hydrated  Gout-taking allopurinol, no recent flares  H/o prostate ca s/p prostatectomy '05, psa undetectable in May, f/b urology  H/o colon CA-no recent f/u  H/o kidney stones, neg KUB in May  He notes balance is not what it once was  Walking without assistive device  Patient Care Team:  Veronica Herrera MD as PCP - General (Internal Medicine)  Lashay Chavis MD as Consulting Physician (Nephrology)  David Watson MD as Consulting Physician (Urology)  Stephanie Murphy as Nurse Practitioner (Internal Medicine)  Maria Fernanda Cleary MD as Endoscopist     Review of Systems:     Review of Systems   Constitutional: Negative for appetite change, chills, diaphoresis, fatigue, fever and unexpected weight change  HENT: Negative for congestion, hearing loss and rhinorrhea  Eyes: Negative for visual disturbance  Respiratory: Positive for shortness of breath  Negative for cough, chest tightness and wheezing  Cardiovascular: Negative for chest pain, palpitations and leg swelling  Gastrointestinal: Negative for abdominal pain and blood in stool  Endocrine: Negative for cold intolerance, heat intolerance, polydipsia and polyuria  Genitourinary: Negative for difficulty urinating, dysuria, frequency and urgency  Musculoskeletal: Positive for back pain  Negative for arthralgias and myalgias  Skin: Negative for rash  Neurological: Negative for dizziness, weakness, light-headedness and headaches  Hematological: Does not bruise/bleed easily  Psychiatric/Behavioral: Negative for dysphoric mood and sleep disturbance          Problem List:     Patient Active Problem List   Diagnosis   • Centrilobular emphysema (HCC)   • Acute bronchitis   • Hypertension   • Post-tussive syncope   • Stage 3 chronic kidney disease ([place] [address] )   • Type 2 diabetes mellitus with chronic kidney disease, without long-term current use of insulin ([place] [address] )   • Chronic respiratory failure with hypoxia, on home O2 therapy ([place] [address] )   • Hypertensive kidney disease with stage 3 chronic kidney disease ([place] [address] )   • History of colon cancer   • Screening for colon cancer   • Change in bowel habits   • Vitamin D deficiency   • History of prostate cancer   • Prostate cancer ([place] [address] )   • Obesity, morbid ([place] [address] )   • Hx of prostatectomy   • Nonhealing surgical wound   • Status post insertion of spinal cord stimulator   • Lumbar radiculopathy   • Continuous opioid dependence ([place] [address] )      Past Medical and Surgical History:     Past Medical History:   Diagnosis Date   • Back pain    • Bronchiectasis ([place] [address] )    • Cataract    • Chronic kidney disease    • COPD (chronic obstructive pulmonary disease) ([place] [address] )    • History of malignant neoplasm of oral cavity     M0   • HL (hearing loss)    • Hypertension    • Kidney stone 05/2019   • Malignant neoplasm of colon (HCC)     descending   • Prostate cancer ([place] [address] )    • SOB (shortness of breath)    • Tachycardia    • Thalassemia trait    • Tinnitus      Past Surgical History:   Procedure Laterality Date   • BACK SURGERY     • CHOLECYSTECTOMY     • COLON SURGERY  10/2008    partial colectomy   • EYE SURGERY     • INCISIONAL HERNIA REPAIR     • JOINT REPLACEMENT Right hip   • NH COLONOSCOPY FLX DX W/COLLJ SPEC WHEN PFRMD N/A 2018    Procedure: COLONOSCOPY;  Surgeon: Maty Taveras MD;  Location: [facility]; Service: Gastroenterology   • PROSTATE SURGERY     • SPINAL CORD STIMULATOR IMPLANT     • TONSILLECTOMY        Family History:     Family History   Problem Relation Age of Onset   • Heart failure Mother         as per [employer]   • Coronary artery disease Mother         as per [employer]   • Diabetes Mother         mellitus - as per [employer]   • Coronary artery disease Father         as per [employer]   • Cancer Sister         malignant neoplasm      Social History:     Social History     Socioeconomic History   • Marital status: /Civil Union     Spouse name: None   • Number of children: None   • Years of education: None   • Highest education level: None   Occupational History   • Occupation: Cook   Tobacco Use   • Smoking status: Former     Packs/day: 0 25     Years: 15 00     Pack years: 3 75     Types: Cigarettes     Start date: 58     Quit date: 2004     Years since quittin 6   • Smokeless tobacco: Never   • Tobacco comments:     non smoker/tobacco user; quit  as per [employer]   Vaping Use   • Vaping Use: Never used   Substance and Sexual Activity   • Alcohol use: Not Currently   • Drug use: No   • Sexual activity: Not Currently     Partners: Female   Other Topics Concern   • None   Social History Narrative    Active directive    Living independently with spouse    Hx of Living will on file     Social Determinants of Health     Financial Resource Strain: Low Risk    • Difficulty of Paying Living Expenses: Not hard at all   Food Insecurity: Not on file   Transportation Needs: No Transportation Needs   • Lack of Transportation (Medical): No   • Lack of Transportation (Non-Medical):  No   Physical Activity: Inactive   • Days of Exercise per Week: 0 days   • Minutes of Exercise per Session: 0 min   Stress: Not on file   Social Connections: Not on file   Intimate Partner Violence: Not on file   Housing Stability: Not on file      Medications and Allergies:     Current Outpatient Medications   Medication Sig Dispense Refill   • acetaminophen-codeine (TYLENOL with CODEINE #4) 300-60 MG per tablet Take 1 tablet by mouth every 6 (six) hours as needed for moderate pain 60 tablet 0   • Advair Diskus 250-50 MCG/ACT inhaler USE 1 INHALATION ORALLY    TWICE DAILY; RINSE MOUTH   AFTER USE 60 blister 1   • albuterol (2 5 mg/3 mL) 0 083 % nebulizer solution Take 3 mL (2 5 mg total) by nebulization every 6 (six) hours 1080 mL 3   • allopurinol (ZYLOPRIM) 100 mg tablet TAKE 1 TABLET DAILY 90 tablet 2   • amLODIPine (NORVASC) 10 mg tablet Take 1 tablet (10 mg total) by mouth every morning 90 tablet 3   • cholecalciferol (VITAMIN D3) 1,000 units tablet Take 5 tablets (5,000 Units total) by mouth every other day     • losartan (COZAAR) 100 MG tablet TAKE 1 TABLET DAILY 90 tablet 2   • metoprolol succinate (TOPROL-XL) 25 mg 24 hr tablet Take 1 tablet (25 mg total) by mouth daily 90 tablet 1   • Polyvinyl Alcohol-Povidone (REFRESH OP) Apply to eye as needed      • prochlorperazine (COMPAZINE) 10 mg tablet Take 1 tablet (10 mg total) by mouth every 6 (six) hours as needed for nausea or vomiting 30 tablet 0   • Vitamins-Lipotropics (LIPO-FLAVONOID PLUS PO) Take by mouth     • albuterol (VENTOLIN HFA) 90 mcg/act inhaler Inhale 2 puffs every 6 (six) hours as needed for wheezing or shortness of breath (Patient not taking: Reported on 1/24/2023) 18 g 3   • gabapentin (NEURONTIN) 300 mg capsule every 12 (twelve) hours as needed  (Patient not taking: Reported on 1/24/2023)     • umeclidinium bromide (Incruse Ellipta) 62 5 mcg/inh AEPB inhaler Inhale 1 puff daily 90 blister 1     No current facility-administered medications for this visit       No Known Allergies   Immunizations:     Immunization History   Administered Date(s) Administered   • COVID-19 MODERNA VACC 0 5 ML IM 01/26/2021, 02/22/2021, 10/27/2021   • INFLUENZA 10/26/2012, 10/22/2013, 12/01/2015, 10/07/2016, 09/18/2018, 11/19/2019, 10/04/2021   • Influenza Split High Dose Preservative Free IM 10/07/2014, 10/01/2017   • Influenza, high dose seasonal 0 7 mL 10/08/2020   • Influenza, seasonal, injectable 10/26/2012, 10/22/2013, 12/01/2015, 09/03/2016   • Pneumococcal Conjugate 13-Valent 08/25/2015, 08/25/2015   • Pneumococcal Polysaccharide PPV23 01/03/2017   • Tdap 07/01/2011   • Zoster 1940   • Zoster Vaccine Recombinant 12/18/2018, 06/04/2019      Health Maintenance:         Topic Date Due   • Hepatitis C Screening  Completed         Topic Date Due   • Hepatitis A Vaccine (1 of 2 - Risk 2-dose series) Never done   • Hepatitis B Vaccine (1 of 3 - Risk 3-dose series) Never done   • COVID-19 Vaccine (4 - Booster for Moderna series) 12/22/2021   • Influenza Vaccine (1) 09/01/2022      Medicare Screening Tests and Risk Assessments:     Christopher Eng is here for his Subsequent Wellness visit  Health Risk Assessment:   Patient rates overall health as fair  Patient feels that their physical health rating is same  Patient is satisfied with their life  Eyesight was rated as same  Hearing was rated as slightly worse  Patient feels that their emotional and mental health rating is slightly worse  Patients states they are sometimes angry  Patient states they are sometimes unusually tired/fatigued  Pain experienced in the last 7 days has been a lot  Patient's pain rating has been 5/10  Patient states that he has experienced no weight loss or gain in last 6 months  Depression Screening:   PHQ-2 Score: 2      Fall Risk Screening: In the past year, patient has experienced: history of falling in past year    Number of falls: 1  Injured during fall?: Yes    Feels unsteady when standing or walking?: No    Worried about falling?: No      Home Safety:  Patient has trouble with stairs inside or outside of their home   Patient has working smoke alarms and has working carbon monoxide detector  Home safety hazards include: none  Nutrition:   Current diet is Regular  Medications:   Patient is currently taking over-the-counter supplements  OTC medications include: see medication list  Patient is able to manage medications  Activities of Daily Living (ADLs)/Instrumental Activities of Daily Living (IADLs):   Walk and transfer into and out of bed and chair?: Yes  Dress and groom yourself?: Yes    Bathe or shower yourself?: Yes    Feed yourself? Yes  Do your laundry/housekeeping?: Yes  Manage your money, pay your bills and track your expenses?: Yes  Make your own meals?: Yes    Do your own shopping?: Yes    Previous Hospitalizations:   Any hospitalizations or ED visits within the last 12 months?: No      Advance Care Planning:   Living will: Yes    Advanced directive: Yes      Cognitive Screening:   Provider or family/friend/caregiver concerned regarding cognition?: No    PREVENTIVE SCREENINGS      Cardiovascular Screening:    General: Screening Current      Diabetes Screening:     General: Screening Not Indicated and History Diabetes      Colorectal Cancer Screening:     General: History Colorectal Cancer      Prostate Cancer Screening:    General: History Prostate Cancer and Screening Not Indicated      Osteoporosis Screening:    General: Screening Not Indicated      Abdominal Aortic Aneurysm (AAA) Screening:    Risk factors include: tobacco use        Lung Cancer Screening:     General: Screening Not Indicated      Hepatitis C Screening:    General: Screening Current    Screening, Brief Intervention, and Referral to Treatment (SBIRT)    Screening      Single Item Drug Screening:  How often have you used an illegal drug (including marijuana) or a prescription medication for non-medical reasons in the past year? never    Single Item Drug Screen Score: 0  Interpretation: Negative screen for possible drug use disorder    No results found  Physical Exam:     /80 (BP Location: Left arm, Patient Position: Sitting, Cuff Size: Large)   Pulse (!) 50   Temp 97 9 °F (36 6 °C) (Tympanic)   Resp 18   Ht 5' 11" (1 803 m)   Wt 117 kg (259 lb)   SpO2 96% Comment: 3L/m via N/C  BMI 36 12 kg/m²     Physical Exam  Vitals and nursing note reviewed  Constitutional:       General: He is not in acute distress  Appearance: He is well-developed  HENT:      Head: Normocephalic and atraumatic  Eyes:      Conjunctiva/sclera: Conjunctivae normal    Cardiovascular:      Rate and Rhythm: Normal rate and regular rhythm  Heart sounds: No murmur heard  Pulmonary:      Effort: Pulmonary effort is normal  No respiratory distress  Breath sounds: Normal breath sounds  Abdominal:      Palpations: Abdomen is soft  Tenderness: There is no abdominal tenderness  Musculoskeletal:         General: No swelling  Cervical back: Neck supple  Skin:     General: Skin is warm and dry  Capillary Refill: Capillary refill takes less than 2 seconds  Neurological:      Mental Status: He is alert and oriented to person, place, and time  Psychiatric:         Mood and Affect: Mood normal          Behavior: Behavior normal  Behavior is cooperative  Thought Content:  Thought content normal          Judgment: Judgment normal           Marsha Prince MD

## 2023-01-24 NOTE — PATIENT INSTRUCTIONS
Lab data reviewed in detail and compared to prior    Diabetes under excellent control with A1c 5 8    COPD-continue bronchodilators and close pulmonary follow-up, weight loss advised to help with work of breathing    Lumbar radiculopathy-refill Tylenol No  4, weight loss advised  Hypertension stable on present regimen    Chronic kidney disease remained stable with creatinine 1 68    History of colon cancer with mildly elevated CEA-check CT chest abdomen and pelvis    History of 4 1 cm ascending aortic aneurysm-CT follow-up    History of 1 7 cm pancreatic lesion-follow with CT, prior MRI was ordered but not completed, consider MRI if any change to this lesion that had been stable since 2015  History of pulmonary 9 [id number] CT has not been completed, CT as above    Balance and gait dysfunction-refer to physical therapy    Routine follow-up after labs in 6 months, sooner as needed  Medicare Preventive Visit Patient Instructions  Thank you for completing your Welcome to Medicare Visit or Medicare Annual Wellness Visit today  Your next wellness visit will be due in one year (1/25/2024)  The screening/preventive services that you may require over the next 5-10 years are detailed below  Some tests may not apply to you based off risk factors and/or age  Screening tests ordered at today's visit but not completed yet may show as past due  Also, please note that scanned in results may not display below    Preventive Screenings:  Service Recommendations Previous Testing/Comments   Colorectal Cancer Screening  Colonoscopy    Fecal Occult Blood Test (FOBT)/Fecal Immunochemical Test (FIT)  Fecal DNA/Cologuard Test  Flexible Sigmoidoscopy Age: 39-70 years old   Colonoscopy: every 10 years (May be performed more frequently if at higher risk)  OR  FOBT/FIT: every 1 year  OR  Cologuard: every 3 years  OR  Sigmoidoscopy: every 5 years  Screening may be recommended earlier than age 39 if at higher risk for colorectal cancer  Also, an individualized decision between you and your healthcare provider will decide whether screening between the ages of 74-80 would be appropriate  Colonoscopy: 07/21/2008  FOBT/FIT: Not on file  Cologuard: Not on file  Sigmoidoscopy: Not on file    History Colorectal Cancer     Prostate Cancer Screening Individualized decision between patient and health care provider in men between ages of 53-78   Medicare will cover every 12 months beginning on the day after your 50th birthday PSA: <0 1 ng/mL     History Prostate Cancer  Screening Not Indicated     Hepatitis C Screening Once for adults born between 1945 and 1965  More frequently in patients at high risk for Hepatitis C Hep C Antibody: 01/17/2023    Screening Current   Diabetes Screening 1-2 times per year if you're at risk for diabetes or have pre-diabetes Fasting glucose: 112 mg/dL (1/17/2023)  A1C: 5 8 % (1/17/2023)  Screening Not Indicated  History Diabetes   Cholesterol Screening Once every 5 years if you don't have a lipid disorder  May order more often based on risk factors  Lipid panel: 01/17/2023  Screening Current      Other Preventive Screenings Covered by Medicare:  Abdominal Aortic Aneurysm (AAA) Screening: covered once if your at risk  You're considered to be at risk if you have a family history of AAA or a male between the age of 73-68 who smoking at least 100 cigarettes in your lifetime  Lung Cancer Screening: covers low dose CT scan once per year if you meet all of the following conditions: (1) Age 50-69; (2) No signs or symptoms of lung cancer; (3) Current smoker or have quit smoking within the last 15 years; (4) You have a tobacco smoking history of at least 20 pack years (packs per day x number of years you smoked); (5) You get a written order from a healthcare provider    Glaucoma Screening: covered annually if you're considered high risk: (1) You have diabetes OR (2) Family history of glaucoma OR (3)  aged 48 and older OR (4)  American aged 72 and older  Osteoporosis Screening: covered every 2 years if you meet one of the following conditions: (1) Have a vertebral abnormality; (2) On glucocorticoid therapy for more than 3 months; (3) Have primary hyperparathyroidism; (4) On osteoporosis medications and need to assess response to drug therapy  HIV Screening: covered annually if you're between the age of 12-76  Also covered annually if you are younger than 13 and older than 72 with risk factors for HIV infection  For pregnant patients, it is covered up to 3 times per pregnancy  Immunizations:  Immunization Recommendations   Influenza Vaccine Annual influenza vaccination during flu season is recommended for all persons aged >= 6 months who do not have contraindications   Pneumococcal Vaccine   * Pneumococcal conjugate vaccine = PCV13 (Prevnar 13), PCV15 (Vaxneuvance), PCV20 (Prevnar 20)  * Pneumococcal polysaccharide vaccine = PPSV23 (Pneumovax) Adults 25-60 years old: 1-3 doses may be recommended based on certain risk factors  Adults 72 years old: 1-2 doses may be recommended based off what pneumonia vaccine you previously received   Hepatitis B Vaccine 3 dose series if at intermediate or high risk (ex: diabetes, end stage renal disease, liver disease)   Tetanus (Td) Vaccine - COST NOT COVERED BY MEDICARE PART B Following completion of primary series, a booster dose should be given every 10 years to maintain immunity against tetanus  Td may also be given as tetanus wound prophylaxis  Tdap Vaccine - COST NOT COVERED BY MEDICARE PART B Recommended at least once for all adults  For pregnant patients, recommended with each pregnancy     Shingles Vaccine (Shingrix) - COST NOT COVERED BY MEDICARE PART B  2 shot series recommended in those aged 48 and above     Health Maintenance Due:      Topic Date Due    Hepatitis C Screening  Completed     Immunizations Due:      Topic Date Due    Hepatitis A Vaccine (1 of 2 - Risk 2-dose series) Never done    Hepatitis B Vaccine (1 of 3 - Risk 3-dose series) Never done    COVID-19 Vaccine (4 - Booster for Moderna series) 12/22/2021    Influenza Vaccine (1) 09/01/2022     Advance Directives   What are advance directives? Advance directives are legal documents that state your wishes and plans for medical care  These plans are made ahead of time in case you lose your ability to make decisions for yourself  Advance directives can apply to any medical decision, such as the treatments you want, and if you want to donate organs  What are the types of advance directives? There are many types of advance directives, and each state has rules about how to use them  You may choose a combination of any of the following:  Living will: This is a written record of the treatment you want  You can also choose which treatments you do not want, which to limit, and which to stop at a certain time  This includes surgery, medicine, IV fluid, and tube feedings  [facility] power of  for healthcare [facility]): This is a written record that states who you want to make healthcare choices for you when you are unable to make them for yourself  This person, called a proxy, is usually a family member or a friend  You may choose more than 1 proxy  Do not resuscitate (DNR) order:  A DNR order is used in case your heart stops beating or you stop breathing  It is a request not to have certain forms of treatment, such as CPR  A DNR order may be included in other types of advance directives  Medical directive: This covers the care that you want if you are in a coma, near death, or unable to make decisions for yourself  You can list the treatments you want for each condition  Treatment may include pain medicine, surgery, blood transfusions, dialysis, IV or tube feedings, and a ventilator (breathing machine)  Values history: This document has questions about your views, beliefs, and how you feel and think about life  This information can help others choose the care that you would choose  Why are advance directives important? An advance directive helps you control your care  Although spoken wishes may be used, it is better to have your wishes written down  Spoken wishes can be misunderstood, or not followed  Treatments may be given even if you do not want them  An advance directive may make it easier for your family to make difficult choices about your care  Fall Prevention    Fall prevention  includes ways to make your home and other areas safer  It also includes ways you can move more carefully to prevent a fall  Health conditions that cause changes in your blood pressure, vision, or muscle strength and coordination may increase your risk for falls  Medicines may also increase your risk for falls if they make you dizzy, weak, or sleepy  Fall prevention tips:   Stand or sit up slowly  Use assistive devices as directed  Wear shoes that fit well and have soles that   Wear a personal alarm  Stay active  Manage your medical conditions  Home Safety Tips:  Add items to prevent falls in the bathroom  Keep paths clear  Install bright lights in your home  Keep items you use often on shelves within reach  [place] or place reflective tape on the edges of your stairs  Weight Management   Why it is important to manage your weight:  Being overweight increases your risk of health conditions such as heart disease, high blood pressure, type 2 diabetes, and certain types of cancer  It can also increase your risk for osteoarthritis, sleep apnea, and other respiratory problems  Aim for a slow, steady weight loss  Even a small amount of weight loss can lower your risk of health problems  How to lose weight safely:  A safe and healthy way to lose weight is to eat fewer calories and get regular exercise  You can lose up about 1 pound a week by decreasing the number of calories you eat by 500 calories each day  Healthy meal plan for weight management:  A healthy meal plan includes a variety of foods, contains fewer calories, and helps you stay healthy  A healthy meal plan includes the following:  Eat whole-grain foods more often  A healthy meal plan should contain fiber  Fiber is the part of grains, fruits, and vegetables that is not broken down by your body  Whole-grain foods are healthy and provide extra fiber in your diet  Some examples of whole-grain foods are whole-wheat breads and pastas, oatmeal, brown rice, and bulgur  Eat a variety of vegetables every day  Include dark, leafy greens such as spinach, kale, june greens, and mustard greens  Eat yellow and orange vegetables such as carrots, sweet potatoes, and winter squash  Eat a variety of fruits every day  Choose fresh or canned fruit (canned in its own juice or light syrup) instead of juice  Fruit juice has very little or no fiber  Eat low-fat dairy foods  Drink fat-free (skim) milk or 1% milk  Eat fat-free yogurt and low-fat cottage cheese  Try low-fat cheeses such as mozzarella and other reduced-fat cheeses  Choose meat and other protein foods that are low in fat  Choose beans or other legumes such as split peas or lentils  Choose fish, skinless poultry (chicken or turkey), or lean cuts of red meat (beef or pork)  Before you cook meat or poultry, cut off any visible fat  Use less fat and oil  Try baking foods instead of frying them  Add less fat, such as margarine, sour cream, regular salad dressing and mayonnaise to foods  Eat fewer high-fat foods  Some examples of high-fat foods include french fries, doughnuts, ice cream, and cakes  Eat fewer sweets  Limit foods and drinks that are high in sugar  This includes candy, cookies, regular soda, and sweetened drinks  Exercise:  Exercise at least 30 minutes per day on most days of the week  Some examples of exercise include walking, biking, dancing, and swimming   You can also fit in more physical activity by taking the stairs instead of the elevator or parking farther away from stores  Ask your healthcare provider about the best exercise plan for you  © Copyright [employer] 2018 Information is for End User's use only and may not be sold, redistributed or otherwise used for commercial purposes   All illustrations and images included in CareNotes® are the copyrighted property of A D A M , Inc  or [address] [place] [address] [employer]

## 2023-01-27 ENCOUNTER — TELEPHONE (OUTPATIENT)
Dept: INTERNAL MEDICINE CLINIC | Facility: CLINIC | Age: 83
End: 2023-01-27

## 2023-01-27 NOTE — TELEPHONE ENCOUNTER
Central Scheduling told patient they need to  Barium for Study  Orders say CT Chest w/o  Please call Earnest Rodas to verify this is without or if the orders should be With      Cell [phone] or [place] [phone]

## 2023-02-15 ENCOUNTER — HOSPITAL ENCOUNTER (OUTPATIENT)
Dept: CT IMAGING | Facility: HOSPITAL | Age: 83
Discharge: HOME | End: 2023-02-15
Attending: INTERNAL MEDICINE

## 2023-02-15 DIAGNOSIS — R91.8 PULMONARY NODULES: ICD-10-CM

## 2023-02-15 DIAGNOSIS — R97.0 ELEVATED CEA: ICD-10-CM

## 2023-02-15 DIAGNOSIS — Z85.038 HISTORY OF COLON CANCER: ICD-10-CM

## 2023-02-15 DIAGNOSIS — I71.21 ANEURYSM OF ASCENDING AORTA WITHOUT RUPTURE: ICD-10-CM

## 2023-02-15 DIAGNOSIS — K86.9 PANCREATIC LESION: ICD-10-CM

## 2023-02-15 DIAGNOSIS — J43.2 CENTRILOBULAR EMPHYSEMA (HCC): ICD-10-CM

## 2023-02-15 DIAGNOSIS — Z85.46 HISTORY OF PROSTATE CANCER: ICD-10-CM

## 2023-02-27 ENCOUNTER — TELEPHONE (OUTPATIENT)
Dept: INTERNAL MEDICINE CLINIC | Facility: CLINIC | Age: 83
End: 2023-02-27

## 2023-02-27 NOTE — TELEPHONE ENCOUNTER
Patients wife notified     ----- Message from Pro Laurent MD sent at 2/25/2023  9:00 AM EST -----  Notify-CT is stable with no new or concerning findings

## 2023-04-02 DIAGNOSIS — J44.9 COPD, SEVERE (HCC): ICD-10-CM

## 2023-04-02 DIAGNOSIS — I10 HYPERTENSION, UNSPECIFIED TYPE: ICD-10-CM

## 2023-04-02 RX ORDER — METOPROLOL SUCCINATE 25 MG/1
TABLET, EXTENDED RELEASE ORAL
Qty: 90 TABLET | Refills: 1 | Status: SHIPPED | OUTPATIENT
Start: 2023-04-02

## 2023-04-03 RX ORDER — FLUTICASONE PROPIONATE AND SALMETEROL 50; 250 UG/1; UG/1
POWDER RESPIRATORY (INHALATION)
Qty: 60 BLISTER | Refills: 1 | Status: SHIPPED | OUTPATIENT
Start: 2023-04-03

## 2023-05-02 NOTE — PROGRESS NOTES
Pulmonary Follow Up Note  Racquel Wolf [de-identified] y o  male MRN: [id number]  5/3/2023    Assessment:    COPD, severe ([facility] )  PFT in 2021 showed ratio of 56% with FEV1 50%  DLCO moderately reduced at 46%  Lung volumes with mild restriction  Discontinue Advair and Incruse as he felt they were not working for him  Switch inhaler therapy to Breztri 2 puffs twice per day  Rinse mouth after  Samples provided and order sent to pharmacy  Continue Albuterol nebulizer/inhaler every 6 hours as needed  Start Pulmonary Rehab  Referral placed  UTD on immunizations    Chronic hypoxemic respiratory failure (HCC)  Continue 3 L continuous oxygen  Start pulmonary rehab  Plan:    Diagnoses and all orders for this visit:    COPD, severe ([facility] )  -     Ambulatory Referral to Pulmonary Rehabilitation; Future  -     Budeson-Glycopyrrol-Formoterol (Breztri Aerosphere) 160-9-4 8 MCG/ACT AERO; Inhale 2 puffs 2 (two) times a day Rinse mouth after use  Chronic hypoxemic respiratory failure ([place] [id number] )  -     Ambulatory Referral to Pulmonary Rehabilitation; Future    Centrilobular emphysema (HCC)  -     albuterol (Ventolin HFA) 90 mcg/act inhaler; Inhale 2 puffs every 6 (six) hours as needed for wheezing or shortness of breath    Nicotine dependence, cigarettes, in remission        Education provided at this visit:   Need for Vaccination: UTD   Pulmonary Rehab: Would benefit, discussed and is interested  Referral placed  Smoking Cessation: N/a, quit 18 years ago   Lung Cancer Screening: No longer candidate   Inhaler Use: Reviewed use of inhalers with demonstration, and rinsing of mouth    Return in about 3 months (around 8/3/2023)  All of Darrell's  questions were answered prior to leaving the office today  He will follow-up in 3 months or sooner should the need arise  He is aware to call our office with any further questions or concerns      History of Present Illness     Chief Complaint:   Chief Complaint   Patient presents with   [facility] Follow-up    Shortness of Breath    Cough    Wheezing       Patient ID: Mk Prieto is a [de-identified] y o  y o  male has a past medical history of Back pain, Bronchiectasis ([facility] ), Cataract, Chronic kidney disease, COPD (chronic obstructive pulmonary disease) ([facility] ), History of malignant neoplasm of oral cavity, HL (hearing loss), Hypertension, Kidney stone (05/2019), Malignant neoplasm of colon ([facility] ), Prostate cancer ([facility] ), SOB (shortness of breath), Tachycardia, Thalassemia trait, and Tinnitus  5/3/2023  HPI: Paul Bishop is a [de-identified] y o  male former smoker quit 18 years ago  with history of severe COPD, emphysema, chronic respiratory failure on home O2, HTN, lung nodules, and prostate CA presents today for follow up visit  Last visit was 9/20/2022 with Dr Vinnie Steinberg  He was maintained on Advair 250 1 puff BID, Incruse 1 puff daily as well asl PRN albuterol nebulizer  Continue 3L continuous oxygen  Declined Pulm rehab d/t tranportation difficulty  Has previously tried Trelegy, but did not work  Tried all nebulized therapy with perforomist and budesonide, but too expensive  Today, Mk Prieto is feeling ok  Reports symptoms are stable  Using Advair and Incruse daily, but states he does not feel like they're working  Using his Albuterol nebulizer 2-3 times per day  Denies need for steroids/antibiotics or exacerbations since his last visit  Still has SOB with exertion  Worse with going up even 3 steps into his house and walking longer distance  He has a dry non-productive cough  Occasional wheezing at night after dinner  He is wearing 3 L NC most of the time during the day and every night with sleep  Review of Systems   Constitutional: Negative for appetite change, chills and fever  HENT: Negative for congestion, ear pain, postnasal drip, rhinorrhea, sneezing, sore throat and trouble swallowing  Respiratory: Positive for cough, shortness of breath and wheezing  Negative for chest tightness      Cardiovascular: Negative for chest pain, palpitations and leg swelling  Neurological: Negative  Historical Information   Past Medical History:   Diagnosis Date    Back pain     Bronchiectasis (HCC)     Cataract     Chronic kidney disease     COPD (chronic obstructive pulmonary disease) (HCC)     History of malignant neoplasm of oral cavity     M0    HL (hearing loss)     Hypertension     Kidney stone 05/2019    Malignant neoplasm of colon (HCC)     descending    Prostate cancer (HCC)     SOB (shortness of breath)     Tachycardia     Thalassemia trait     Tinnitus      Past Surgical History:   Procedure Laterality Date    BACK SURGERY      CHOLECYSTECTOMY      COLON SURGERY  10/2008    partial colectomy    EYE SURGERY      INCISIONAL HERNIA REPAIR      JOINT REPLACEMENT Right     hip    [place] COLONOSCOPY FLX DX W/COLLJ SPEC WHEN PFRMD N/A 07/23/2018    Procedure: COLONOSCOPY;  Surgeon: Leonora Stephenson MD;  Location: [facility]; Service: Gastroenterology    PROSTATE SURGERY      SPINAL CORD STIMULATOR IMPLANT      TONSILLECTOMY       Family History   Problem Relation Age of Onset    Heart failure Mother         as per [employer]    Coronary artery disease Mother         as per [employer]    Diabetes Mother         mellitus - as per [employer]    Coronary artery disease Father         as per [employer]    Cancer Sister         malignant neoplasm       Smoking history: He reports that he quit smoking about 18 years ago  His smoking use included cigarettes  He started smoking about 63 years ago  He has a 3 75 pack-year smoking history   He has never used smokeless tobacco       Immunization History   Administered Date(s) Administered    COVID-19 MODERNA VACC 0 5 ML IM 01/26/2021, 02/22/2021, 10/27/2021    INFLUENZA 10/26/2012, 10/22/2013, 12/01/2015, 10/07/2016, 09/18/2018, 11/19/2019, 10/04/2021    Influenza Split High Dose Preservative Free IM 10/07/2014, 10/01/2017    Influenza, high dose seasonal 0 7 mL 10/08/2020    Influenza, seasonal, injectable 10/26/2012, 10/22/2013, 12/01/2015, 09/03/2016    Pneumococcal Conjugate 13-Valent 08/25/2015, 08/25/2015    Pneumococcal Polysaccharide PPV23 01/03/2017    Tdap 07/01/2011    Zoster 1940    Zoster Vaccine Recombinant 12/18/2018, 06/04/2019       Meds/Allergies     Current Outpatient Medications:     acetaminophen-codeine (TYLENOL with CODEINE #4) 300-60 MG per tablet, Take 1 tablet by mouth every 6 (six) hours as needed for moderate pain, Disp: 60 tablet, Rfl: 0    albuterol (2 5 mg/3 mL) 0 083 % nebulizer solution, Take 3 mL (2 5 mg total) by nebulization every 6 (six) hours, Disp: 1080 mL, Rfl: 3    albuterol (Ventolin HFA) 90 mcg/act inhaler, Inhale 2 puffs every 6 (six) hours as needed for wheezing or shortness of breath, Disp: 18 g, Rfl: 3    allopurinol (ZYLOPRIM) 100 mg tablet, TAKE 1 TABLET DAILY, Disp: 90 tablet, Rfl: 2    amLODIPine (NORVASC) 10 mg tablet, Take 1 tablet (10 mg total) by mouth every morning, Disp: 90 tablet, Rfl: 3    Budeson-Glycopyrrol-Formoterol (Breztri Aerosphere) 160-9-4 8 MCG/ACT AERO, Inhale 2 puffs 2 (two) times a day Rinse mouth after use , Disp: 31 1 g, Rfl: 3    cholecalciferol (VITAMIN D3) 1,000 units tablet, Take 5 tablets (5,000 Units total) by mouth every other day, Disp: , Rfl:     losartan (COZAAR) 100 MG tablet, TAKE 1 TABLET DAILY, Disp: 90 tablet, Rfl: 2    metoprolol succinate (TOPROL-XL) 25 mg 24 hr tablet, TAKE 1 TABLET DAILY, Disp: 90 tablet, Rfl: 1    Polyvinyl Alcohol-Povidone (REFRESH OP), Apply to eye as needed , Disp: , Rfl:     prochlorperazine (COMPAZINE) 10 mg tablet, Take 1 tablet (10 mg total) by mouth every 6 (six) hours as needed for nausea or vomiting, Disp: 30 tablet, Rfl: 0    Vitamins-Lipotropics (LIPO-FLAVONOID PLUS PO), Take by mouth, Disp: , Rfl:     gabapentin (NEURONTIN) 300 mg capsule, every 12 (twelve) hours as needed  (Patient not taking: Reported on "1/24/2023), Disp: , Rfl:   Allergies: No Known Allergies      Vitals:  Vitals:    05/03/23 1102   BP: 124/80   Pulse: 55   Temp: 97 7 °F (36 5 °C)   SpO2: 92%   Weight: 115 kg (253 lb)   Height: 5' 11\" (1 803 m)   Oxygen Therapy  SpO2: 92 % (WITH 2 LTS OF OXYGEN)    Wt Readings from Last 3 Encounters:   05/03/23 115 kg (253 lb)   01/24/23 117 kg (259 lb)   11/15/22 119 kg (262 lb)     Body mass index is 35 29 kg/m²  Physical Exam  Vitals and nursing note reviewed  Constitutional:       General: He is not in acute distress  Appearance: Normal appearance  He is well-developed  HENT:      Head: Normocephalic and atraumatic  Cardiovascular:      Rate and Rhythm: Normal rate and regular rhythm  Heart sounds: Normal heart sounds  No murmur heard  Pulmonary:      Effort: Pulmonary effort is normal  No respiratory distress  Breath sounds: Decreased breath sounds present  No wheezing, rhonchi or rales  Comments: Clear lungs on ausculation, however diminished breath sounds throughout  Musculoskeletal:         General: No swelling  Right lower leg: No edema  Left lower leg: No edema  Skin:     General: Skin is warm and dry  Capillary Refill: Capillary refill takes less than 2 seconds  Neurological:      General: No focal deficit present  Mental Status: He is alert  Psychiatric:         Mood and Affect: Mood and affect normal          Behavior: Behavior normal  Behavior is cooperative  Labs: I have personally reviewed pertinent lab results    Lab Results   Component Value Date    WBC 9 64 01/17/2023    HGB 14 2 01/17/2023    HCT 45 8 01/17/2023    MCV 77 (L) 01/17/2023     01/17/2023     Lab Results   Component Value Date    GLUCOSE 97 08/18/2015    CALCIUM 9 3 01/17/2023     08/18/2015    K 4 1 01/17/2023    CO2 28 01/17/2023     01/17/2023    BUN 16 01/17/2023    CREATININE 1 68 (H) 01/17/2023     No results found for: IGE  Lab Results " Component Value Date    ALT 27 01/17/2023    AST 47 (H) 01/17/2023    ALKPHOS 107 01/17/2023    BILITOT 1 0 08/18/2015       Imaging and other studies: I have personally reviewed pertinent reports  CT chest abdomen pelvis 2/15/23  Reidentified findings include emphysematous changes in the lungs, pleural plaques most consistent with chronic asbestos-related pleural disease, Few scattered benign pulmonary nodules, the largest of which measures 5 mm in the right middle lobe Linear markings at the left base consistent with   atelectasis or scarring  No consolidative or groundglass airspace opacity  No tracheal or endobronchial lesion  Pulmonary function testing:     Pulmonary Functions Testing Results: 10/07/2021     FEV1/FVC ratio 56%    FEV1 50% predicted  FVC 66% predicted  (-) response to bronchodilators  TLC 71 % predicted  RV 77 % predicted  DLCO corrected for hemoglobin 46 % predicted    Impression:    Moderately severe obstructive airflow defect on spirometry   No significant response to the administration to bronchodilator per ATS Standards   MIld restrictive lung disease as indicated by lung volumes   Moderately reduced diffusion capacity   Flow volume loop is obstructed

## 2023-05-03 ENCOUNTER — OFFICE VISIT (OUTPATIENT)
Age: 83
End: 2023-05-03

## 2023-05-03 VITALS
HEART RATE: 55 BPM | SYSTOLIC BLOOD PRESSURE: 124 MMHG | BODY MASS INDEX: 35.42 KG/M2 | HEIGHT: 71 IN | WEIGHT: 253 LBS | TEMPERATURE: 97.7 F | DIASTOLIC BLOOD PRESSURE: 80 MMHG | OXYGEN SATURATION: 92 %

## 2023-05-03 DIAGNOSIS — J44.9 COPD, SEVERE (HCC): Primary | ICD-10-CM

## 2023-05-03 DIAGNOSIS — J96.11 CHRONIC HYPOXEMIC RESPIRATORY FAILURE (HCC): ICD-10-CM

## 2023-05-03 DIAGNOSIS — F17.211 NICOTINE DEPENDENCE, CIGARETTES, IN REMISSION: ICD-10-CM

## 2023-05-03 DIAGNOSIS — J43.2 CENTRILOBULAR EMPHYSEMA (HCC): ICD-10-CM

## 2023-05-03 RX ORDER — BUDESONIDE, GLYCOPYRROLATE, AND FORMOTEROL FUMARATE 160; 9; 4.8 UG/1; UG/1; UG/1
2 AEROSOL, METERED RESPIRATORY (INHALATION) 2 TIMES DAILY
Qty: 31.1 G | Refills: 3 | Status: SHIPPED | OUTPATIENT
Start: 2023-05-03

## 2023-05-03 RX ORDER — BUDESONIDE, GLYCOPYRROLATE, AND FORMOTEROL FUMARATE 160; 9; 4.8 UG/1; UG/1; UG/1
2 AEROSOL, METERED RESPIRATORY (INHALATION) 2 TIMES DAILY
Qty: 10.7 G | Refills: 0 | Status: CANCELLED | COMMUNITY
Start: 2023-05-03 | End: 2023-05-31

## 2023-05-03 RX ORDER — ALBUTEROL SULFATE 90 UG/1
2 AEROSOL, METERED RESPIRATORY (INHALATION) EVERY 6 HOURS PRN
Qty: 18 G | Refills: 3 | Status: SHIPPED | OUTPATIENT
Start: 2023-05-03

## 2023-05-03 NOTE — PATIENT INSTRUCTIONS
Start Breztri 2 puffs twice per day  Rinse mouth  Stop Advair and Incruse  Albuterol nebulizer/inhaler every 6 hours as needed  A  Start Pulmonary Rehab  Continue 3L continuous oxygen

## 2023-05-03 NOTE — ASSESSMENT & PLAN NOTE
PFT in 2021 showed ratio of 56% with FEV1 50%  DLCO moderately reduced at 46%  Lung volumes with mild restriction  Discontinue Advair and Incruse as he felt they were not working for him  Switch inhaler therapy to Breztri 2 puffs twice per day  Rinse mouth after  Samples provided and order sent to pharmacy  Continue Albuterol nebulizer/inhaler every 6 hours as needed  Start Pulmonary Rehab  Referral placed       UTD on immunizations

## 2023-05-30 ENCOUNTER — TELEPHONE (OUTPATIENT)
Dept: NEPHROLOGY | Facility: CLINIC | Age: 83
End: 2023-05-30

## 2023-05-30 NOTE — TELEPHONE ENCOUNTER
Called spoke with patient advised patient to get labs done prior to 06/05/23 fu appt with Dr Pierre  patient understood and is okay with it

## 2023-05-31 ENCOUNTER — APPOINTMENT (OUTPATIENT)
Dept: LAB | Facility: HOSPITAL | Age: 83
End: 2023-05-31
Attending: INTERNAL MEDICINE
Payer: COMMERCIAL

## 2023-05-31 DIAGNOSIS — N18.32 HYPERTENSIVE KIDNEY DISEASE WITH STAGE 3B CHRONIC KIDNEY DISEASE (HCC): ICD-10-CM

## 2023-05-31 DIAGNOSIS — N18.32 STAGE 3B CHRONIC KIDNEY DISEASE (HCC): ICD-10-CM

## 2023-05-31 DIAGNOSIS — E55.9 VITAMIN D DEFICIENCY: ICD-10-CM

## 2023-05-31 DIAGNOSIS — I12.9 HYPERTENSIVE KIDNEY DISEASE WITH STAGE 3B CHRONIC KIDNEY DISEASE (HCC): ICD-10-CM

## 2023-05-31 LAB
25(OH)D3 SERPL-MCNC: 73.9 NG/ML (ref 30–100)
ANION GAP SERPL CALCULATED.3IONS-SCNC: 6 MMOL/L (ref 4–13)
BACTERIA UR QL AUTO: ABNORMAL /HPF
BASOPHILS # BLD AUTO: 0.04 THOUSANDS/ÂΜL (ref 0–0.1)
BASOPHILS NFR BLD AUTO: 0 % (ref 0–1)
BILIRUB UR QL STRIP: NEGATIVE
BUN SERPL-MCNC: 16 MG/DL (ref 5–25)
CALCIUM SERPL-MCNC: 10.1 MG/DL (ref 8.4–10.2)
CHLORIDE SERPL-SCNC: 104 MMOL/L (ref 96–108)
CLARITY UR: ABNORMAL
CO2 SERPL-SCNC: 30 MMOL/L (ref 21–32)
COLOR UR: YELLOW
CREAT SERPL-MCNC: 1.63 MG/DL (ref 0.6–1.3)
CREAT UR-MCNC: 431 MG/DL
EOSINOPHIL # BLD AUTO: 0.11 THOUSAND/ÂΜL (ref 0–0.61)
EOSINOPHIL NFR BLD AUTO: 1 % (ref 0–6)
ERYTHROCYTE [DISTWIDTH] IN BLOOD BY AUTOMATED COUNT: 17.1 % (ref 11.6–15.1)
GFR SERPL CREATININE-BSD FRML MDRD: 39 ML/MIN/1.73SQ M
GLUCOSE P FAST SERPL-MCNC: 103 MG/DL (ref 65–99)
GLUCOSE UR STRIP-MCNC: NEGATIVE MG/DL
HCT VFR BLD AUTO: 45.5 % (ref 36.5–49.3)
HGB BLD-MCNC: 14.2 G/DL (ref 12–17)
HGB UR QL STRIP.AUTO: NEGATIVE
IMM GRANULOCYTES # BLD AUTO: 0.02 THOUSAND/UL (ref 0–0.2)
IMM GRANULOCYTES NFR BLD AUTO: 0 % (ref 0–2)
KETONES UR STRIP-MCNC: NEGATIVE MG/DL
LEUKOCYTE ESTERASE UR QL STRIP: NEGATIVE
LYMPHOCYTES # BLD AUTO: 1.43 THOUSANDS/ÂΜL (ref 0.6–4.47)
LYMPHOCYTES NFR BLD AUTO: 16 % (ref 14–44)
MCH RBC QN AUTO: 23.7 PG (ref 26.8–34.3)
MCHC RBC AUTO-ENTMCNC: 31.2 G/DL (ref 31.4–37.4)
MCV RBC AUTO: 76 FL (ref 82–98)
MICROALBUMIN UR-MCNC: 156 MG/L (ref 0–20)
MICROALBUMIN/CREAT 24H UR: 36 MG/G CREATININE (ref 0–30)
MONOCYTES # BLD AUTO: 0.58 THOUSAND/ÂΜL (ref 0.17–1.22)
MONOCYTES NFR BLD AUTO: 6 % (ref 4–12)
MUCOUS THREADS UR QL AUTO: ABNORMAL
NEUTROPHILS # BLD AUTO: 6.96 THOUSANDS/ÂΜL (ref 1.85–7.62)
NEUTS SEG NFR BLD AUTO: 77 % (ref 43–75)
NITRITE UR QL STRIP: NEGATIVE
NON-SQ EPI CELLS URNS QL MICRO: ABNORMAL /HPF
NRBC BLD AUTO-RTO: 0 /100 WBCS
PH UR STRIP.AUTO: 5.5 [PH]
PHOSPHATE SERPL-MCNC: 2.9 MG/DL (ref 2.3–4.1)
PLATELET # BLD AUTO: 229 THOUSANDS/UL (ref 149–390)
POTASSIUM SERPL-SCNC: 4.5 MMOL/L (ref 3.5–5.3)
PROT UR STRIP-MCNC: ABNORMAL MG/DL
PTH-INTACT SERPL-MCNC: 61.4 PG/ML (ref 12–88)
RBC # BLD AUTO: 5.98 MILLION/UL (ref 3.88–5.62)
RBC #/AREA URNS AUTO: ABNORMAL /HPF
SODIUM SERPL-SCNC: 140 MMOL/L (ref 135–147)
SP GR UR STRIP.AUTO: 1.03 (ref 1–1.03)
URATE SERPL-MCNC: 7 MG/DL (ref 3.5–8.5)
UROBILINOGEN UR STRIP-ACNC: <2 MG/DL
WBC # BLD AUTO: 9.14 THOUSAND/UL (ref 4.31–10.16)
WBC #/AREA URNS AUTO: ABNORMAL /HPF

## 2023-05-31 PROCEDURE — 80048 BASIC METABOLIC PNL TOTAL CA: CPT

## 2023-05-31 PROCEDURE — 81001 URINALYSIS AUTO W/SCOPE: CPT

## 2023-05-31 PROCEDURE — 85025 COMPLETE CBC W/AUTO DIFF WBC: CPT

## 2023-05-31 PROCEDURE — 36415 COLL VENOUS BLD VENIPUNCTURE: CPT

## 2023-05-31 PROCEDURE — 82306 VITAMIN D 25 HYDROXY: CPT

## 2023-05-31 PROCEDURE — 83970 ASSAY OF PARATHORMONE: CPT

## 2023-05-31 PROCEDURE — 84550 ASSAY OF BLOOD/URIC ACID: CPT

## 2023-05-31 PROCEDURE — 84100 ASSAY OF PHOSPHORUS: CPT

## 2023-05-31 PROCEDURE — 82043 UR ALBUMIN QUANTITATIVE: CPT

## 2023-05-31 PROCEDURE — 82570 ASSAY OF URINE CREATININE: CPT

## 2023-06-02 ENCOUNTER — TELEPHONE (OUTPATIENT)
Dept: NEPHROLOGY | Facility: CLINIC | Age: 83
End: 2023-06-02

## 2023-06-02 DIAGNOSIS — M54.16 LUMBAR RADICULOPATHY: ICD-10-CM

## 2023-06-05 ENCOUNTER — OFFICE VISIT (OUTPATIENT)
Dept: NEPHROLOGY | Facility: CLINIC | Age: 83
End: 2023-06-05
Payer: COMMERCIAL

## 2023-06-05 VITALS
HEART RATE: 50 BPM | RESPIRATION RATE: 16 BRPM | TEMPERATURE: 97.3 F | HEIGHT: 71 IN | DIASTOLIC BLOOD PRESSURE: 80 MMHG | BODY MASS INDEX: 34.86 KG/M2 | WEIGHT: 249 LBS | SYSTOLIC BLOOD PRESSURE: 150 MMHG | OXYGEN SATURATION: 91 %

## 2023-06-05 DIAGNOSIS — E55.9 VITAMIN D DEFICIENCY: ICD-10-CM

## 2023-06-05 DIAGNOSIS — N18.32 STAGE 3B CHRONIC KIDNEY DISEASE (HCC): Primary | ICD-10-CM

## 2023-06-05 DIAGNOSIS — N18.32 HYPERTENSIVE KIDNEY DISEASE WITH STAGE 3B CHRONIC KIDNEY DISEASE (HCC): ICD-10-CM

## 2023-06-05 DIAGNOSIS — I12.9 HYPERTENSIVE KIDNEY DISEASE WITH STAGE 3B CHRONIC KIDNEY DISEASE (HCC): ICD-10-CM

## 2023-06-05 PROCEDURE — 99214 OFFICE O/P EST MOD 30 MIN: CPT | Performed by: INTERNAL MEDICINE

## 2023-06-05 RX ORDER — ACETAMINOPHEN AND CODEINE PHOSPHATE 300; 60 MG/1; MG/1
1 TABLET ORAL EVERY 6 HOURS PRN
Qty: 60 TABLET | Refills: 0 | Status: SHIPPED | OUTPATIENT
Start: 2023-06-05

## 2023-06-05 NOTE — PROGRESS NOTES
NEPHROLOGY OFFICE FOLLOW UP  Óscar Pugh [de-identified] y o  male MRN: [id number]    Encounter: [id number] DATE: 6/5/2023    REASON FOR VISIT: Óscar Pugh is a [de-identified] y o  male who is here on 6/5/2023 for further management of chronic kidney disease  HPI:    This is a 74-year-old male with a past medical history of CKD stage 3, hypertension, COPD, returns to Nephrology Clinic for further management of CKD stage 3  Upon review of old medical records,  Patient's new baseline creatinine seems to be fluctuating around 1 6-1 7  Patient's wife was also present at the time of consultation and history was also obtained from her and all the questions were answered  Patient has COPD  Patient currently does not smoke  According to patient his breathing is currently under good control with the use of nebulizers  Patient also uses oxygen 3 liters/minute  Patient has hypertension which seems under well controlled with the use of current antihypertensive medications  Patient checks blood pressure on regular basis at home  Patient has gout and currently taking allopurinol 100 mg PO daily on daily basis  Patient denies any recent gout flare-up  Patient also have chronic back pain and was also been followed by pain management specialist in the past and was using narcotics in the past but now uses as needed Tylenol and also have a back stimulator in place but according to patient's wife, he does not use that frequently  Patient was also found to have vitamin-D deficiency and currently taking cholecalciferol  Patient also have kidney stone and was also seen by Dr Prachi Garza in the past     According to patient he does not use any NSAIDs including Mobic  REVIEW OF SYSTEMS:    Review of Systems   Constitutional: Negative for chills and fever  HENT: Negative for nosebleeds and sore throat  Eyes: Negative for photophobia and pain  Respiratory: Negative for choking and wheezing      Cardiovascular: Negative for chest pain and palpitations  Gastrointestinal: Negative for blood in stool  Genitourinary: Negative for hematuria  Musculoskeletal: Negative for neck stiffness  Skin: Negative for pallor  Neurological: Negative for seizures and syncope  Psychiatric/Behavioral: Negative for confusion and suicidal ideas  PAST MEDICAL HISTORY:  Past Medical History:   Diagnosis Date   • Back pain    • Bronchiectasis ([address] )    • Cataract    • Chronic kidney disease    • COPD (chronic obstructive pulmonary disease) (HCC)    • History of malignant neoplasm of oral cavity     M0   • HL (hearing loss)    • Hypertension    • Kidney stone 2019   • Malignant neoplasm of colon (HCC)     descending   • Prostate cancer (HCC)    • SOB (shortness of breath)    • Tachycardia    • Thalassemia trait    • Tinnitus        PAST SURGICAL HISTORY:  Past Surgical History:   Procedure Laterality Date   • BACK SURGERY     • CHOLECYSTECTOMY     • COLON SURGERY  10/2008    partial colectomy   • EYE SURGERY     • INCISIONAL HERNIA REPAIR     • JOINT REPLACEMENT Right     hip   • TN COLONOSCOPY FLX DX W/COLLJ SPEC WHEN PFRMD N/A 2018    Procedure: COLONOSCOPY;  Surgeon: Juan Daniel Clark MD;  Location: [facility];   Service: Gastroenterology   • PROSTATE SURGERY     • SPINAL CORD STIMULATOR IMPLANT     • TONSILLECTOMY         SOCIAL HISTORY:  Social History     Substance and Sexual Activity   Alcohol Use Not Currently     Social History     Substance and Sexual Activity   Drug Use No     Social History     Tobacco Use   Smoking Status Former   • Packs/day: 0 25   • Years: 15 00   • Total pack years: 3 75   • Types: Cigarettes   • Start date: 58   • Quit date: 2004   • Years since quittin 0   Smokeless Tobacco Never   Tobacco Comments    non smoker/tobacco user; quit  as per [employer]       FAMILY HISTORY:  Family History   Problem Relation Age of Onset   • Heart failure Mother         as per [employer] • Coronary artery disease Mother         as per [employer]   • Diabetes Mother         mellitus - as per [employer]   • Coronary artery disease Father         as per [employer]   • Cancer Sister         malignant neoplasm       ALLERGY:  No Known Allergies    MEDICATIONS:    Current Outpatient Medications:   •  acetaminophen-codeine (TYLENOL with CODEINE #4) 300-60 MG per tablet, Take 1 tablet by mouth every 6 (six) hours as needed for moderate pain, Disp: 60 tablet, Rfl: 0  •  albuterol (2 5 mg/3 mL) 0 083 % nebulizer solution, Take 3 mL (2 5 mg total) by nebulization every 6 (six) hours, Disp: 1080 mL, Rfl: 3  •  albuterol (Ventolin HFA) 90 mcg/act inhaler, Inhale 2 puffs every 6 (six) hours as needed for wheezing or shortness of breath, Disp: 18 g, Rfl: 3  •  allopurinol (ZYLOPRIM) 100 mg tablet, TAKE 1 TABLET DAILY, Disp: 90 tablet, Rfl: 2  •  amLODIPine (NORVASC) 10 mg tablet, Take 1 tablet (10 mg total) by mouth every morning, Disp: 90 tablet, Rfl: 3  •  Budeson-Glycopyrrol-Formoterol (Breztri Aerosphere) 160-9-4 8 MCG/ACT AERO, Inhale 2 puffs 2 (two) times a day Rinse mouth after use , Disp: 31 1 g, Rfl: 3  •  cholecalciferol (VITAMIN D3) 1,000 units tablet, Take 5 tablets (5,000 Units total) by mouth every other day, Disp: , Rfl:   •  losartan (COZAAR) 100 MG tablet, TAKE 1 TABLET DAILY, Disp: 90 tablet, Rfl: 2  •  metoprolol succinate (TOPROL-XL) 25 mg 24 hr tablet, TAKE 1 TABLET DAILY, Disp: 90 tablet, Rfl: 1  •  Polyvinyl Alcohol-Povidone (REFRESH OP), Apply to eye as needed , Disp: , Rfl:   •  prochlorperazine (COMPAZINE) 10 mg tablet, Take 1 tablet (10 mg total) by mouth every 6 (six) hours as needed for nausea or vomiting, Disp: 30 tablet, Rfl: 0  •  Vitamins-Lipotropics (LIPO-FLAVONOID PLUS PO), Take by mouth, Disp: , Rfl:   •  gabapentin (NEURONTIN) 300 mg capsule, every 12 (twelve) hours as needed  (Patient not taking: Reported on 1/24/2023), Disp: , Rfl:     PHYSICAL EXAM:  Vitals:    06/05/23 "1522   BP: 150/80   BP Location: Left arm   Patient Position: Sitting   Cuff Size: Large   Pulse: (!) 50   Resp: 16   Temp: (!) 97 3 °F (36 3 °C)   TempSrc: Temporal   SpO2: 91%   Weight: 113 kg (249 lb)   Height: 5' 11\" (1 803 m)     Body mass index is 34 73 kg/m²  Physical Exam  Constitutional:       General: He is not in acute distress  Comments: Uses nasal oxygen   HENT:      Right Ear: External ear normal    Eyes:      Conjunctiva/sclera:      Right eye: No hemorrhage  Neck:      Thyroid: No thyromegaly  Cardiovascular:      Rate and Rhythm: Normal rate  Pulmonary:      Effort: No accessory muscle usage or respiratory distress  Breath sounds: No wheezing  Abdominal:      General: There is no distension  Musculoskeletal:      Right ankle: Swelling present  Left ankle: Swelling present  Skin:     General: Skin is warm  Coloration: Skin is not jaundiced  Neurological:      Mental Status: He is alert  He is not disoriented  Psychiatric:         Speech: He is communicative  Behavior: Behavior is not combative           LAB RESULTS:  Results for orders placed or performed in visit on 05/31/23   CBC and differential   Result Value Ref Range    WBC 9 14 4 31 - 10 16 Thousand/uL    RBC 5 98 (H) 3 88 - 5 62 Million/uL    Hemoglobin 14 2 12 0 - 17 0 g/dL    Hematocrit 45 5 36 5 - 49 3 %    MCV 76 (L) 82 - 98 fL    MCH 23 7 (L) 26 8 - 34 3 pg    MCHC 31 2 (L) 31 4 - 37 4 g/dL    RDW 17 1 (H) 11 6 - 15 1 %    Platelets 119 018 - 309 Thousands/uL    nRBC 0 /100 WBCs    Neutrophils Relative 77 (H) 43 - 75 %    Immat GRANS % 0 0 - 2 %    Lymphocytes Relative 16 14 - 44 %    Monocytes Relative 6 4 - 12 %    Eosinophils Relative 1 0 - 6 %    Basophils Relative 0 0 - 1 %    Neutrophils Absolute 6 96 1 85 - 7 62 Thousands/µL    Immature Grans Absolute 0 02 0 00 - 0 20 Thousand/uL    Lymphocytes Absolute 1 43 0 60 - 4 47 Thousands/µL    Monocytes Absolute 0 58 0 17 - 1 22 Thousand/µL    " Eosinophils Absolute 0 11 0 00 - 0 61 Thousand/µL    Basophils Absolute 0 04 0 00 - 0 10 Thousands/µL   Basic metabolic panel   Result Value Ref Range    Sodium 140 135 - 147 mmol/L    Potassium 4 5 3 5 - 5 3 mmol/L    Chloride 104 96 - 108 mmol/L    CO2 30 21 - 32 mmol/L    ANION GAP 6 4 - 13 mmol/L    BUN 16 5 - 25 mg/dL    Creatinine 1 63 (H) 0 60 - 1 30 mg/dL    Glucose, Fasting 103 (H) 65 - 99 mg/dL    Calcium 10 1 8 4 - 10 2 mg/dL    eGFR 39 ml/min/1 73sq m   Urinalysis with microscopic   Result Value Ref Range    Color, UA Yellow     Clarity, UA Turbid     Specific [place], UA 1 026 1 003 - 1 030    pH, UA 5 5 4 5, 5 0, 5 5, 6 0, 6 5, 7 0, 7 5, 8 0    Leukocytes, UA Negative Negative    Nitrite, UA Negative Negative    Protein, UA 50 (1+) (A) Negative mg/dl    Glucose, UA Negative Negative mg/dl    Ketones, UA Negative Negative mg/dl    Urobilinogen, UA <2 0 <2 0 mg/dl mg/dl    Bilirubin, UA Negative Negative    Occult Blood, UA Negative Negative    RBC, UA 2-4 (A) None Seen, 1-2 /hpf    WBC, UA 4-10 (A) None Seen, 1-2 /hpf    Epithelial Cells Occasional None Seen, Occasional /hpf    Bacteria, UA None Seen None Seen, Occasional /hpf    MUCUS THREADS Innumerable (A) None Seen   Microalbumin / creatinine urine ratio   Result Value Ref Range    Creatinine, Ur 431 0 mg/dL    Albumin,U,Random 156 0 (H) 0 0 - 20 0 mg/L    Albumin Creat Ratio 36 (H) 0 - 30 mg/g creatinine   Phosphorus   Result Value Ref Range    Phosphorus 2 9 2 3 - 4 1 mg/dL   Uric acid   Result Value Ref Range    Uric Acid 7 0 3 5 - 8 5 mg/dL   PTH, intact   Result Value Ref Range    PTH 61 4 12 0 - 88 0 pg/mL   Vitamin D 25 hydroxy   Result Value Ref Range    Vit D, 25-Hydroxy 73 9 30 0 - 100 0 ng/mL       ASSESSMENT and PLAN:  Rocio Alegria was seen today for chronic kidney disease and follow-up      Diagnoses and all orders for this visit:    Stage 3b chronic kidney disease ([facility] [address] )  -     CBC and differential; Future  -     Basic metabolic panel; Future  -     Urinalysis with microscopic; Future  -     Albumin / creatinine urine ratio; Future  -     Phosphorus; Future  -     Uric acid; Future  -     PTH, intact; Future  -     Vitamin D 25 hydroxy; Future    Hypertensive kidney disease with stage 3b chronic kidney disease ([facility] [address] )  -     Basic metabolic panel; Future  -     Urinalysis with microscopic; Future  -     Albumin / creatinine urine ratio; Future    Vitamin D deficiency  -     Vitamin D 25 hydroxy; Future      1  CKD stage 3  Multifactorial and was suspected due to underlying hypertensive nephrosclerosis  Upon review of old medical records, new baseline creatinine seems to be fluctuating around 1 6-1 7 and in the interim renal function has remained at baseline with current creatinine of 1 63 with EGFR of 39  Clinically not in volume overload state and advised patient to drink around 2 L of fluid on daily basis to ensure staying well-hydrated  Patient has underlying COPD and uses nasal oxygen 3 L/min continuously and according to patient his breathing is currently stable  Management of hypertension as mentioned below  Plan to avoid NSAIDs including Mobic/meloxicam and recheck renal function before next visit  2   Hypertension in chronic kidney disease  Today's blood pressure was on the higher side but I have personally reviewed patient's home log of blood pressure reading suggesting hypertension is under well control at home and for time being monitor hypertension with amlodipine 10 mg PO daily, metoprolol XL 25 mg PO daily and losartan 100 mg PO daily  Also advised patient to follow low-salt diet   3  Vitamin-D deficiency  Patient is currently taking cholecalciferol 5000 Units every other day  Current vitamin D level is 73 which is at goal and plan to continue cholecalciferol at current dose and recheck vitamin D level with the next visit  Returns to Nephrology Clinic in 6 months  Future labs ordered        Portions of the "record may have been created with voice recognition software  Occasional wrong word or \"sound a like\" substitutions may have occurred due to the inherent limitations of voice recognition software  Read the chart carefully and recognize, using context, where substitutions have occurred  If you have any questions, please contact the dictating provider     "

## 2023-07-19 ENCOUNTER — APPOINTMENT (OUTPATIENT)
Dept: LAB | Facility: CLINIC | Age: 83
End: 2023-07-19
Payer: COMMERCIAL

## 2023-07-19 DIAGNOSIS — N18.32 TYPE 2 DIABETES MELLITUS WITH STAGE 3B CHRONIC KIDNEY DISEASE, WITHOUT LONG-TERM CURRENT USE OF INSULIN (HCC): ICD-10-CM

## 2023-07-19 DIAGNOSIS — R97.0 ELEVATED CEA: ICD-10-CM

## 2023-07-19 DIAGNOSIS — E11.22 TYPE 2 DIABETES MELLITUS WITH STAGE 3B CHRONIC KIDNEY DISEASE, WITHOUT LONG-TERM CURRENT USE OF INSULIN (HCC): ICD-10-CM

## 2023-07-19 DIAGNOSIS — Z85.038 HISTORY OF COLON CANCER: ICD-10-CM

## 2023-07-19 LAB
ALBUMIN SERPL BCP-MCNC: 3.9 G/DL (ref 3.5–5)
ALP SERPL-CCNC: 100 U/L (ref 46–116)
ALT SERPL W P-5'-P-CCNC: 25 U/L (ref 12–78)
ANION GAP SERPL CALCULATED.3IONS-SCNC: 6 MMOL/L
AST SERPL W P-5'-P-CCNC: 47 U/L (ref 5–45)
BASOPHILS # BLD AUTO: 0.03 THOUSANDS/ÂΜL (ref 0–0.1)
BASOPHILS NFR BLD AUTO: 0 % (ref 0–1)
BILIRUB SERPL-MCNC: 1.78 MG/DL (ref 0.2–1)
BUN SERPL-MCNC: 12 MG/DL (ref 5–25)
CALCIUM SERPL-MCNC: 9 MG/DL (ref 8.3–10.1)
CEA SERPL-MCNC: 3.6 NG/ML (ref 0–3)
CHLORIDE SERPL-SCNC: 109 MMOL/L (ref 96–108)
CHOLEST SERPL-MCNC: 104 MG/DL
CO2 SERPL-SCNC: 26 MMOL/L (ref 21–32)
CREAT SERPL-MCNC: 1.75 MG/DL (ref 0.6–1.3)
EOSINOPHIL # BLD AUTO: 0.11 THOUSAND/ÂΜL (ref 0–0.61)
EOSINOPHIL NFR BLD AUTO: 1 % (ref 0–6)
ERYTHROCYTE [DISTWIDTH] IN BLOOD BY AUTOMATED COUNT: 18.9 % (ref 11.6–15.1)
EST. AVERAGE GLUCOSE BLD GHB EST-MCNC: 114 MG/DL
GFR SERPL CREATININE-BSD FRML MDRD: 35 ML/MIN/1.73SQ M
GLUCOSE P FAST SERPL-MCNC: 119 MG/DL (ref 65–99)
HBA1C MFR BLD: 5.6 %
HCT VFR BLD AUTO: 46.2 % (ref 36.5–49.3)
HDLC SERPL-MCNC: 48 MG/DL
HGB BLD-MCNC: 13.6 G/DL (ref 12–17)
IMM GRANULOCYTES # BLD AUTO: 0.02 THOUSAND/UL (ref 0–0.2)
IMM GRANULOCYTES NFR BLD AUTO: 0 % (ref 0–2)
LDLC SERPL CALC-MCNC: 42 MG/DL (ref 0–100)
LYMPHOCYTES # BLD AUTO: 1.11 THOUSANDS/ÂΜL (ref 0.6–4.47)
LYMPHOCYTES NFR BLD AUTO: 14 % (ref 14–44)
MCH RBC QN AUTO: 23.3 PG (ref 26.8–34.3)
MCHC RBC AUTO-ENTMCNC: 29.4 G/DL (ref 31.4–37.4)
MCV RBC AUTO: 79 FL (ref 82–98)
MONOCYTES # BLD AUTO: 0.5 THOUSAND/ÂΜL (ref 0.17–1.22)
MONOCYTES NFR BLD AUTO: 6 % (ref 4–12)
NEUTROPHILS # BLD AUTO: 6.37 THOUSANDS/ÂΜL (ref 1.85–7.62)
NEUTS SEG NFR BLD AUTO: 79 % (ref 43–75)
NONHDLC SERPL-MCNC: 56 MG/DL
NRBC BLD AUTO-RTO: 0 /100 WBCS
PLATELET # BLD AUTO: 209 THOUSANDS/UL (ref 149–390)
POTASSIUM SERPL-SCNC: 4 MMOL/L (ref 3.5–5.3)
PROT SERPL-MCNC: 8.3 G/DL (ref 6.4–8.4)
RBC # BLD AUTO: 5.83 MILLION/UL (ref 3.88–5.62)
SODIUM SERPL-SCNC: 141 MMOL/L (ref 135–147)
TRIGL SERPL-MCNC: 72 MG/DL
TSH SERPL DL<=0.05 MIU/L-ACNC: 1.44 UIU/ML (ref 0.45–4.5)
WBC # BLD AUTO: 8.14 THOUSAND/UL (ref 4.31–10.16)

## 2023-07-19 PROCEDURE — 82378 CARCINOEMBRYONIC ANTIGEN: CPT

## 2023-07-19 PROCEDURE — 36415 COLL VENOUS BLD VENIPUNCTURE: CPT

## 2023-07-19 PROCEDURE — 84443 ASSAY THYROID STIM HORMONE: CPT

## 2023-07-19 PROCEDURE — 80053 COMPREHEN METABOLIC PANEL: CPT

## 2023-07-19 PROCEDURE — 80061 LIPID PANEL: CPT

## 2023-07-19 PROCEDURE — 85025 COMPLETE CBC W/AUTO DIFF WBC: CPT

## 2023-07-19 PROCEDURE — 83036 HEMOGLOBIN GLYCOSYLATED A1C: CPT

## 2023-07-20 ENCOUNTER — RA CDI HCC (OUTPATIENT)
Dept: OTHER | Facility: HOSPITAL | Age: 83
End: 2023-07-20

## 2023-07-20 DIAGNOSIS — M10.9 GOUT, UNSPECIFIED CAUSE, UNSPECIFIED CHRONICITY, UNSPECIFIED SITE: ICD-10-CM

## 2023-07-20 NOTE — PROGRESS NOTES
[address] [facility] coding opportunities          Chart Reviewed number of suggestions sent to Provider: 2   I73.9 - see podiatry exam assessment  E11.51    Patients Insurance     Medicare Insurance: [id number]

## 2023-07-21 RX ORDER — ALLOPURINOL 100 MG/1
TABLET ORAL
Qty: 90 TABLET | Refills: 2 | Status: SHIPPED | OUTPATIENT
Start: 2023-07-21

## 2023-07-25 ENCOUNTER — OFFICE VISIT (OUTPATIENT)
Age: 83
End: 2023-07-25
Payer: COMMERCIAL

## 2023-07-25 VITALS
SYSTOLIC BLOOD PRESSURE: 138 MMHG | WEIGHT: 245 LBS | OXYGEN SATURATION: 96 % | HEART RATE: 50 BPM | HEIGHT: 71 IN | DIASTOLIC BLOOD PRESSURE: 76 MMHG | BODY MASS INDEX: 34.3 KG/M2

## 2023-07-25 DIAGNOSIS — N18.32 STAGE 3B CHRONIC KIDNEY DISEASE (HCC): ICD-10-CM

## 2023-07-25 DIAGNOSIS — C61 PROSTATE CANCER (HCC): ICD-10-CM

## 2023-07-25 DIAGNOSIS — M54.16 LUMBAR RADICULOPATHY: ICD-10-CM

## 2023-07-25 DIAGNOSIS — I10 PRIMARY HYPERTENSION: ICD-10-CM

## 2023-07-25 DIAGNOSIS — J96.11 CHRONIC HYPOXEMIC RESPIRATORY FAILURE (HCC): ICD-10-CM

## 2023-07-25 DIAGNOSIS — Z85.038 HISTORY OF COLON CANCER: ICD-10-CM

## 2023-07-25 DIAGNOSIS — E11.22 TYPE 2 DIABETES MELLITUS WITH STAGE 3B CHRONIC KIDNEY DISEASE, WITHOUT LONG-TERM CURRENT USE OF INSULIN (HCC): Primary | ICD-10-CM

## 2023-07-25 DIAGNOSIS — N18.32 TYPE 2 DIABETES MELLITUS WITH STAGE 3B CHRONIC KIDNEY DISEASE, WITHOUT LONG-TERM CURRENT USE OF INSULIN (HCC): Primary | ICD-10-CM

## 2023-07-25 DIAGNOSIS — I12.9 HYPERTENSIVE KIDNEY DISEASE WITH STAGE 3B CHRONIC KIDNEY DISEASE (HCC): ICD-10-CM

## 2023-07-25 DIAGNOSIS — N18.32 HYPERTENSIVE KIDNEY DISEASE WITH STAGE 3B CHRONIC KIDNEY DISEASE (HCC): ICD-10-CM

## 2023-07-25 DIAGNOSIS — F11.20 CONTINUOUS OPIOID DEPENDENCE (HCC): ICD-10-CM

## 2023-07-25 DIAGNOSIS — J43.2 CENTRILOBULAR EMPHYSEMA (HCC): ICD-10-CM

## 2023-07-25 DIAGNOSIS — E66.01 OBESITY, MORBID (HCC): ICD-10-CM

## 2023-07-25 DIAGNOSIS — J44.9 COPD, SEVERE (HCC): ICD-10-CM

## 2023-07-25 LAB
LEFT EYE DIABETIC RETINOPATHY: ABNORMAL
LEFT EYE IMAGE QUALITY: ABNORMAL
LEFT EYE MACULAR EDEMA: ABNORMAL
LEFT EYE OTHER RETINOPATHY: ABNORMAL
RIGHT EYE DIABETIC RETINOPATHY: ABNORMAL
RIGHT EYE IMAGE QUALITY: ABNORMAL
RIGHT EYE MACULAR EDEMA: ABNORMAL
RIGHT EYE OTHER RETINOPATHY: ABNORMAL
SEVERITY (EYE EXAM): ABNORMAL

## 2023-07-25 PROCEDURE — 92250 FUNDUS PHOTOGRAPHY W/I&R: CPT | Performed by: INTERNAL MEDICINE

## 2023-07-25 PROCEDURE — 99214 OFFICE O/P EST MOD 30 MIN: CPT | Performed by: INTERNAL MEDICINE

## 2023-07-25 NOTE — PATIENT INSTRUCTIONS
Lab data reviewed in detail and compared to prior    Diabetes in remission with now normal A1c, continue with excellent dietary modification. COPD with chronic hypoxemic respiratory failure stable with current bronchodilators and supplemental oxygen    CKD 3B remains stable following with nephrology    History of prostate cancer following with urology    History of colon cancer-resected approximately 20 years ago. CEA slightly elevated. Repeat in 6 months. Pulmonary nodules-all 5 mm or less, stable since 2017    Thoracic aortic aneurysm stable at 41 mm, continue yearly follow-up    Gout stable on allopurinol    Routine follow-up after labs in 6 months, sooner as needed.

## 2023-07-25 NOTE — PROGRESS NOTES
Assessment/Plan:    Diagnoses and all orders for this visit:    Type 2 diabetes mellitus with stage 3b chronic kidney disease, without long-term current use of insulin (HCC)  -     IRIS Diabetic eye exam  -     CBC and differential; Future  -     Comprehensive metabolic panel; Future  -     Lipid panel; Future  -     Hemoglobin A1C; Future  -     TSH, 3rd generation with Free T4 reflex; Future    COPD, severe ([address])    Chronic hypoxemic respiratory failure (HCC)    Centrilobular emphysema (HCC)    Primary hypertension    Lumbar radiculopathy    Stage 3b chronic kidney disease (HCC)    Prostate cancer ([address])    Hypertensive kidney disease with stage 3b chronic kidney disease (HCC)    Obesity, morbid ([address])    History of colon cancer  -     CEA; Future    Continuous opioid dependence [facility])              Patient Instructions   Lab data reviewed in detail and compared to prior    Diabetes in remission with now normal A1c, continue with excellent dietary modification. COPD with chronic hypoxemic respiratory failure stable with current bronchodilators and supplemental oxygen    CKD 3B remains stable following with nephrology    History of prostate cancer following with urology    History of colon cancer-resected approximately 20 years ago. CEA slightly elevated. Repeat in 6 months. Pulmonary nodules-all 5 mm or less, stable since 2017    Thoracic aortic aneurysm stable at 41 mm, continue yearly follow-up    Gout stable on allopurinol    Routine follow-up after labs in 6 months, sooner as needed. Subjective:      Patient ID: Helen Whitaker is a 82 y.o. male    F/u mmp, review labs, here w/ Linell Pill who helps w/ hx.  Lumbar radiculopathy-s/p SCC and rhizotomy, had been f/b PM, but exhausted options, using T4's prior to any activity, not daily. COPD-fairly stable, back on advair and incruise. He is less compliant w/ continuous O2 at 2lpm at home, but always when he goes out.    Pulm nodules-largest 5 mm, stable '17-'23  Ascending aortic aneurysm-4.1 cm in '19, stable 2/23  DM-not really following diet, no regular exercise  HTN-taking rx as directed, home bp's daily have all been nl b/t 120-140/60-70's. CKD-f/b neph, keeping hydrated. Gout-taking allopurinol, no recent flares  H/o prostate ca s/p prostatectomy '05, psa undetectable in May, f/b urology  H/o colon CA-no recent f/u  H/o kidney stones, neg KUB in May  He notes balance is stable, no falls. Walking without assistive device.         Current Outpatient Medications:   •  acetaminophen-codeine (TYLENOL with CODEINE #4) 300-60 MG per tablet, Take 1 tablet by mouth every 6 (six) hours as needed for moderate pain, Disp: 60 tablet, Rfl: 0  •  albuterol (2.5 mg/3 mL) 0.083 % nebulizer solution, Take 3 mL (2.5 mg total) by nebulization every 6 (six) hours, Disp: 1080 mL, Rfl: 3  •  albuterol (Ventolin HFA) 90 mcg/act inhaler, Inhale 2 puffs every 6 (six) hours as needed for wheezing or shortness of breath, Disp: 18 g, Rfl: 3  •  allopurinol (ZYLOPRIM) 100 mg tablet, TAKE 1 TABLET DAILY, Disp: 90 tablet, Rfl: 2  •  amLODIPine (NORVASC) 10 mg tablet, Take 1 tablet (10 mg total) by mouth every morning, Disp: 90 tablet, Rfl: 3  •  Budeson-Glycopyrrol-Formoterol (Breztri Aerosphere) 160-9-4.8 MCG/ACT AERO, Inhale 2 puffs 2 (two) times a day Rinse mouth after use., Disp: 31.1 g, Rfl: 3  •  cholecalciferol (VITAMIN D3) 1,000 units tablet, Take 5 tablets (5,000 Units total) by mouth every other day, Disp: , Rfl:   •  losartan (COZAAR) 100 MG tablet, TAKE 1 TABLET DAILY, Disp: 90 tablet, Rfl: 2  •  metoprolol succinate (TOPROL-XL) 25 mg 24 hr tablet, TAKE 1 TABLET DAILY, Disp: 90 tablet, Rfl: 1  •  Polyvinyl Alcohol-Povidone (REFRESH OP), Apply to eye as needed , Disp: , Rfl:   •  prochlorperazine (COMPAZINE) 10 mg tablet, Take 1 tablet (10 mg total) by mouth every 6 (six) hours as needed for nausea or vomiting, Disp: 30 tablet, Rfl: 0  •  Vitamins-Lipotropics (LIPO-FLAVONOID PLUS PO), Take by mouth, Disp: , Rfl:   •  gabapentin (NEURONTIN) 300 mg capsule, every 12 (twelve) hours as needed  (Patient not taking: Reported on 1/24/2023), Disp: , Rfl:     Recent Results (from the past 1008 hour(s))   CBC and differential    Collection Time: 07/19/23  2:35 PM   Result Value Ref Range    WBC 8.14 4.31 - 10.16 Thousand/uL    RBC 5.83 (H) 3.88 - 5.62 Million/uL    Hemoglobin 13.6 12.0 - 17.0 g/dL    Hematocrit 46.2 36.5 - 49.3 %    MCV 79 (L) 82 - 98 fL    MCH 23.3 (L) 26.8 - 34.3 pg    MCHC 29.4 (L) 31.4 - 37.4 g/dL    RDW 18.9 (H) 11.6 - 15.1 %    Platelets 441 070 - 051 Thousands/uL    nRBC 0 /100 WBCs    Neutrophils Relative 79 (H) 43 - 75 %    Immat GRANS % 0 0 - 2 %    Lymphocytes Relative 14 14 - 44 %    Monocytes Relative 6 4 - 12 %    Eosinophils Relative 1 0 - 6 %    Basophils Relative 0 0 - 1 %    Neutrophils Absolute 6.37 1.85 - 7.62 Thousands/µL    Immature Grans Absolute 0.02 0.00 - 0.20 Thousand/uL    Lymphocytes Absolute 1.11 0.60 - 4.47 Thousands/µL    Monocytes Absolute 0.50 0.17 - 1.22 Thousand/µL    Eosinophils Absolute 0.11 0.00 - 0.61 Thousand/µL    Basophils Absolute 0.03 0.00 - 0.10 Thousands/µL   Comprehensive metabolic panel    Collection Time: 07/19/23  2:35 PM   Result Value Ref Range    Sodium 141 135 - 147 mmol/L    Potassium 4.0 3.5 - 5.3 mmol/L    Chloride 109 (H) 96 - 108 mmol/L    CO2 26 21 - 32 mmol/L    ANION GAP 6 mmol/L    BUN 12 5 - 25 mg/dL    Creatinine 1.75 (H) 0.60 - 1.30 mg/dL    Glucose, Fasting 119 (H) 65 - 99 mg/dL    Calcium 9.0 8.3 - 10.1 mg/dL    AST 47 (H) 5 - 45 U/L    ALT 25 12 - 78 U/L    Alkaline Phosphatase 100 46 - 116 U/L    Total Protein 8.3 6.4 - 8.4 g/dL    Albumin 3.9 3.5 - 5.0 g/dL    Total Bilirubin 1.78 (H) 0.20 - 1.00 mg/dL    eGFR 35 ml/min/1.73sq m   Lipid panel    Collection Time: 07/19/23  2:35 PM   Result Value Ref Range    Cholesterol 104 See Comment mg/dL    Triglycerides 72 See Comment mg/dL    HDL, Direct 48 >=40 mg/dL LDL Calculated 42 0 - 100 mg/dL    Non-HDL-Chol (CHOL-HDL) 56 mg/dl   Hemoglobin A1C    Collection Time: 07/19/23  2:35 PM   Result Value Ref Range    Hemoglobin A1C 5.6 Normal 3.8-5.6%; PreDiabetic 5.7-6.4%; Diabetic >=6.5%; Glycemic control for adults with diabetes <7.0% %     mg/dl   TSH, 3rd generation with Free T4 reflex    Collection Time: 07/19/23  2:35 PM   Result Value Ref Range    TSH 3RD GENERATON 1.444 0.450 - 4.500 uIU/mL   CEA    Collection Time: 07/19/23  2:35 PM   Result Value Ref Range    CEA 3.6 (H) 0.0 - 3.0 ng/mL       The following portions of the patient's history were reviewed and updated as appropriate: allergies, current medications, past family history, past medical history, past social history, past surgical history and problem list.     Review of Systems   Constitutional: Negative for appetite change, chills, diaphoresis, fatigue, fever and unexpected weight change. HENT: Negative for congestion, hearing loss and rhinorrhea. Eyes: Negative for visual disturbance. Respiratory: Positive for shortness of breath. Negative for cough, chest tightness and wheezing. Cardiovascular: Negative for chest pain, palpitations and leg swelling. Gastrointestinal: Negative for abdominal pain and blood in stool. Endocrine: Negative for cold intolerance, heat intolerance, polydipsia and polyuria. Genitourinary: Negative for difficulty urinating, dysuria, frequency and urgency. Musculoskeletal: Positive for back pain. Negative for arthralgias and myalgias. Skin: Negative for rash. Neurological: Negative for dizziness, weakness, light-headedness and headaches. Hematological: Does not bruise/bleed easily. Psychiatric/Behavioral: Negative for dysphoric mood and sleep disturbance. Objective:      Vitals:    07/25/23 1243   BP: 138/76   Pulse: (!) 50   SpO2: 96%          Physical Exam  Constitutional:       Appearance: He is well-developed.    HENT:      Head: Normocephalic and atraumatic. Nose: Nose normal.   Eyes:      General: No scleral icterus. Conjunctiva/sclera: Conjunctivae normal.      Pupils: Pupils are equal, round, and reactive to light. Neck:      Thyroid: No thyromegaly. Vascular: No JVD. Trachea: No tracheal deviation. Cardiovascular:      Rate and Rhythm: Normal rate and regular rhythm. Pulses: no weak pulses          Dorsalis pedis pulses are 1+ on the right side and 1+ on the left side. Posterior tibial pulses are 1+ on the right side and 1+ on the left side. Heart sounds: No murmur heard. No friction rub. No gallop. Pulmonary:      Effort: Pulmonary effort is normal. No respiratory distress. Breath sounds: Normal breath sounds. No wheezing or rales. Musculoskeletal:         General: No deformity. Cervical back: Normal range of motion and neck supple. Feet:      Right foot:      Skin integrity: No ulcer, skin breakdown, erythema, warmth, callus or dry skin. Left foot:      Skin integrity: No ulcer, skin breakdown, erythema, warmth, callus or dry skin. Lymphadenopathy:      Cervical: No cervical adenopathy. Skin:     General: Skin is warm and dry. Coloration: Skin is not pale. Findings: No erythema or rash. Neurological:      Mental Status: He is alert and oriented to person, place, and time. Cranial Nerves: No cranial nerve deficit. Psychiatric:         Behavior: Behavior normal.         Thought Content: Thought content normal.         Judgment: Judgment normal.     Diabetic Foot Exam    Patient's shoes and socks removed. Right Foot/Ankle   Right Foot Inspection  Skin Exam: skin normal and skin intact. No dry skin, no warmth, no callus, no erythema, no maceration, no abnormal color, no pre-ulcer, no ulcer and no callus. Toe Exam: ROM and strength within normal limits.      Sensory   Proprioception: intact  Monofilament testing: intact    Vascular  Capillary refills: < 3 seconds  The right DP pulse is 1+. The right PT pulse is 1+. Left Foot/Ankle  Left Foot Inspection  Skin Exam: skin normal and skin intact. No dry skin, no warmth, no erythema, no maceration, normal color, no pre-ulcer, no ulcer and no callus. Toe Exam: ROM and strength within normal limits. Sensory   Proprioception: intact  Monofilament testing: diminished    Vascular  Capillary refills: < 3 seconds  The left DP pulse is 1+. The left PT pulse is 1+.      Assign Risk Category  No deformity present  No loss of protective sensation  No weak pulses  Risk: 1

## 2023-07-26 ENCOUNTER — TELEPHONE (OUTPATIENT)
Age: 83
End: 2023-07-26

## 2023-07-26 ENCOUNTER — TELEPHONE (OUTPATIENT)
Dept: NEPHROLOGY | Facility: CLINIC | Age: 83
End: 2023-07-26

## 2023-07-26 NOTE — TELEPHONE ENCOUNTER
----- Message from Caelb Santana MD sent at 7/26/2023 12:18 PM EDT -----  IRIS was abnl. Please advise him to see retina specialist within 3 mos.

## 2023-07-26 NOTE — TELEPHONE ENCOUNTER
I called and left a message on machine for patient to return our call about rescheduling is 12/6/2023 nephrology follow up appointment with Dr. Nicole Olea, because of Dr. Nicole Olea on hospital call.

## 2023-08-09 ENCOUNTER — OFFICE VISIT (OUTPATIENT)
Age: 83
End: 2023-08-09
Payer: COMMERCIAL

## 2023-08-09 VITALS
HEIGHT: 73 IN | DIASTOLIC BLOOD PRESSURE: 79 MMHG | WEIGHT: 245 LBS | SYSTOLIC BLOOD PRESSURE: 138 MMHG | RESPIRATION RATE: 16 BRPM | OXYGEN SATURATION: 91 % | HEART RATE: 62 BPM | BODY MASS INDEX: 32.47 KG/M2

## 2023-08-09 DIAGNOSIS — F17.211 NICOTINE DEPENDENCE, CIGARETTES, IN REMISSION: ICD-10-CM

## 2023-08-09 DIAGNOSIS — J96.11 CHRONIC HYPOXEMIC RESPIRATORY FAILURE (HCC): ICD-10-CM

## 2023-08-09 DIAGNOSIS — J44.9 COPD, SEVERE (HCC): Primary | ICD-10-CM

## 2023-08-09 PROCEDURE — 99214 OFFICE O/P EST MOD 30 MIN: CPT

## 2023-08-09 NOTE — PROGRESS NOTES
Pulmonary Follow Up Note  Britton Palacios 82 y.o. male MRN: [id number]  8/9/2023    Assessment:    COPD, severe ([address])  Patient symptoms improved and currently stable on the [facility] inhaler. He will continue Breztri 2 puffs twice per day, albuterol HFA/nebulizer every 6 hours as needed for shortness of breath or wheezing. Had patient demonstrate Breztri inhaler use in the office. He had good technique. I also provided patient with spacer device should he need. Reminded patient to rinse mouth after use. Referred to pulmonary rehab at last office visit, however did not start. Patient encouraged to contact [facility] pulmonary rehab for appointment. Will follow-up with patient in 3 months. Chronic hypoxemic respiratory failure (HCC)  Secondary to severe COPD, obesity, and deconditioning. He will continue wearing 3 L oxygen during all hours of sleep, and as needed to maintain SpO2 above 88%. Encouraged him to start pulmonary rehab. Plan:    Diagnoses and all orders for this visit:    COPD, severe ([address])    Chronic hypoxemic respiratory failure (HCC)    Nicotine dependence, cigarettes, in remission            Return in about 3 months (around 11/9/2023). History of Present Illness     Chief Complaint:   Chief Complaint   Patient presents with   • Follow-up       Patient ID: Akosua Knapp is a 82 y.o. y.o. male has a past medical history of Back pain, Bronchiectasis ([address]), Cataract, Chronic kidney disease, COPD (chronic obstructive pulmonary disease) ([address]), History of malignant neoplasm of oral cavity, HL (hearing loss), Hypertension, Kidney stone (05/2019), Malignant neoplasm of colon ([address]), Prostate cancer ([address]), SOB (shortness of breath), Tachycardia, Thalassemia trait, and Tinnitus. 8/9/2023  HPI: Britton Palacios is a 82 y.o. male The past medical history-quit 18 years ago, severe CAD, chronic respiratory failure on prostate cancer. He is here today for 3-month follow-up visit.   He was not noticing any benefit from his Advair and Incruse inhaler. He was switched to [facility] and provided samples. Also referred to pulmonary rehab. Patient states he is doing well. Symptoms are stable and he prefers the [facility] inhaler given at his last office visit. Still has shortness of breath with stairs. Denies any significant cough, mucus production, chest tightness/chest pain. Occasional wheezing. He is wearing his oxygen 3 L every night, and as needed during the day with exertion to maintain SpO2 above 88%. Not requiring frequent use of his albuterol. Uses nebulizer at night, and on occasion uses inhaler if symptoms severe. He has not yet started pulmonary rehab seeing [facility]'s location is too far. Review of Systems   Constitutional: Negative for appetite change and fever. HENT: Positive for sneezing. Negative for ear pain, postnasal drip, rhinorrhea, sore throat and trouble swallowing. Respiratory: Positive for shortness of breath and wheezing. Cardiovascular: Negative for chest pain. Musculoskeletal: Negative for myalgias. Neurological: Negative for headaches.        Historical Information   Past Medical History:   Diagnosis Date   • Back pain    • Bronchiectasis ([address] [facility])    • Cataract    • Chronic kidney disease    • COPD (chronic obstructive pulmonary disease) (HCC)    • History of malignant neoplasm of oral cavity     M0   • HL (hearing loss)    • Hypertension    • Kidney stone 05/2019   • Malignant neoplasm of colon (HCC)     descending   • Prostate cancer ([address])    • SOB (shortness of breath)    • Tachycardia    • Thalassemia trait    • Tinnitus      Past Surgical History:   Procedure Laterality Date   • BACK SURGERY     • CHOLECYSTECTOMY     • COLON SURGERY  10/2008    partial colectomy   • EYE SURGERY     • INCISIONAL HERNIA REPAIR     • JOINT REPLACEMENT Right     hip   • [place] COLONOSCOPY FLX DX W/COLLJ SPEC WHEN PFRMD N/A 07/23/2018    Procedure: COLONOSCOPY;  Surgeon: Zackary Alonzo MD;  Location: [facility]; Service: Gastroenterology   • PROSTATE SURGERY     • SPINAL CORD STIMULATOR IMPLANT     • TONSILLECTOMY       Family History   Problem Relation Age of Onset   • Heart failure Mother         as per [employer]   • Coronary artery disease Mother         as per [employer]   • Diabetes Mother         mellitus - as per [employer]   • Coronary artery disease Father         as per [employer]   • Cancer Sister         malignant neoplasm       Smoking history: He reports that he quit smoking about 19 years ago. His smoking use included cigarettes. He started smoking about 63 years ago. He has a 3.75 pack-year smoking history.  He has never used smokeless tobacco.    Occupational History:     Immunization History   Administered Date(s) Administered   • COVID-19 MODERNA VACC 0.5 ML IM 01/26/2021, 02/22/2021, 10/27/2021   • INFLUENZA 10/26/2012, 10/22/2013, 12/01/2015, 10/07/2016, 09/18/2018, 11/19/2019, 10/04/2021   • Influenza Split High Dose Preservative Free IM 10/07/2014, 10/01/2017   • Influenza, high dose seasonal 0.7 mL 10/08/2020   • Influenza, seasonal, injectable 10/26/2012, 10/22/2013, 12/01/2015, 09/03/2016   • Pneumococcal Conjugate 13-Valent 08/25/2015, 08/25/2015   • Pneumococcal Polysaccharide PPV23 01/03/2017   • Tdap 07/01/2011   • Zoster 1940   • Zoster Vaccine Recombinant 12/18/2018, 06/04/2019       Meds/Allergies     Current Outpatient Medications:   •  acetaminophen-codeine (TYLENOL with CODEINE #4) 300-60 MG per tablet, Take 1 tablet by mouth every 6 (six) hours as needed for moderate pain, Disp: 60 tablet, Rfl: 0  •  albuterol (2.5 mg/3 mL) 0.083 % nebulizer solution, Take 3 mL (2.5 mg total) by nebulization every 6 (six) hours, Disp: 1080 mL, Rfl: 3  •  albuterol (Ventolin HFA) 90 mcg/act inhaler, Inhale 2 puffs every 6 (six) hours as needed for wheezing or shortness of breath, Disp: 18 g, Rfl: 3  •  allopurinol (ZYLOPRIM) 100 mg tablet, TAKE 1 TABLET DAILY, Disp: 90 tablet, Rfl: 2  •  amLODIPine (NORVASC) 10 mg tablet, Take 1 tablet (10 mg total) by mouth every morning, Disp: 90 tablet, Rfl: 3  •  Budeson-Glycopyrrol-Formoterol (Breztri Aerosphere) 160-9-4.8 MCG/ACT AERO, Inhale 2 puffs 2 (two) times a day Rinse mouth after use., Disp: 31.1 g, Rfl: 3  •  cholecalciferol (VITAMIN D3) 1,000 units tablet, Take 5 tablets (5,000 Units total) by mouth every other day, Disp: , Rfl:   •  losartan (COZAAR) 100 MG tablet, TAKE 1 TABLET DAILY, Disp: 90 tablet, Rfl: 2  •  metoprolol succinate (TOPROL-XL) 25 mg 24 hr tablet, TAKE 1 TABLET DAILY, Disp: 90 tablet, Rfl: 1  •  Polyvinyl Alcohol-Povidone (REFRESH OP), Apply to eye as needed , Disp: , Rfl:   •  prochlorperazine (COMPAZINE) 10 mg tablet, Take 1 tablet (10 mg total) by mouth every 6 (six) hours as needed for nausea or vomiting, Disp: 30 tablet, Rfl: 0  •  Vitamins-Lipotropics (LIPO-FLAVONOID PLUS PO), Take by mouth, Disp: , Rfl:   •  gabapentin (NEURONTIN) 300 mg capsule, every 12 (twelve) hours as needed  (Patient not taking: Reported on 1/24/2023), Disp: , Rfl:   Allergies: No Known Allergies      Vitals:  Vitals:    08/09/23 1404 08/09/23 1406   BP: 138/79    BP Location: Right arm    Patient Position: Sitting    Cuff Size: Large    Pulse: 62    Resp: 16    SpO2: 91% 91%   Weight: 111 kg (245 lb)    Height: 6' 1" (1.854 m)    Oxygen Therapy  SpO2: 91 %  Oxygen Therapy: Supplemental oxygen  . Wt Readings from Last 3 Encounters:   08/09/23 111 kg (245 lb)   07/25/23 111 kg (245 lb)   06/05/23 113 kg (249 lb)     Body mass index is 32.32 kg/m². Physical Exam  Vitals and nursing note reviewed. Constitutional:       General: He is not in acute distress. Appearance: Normal appearance. He is well-developed. He is obese. Cardiovascular:      Rate and Rhythm: Normal rate and regular rhythm. Heart sounds: Normal heart sounds. No murmur heard. Pulmonary:      Effort: Pulmonary effort is normal. No respiratory distress. Breath sounds: Normal breath sounds. No decreased breath sounds, wheezing, rhonchi or rales. Musculoskeletal:         General: No swelling. Right lower leg: No edema. Left lower leg: No edema. Psychiatric:         Mood and Affect: Mood and affect normal.         Behavior: Behavior normal. Behavior is cooperative. Labs: I have personally reviewed pertinent lab results. Lab Results   Component Value Date    WBC 8.14 07/19/2023    HGB 13.6 07/19/2023    HCT 46.2 07/19/2023    MCV 79 (L) 07/19/2023     07/19/2023     Lab Results   Component Value Date    GLUCOSE 97 08/18/2015    CALCIUM 9.0 07/19/2023     08/18/2015    K 4.0 07/19/2023    CO2 26 07/19/2023     (H) 07/19/2023    BUN 12 07/19/2023    CREATININE 1.75 (H) 07/19/2023     No results found for: "IGE"  Lab Results   Component Value Date    ALT 25 07/19/2023    AST 47 (H) 07/19/2023    ALKPHOS 100 07/19/2023    BILITOT 1.0 08/18/2015       Imaging and other studies: I have personally reviewed pertinent reports and I have personally reviewed pertinent films in PACS     CT chest abdomen pelvis 2/15/23  Reidentified findings include emphysematous changes in the lungs, pleural plaques most consistent with chronic asbestos-related pleural disease, Few scattered benign pulmonary nodules, the largest of which measures 5 mm in the right middle lobe Linear markings at the left base consistent with   atelectasis or scarring. No consolidative or groundglass airspace opacity.   No tracheal or endobronchial lesion.     Pulmonary function testing:      Pulmonary Functions Testing Results: 10/07/2021        FEV1/FVC ratio 56%    FEV1 50% predicted  FVC 66% predicted  (-) response to bronchodilators  TLC 71 % predicted  RV 77 % predicted  DLCO corrected for hemoglobin 46 % predicted     Impression:    Moderately severe obstructive airflow defect on spirometry   No significant response to the administration to bronchodilator per ATS Standards   MIld restrictive lung disease as indicated by lung volumes   Moderately reduced diffusion capacity   Flow volume loop is obstructed.     Answers for HPI/ROS submitted by the patient on 8/2/2023  Chronicity: recurrent  When did you first notice your symptoms?: 1 to 4 weeks ago  How often do your symptoms occur?: intermittently  Since you first noticed this problem, how has it changed?: waxing and waning  Do you have shortness of breath that occurs with effort or exertion?: Yes  Do you have ear congestion?: Yes  Do you have heartburn?: No  Do you have fatigue?: No  Do you have nasal congestion?: No  Do you have shortness of breath when lying flat?: No  Do you have shortness of breath when you wake up?: No  Do you have sweats?: No  Have you experienced weight loss?: No  Which of the following makes your symptoms worse?: any activity, climbing stairs, exercise, minimal activity, strenuous activity  Which of the following makes your symptoms better?: change in position, OTC inhaler, rest

## 2023-08-09 NOTE — ASSESSMENT & PLAN NOTE
Secondary to severe COPD, obesity, and deconditioning. He will continue wearing 3 L oxygen during all hours of sleep, and as needed to maintain SpO2 above 88%. Encouraged him to start pulmonary rehab.

## 2023-08-09 NOTE — ASSESSMENT & PLAN NOTE
Patient symptoms improved and currently stable on the [facility] inhaler. He will continue Breztri 2 puffs twice per day, albuterol HFA/nebulizer every 6 hours as needed for shortness of breath or wheezing. Had patient demonstrate Breztri inhaler use in the office. He had good technique. I also provided patient with spacer device should he need. Reminded patient to rinse mouth after use. Referred to pulmonary rehab at last office visit, however did not start. Patient encouraged to contact [facility] pulmonary rehab for appointment. Will follow-up with patient in 3 months.

## 2023-08-29 DIAGNOSIS — I10 HYPERTENSION, UNSPECIFIED TYPE: ICD-10-CM

## 2023-08-29 RX ORDER — LOSARTAN POTASSIUM 100 MG/1
TABLET ORAL
Qty: 90 TABLET | Refills: 2 | Status: SHIPPED | OUTPATIENT
Start: 2023-08-29

## 2023-10-06 DIAGNOSIS — I10 HYPERTENSION, UNSPECIFIED TYPE: ICD-10-CM

## 2023-10-06 RX ORDER — METOPROLOL SUCCINATE 25 MG/1
TABLET, EXTENDED RELEASE ORAL
Qty: 90 TABLET | Refills: 1 | Status: SHIPPED | OUTPATIENT
Start: 2023-10-06

## 2023-10-10 ENCOUNTER — TELEPHONE (OUTPATIENT)
Age: 83
End: 2023-10-10

## 2023-10-26 DIAGNOSIS — M54.16 LUMBAR RADICULOPATHY: ICD-10-CM

## 2023-10-27 RX ORDER — ACETAMINOPHEN AND CODEINE PHOSPHATE 300; 60 MG/1; MG/1
TABLET ORAL
Qty: 60 TABLET | Refills: 0 | Status: SHIPPED | OUTPATIENT
Start: 2023-10-27

## 2023-11-11 ENCOUNTER — PATIENT MESSAGE (OUTPATIENT)
Age: 83
End: 2023-11-11

## 2023-11-11 DIAGNOSIS — I10 ESSENTIAL HYPERTENSION: ICD-10-CM

## 2023-11-13 ENCOUNTER — TELEPHONE (OUTPATIENT)
Age: 83
End: 2023-11-13

## 2023-11-13 DIAGNOSIS — I10 ESSENTIAL HYPERTENSION: ICD-10-CM

## 2023-11-13 RX ORDER — AMLODIPINE BESYLATE 10 MG/1
10 TABLET ORAL EVERY MORNING
Qty: 90 TABLET | Refills: 3 | Status: SHIPPED | OUTPATIENT
Start: 2023-11-13 | End: 2023-11-13 | Stop reason: SDUPTHER

## 2023-11-13 RX ORDER — AMLODIPINE BESYLATE 10 MG/1
10 TABLET ORAL EVERY MORNING
Qty: 90 TABLET | Refills: 3 | Status: SHIPPED | OUTPATIENT
Start: 2023-11-13 | End: 2023-11-16 | Stop reason: SDUPTHER

## 2023-11-13 NOTE — TELEPHONE ENCOUNTER
Patients wife came in because she stated she called in her husbands Amlodipine and no one responded to her request, so she came in office to get further assistance.  Patients wife was informed that the medication was put in already by Alejandro Ham and she should contact her pharmacy to see when she could  the medication

## 2023-11-15 ENCOUNTER — TELEPHONE (OUTPATIENT)
Age: 83
End: 2023-11-15

## 2023-11-15 NOTE — TELEPHONE ENCOUNTER
Left message for patient to call office regarding refill medication        Patient left VM        My name is Hugh Garcia [id number]. I'm calling because I don't have no more of my medicine and I would like for it to be sent to [facility] mail order so I couldn't get it and let me see what else. My phone number is [phone]. Thank you.  Riki.

## 2023-11-16 DIAGNOSIS — I10 ESSENTIAL HYPERTENSION: ICD-10-CM

## 2023-11-16 RX ORDER — AMLODIPINE BESYLATE 10 MG/1
10 TABLET ORAL EVERY MORNING
Qty: 90 TABLET | Refills: 3 | Status: SHIPPED | OUTPATIENT
Start: 2023-11-16

## 2023-11-16 NOTE — TELEPHONE ENCOUNTER
Requesting a new prescription to be sent to [facility] for Amlodipine, with new directions on how to take the med, strength and dose, please advise

## 2023-11-28 ENCOUNTER — APPOINTMENT (OUTPATIENT)
Dept: LAB | Facility: HOSPITAL | Age: 83
End: 2023-11-28
Payer: COMMERCIAL

## 2023-11-28 DIAGNOSIS — N18.32 HYPERTENSIVE KIDNEY DISEASE WITH STAGE 3B CHRONIC KIDNEY DISEASE (HCC): ICD-10-CM

## 2023-11-28 DIAGNOSIS — E55.9 VITAMIN D DEFICIENCY: ICD-10-CM

## 2023-11-28 DIAGNOSIS — N18.32 STAGE 3B CHRONIC KIDNEY DISEASE (HCC): ICD-10-CM

## 2023-11-28 DIAGNOSIS — I12.9 HYPERTENSIVE KIDNEY DISEASE WITH STAGE 3B CHRONIC KIDNEY DISEASE (HCC): ICD-10-CM

## 2023-11-28 LAB
25(OH)D3 SERPL-MCNC: 57.1 NG/ML (ref 30–100)
ANION GAP SERPL CALCULATED.3IONS-SCNC: 9 MMOL/L
BACTERIA UR QL AUTO: ABNORMAL /HPF
BASOPHILS # BLD AUTO: 0.03 THOUSANDS/ÂΜL (ref 0–0.1)
BASOPHILS NFR BLD AUTO: 0 % (ref 0–1)
BILIRUB UR QL STRIP: NEGATIVE
BUN SERPL-MCNC: 19 MG/DL (ref 5–25)
CALCIUM SERPL-MCNC: 9.9 MG/DL (ref 8.4–10.2)
CHLORIDE SERPL-SCNC: 103 MMOL/L (ref 96–108)
CLARITY UR: CLEAR
CO2 SERPL-SCNC: 28 MMOL/L (ref 21–32)
COLOR UR: YELLOW
CREAT SERPL-MCNC: 1.65 MG/DL (ref 0.6–1.3)
CREAT UR-MCNC: 260.4 MG/DL
EOSINOPHIL # BLD AUTO: 0.11 THOUSAND/ÂΜL (ref 0–0.61)
EOSINOPHIL NFR BLD AUTO: 1 % (ref 0–6)
ERYTHROCYTE [DISTWIDTH] IN BLOOD BY AUTOMATED COUNT: 17.8 % (ref 11.6–15.1)
GFR SERPL CREATININE-BSD FRML MDRD: 38 ML/MIN/1.73SQ M
GLUCOSE P FAST SERPL-MCNC: 101 MG/DL (ref 65–99)
GLUCOSE UR STRIP-MCNC: NEGATIVE MG/DL
HCT VFR BLD AUTO: 46.2 % (ref 36.5–49.3)
HGB BLD-MCNC: 14.4 G/DL (ref 12–17)
HGB UR QL STRIP.AUTO: NEGATIVE
IMM GRANULOCYTES # BLD AUTO: 0.03 THOUSAND/UL (ref 0–0.2)
IMM GRANULOCYTES NFR BLD AUTO: 0 % (ref 0–2)
KETONES UR STRIP-MCNC: NEGATIVE MG/DL
LEUKOCYTE ESTERASE UR QL STRIP: NEGATIVE
LYMPHOCYTES # BLD AUTO: 1.61 THOUSANDS/ÂΜL (ref 0.6–4.47)
LYMPHOCYTES NFR BLD AUTO: 17 % (ref 14–44)
MCH RBC QN AUTO: 24 PG (ref 26.8–34.3)
MCHC RBC AUTO-ENTMCNC: 31.2 G/DL (ref 31.4–37.4)
MCV RBC AUTO: 77 FL (ref 82–98)
MICROALBUMIN UR-MCNC: 80.5 MG/L
MICROALBUMIN/CREAT 24H UR: 31 MG/G CREATININE (ref 0–30)
MONOCYTES # BLD AUTO: 0.65 THOUSAND/ÂΜL (ref 0.17–1.22)
MONOCYTES NFR BLD AUTO: 7 % (ref 4–12)
MUCOUS THREADS UR QL AUTO: ABNORMAL
NEUTROPHILS # BLD AUTO: 7.11 THOUSANDS/ÂΜL (ref 1.85–7.62)
NEUTS SEG NFR BLD AUTO: 75 % (ref 43–75)
NITRITE UR QL STRIP: NEGATIVE
NON-SQ EPI CELLS URNS QL MICRO: ABNORMAL /HPF
NRBC BLD AUTO-RTO: 0 /100 WBCS
PH UR STRIP.AUTO: 5 [PH]
PHOSPHATE SERPL-MCNC: 3.1 MG/DL (ref 2.3–4.1)
PLATELET # BLD AUTO: 217 THOUSANDS/UL (ref 149–390)
POTASSIUM SERPL-SCNC: 3.8 MMOL/L (ref 3.5–5.3)
PROT UR STRIP-MCNC: ABNORMAL MG/DL
PTH-INTACT SERPL-MCNC: 57 PG/ML (ref 12–88)
RBC # BLD AUTO: 5.99 MILLION/UL (ref 3.88–5.62)
RBC #/AREA URNS AUTO: ABNORMAL /HPF
SODIUM SERPL-SCNC: 140 MMOL/L (ref 135–147)
SP GR UR STRIP.AUTO: 1.02 (ref 1–1.03)
URATE SERPL-MCNC: 6.1 MG/DL (ref 3.5–8.5)
UROBILINOGEN UR STRIP-ACNC: <2 MG/DL
WBC # BLD AUTO: 9.54 THOUSAND/UL (ref 4.31–10.16)
WBC #/AREA URNS AUTO: ABNORMAL /HPF

## 2023-11-28 PROCEDURE — 82306 VITAMIN D 25 HYDROXY: CPT

## 2023-11-28 PROCEDURE — 82043 UR ALBUMIN QUANTITATIVE: CPT

## 2023-11-28 PROCEDURE — 82570 ASSAY OF URINE CREATININE: CPT

## 2023-11-28 PROCEDURE — 83970 ASSAY OF PARATHORMONE: CPT

## 2023-11-28 PROCEDURE — 80048 BASIC METABOLIC PNL TOTAL CA: CPT

## 2023-11-28 PROCEDURE — 36415 COLL VENOUS BLD VENIPUNCTURE: CPT

## 2023-11-28 PROCEDURE — 81001 URINALYSIS AUTO W/SCOPE: CPT

## 2023-11-28 PROCEDURE — 84550 ASSAY OF BLOOD/URIC ACID: CPT

## 2023-11-28 PROCEDURE — 84100 ASSAY OF PHOSPHORUS: CPT

## 2023-11-28 PROCEDURE — 85025 COMPLETE CBC W/AUTO DIFF WBC: CPT

## 2023-12-06 ENCOUNTER — TELEPHONE (OUTPATIENT)
Dept: GASTROENTEROLOGY | Facility: CLINIC | Age: 83
End: 2023-12-06

## 2023-12-19 ENCOUNTER — OFFICE VISIT (OUTPATIENT)
Age: 83
End: 2023-12-19
Payer: COMMERCIAL

## 2023-12-19 VITALS
TEMPERATURE: 98.5 F | HEIGHT: 73 IN | WEIGHT: 248 LBS | DIASTOLIC BLOOD PRESSURE: 80 MMHG | HEART RATE: 45 BPM | BODY MASS INDEX: 32.87 KG/M2 | SYSTOLIC BLOOD PRESSURE: 144 MMHG | OXYGEN SATURATION: 94 %

## 2023-12-19 DIAGNOSIS — J44.9 COPD, SEVERE (HCC): ICD-10-CM

## 2023-12-19 DIAGNOSIS — Z87.891 FORMER SMOKER: ICD-10-CM

## 2023-12-19 DIAGNOSIS — J43.2 CENTRILOBULAR EMPHYSEMA (HCC): ICD-10-CM

## 2023-12-19 DIAGNOSIS — R06.02 SHORTNESS OF BREATH: Primary | ICD-10-CM

## 2023-12-19 DIAGNOSIS — J96.11 CHRONIC HYPOXEMIC RESPIRATORY FAILURE (HCC): ICD-10-CM

## 2023-12-19 DIAGNOSIS — F17.211 NICOTINE DEPENDENCE, CIGARETTES, IN REMISSION: ICD-10-CM

## 2023-12-19 PROCEDURE — 99214 OFFICE O/P EST MOD 30 MIN: CPT | Performed by: INTERNAL MEDICINE

## 2023-12-19 NOTE — PROGRESS NOTES
"Assessment/Plan:   Diagnoses and all orders for this visit:    Shortness of breath    COPD, severe (HCC)  -     Ambulatory Referral to Pulmonary Rehabilitation; Future    Nicotine dependence, cigarettes, in remission    Chronic hypoxemic respiratory failure (HCC)    Centrilobular emphysema (HCC)    Former smoker        COPD severe currently stable continue with Breztri 2 puffs twice daily  Rinse mouth after use  Continue with albuterol MDI 2 puffs 4 times daily as needed when outside the house.  He also has a nebulizer with albuterol to be used 4 times daily as needed  Continue with oxygen currently on 2 L of oxygen continuous to continue  Again discussed with the patient the need for pulmonary rehab sessions, he states it was too far out for him to go and he could not going for the sessions at that time but he still wants an order now he states he will plan to see if he can go now  I have put in the order again for the pulmonary rehab he will get it scheduled  Vaccinations up-to-date  Not a candidate for lung cancer screening last CT of the chest done as a part of his prostate cancer as well chest abdomen and pelvis CT lung and on chest demonstrating stable lung nodules  Will follow-up in 6 months or earlier as needed  No follow-ups on file.  All questions are answered to the patient's satisfaction and understanding.  He verbalizes understanding.  He is encouraged to call with any further questions or concerns.    Portions of the record may have been created with voice recognition software.  Occasional wrong word or \"sound a like\" substitutions may have occurred due to the inherent limitations of voice recognition software.  Read the chart carefully and recognize, using context, where substitutions have occurred.    Electronically Signed by Suzanne Fletcher MD    ______________________________________________________________________    Chief Complaint:   Chief Complaint   Patient presents with    Follow-up "       Patient ID: Darrell is a 83 y.o. y.o. male has a past medical history of Back pain, Bronchiectasis (HCC), Cataract, Chronic kidney disease, COPD (chronic obstructive pulmonary disease) (HCC), History of malignant neoplasm of oral cavity, HL (hearing loss), Hypertension, Kidney stone (05/2019), Malignant neoplasm of colon (HCC), Prostate cancer (HCC), SOB (shortness of breath), Tachycardia, Thalassemia trait, and Tinnitus.    12/19/2023  Patient presents today for follow-up visit.  Darrell Mayer is a 83 y.o. male The past medical history-quit 18 years ago, severe CAD, chronic respiratory failure on prostate cancer.  He is here today for 3-month follow-up visit.  He was not noticing any benefit from his Advair and Incruse inhaler.  He was switched to Breztri and provided samples.  Also referred to pulmonary rehab.     Patient states he is doing well.  Symptoms are stable and he prefers the Breztri inhaler given at his last office visit.  Still has shortness of breath with stairs.  Denies any significant cough, mucus production, chest tightness/chest pain.  Occasional wheezing.  He is wearing his oxygen 3 L every night, and as needed during the day with exertion to maintain SpO2 above 88%.  Not requiring frequent use of his albuterol.  Uses nebulizer at night, and on occasion uses inhaler if symptoms severe.  He has not yet started pulmonary rehab seeing [facility]'s location is too far.      General  Associated symptoms include coughing. Pertinent negatives include no chest pain, fever, headaches, myalgias or sore throat.       Review of Systems   Constitutional: Negative.  Negative for appetite change and fever.   HENT: Negative.  Negative for ear pain, postnasal drip, rhinorrhea, sneezing, sore throat and trouble swallowing.    Eyes: Negative.    Respiratory:  Positive for cough and wheezing.    Cardiovascular: Negative.  Negative for chest pain.   Gastrointestinal: Negative.    Endocrine: Negative.     Genitourinary: Negative.    Musculoskeletal: Negative.  Negative for myalgias.   Allergic/Immunologic: Negative.    Neurological: Negative.  Negative for headaches.   Hematological: Negative.    Psychiatric/Behavioral: Negative.         Smoking history: He reports that he quit smoking about 19 years ago. His smoking use included cigarettes. He started smoking about 64 years ago. He has a 11.1 pack-year smoking history. He has never used smokeless tobacco.    The following portions of the patient's history were reviewed and updated as appropriate: allergies, current medications, past family history, past medical history, past social history, past surgical history, and problem list.    Immunization History   Administered Date(s) Administered    COVID-19 MODERNA VACC 0.5 ML IM 01/26/2021, 02/22/2021, 10/27/2021    INFLUENZA 10/26/2012, 10/22/2013, 12/01/2015, 10/07/2016, 09/18/2018, 11/19/2019, 10/04/2021    Influenza Split High Dose Preservative Free IM 10/07/2014, 10/01/2017    Influenza, high dose seasonal 0.7 mL 10/08/2020    Influenza, seasonal, injectable 10/26/2012, 10/22/2013, 12/01/2015, 09/03/2016    Pneumococcal Conjugate 13-Valent 08/25/2015, 08/25/2015    Pneumococcal Polysaccharide PPV23 01/03/2017    Tdap 07/01/2011    Zoster 1940    Zoster Vaccine Recombinant 12/18/2018, 06/04/2019     Current Outpatient Medications   Medication Sig Dispense Refill    acetaminophen-codeine (TYLENOL with CODEINE #4) 300-60 MG per tablet TAKE 1 TABLET EVERY 6 HOURSAS NEEDED FOR MODERATE PAIN 60 tablet 0    allopurinol (ZYLOPRIM) 100 mg tablet TAKE 1 TABLET DAILY 90 tablet 2    amLODIPine (NORVASC) 10 mg tablet Take 1 tablet (10 mg total) by mouth every morning 90 tablet 3    Budeson-Glycopyrrol-Formoterol (Breztri Aerosphere) 160-9-4.8 MCG/ACT AERO Inhale 2 puffs 2 (two) times a day Rinse mouth after use. 31.1 g 3    cholecalciferol (VITAMIN D3) 1,000 units tablet Take 5 tablets (5,000 Units total) by mouth every  "other day      losartan (COZAAR) 100 MG tablet TAKE 1 TABLET DAILY 90 tablet 2    metoprolol succinate (TOPROL-XL) 25 mg 24 hr tablet TAKE 1 TABLET DAILY 90 tablet 1    Polyvinyl Alcohol-Povidone (REFRESH OP) Apply to eye as needed       prochlorperazine (COMPAZINE) 10 mg tablet Take 1 tablet (10 mg total) by mouth every 6 (six) hours as needed for nausea or vomiting 30 tablet 0    Vitamins-Lipotropics (LIPO-FLAVONOID PLUS PO) Take by mouth      albuterol (2.5 mg/3 mL) 0.083 % nebulizer solution Take 3 mL (2.5 mg total) by nebulization every 6 (six) hours 1080 mL 3    albuterol (Ventolin HFA) 90 mcg/act inhaler Inhale 2 puffs every 6 (six) hours as needed for wheezing or shortness of breath 18 g 3    gabapentin (NEURONTIN) 300 mg capsule every 12 (twelve) hours as needed  (Patient not taking: Reported on 1/24/2023)       No current facility-administered medications for this visit.     Allergies: Patient has no known allergies.    Objective:  Vitals:    12/19/23 1202   BP: 144/80   Pulse: (!) 45   Temp: 98.5 °F (36.9 °C)   SpO2: 94%   Weight: 112 kg (248 lb)   Height: 6' 1\" (1.854 m)   Oxygen Therapy  SpO2: 94 % (with 3 lts of oxygen)  .  Wt Readings from Last 3 Encounters:   12/19/23 112 kg (248 lb)   08/09/23 111 kg (245 lb)   07/25/23 111 kg (245 lb)     Body mass index is 32.72 kg/m².    Physical Exam  Constitutional:       Appearance: He is well-developed.   HENT:      Head: Normocephalic and atraumatic.   Eyes:      Pupils: Pupils are equal, round, and reactive to light.   Neck:      Comments: Short and wide neck  Cardiovascular:      Rate and Rhythm: Normal rate and regular rhythm.      Heart sounds: Normal heart sounds.   Pulmonary:      Effort: Pulmonary effort is normal.      Breath sounds: Normal breath sounds.   Musculoskeletal:         General: Normal range of motion.      Cervical back: Normal range of motion and neck supple.   Skin:     General: Skin is warm and dry.   Neurological:      Mental Status: " He is alert and oriented to person, place, and time.   Psychiatric:         Behavior: Behavior normal.         Lab Review:   Appointment on 11/28/2023   Component Date Value    WBC 11/28/2023 9.54     RBC 11/28/2023 5.99 (H)     Hemoglobin 11/28/2023 14.4     Hematocrit 11/28/2023 46.2     MCV 11/28/2023 77 (L)     MCH 11/28/2023 24.0 (L)     MCHC 11/28/2023 31.2 (L)     RDW 11/28/2023 17.8 (H)     Platelets 11/28/2023 217     nRBC 11/28/2023 0     Neutrophils Relative 11/28/2023 75     Immat GRANS % 11/28/2023 0     Lymphocytes Relative 11/28/2023 17     Monocytes Relative 11/28/2023 7     Eosinophils Relative 11/28/2023 1     Basophils Relative 11/28/2023 0     Neutrophils Absolute 11/28/2023 7.11     Immature Grans Absolute 11/28/2023 0.03     Lymphocytes Absolute 11/28/2023 1.61     Monocytes Absolute 11/28/2023 0.65     Eosinophils Absolute 11/28/2023 0.11     Basophils Absolute 11/28/2023 0.03     Sodium 11/28/2023 140     Potassium 11/28/2023 3.8     Chloride 11/28/2023 103     CO2 11/28/2023 28     ANION GAP 11/28/2023 9     BUN 11/28/2023 19     Creatinine 11/28/2023 1.65 (H)     Glucose, Fasting 11/28/2023 101 (H)     Calcium 11/28/2023 9.9     eGFR 11/28/2023 38     Color, UA 11/28/2023 Yellow     Clarity, UA 11/28/2023 Clear     Specific Gravity, UA 11/28/2023 1.025     pH, UA 11/28/2023 5.0     Leukocytes, UA 11/28/2023 Negative     Nitrite, UA 11/28/2023 Negative     Protein, UA 11/28/2023 Trace (A)     Glucose, UA 11/28/2023 Negative     Ketones, UA 11/28/2023 Negative     Urobilinogen, UA 11/28/2023 <2.0     Bilirubin, UA 11/28/2023 Negative     Occult Blood, UA 11/28/2023 Negative     RBC, UA 11/28/2023 1-2     WBC, UA 11/28/2023 1-2     Epithelial Cells 11/28/2023 Occasional     Bacteria, UA 11/28/2023 Occasional     MUCUS THREADS 11/28/2023 Occasional (A)     Creatinine, Ur 11/28/2023 260.4     Albumin,U,Random 11/28/2023 80.5 (H)     Albumin Creat Ratio 11/28/2023 31 (H)     Phosphorus 11/28/2023  3.1     Uric Acid 2023 6.1     PTH 2023 57.0     Vit D, 25-Hydroxy 2023 57.1    Office Visit on 2023   Component Date Value    Severity 2023 ALERT (A)     Right Eye Diabetic Retin* 2023 None     Right Eye Macular Edema 2023 None     Right Eye Other Retinopa* 2023 None     Right Eye Image Quality 2023 Gradable Image     Left Eye Diabetic Retino* 2023 None     Left Eye Macular Edema 2023 None     Left Eye Other Retinopat* 2023 None     Left Eye Image Quality 2023 Not Gradable Image (A)     Result 2023 Retinal Study Result for SHANE RENTERIA     Result 2023       Result 2023  flako MORALES 84 y/o, M (: 1940, MRN: [id number])     Result 2023  presented to [facility] on 2023 for a retinal imaging study of the left and right eyes.     Result 2023       Result 2023  Based on the findings of the study, the following is recommended for SHANE RENTERIA     Result 2023  Not Gradable Eye(s) Found: Schedule an appointment with a retina specialist for further evaluation within 3 months.     Result 2023       Result 2023  Interpreting Provider's Comments:  No comments provided     Result 2023  Diagnoses Present:  - Type 2 diabetes mellitus with diabetic chronic kidney disease     Result 2023       Result 2023  Right eye findings: Negative for Diabetic Retinopathy     Result 2023 Negative for Macular Edema     Result 2023       Result 2023  Left eye findings: Image not gradable for reasons listed: Insufficient View of Macula     Result 2023       Result 2023  This result was electronically signed by Shavon Zamudio MD, NPI: [id number], Taxonomy: [id number] on 2023 23:33 UT.     Result 2023 NOTE:  Any pathology noted on this diabetic retinal evaluation should be confirmed by an appropriate  ophthalmic examination.    Appointment on 07/19/2023   Component Date Value    WBC 07/19/2023 8.14     RBC 07/19/2023 5.83 (H)     Hemoglobin 07/19/2023 13.6     Hematocrit 07/19/2023 46.2     MCV 07/19/2023 79 (L)     MCH 07/19/2023 23.3 (L)     MCHC 07/19/2023 29.4 (L)     RDW 07/19/2023 18.9 (H)     Platelets 07/19/2023 209     nRBC 07/19/2023 0     Neutrophils Relative 07/19/2023 79 (H)     Immat GRANS % 07/19/2023 0     Lymphocytes Relative 07/19/2023 14     Monocytes Relative 07/19/2023 6     Eosinophils Relative 07/19/2023 1     Basophils Relative 07/19/2023 0     Neutrophils Absolute 07/19/2023 6.37     Immature Grans Absolute 07/19/2023 0.02     Lymphocytes Absolute 07/19/2023 1.11     Monocytes Absolute 07/19/2023 0.50     Eosinophils Absolute 07/19/2023 0.11     Basophils Absolute 07/19/2023 0.03     Sodium 07/19/2023 141     Potassium 07/19/2023 4.0     Chloride 07/19/2023 109 (H)     CO2 07/19/2023 26     ANION GAP 07/19/2023 6     BUN 07/19/2023 12     Creatinine 07/19/2023 1.75 (H)     Glucose, Fasting 07/19/2023 119 (H)     Calcium 07/19/2023 9.0     AST 07/19/2023 47 (H)     ALT 07/19/2023 25     Alkaline Phosphatase 07/19/2023 100     Total Protein 07/19/2023 8.3     Albumin 07/19/2023 3.9     Total Bilirubin 07/19/2023 1.78 (H)     eGFR 07/19/2023 35     Cholesterol 07/19/2023 104     Triglycerides 07/19/2023 72     HDL, Direct 07/19/2023 48     LDL Calculated 07/19/2023 42     Non-HDL-Chol (CHOL-HDL) 07/19/2023 56     Hemoglobin A1C 07/19/2023 5.6     EAG 07/19/2023 114     TSH 3RD GENERATON 07/19/2023 1.444     CEA 07/19/2023 3.6 (H)        Diagnostics:  I have personally reviewed pertinent reports.    Answers submitted by the patient for this visit:  Pulmonology Questionnaire (Submitted on 12/12/2023)  Chief Complaint: Primary symptoms  Chronicity: new  When did you first notice your symptoms?: in the past 7 days  How often do your symptoms occur?: intermittently  Since you first noticed this  problem, how has it changed?: gradually improving  Do you have shortness of breath that occurs with effort or exertion?: Yes  Do you have ear congestion?: No  Do you have heartburn?: No  Do you have fatigue?: No  Do you have nasal congestion?: No  Do you have shortness of breath when lying flat?: No  Do you have shortness of breath when you wake up?: No  Do you have sweats?: No  Have you experienced weight loss?: No  Which of the following makes your symptoms worse?: climbing stairs, minimal activity, strenuous activity  Which of the following makes your symptoms better?: OTC inhaler, rest

## 2024-01-08 ENCOUNTER — TELEPHONE (OUTPATIENT)
Dept: NEPHROLOGY | Facility: CLINIC | Age: 84
End: 2024-01-08

## 2024-01-09 ENCOUNTER — OFFICE VISIT (OUTPATIENT)
Dept: NEPHROLOGY | Facility: CLINIC | Age: 84
End: 2024-01-09
Payer: COMMERCIAL

## 2024-01-09 VITALS
TEMPERATURE: 96.9 F | WEIGHT: 245 LBS | BODY MASS INDEX: 32.47 KG/M2 | SYSTOLIC BLOOD PRESSURE: 164 MMHG | HEART RATE: 60 BPM | OXYGEN SATURATION: 89 % | DIASTOLIC BLOOD PRESSURE: 86 MMHG | RESPIRATION RATE: 16 BRPM | HEIGHT: 73 IN

## 2024-01-09 DIAGNOSIS — E55.9 VITAMIN D DEFICIENCY: ICD-10-CM

## 2024-01-09 DIAGNOSIS — N18.32 HYPERTENSIVE KIDNEY DISEASE WITH STAGE 3B CHRONIC KIDNEY DISEASE (HCC): ICD-10-CM

## 2024-01-09 DIAGNOSIS — N18.32 STAGE 3B CHRONIC KIDNEY DISEASE (HCC): Primary | ICD-10-CM

## 2024-01-09 DIAGNOSIS — I12.9 HYPERTENSIVE KIDNEY DISEASE WITH STAGE 3B CHRONIC KIDNEY DISEASE (HCC): ICD-10-CM

## 2024-01-09 PROCEDURE — 99214 OFFICE O/P EST MOD 30 MIN: CPT | Performed by: INTERNAL MEDICINE

## 2024-01-09 NOTE — PROGRESS NOTES
NEPHROLOGY OFFICE FOLLOW UP  Darrell Mayer 83 y.o. male MRN: [id number]    Encounter: [id number] DATE: 1/9/2024    REASON FOR VISIT: Darrell Mayer is a 83 y.o. male who is here on 1/9/2024 for further management of chronic kidney disease.    HPI:    This is a 83-year-old male with a past medical history of CKD stage 3, hypertension, COPD, returns to Nephrology Clinic for further management of CKD stage 3.    Upon review of old medical records,  Patient's new baseline creatinine seems to be fluctuating around 1.6-1.7.    Patient's wife was also present at the time of consultation and history was also obtained from her and all the questions were answered.    Patient has COPD.  Patient currently does not smoke.  According to patient his breathing is currently under good control with the use of nebulizers.  Patient also uses oxygen 3 liters/minute.    Patient has hypertension which seems under well controlled with the use of current antihypertensive medications.  Patient checks blood pressure at home on regular basis.    Patient has gout and currently taking allopurinol 100 mg PO daily on daily basis.  Patient denies any recent gout flare-up.    Patient also have chronic back pain and was also been followed by pain management specialist in the past and was using narcotics in the past but now uses as needed Tylenol and also have a back stimulator in place.    Patient was also found to have vitamin-D deficiency and currently taking cholecalciferol.    Patient also have kidney stone and was also seen by Dr. Gerber-urologist in the past.    According to patient he does not use any NSAIDs including Mobic.        REVIEW OF SYSTEMS:    Review of Systems   Constitutional:  Negative for chills and fever.   HENT:  Negative for nosebleeds.    Eyes:  Negative for photophobia and pain.   Respiratory:  Negative for choking.    Cardiovascular:  Negative for palpitations.   Gastrointestinal:  Negative for blood in stool.    Genitourinary:  Negative for hematuria.   Musculoskeletal:  Negative for neck stiffness.   Neurological:  Negative for seizures.   Psychiatric/Behavioral:  Negative for confusion and suicidal ideas.          PAST MEDICAL HISTORY:  Past Medical History:   Diagnosis Date    Back pain     Bronchiectasis (HCC)     Cataract     Chronic kidney disease     COPD (chronic obstructive pulmonary disease) (HCC)     History of malignant neoplasm of oral cavity     M0    HL (hearing loss)     Hypertension     Kidney stone 2019    Malignant neoplasm of colon (HCC)     descending    Prostate cancer (HCC)     SOB (shortness of breath)     Tachycardia     Thalassemia trait     Tinnitus        PAST SURGICAL HISTORY:  Past Surgical History:   Procedure Laterality Date    BACK SURGERY      CHOLECYSTECTOMY      COLON SURGERY  10/2008    partial colectomy    EYE SURGERY      INCISIONAL HERNIA REPAIR      JOINT REPLACEMENT Right     hip    MT COLONOSCOPY FLX DX W/COLLJ SPEC WHEN PFRMD N/A 2018    Procedure: COLONOSCOPY;  Surgeon: Jv Toribio III, MD;  Location: [facility];  Service: Gastroenterology    PROSTATE SURGERY      SPINAL CORD STIMULATOR IMPLANT      TONSILLECTOMY         SOCIAL HISTORY:  Social History     Substance and Sexual Activity   Alcohol Use Not Currently     Social History     Substance and Sexual Activity   Drug Use No     Social History     Tobacco Use   Smoking Status Former    Current packs/day: 0.00    Average packs/day: 0.3 packs/day for 44.4 years (11.1 ttl pk-yrs)    Types: Cigarettes    Start date: 1960    Quit date: 2004    Years since quittin.6   Smokeless Tobacco Never   Tobacco Comments    non smoker/tobacco user; quit  as per [employer]       FAMILY HISTORY:  Family History   Problem Relation Age of Onset    Heart failure Mother         as per [employer]    Coronary artery disease Mother         as per [employer]    Diabetes Mother         mellitus - as per [employer]     "Coronary artery disease Father         [facility]    Cancer Sister         malignant neoplasm       ALLERGY:  No Known Allergies    MEDICATIONS:    Current Outpatient Medications:     acetaminophen-codeine (TYLENOL with CODEINE #4) 300-60 MG per tablet, TAKE 1 TABLET EVERY 6 HOURSAS NEEDED FOR MODERATE PAIN, Disp: 60 tablet, Rfl: 0    albuterol (2.5 mg/3 mL) 0.083 % nebulizer solution, Take 3 mL (2.5 mg total) by nebulization every 6 (six) hours, Disp: 1080 mL, Rfl: 3    albuterol (Ventolin HFA) 90 mcg/act inhaler, Inhale 2 puffs every 6 (six) hours as needed for wheezing or shortness of breath, Disp: 18 g, Rfl: 3    allopurinol (ZYLOPRIM) 100 mg tablet, TAKE 1 TABLET DAILY, Disp: 90 tablet, Rfl: 2    amLODIPine (NORVASC) 10 mg tablet, Take 1 tablet (10 mg total) by mouth every morning, Disp: 90 tablet, Rfl: 3    Budeson-Glycopyrrol-Formoterol (Breztri Aerosphere) 160-9-4.8 MCG/ACT AERO, Inhale 2 puffs 2 (two) times a day Rinse mouth after use., Disp: 31.1 g, Rfl: 3    cholecalciferol (VITAMIN D3) 1,000 units tablet, Take 5 tablets (5,000 Units total) by mouth every other day, Disp: , Rfl:     losartan (COZAAR) 100 MG tablet, TAKE 1 TABLET DAILY, Disp: 90 tablet, Rfl: 2    metoprolol succinate (TOPROL-XL) 25 mg 24 hr tablet, TAKE 1 TABLET DAILY, Disp: 90 tablet, Rfl: 1    Polyvinyl Alcohol-Povidone (REFRESH OP), Apply to eye as needed , Disp: , Rfl:     prochlorperazine (COMPAZINE) 10 mg tablet, Take 1 tablet (10 mg total) by mouth every 6 (six) hours as needed for nausea or vomiting, Disp: 30 tablet, Rfl: 0    Vitamins-Lipotropics (LIPO-FLAVONOID PLUS PO), Take by mouth, Disp: , Rfl:     PHYSICAL EXAM:  Vitals:    01/09/24 1029   BP: 164/86   Pulse: 60   Resp: 16   Temp: (!) 96.9 °F (36.1 °C)   TempSrc: Temporal   SpO2: (!) 89%   Weight: 111 kg (245 lb)   Height: 6' 1\" (1.854 m)       Body mass index is 32.32 kg/m².    Physical Exam  Constitutional:       General: He is not in acute distress.     Comments: " Uses nasal oxygen   HENT:      Right Ear: External ear normal.   Eyes:      Conjunctiva/sclera:      Right eye: No hemorrhage.  Neck:      Thyroid: No thyromegaly.   Cardiovascular:      Rate and Rhythm: Normal rate.   Pulmonary:      Effort: No accessory muscle usage or respiratory distress.      Breath sounds: No wheezing.   Abdominal:      General: There is no distension.   Musculoskeletal:      Right ankle: Swelling present.      Left ankle: Swelling present.   Skin:     General: Skin is warm.      Coloration: Skin is not jaundiced.   Neurological:      Mental Status: He is alert. He is not disoriented.   Psychiatric:         Speech: He is communicative.         Behavior: Behavior is not combative.         LAB RESULTS:  Results for orders placed or performed in visit on 11/28/23   CBC and differential   Result Value Ref Range    WBC 9.54 4.31 - 10.16 Thousand/uL    RBC 5.99 (H) 3.88 - 5.62 Million/uL    Hemoglobin 14.4 12.0 - 17.0 g/dL    Hematocrit 46.2 36.5 - 49.3 %    MCV 77 (L) 82 - 98 fL    MCH 24.0 (L) 26.8 - 34.3 pg    MCHC 31.2 (L) 31.4 - 37.4 g/dL    RDW 17.8 (H) 11.6 - 15.1 %    Platelets 217 149 - 390 Thousands/uL    nRBC 0 /100 WBCs    Neutrophils Relative 75 43 - 75 %    Immat GRANS % 0 0 - 2 %    Lymphocytes Relative 17 14 - 44 %    Monocytes Relative 7 4 - 12 %    Eosinophils Relative 1 0 - 6 %    Basophils Relative 0 0 - 1 %    Neutrophils Absolute 7.11 1.85 - 7.62 Thousands/µL    Immature Grans Absolute 0.03 0.00 - 0.20 Thousand/uL    Lymphocytes Absolute 1.61 0.60 - 4.47 Thousands/µL    Monocytes Absolute 0.65 0.17 - 1.22 Thousand/µL    Eosinophils Absolute 0.11 0.00 - 0.61 Thousand/µL    Basophils Absolute 0.03 0.00 - 0.10 Thousands/µL   Basic metabolic panel   Result Value Ref Range    Sodium 140 135 - 147 mmol/L    Potassium 3.8 3.5 - 5.3 mmol/L    Chloride 103 96 - 108 mmol/L    CO2 28 21 - 32 mmol/L    ANION GAP 9 mmol/L    BUN 19 5 - 25 mg/dL    Creatinine 1.65 (H) 0.60 - 1.30 mg/dL     Glucose, Fasting 101 (H) 65 - 99 mg/dL    Calcium 9.9 8.4 - 10.2 mg/dL    eGFR 38 ml/min/1.73sq m   Urinalysis with microscopic   Result Value Ref Range    Color, UA Yellow     Clarity, UA Clear     Specific Gravity, UA 1.025 1.003 - 1.030    pH, UA 5.0 4.5, 5.0, 5.5, 6.0, 6.5, 7.0, 7.5, 8.0    Leukocytes, UA Negative Negative    Nitrite, UA Negative Negative    Protein, UA Trace (A) Negative mg/dl    Glucose, UA Negative Negative mg/dl    Ketones, UA Negative Negative mg/dl    Urobilinogen, UA <2.0 <2.0 mg/dl mg/dl    Bilirubin, UA Negative Negative    Occult Blood, UA Negative Negative    RBC, UA 1-2 None Seen, 1-2 /hpf    WBC, UA 1-2 None Seen, 1-2 /hpf    Epithelial Cells Occasional None Seen, Occasional /hpf    Bacteria, UA Occasional None Seen, Occasional /hpf    MUCUS THREADS Occasional (A) None Seen   Albumin / creatinine urine ratio   Result Value Ref Range    Creatinine, Ur 260.4 Reference range not established. mg/dL    Albumin,U,Random 80.5 (H) <20.0 mg/L    Albumin Creat Ratio 31 (H) 0 - 30 mg/g creatinine   Phosphorus   Result Value Ref Range    Phosphorus 3.1 2.3 - 4.1 mg/dL   Uric acid   Result Value Ref Range    Uric Acid 6.1 3.5 - 8.5 mg/dL   PTH, intact   Result Value Ref Range    PTH 57.0 12.0 - 88.0 pg/mL   Vitamin D 25 hydroxy   Result Value Ref Range    Vit D, 25-Hydroxy 57.1 30.0 - 100.0 ng/mL       ASSESSMENT and PLAN:  Darrell was seen today for chronic kidney disease and follow-up.    Diagnoses and all orders for this visit:    Stage 3b chronic kidney disease (HCC)  -     CBC and differential; Future  -     Basic metabolic panel; Future  -     Urinalysis with microscopic; Future  -     Albumin / creatinine urine ratio; Future  -     Phosphorus; Future  -     Uric acid; Future  -     PTH, intact; Future  -     Vitamin D 25 hydroxy; Future    Hypertensive kidney disease with stage 3b chronic kidney disease (HCC)  -     Basic metabolic panel; Future  -     Urinalysis with microscopic;  "Future  -     Albumin / creatinine urine ratio; Future    Vitamin D deficiency  -     Vitamin D 25 hydroxy; Future      1.  CKD stage 3.  Multifactorial and was suspected due to underlying hypertensive nephrosclerosis.    Upon review of old medical records, new baseline creatinine seems to be fluctuating around 1.6-1.7 and in the interim renal function has remained at baseline with current creatinine of 1.65 with EGFR of 38.    Patient uses nasal oxygen on a chronic basis and since breathing is stable, encourage patient to drink around 2 L of fluid on daily basis to ensure staying well-hydrated    Patient has underlying COPD and uses nasal oxygen 3 L/min mostly during nighttime. Oxygen saturation in office was 89% and advised patient/family to use nasal oxygen during the daytime today if patient is feeling short of breath upon reaching home.    Management of hypertension as mentioned below.    Plan to avoid NSAIDs including Mobic/meloxicam and recheck renal function before next visit.    2.  Hypertension in chronic kidney disease.  Today's blood pressure was elevated but reviewed patient's home log of blood pressure readings suggesting hypertension is under well control at home and for time being monitor hypertension with amlodipine 10 mg PO daily, metoprolol XL 25 mg PO daily and losartan 100 mg PO daily.  Also advised patient to follow low-salt diet .    3. Vitamin-D deficiency. Patient is currently taking cholecalciferol 5000 Units every other day.  Current vitamin D level is 57 which is at goal and plan to continue cholecalciferol at current dose and recheck vitamin D level with the next visit.    Returns to Nephrology Clinic in 6 months.  Future labs ordered.        Portions of the record may have been created with voice recognition software. Occasional wrong word or \"sound a like\" substitutions may have occurred due to the inherent limitations of voice recognition software. Read the chart carefully and " recognize, using context, where substitutions have occurred.If you have any questions, please contact the dictating provider.

## 2024-01-16 ENCOUNTER — RA CDI HCC (OUTPATIENT)
Dept: OTHER | Facility: HOSPITAL | Age: 84
End: 2024-01-16

## 2024-01-16 NOTE — PROGRESS NOTES
E11.51  HCC coding opportunities          Chart Reviewed number of suggestions sent to Provider: 1     Patients Insurance     Medicare Insurance: [employer]vantage

## 2024-01-17 ENCOUNTER — APPOINTMENT (OUTPATIENT)
Dept: LAB | Facility: HOSPITAL | Age: 84
End: 2024-01-17
Payer: COMMERCIAL

## 2024-01-17 DIAGNOSIS — E11.22 TYPE 2 DIABETES MELLITUS WITH STAGE 3B CHRONIC KIDNEY DISEASE, WITHOUT LONG-TERM CURRENT USE OF INSULIN (HCC): ICD-10-CM

## 2024-01-17 DIAGNOSIS — Z85.038 HISTORY OF COLON CANCER: ICD-10-CM

## 2024-01-17 DIAGNOSIS — N18.32 TYPE 2 DIABETES MELLITUS WITH STAGE 3B CHRONIC KIDNEY DISEASE, WITHOUT LONG-TERM CURRENT USE OF INSULIN (HCC): ICD-10-CM

## 2024-01-17 LAB
ALBUMIN SERPL BCP-MCNC: 4.4 G/DL (ref 3.5–5)
ALP SERPL-CCNC: 92 U/L (ref 34–104)
ALT SERPL W P-5'-P-CCNC: 14 U/L (ref 7–52)
ANION GAP SERPL CALCULATED.3IONS-SCNC: 6 MMOL/L
AST SERPL W P-5'-P-CCNC: 31 U/L (ref 13–39)
BASOPHILS # BLD AUTO: 0.04 THOUSANDS/ÂΜL (ref 0–0.1)
BASOPHILS NFR BLD AUTO: 1 % (ref 0–1)
BILIRUB SERPL-MCNC: 0.99 MG/DL (ref 0.2–1)
BUN SERPL-MCNC: 15 MG/DL (ref 5–25)
CALCIUM SERPL-MCNC: 9.8 MG/DL (ref 8.4–10.2)
CEA SERPL-MCNC: 3.5 NG/ML (ref 0–3)
CHLORIDE SERPL-SCNC: 104 MMOL/L (ref 96–108)
CHOLEST SERPL-MCNC: 110 MG/DL
CO2 SERPL-SCNC: 30 MMOL/L (ref 21–32)
CREAT SERPL-MCNC: 1.49 MG/DL (ref 0.6–1.3)
EOSINOPHIL # BLD AUTO: 0.12 THOUSAND/ÂΜL (ref 0–0.61)
EOSINOPHIL NFR BLD AUTO: 2 % (ref 0–6)
ERYTHROCYTE [DISTWIDTH] IN BLOOD BY AUTOMATED COUNT: 17.4 % (ref 11.6–15.1)
EST. AVERAGE GLUCOSE BLD GHB EST-MCNC: 126 MG/DL
GFR SERPL CREATININE-BSD FRML MDRD: 43 ML/MIN/1.73SQ M
GLUCOSE P FAST SERPL-MCNC: 104 MG/DL (ref 65–99)
HBA1C MFR BLD: 6 %
HCT VFR BLD AUTO: 46.5 % (ref 36.5–49.3)
HDLC SERPL-MCNC: 42 MG/DL
HGB BLD-MCNC: 14.2 G/DL (ref 12–17)
IMM GRANULOCYTES # BLD AUTO: 0.03 THOUSAND/UL (ref 0–0.2)
IMM GRANULOCYTES NFR BLD AUTO: 0 % (ref 0–2)
LDLC SERPL CALC-MCNC: 49 MG/DL (ref 0–100)
LYMPHOCYTES # BLD AUTO: 1.34 THOUSANDS/ÂΜL (ref 0.6–4.47)
LYMPHOCYTES NFR BLD AUTO: 19 % (ref 14–44)
MCH RBC QN AUTO: 23.6 PG (ref 26.8–34.3)
MCHC RBC AUTO-ENTMCNC: 30.5 G/DL (ref 31.4–37.4)
MCV RBC AUTO: 77 FL (ref 82–98)
MONOCYTES # BLD AUTO: 0.44 THOUSAND/ÂΜL (ref 0.17–1.22)
MONOCYTES NFR BLD AUTO: 6 % (ref 4–12)
NEUTROPHILS # BLD AUTO: 5.22 THOUSANDS/ÂΜL (ref 1.85–7.62)
NEUTS SEG NFR BLD AUTO: 72 % (ref 43–75)
NONHDLC SERPL-MCNC: 68 MG/DL
NRBC BLD AUTO-RTO: 0 /100 WBCS
PLATELET # BLD AUTO: 250 THOUSANDS/UL (ref 149–390)
POTASSIUM SERPL-SCNC: 4.1 MMOL/L (ref 3.5–5.3)
PROT SERPL-MCNC: 8.3 G/DL (ref 6.4–8.4)
RBC # BLD AUTO: 6.02 MILLION/UL (ref 3.88–5.62)
SODIUM SERPL-SCNC: 140 MMOL/L (ref 135–147)
TRIGL SERPL-MCNC: 93 MG/DL
TSH SERPL DL<=0.05 MIU/L-ACNC: 1.7 UIU/ML (ref 0.45–4.5)
WBC # BLD AUTO: 7.19 THOUSAND/UL (ref 4.31–10.16)

## 2024-01-17 PROCEDURE — 85025 COMPLETE CBC W/AUTO DIFF WBC: CPT

## 2024-01-17 PROCEDURE — 82378 CARCINOEMBRYONIC ANTIGEN: CPT

## 2024-01-17 PROCEDURE — 80061 LIPID PANEL: CPT

## 2024-01-17 PROCEDURE — 84443 ASSAY THYROID STIM HORMONE: CPT

## 2024-01-17 PROCEDURE — 36415 COLL VENOUS BLD VENIPUNCTURE: CPT

## 2024-01-17 PROCEDURE — 83036 HEMOGLOBIN GLYCOSYLATED A1C: CPT

## 2024-01-17 PROCEDURE — 80053 COMPREHEN METABOLIC PANEL: CPT

## 2024-01-23 ENCOUNTER — OFFICE VISIT (OUTPATIENT)
Age: 84
End: 2024-01-23
Payer: COMMERCIAL

## 2024-01-23 VITALS
HEIGHT: 73 IN | OXYGEN SATURATION: 96 % | WEIGHT: 250 LBS | SYSTOLIC BLOOD PRESSURE: 124 MMHG | DIASTOLIC BLOOD PRESSURE: 84 MMHG | BODY MASS INDEX: 33.13 KG/M2 | HEART RATE: 46 BPM | RESPIRATION RATE: 18 BRPM

## 2024-01-23 DIAGNOSIS — I10 PRIMARY HYPERTENSION: ICD-10-CM

## 2024-01-23 DIAGNOSIS — C61 PROSTATE CANCER (HCC): ICD-10-CM

## 2024-01-23 DIAGNOSIS — Z85.46 HISTORY OF PROSTATE CANCER: ICD-10-CM

## 2024-01-23 DIAGNOSIS — N18.32 HYPERTENSIVE KIDNEY DISEASE WITH STAGE 3B CHRONIC KIDNEY DISEASE (HCC): ICD-10-CM

## 2024-01-23 DIAGNOSIS — I12.9 HYPERTENSIVE KIDNEY DISEASE WITH STAGE 3B CHRONIC KIDNEY DISEASE (HCC): ICD-10-CM

## 2024-01-23 DIAGNOSIS — I10 ESSENTIAL HYPERTENSION: ICD-10-CM

## 2024-01-23 DIAGNOSIS — N18.32 STAGE 3B CHRONIC KIDNEY DISEASE (HCC): ICD-10-CM

## 2024-01-23 DIAGNOSIS — N18.32 TYPE 2 DIABETES MELLITUS WITH STAGE 3B CHRONIC KIDNEY DISEASE, WITHOUT LONG-TERM CURRENT USE OF INSULIN (HCC): Primary | ICD-10-CM

## 2024-01-23 DIAGNOSIS — E66.01 OBESITY, MORBID (HCC): ICD-10-CM

## 2024-01-23 DIAGNOSIS — J44.9 COPD, SEVERE (HCC): ICD-10-CM

## 2024-01-23 DIAGNOSIS — F11.20 CONTINUOUS OPIOID DEPENDENCE (HCC): ICD-10-CM

## 2024-01-23 DIAGNOSIS — I71.21 ANEURYSM OF ASCENDING AORTA WITHOUT RUPTURE (HCC): ICD-10-CM

## 2024-01-23 DIAGNOSIS — Z85.038 HISTORY OF COLON CANCER: ICD-10-CM

## 2024-01-23 DIAGNOSIS — E11.22 TYPE 2 DIABETES MELLITUS WITH STAGE 3B CHRONIC KIDNEY DISEASE, WITHOUT LONG-TERM CURRENT USE OF INSULIN (HCC): Primary | ICD-10-CM

## 2024-01-23 DIAGNOSIS — J43.2 CENTRILOBULAR EMPHYSEMA (HCC): ICD-10-CM

## 2024-01-23 DIAGNOSIS — J96.11 CHRONIC HYPOXEMIC RESPIRATORY FAILURE (HCC): ICD-10-CM

## 2024-01-23 PROCEDURE — 99214 OFFICE O/P EST MOD 30 MIN: CPT | Performed by: INTERNAL MEDICINE

## 2024-01-23 RX ORDER — AMLODIPINE BESYLATE 10 MG/1
10 TABLET ORAL EVERY MORNING
Qty: 90 TABLET | Refills: 3 | Status: SHIPPED | OUTPATIENT
Start: 2024-01-23

## 2024-01-23 NOTE — PROGRESS NOTES
Assessment/Plan:    Diagnoses and all orders for this visit:    Type 2 diabetes mellitus with stage 3b chronic kidney disease, without long-term current use of insulin (HCC)  -     Uric acid; Future  -     CBC and differential; Future  -     Comprehensive metabolic panel; Future  -     Lipid panel; Future  -     Hemoglobin A1C; Future  -     TSH, 3rd generation with Free T4 reflex; Future    Essential hypertension  -     amLODIPine (NORVASC) 10 mg tablet; Take 1 tablet (10 mg total) by mouth every morning    COPD, severe (HCC)    Chronic hypoxemic respiratory failure (HCC)    Centrilobular emphysema (HCC)    Primary hypertension    Stage 3b chronic kidney disease (HCC)    Prostate cancer (HCC)    Hypertensive kidney disease with stage 3b chronic kidney disease (HCC)    Obesity, morbid (HCC)    History of prostate cancer    History of colon cancer    Continuous opioid dependence (HCC)    Aneurysm of ascending aorta without rupture (HCC)  -     CT chest wo contrast; Future              Patient Instructions   Lab data reviewed in detail and compared to prior    COPD appears clinically stable on Breztri, following with pulmonary with continuous oxygen    Pulmonary nodules-have been stable since 2017    4.1 cm ascending thoracic aortic aneurysm has been [id number] CT ordered    Diabetes in remission stable with A1c 6.0    Hypertension stable on present regimen    Chronic kidney disease following with nephrology has remained stable.  Keep well-hydrated and avoid nephrotoxic agents    Health maintenance-up-to-date with the exception of RSV vaccine which is advised at local pharmacy.    6-month follow-up after labs, sooner as needed.    History of colon cancer-CEA stable at 3.5 CT last February no evidence for recurrent disease    History of gout-stable follow-up uric acid, continue on allopurinol    Subjective:      Patient ID: Darrell Mayer is a 83 y.o. male    F/u mmp, review labs, here w/ Lisha who helps w/  hx.  Lumbar radiculopathy-s/p SCC and rhizotomy, had been f/b PM, but exhausted options, using T4's prior to any activity, not daily.     COPD-fairly stable, on Breztri.  He is less compliant w/ continuous O2 at 2lpm at home, but always when he goes out. F/b Dr. Chance Abdul nodules-largest 5 mm, stable '17-'23  Ascending aortic aneurysm-4.1 cm in '19, stable 2/23  DM-not really following diet, no regular exercise  HTN-taking rx as directed, home bp's daily have all been nl b/t 120-140/60-70's.    CKD-f/b Dr. Pierre, keeping hydrated, no nsaids.   Gout-taking allopurinol, no recent flares  H/o prostate ca s/p prostatectomy '05, psa undetectable in May, f/b urology  H/o colon CA-CEA stable about 3.5  H/o kidney stones, b/l non-obstructing calculi on CT 2/23  He notes balance is stable, no falls.  Walking without assistive device.          Current Outpatient Medications:     acetaminophen-codeine (TYLENOL with CODEINE #4) 300-60 MG per tablet, TAKE 1 TABLET EVERY 6 HOURSAS NEEDED FOR MODERATE PAIN, Disp: 60 tablet, Rfl: 0    albuterol (2.5 mg/3 mL) 0.083 % nebulizer solution, Take 3 mL (2.5 mg total) by nebulization every 6 (six) hours, Disp: 1080 mL, Rfl: 3    albuterol (Ventolin HFA) 90 mcg/act inhaler, Inhale 2 puffs every 6 (six) hours as needed for wheezing or shortness of breath, Disp: 18 g, Rfl: 3    allopurinol (ZYLOPRIM) 100 mg tablet, TAKE 1 TABLET DAILY, Disp: 90 tablet, Rfl: 2    amLODIPine (NORVASC) 10 mg tablet, Take 1 tablet (10 mg total) by mouth every morning, Disp: 90 tablet, Rfl: 3    Budeson-Glycopyrrol-Formoterol (Breztri Aerosphere) 160-9-4.8 MCG/ACT AERO, Inhale 2 puffs 2 (two) times a day Rinse mouth after use., Disp: 31.1 g, Rfl: 3    cholecalciferol (VITAMIN D3) 1,000 units tablet, Take 5 tablets (5,000 Units total) by mouth every other day, Disp: , Rfl:     losartan (COZAAR) 100 MG tablet, TAKE 1 TABLET DAILY, Disp: 90 tablet, Rfl: 2    Polyvinyl Alcohol-Povidone (REFRESH OP), Apply to eye as  needed , Disp: , Rfl:     prochlorperazine (COMPAZINE) 10 mg tablet, Take 1 tablet (10 mg total) by mouth every 6 (six) hours as needed for nausea or vomiting, Disp: 30 tablet, Rfl: 0    Vitamins-Lipotropics (LIPO-FLAVONOID PLUS PO), Take by mouth, Disp: , Rfl:     metoprolol succinate (TOPROL-XL) 25 mg 24 hr tablet, TAKE 1 TABLET DAILY, Disp: 90 tablet, Rfl: 1    Recent Results (from the past 1008 hour(s))   CEA    Collection Time: 01/17/24  1:15 PM   Result Value Ref Range    CEA 3.5 (H) 0.0 - 3.0 ng/mL   CBC and differential    Collection Time: 01/17/24  1:15 PM   Result Value Ref Range    WBC 7.19 4.31 - 10.16 Thousand/uL    RBC 6.02 (H) 3.88 - 5.62 Million/uL    Hemoglobin 14.2 12.0 - 17.0 g/dL    Hematocrit 46.5 36.5 - 49.3 %    MCV 77 (L) 82 - 98 fL    MCH 23.6 (L) 26.8 - 34.3 pg    MCHC 30.5 (L) 31.4 - 37.4 g/dL    RDW 17.4 (H) 11.6 - 15.1 %    Platelets 250 149 - 390 Thousands/uL    nRBC 0 /100 WBCs    Neutrophils Relative 72 43 - 75 %    Immat GRANS % 0 0 - 2 %    Lymphocytes Relative 19 14 - 44 %    Monocytes Relative 6 4 - 12 %    Eosinophils Relative 2 0 - 6 %    Basophils Relative 1 0 - 1 %    Neutrophils Absolute 5.22 1.85 - 7.62 Thousands/µL    Immature Grans Absolute 0.03 0.00 - 0.20 Thousand/uL    Lymphocytes Absolute 1.34 0.60 - 4.47 Thousands/µL    Monocytes Absolute 0.44 0.17 - 1.22 Thousand/µL    Eosinophils Absolute 0.12 0.00 - 0.61 Thousand/µL    Basophils Absolute 0.04 0.00 - 0.10 Thousands/µL   Comprehensive metabolic panel    Collection Time: 01/17/24  1:15 PM   Result Value Ref Range    Sodium 140 135 - 147 mmol/L    Potassium 4.1 3.5 - 5.3 mmol/L    Chloride 104 96 - 108 mmol/L    CO2 30 21 - 32 mmol/L    ANION GAP 6 mmol/L    BUN 15 5 - 25 mg/dL    Creatinine 1.49 (H) 0.60 - 1.30 mg/dL    Glucose, Fasting 104 (H) 65 - 99 mg/dL    Calcium 9.8 8.4 - 10.2 mg/dL    AST 31 13 - 39 U/L    ALT 14 7 - 52 U/L    Alkaline Phosphatase 92 34 - 104 U/L    Total Protein 8.3 6.4 - 8.4 g/dL    Albumin  4.4 3.5 - 5.0 g/dL    Total Bilirubin 0.99 0.20 - 1.00 mg/dL    eGFR 43 ml/min/1.73sq m   Lipid panel    Collection Time: 01/17/24  1:15 PM   Result Value Ref Range    Cholesterol 110 See Comment mg/dL    Triglycerides 93 See Comment mg/dL    HDL, Direct 42 >=40 mg/dL    LDL Calculated 49 0 - 100 mg/dL    Non-HDL-Chol (CHOL-HDL) 68 mg/dl   Hemoglobin A1C    Collection Time: 01/17/24  1:15 PM   Result Value Ref Range    Hemoglobin A1C 6.0 (H) Normal 4.0-5.6%; PreDiabetic 5.7-6.4%; Diabetic >=6.5%; Glycemic control for adults with diabetes <7.0% %     mg/dl   TSH, 3rd generation with Free T4 reflex    Collection Time: 01/17/24  1:15 PM   Result Value Ref Range    TSH 3RD GENERATON 1.703 0.450 - 4.500 uIU/mL       The following portions of the patient's history were reviewed and updated as appropriate: allergies, current medications, past family history, past medical history, past social history, past surgical history and problem list.     Review of Systems   Constitutional:  Negative for chills and fever.   HENT:  Negative for ear pain and sore throat.    Eyes:  Negative for pain and visual disturbance.   Respiratory:  Negative for cough and shortness of breath.    Cardiovascular:  Negative for chest pain and palpitations.   Gastrointestinal:  Negative for abdominal pain and vomiting.   Genitourinary:  Negative for dysuria and hematuria.   Musculoskeletal:  Negative for arthralgias and back pain.   Skin:  Negative for color change and rash.   Neurological:  Negative for seizures and syncope.   All other systems reviewed and are negative.        Objective:      Vitals:    01/23/24 1343   BP: 124/84   Pulse: (!) 46   Resp: 18   SpO2: 96%          Physical Exam  Constitutional:       Appearance: He is well-developed.   HENT:      Head: Normocephalic and atraumatic.      Nose: Nose normal.   Eyes:      General: No scleral icterus.     Conjunctiva/sclera: Conjunctivae normal.      Pupils: Pupils are equal, round,  and reactive to light.   Neck:      Thyroid: No thyromegaly.      Vascular: No JVD.      Trachea: No tracheal deviation.   Cardiovascular:      Rate and Rhythm: Normal rate and regular rhythm.      Heart sounds: No murmur heard.     No friction rub. No gallop.   Pulmonary:      Effort: Pulmonary effort is normal. No respiratory distress.      Breath sounds: Normal breath sounds. No wheezing or rales.   Musculoskeletal:         General: No deformity.      Cervical back: Normal range of motion and neck supple.   Lymphadenopathy:      Cervical: No cervical adenopathy.   Skin:     General: Skin is warm and dry.      Coloration: Skin is not pale.      Findings: No erythema or rash.   Neurological:      Mental Status: He is alert and oriented to person, place, and time.      Cranial Nerves: No cranial nerve deficit.   Psychiatric:         Behavior: Behavior normal.         Thought Content: Thought content normal.         Judgment: Judgment normal.

## 2024-01-23 NOTE — PATIENT INSTRUCTIONS
Lab data reviewed in detail and compared to prior    COPD appears clinically stable on Breztri, following with pulmonary with continuous oxygen    Pulmonary nodules-have been stable since 2017    4.1 cm ascending thoracic aortic aneurysm has been [id number] CT ordered    Diabetes in remission stable with A1c 6.0    Hypertension stable on present regimen    Chronic kidney disease following with nephrology has remained stable.  Keep well-hydrated and avoid nephrotoxic agents    Health maintenance-up-to-date with the exception of RSV vaccine which is advised at local pharmacy.    6-month follow-up after labs, sooner as needed.    History of colon cancer-CEA stable at 3.5 CT last February no evidence for recurrent disease    History of gout-stable follow-up uric acid, continue on allopurinol

## 2024-01-24 DIAGNOSIS — J43.2 CENTRILOBULAR EMPHYSEMA (HCC): ICD-10-CM

## 2024-01-24 RX ORDER — ALBUTEROL SULFATE 2.5 MG/3ML
SOLUTION RESPIRATORY (INHALATION)
Qty: 120 ML | Refills: 1 | Status: SHIPPED | OUTPATIENT
Start: 2024-01-24

## 2024-02-21 PROBLEM — Z12.11 SCREENING FOR COLON CANCER: Status: RESOLVED | Noted: 2018-07-17 | Resolved: 2024-02-21

## 2024-02-23 ENCOUNTER — TELEPHONE (OUTPATIENT)
Age: 84
End: 2024-02-23

## 2024-02-23 DIAGNOSIS — M54.16 LUMBAR RADICULOPATHY: ICD-10-CM

## 2024-02-23 RX ORDER — ACETAMINOPHEN AND CODEINE PHOSPHATE 300; 60 MG/1; MG/1
1 TABLET ORAL EVERY 6 HOURS PRN
Qty: 60 TABLET | Refills: 0 | Status: SHIPPED | OUTPATIENT
Start: 2024-02-23

## 2024-02-29 ENCOUNTER — HOSPITAL ENCOUNTER (OUTPATIENT)
Dept: CT IMAGING | Facility: HOSPITAL | Age: 84
End: 2024-02-29
Attending: INTERNAL MEDICINE
Payer: COMMERCIAL

## 2024-02-29 DIAGNOSIS — I71.21 ANEURYSM OF ASCENDING AORTA WITHOUT RUPTURE (HCC): ICD-10-CM

## 2024-02-29 PROCEDURE — 71250 CT THORAX DX C-: CPT

## 2024-02-29 PROCEDURE — G1004 CDSM NDSC: HCPCS

## 2024-03-06 ENCOUNTER — TELEPHONE (OUTPATIENT)
Age: 84
End: 2024-03-06

## 2024-03-06 DIAGNOSIS — R91.1 LUNG NODULE: Primary | ICD-10-CM

## 2024-03-11 ENCOUNTER — TELEPHONE (OUTPATIENT)
Age: 84
End: 2024-03-11

## 2024-03-11 DIAGNOSIS — M54.16 LUMBAR RADICULOPATHY: ICD-10-CM

## 2024-03-11 RX ORDER — ACETAMINOPHEN AND CODEINE PHOSPHATE 300; 60 MG/1; MG/1
1 TABLET ORAL EVERY 6 HOURS PRN
Qty: 60 TABLET | Refills: 0 | Status: SHIPPED | OUTPATIENT
Start: 2024-03-11 | End: 2024-03-22 | Stop reason: SDUPTHER

## 2024-03-12 ENCOUNTER — TELEPHONE (OUTPATIENT)
Age: 84
End: 2024-03-12

## 2024-03-15 DIAGNOSIS — I10 HYPERTENSION, UNSPECIFIED TYPE: ICD-10-CM

## 2024-03-18 RX ORDER — METOPROLOL SUCCINATE 25 MG/1
TABLET, EXTENDED RELEASE ORAL
Qty: 90 TABLET | Refills: 1 | Status: SHIPPED | OUTPATIENT
Start: 2024-03-18

## 2024-03-22 ENCOUNTER — TELEPHONE (OUTPATIENT)
Age: 84
End: 2024-03-22

## 2024-03-22 DIAGNOSIS — M54.16 LUMBAR RADICULOPATHY: ICD-10-CM

## 2024-03-22 RX ORDER — ACETAMINOPHEN AND CODEINE PHOSPHATE 300; 60 MG/1; MG/1
1 TABLET ORAL EVERY 6 HOURS PRN
Qty: 60 TABLET | Refills: 0 | Status: SHIPPED | OUTPATIENT
Start: 2024-03-22

## 2024-04-10 DIAGNOSIS — M54.16 LUMBAR RADICULOPATHY: ICD-10-CM

## 2024-04-10 DIAGNOSIS — M10.9 GOUT, UNSPECIFIED CAUSE, UNSPECIFIED CHRONICITY, UNSPECIFIED SITE: ICD-10-CM

## 2024-04-11 DIAGNOSIS — M10.9 GOUT, UNSPECIFIED CAUSE, UNSPECIFIED CHRONICITY, UNSPECIFIED SITE: ICD-10-CM

## 2024-04-11 DIAGNOSIS — M54.16 LUMBAR RADICULOPATHY: ICD-10-CM

## 2024-04-11 RX ORDER — ALLOPURINOL 100 MG/1
100 TABLET ORAL DAILY
Qty: 90 TABLET | Refills: 3 | Status: SHIPPED | OUTPATIENT
Start: 2024-04-11 | End: 2024-04-11 | Stop reason: SDUPTHER

## 2024-04-11 RX ORDER — ACETAMINOPHEN AND CODEINE PHOSPHATE 300; 60 MG/1; MG/1
1 TABLET ORAL EVERY 6 HOURS PRN
Qty: 60 TABLET | Refills: 0 | Status: SHIPPED | OUTPATIENT
Start: 2024-04-11 | End: 2024-04-11 | Stop reason: SDUPTHER

## 2024-04-11 NOTE — TELEPHONE ENCOUNTER
Patient called and stated that the medications were sent to the wrong pharmacy. They want it to go to [facility]. Will call Brenda and cancel the pharmacy refill request to them

## 2024-04-12 RX ORDER — ALLOPURINOL 100 MG/1
100 TABLET ORAL DAILY
Qty: 90 TABLET | Refills: 3 | Status: SHIPPED | OUTPATIENT
Start: 2024-04-12

## 2024-04-12 RX ORDER — ACETAMINOPHEN AND CODEINE PHOSPHATE 300; 60 MG/1; MG/1
1 TABLET ORAL EVERY 6 HOURS PRN
Qty: 60 TABLET | Refills: 0 | Status: SHIPPED | OUTPATIENT
Start: 2024-04-12

## 2024-04-23 DIAGNOSIS — J43.2 CENTRILOBULAR EMPHYSEMA (HCC): ICD-10-CM

## 2024-04-23 RX ORDER — ALBUTEROL SULFATE 2.5 MG/3ML
SOLUTION RESPIRATORY (INHALATION)
Qty: 360 ML | Refills: 1 | Status: SHIPPED | OUTPATIENT
Start: 2024-04-23

## 2024-04-26 ENCOUNTER — TELEPHONE (OUTPATIENT)
Dept: OTHER | Facility: HOSPITAL | Age: 84
End: 2024-04-26

## 2024-04-26 NOTE — TELEPHONE ENCOUNTER
Called and left Darrell a message.  He should reach out to the pulmonary rehab to see if they are able to switch him to the different location.

## 2024-04-26 NOTE — TELEPHONE ENCOUNTER
Also left message that he could use walker during rehab if he needed to to complete the rehab, unless otherwise indicated by therapist

## 2024-04-30 ENCOUNTER — CLINICAL SUPPORT (OUTPATIENT)
Facility: HOSPITAL | Age: 84
End: 2024-04-30

## 2024-04-30 DIAGNOSIS — J44.9 COPD, SEVERE (HCC): Primary | ICD-10-CM

## 2024-04-30 NOTE — PROGRESS NOTES
PULMONARY REHABILITATION   ASSESSMENT AND INDIVIDUALIZED TREATMENT PLAN  INITIAL     Today's date: 2024  # of Exercise Sessions Completed: 1-initial eval  Patient name: Darrell Mayer     : 1940       MRN: [id number]  PCP: Long Chandler MD  Referring Physician: Suzanne Fletcher MD  Pulmonologist: Suzanne Fletcher MD    Provider: Edgar  Clinician: Shakira Chávez MS       ASSESSMENT    PULMONARY   Dx:   Encounter Diagnosis   Name Primary?    COPD, severe (HCC) Yes     Date of onset: 5/3/2023  Description of Diagnosis: very severe COPD, pt has not be active at home.  Other Pulmonary History:   Comments: centrilobular emphysema, acute bronchitis, chronic hypoxemic respiratory failure    PFT:    does have a PFT on file  FEV1/FVC ratio of 56% and an   FEV1 of 1.53% predicted  severeobstruction.    Pulmonary Disease Risk Factors:  smoking    Sleep Disorders:   none    Medical History:   Past Medical History:   Diagnosis Date    Back pain     Bronchiectasis (HCC)     Cataract     Chronic kidney disease     COPD (chronic obstructive pulmonary disease) (HCC)     History of malignant neoplasm of oral cavity     M0    HL (hearing loss)     Hypertension     Kidney stone 2019    Malignant neoplasm of colon (HCC)     descending    Prostate cancer (HCC)     SOB (shortness of breath)     Tachycardia     Thalassemia trait     Tinnitus        Family History:  Family History   Problem Relation Age of Onset    Heart failure Mother         as per [employer]    Coronary artery disease Mother         as per [employer]    Diabetes Mother         mellitus - as per [employer]    Coronary artery disease Father         as per [employer]    Cancer Sister         malignant neoplasm       Allergies:   Patient has no known allergies.    Current Medications:   Current Outpatient Medications   Medication Sig Dispense Refill    acetaminophen-codeine (TYLENOL with CODEINE #4) 300-60 MG per tablet Take 1 tablet by mouth every 6  (six) hours as needed for moderate pain 60 tablet 0    albuterol (2.5 mg/3 mL) 0.083 % nebulizer solution USE 1 VIAL IN NEBULIZER EVERY 6 HOURS 360 mL 1    albuterol (Ventolin HFA) 90 mcg/act inhaler Inhale 2 puffs every 6 (six) hours as needed for wheezing or shortness of breath 18 g 3    allopurinol (ZYLOPRIM) 100 mg tablet Take 1 tablet (100 mg total) by mouth daily 90 tablet 3    amLODIPine (NORVASC) 10 mg tablet Take 1 tablet (10 mg total) by mouth every morning 90 tablet 3    Budeson-Glycopyrrol-Formoterol (Breztri Aerosphere) 160-9-4.8 MCG/ACT AERO Inhale 2 puffs 2 (two) times a day Rinse mouth after use. 31.1 g 3    cholecalciferol (VITAMIN D3) 1,000 units tablet Take 5 tablets (5,000 Units total) by mouth every other day      losartan (COZAAR) 100 MG tablet TAKE 1 TABLET DAILY 90 tablet 2    metoprolol succinate (TOPROL-XL) 25 mg 24 hr tablet TAKE 1 TABLET DAILY 90 tablet 1    Polyvinyl Alcohol-Povidone (REFRESH OP) Apply to eye as needed       prochlorperazine (COMPAZINE) 10 mg tablet Take 1 tablet (10 mg total) by mouth every 6 (six) hours as needed for nausea or vomiting 30 tablet 0    Vitamins-Lipotropics (LIPO-FLAVONOID PLUS PO) Take by mouth       No current facility-administered medications for this visit.       Medication compliance: Yes   Comments: Pt reports to be compliant with medications    Cultural needs: none      EXERCISE ASSESSMENT:      FUNCTIONAL STATUS:  Current Status:  Occupation: retired  Recreation: Gardening, work around the house  ADL’s:No limitations Capable of performing light to moderate ADLs able to perform self-care  [place]: No limitations Capable of performing light to moderate ADLs able to perform self-care  Home Exercise/Equipment: None, bike  Other: none    Physical Limitations: Knee-     Fall Risk: Low   Comments: Ambulates with a steady gait with no assist device and Denies a fall in the past 6 months    Initial Fitness Assessment: 6MWT:  ECG Summary: Possible AV,  "pt does not follow with cardiologistdanika texted PCP.   Deferred initial testing      NUTRITION ASSESSMENT:    Height:   Ht Readings from Last 1 Encounters:   01/23/24 6' 1\" (1.854 m)       Weight control:    Starting weight:    Current weight:     Rate Your Plate Score: 46/81    Diabetes: N/A  A1c: 6.0     Last Measured: 1/17/2024  Medication Compliance: compliant all of the time      Current Dietary Habits:  Increased fruit intake, decreased sweets  Coffee and corn bread   Grilled cheese   Does not use extra salt on food  Can increase hydration     Drug/Alcohol Use: No      PSYCHOSOCIAL ASSESSMENT:    Depression screening:  PHQ-9 = 6    Interpretation:  5-9 = Mild Depression  Last Assessed: 4/30/2024    Anxiety screening:  VASQUEZ-7 = 1    Interpretation: 0-4  = Not anxious  Last Assessed: 4/30/24    Pt self-report of depression and anxiety:   Patient reports they are coping well with good social support and denies depression or anxiety    Self-reported stress level:   0   Stressors:  None- does not really get stressed about anything  Stress Management Tools: practice Relaxation Techniques, spend time outside, and spend time with family    Quality of Life Screen:  (Higher score indicates disease impact on QOL)  [facility] COOP score: /40        Social Support: spouse  Community/Social Activities: use to go to gym with wife     Psychosocial Assessment as it relates to rehabilitation:   Patient denies issues with his/her family or home life that may affect their rehabilitation efforts.       OTHER CORE COMPONENT ASSESSMENT:    Tobacco Use: N/A: Pt has a remote history of smoking  Tobacco Intervention: N/A:  Pt has a remote history of smoking.    Pt quit 20 years ago and has abstained since quitting.      Hospitalizations in the past year: 0    Oxygen needs:   Rest:  room air  Exercise/physical activity:  supplemental O2 via nasal cannula @ 2L/min   Sleep:   supplemental O2 via nasal cannula @ 2.5L/min     Does Pt " monitor home SpO2? no   Average SpO2 at rest:  n/a%   Average SpO2 with ADLs/physical activity:  n/a%      Breathing Treatments:   Rescue Inhaler: Yes:  1 times per day occasionally   Maintenance Inhaler: No  Nebulizer Treatments:  Yes:  2 times per day  Patient practices breathing techniques at home:  No      INDIVIDUALIZED TREATMENT PLAN    Patient will attend 24 monitored exercise sessions beginning Thursday May 2nd at 1pm.    See outlined plan of care below for specific patient goals in each component of care.      PATIENT SPECIFIC GOALS:     Exercise: (home exercise, ADLs)  attend pulmonary rehab regularly  Decrease sitting time at home   Nutrition: (wt control, diabetes management, dietary modifications)  improve hydration  Increase chicken and fish   Psychosocial: (stress, emotional well being, social support)  spend time with family  Maintain emotional health   Core Component: (smoking, BP control, angina control, medication)  reduced dietary sodium <2000mg    SMART GOALS:  Exercise:   reduced score on CAT, improved 6MWT distance, reduced dyspnea during exercise, improved exercise tolerance based on peak METs tolerated in pulmonary rehab exercise session, and SpO2 >88% during exercise   Nutrition:   LDL <100, HDL >40, TRG <150, CHOL <200, Improved Rate Your Plate score  >64, eat 6 or more servings of grain products per day, eat whole grain breads, brown rice and whole grain cereals, eat 5 or more servings of fruits and vegetables a day, eat 2 or more servings of low fat milk or yogurt a day, eat no more than 6oz of meat per day, eat red meat once a week or less, choose lean beef or rarely eat beef, rarely eat processed meats or eat low fat processed meats, eat poultry without the skin, eat chicken and fish that is not fried, eat meatless meals twice a week or more, choose 1% or skim milk, rarely eat cheese or choose reduced fat or skim, Do not cook with oil, butter or margarine, Do not eat fried foods, use  "\"light\" tub margarine on bread, potatoes and vegetables, choose light or fat-free salad dressings or [place], choose healthy snacks such as fruit, pretzels, and low fat crackers, choose healthy desserts and sweets such as pau food cake or  fruit, choose low sodium canned, frozen/packaged foods or rarely/never eat, rarely/never eat salty snacks, and choose low sugar desserts and sweets   Psychosocial:   Physical Fitness in [facility] Score < 3, [facility], and feel less tired with more energy    Core Components:   consistent, controlled resting BP < 130/80 and medication compliance      EXERCISE PLAN    Progress toward SMART and personal Exercise goals: Patient is agreeable to attend cardiopulmonary rehab exercise sessions 3x/wk x 36 sessions.    Group and Individual Education: pursed lipped breathing, diaphragmatic breathing, O2 saturation monitoring, and appropriate O2 response to exercise    Readiness to change: Preparation:  (Getting ready to change)     Plan for next 30 days:    Titrate supplemental oxygen as needed to maintain SpO2>88% with exercise, learn to conserve energy with ADLs , practice diaphragmatic breathing, reduce time sitting at home, increased strength of respiratory muscles, utilize PLB with physical activity, and begin a home exercise program     Current Aerobic Exercise Prescription:      Frequency: 2 days/week *Supplement with home exercise 2+ days/wk as tolerated        Minutes: 20-40         METS: 1.8-2.5              SpO2: >88%              RPD: 4-6          HR: RHR +30-40bpm   RPE: 4-6         Modalities: UBE, NuStep, and Recumbent bike     Exercise workloads will be progressed gradually as tolerated, within limits of patient's ability, and according to the patient's response to the exercise program.    Aerobic Exercise Prescription - 30 day goal:   Frequency: 2 days/week *Supplement with home exercise 2+ days/wk as tolerated        Minutes: 20-40         METS: 1.8-2.5      "         SpO2: >88%              RPD: 4-6          HR: RHR +30-40bpm   RPE: 4-6         Modalities: UBE, NuStep, and Recumbent bike     Strength trainin-3 days / week  12-15 repetitions  1-2 sets per modality   Will be added following 2-3 weeks of monitored exercise sessions   Modalities: Lateral Raise, Arm Curl, Sit to Stands, and Front Raises    Supplemental Oxygen Needs with Exercise:  supplemental O2 2L/min via nasal canula.    Home Exercise: none    The patient was counseled on exercise guidelines to achieve a minimum of 150 mins/wk of moderate intensity (RPE 4-6) exercise and encouraged to add 1-2 days of exercise on opposite days of cardiac rehab as tolerated.       NUTRITION PLAN    Patient's progress toward SMART and personal Nutrition goals:   Patient is agreeable to attend cardiopulmonary rehab exercise sessions 3x/wk x 36 sessions.    Group and Individual Education:   low sodium diet  maintaining hydration    Readiness to change: Preparation:  (Getting ready to change)     Plan for next 30 days:   group class: Reading Food Labels, group Class: Heart Healthy Eating, choose lean red meat, and eat chicken and fish that is baked, broiled or roasted    Measurable goals were based Rate Your Plate Dietary Self-Assessment. These are the areas in which the patient could score higher on the assessment. Goals include recommendations for a heart healthy diet based on American Heart Association.      PSYCHOSOCIAL PLAN    Patient's progress toward SMART and personal Psychosocial goals:   Patient is agreeable to attend cardiopulmonary rehab exercise sessions 3x/wk x 36 sessions.    Group and Individual Education: signs/sxs of depression and benefits of a positive support system    Readiness to change: Preparation:  (Getting ready to change)     Plan for next 30 days:   Class: Stress and Your Health    Information to utilize [employer] was provided as well as contact information for counseling through   Behavioral Health and group psychotherapy groups available.      OTHER CORE COMPONENTS PLAN    Blood pressure:    Restin/80   Exercise:    Recovery:     Pt will be encouraged to monitor home BP if advised by cardiologist.    Progress toward SMART and personal Core Component goals:   Patient is agreeable to attend cardiopulmonary rehab exercise sessions 3x/wk x 36 sessions.    Group and Individual Education:  understanding high blood pressure and it's relationship to CAD    Readiness to change: Preparation:  (Getting ready to change)     Plan for next 30 days:   avoid processed foods, engage in regular exercise, eliminate salt shaker at the table, use salt substitutes, check labels for sodium content, monitor home BP, group class: Pulmonary Anatomy and Physiology, and group class:  Pulmonary Medications

## 2024-04-30 NOTE — PROGRESS NOTES
[facility] Cardiopulmonary Rehab  [place]    Emotional well-being and depression is addressed in the Pulmonary rehab evaluation. To assess the severity of depression and anxiety, patients are given the PHQ-9 Depression Questionnaire and the VASQUEZ-7 Anxiety Questionnaire.    This is to inform you that your patient Darrell scored a 6 which is interpreted as 5-9 = Mild Depression and a 1 which is interpreted as 0-4  = Not anxious.    The patient was provided with contact information for [facility] and has been encouraged to enroll in [employer].   A repeat PHQ -9 and VASQUEZ-7 will be administered in 30 days to assess improvement.

## 2024-05-01 ENCOUNTER — HOSPITAL ENCOUNTER (INPATIENT)
Facility: HOSPITAL | Age: 84
LOS: 1 days | Discharge: HOME | DRG: 243 | End: 2024-05-03
Attending: EMERGENCY MEDICINE | Admitting: INTERNAL MEDICINE
Payer: COMMERCIAL

## 2024-05-01 ENCOUNTER — OFFICE VISIT (OUTPATIENT)
Age: 84
End: 2024-05-01
Payer: COMMERCIAL

## 2024-05-01 VITALS
SYSTOLIC BLOOD PRESSURE: 130 MMHG | BODY MASS INDEX: 33.13 KG/M2 | HEART RATE: 47 BPM | WEIGHT: 250 LBS | HEIGHT: 73 IN | DIASTOLIC BLOOD PRESSURE: 80 MMHG

## 2024-05-01 DIAGNOSIS — I10 HYPERTENSION, UNSPECIFIED TYPE: ICD-10-CM

## 2024-05-01 DIAGNOSIS — I10 ESSENTIAL HYPERTENSION: ICD-10-CM

## 2024-05-01 DIAGNOSIS — R94.31 ABNORMAL EKG: ICD-10-CM

## 2024-05-01 DIAGNOSIS — R00.1 BRADYCARDIA: ICD-10-CM

## 2024-05-01 DIAGNOSIS — R94.31 ABNORMAL EKG: Primary | ICD-10-CM

## 2024-05-01 DIAGNOSIS — I44.2 COMPLETE HEART BLOCK (HCC): Primary | ICD-10-CM

## 2024-05-01 DIAGNOSIS — F32.89 OTHER DEPRESSION: ICD-10-CM

## 2024-05-01 LAB
BASOPHILS # BLD AUTO: 0.02 THOUSANDS/ÂΜL (ref 0–0.1)
BASOPHILS NFR BLD AUTO: 0 % (ref 0–1)
EOSINOPHIL # BLD AUTO: 0.02 THOUSAND/ÂΜL (ref 0–0.61)
EOSINOPHIL NFR BLD AUTO: 0 % (ref 0–6)
ERYTHROCYTE [DISTWIDTH] IN BLOOD BY AUTOMATED COUNT: 17.9 % (ref 11.6–15.1)
GLUCOSE SERPL-MCNC: 98 MG/DL (ref 65–140)
HCT VFR BLD AUTO: 45 % (ref 36.5–49.3)
HGB BLD-MCNC: 13.9 G/DL (ref 12–17)
IMM GRANULOCYTES # BLD AUTO: 0.04 THOUSAND/UL (ref 0–0.2)
IMM GRANULOCYTES NFR BLD AUTO: 0 % (ref 0–2)
LYMPHOCYTES # BLD AUTO: 1.28 THOUSANDS/ÂΜL (ref 0.6–4.47)
LYMPHOCYTES NFR BLD AUTO: 13 % (ref 14–44)
MAGNESIUM SERPL-MCNC: 1.9 MG/DL (ref 1.9–2.7)
MCH RBC QN AUTO: 23.8 PG (ref 26.8–34.3)
MCHC RBC AUTO-ENTMCNC: 30.9 G/DL (ref 31.4–37.4)
MCV RBC AUTO: 77 FL (ref 82–98)
MONOCYTES # BLD AUTO: 0.53 THOUSAND/ÂΜL (ref 0.17–1.22)
MONOCYTES NFR BLD AUTO: 6 % (ref 4–12)
NEUTROPHILS # BLD AUTO: 7.74 THOUSANDS/ÂΜL (ref 1.85–7.62)
NEUTS SEG NFR BLD AUTO: 81 % (ref 43–75)
NRBC BLD AUTO-RTO: 0 /100 WBCS
PLATELET # BLD AUTO: 202 THOUSANDS/UL (ref 149–390)
PMV BLD AUTO: 10 FL (ref 8.9–12.7)
RBC # BLD AUTO: 5.84 MILLION/UL (ref 3.88–5.62)
TSH SERPL DL<=0.05 MIU/L-ACNC: 1.76 UIU/ML (ref 0.45–4.5)
WBC # BLD AUTO: 9.63 THOUSAND/UL (ref 4.31–10.16)

## 2024-05-01 PROCEDURE — 36415 COLL VENOUS BLD VENIPUNCTURE: CPT

## 2024-05-01 PROCEDURE — 93005 ELECTROCARDIOGRAM TRACING: CPT

## 2024-05-01 PROCEDURE — 86618 LYME DISEASE ANTIBODY: CPT

## 2024-05-01 PROCEDURE — 99285 EMERGENCY DEPT VISIT HI MDM: CPT

## 2024-05-01 PROCEDURE — 86617 LYME DISEASE ANTIBODY: CPT

## 2024-05-01 PROCEDURE — 83735 ASSAY OF MAGNESIUM: CPT

## 2024-05-01 PROCEDURE — 80053 COMPREHEN METABOLIC PANEL: CPT

## 2024-05-01 PROCEDURE — 85025 COMPLETE CBC W/AUTO DIFF WBC: CPT

## 2024-05-01 PROCEDURE — 1160F RVW MEDS BY RX/DR IN RCRD: CPT

## 2024-05-01 PROCEDURE — 3079F DIAST BP 80-89 MM HG: CPT

## 2024-05-01 PROCEDURE — 84443 ASSAY THYROID STIM HORMONE: CPT

## 2024-05-01 PROCEDURE — 99214 OFFICE O/P EST MOD 30 MIN: CPT

## 2024-05-01 PROCEDURE — 93000 ELECTROCARDIOGRAM COMPLETE: CPT

## 2024-05-01 PROCEDURE — 1159F MED LIST DOCD IN RCRD: CPT

## 2024-05-01 PROCEDURE — 82948 REAGENT STRIP/BLOOD GLUCOSE: CPT

## 2024-05-01 PROCEDURE — 1123F ACP DISCUSS/DSCN MKR DOCD: CPT | Performed by: INTERNAL MEDICINE

## 2024-05-01 PROCEDURE — 99223 1ST HOSP IP/OBS HIGH 75: CPT | Performed by: INTERNAL MEDICINE

## 2024-05-01 PROCEDURE — 3075F SYST BP GE 130 - 139MM HG: CPT

## 2024-05-01 RX ORDER — LOSARTAN POTASSIUM 50 MG/1
100 TABLET ORAL DAILY
Status: DISCONTINUED | OUTPATIENT
Start: 2024-05-02 | End: 2024-05-03 | Stop reason: HOSPADM

## 2024-05-01 RX ORDER — LOSARTAN POTASSIUM 100 MG/1
100 TABLET ORAL DAILY
Qty: 90 TABLET | Refills: 3 | Status: SHIPPED | OUTPATIENT
Start: 2024-05-01 | End: 2024-05-02 | Stop reason: SDUPTHER

## 2024-05-01 RX ORDER — INSULIN LISPRO 100 [IU]/ML
1-5 INJECTION, SOLUTION INTRAVENOUS; SUBCUTANEOUS
Status: DISCONTINUED | OUTPATIENT
Start: 2024-05-02 | End: 2024-05-03 | Stop reason: HOSPADM

## 2024-05-01 RX ORDER — AMLODIPINE BESYLATE 10 MG/1
10 TABLET ORAL EVERY MORNING
Qty: 90 TABLET | Refills: 3 | Status: SHIPPED | OUTPATIENT
Start: 2024-05-01 | End: 2024-05-02 | Stop reason: SDUPTHER

## 2024-05-01 RX ORDER — AMLODIPINE BESYLATE 10 MG/1
10 TABLET ORAL EVERY MORNING
Status: DISCONTINUED | OUTPATIENT
Start: 2024-05-02 | End: 2024-05-03 | Stop reason: HOSPADM

## 2024-05-01 RX ORDER — ACETAMINOPHEN 325 MG/1
650 TABLET ORAL EVERY 6 HOURS PRN
Status: DISCONTINUED | OUTPATIENT
Start: 2024-05-01 | End: 2024-05-03 | Stop reason: HOSPADM

## 2024-05-01 RX ORDER — HEPARIN SODIUM 5000 [USP'U]/ML
5000 INJECTION, SOLUTION INTRAVENOUS; SUBCUTANEOUS EVERY 8 HOURS SCHEDULED
Status: DISCONTINUED | OUTPATIENT
Start: 2024-05-02 | End: 2024-05-02

## 2024-05-01 RX ORDER — INSULIN LISPRO 100 [IU]/ML
1-5 INJECTION, SOLUTION INTRAVENOUS; SUBCUTANEOUS
Status: DISCONTINUED | OUTPATIENT
Start: 2024-05-01 | End: 2024-05-03 | Stop reason: HOSPADM

## 2024-05-01 RX ORDER — BUDESONIDE AND FORMOTEROL FUMARATE DIHYDRATE 160; 4.5 UG/1; UG/1
2 AEROSOL RESPIRATORY (INHALATION) 2 TIMES DAILY
Status: DISCONTINUED | OUTPATIENT
Start: 2024-05-02 | End: 2024-05-03 | Stop reason: HOSPADM

## 2024-05-01 RX ORDER — ALLOPURINOL 100 MG/1
50 TABLET ORAL DAILY
Status: DISCONTINUED | OUTPATIENT
Start: 2024-05-02 | End: 2024-05-03 | Stop reason: HOSPADM

## 2024-05-01 RX ADMIN — ACETAMINOPHEN 650 MG: 325 TABLET, FILM COATED ORAL at 20:33

## 2024-05-01 NOTE — H&P
[facility]  H&P  Name: Darrell Mayer 83 y.o. male I MRN: [id number]  Unit/Bed#: ED 06 I Date of Admission: 5/1/2024   Date of Service: 5/1/2024 I Hospital Day: 0      Assessment/Plan   * Complete heart block (HCC)  Assessment & Plan  83-year-old female with history of hypertension was noted outpatient to be in complete heart block  Currently asymptomatic  Case was discussed with cardiology plans to do pacemaker tomorrow  N.p.o. after midnight  Patient is evaluated by critical care [facility] for [facility] floor  Continue telemetry monitoring  Follow-up cardiology recommendations  Hold home metoprolol    COPD, severe (HCC)  Assessment & Plan  Not in acute exacerbation  Continue home inhalers    Chronic hypoxemic respiratory failure (HCC)  Assessment & Plan  Currently at baseline oxygen  Continue to monitor    Type 2 diabetes mellitus with chronic kidney disease, without long-term current use of insulin (HCC)  Assessment & Plan    Lab Results   Component Value Date    HGBA1C 6.0 (H) 01/17/2024       Stage 3 chronic kidney disease (HCC)  Assessment & Plan  Creatinine currently at baseline  Continue to monitor    History of hypertension  Assessment & Plan  Blood pressure currently controlled  Continue amlodipine, losartan           VTE Prophylaxis: Heparin  / sequential compression device   Code Status: full code  POLST: There is no POLST form on file for this patient (pre-hospital)  Discussion with family: patient    Anticipated Length of Stay:  Patient will be admitted on an Emergency basis with an anticipated length of stay of  < 2 midnights.   Justification for Hospital Stay: Complete heart block    Total Time for Visit, including Counseling / Coordination of Care: 60 minutes.  Greater than 50% of this total time spent on direct patient counseling and coordination of care.    Chief Complaint:   Lightheadedness    History of Present Illness:    Darrell Mayer is a 83 y.o. male with past medical history  significant COPD, hypertension, CKD who presented to the emergency department after being found to have complete heart block on EKG.  He reported some lightheadedness however reports he has had ongoing lightheadedness previously.  Denies any chest pain, shortness of breath, palpitations or any complaints at this time.  Denies fevers, chills, abdominal pain, nausea, vomiting.  Review of Systems:    Review of Systems   Constitutional:  Negative for appetite change, chills, diaphoresis, fatigue, fever and unexpected weight change.   HENT:  Negative for congestion, rhinorrhea and sore throat.    Eyes:  Negative for photophobia and visual disturbance.   Respiratory:  Negative for cough, shortness of breath and wheezing.    Cardiovascular:  Negative for chest pain, palpitations and leg swelling.   Gastrointestinal:  Negative for abdominal pain, anal bleeding, blood in stool, constipation, diarrhea, nausea and vomiting.   Genitourinary:  Negative for decreased urine volume, difficulty urinating, dysuria, flank pain, frequency, hematuria and urgency.   Musculoskeletal:  Negative for arthralgias, back pain, joint swelling and myalgias.   Skin:  Negative for color change and rash.   Neurological:  Positive for light-headedness. Negative for dizziness, seizures, facial asymmetry, speech difficulty, numbness and headaches.   Psychiatric/Behavioral:  Negative for agitation, confusion and decreased concentration. The patient is not nervous/anxious.        Past Medical and Surgical History:     Past Medical History:   Diagnosis Date    Back pain     Bronchiectasis (HCC)     Cataract     Chronic kidney disease     COPD (chronic obstructive pulmonary disease) (HCC)     History of malignant neoplasm of oral cavity     M0    HL (hearing loss)     Hypertension     Kidney stone 05/2019    Malignant neoplasm of colon (HCC)     descending    Prostate cancer (HCC)     SOB (shortness of breath)     Tachycardia     Thalassemia trait      Tinnitus        Past Surgical History:   Procedure Laterality Date    BACK SURGERY      CHOLECYSTECTOMY      COLON SURGERY  10/2008    partial colectomy    EYE SURGERY      INCISIONAL HERNIA REPAIR      JOINT REPLACEMENT Right     hip    TN COLONOSCOPY FLX DX W/COLLJ SPEC WHEN PFRMD N/A 07/23/2018    Procedure: COLONOSCOPY;  Surgeon: Jv Toribio III, MD;  Location: [facility];  Service: Gastroenterology    PROSTATE SURGERY      SPINAL CORD STIMULATOR IMPLANT      TONSILLECTOMY         Meds/Allergies:    Prior to Admission medications    Medication Sig Start Date End Date Taking? Authorizing Provider   acetaminophen-codeine (TYLENOL with CODEINE #4) 300-60 MG per tablet Take 1 tablet by mouth every 6 (six) hours as needed for moderate pain 4/12/24   Long Chandler MD   albuterol (2.5 mg/3 mL) 0.083 % nebulizer solution USE 1 VIAL IN NEBULIZER EVERY 6 HOURS 4/23/24   Suzanne Fletcher MD   allopurinol (ZYLOPRIM) 100 mg tablet Take 1 tablet (100 mg total) by mouth daily 4/12/24   Long Chandler MD   amLODIPine (NORVASC) 10 mg tablet Take 1 tablet (10 mg total) by mouth every morning 5/1/24   Sarah Landers PA-C   Budeson-Glycopyrrol-Formoterol (Breztri Aerosphere) 160-9-4.8 MCG/ACT AERO Inhale 2 puffs 2 (two) times a day Rinse mouth after use. 5/3/23   HUA Dupree   cholecalciferol (VITAMIN D3) 1,000 units tablet Take 5 tablets (5,000 Units total) by mouth every other day 5/12/22   Brian Pierre MD   losartan (COZAAR) 100 MG tablet Take 1 tablet (100 mg total) by mouth daily 5/1/24   Sarah Landers PA-C   metoprolol succinate (TOPROL-XL) 25 mg 24 hr tablet TAKE 1 TABLET DAILY 3/18/24   Long Chandler MD   Polyvinyl Alcohol-Povidone (REFRESH OP) Apply to eye as needed     Historical Provider, MD   prochlorperazine (COMPAZINE) 10 mg tablet Take 1 tablet (10 mg total) by mouth every 6 (six) hours as needed for nausea or vomiting 1/24/23   Long Chandler MD   Vitamins-Lipotropics  (LIPO-FLAVONOID PLUS PO) Take by mouth    Historical Provider, MD   albuterol (Ventolin HFA) 90 mcg/act inhaler Inhale 2 puffs every 6 (six) hours as needed for wheezing or shortness of breath 5/3/23 5/1/24  HUA Dupree   amLODIPine (NORVASC) 10 mg tablet Take 1 tablet (10 mg total) by mouth every morning 24  Long Chandler MD   losartan (COZAAR) 100 MG tablet TAKE 1 TABLET DAILY 23  Long Chandler MD     I have reviewed home medications with patient personally.    Allergies: No Known Allergies    Social History:     Marital Status: /Civil Union     Patient Pre-hospital Living Situation: home  Patient Pre-hospital Level of Mobility: independent  Patient Pre-hospital Diet Restrictions: none  Substance Use History:   Social History     Substance and Sexual Activity   Alcohol Use Not Currently     Social History     Tobacco Use   Smoking Status Former    Current packs/day: 0.00    Average packs/day: 0.3 packs/day for 44.4 years (11.1 ttl pk-yrs)    Types: Cigarettes    Start date: 1960    Quit date: 2004    Years since quittin.9   Smokeless Tobacco Never   Tobacco Comments    non smoker/tobacco user; quit  as per [employer]     Social History     Substance and Sexual Activity   Drug Use No       Family History:    Family History   Problem Relation Age of Onset    Heart failure Mother         as per [employer]    Coronary artery disease Mother         as per [employer]    Diabetes Mother         mellitus - as per [employer]    Coronary artery disease Father         as per [employer]    Cancer Sister         malignant neoplasm       Physical Exam:     Vitals:   Blood Pressure: 147/78 (24)  Pulse: (!) 41 (24)  Temperature: 99.1 °F (37.3 °C) (24 1658)  Respirations: 21 (24)  SpO2: 95 % (24)    Physical Exam  Constitutional:       General: He is not in acute distress.     Appearance: He is well-developed. He is  not diaphoretic.   HENT:      Head: Normocephalic and atraumatic.      Nose: Nose normal.      Mouth/Throat:      Pharynx: No oropharyngeal exudate.   Eyes:      General: No scleral icterus.     Conjunctiva/sclera: Conjunctivae normal.   Cardiovascular:      Rate and Rhythm: Bradycardia present.      Heart sounds: Normal heart sounds. No murmur heard.     No friction rub. No gallop.   Pulmonary:      Effort: Pulmonary effort is normal. No respiratory distress.      Breath sounds: Normal breath sounds. No wheezing or rales.   Chest:      Chest wall: No tenderness.   Abdominal:      General: Bowel sounds are normal. There is no distension.      Palpations: Abdomen is soft.      Tenderness: There is no abdominal tenderness. There is no guarding.   Musculoskeletal:         General: No tenderness or deformity. Normal range of motion.      Cervical back: Normal range of motion and neck supple.   Skin:     General: Skin is warm and dry.      Findings: No erythema.   Neurological:      Mental Status: He is alert. Mental status is at baseline.         Additional Data:     Lab Results: I have personally reviewed pertinent reports.      Results from last 7 days   Lab Units 05/01/24  1738   WBC Thousand/uL 9.63   HEMOGLOBIN g/dL 13.9   HEMATOCRIT % 45.0   PLATELETS Thousands/uL 202   SEGS PCT % 81*   LYMPHO PCT % 13*   MONO PCT % 6   EOS PCT % 0     Results from last 7 days   Lab Units 05/01/24  1738   SODIUM mmol/L 139   POTASSIUM mmol/L 4.4   CHLORIDE mmol/L 107   CO2 mmol/L 22   BUN mg/dL 21   CREATININE mg/dL 1.45*   ANION GAP mmol/L 10   CALCIUM mg/dL 9.6   ALBUMIN g/dL 4.1   TOTAL BILIRUBIN mg/dL 1.35   ALK PHOS U/L 74   ALT U/L 13   AST U/L 39   GLUCOSE RANDOM mg/dL 94                       Imaging: I have personally reviewed pertinent reports.      No orders to display       EKG, Pathology, and Other Studies Reviewed on Admission:   EKG: reviewed    [facility] [employer] Records Reviewed: Yes     ** Please Note: This note  has been constructed using a voice recognition system. **

## 2024-05-01 NOTE — ED PROVIDER NOTES
"History  Chief Complaint   Patient presents with    Abnormal ECG     Pt was sent in by cardiology for low HR and abnormal EKG.      The patient is an 83 YOM with hx HTN, COPD, CKD who presents to the ED for evaluation of an abnormal EKG. Yesterday, the patient was at pulmonary rehabilitation when it was noted that his telemetry strip was abnormal. He was instructed to go to his PCP, which he did today. He saw his PCP who discussed EKG with cardiology, and referred pt to ED. There was concern for complete heart block. Patient denies any symptoms including chest pain, dyspnea, syncope, lightheadedness, abdominal pain, nausea, vomiting. Patient reports occasional lightheadedness at home, not currently present, but reports this has been present for a \"long time.\"         Prior to Admission Medications   Prescriptions Last Dose Informant Patient Reported? Taking?   Budeson-Glycopyrrol-Formoterol (Breztri Aerosphere) 160-9-4.8 MCG/ACT AERO  Self No No   Sig: Inhale 2 puffs 2 (two) times a day Rinse mouth after use.   Polyvinyl Alcohol-Povidone (REFRESH OP)  Self Yes No   Sig: Apply to eye as needed    Vitamins-Lipotropics (LIPO-FLAVONOID PLUS PO)  Self Yes No   Sig: Take by mouth   acetaminophen-codeine (TYLENOL with CODEINE #4) 300-60 MG per tablet   No No   Sig: Take 1 tablet by mouth every 6 (six) hours as needed for moderate pain   albuterol (2.5 mg/3 mL) 0.083 % nebulizer solution   No No   Sig: USE 1 VIAL IN NEBULIZER EVERY 6 HOURS   allopurinol (ZYLOPRIM) 100 mg tablet   No No   Sig: Take 1 tablet (100 mg total) by mouth daily   amLODIPine (NORVASC) 10 mg tablet   No No   Sig: Take 1 tablet (10 mg total) by mouth every morning   cholecalciferol (VITAMIN D3) 1,000 units tablet  Self No No   Sig: Take 5 tablets (5,000 Units total) by mouth every other day   losartan (COZAAR) 100 MG tablet   No No   Sig: Take 1 tablet (100 mg total) by mouth daily   metoprolol succinate (TOPROL-XL) 25 mg 24 hr tablet   No No   Sig: " TAKE 1 TABLET DAILY   prochlorperazine (COMPAZINE) 10 mg tablet  Self No No   Sig: Take 1 tablet (10 mg total) by mouth every 6 (six) hours as needed for nausea or vomiting      Facility-Administered Medications: None       Past Medical History:   Diagnosis Date    Back pain     Bronchiectasis (HCC)     Cataract     Chronic kidney disease     COPD (chronic obstructive pulmonary disease) (HCC)     History of malignant neoplasm of oral cavity     M0    HL (hearing loss)     Hypertension     Kidney stone 05/2019    Malignant neoplasm of colon (HCC)     descending    Prostate cancer (HCC)     SOB (shortness of breath)     Tachycardia     Thalassemia trait     Tinnitus        Past Surgical History:   Procedure Laterality Date    BACK SURGERY      CHOLECYSTECTOMY      COLON SURGERY  10/2008    partial colectomy    EYE SURGERY      INCISIONAL HERNIA REPAIR      JOINT REPLACEMENT Right     hip    CA COLONOSCOPY FLX DX W/COLLJ SPEC WHEN PFRMD N/A 07/23/2018    Procedure: COLONOSCOPY;  Surgeon: Jv Toribio III, MD;  Location: [facility];  Service: Gastroenterology    PROSTATE SURGERY      SPINAL CORD STIMULATOR IMPLANT      TONSILLECTOMY         Family History   Problem Relation Age of Onset    Heart failure Mother         as per [employer]    Coronary artery disease Mother         as per [employer]    Diabetes Mother         mellitus - as per [employer]    Coronary artery disease Father         as per [employer]    Cancer Sister         malignant neoplasm     I have reviewed and agree with the history as documented.    E-Cigarette/Vaping    E-Cigarette Use Never User      E-Cigarette/Vaping Substances    Nicotine No     THC No     CBD No     Flavoring No     Other No     Unknown No      Social History     Tobacco Use    Smoking status: Former     Current packs/day: 0.00     Average packs/day: 0.3 packs/day for 44.4 years (11.1 ttl pk-yrs)     Types: Cigarettes     Start date: 1/1/1960     Quit date: 6/2/2004     Years  since quittin.9    Smokeless tobacco: Never    Tobacco comments:     non smoker/tobacco user; quit  as per [facility]   Vaping Use    Vaping status: Never Used   Substance Use Topics    Alcohol use: Not Currently    Drug use: No       Review of Systems   Constitutional:  Negative for chills and fever.   Respiratory:  Negative for cough and shortness of breath.    Cardiovascular:  Negative for chest pain and leg swelling.   Gastrointestinal:  Negative for abdominal pain, constipation, diarrhea, nausea and vomiting.   Genitourinary:  Negative for dysuria and flank pain.   Musculoskeletal:  Negative for arthralgias and myalgias.   Skin:  Negative for rash and wound.   Neurological:  Negative for dizziness, syncope, weakness, numbness and headaches.       Physical Exam  Physical Exam  Vitals and nursing note reviewed.   Constitutional:       General: He is not in acute distress.     Appearance: He is well-developed. He is obese. He is not toxic-appearing.   HENT:      Head: Normocephalic and atraumatic.   Eyes:      Conjunctiva/sclera: Conjunctivae normal.   Cardiovascular:      Rate and Rhythm: Regular rhythm. Bradycardia present.      Heart sounds: No murmur heard.  Pulmonary:      Effort: Pulmonary effort is normal. No respiratory distress.      Breath sounds: Normal breath sounds.   Abdominal:      Palpations: Abdomen is soft.      Tenderness: There is no abdominal tenderness.   Musculoskeletal:         General: No swelling. Normal range of motion.      Cervical back: Neck supple.   Skin:     General: Skin is warm and dry.      Capillary Refill: Capillary refill takes less than 2 seconds.   Neurological:      Mental Status: He is alert.         Vital Signs  ED Triage Vitals   Temperature Pulse Respirations Blood Pressure SpO2   24 1658 24 1658 24 1658 24 1658 24 1658   99.1 °F (37.3 °C) (!) 44 18 141/75 94 %      Temp Source Heart Rate Source Patient Position - Orthostatic VS  BP Location FiO2 (%)   05/01/24 1926 05/01/24 1658 05/01/24 1730 05/01/24 1730 --   Oral Monitor Sitting Right arm       Pain Score       --                  Vitals:    05/01/24 1800 05/01/24 1830 05/01/24 1926 05/01/24 1930   BP: 150/74 147/78 147/68 163/78   Pulse: (!) 42 (!) 41 (!) 40 (!) 40   Patient Position - Orthostatic VS: Lying Lying           Visual Acuity  Visual Acuity      Flowsheet Row Most Recent Value   L Pupil Size (mm) 3   R Pupil Size (mm) 3            ED Medications  Medications   heparin (porcine) subcutaneous injection 5,000 Units (has no administration in time range)   acetaminophen (TYLENOL) tablet 650 mg (has no administration in time range)   insulin lispro (HumALOG/ADMELOG) 100 units/mL subcutaneous injection 1-5 Units (has no administration in time range)   insulin lispro (HumALOG/ADMELOG) 100 units/mL subcutaneous injection 1-5 Units (has no administration in time range)       Diagnostic Studies  Results Reviewed       Procedure Component Value Units Date/Time    TSH, 3rd generation with Free T4 reflex [[id number]]  (Normal) Collected: 05/01/24 1738    Lab Status: Final result Specimen: Blood from Arm, Right Updated: 05/01/24 1826     TSH 3RD GENERATON 1.757 uIU/mL     Comprehensive metabolic panel [[id number]]  (Abnormal) Collected: 05/01/24 1738    Lab Status: Final result Specimen: Blood from Arm, Right Updated: 05/01/24 1809     Sodium 139 mmol/L      Potassium 4.4 mmol/L      Chloride 107 mmol/L      CO2 22 mmol/L      ANION GAP 10 mmol/L      BUN 21 mg/dL      Creatinine 1.45 mg/dL      Glucose 94 mg/dL      Calcium 9.6 mg/dL      AST 39 U/L      ALT 13 U/L      Alkaline Phosphatase 74 U/L      Total Protein 7.6 g/dL      Albumin 4.1 g/dL      Total Bilirubin 1.35 mg/dL      eGFR 44 ml/min/1.73sq m     Narrative:      National Kidney Disease Foundation guidelines for Chronic Kidney Disease (CKD):     Stage 1 with normal or high GFR (GFR > 90 mL/min/1.73 square meters)    Stage 2 Mild  CKD (GFR = 60-89 mL/min/1.73 square meters)    Stage 3A Moderate CKD (GFR = 45-59 mL/min/1.73 square meters)    Stage 3B Moderate CKD (GFR = 30-44 mL/min/1.73 square meters)    Stage 4 Severe CKD (GFR = 15-29 mL/min/1.73 square meters)    Stage 5 End Stage CKD (GFR <15 mL/min/1.73 square meters)  Note: GFR calculation is accurate only with a steady state creatinine    Magnesium [[id number]]  (Normal) Collected: 05/01/24 1738    Lab Status: Final result Specimen: Blood from Arm, Right Updated: 05/01/24 1809     Magnesium 1.9 mg/dL     CBC and differential [[id number]]  (Abnormal) Collected: 05/01/24 1738    Lab Status: Final result Specimen: Blood from Arm, Right Updated: 05/01/24 1745     WBC 9.63 Thousand/uL      RBC 5.84 Million/uL      Hemoglobin 13.9 g/dL      Hematocrit 45.0 %      MCV 77 fL      MCH 23.8 pg      MCHC 30.9 g/dL      RDW 17.9 %      MPV 10.0 fL      Platelets 202 Thousands/uL      nRBC 0 /100 WBCs      Segmented % 81 %      Immature Grans % 0 %      Lymphocytes % 13 %      Monocytes % 6 %      Eosinophils Relative 0 %      Basophils Relative 0 %      Absolute Neutrophils 7.74 Thousands/µL      Absolute Immature Grans 0.04 Thousand/uL      Absolute Lymphocytes 1.28 Thousands/µL      Absolute Monocytes 0.53 Thousand/µL      Eosinophils Absolute 0.02 Thousand/µL      Basophils Absolute 0.02 Thousands/µL     Lyme Total AB W Reflex to IGM/IGG [[id number]] Collected: 05/01/24 1738    Lab Status: In process Specimen: Blood from Arm, Right Updated: 05/01/24 1743    Narrative:      The following orders were created for panel order Lyme Total AB W Reflex to IGM/IGG.  Procedure                               Abnormality         Status                     ---------                               -----------         ------                     Lyme Total AB W Reflex t...[[id number]]                      In process                   Please view results for these tests on the individual orders.    Lyme Total AB W  "Reflex to IGM/IGG [[id number]] Collected: 05/01/24 1738    Lab Status: In process Specimen: Blood from Arm, Right Updated: 05/01/24 1743                   No orders to display              Procedures  Procedures         ED Course  ED Course as of 05/01/24 2007   Wed May 01, 2024   1713 TT sent to cardiology    1718 Case discussed with Dr. Hightower, Cardiology. \"Will likely need pacemaker  Hold toprol  External Zoll Transcutaneous pacer on standby  Dr. Bernal working tomorrow  If they are able to do it here  If not , they may need to transfer to [facility] [place]  Keep NPO after midnight  I will give the team heads up\"   1802 Case discussed with Dr. Bernal, pt can be admitted to [facility]   1814 TT sent to CC   1831 Critical care coming to evaluate pt at bedside   1900 Per critical care, pt stable for [facility] [place]. Case discussed with [facility]     EKG: Third degree AV block at 47 BPM, RBBB, RAD, no STEMI as interpreted by me         Medical Decision Making  DDx including but not limited to: metabolic abnormality, thyroid disease, overdose, adverse reaction, sick sinus syndrome, heart block, , Lyme disease, rheumatologic etiology.     Patient hemodynamically stable, denies any symptoms at this time. Will obtain EKG.  Will obtain CBC to evaluate for leukocytosis, anemia.  Will obtain CMP to evaluate kidney function, for electrolyte disturbance.  Will obtain lyme, TSH.    EKG concerned for complete heart block. Labs without actionable derangement. Cr similar to previous 3 months ago. Case discussed with Cardiology, critical care. Per critical care, patient stable for admission to [facility]. Discussed with [facility] who accepts patient.     At the time of admission, the patient is in no acute distress. I discussed with the patient and family the diagnosis, and plan for admission; they were given the opportunity to ask questions and verbalized understanding. They agree with plan.    Problems Addressed:  Abnormal EKG: acute illness or injury  Bradycardia: " acute illness or injury    Amount and/or Complexity of Data Reviewed  External Data Reviewed: labs, ECG and notes.  Labs: ordered. Decision-making details documented in ED Course.  ECG/medicine tests: ordered and independent interpretation performed. Decision-making details documented in ED Course.             Disposition  Final diagnoses:   Bradycardia   Abnormal EKG     Time reflects when diagnosis was documented in both MDM as applicable and the Disposition within this note       Time User Action Codes Description Comment    5/1/2024  7:13 PM Cisco Barrios Add [I44.2] Complete heart block (HCC)     5/1/2024  8:03 PM Brandy David Add [R00.1] Bradycardia     5/1/2024  8:03 PM Brandy David Add [R94.31] Abnormal EKG           ED Disposition       None          Follow-up Information    None         Patient's Medications   Discharge Prescriptions    No medications on file       No discharge procedures on file.    PDMP Review         Value Time User    PDMP Reviewed  Yes 4/11/2024  7:27 AM Long Chandler MD            ED Provider  Electronically Signed by             Brandy David PA-C  05/01/24 2012

## 2024-05-01 NOTE — ASSESSMENT & PLAN NOTE
83-year-old female with history of hypertension was noted outpatient to be in complete heart block  Currently asymptomatic  Case was discussed with cardiology plans to do pacemaker tomorrow  N.p.o. after midnight  Patient is evaluated by critical care [facility] for [facility] floor  Continue telemetry monitoring  Follow-up cardiology recommendations  Hold home metoprolol

## 2024-05-01 NOTE — Clinical Note
Prepped: left chest, subxiphoid process, left neck and left axillary. Prepped with: ChloraPrep. The site was clipped. The patient was draped.

## 2024-05-01 NOTE — PROGRESS NOTES
INTERNAL MEDICINE FOLLOW-UP VISIT  [facility] - [facility] INTERNAL MEDICINE [address]    NAME: Darrell Mayer  AGE: 83 y.o. SEX: male  : 1940     DATE: 2024     Assessment and Plan:   1. Abnormal EKG  EKG and rhythm strips in office showed complete heart block with right bundle branch block. Discussed EKG with Dr. Dexter and Dr. Chandler who recommended going to the ER to be evaluated at this time for potential pacemaker placement tomorrow.  Called patient to inform him and his wife of the plan, and they were agreeable to heading to the ER now.   He does not monitor his heart rate at home.  - POCT ECG  - Ambulatory Referral to Cardiology; Future    2. Other depression  Patient denies any increase in depression or anxiety at this time.    3. Essential hypertension  - amLODIPine (NORVASC) 10 mg tablet; Take 1 tablet (10 mg total) by mouth every morning  Dispense: 90 tablet; Refill: 3    4. Hypertension, unspecified type  - losartan (COZAAR) 100 MG tablet; Take 1 tablet (100 mg total) by mouth daily  Dispense: 90 tablet; Refill: 3        No follow-ups on file.       Chief Complaint:     Chief Complaint   Patient presents with    Follow-up     EKG - RHYTHM       History of Present Illness:   Patient is an 83-year-old male that presents today for an abnormal EKG.  He was at pulmonary rehab where it was found that he potentially had an AV block on telemetry.  He denies any fatigue, dizziness, chest pain, syncope, AMS, hypotension.  He denies checking his heart rate at home.  He has never seen a cardiologist before.    The following portions of the patient's history were reviewed and updated as appropriate: allergies, current medications, past family history, past medical history, past social history, past surgical history and problem list.     Review of Systems:     Review of Systems   Constitutional:  Negative for chills, fatigue and fever.   HENT:  Negative for ear discharge, ear pain,  postnasal drip, rhinorrhea, sinus pressure, sinus pain, sore throat, tinnitus and trouble swallowing.    Eyes:  Negative for pain, discharge and itching.   Respiratory:  Positive for shortness of breath. Negative for cough and wheezing.    Cardiovascular:  Negative for chest pain, palpitations and leg swelling.   Gastrointestinal:  Negative for abdominal pain, constipation, diarrhea, nausea and vomiting.   Endocrine: Negative for polydipsia, polyphagia and polyuria.   Genitourinary:  Negative for difficulty urinating, frequency, hematuria and urgency.   Musculoskeletal:  Negative for arthralgias, joint swelling and myalgias.   Skin:  Negative for color change.   Allergic/Immunologic: Negative for environmental allergies.   Neurological:  Negative for dizziness, weakness, light-headedness, numbness and headaches.   Hematological:  Negative for adenopathy.   Psychiatric/Behavioral:  Negative for decreased concentration and sleep disturbance. The patient is not nervous/anxious.         Past Medical History:     Past Medical History:   Diagnosis Date    Back pain     Bronchiectasis (HCC)     Cataract     Chronic kidney disease     COPD (chronic obstructive pulmonary disease) (HCC)     History of malignant neoplasm of oral cavity     M0    HL (hearing loss)     Hypertension     Kidney stone 05/2019    Malignant neoplasm of colon (HCC)     descending    Prostate cancer (HCC)     SOB (shortness of breath)     Tachycardia     Thalassemia trait     Tinnitus         Current Medications:     Current Outpatient Medications:     acetaminophen-codeine (TYLENOL with CODEINE #4) 300-60 MG per tablet, Take 1 tablet by mouth every 6 (six) hours as needed for moderate pain, Disp: 60 tablet, Rfl: 0    albuterol (2.5 mg/3 mL) 0.083 % nebulizer solution, USE 1 VIAL IN NEBULIZER EVERY 6 HOURS, Disp: 360 mL, Rfl: 1    albuterol (Ventolin HFA) 90 mcg/act inhaler, Inhale 2 puffs every 6 (six) hours as needed for wheezing or shortness of  breath, Disp: 18 g, Rfl: 3    allopurinol (ZYLOPRIM) 100 mg tablet, Take 1 tablet (100 mg total) by mouth daily, Disp: 90 tablet, Rfl: 3    amLODIPine (NORVASC) 10 mg tablet, Take 1 tablet (10 mg total) by mouth every morning, Disp: 90 tablet, Rfl: 3    Budeson-Glycopyrrol-Formoterol (Breztri Aerosphere) 160-9-4.8 MCG/ACT AERO, Inhale 2 puffs 2 (two) times a day Rinse mouth after use., Disp: 31.1 g, Rfl: 3    cholecalciferol (VITAMIN D3) 1,000 units tablet, Take 5 tablets (5,000 Units total) by mouth every other day, Disp: , Rfl:     losartan (COZAAR) 100 MG tablet, TAKE 1 TABLET DAILY, Disp: 90 tablet, Rfl: 2    metoprolol succinate (TOPROL-XL) 25 mg 24 hr tablet, TAKE 1 TABLET DAILY, Disp: 90 tablet, Rfl: 1    Polyvinyl Alcohol-Povidone (REFRESH OP), Apply to eye as needed , Disp: , Rfl:     prochlorperazine (COMPAZINE) 10 mg tablet, Take 1 tablet (10 mg total) by mouth every 6 (six) hours as needed for nausea or vomiting, Disp: 30 tablet, Rfl: 0    Vitamins-Lipotropics (LIPO-FLAVONOID PLUS PO), Take by mouth, Disp: , Rfl:      Allergies:   No Known Allergies     Physical Exam:     There were no vitals taken for this visit.    Physical Exam  Vitals and nursing note reviewed.   Constitutional:       General: He is awake.      Appearance: Normal appearance. He is well-developed and well-groomed. He is obese.   HENT:      Head: Normocephalic and atraumatic.      Right Ear: Hearing and external ear normal.      Left Ear: Hearing and external ear normal.      Nose: Nose normal.      Mouth/Throat:      Lips: Pink.      Mouth: Mucous membranes are moist.   Eyes:      General: Lids are normal. Vision grossly intact. Gaze aligned appropriately.      Conjunctiva/sclera: Conjunctivae normal.   Neck:      Vascular: No carotid bruit.      Trachea: Trachea and phonation normal.   Cardiovascular:      Rate and Rhythm: Normal rate and regular rhythm.      Heart sounds: Normal heart sounds, S1 normal and S2 normal. No murmur  heard.     No friction rub. No gallop.   Pulmonary:      Effort: Pulmonary effort is normal.      Breath sounds: Normal breath sounds. Decreased air movement present. No decreased breath sounds, wheezing, rhonchi or rales.   Abdominal:      General: Abdomen is protuberant.   Musculoskeletal:      Cervical back: Neck supple.      Right lower leg: No edema.      Left lower leg: No edema.   Skin:     General: Skin is warm.      Capillary Refill: Capillary refill takes less than 2 seconds.   Neurological:      Mental Status: He is alert.   Psychiatric:         Attention and Perception: Attention and perception normal.         Mood and Affect: Mood and affect normal.         Speech: Speech normal.         Behavior: Behavior normal. Behavior is cooperative.         Thought Content: Thought content normal.         Cognition and Memory: Cognition and memory normal.         Judgment: Judgment normal.           Data:     Laboratory Results: I have personally reviewed the pertinent laboratory results/reports   Radiology/Other Diagnostic Testing Results: I have personally reviewed pertinent reports.      Sarah Landers PA-C  [facility] INTERNAL MEDICINE [address]

## 2024-05-01 NOTE — Clinical Note
[facility] Progress Note     Emma Peralta Patient Status:  Inpatient    1945 MRN [id number]   Location [facility] SURGICAL HEART UNIT Attending Ana M Gates MD   Hosp Day # 16 PCP Rodney Martines MD     Subjective:      Patient currently intubated and sedated on vent support.  Discussed with patient's RN at bedside.  Failed extubation yesterday.  Currently on Levophed.  Metoprolol on hold.    ROS:  Unable to do due to patient's current clinical status.    Objective:     Medications:  Current Facility-Administered Medications   Medication Dose Route Frequency Provider Last Rate Last Admin   • insulin glargine (LANTUS) injection 27 Units  27 Units Subcutaneous 2 times per day Erwin Kan MD   27 Units at 23   • nystatin (MYCOSTATIN) 530447 UNIT/ML suspension 500,000 Units  500,000 Units Swish & Spit 4x Daily Shari Rosario MD   500,000 Units at 23   • insulin lispro (ADMELOG,HumaLOG) - Correction Dose   Subcutaneous 4 times per day DAVID Ferreira   2 Units at 23 0013   • metoPROLOL tartrate (LOPRESSOR) tablet 100 mg  100 mg Per NG tube 2 times per day Fritz De Dios MD   100 mg at 23 1000   • pantoprazole (PROTONIX) 40 MG/20ML (compounded) suspension 40 mg  40 mg Per NG tube Daily Nata Reed CNP   40 mg at 23   • furosemide (LASIX INJECT) injection 20 mg  20 mg Intravenous 2 times per day Vishal Alvarado MD   20 mg at 23   • chlorhexidine gluconate (PERIDEX) 0.12 % solution 15 mL  15 mL Swish & Spit 2 times per day Yasmin Causey PA-C   15 mL at 23   • aspirin chewable 324 mg  324 mg Per NG tube Daily Yasmin Causey PA-C   324 mg at 23   • atorvastatin (LIPITOR) tablet 40 mg  40 mg Oral Daily Yasmin Causey PA-C   40 mg at 23   • docusate sodium-sennosides (SENOKOT S) 50-8.6 MG 2 tablet  2 tablet Oral BID Yasmin Causey PA-C   2 tablet at 23  A venogram was performed on the left axillary vein. Hand injected. 0930   • sodium chloride (PF) 0.9 % injection 2 mL  2 mL Intracatheter 2 times per day Yasmin Causey PA-C   2 mL at 02/04/23 0939   • heparin (porcine) injection 5,000 Units  5,000 Units Subcutaneous 3 times per day Yasmin Causey PA-C   5,000 Units at 02/04/23 0534      Current Facility-Administered Medications   Medication Dose Route Frequency Provider Last Rate Last Admin   • NORepinephrine (LEVOPHED) 8 mg/250 mL in dextrose 5 % infusion  0-100 mcg/min Intravenous Continuous Fritz De Dios MD 15 mL/hr at 02/04/23 0703 8 mcg/min at 02/04/23 0703   • propofol (DIPRIVAN) infusion  0-75 mcg/kg/min (Dosing Weight) Intravenous Continuous Fritz De Dios MD 18.9 mL/hr at 02/04/23 1300 40 mcg/kg/min at 02/04/23 1300   • sodium chloride 0.9% infusion   Intravenous Continuous PRN Fritz De Dios MD       • sodium chloride 0.9% infusion   Intravenous Continuous PRN Shari Rosario MD       • dexMEDEtomidine (PRECEDEX) 400 mcg/100 mL in sodium chloride 0.9 % infusion  0-1.5 mcg/kg/hr (Dosing Weight) Intravenous Continuous Shari Rosario MD   Completed at 02/02/23 0835   • insulin regular (human) (HumuLIN R) 100 units in sodium chloride 0.9% 100 mL infusion  0-117 Units/hr Intravenous Continuous Vishal Alvarado MD   Completed at 02/01/23 1442   • dextrose 5 % infusion   Intravenous Continuous Shari Rosario MD   Completed at 02/01/23 0046   • dextrose 5 % infusion   Intravenous Continuous Neel Garrison MD   Completed at 02/01/23 1442   • niCARdipine (CARDENE) 40 mg/200 mL in NaCl infusion  0-15 mg/hr Intravenous Continuous PRN Yasmin Causey PA-C   Completed at 02/03/23 1100   • lidocaine (XYLOCAINE) 2,000 mg/500 mL in dextrose 5 % infusion  2 mg/min Intravenous Continuous PRN Yasmin Causey PA-C       • sodium chloride 0.9% infusion   Intravenous Continuous Yasmin Causey PA-C 3 mL/hr at 02/04/23 0703 Rate Verify at 02/04/23 0703   • sodium chloride 0.9% infusion   Intravenous Continuous  Yasmin Causey PA-C 10 mL/hr at 02/04/23 0703 Rate Verify at 02/04/23 0703      Current Facility-Administered Medications   Medication Dose Route Frequency Provider Last Rate Last Admin   • sodium chloride 0.9% infusion   Intravenous Continuous PRN Fritz De Dios MD       • sodium chloride 0.9% infusion   Intravenous Continuous PRN Shari Rosario MD       • dextrose 50 % injection 25 g  25 g Intravenous PRN Vishal Alvarado MD       • dextrose 50 % injection 12.5 g  12.5 g Intravenous PRN Vishal Alvarado MD   12.5 g at 02/01/23 0611   • glucagon (GLUCAGEN) injection 1 mg  1 mg Intramuscular PRN Vishal Alvarado MD       • dextrose (GLUTOSE) 40 % gel 15 g  15 g Oral PRN Vishal Alvarado MD       • dextrose (GLUTOSE) 40 % gel 30 g  30 g Oral PRN Vishal Alvarado MD       • simethicone (MYLICON) tablet 125 mg  125 mg Oral 4x Daily PRN Vishal Alvarado MD   125 mg at 01/25/23 2114   • diphenhydrAMINE (BENADRYL) injection 25 mg  25 mg Intravenous Q8H PRN Shari Rosario MD   25 mg at 01/23/23 1504   • HYDROcodone-acetaminophen (NORCO) 5-325 MG per tablet 1 tablet  1 tablet Oral Q4H PRN Yasmin Causey PA-C   1 tablet at 02/02/23 2128    Or   • oxyCODONE-acetaminophen (PERCOCET) 5-325 MG tablet 1 tablet  1 tablet Oral Q4H PRN Yasmin Causey PA-C   1 tablet at 01/23/23 2011   • oxyCODONE-acetaminophen (PERCOCET)  MG tablet 1 tablet  1 tablet Oral Q4H PRN Yasmin Causey PA-C        Or   • HYDROcodone-acetaminophen (NORCO)  MG per tablet 1 tablet  1 tablet Oral Q4H PRN Yasmin Causey PA-C       • furosemide (LASIX INJECT) injection 20 mg  20 mg Intravenous PRN Yasmin Causey PA-C   20 mg at 01/23/23 1731   • magnesium oxide (MAG-OX) tablet 400 mg  400 mg Oral BID PRN Yasmin Causey PA-C       • chlorhexidine gluconate (PERIDEX) 0.12 % solution 15 mL  15 mL Swish & Spit PRN Yasmin Causey PA-C   15 mL at 01/23/23 0851   • acetaminophen (TYLENOL) tablet 650 mg  650  mg Oral Q6H PRN ALESSIA MagdalenoC   650 mg at 01/28/23 1842    Or   • acetaminophen (TYLENOL) suppository 650 mg  650 mg Rectal Q6H PRN Yasmin Causey PA-C       • morphine injection 4 mg  4 mg Intravenous Q1H PRN ALESSIA MagdalenoC   4 mg at 02/02/23 1645   • fentaNYL (SUBLIMAZE) injection 25 mcg  25 mcg Intravenous Q1H PRN ALESSIA MagdalenoC   25 mcg at 02/02/23 0858   • ondansetron (ZOFRAN ODT) disintegrating tablet 4 mg  4 mg Oral Q12H PRN Yasmin Causey PA-C        Or   • ondansetron (ZOFRAN) injection 4 mg  4 mg Intravenous Q6H PRN Yasmin Causey PA-C   4 mg at 01/24/23 1301   • AMIODarone 150 mg in dextrose 100 mL bolus IVPB  150 mg Intravenous PRN Yasmin Causey PA-C       • niCARdipine (CARDENE) 40 mg/200 mL in NaCl infusion  0-15 mg/hr Intravenous Continuous PRN Yasmin Causey PA-C   Completed at 02/03/23 1100   • lidocaine (XYLOCAINE) 2,000 mg/500 mL in dextrose 5 % infusion  2 mg/min Intravenous Continuous PRN Yasmin Causey PA-C       • MIDazolam (VERSED) injection 1 mg  1 mg Intravenous Q1H PRN Yasmin Causey PA-C   1 mg at 02/03/23 1216   • ALPRAZolam (XANAX) tablet 0.25 mg  0.25 mg Oral Q12H PRN ALESSIA MagdalenoC   0.25 mg at 02/01/23 1445   • potassium CHLORIDE 60 mEq in sodium chloride 0.9 % 150 mL total volume IVPB  60 mEq Intravenous PRN Yasmin Causey PA-C       • potassium CHLORIDE (KLOR-CON) packet 20 mEq  20 mEq Oral PRN Yasmin Causey PA-C   20 mEq at 02/01/23 0603    And   • potassium CHLORIDE 40 mEq/100 mL IVPB premix  40 mEq Intravenous PRN Yasmin Causey PA-C       • potassium CHLORIDE (KLOR-CON) packet 40 mEq  40 mEq Oral PRN Yasmin Causey PA-C        Or   • potassium CHLORIDE 40 mEq/100 mL IVPB premix  40 mEq Intravenous PRN Yasmin Causey PA-C 50 mL/hr at 02/02/23 1404 40 mEq at 02/02/23 1404   • potassium CHLORIDE (KLOR-CON) packet 20 mEq  20 mEq Oral PRN Yasmin Causey PA-C   20 mEq at 02/01/23 2304    Or   • potassium  CHLORIDE 20 MEQ/50ML IVPB premix 20 mEq  20 mEq Intravenous PRN Yasmin Causey PA-C 50 mL/hr at 01/31/23 1206 20 mEq at 01/31/23 1206   • magnesium sulfate 2 g in 50 mL premix IVPB  2 g Intravenous PRN Yasmin Causey PA-C       • sodium bicarbonate 8.4 % injection 50 mEq  50 mEq Intravenous PRN Yasmin Causey PA-C       • bisacodyl (DULCOLAX) suppository 10 mg  10 mg Rectal Daily PRN Yasmin Causey PA-C   10 mg at 01/24/23 0816   • fentaNYL (SUBLIMAZE) injection 50 mcg  50 mcg Intravenous Q1H PRN Yasmin Causey PA-C   50 mcg at 02/03/23 1227        Allergies:   ALLERGIES:   Allergen Reactions   • Carvedilol Other (See Comments)     face broke out in a rash          Physical Exam:  Vital Last Value 24 Hour Range   Temperature 98.6 °F (37 °C) (02/04/23 1200) Temp  Min: 98.6 °F (37 °C)  Max: 100 °F (37.8 °C)   Pulse (!) 117 (02/04/23 1100) Pulse  Min: 100  Max: 117   Respiratory (!) 23 (02/04/23 1100) Resp  Min: 17  Max: 28   Non-Invasive  Blood Pressure 92/49 (02/04/23 0500) BP  Min: 92/49  Max: 114/59   Pulse Oximetry 98 % (02/04/23 1100) SpO2  Min: 98 %  Max: 100 %   Arterial   Blood Pressure (!) 147/58 (02/04/23 1100) Arterial Line BP  Min: 103/68  Max: 159/61     Tele: -120, PVC's    Intake/Output:     Intake/Output Summary (Last 24 hours) at 2/4/2023 1428  Last data filed at 2/4/2023 0659  Gross per 24 hour   Intake 1908.52 ml   Output 1065 ml   Net 843.52 ml       Weight    02/01/23 0000 02/02/23 0434 02/03/23 0332 02/04/23 0535   Weight: 78.7 kg (173 lb 8 oz) 78.4 kg (172 lb 13.5 oz) 78.2 kg (172 lb 6.4 oz) 79 kg (174 lb 2.6 oz)        GENERAL: Intubated and sedated on vent support.  HEENT: Normocephalic.  Neck:   Edema present  Oral mucosa : Pink and moist.    Endocrine: There is no goiter.  CVS: Regular rate and rhythm.  Normal first and second heart tones.  Sternotomy CDI  Lung fields: Clear to auscultation bilaterally.   GI: Soft. Nontender, nondistended.    Lower extremity: No  cyanosis, clubbing or edema.   Peripheral vascular: Both lower extremities are warm and well perfused.    Neuro: Intubated and sedated  Psych: Intubated and sedated  Integumentary: Warm and Dry    Clinical Data:   (PERSONALLY REVIEWED)    Labs    CBC  Recent Labs   Lab 02/04/23 0312 02/03/23 0426 02/02/23 1058 02/02/23  0332   WBC 12.7* 12.4*  --  17.6*   HCT 27.1* 30.4*  --  26.5*   HGB 8.6* 9.8* 10.1* 8.8*    253  --  329       CMP  Recent Labs   Lab 02/04/23 0312 02/03/23 2230 02/03/23 0329 02/02/23 1058 02/02/23  0332   SODIUM 142  --  141  --  137   POTASSIUM 4.1 4.3 4.3   < > 4.1   CHLORIDE 111*  --  111*  --  109   CO2 27  --  26  --  23   GLUCOSE 134*  --  115*  --  221*   BUN 67*  --  65*  --  69*   CREATININE 2.09*  --  1.70*  --  2.06*   CALCIUM 7.8*  --  8.2*  --  7.9*   TOTPROTEIN 5.7*  --  6.1*  --  5.7*   ALBUMIN 1.9*  --  2.2*  --  2.1*   BILIRUBIN 0.4  --  0.5  --  0.4   AST 73*  --  53*  --  61*   GPT 98*  --  101*  --  114*   ALKPT 83  --  72  --  71    < > = values in this interval not displayed.         Cardiac Labs  Recent Labs   Lab 02/04/23 0312 02/03/23 0329 02/02/23 0332   NTPROB 5,547* 4,715* 5,763*       Lipid Panel  No results found    Coags  Recent Labs   Lab 02/04/23 0312 02/03/23 0329 02/02/23  0332   INR 1.0 0.9 1.0   PTT 25 24 25       ABG  Recent Labs   Lab 02/04/23 0320 02/03/23 2230 02/03/23  1447   RAPH 7.46* 7.43 7.52*   RAPCO2 38 38 35   RAPO2 114* 144* 108   RAHCO3 27 25 29*   RASAT 99 100* 100*       Imaging    ECG:   Encounter Date: 12/01/22   Electrocardiogram 12-Lead   Result Value    Ventricular Rate EKG/Min (BPM) 95    Atrial Rate (BPM) 95    TN-Interval (MSEC) 174    QRS-Interval (MSEC) 88    QT-Interval (MSEC) 362    QTc 455    P Axis (Degrees) 80    R Axis (Degrees) -17    T Axis (Degrees) 83    REPORT TEXT      Normal sinus rhythm  Normal ECG  When compared with ECG of  07-OCT-2022 09:27,  No significant change was found  Confirmed by CALLE  CHRISTINE WOODS ([id number]) on 12/1/2022 2:06:17 PM        TTE 12/2/22  SUMMARY:  1. Left ventricle: The cavity size is normal. Wall thickness is mildly     increased. There is concentric hypertrophy. Systolic function is normal.     The ejection fraction was measured by biplane method of disks. The ejection     fraction is 62%.  2. Right ventricle: The cavity size is normal. Wall thickness is normal.     Systolic function is normal.     Lexiscan Cardiolite Stress Test 10/7/22  Summary:  1. Myocardial perfusion imaging: Left ventricular size is normal. There is no     transient ischemic dilation of the left ventricle during stress. The TID     ratio is 1.09. Prone imaging was not utilized. There is a medium-sized,     moderate defect involving the basal and mid anterolateral wall(s),     consistent with ischemia.  2. Gated SPECT: Normal: no left ventricular regional motion abnormality.  3. The calculated EF is 73%.  4. Stress ECG conclusions: Isolated ventricular contractions. The stress ECG     is negative for ischemia.       Cardiac cath:   Results for orders placed or performed during the hospital encounter of 10/27/22   Cath/PV Case    Narrative    [facility] [place] [facility]   CARDIAC CATHETERIZATION REPORT    DATE of PROCEDURE:  10/27/2022      PROCEDURES PERFORMED:    1.  Diagnostic left heart catheterization   2.  Selective coronary angiography  3.  Hemodynamic assessment      CONSCIOUS SEDATION:  Versed 2 mg and Fentanyl 50mcg were given under the   direction of the  in conjunction with the nurse in the   case monitoring the patient. Doses were titrated to effect to achieve   adequate levels of moderate sedation. A total of 16 minutes of monitoring   were required.    APPROACH:  Under sterile draping and local anesthesia, access to the Right   Radial  artery with a 6 F sheath was obtained using the modified Seldinger   technique.    COMPLICATIONS:  None.  Patient tolerated the procedure well.  No access   site  hematoma was present and distal pulses were intact.  The patient left   the Catheterization Lab in stable condition.      CORONARY ARTERIES:     DOMINANCE:  Right  LEFT MAIN:  Mild intimal disease  LEFT ANTERIOR DESCENDING:  Proximal LAD there is 60% calcific disease at   the bifurcation of the diagonal, the ostium of the diagonal has 60%   disease  LEFT CIRCUMFLEX CORONARY ARTERY: Gives rise to an obtuse marginal which   ostially has 80% disease after the OM the LCX is   RIGHT CORONARY ARTERY: Proximal RCA with 60% disease,Mid RCA with 80%   disease and distal RCA with 70% disease,RPDA and RPL free of disease     PROCEDURAL MEDICATIONS      Total units Heparin given: 5,000    PROCEDURE IN DETAIL:  A JL 5 and Tiger catheter was used to engage the   Left and Right Coronary arteries respectively. The MIV catheter was used   to cross the Aortic Valve and perform the LHC. All catheter and guidewires   were removed at the end of the procedure.    MANUAL HOLD/CLOSURE DEVICE/SHEATHS IN PLACE: Vasc Band    CONCLUSIONS:  1.  Severe Multivessel calcific Coronary artery disease   2.  Normal LVEDP      RECOMMENDATIONS:  1.  CT surgery evaluation as an outpatient for surgical revascularization,   Can also consider Complex low contrast PCI(Creatnine 1.7)  2.  Continue with ASA and statin and aggressive risk factor modifications.       CT surgery APN notified.    Discussed with Dr. Uriarte.    Talked with patient and daughter in detail.     Thank you for allowing us to participate in the care of your patient. If   you have any further questions, please do not hesitate to contact me.         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 In addition, there was a 54 mm aneurysm involving the abdominal aorta  -Noted preserved LV systolic function.  -Continue aspirin and atorvastatin  -Patient remains intubated at this time.  Last extubation attempt 2/3/2023.  -Metoprolol currently on hold due to low BP.  Currently on Levophed.  -Currently n IV Lasix 20 mg BID per CV surgery.   -Monitor renal function closely.. Nephrology following.  -I's and O's  -Monitor and replete electrolytes as needed  -Postop care per CV surgery.  -Resume beta-blocker therapy when able to do so.    Sinus tachycardia  -Normal TSH levels.  -Heart rate has improved with beta-blocker therapy.  -Metoprolol held due to low BP.  -    Small bilateral subacute ischemic infarcts.  -Embolic etiology suspected.    -Neurology following.  May possibly need ARIANA per neurology. Currently not an ideal candidate for ARIANA due to angioedema/oropharyngeal swelling.    Hypertension  -Currently on Levophed for low BP.  Metoprolol on hold.    Hyperlipidemia  -Lipid panel from 1/19/2023:, , HDL 54, LDL 81  -Continue atorvastatin      Stage 3 chronic kidney disease  -Continue to monitor renal function closely with diuresis.  -Nephrology following.    Oropharyngeal swelling-respiratory failure  -Concerns for possible allergic reaction.  -On steroid therapy.  -Followed by pulmonary.  -Pt remains intubated     Plan discussed with patient's RN at the bedside.    Thank you for allowing us to participate in this patient's care.  Please do not hesitate to call with any questions or concerns.     This note was created utilizing speech recognition software. Grammatical errors, random word insertions, pronoun errors, and incomplete sentences are an occasional consequence of this system due to software limitations, ambient noise, and hardware issues. Any formal questions or concerns about the content, text or information contained within the  body of this dictation should be directly addressed to this provider for clarification so that it can be rectified in a timely fashion.

## 2024-05-02 ENCOUNTER — APPOINTMENT (INPATIENT)
Dept: NON INVASIVE DIAGNOSTICS | Facility: HOSPITAL | Age: 84
DRG: 243 | End: 2024-05-02
Payer: COMMERCIAL

## 2024-05-02 ENCOUNTER — APPOINTMENT (INPATIENT)
Dept: RADIOLOGY | Facility: HOSPITAL | Age: 84
DRG: 243 | End: 2024-05-02
Payer: COMMERCIAL

## 2024-05-02 ENCOUNTER — TELEPHONE (OUTPATIENT)
Age: 84
End: 2024-05-02

## 2024-05-02 DIAGNOSIS — I10 HYPERTENSION, UNSPECIFIED TYPE: ICD-10-CM

## 2024-05-02 DIAGNOSIS — I10 ESSENTIAL HYPERTENSION: ICD-10-CM

## 2024-05-02 LAB
ANION GAP SERPL CALCULATED.3IONS-SCNC: 6 MMOL/L (ref 4–13)
AORTIC ROOT: 3.7 CM
APICAL FOUR CHAMBER EJECTION FRACTION: 60 %
ASCENDING AORTA: 4 CM
AV LVOT MEAN GRADIENT: 3 MMHG
AV LVOT PEAK GRADIENT: 6 MMHG
AV PEAK GRADIENT: 10 MMHG
AV REGURGITATION PRESSURE HALF TIME: 921 MS
B BURGDOR IGG SERPL QL IA: POSITIVE
B BURGDOR IGG+IGM SER QL IA: POSITIVE
B BURGDOR IGM SERPL QL IA: POSITIVE
BASOPHILS # BLD AUTO: 0.02 THOUSANDS/ÂΜL (ref 0–0.1)
BASOPHILS NFR BLD AUTO: 0 % (ref 0–1)
BSA FOR ECHO PROCEDURE: 2.37 M2
BUN SERPL-MCNC: 19 MG/DL (ref 5–25)
CALCIUM SERPL-MCNC: 9.2 MG/DL (ref 8.4–10.2)
CHLORIDE SERPL-SCNC: 105 MMOL/L (ref 96–108)
CO2 SERPL-SCNC: 30 MMOL/L (ref 21–32)
CREAT SERPL-MCNC: 1.3 MG/DL (ref 0.6–1.3)
DOP CALC LVOT PEAK VEL VTI: 27.8 CM
DOP CALC LVOT PEAK VEL: 1.23 M/S
E WAVE DECELERATION TIME: 244 MS
E/A RATIO: 0.92
EOSINOPHIL # BLD AUTO: 0.03 THOUSAND/ÂΜL (ref 0–0.61)
EOSINOPHIL NFR BLD AUTO: 0 % (ref 0–6)
ERYTHROCYTE [DISTWIDTH] IN BLOOD BY AUTOMATED COUNT: 17.5 % (ref 11.6–15.1)
FRACTIONAL SHORTENING: 40 (ref 28–44)
GFR SERPL CREATININE-BSD FRML MDRD: 51 ML/MIN/1.73SQ M
GLUCOSE SERPL-MCNC: 103 MG/DL (ref 65–140)
GLUCOSE SERPL-MCNC: 105 MG/DL (ref 65–140)
GLUCOSE SERPL-MCNC: 126 MG/DL (ref 65–140)
GLUCOSE SERPL-MCNC: 132 MG/DL (ref 65–140)
GLUCOSE SERPL-MCNC: 93 MG/DL (ref 65–140)
HCT VFR BLD AUTO: 45.2 % (ref 36.5–49.3)
HGB BLD-MCNC: 14 G/DL (ref 12–17)
IMM GRANULOCYTES # BLD AUTO: 0.03 THOUSAND/UL (ref 0–0.2)
IMM GRANULOCYTES NFR BLD AUTO: 0 % (ref 0–2)
INR PPP: 1.06 (ref 0.84–1.19)
INTERVENTRICULAR SEPTUM IN DIASTOLE (PARASTERNAL SHORT AXIS VIEW): 1.1 CM
INTERVENTRICULAR SEPTUM: 1.1 CM (ref 0.6–1.1)
LA/AORTA RATIO 2D: 0.89
LAAS-AP2: 12.7 CM2
LAAS-AP4: 17.3 CM2
LEFT ATRIUM SIZE: 3.3 CM
LEFT ATRIUM VOLUME (MOD BIPLANE): 34 ML
LEFT ATRIUM VOLUME INDEX (MOD BIPLANE): 14.3 ML/M2
LEFT INTERNAL DIMENSION IN SYSTOLE: 3.3 CM (ref 2.1–4)
LEFT VENTRICULAR INTERNAL DIMENSION IN DIASTOLE: 5.5 CM (ref 3.5–6)
LEFT VENTRICULAR POSTERIOR WALL IN END DIASTOLE: 0.9 CM
LEFT VENTRICULAR STROKE VOLUME: 103 ML
LVSV (TEICH): 103 ML
LYMPHOCYTES # BLD AUTO: 1.4 THOUSANDS/ÂΜL (ref 0.6–4.47)
LYMPHOCYTES NFR BLD AUTO: 18 % (ref 14–44)
MAGNESIUM SERPL-MCNC: 2 MG/DL (ref 1.9–2.7)
MCH RBC QN AUTO: 23.9 PG (ref 26.8–34.3)
MCHC RBC AUTO-ENTMCNC: 31 G/DL (ref 31.4–37.4)
MCV RBC AUTO: 77 FL (ref 82–98)
MONOCYTES # BLD AUTO: 0.46 THOUSAND/ÂΜL (ref 0.17–1.22)
MONOCYTES NFR BLD AUTO: 6 % (ref 4–12)
MV E'TISSUE VEL-SEP: 6 CM/S
MV PEAK A VEL: 0.93 M/S
MV PEAK E VEL: 86 CM/S
MV STENOSIS PRESSURE HALF TIME: 72 MS
MV VALVE AREA P 1/2 METHOD: 3.06
NEUTROPHILS # BLD AUTO: 5.97 THOUSANDS/ÂΜL (ref 1.85–7.62)
NEUTS SEG NFR BLD AUTO: 76 % (ref 43–75)
NRBC BLD AUTO-RTO: 0 /100 WBCS
PLATELET # BLD AUTO: 227 THOUSANDS/UL (ref 149–390)
PMV BLD AUTO: 11.5 FL (ref 8.9–12.7)
POTASSIUM SERPL-SCNC: 4.4 MMOL/L (ref 3.5–5.3)
PROTHROMBIN TIME: 14.4 SECONDS (ref 11.6–14.5)
RBC # BLD AUTO: 5.87 MILLION/UL (ref 3.88–5.62)
RIGHT VENTRICLE ID DIMENSION: 3.4 CM
SL CV AV PEAK GRADIENT RETROGRADE: 97 MMHG
SL CV LV EF: 55
SL CV PED ECHO LEFT VENTRICLE DIASTOLIC VOLUME (MOD BIPLANE) 2D: 146 ML
SL CV PED ECHO LEFT VENTRICLE SYSTOLIC VOLUME (MOD BIPLANE) 2D: 44 ML
SODIUM SERPL-SCNC: 141 MMOL/L (ref 135–147)
TRICUSPID ANNULAR PLANE SYSTOLIC EXCURSION: 2.7 CM
WBC # BLD AUTO: 7.91 THOUSAND/UL (ref 4.31–10.16)

## 2024-05-02 PROCEDURE — 80048 BASIC METABOLIC PNL TOTAL CA: CPT | Performed by: INTERNAL MEDICINE

## 2024-05-02 PROCEDURE — C1769 GUIDE WIRE: HCPCS | Performed by: INTERNAL MEDICINE

## 2024-05-02 PROCEDURE — C1892 INTRO/SHEATH,FIXED,PEEL-AWAY: HCPCS | Performed by: INTERNAL MEDICINE

## 2024-05-02 PROCEDURE — 71045 X-RAY EXAM CHEST 1 VIEW: CPT

## 2024-05-02 PROCEDURE — 83735 ASSAY OF MAGNESIUM: CPT | Performed by: INTERNAL MEDICINE

## 2024-05-02 PROCEDURE — 93306 TTE W/DOPPLER COMPLETE: CPT

## 2024-05-02 PROCEDURE — 82948 REAGENT STRIP/BLOOD GLUCOSE: CPT

## 2024-05-02 PROCEDURE — 02PA3MZ REMOVAL OF CARDIAC LEAD FROM HEART, PERCUTANEOUS APPROACH: ICD-10-PCS | Performed by: INTERNAL MEDICINE

## 2024-05-02 PROCEDURE — 0JH606Z INSERTION OF PACEMAKER, DUAL CHAMBER INTO CHEST SUBCUTANEOUS TISSUE AND FASCIA, OPEN APPROACH: ICD-10-PCS | Performed by: INTERNAL MEDICINE

## 2024-05-02 PROCEDURE — 02HK3JZ INSERTION OF PACEMAKER LEAD INTO RIGHT VENTRICLE, PERCUTANEOUS APPROACH: ICD-10-PCS | Performed by: INTERNAL MEDICINE

## 2024-05-02 PROCEDURE — 85610 PROTHROMBIN TIME: CPT

## 2024-05-02 PROCEDURE — 33208 INSRT HEART PM ATRIAL & VENT: CPT | Performed by: INTERNAL MEDICINE

## 2024-05-02 PROCEDURE — C1887 CATHETER, GUIDING: HCPCS | Performed by: INTERNAL MEDICINE

## 2024-05-02 PROCEDURE — C1785 PMKR, DUAL, RATE-RESP: HCPCS | Performed by: INTERNAL MEDICINE

## 2024-05-02 PROCEDURE — 93005 ELECTROCARDIOGRAM TRACING: CPT

## 2024-05-02 PROCEDURE — 99232 SBSQ HOSP IP/OBS MODERATE 35: CPT | Performed by: INTERNAL MEDICINE

## 2024-05-02 PROCEDURE — 85025 COMPLETE CBC W/AUTO DIFF WBC: CPT | Performed by: INTERNAL MEDICINE

## 2024-05-02 PROCEDURE — 02H63JZ INSERTION OF PACEMAKER LEAD INTO RIGHT ATRIUM, PERCUTANEOUS APPROACH: ICD-10-PCS | Performed by: INTERNAL MEDICINE

## 2024-05-02 PROCEDURE — 3E0102A INTRODUCTION OF ANTI-INFECTIVE ENVELOPE INTO SUBCUTANEOUS TISSUE, OPEN APPROACH: ICD-10-PCS | Performed by: INTERNAL MEDICINE

## 2024-05-02 PROCEDURE — C1898 LEAD, PMKR, OTHER THAN TRANS: HCPCS | Performed by: INTERNAL MEDICINE

## 2024-05-02 PROCEDURE — 99223 1ST HOSP IP/OBS HIGH 75: CPT | Performed by: INTERNAL MEDICINE

## 2024-05-02 PROCEDURE — 36415 COLL VENOUS BLD VENIPUNCTURE: CPT | Performed by: INTERNAL MEDICINE

## 2024-05-02 PROCEDURE — 93306 TTE W/DOPPLER COMPLETE: CPT | Performed by: INTERNAL MEDICINE

## 2024-05-02 DEVICE — LEAD 3830 US MKT/ 69CM MRI LBBAP
Type: IMPLANTABLE DEVICE | Site: HEART | Status: FUNCTIONAL
Brand: SELECTSECURE™ MRI SURESCAN™

## 2024-05-02 DEVICE — ENVELOPE CMRM6122 ABSORB MED MR
Type: IMPLANTABLE DEVICE | Site: CHEST  WALL | Status: FUNCTIONAL
Brand: TYRX™

## 2024-05-02 DEVICE — LEAD 5076-52 MRI US RCMCRD
Type: IMPLANTABLE DEVICE | Site: HEART | Status: FUNCTIONAL
Brand: CAPSUREFIX NOVUS MRI™ SURESCAN®

## 2024-05-02 DEVICE — IPG W1DR01 AZURE XT DR MRI USA
Type: IMPLANTABLE DEVICE | Site: CHEST  WALL | Status: FUNCTIONAL
Brand: AZURE™ XT DR MRI SURESCAN™

## 2024-05-02 RX ORDER — ONDANSETRON 2 MG/ML
4 INJECTION INTRAMUSCULAR; INTRAVENOUS ONCE AS NEEDED
Status: DISCONTINUED | OUTPATIENT
Start: 2024-05-02 | End: 2024-05-02 | Stop reason: HOSPADM

## 2024-05-02 RX ORDER — SODIUM CHLORIDE 9 MG/ML
75 INJECTION, SOLUTION INTRAVENOUS CONTINUOUS
Status: DISCONTINUED | OUTPATIENT
Start: 2024-05-02 | End: 2024-05-02

## 2024-05-02 RX ORDER — AMLODIPINE BESYLATE 10 MG/1
10 TABLET ORAL EVERY MORNING
Qty: 90 TABLET | Refills: 3 | Status: SHIPPED | OUTPATIENT
Start: 2024-05-02

## 2024-05-02 RX ORDER — SODIUM CHLORIDE, SODIUM LACTATE, POTASSIUM CHLORIDE, CALCIUM CHLORIDE 600; 310; 30; 20 MG/100ML; MG/100ML; MG/100ML; MG/100ML
INJECTION, SOLUTION INTRAVENOUS CONTINUOUS PRN
Status: DISCONTINUED | OUTPATIENT
Start: 2024-05-02 | End: 2024-05-02

## 2024-05-02 RX ORDER — ALBUTEROL SULFATE 2.5 MG/3ML
2.5 SOLUTION RESPIRATORY (INHALATION) ONCE AS NEEDED
Status: DISCONTINUED | OUTPATIENT
Start: 2024-05-02 | End: 2024-05-02 | Stop reason: HOSPADM

## 2024-05-02 RX ORDER — HYDROMORPHONE HCL/PF 1 MG/ML
SYRINGE (ML) INJECTION AS NEEDED
Status: DISCONTINUED | OUTPATIENT
Start: 2024-05-02 | End: 2024-05-02

## 2024-05-02 RX ORDER — FENTANYL CITRATE 50 UG/ML
INJECTION, SOLUTION INTRAMUSCULAR; INTRAVENOUS AS NEEDED
Status: DISCONTINUED | OUTPATIENT
Start: 2024-05-02 | End: 2024-05-02

## 2024-05-02 RX ORDER — CHLORHEXIDINE GLUCONATE ORAL RINSE 1.2 MG/ML
15 SOLUTION DENTAL ONCE
Status: COMPLETED | OUTPATIENT
Start: 2024-05-02 | End: 2024-05-02

## 2024-05-02 RX ORDER — HYDRALAZINE HYDROCHLORIDE 20 MG/ML
5 INJECTION INTRAMUSCULAR; INTRAVENOUS ONCE AS NEEDED
Status: DISCONTINUED | OUTPATIENT
Start: 2024-05-02 | End: 2024-05-02 | Stop reason: HOSPADM

## 2024-05-02 RX ORDER — LIDOCAINE WITH 8.4% SOD BICARB 0.9%(10ML)
SYRINGE (ML) INJECTION CODE/TRAUMA/SEDATION MEDICATION
Status: DISCONTINUED | OUTPATIENT
Start: 2024-05-02 | End: 2024-05-02 | Stop reason: HOSPADM

## 2024-05-02 RX ORDER — CHLORHEXIDINE GLUCONATE ORAL RINSE 1.2 MG/ML
SOLUTION DENTAL
Status: DISPENSED
Start: 2024-05-02 | End: 2024-05-02

## 2024-05-02 RX ORDER — FENTANYL CITRATE/PF 50 MCG/ML
50 SYRINGE (ML) INJECTION
Status: DISCONTINUED | OUTPATIENT
Start: 2024-05-02 | End: 2024-05-02 | Stop reason: HOSPADM

## 2024-05-02 RX ORDER — SODIUM CHLORIDE, SODIUM LACTATE, POTASSIUM CHLORIDE, CALCIUM CHLORIDE 600; 310; 30; 20 MG/100ML; MG/100ML; MG/100ML; MG/100ML
50 INJECTION, SOLUTION INTRAVENOUS CONTINUOUS
Status: DISCONTINUED | OUTPATIENT
Start: 2024-05-02 | End: 2024-05-03 | Stop reason: HOSPADM

## 2024-05-02 RX ORDER — CEFAZOLIN SODIUM 2 G/50ML
2000 SOLUTION INTRAVENOUS ONCE
Status: COMPLETED | OUTPATIENT
Start: 2024-05-02 | End: 2024-05-02

## 2024-05-02 RX ORDER — MIDAZOLAM HYDROCHLORIDE 2 MG/2ML
INJECTION, SOLUTION INTRAMUSCULAR; INTRAVENOUS AS NEEDED
Status: DISCONTINUED | OUTPATIENT
Start: 2024-05-02 | End: 2024-05-02

## 2024-05-02 RX ORDER — LOSARTAN POTASSIUM 100 MG/1
100 TABLET ORAL DAILY
Qty: 90 TABLET | Refills: 3 | Status: SHIPPED | OUTPATIENT
Start: 2024-05-02

## 2024-05-02 RX ADMIN — SODIUM CHLORIDE, SODIUM LACTATE, POTASSIUM CHLORIDE, AND CALCIUM CHLORIDE: .6; .31; .03; .02 INJECTION, SOLUTION INTRAVENOUS at 11:48

## 2024-05-02 RX ADMIN — MIDAZOLAM HYDROCHLORIDE 1 MG: 1 INJECTION, SOLUTION INTRAMUSCULAR; INTRAVENOUS at 13:51

## 2024-05-02 RX ADMIN — SODIUM CHLORIDE, SODIUM LACTATE, POTASSIUM CHLORIDE, AND CALCIUM CHLORIDE 50 ML/HR: .6; .31; .03; .02 INJECTION, SOLUTION INTRAVENOUS at 18:15

## 2024-05-02 RX ADMIN — LOSARTAN POTASSIUM 100 MG: 50 TABLET, FILM COATED ORAL at 09:23

## 2024-05-02 RX ADMIN — CHLORHEXIDINE GLUCONATE 0.12% ORAL RINSE 15 ML: 1.2 LIQUID ORAL at 10:51

## 2024-05-02 RX ADMIN — FENTANYL CITRATE 50 MCG: 50 INJECTION, SOLUTION INTRAMUSCULAR; INTRAVENOUS at 13:56

## 2024-05-02 RX ADMIN — HEPARIN SODIUM 5000 UNITS: 5000 INJECTION INTRAVENOUS; SUBCUTANEOUS at 05:16

## 2024-05-02 RX ADMIN — CEFAZOLIN SODIUM 2000 MG: 2 SOLUTION INTRAVENOUS at 09:24

## 2024-05-02 RX ADMIN — HYDROMORPHONE HYDROCHLORIDE 1 MG: 1 INJECTION, SOLUTION INTRAMUSCULAR; INTRAVENOUS; SUBCUTANEOUS at 15:08

## 2024-05-02 RX ADMIN — FENTANYL CITRATE 50 MCG: 50 INJECTION, SOLUTION INTRAMUSCULAR; INTRAVENOUS at 13:54

## 2024-05-02 RX ADMIN — AMLODIPINE BESYLATE 10 MG: 10 TABLET ORAL at 09:20

## 2024-05-02 RX ADMIN — MIDAZOLAM HYDROCHLORIDE 1 MG: 1 INJECTION, SOLUTION INTRAMUSCULAR; INTRAVENOUS at 12:04

## 2024-05-02 RX ADMIN — FENTANYL CITRATE 50 MCG: 50 INJECTION, SOLUTION INTRAMUSCULAR; INTRAVENOUS at 12:27

## 2024-05-02 RX ADMIN — ALLOPURINOL 50 MG: 100 TABLET ORAL at 09:20

## 2024-05-02 RX ADMIN — FENTANYL CITRATE 50 MCG: 50 INJECTION, SOLUTION INTRAMUSCULAR; INTRAVENOUS at 12:04

## 2024-05-02 RX ADMIN — CEFAZOLIN SODIUM 2000 MG: 2 SOLUTION INTRAVENOUS at 12:05

## 2024-05-02 RX ADMIN — BUDESONIDE AND FORMOTEROL FUMARATE DIHYDRATE 2 PUFF: 160; 4.5 AEROSOL RESPIRATORY (INHALATION) at 09:26

## 2024-05-02 NOTE — CONSULTS
Consultation - Cardiology   Darrell Mayer 83 y.o. male MRN: [id number]  Unit/Bed#: ED 06 Encounter: [id number]  05/02/24  9:04 AM    Assessment/ Plan:    Complete heart block  Continue to hold home metoprolol  Plan for PPM implant today  Discussed the indications, alternatives, risks and benefit of pacemaker implantation. The procedure risks, benefits, and complications (including but not limited to bleeding, infection, air leak into the lungs, anesthesia complications, pericardial effusion, and complications related to device and leads) were reviewed.  Patient is alert and oriented x3 and wishes to proceed. All questions answered.     2.  Hypertension  Continue amlodipine, losartan    3.  DM2 - A1C 6.0    4.  CKD 3    5.  ASCVD-coronary artery calcification on CT  Denies chest pain    6.  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  Mild to moderate AR  Recommend routine outpatient monitoring.    History of Present Illness   Physician Requesting Consult: Long Chandler MD    Reason for Consult / Principal Problem: Complete heart block    HPI: Darrell Mayer is a 83 y.o. year old male with past medical history of hypertension, DM2, CKD 3, COPD, chronic respiratory failure, history of prostate cancer s/p prostatectomy, history of colon cancer history of malignant neoplasm of oral cavity, former smoker who presents with complete heart block.    Patient was at pulmonary rehab on 4/30 and it was noted that he had abnormal rhythm on telemetry. Telemetry strips in chart were reviewed, demonstrated episodes of complete heart block, PACs, intermittent IVCD.  He went to his PCP yesterday, and EKG at his PCPs office demonstrated complete heart block with RBBB per chart review, and he was advised to the ER for further evaluation.  Unable to view EKG from PCP office.    Upon arrival to the ER, patient was again found to be in complete heart  block.  Home metoprolol has been held.   Electrolytes within normal limits.  TSH within normal limits.  He denies recent hiking or tick bites.    Patient states he has overall been feeling in his usual state of health.  He denies chest pain/pressure, palpitations, lightheadedness, dizziness, syncopal episodes, increased fatigue.  He does endorse shortness of breath as well as dyspnea on exertion such as with climbing steps.  He believed this was due to his COPD.  He denies exertional chest pain/pressure.  He denies any recent episodes of significant chest pain/pressure, diaphoresis.     Family history: Mother-CAD, father-CAD  He does not follow with cardiology in the outpatient setting.      TTE today: EF 55%, systolic function normal, wall motion normal, RV systolic function normal    Inpatient consult to Cardiology  Consult performed by: Luma Ojeda PA-C  Consult ordered by: Cisco Barrios MD        EKG: Bradycardic, CHB, RBBB, anteroseptal nonspecific ST-T wave abnormalities  Tele: Complete heart block    Review of Systems   Constitutional:  Negative for diaphoresis, fever and unexpected weight change.   HENT:  Negative for ear pain and sore throat.    Eyes:  Negative for pain and redness.   Respiratory:  Positive for shortness of breath. Negative for cough.    Cardiovascular:  Negative for chest pain, palpitations and leg swelling.   Gastrointestinal:  Negative for abdominal pain, nausea and vomiting.   Genitourinary:  Negative for dysuria.   Skin:  Negative for color change and rash.   Neurological:  Negative for dizziness, syncope and light-headedness.   Hematological:  Does not bruise/bleed easily.   Psychiatric/Behavioral:  Negative for agitation and behavioral problems.    All other systems reviewed and are negative.      Historical Information   Past Medical History:   Diagnosis Date    Back pain     Bronchiectasis (HCC)     Cataract     Chronic kidney disease     COPD (chronic obstructive  pulmonary disease) (HCC)     History of malignant neoplasm of oral cavity     M0    HL (hearing loss)     Hypertension     Kidney stone 2019    Malignant neoplasm of colon (HCC)     descending    Prostate cancer (HCC)     SOB (shortness of breath)     Tachycardia     Thalassemia trait     Tinnitus      Past Surgical History:   Procedure Laterality Date    BACK SURGERY      CHOLECYSTECTOMY      COLON SURGERY  10/2008    partial colectomy    EYE SURGERY      INCISIONAL HERNIA REPAIR      JOINT REPLACEMENT Right     hip    ID COLONOSCOPY FLX DX W/COLLJ SPEC WHEN PFRMD N/A 2018    Procedure: COLONOSCOPY;  Surgeon: Jv Toribio III, MD;  Location: [facility];  Service: Gastroenterology    PROSTATE SURGERY      SPINAL CORD STIMULATOR IMPLANT      TONSILLECTOMY       Social History     Substance and Sexual Activity   Alcohol Use Not Currently     Social History     Substance and Sexual Activity   Drug Use No     Social History     Tobacco Use   Smoking Status Former    Current packs/day: 0.00    Average packs/day: 0.3 packs/day for 44.4 years (11.1 ttl pk-yrs)    Types: Cigarettes    Start date: 1960    Quit date: 2004    Years since quittin.9   Smokeless Tobacco Never   Tobacco Comments    non smoker/tobacco user; quit  as per [employer]       Family History:   Family History   Problem Relation Age of Onset    Heart failure Mother         as per [employer]    Coronary artery disease Mother         as per [employer]    Diabetes Mother         mellitus - as per [employer]    Coronary artery disease Father         as per [employer]    Cancer Sister         malignant neoplasm       Meds/Allergies   all current active meds have been reviewed  No Known Allergies    Objective   Vitals: Blood pressure (!) 173/82, pulse (!) 37, temperature 97.8 °F (36.6 °C), temperature source Oral, resp. rate 19, SpO2 97%., There is no height or weight on file to calculate BMI.,   Orthostatic Blood Pressures       Flowsheet Row Most Recent Value   Blood Pressure 173/82 filed at 2024 0730   Patient Position - Orthostatic VS Lying filed at 2024 0730            Systolic (24hrs), Av , Min:130 , Max:182     Diastolic (24hrs), Av, Min:61, Max:94      No intake or output data in the 24 hours ending 24 0904    Invasive Devices       Peripheral Intravenous Line  Duration             Peripheral IV 24 Right Antecubital <1 day                        Physical Exam:    GEN: Alert and oriented x 3, in no acute distress.  Well appearing and well nourished.   HEENT: Sclera anicteric, conjunctivae pink, mucous membranes moist. Oropharynx clear.   NECK: Supple, no significant JVD. Trachea midline, no thyromegaly.   HEART: Bradycardic.  Regular rhythm, normal S1 and S2, no murmurs, clicks, gallops or rubs. PMI nondisplaced, no thrills.   LUNGS: On O2 via NC.  Clear to auscultation bilaterally; no wheezes, rales, or rhonchi. No increased work of breathing or signs of respiratory distress.   ABDOMEN: Soft, nontender, non-distended.   EXTREMITIES: Skin warm and well perfused, no clubbing, cyanosis, or edema.  NEURO: No focal findings. Normal speech. Mood and affect normal.   SKIN: Normal without suspicious lesions on exposed skin.      Lab Results:     Troponins:       CBC with diff:   Results from last 7 days   Lab Units 24  0524  1738   WBC Thousand/uL 7.91 9.63   HEMOGLOBIN g/dL 14.0 13.9   HEMATOCRIT % 45.2 45.0   MCV fL 77* 77*   PLATELETS Thousands/uL 227 202   RBC Million/uL 5.87* 5.84*   MCH pg 23.9* 23.8*   MCHC g/dL 31.0* 30.9*   RDW % 17.5* 17.9*   MPV fL 11.5 10.0   NRBC AUTO /100 WBCs 0 0         CMP:   Results from last 7 days   Lab Units 24  0524  1738   POTASSIUM mmol/L 4.4 4.4   CHLORIDE mmol/L 105 107   CO2 mmol/L 30 22   BUN mg/dL 19 21   CREATININE mg/dL 1.30 1.45*   CALCIUM mg/dL 9.2 9.6   AST U/L  --  39   ALT U/L  --  13   ALK PHOS U/L  --  74   EGFR  ml/min/1.73sq m 51 44

## 2024-05-02 NOTE — ANESTHESIA PREPROCEDURE EVALUATION
Procedure:  Cardiac pacer implant (Chest)    Relevant Problems   CARDIO   (+) Complete heart block (HCC)   (+) Hypertension      ENDO   (+) Type 2 diabetes mellitus with chronic kidney disease, without long-term current use of insulin (HCC)      /RENAL   (+) Hypertensive kidney disease with stage 3 chronic kidney disease (HCC)   (+) Prostate cancer (HCC)   (+) Stage 3 chronic kidney disease (HCC)      NEURO/PSYCH   (+) Continuous opioid dependence (HCC)      PULMONARY   (+) COPD, severe (HCC)   (+) Centrilobular emphysema (HCC)   (+) Chronic hypoxemic respiratory failure (HCC)        Physical Exam    Airway    Mallampati score: II  TM Distance: >3 FB  Neck ROM: full     Dental       Cardiovascular      Pulmonary      Other Findings        Anesthesia Plan  ASA Score- 3     Anesthesia Type- IV sedation with anesthesia with ASA Monitors.         Additional Monitors:     Airway Plan:            Plan Factors-Exercise tolerance (METS): <4 METS.    Chart reviewed. EKG reviewed.  Existing labs reviewed.     Patient is not a current smoker.      There is medical exclusion for perioperative obstructive sleep apnea risk education.        Induction- intravenous.    Postoperative Plan-     Informed Consent- Anesthetic plan and risks discussed with patient.  I personally reviewed this patient with the CRNA. Discussed and agreed on the Anesthesia Plan with the CRNA..

## 2024-05-02 NOTE — TELEPHONE ENCOUNTER
Patient's wife called - she has been waiting at the hospital since 12noon for news on her .  He went into ER yesterday and they were to put in a pacemaker.  She has not heard anything since 12noon and even the nurses do not have any information for her.  She would like Dr. Chandler to please call as soon as possible.

## 2024-05-02 NOTE — PLAN OF CARE
Problem: PAIN - ADULT  Goal: Verbalizes/displays adequate comfort level or baseline comfort level  Description: Interventions:  - Encourage patient to monitor pain and request assistance  - Assess pain using appropriate pain scale  - Administer analgesics based on type and severity of pain and evaluate response  - Implement non-pharmacological measures as appropriate and evaluate response  - Consider cultural and social influences on pain and pain management  - Notify physician/advanced practitioner if interventions unsuccessful or patient reports new pain  Outcome: Progressing     Problem: INFECTION - ADULT  Goal: Absence or prevention of progression during hospitalization  Description: INTERVENTIONS:  - Assess and monitor for signs and symptoms of infection  - Monitor lab/diagnostic results  - Monitor all insertion sites, i.e. indwelling lines, tubes, and drains  - Monitor endotracheal if appropriate and nasal secretions for changes in amount and color  - [place] appropriate cooling/warming therapies per order  - Administer medications as ordered  - Instruct and encourage patient and family to use good hand hygiene technique  - Identify and instruct in appropriate isolation precautions for identified infection/condition  Outcome: Progressing  Goal: Absence of fever/infection during neutropenic period  Description: INTERVENTIONS:  - Monitor WBC    Outcome: Progressing     Problem: SAFETY ADULT  Goal: Patient will remain free of falls  Description: INTERVENTIONS:  - Educate patient/family on patient safety including physical limitations  - Instruct patient to call for assistance with activity   - Consult OT/PT to assist with strengthening/mobility   - Keep Call bell within reach  - Keep bed low and locked with side rails adjusted as appropriate  - Keep care items and personal belongings within reach  - Initiate and maintain comfort rounds  - Make Fall Risk Sign visible to staff  - Offer Toileting every 2 Hours,  in advance of need  - Initiate/Maintain alarm  - Obtain necessary fall risk management equipment:   - Apply yellow socks and bracelet for high fall risk patients  - Consider moving patient to room near nurses station  Outcome: Progressing  Goal: Maintain or return to baseline ADL function  Description: INTERVENTIONS:  -  Assess patient's ability to carry out ADLs; assess patient's baseline for ADL function and identify physical deficits which impact ability to perform ADLs (bathing, care of mouth/teeth, toileting, grooming, dressing, etc.)  - Assess/evaluate cause of self-care deficits   - Assess range of motion  - Assess patient's mobility; develop plan if impaired  - Assess patient's need for assistive devices and provide as appropriate  - Encourage maximum independence but intervene and supervise when necessary  - Involve family in performance of ADLs  - Assess for home care needs following discharge   - Consider OT consult to assist with ADL evaluation and planning for discharge  - Provide patient education as appropriate  Outcome: Progressing  Goal: Maintains/Returns to pre admission functional level  Description: INTERVENTIONS:  - Perform AM-PAC 6 Click Basic Mobility/ Daily Activity assessment daily.  - Set and communicate daily mobility goal to care team and patient/family/caregiver.   - Collaborate with rehabilitation services on mobility goals if consulted  - Perform Range of Motion times a day.  - Reposition patient every 2 hours.  - Dangle patient 3 times a day  - Stand patient 3 times a day  - Ambulate patient 3 times a day  - Out of bed to chair 3 times a day   - Out of bed for meals 3 times a day  - Out of bed for toileting  - Record patient progress and toleration of activity level   Outcome: Progressing     Problem: DISCHARGE PLANNING  Goal: Discharge to home or other facility with appropriate resources  Description: INTERVENTIONS:  - Identify barriers to discharge w/patient and caregiver  - Arrange  for needed discharge resources and transportation as appropriate  - Identify discharge learning needs (meds, wound care, etc.)  - Arrange for interpretive services to assist at discharge as needed  - Refer to Case Management Department for coordinating discharge planning if the patient needs post-hospital services based on physician/advanced practitioner order or complex needs related to functional status, cognitive ability, or social support system  Outcome: Progressing     Problem: Knowledge Deficit  Goal: Patient/family/caregiver demonstrates understanding of disease process, treatment plan, medications, and discharge instructions  Description: Complete learning assessment and assess knowledge base.  Interventions:  - Provide teaching at level of understanding  - Provide teaching via preferred learning methods  Outcome: Progressing

## 2024-05-02 NOTE — UTILIZATION REVIEW
Initial Clinical Review    Admission: Date/Time/Statement:   Admission Orders (From admission, onward)       Ordered        05/02/24 0205  Inpatient Admission  Once                          Orders Placed This Encounter   Procedures    Inpatient Admission     Standing Status:   Standing     Number of Occurrences:   1     Order Specific Question:   Level of Care     Answer:   Med Surg [16]     Order Specific Question:   Estimated length of stay     Answer:   More than 2 Midnights     Order Specific Question:   Certification     Answer:   I certify that inpatient services are medically necessary for this patient for a duration of greater than two midnights. See H&P and MD Progress Notes for additional information about the patient's course of treatment.     ED Arrival Information       Expected   -    Arrival   5/1/2024 16:46    Acuity   Emergent              Means of arrival   Walk-In    Escorted by   Family Member    Service   Hospitalist    Admission type   Emergency              Arrival complaint   Abnormal EKG             Chief Complaint   Patient presents with    Abnormal ECG     Pt was sent in by cardiology for low HR and abnormal EKG.        Initial Presentation: 83 y.o. male to ED from home w/ PMHX COPD, hypertension, CKD who presented to the emergency department after being found to have complete heart block on EKG. Currently asymptomatic . Admitted IP status w/ complete hrt block . Plan for NPO after MN , plan for pacemaker 5/2 , tele , hold lopressor . COPD cont inhalers . Chronic resp failure monitor , currently at baseline . CKD at baseline , monitor . HTN cont amlodipine , losartan .     Anticipated Length of Stay/Certification Statement: Patient will be admitted on an Emergency basis with an anticipated length of stay of  < 2 midnights.   Justification for Hospital Stay: Complete heart block     Date:   5/2 Day 2: Complete heart block-this was noted incidentally during monitoring with pulmonary rehab  earlier in the week.  Patient remains asymptomatic. Metoprolol held.Plan for pacemaker today. COPD cont O2 protocol and bronchodilators . DM stable w/ diet control . HTN cont losartan and amlodipine . Not stable for discharge pending placement of pacemaker and assured stability from cardiology service.     5/2 Cardiology Consult   Episodes of lightheadedness, symptomatic bradycardia , complete hrt block . Plan for dual chamber permanent pacemaker . Plan cont home lopressor , plan for PPM implant today . HTN cont amlodipine , losartan .     Date: 5/3  Day 3: Has surpassed a 2nd midnight with active treatments and services.    ED Triage Vitals   Temperature Pulse Respirations Blood Pressure SpO2   05/01/24 1658 05/01/24 1658 05/01/24 1658 05/01/24 1658 05/01/24 1658   99.1 °F (37.3 °C) (!) 44 18 141/75 94 %      Temp Source Heart Rate Source Patient Position - Orthostatic VS BP Location FiO2 (%)   05/01/24 1926 05/01/24 1658 05/01/24 1730 05/01/24 1730 --   Oral Monitor Sitting Right arm       Pain Score       05/01/24 2033       6          Wt Readings from Last 1 Encounters:   05/02/24 109 kg (240 lb)     Additional Vital Signs:   05/03/24 07:11:58 98.1 °F (36.7 °C) 80 18 144/86 105 89 % Abnormal  -- -- -- -- -- --   05/03/24 02:23:25 -- 63 15 152/85 107 88 % Abnormal  -- -- -- -- -- --   05/02/24 17:29:25 97 °F (36.1 °C) Abnormal  63 17 147/82 104 95 % -- -- -- -- -- --   05/02/24 1700 -- 60 18 128/72 -- 95 % -- 3 L/min -- Nasal cannula WDL --   05/02/24 1645 -- 63 23 Abnormal  128/79 -- 95 % -- 3 L/min -- Nasal cannula WDL --   05/02/24 1630 -- 75 20 123/74 -- 94 % -- 3 L/min -- Nasal cannula WDL --   05/02/24 1615 -- 68 17 133/82 -- 97 % -- 5 L/min -- EtCO2 mask L --   05/02/24 1607 97.3 °F (36.3 °C) Abnormal  70 16 133/82 -- 96 % -- 5 L/min -- EtCO2 mask [facility] --   05/02/24 12:04:43 -- -- -- -- -- -- 28 -- 2 L/min Nasal cannula -- --   05/02/24 1012 97.8 °F (36.6 °C) 41 Abnormal  15 169/85 -- 96 % 32 -- 3 L/min  Nasal cannula -- --   05/02/24 0845 -- 39 Abnormal  -- 180/83 Abnormal  -- -- -- -- -- -- -- --   05/02/24 0730 -- 37 Abnormal  19 173/82 Abnormal  118 97 % 32 -- 3 L/min None (Room air) -- Lying   05/02/24 0700 -- 37 Abnormal  18 180/88 Abnormal  126 98 % -- -- -- None (Room air) -- Lying   05/02/24 0630 -- 36 Abnormal  21 182/82 Abnormal  118 97 % 32 -- 3 L/min None (Room air) -- Lying   05/02/24 0611 -- 37 Abnormal  18 166/74 -- 96 % -- -- -- -- -- Lying   05/02/24 0520 -- 38 Abnormal  18 161/94 122 98 % -- -- -- None (Room air) -- --   05/02/24 0100 -- 35 Abnormal  22 145/68 98 98 % -- -- -- None (Room air) -- Lying   05/01/24 2330 -- 36 Abnormal  18 148/70 101 95 % -- -- -- None (Room air) -- Lying   05/01/24 2230 -- 37 Abnormal  22 158/61 97 96 % -- -- -- None (Room air) -- Lying   05/01/24 2200 -- 38 Abnormal  15 170/74 107 95 % -- -- -- None (Room air) -- Lying   05/01/24 2030 -- 39 Abnormal  20 171/74 Abnormal  106 94 % -- -- -- None (Room air) -- Lying   05/01/24 1930 -- 40 Abnormal  18 163/78 112 95 % -- -- -- -- -- --   05/01/24 1926 97.8 °F (36.6 °C) 40 Abnormal  18 147/68 -- 96 % -- -- -- None (Room air) -- --   05/01/24 1835 -- -- -- -- -- -- -- -- -- Nasal cannula -- --   05/01/24 1830 -- 41 Abnormal  21 147/78 103 95 % 32 -- 3 L/min Nasal cannula -- Lying   05/01/24 1800 -- 42 Abnormal  20 150/74 105 94 % 32 -- 3 L/min Nasal cannula -- Lying   05/01/24 1730 -- 44 Abnormal  20 142/76 104 95 % 32 -- 3 L/min Nasal ricky       Pertinent Labs/Diagnostic Test Results:   5/2 ELECTROPHYSIOLOGY  OPERATIVE REPORT  Successful Dual Chamber Permanent Pacemaker implant.   5/2 Echo   Left Ventricle: Left ventricular cavity size is normal. Wall thickness is normal. The left ventricular ejection fraction is 55%. Systolic function is normal. Wall motion is normal. Diastolic function is normal.    Right Ventricle: Systolic function is normal.    Aortic Valve: There is mild to moderate regurgitation. There is aortic  valve sclerosis.    Aorta: The ascending aorta is mildly dilated, 4.0 cm  5/1 EKG complete heart block with right bundle branch block.      XR chest 2 views   Final Result by Jae Herron MD (05/03 0708)      No acute cardiopulmonary disease.   Satisfactory positioning of dual lead left chest wall pacemaker. No pneumothorax.            Workstation performed: [id number]         XR chest portable   Final Result by Jae Herron MD (05/03 0712)      Interval placement of left chest wall ICD, lead tips overlying the right atrium and right ventricle in satisfactory position.      No pneumothorax.            Workstation performed: [id number]         XR chest portable   Final Result by Jae Herron MD (05/03 0711)      No acute cardiopulmonary disease.            Workstation performed: [id number]               Results from last 7 days   Lab Units 05/03/24  0529 05/02/24  0519 05/01/24  1738   WBC Thousand/uL 11.70* 7.91 9.63   HEMOGLOBIN g/dL 14.3 14.0 13.9   HEMATOCRIT % 46.2 45.2 45.0   PLATELETS Thousands/uL 177 227 202   TOTAL NEUT ABS Thousands/µL  --  5.97 7.74*         Results from last 7 days   Lab Units 05/02/24  0519 05/01/24  1738   SODIUM mmol/L 141 139   POTASSIUM mmol/L 4.4 4.4   CHLORIDE mmol/L 105 107   CO2 mmol/L 30 22   ANION GAP mmol/L 6 10   BUN mg/dL 19 21   CREATININE mg/dL 1.30 1.45*   EGFR ml/min/1.73sq m 51 44   CALCIUM mg/dL 9.2 9.6   MAGNESIUM mg/dL 2.0 1.9     Results from last 7 days   Lab Units 05/01/24  1738   AST U/L 39   ALT U/L 13   ALK PHOS U/L 74   TOTAL PROTEIN g/dL 7.6   ALBUMIN g/dL 4.1   TOTAL BILIRUBIN mg/dL 1.35     Results from last 7 days   Lab Units 05/03/24  0714 05/02/24  2110 05/02/24  1805 05/02/24  1055 05/02/24  0656 05/01/24  2106   POC GLUCOSE mg/dl 113 132 126 103 105 98     Results from last 7 days   Lab Units 05/02/24  0519 05/01/24  1738   GLUCOSE RANDOM mg/dL 93 94     Results from last 7 days   Lab Units 05/02/24  1146   PROTIME seconds 14.4   INR  1.06      Results from last 7 days   Lab Units 05/01/24  1738   TSH 3RD GENERATON uIU/mL 1.757       ED Treatment:   Medication Administration from 05/01/2024 1646 to 05/02/2024 0948         Date/Time Order Dose Route Action     05/02/2024 0516 EDT heparin (porcine) subcutaneous injection 5,000 Units 5,000 Units Subcutaneous Given     05/01/2024 2033 EDT acetaminophen (TYLENOL) tablet 650 mg 650 mg Oral Given     05/02/2024 0920 EDT allopurinol (ZYLOPRIM) tablet 50 mg 50 mg Oral Given     05/02/2024 0920 EDT amLODIPine (NORVASC) tablet 10 mg 10 mg Oral Given     05/02/2024 0926 EDT budesonide-formoterol (SYMBICORT) 160-4.5 mcg/act inhaler 2 puff 2 puff Inhalation Given     05/02/2024 0923 EDT losartan (COZAAR) tablet 100 mg 100 mg Oral Given     05/02/2024 0924 EDT ceFAZolin (ANCEF) IVPB (premix in dextrose) 2,000 mg 50 mL 2,000 mg Intravenous New Bag          Past Medical History:   Diagnosis Date    Back pain     Bronchiectasis (HCC)     Cataract     Chronic kidney disease     COPD (chronic obstructive pulmonary disease) (HCC)     History of malignant neoplasm of oral cavity     M0    HL (hearing loss)     Hypertension     Kidney stone 05/2019    Malignant neoplasm of colon (HCC)     descending    Prostate cancer (HCC)     SOB (shortness of breath)     Tachycardia     Thalassemia trait     Tinnitus      Present on Admission:   COPD, severe (HCC)   Chronic hypoxemic respiratory failure (HCC)   Complete heart block (HCC)   Type 2 diabetes mellitus with chronic kidney disease, without long-term current use of insulin (HCC)   Stage 3 chronic kidney disease (HCC)      Admitting Diagnosis: Complete heart block (HCC) [I44.2]  Bradycardia [R00.1]  Abnormal ECG [R94.31]  Abnormal EKG [R94.31]  Age/Sex: 83 y.o. male  Admission Orders:  Scheduled Medications:  allopurinol, 50 mg, Oral, Daily  amLODIPine, 10 mg, Oral, QAM  budesonide-formoterol, 2 puff, Inhalation, BID  cefazolin, 2,000 mg, Intravenous, Once  chlorhexidine, 15 mL,  Swish & Spit, Once  heparin (porcine), 5,000 Units, Subcutaneous, Q8H LAZ  insulin lispro, 1-5 Units, Subcutaneous, TID AC  insulin lispro, 1-5 Units, Subcutaneous, HS  losartan, 100 mg, Oral, Daily      Continuous IV Infusions:  lactated ringers, 50 mL/hr, Intravenous, Continuous      PRN Meds:  acetaminophen, 650 mg, Oral, Q6H PRN        IP CONSULT TO CARDIOLOGY    Network Utilization Review Department  ATTENTION: Please call with any questions or concerns to [phone] and carefully listen to the prompts so that you are directed to the right person. All voicemails are confidential.   For Discharge needs, contact Care Management DC Support Team at [phone] opt. [phone]  Send all requests for admission clinical reviews, approved or denied determinations and any other requests to dedicated fax number below belonging to the campus where the patient is receiving treatment. List of dedicated fax numbers for the Facilities:  FACILITY NAME UR FAX NUMBER   ADMISSION DENIALS (Administrative/Medical Necessity) [phone]   DISCHARGE SUPPORT TEAM (NETWORK) [phone]   PARENT CHILD HEALTH (Maternity/NICU/Pediatrics) [phone]   [facility] [phone]   [facility] [phone]   [facility] [phone]   [facility] [phone]   [facility] [phone]   [facility] [phone]   [facility] [phone]   [facility] [phone]   [facility] [phone]   [facility] [phone]   [facility] [phone]   [facility] [phone]

## 2024-05-02 NOTE — PROGRESS NOTES
"Progress Note - Darrell Mayer 83 y.o. male MRN: [id number]  Unit/Bed#: [facility] CATH LAB ROOM Encounter: [id number]    Subjective:   Slept poorly in the emergency department relating to bed and setting but had no other acute complaints.  He specifically denies any lightheadedness or presyncope, chest pain, shortness of breath, palpitations, nausea or diaphoresis.    All other ROS are negative.    Objective:   Vitals: Blood pressure 169/85, pulse (!) 41, temperature 97.8 °F (36.6 °C), temperature source Temporal, resp. rate 15, height 6' 1\" (1.854 m), weight 109 kg (240 lb), SpO2 96%.,Body mass index is 31.66 kg/m².  SPO2 RA Rest      Flowsheet Row ED to Hosp-Admission (Current) from 5/1/2024 in [facility] Cardiac Cath Lab   SpO2 96 %   SpO2 Activity At Rest   O2 Device Nasal cannula   O2 Flow Rate --          I&O:   Intake/Output Summary (Last 24 hours) at 5/2/2024 1217  Last data filed at 5/2/2024 1001  Gross per 24 hour   Intake --   Output 500 ml   Net -500 ml         Physical Exam:       General Appearance:    Alert, cooperative, no distress   Head:    Normocephalic, without obvious abnormality, atraumatic   Eyes:    PERRL, conjunctiva/corneas clear, EOM's intact       Nose:   Moist mucous membranes, no drainage or sinus tenderness   Throat:   No tenderness, no exudates   Neck:   Supple, symmetrical, trachea midline, no JVD   Lungs:     Clear to auscultation bilaterally, respirations unlabored   Heart:    Regular, bradycardic, normal S1 and S2 normal, no murmur, rub   or gallop   Abdomen: Soft, non-tender, positive bowel sounds, no masses, no organomegaly   Extremities:  No pedal edema, calf tenderness. Distal pulses palpable.   Neurologic:     CNII-XII intact.      Invasive Devices       Peripheral Intravenous Line  Duration             Peripheral IV 05/02/24 Left;Ventral (anterior) Wrist <1 day    Peripheral IV 05/02/24 Right Antecubital <1 day                          Social History  reviewed  Family " History   Problem Relation Age of Onset    Heart failure Mother         as per [employer]    Coronary artery disease Mother         as per [employer]    Diabetes Mother         mellitus - as per [employer]    Coronary artery disease Father         as per [employer]    Cancer Sister         malignant neoplasm    reviewed    Meds:  Current Facility-Administered Medications   Medication Dose Route Frequency Provider Last Rate Last Admin    [Transfer Hold] acetaminophen (TYLENOL) tablet 650 mg  650 mg Oral Q6H PRN Cisco Barrios MD   650 mg at 05/01/24 2033    allopurinol (ZYLOPRIM) tablet 50 mg  50 mg Oral Daily Cisco Barrios MD   50 mg at 05/02/24 0920    amLODIPine (NORVASC) tablet 10 mg  10 mg Oral QAM Cisco Barrios MD   10 mg at 05/02/24 0920    budesonide-formoterol (SYMBICORT) 160-4.5 mcg/act inhaler 2 puff  2 puff Inhalation BID Cisco Barrios MD   2 puff at 05/02/24 0926    chlorhexidine (PERIDEX) 0.12 % oral rinse **ADS Override Pull**             [Transfer Hold] heparin (porcine) subcutaneous injection 5,000 Units  5,000 Units Subcutaneous Q8H LAZ Cisco Barrios MD   5,000 Units at 05/02/24 0516    [Transfer Hold] insulin lispro (HumALOG/ADMELOG) 100 units/mL subcutaneous injection 1-5 Units  1-5 Units Subcutaneous TID AC Cisco Barrios MD        [Transfer Hold] insulin lispro (HumALOG/ADMELOG) 100 units/mL subcutaneous injection 1-5 Units  1-5 Units Subcutaneous HS Cisco Barrios MD        losartan (COZAAR) tablet 100 mg  100 mg Oral Daily Cisco Barrios MD   100 mg at 05/02/24 0923     Facility-Administered Medications Ordered in Other Encounters   Medication Dose Route Frequency Provider Last Rate Last Admin    fentaNYL injection   Intravenous PRN Travus A Jadus, CRNA   50 mcg at 05/02/24 1204    lactated ringers infusion   Intravenous Continuous PRN Travus A Jadus, CRNA   New Bag at 05/02/24 1148    midazolam (VERSED) injection   Intravenous PRN Travus A Jadus, CRNA   1 mg at 05/02/24 1204      Medications  Prior to Admission   Medication    acetaminophen-codeine (TYLENOL with CODEINE #4) 300-60 MG per tablet    albuterol (2.5 mg/3 mL) 0.083 % nebulizer solution    allopurinol (ZYLOPRIM) 100 mg tablet    amLODIPine (NORVASC) 10 mg tablet    Budeson-Glycopyrrol-Formoterol (Breztri Aerosphere) 160-9-4.8 MCG/ACT AERO    cholecalciferol (VITAMIN D3) 1,000 units tablet    losartan (COZAAR) 100 MG tablet    metoprolol succinate (TOPROL-XL) 25 mg 24 hr tablet    Polyvinyl Alcohol-Povidone (REFRESH OP)    prochlorperazine (COMPAZINE) 10 mg tablet    Vitamins-Lipotropics (LIPO-FLAVONOID PLUS PO)       Labs:  Results from last 7 days   Lab Units 05/02/24  0519 05/01/24  1738   WBC Thousand/uL 7.91 9.63   HEMOGLOBIN g/dL 14.0 13.9   HEMATOCRIT % 45.2 45.0   PLATELETS Thousands/uL 227 202   SEGS PCT % 76* 81*   LYMPHO PCT % 18 13*   MONO PCT % 6 6   EOS PCT % 0 0     Results from last 7 days   Lab Units 05/02/24  0519 05/01/24  1738   POTASSIUM mmol/L 4.4 4.4   CHLORIDE mmol/L 105 107   CO2 mmol/L 30 22   BUN mg/dL 19 21   CREATININE mg/dL 1.30 1.45*   CALCIUM mg/dL 9.2 9.6   ALK PHOS U/L  --  74   ALT U/L  --  13   AST U/L  --  39     Lab Results   Component Value Date    TROPONINI <0.02 05/18/2019    TROPONINI <0.02 01/02/2018    TROPONINI <0.02 10/16/2017         Lab Results   Component Value Date    BLOODCX No Growth After 5 Days. 10/16/2017    BLOODCX No Growth After 5 Days. 10/16/2017    SPUTUMCULTUR Test not performed. Suggest repeat specimen. 01/04/2018       Imaging:  Results for orders placed during the hospital encounter of 10/16/17    XR chest portable    Narrative  CHEST    INDICATION:  Nausea and vomiting, diarrhea.  History of COPD    COMPARISON:  None    VIEWS:   AP frontal    IMAGES:  2    FINDINGS:    Cardiomediastinal silhouette appears unremarkable.    Appearance of the lungs is compatible with COPD.  Linear changes at the lung bases suggest subsegmental atelectasis or scar.  No pulmonary edema or confluent  infiltrate.  No pneumothorax or significant effusion.    Visualized osseous structures appear within normal limits for the patient's age.    Impression  COPD with minor bibasilar subsegmental atelectasis or scarring      Workstation performed: [id number]    Results for orders placed during the hospital encounter of 01/02/18    XR chest 2 views    Narrative  CHEST - DUAL ENERGY    INDICATION:  Cough for 2 weeks    COMPARISON:  None    VIEWS:  PA (including soft tissue/bone algorithms) and lateral projections    IMAGES:  5    FINDINGS:    Cardiomediastinal silhouette appears unremarkable.    No congestion seen  Hazy groundglass changes seen in the right lower lung may be due to atelectasis, less likely due to infiltrate    Visualized osseous structures appear within normal limits for the patient's age.    Impression  No congestion seen  Hazy groundglass areas seen in the right lower lung may be due to atelectasis, less likely infiltrate.  Consider follow-up radiograph in 6-8 weeks to demonstrate resolution    The study was marked in EPIC for immediate notification.      Workstation performed: [id number]      VTE Pharmacologic Prophylaxis: Heparin      Code Status:   Level 1 - Full Code    Assessment:  Principal Problem:    Complete heart block (HCC)  Active Problems:    History of hypertension    Stage 3 chronic kidney disease (HCC)    Type 2 diabetes mellitus with chronic kidney disease, without long-term current use of insulin (HCC)    Chronic hypoxemic respiratory failure (HCC)    COPD, severe (HCC)  Resolved Problems:    * No resolved hospital problems. *      Plan:  Complete heart block-this was noted incidentally during monitoring with pulmonary rehab earlier in the week.  Patient remains asymptomatic.  -Metoprolol held.  - Plan for pacemaker today    COPD with chronic hypoxemic respiratory failure-not in acute exacerbation, continue oxygen protocol and bronchodilators.    Type 2 diabetes mellitus-stable with  diet control, last A1c 6.0    Hypertension-monitor with discontinuation of Toprol, continue losartan and amlodipine.  After pacemaker may consider resumption of Toprol if needed.    CKD 3-GFR remains at baseline.    Disposition-not stable for discharge pending placement of pacemaker and assured stability from cardiology service.    Long Chandler MD  5/2/2024,12:17 PM

## 2024-05-02 NOTE — DISCHARGE INSTR - AVS FIRST PAGE
Please refer to post pacemaker implantation discharge instructions and restrictions and your pacemaker booklet/temporary card.     Keep incision dry for one week. Leave outer bandage in place for 1 week - it is water proof, and as long as it is fully adhered to your skin you may shower with it.  If it appears as though the bandage is coming off and/or there is any communication to the area of device incision, please then keep the whole area dry for the remaining week.  After 1 week, please remove by pulling all edges away from the center of the bandage. After the bandage is removed, you may then shower normally and get the area wet with soap and water, no scrubbing, and pat dry. Do not use lotions/powders/creams on incision.    No overhead reaching/pushing/pulling/lifting greater than 5-10lbs with left arm for six weeks. Please call the office if you notice redness, swelling, bleeding, or drainage from incision or if you develop fevers.       AFTER PACEMAKER CARE:    If you have any questions, please call [phone] to speak with a nurse (8:30am-4pm, or [phone] after hours). For appointments, please call [phone].      WHAT YOU SHOULD KNOW:   A pacemaker is a small, battery-powered device that is placed under your skin in your upper chest area with wires placed through a vein that lead directly into the heart. It helps regulate your heart rate and prevent your heart from beating too slowly.                 AFTER YOU LEAVE:     Medicines:     Pain medicine:  You may need medicine to take away or decrease pain.     Learn how to take your medicine. Ask what medicine and how much you should take. Be sure you know how, when, and how often to take it. Usually Over the counter pain medicine is sufficient to control pain (Acetominophen or Ibuprofen) Ask your doctor if you may take these. If this does not control your pain, narcotic pain killers may be prescribed, please call if you need prescription.     Do not  wait until the pain is severe before you take your medicine. Tell caregivers if your pain does not decrease.    Pain medicine can make you dizzy or sleepy. Prevent falls by calling someone when you get out of bed or if you need help.        Take your medicine as directed.  Call your healthcare provider if you think your medicine is not helping or if you have side effects. Tell him if you are allergic to any medicine.    Follow up with your cardiologist after your procedure:  You will need a follow-up visit approximately 2 weeks after you leave the hospital. Your cardiologist will check your wound and make sure that your pacemaker is working correctly.     Follow the instructions to check your pacemaker:  Your cardiologist or primary healthcare provider will check your pacemaker and the battery regularly.  He will use a computer to check your pacemaker over the telephone or wireless device which will be given to you.     Pacemaker batteries usually last 8 to 10 years. The pacemaker unit will be replaced when the battery gets low. This is a simpler procedure than the original one to implant your pacemaker.    Wound care:  Keep your incision dry for one week.  Do not use lotions/powders/creams on incision.     Leave outer bandage in place for 1 week - it is water proof, and as long as it is fully adhered to your skin you may shower with it.  If it appears as though the bandage is coming off and/or there is any communication to the area of device incision, please then keep the whole area dry for the remaining week.  After 1 week, please remove by pulling all edges away from the center of the bandage.    Please call the office if you notice redness, swelling, bleeding, or drainage from incision or if you develop fevers.       Activity:   Arm movement and lifting:  Be careful using the arm on the side of your pacemaker. Do not move your arm for the first 24 hours after your procedure. Do not  lift your arm above your  shoulder or lift more than 10 pounds for one month after your procedure. Avoid pushing, pulling, or repetitive arm movements for one month. This helps the leads stay in place and helps your wound heal. Ask your caregiver when you can drive after your procedure. You may move your arm side to side without lifting above your shoulder, and do not need to wear a sling at home.   Driving: you are ok to drive 48 hours after pacemaker is implanted   Sports:  Ask your caregiver when it is okay to play tennis, golf, basketball, or any sport that requires you to lift your arms. Do not play full contact sports, such as football, that could damage your pacemaker. Ask your cardiologist or primary healthcare provider how much and what kinds of physical activity are safe for you.    Living with a pacemaker:   Tell all caregivers you have a pacemaker:  This includes surgeons, radiologists, and medical technicians. You may want to wear a medical alert ID bracelet or necklace that states that you have a pacemaker.    Carry your pacemaker ID card:  Make sure you receive a pacemaker ID card. Carry it with you at all times. It lists important information about your pacemaker. Show it to airport security if you travel.     Avoid electrical interference:  Avoid welding equipment and other equipment with large magnets or electric fields. These things could interfere with how your pacemaker works. Use your cell phone on the ear opposite from your pacemaker. Do not carry your cell phone in your shirt pocket over your chest.     Some Pacemakers are MRI safe. Ask you doctor if it is safe to proceed with MRI and let the radiologist and staff know you have a pacemaker.     Do not touch the skin around your pacemaker:  This can cause damage to the lead wires or move the pacemaker unit from where it should be.    Contact your cardiologist or primary healthcare provider if:   The area around your pacemaker has increasing amount of pain after  surgery. The pain should improve over first few days after implantation.     The skin around your stitches has increasing redness, swelling, or has drainage. This may mean that you have an infection.     You have a fever.     You have chills, a cough, and feel weak or achy. These are also signs of infection.    Your feet or ankles are more swollen than your baseline.     Your Heart rate is less than 50 beats per minute     Seek care immediately if:   Your bandage becomes soaked with blood.     Your pacemaker is swelling rapidly    Your stitches open up.     You feel your heart suddenly beating very slowly or quickly.    You become too weak or dizzy to stand, or you pass out.     Your arm or leg feels warm, tender, and painful. It may look swollen and red.    You have chest pain that does not go away with rest or medicine.     You feel lightheaded, short of breath, and have chest pain.     You cough up blood.        © 2014 [employer]. Information is for End User's use only and may not be sold, redistributed or otherwise used for commercial purposes. All illustrations and images included in CareNotes® are the copyrighted property of [employer] or [employer].  The above information is an  only. It is not intended as medical advice for individual conditions or treatments. Talk to your doctor, nurse or pharmacist before following any medical regimen to see if it is safe and effective for you.

## 2024-05-02 NOTE — ANESTHESIA POSTPROCEDURE EVALUATION
Post-Op Assessment Note    CV Status:  Stable  Pain Score: 0    Pain management: adequate       Mental Status:  Sleepy and arousable   Hydration Status:  Stable   PONV Controlled:  Controlled   Airway Patency:  Patent     Post Op Vitals Reviewed: Yes    No anethesia notable event occurred.    Staff: CRNA               BP   133/82   Temp   97.5   Pulse  61 paced   Resp 14   SpO2 97 % (05/02/24 1615)

## 2024-05-03 ENCOUNTER — TELEPHONE (OUTPATIENT)
Dept: CARDIOLOGY CLINIC | Facility: CLINIC | Age: 84
End: 2024-05-03

## 2024-05-03 ENCOUNTER — APPOINTMENT (INPATIENT)
Dept: RADIOLOGY | Facility: HOSPITAL | Age: 84
DRG: 243 | End: 2024-05-03
Payer: COMMERCIAL

## 2024-05-03 VITALS
SYSTOLIC BLOOD PRESSURE: 144 MMHG | WEIGHT: 240 LBS | RESPIRATION RATE: 18 BRPM | HEIGHT: 73 IN | DIASTOLIC BLOOD PRESSURE: 86 MMHG | OXYGEN SATURATION: 89 % | BODY MASS INDEX: 31.81 KG/M2 | TEMPERATURE: 98.1 F | HEART RATE: 80 BPM

## 2024-05-03 LAB
ERYTHROCYTE [DISTWIDTH] IN BLOOD BY AUTOMATED COUNT: 18.1 % (ref 11.6–15.1)
GLUCOSE SERPL-MCNC: 107 MG/DL (ref 65–140)
GLUCOSE SERPL-MCNC: 113 MG/DL (ref 65–140)
HCT VFR BLD AUTO: 46.2 % (ref 36.5–49.3)
HGB BLD-MCNC: 14.3 G/DL (ref 12–17)
MCH RBC QN AUTO: 24.3 PG (ref 26.8–34.3)
MCHC RBC AUTO-ENTMCNC: 31 G/DL (ref 31.4–37.4)
MCV RBC AUTO: 78 FL (ref 82–98)
PLATELET # BLD AUTO: 177 THOUSANDS/UL (ref 149–390)
RBC # BLD AUTO: 5.89 MILLION/UL (ref 3.88–5.62)
WBC # BLD AUTO: 11.7 THOUSAND/UL (ref 4.31–10.16)

## 2024-05-03 PROCEDURE — 82948 REAGENT STRIP/BLOOD GLUCOSE: CPT

## 2024-05-03 PROCEDURE — 85027 COMPLETE CBC AUTOMATED: CPT

## 2024-05-03 PROCEDURE — 99024 POSTOP FOLLOW-UP VISIT: CPT | Performed by: INTERNAL MEDICINE

## 2024-05-03 PROCEDURE — 99239 HOSP IP/OBS DSCHRG MGMT >30: CPT | Performed by: INTERNAL MEDICINE

## 2024-05-03 PROCEDURE — 71046 X-RAY EXAM CHEST 2 VIEWS: CPT

## 2024-05-03 RX ADMIN — LOSARTAN POTASSIUM 100 MG: 50 TABLET, FILM COATED ORAL at 08:22

## 2024-05-03 RX ADMIN — ALLOPURINOL 50 MG: 100 TABLET ORAL at 08:22

## 2024-05-03 RX ADMIN — AMLODIPINE BESYLATE 10 MG: 10 TABLET ORAL at 08:22

## 2024-05-03 RX ADMIN — ACETAMINOPHEN 650 MG: 325 TABLET, FILM COATED ORAL at 12:18

## 2024-05-03 RX ADMIN — BUDESONIDE AND FORMOTEROL FUMARATE DIHYDRATE 2 PUFF: 160; 4.5 AEROSOL RESPIRATORY (INHALATION) at 08:23

## 2024-05-03 NOTE — PROGRESS NOTES
"Cardiology Progress Note - Darrell Mayer 83 y.o. male MRN: [id number]    Unit/Bed#: -01 Encounter: [id number]      Assessment/Plan:  1.  Complete heart block status post dual-chamber PPM placed on 5/2/2024.  Follow post PPM orders.  Reviewed the importance of keeping left arm below the shoulder for the next 6 weeks.  Follow in device clinic.    2.  Hypertension, stable.  Continue amlodipine, losartan, restart Toprol    3.  Diabetes, management per Slim    4.  CKD, creatinine today 1.3.    5.  ASCVD/coronary artery calcification on CT, restart Toprol.    6.  Aortic aneurysm, 4.4 cm continue to monitor OP    7.  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  Follow-up in the office/device clinic      Subjective:   Patient seen and examined.  No significant events overnight. Im feeling good. Denies cp    Objective:     Vitals: Blood pressure 144/86, pulse 80, temperature 98.1 °F (36.7 °C), resp. rate 18, height 6' 1\" (1.854 m), weight 109 kg (240 lb), SpO2 (!) 89%., Body mass index is 31.66 kg/m².,   Orthostatic Blood Pressures      Flowsheet Row Most Recent Value   Blood Pressure 144/86 filed at 05/03/2024 0711   Patient Position - Orthostatic VS Lying filed at 05/02/2024 0730              Intake/Output Summary (Last 24 hours) at 5/3/2024 1518  Last data filed at 5/3/2024 0900  Gross per 24 hour   Intake 300 ml   Output --   Net 300 ml         Physical Exam:  GEN: Alert and oriented x 3, in no acute distress.  Well appearing and well nourished.   HEENT: Sclera anicteric, conjunctivae pink, mucous membranes moist. Oropharynx clear.   NECK: Supple, no carotid bruits, no significant JVD. Trachea midline, no thyromegaly.   HEART: Regular rhythm, normal S1 and S2, no murmurs, clicks, gallops or rubs. PMI nondisplaced, no thrills.   LUNGS: Clear to auscultation bilaterally; no wheezes, rales, or rhonchi. No increased work of breathing or signs of " respiratory distress.   ABDOMEN: Soft, nontender, nondistended, normoactive bowel sounds.   EXTREMITIES: + left arm sling, Skin warm and well perfused, no clubbing, cyanosis, or edema.  NEURO: No focal findings. Normal speech. Mood and affect normal.   SKIN: Normal without suspicious lesions on exposed skin.      Medications:    No current facility-administered medications for this encounter.    Current Outpatient Medications:     acetaminophen-codeine (TYLENOL with CODEINE #4) 300-60 MG per tablet, Take 1 tablet by mouth every 6 (six) hours as needed for moderate pain, Disp: 60 tablet, Rfl: 0    albuterol (2.5 mg/3 mL) 0.083 % nebulizer solution, USE 1 VIAL IN NEBULIZER EVERY 6 HOURS, Disp: 360 mL, Rfl: 1    allopurinol (ZYLOPRIM) 100 mg tablet, Take 1 tablet (100 mg total) by mouth daily, Disp: 90 tablet, Rfl: 3    amLODIPine (NORVASC) 10 mg tablet, Take 1 tablet (10 mg total) by mouth every morning, Disp: 90 tablet, Rfl: 3    Budeson-Glycopyrrol-Formoterol (Breztri Aerosphere) 160-9-4.8 MCG/ACT AERO, Inhale 2 puffs 2 (two) times a day Rinse mouth after use., Disp: 31.1 g, Rfl: 3    cholecalciferol (VITAMIN D3) 1,000 units tablet, Take 5 tablets (5,000 Units total) by mouth every other day, Disp: , Rfl:     losartan (COZAAR) 100 MG tablet, Take 1 tablet (100 mg total) by mouth daily, Disp: 90 tablet, Rfl: 3    metoprolol succinate (TOPROL-XL) 25 mg 24 hr tablet, TAKE 1 TABLET DAILY, Disp: 90 tablet, Rfl: 1    Polyvinyl Alcohol-Povidone (REFRESH OP), Apply to eye as needed , Disp: , Rfl:     prochlorperazine (COMPAZINE) 10 mg tablet, Take 1 tablet (10 mg total) by mouth every 6 (six) hours as needed for nausea or vomiting, Disp: 30 tablet, Rfl: 0    Vitamins-Lipotropics (LIPO-FLAVONOID PLUS PO), Take by mouth, Disp: , Rfl:      Labs & Results:        Results from last 7 days   Lab Units 05/03/24  0529 05/02/24  0519 05/01/24  1738   WBC Thousand/uL 11.70* 7.91 9.63   HEMOGLOBIN g/dL 14.3 14.0 13.9   HEMATOCRIT %  46.2 45.2 45.0   PLATELETS Thousands/uL 177 227 202         Results from last 7 days   Lab Units 05/02/24  0519 05/01/24  1738   POTASSIUM mmol/L 4.4 4.4   CHLORIDE mmol/L 105 107   CO2 mmol/L 30 22   BUN mg/dL 19 21   CREATININE mg/dL 1.30 1.45*   CALCIUM mg/dL 9.2 9.6   ALK PHOS U/L  --  74   ALT U/L  --  13   AST U/L  --  39     Results from last 7 days   Lab Units 05/02/24  1146   INR  1.06     Results from last 7 days   Lab Units 05/02/24  0519 05/01/24  1738   MAGNESIUM mg/dL 2.0 1.9

## 2024-05-03 NOTE — TELEPHONE ENCOUNTER
----- Message from Zuleima Cevallos PA-C sent at 5/3/2024  2:21 PM EDT -----  Regarding: [facility]  Cardiology Follow-up:    Patient clinical visit in 4 week at the Cardio location: [place] office.     Schedule visit with Cardio [facility] Providers: first available provider / LOU    Type of Visit: VISIT TYPE: in-person office visit.    Test ordered: Cardiac tests: .    Additional Details:     DEVICE-- PT HAD PPM PLACED 5/2/24 BY LOU NEEDS 7-10 DAY DEVICE CLINIC /WOUND CHECK IN URG

## 2024-05-03 NOTE — PLAN OF CARE
Problem: PAIN - ADULT  Goal: Verbalizes/displays adequate comfort level or baseline comfort level  Description: Interventions:  - Encourage patient to monitor pain and request assistance  - Assess pain using appropriate pain scale  - Administer analgesics based on type and severity of pain and evaluate response  - Implement non-pharmacological measures as appropriate and evaluate response  - Consider cultural and social influences on pain and pain management  - Notify physician/advanced practitioner if interventions unsuccessful or patient reports new pain  Outcome: Progressing     Problem: CARDIOVASCULAR - ADULT  Goal: Maintains optimal cardiac output and hemodynamic stability  Description: INTERVENTIONS:  - Monitor I/O, vital signs and rhythm  - Monitor for S/S and trends of decreased cardiac output  - Administer and titrate ordered vasoactive medications to optimize hemodynamic stability  - Assess quality of pulses, skin color and temperature  - Assess for signs of decreased coronary artery perfusion  - Instruct patient to report change in severity of symptoms  Outcome: Progressing     Problem: CARDIOVASCULAR - ADULT  Goal: Absence of cardiac dysrhythmias or at baseline rhythm  Description: INTERVENTIONS:  - Continuous cardiac monitoring, vital signs, obtain 12 lead EKG if ordered  - Administer antiarrhythmic and heart rate control medications as ordered  - Monitor electrolytes and administer replacement therapy as ordered  Outcome: Progressing     Problem: CARDIOVASCULAR - ADULT  Goal: Absence of cardiac dysrhythmias or at baseline rhythm  Description: INTERVENTIONS:  - Continuous cardiac monitoring, vital signs, obtain 12 lead EKG if ordered  - Administer antiarrhythmic and heart rate control medications as ordered  - Monitor electrolytes and administer replacement therapy as ordered  Outcome: Progressing

## 2024-05-03 NOTE — DISCHARGE SUMMARY
Discharge Summary - Darrell Mayer 83 y.o. male MRN: [id number]  Unit/Bed#: -01 Encounter: [id number]    Admission Date:    5/1/2024   Discharge Date:   05/03/24   Admitting Diagnosis:   Complete heart block (HCC) [I44.2]  Bradycardia [R00.1]  Abnormal ECG [R94.31]  Abnormal EKG [R94.31]  Admitting Provider:   Cisco Barrios MD  Discharge Provider:   Long Chandler MD     Primary Care Physician at Discharge:   Long Chandler MD,[phone]    HPI: From history and physical by Dr. Barrios  Darrell Mayer is a 83 y.o. male with past medical history significant COPD, hypertension, CKD who presented to the emergency department after being found to have complete heart block on EKG.  He reported some lightheadedness however reports he has had ongoing lightheadedness previously.  Denies any chest pain, shortness of breath, palpitations or any complaints at this time.  Denies fevers, chills, abdominal pain, nausea, vomiting.     Procedures Performed:   Orders Placed This Encounter   Procedures    Cardiac ep lab eps/ablations       Hospital Course:   Complete heart block-incidentally noted during monitoring at pulmonary rehab.  Patient was minimally symptomatic.  He was evaluated by cardiology and after further discussion decided to proceed with dual-chamber pacer which was placed 5/2 without complication.  He was counseled regarding postoperative care and monitoring.    Hypertension-Toprol was held upon diagnosis of heart block.  Blood pressures were fairly stable without Toprol during the admission.  Toprol will be resumed upon discharge and he will continue losartan and amlodipine    Other medical problems remained stable throughout the hospitalization.      Current Facility-Administered Medications:     acetaminophen (TYLENOL) tablet 650 mg, 650 mg, Oral, Q6H PRN, Luma Ojeda PA-C, 650 mg at 05/01/24 2033    allopurinol (ZYLOPRIM) tablet 50 mg, 50 mg, Oral, Daily, Luma Ojeda PA-C, 50 mg at  05/03/24 0822    amLODIPine (NORVASC) tablet 10 mg, 10 mg, Oral, QAM, Luma Ojeda PA-C, 10 mg at 05/03/24 0822    budesonide-formoterol (SYMBICORT) 160-4.5 mcg/act inhaler 2 puff, 2 puff, Inhalation, BID, Luma Ojeda PA-C, 2 puff at 05/03/24 0823    insulin lispro (HumALOG/ADMELOG) 100 units/mL subcutaneous injection 1-5 Units, 1-5 Units, Subcutaneous, TID AC **AND** Fingerstick Glucose (POCT), , , TID AC, Luma Ojeda PA-C    insulin lispro (HumALOG/ADMELOG) 100 units/mL subcutaneous injection 1-5 Units, 1-5 Units, Subcutaneous, HS, Luma Ojeda PA-C    lactated ringers infusion, 50 mL/hr, Intravenous, Continuous, Jason Alexander, CRNA, Last Rate: 50 mL/hr at 05/02/24 1815, 50 mL/hr at 05/02/24 1815    losartan (COZAAR) tablet 100 mg, 100 mg, Oral, Daily, Luma Ojeda PA-C, 100 mg at 05/03/24 0822      Consulting Providers   Cardiology    Significant Findings, Care, Treatment and Services Provided:   As above    Complications:  None  Physical Exam:  General Appearance:    Alert, cooperative, no distress   Head:    Normocephalic, without obvious abnormality, atraumatic   Eyes:    PERRL, conjunctiva/corneas clear, EOM's intact       Nose:   Moist mucous membranes, no drainage or sinus tenderness   Throat:   No tenderness, no exudates   Neck:   Supple, symmetrical, trachea midline, no JVD   Lungs:     Clear to auscultation bilaterally, respirations unlabored   Heart:    Regular rate and rhythm, S1 and S2 normal, no murmur, rub   or gallop   Abdomen: Soft, non-tender, positive bowel sounds, no masses, no organomegaly   Extremities:  No pedal edema, calf tenderness. Distal pulses palpable.   Neurologic:     CNII-XII intact.    Labs:   Lab Results   Component Value Date    WBC 11.70 (H) 05/03/2024    WBC 8.4 08/18/2015    RBC 5.89 (H) 05/03/2024    RBC 6.07 (H) 08/18/2015    HGB 14.3 05/03/2024    HGB 13.8 08/18/2015    HCT 46.2 05/03/2024    HCT 44.0 08/18/2015     MCV 78 (L) 05/03/2024    MCV 72 (L) 08/18/2015    MCH 24.3 (L) 05/03/2024    MCH 22.7 (L) 08/18/2015    RDW 18.1 (H) 05/03/2024    RDW 16.3 (H) 08/18/2015     05/03/2024     08/18/2015     Lab Results   Component Value Date    CREATININE 1.30 05/02/2024    CREATININE 1.3 08/18/2015    BUN 19 05/02/2024    BUN 9 08/18/2015     08/18/2015    K 4.4 05/02/2024    K 3.8 08/18/2015     05/02/2024     08/18/2015    CO2 30 05/02/2024    CO2 25.4 08/18/2015    GLUCOSE 97 08/18/2015    PROT 8.2 08/18/2015    ALKPHOS 74 05/01/2024    ALKPHOS 125 (H) 08/18/2015    ALT 13 05/01/2024    ALT 28 08/18/2015    AST 39 05/01/2024    AST 34 08/18/2015    BILIDIR 0.26 (H) 06/29/2020    BILIDIR 0.23 (H) 08/18/2015         Discharge Diagnosis:   Principal Problem:    Complete heart block (HCC)  Active Problems:    History of hypertension    Stage 3 chronic kidney disease (HCC)    Type 2 diabetes mellitus with chronic kidney disease, without long-term current use of insulin (HCC)    Chronic hypoxemic respiratory failure (HCC)    COPD, severe (HCC)  Resolved Problems:    * No resolved hospital problems. *      Condition at Discharge:   Good     Code Status: Level 1 - Full Code  Advance Directive and Living Will: Received  Power of :    POLST:      Discharge instructions/Information to patient and family:   See after visit summary for information provided to patient and family.      Provisions for Follow-Up Care:  See after visit summary for information related to follow-up care and any pertinent home health orders.      Disposition:   Home    Planned Readmission:   No    Discharge Statement   I spent 31 minutes discharging the patient. This time was spent on the day of discharge. I had direct contact with the patient on the day of discharge. Additional documentation is required if more than 30 minutes were spent on discharge. Greater than 50% of the total time was spent examining patient, answering  all patient questions, arranging and discussing plan of care with patient as well as directly providing post-discharge instructions. Additional time then spent on discharge activities.    Discharge Medications:  See after visit summary for reconciled discharge medications provided to patient and family.      Long Chandler MD  5/3/2024,11:04 AM

## 2024-05-03 NOTE — PLAN OF CARE
Problem: PAIN - ADULT  Goal: Verbalizes/displays adequate comfort level or baseline comfort level  Description: Interventions:  - Encourage patient to monitor pain and request assistance  - Assess pain using appropriate pain scale  - Administer analgesics based on type and severity of pain and evaluate response  - Implement non-pharmacological measures as appropriate and evaluate response  - Consider cultural and social influences on pain and pain management  - Notify physician/advanced practitioner if interventions unsuccessful or patient reports new pain  Outcome: Progressing     Problem: INFECTION - ADULT  Goal: Absence or prevention of progression during hospitalization  Description: INTERVENTIONS:  - Assess and monitor for signs and symptoms of infection  - Monitor lab/diagnostic results  - Monitor all insertion sites, i.e. indwelling lines, tubes, and drains  - Monitor endotracheal if appropriate and nasal secretions for changes in amount and color  - [place] appropriate cooling/warming therapies per order  - Administer medications as ordered  - Instruct and encourage patient and family to use good hand hygiene technique  - Identify and instruct in appropriate isolation precautions for identified infection/condition  Outcome: Progressing

## 2024-05-04 LAB
ATRIAL RATE: 47 BPM
ATRIAL RATE: 64 BPM
ATRIAL RATE: 75 BPM
P AXIS: 68 DEGREES
P AXIS: 74 DEGREES
PR INTERVAL: 180 MS
QRS AXIS: -81 DEGREES
QRS AXIS: 91 DEGREES
QRS AXIS: 92 DEGREES
QRSD INTERVAL: 144 MS
QRSD INTERVAL: 152 MS
QRSD INTERVAL: 160 MS
QT INTERVAL: 510 MS
QT INTERVAL: 544 MS
QT INTERVAL: 570 MS
QTC INTERVAL: 481 MS
QTC INTERVAL: 487 MS
QTC INTERVAL: 526 MS
T WAVE AXIS: 48 DEGREES
T WAVE AXIS: 69 DEGREES
T WAVE AXIS: 90 DEGREES
VENTRICULAR RATE: 44 BPM
VENTRICULAR RATE: 47 BPM
VENTRICULAR RATE: 64 BPM

## 2024-05-04 PROCEDURE — 93010 ELECTROCARDIOGRAM REPORT: CPT | Performed by: INTERNAL MEDICINE

## 2024-05-06 ENCOUNTER — APPOINTMENT (OUTPATIENT)
Dept: LAB | Facility: HOSPITAL | Age: 84
End: 2024-05-06
Attending: INTERNAL MEDICINE
Payer: COMMERCIAL

## 2024-05-06 ENCOUNTER — TRANSITIONAL CARE MANAGEMENT (OUTPATIENT)
Age: 84
End: 2024-05-06

## 2024-05-06 DIAGNOSIS — I44.2 COMPLETE HEART BLOCK (HCC): ICD-10-CM

## 2024-05-06 LAB
ANION GAP SERPL CALCULATED.3IONS-SCNC: 7 MMOL/L (ref 4–13)
BASOPHILS # BLD AUTO: 0.03 THOUSANDS/ÂΜL (ref 0–0.1)
BASOPHILS NFR BLD AUTO: 0 % (ref 0–1)
BUN SERPL-MCNC: 13 MG/DL (ref 5–25)
CALCIUM SERPL-MCNC: 9.8 MG/DL (ref 8.4–10.2)
CHLORIDE SERPL-SCNC: 103 MMOL/L (ref 96–108)
CO2 SERPL-SCNC: 31 MMOL/L (ref 21–32)
CREAT SERPL-MCNC: 1.15 MG/DL (ref 0.6–1.3)
EOSINOPHIL # BLD AUTO: 0.05 THOUSAND/ÂΜL (ref 0–0.61)
EOSINOPHIL NFR BLD AUTO: 1 % (ref 0–6)
ERYTHROCYTE [DISTWIDTH] IN BLOOD BY AUTOMATED COUNT: 18 % (ref 11.6–15.1)
GFR SERPL CREATININE-BSD FRML MDRD: 59 ML/MIN/1.73SQ M
GLUCOSE P FAST SERPL-MCNC: 98 MG/DL (ref 65–99)
HCT VFR BLD AUTO: 45.1 % (ref 36.5–49.3)
HGB BLD-MCNC: 13.9 G/DL (ref 12–17)
IMM GRANULOCYTES # BLD AUTO: 0.05 THOUSAND/UL (ref 0–0.2)
IMM GRANULOCYTES NFR BLD AUTO: 1 % (ref 0–2)
LYMPHOCYTES # BLD AUTO: 1.53 THOUSANDS/ÂΜL (ref 0.6–4.47)
LYMPHOCYTES NFR BLD AUTO: 15 % (ref 14–44)
MCH RBC QN AUTO: 23.7 PG (ref 26.8–34.3)
MCHC RBC AUTO-ENTMCNC: 30.8 G/DL (ref 31.4–37.4)
MCV RBC AUTO: 77 FL (ref 82–98)
MONOCYTES # BLD AUTO: 0.68 THOUSAND/ÂΜL (ref 0.17–1.22)
MONOCYTES NFR BLD AUTO: 7 % (ref 4–12)
NEUTROPHILS # BLD AUTO: 7.86 THOUSANDS/ÂΜL (ref 1.85–7.62)
NEUTS SEG NFR BLD AUTO: 76 % (ref 43–75)
NRBC BLD AUTO-RTO: 0 /100 WBCS
PLATELET # BLD AUTO: 156 THOUSANDS/UL (ref 149–390)
POTASSIUM SERPL-SCNC: 4.1 MMOL/L (ref 3.5–5.3)
RBC # BLD AUTO: 5.87 MILLION/UL (ref 3.88–5.62)
SODIUM SERPL-SCNC: 141 MMOL/L (ref 135–147)
WBC # BLD AUTO: 10.2 THOUSAND/UL (ref 4.31–10.16)

## 2024-05-06 PROCEDURE — 80048 BASIC METABOLIC PNL TOTAL CA: CPT

## 2024-05-06 PROCEDURE — 85025 COMPLETE CBC W/AUTO DIFF WBC: CPT

## 2024-05-06 PROCEDURE — 36415 COLL VENOUS BLD VENIPUNCTURE: CPT

## 2024-05-06 NOTE — UTILIZATION REVIEW
NOTIFICATION OF ADMISSION DISCHARGE   This is a Notification of Discharge from [facility]. Please be advised that this patient has been discharge from our facility. Below you will find the admission and discharge date and time including the patient’s disposition.   UTILIZATION REVIEW CONTACT:  Jo Ann Meyer  Utilization   Network Utilization Review Department  Phone: [phone] x carefully listen to the prompts. All voicemails are confidential.  Email: [email or web address]     ADMISSION INFORMATION  PRESENTATION DATE: 5/1/2024  5:00 PM  OBERVATION ADMISSION DATE:   INPATIENT ADMISSION DATE: 5/2/24  2:05 AM   DISCHARGE DATE: 5/3/2024 12:47 PM   DISPOSITION:Home/Self Care    Network Utilization Review Department  ATTENTION: Please call with any questions or concerns to [phone] and carefully listen to the prompts so that you are directed to the right person. All voicemails are confidential.   For Discharge needs, contact Care Management DC Support Team at [phone] opt. 2  Send all requests for admission clinical reviews, approved or denied determinations and any other requests to dedicated fax number below belonging to the campus where the patient is receiving treatment. List of dedicated fax numbers for the Facilities:  FACILITY NAME UR FAX NUMBER   ADMISSION DENIALS (Administrative/Medical Necessity) [phone]   DISCHARGE SUPPORT TEAM ([facility]) [phone]   PARENT CHILD HEALTH (Maternity/NICU/Pediatrics) [phone]   [facility] [phone]   [facility] [phone]   [facility] [phone]   [facility] [phone]   [facility] [phone]   [facility] [phone]   [facility] [phone]   [facility] [phone]    [facility] [phone]   [facility] [phone]   [facility] [phone]   [facility] [phone]

## 2024-05-07 ENCOUNTER — RA CDI HCC (OUTPATIENT)
Dept: OTHER | Facility: HOSPITAL | Age: 84
End: 2024-05-07

## 2024-05-08 LAB
ALBUMIN SERPL BCP-MCNC: 4.1 G/DL (ref 3.5–5)
ALP SERPL-CCNC: 74 U/L (ref 34–104)
ALT SERPL W P-5'-P-CCNC: 13 U/L (ref 7–52)
ANION GAP SERPL CALCULATED.3IONS-SCNC: 10 MMOL/L (ref 4–13)
AST SERPL W P-5'-P-CCNC: 39 U/L (ref 13–39)
BILIRUB SERPL-MCNC: 1.35 MG/DL (ref 0.2–1)
BUN SERPL-MCNC: 21 MG/DL (ref 5–25)
CALCIUM SERPL-MCNC: 9.6 MG/DL (ref 8.4–10.2)
CHLORIDE SERPL-SCNC: 107 MMOL/L (ref 96–108)
CO2 SERPL-SCNC: 22 MMOL/L (ref 21–32)
CREAT SERPL-MCNC: 1.45 MG/DL (ref 0.6–1.3)
GFR SERPL CREATININE-BSD FRML MDRD: 44 ML/MIN/1.73SQ M
GLUCOSE SERPL-MCNC: 94 MG/DL (ref 65–140)
POTASSIUM SERPL-SCNC: 4.4 MMOL/L (ref 3.5–5.3)
PROT SERPL-MCNC: 7.6 G/DL (ref 6.4–8.4)
SODIUM SERPL-SCNC: 139 MMOL/L (ref 135–147)

## 2024-05-14 ENCOUNTER — OFFICE VISIT (OUTPATIENT)
Age: 84
End: 2024-05-14
Payer: COMMERCIAL

## 2024-05-14 VITALS
HEART RATE: 111 BPM | WEIGHT: 240 LBS | HEIGHT: 73 IN | DIASTOLIC BLOOD PRESSURE: 80 MMHG | BODY MASS INDEX: 31.81 KG/M2 | SYSTOLIC BLOOD PRESSURE: 140 MMHG | OXYGEN SATURATION: 92 %

## 2024-05-14 DIAGNOSIS — I44.2 COMPLETE HEART BLOCK (HCC): Primary | ICD-10-CM

## 2024-05-14 DIAGNOSIS — I10 PRIMARY HYPERTENSION: ICD-10-CM

## 2024-05-14 PROCEDURE — 99495 TRANSJ CARE MGMT MOD F2F 14D: CPT

## 2024-05-14 NOTE — ASSESSMENT & PLAN NOTE
He is s/p dual-chamber pacemaker placement 5/2.  He was supposed to have a home nurse come and check the site, but they never came.  Site is healing nicely without any erythema, warmth to touch, or drainage.  He has a cardiology appointment on Thursday for a site check, and a follow-up in 3 weeks.

## 2024-05-14 NOTE — ASSESSMENT & PLAN NOTE
BP in office is 148/96, on recheck it was 140/80.  He is currently on amlodipine 10 mg daily, losartan 100 mg daily, and metoprolol 25 mg daily. He checks his blood pressure daily at home and this morning it was around 120/80.

## 2024-05-14 NOTE — PROGRESS NOTES
Assessment & Plan     1. Complete heart block (HCC)  Assessment & Plan:  He is s/p dual-chamber pacemaker placement 5/2.  He was supposed to have a home nurse come and check the site, but they never came.  Site is healing nicely without any erythema, warmth to touch, or drainage.  He has a cardiology appointment on Thursday for a site check, and a follow-up in 3 weeks.      2. Primary hypertension  Assessment & Plan:  BP in office is 148/96, on recheck it was 140/80.  He is currently on amlodipine 10 mg daily, losartan 100 mg daily, and metoprolol 25 mg daily. He checks his blood pressure daily at home and this morning it was around 120/80.           Subjective     Transitional Care Management Review:   Darrell Mayer is a 83 y.o. male here for TCM follow up.  He presented to the ER 5/1 after being found to have complete heart block on EKG.  He was admitted and a dual-chamber pacemaker was placed 5/2 without complication.  He was discharged 5/3.  Today, he is feeling well.    During the TCM phone call patient stated:  TCM Call       Date and time call was made  5/6/2024  2:37 PM    Hospital care reviewed  Records reviewed    Patient was hospitialized at  [facility]    Date of Admission  05/01/24    Date of discharge  05/03/24    Diagnosis  Complete heart block    Disposition  Home    Current Symptoms  None          TCM Call       Post hospital issues  None    Scheduled for follow up?  Yes    Patients specialists  Cardiologist    Did you obtain your prescribed medications  Yes    Do you need help managing your prescriptions or medications  No    Is transportation to your appointment needed  No    I have advised the patient to call PCP with any new or worsening symptoms  ron quintero cma    Living Arrangements  Spouse or Significiant other    Are you recieving any outpatient services  No    Are you recieving home care services  No          HPI  Review of Systems   Constitutional:  Negative for chills, fatigue and  "fever.   HENT:  Negative for ear discharge, ear pain, postnasal drip, rhinorrhea, sinus pressure, sinus pain, sore throat, tinnitus and trouble swallowing.    Eyes:  Negative for pain, discharge and itching.   Respiratory:  Negative for cough, shortness of breath and wheezing.    Cardiovascular:  Negative for chest pain, palpitations and leg swelling.   Gastrointestinal:  Negative for abdominal pain, constipation, diarrhea, nausea and vomiting.   Endocrine: Negative for polydipsia, polyphagia and polyuria.   Genitourinary:  Negative for difficulty urinating, frequency, hematuria and urgency.   Musculoskeletal:  Negative for arthralgias, joint swelling and myalgias.   Skin:  Negative for color change.   Allergic/Immunologic: Negative for environmental allergies.   Neurological:  Negative for dizziness, weakness, light-headedness, numbness and headaches.   Hematological:  Negative for adenopathy.   Psychiatric/Behavioral:  Negative for decreased concentration and sleep disturbance. The patient is not nervous/anxious.        Objective     /80 (BP Location: Left arm, Patient Position: Sitting, Cuff Size: Large)   Pulse (!) 111   Ht 6' 1\" (1.854 m)   Wt 109 kg (240 lb)   SpO2 92%   BMI 31.66 kg/m²      Physical Exam  Vitals and nursing note reviewed.   Constitutional:       General: He is awake.      Appearance: Normal appearance. He is well-developed, well-groomed and overweight.   HENT:      Head: Normocephalic and atraumatic.      Right Ear: Hearing and external ear normal.      Left Ear: Hearing and external ear normal.      Nose: Nose normal.      Mouth/Throat:      Lips: Pink.      Mouth: Mucous membranes are moist.   Eyes:      General: Lids are normal. Vision grossly intact. Gaze aligned appropriately.      Conjunctiva/sclera: Conjunctivae normal.   Neck:      Vascular: No carotid bruit.      Trachea: Trachea and phonation normal.   Cardiovascular:      Rate and Rhythm: Normal rate and regular rhythm. "      Heart sounds: Normal heart sounds, S1 normal and S2 normal. No murmur heard.     No friction rub. No gallop.   Pulmonary:      Effort: Pulmonary effort is normal.      Breath sounds: Normal breath sounds and air entry. No decreased breath sounds, wheezing, rhonchi or rales.   Abdominal:      General: Abdomen is protuberant.   Musculoskeletal:      Cervical back: Neck supple.      Right lower leg: No edema.      Left lower leg: No edema.   Skin:     General: Skin is warm.      Capillary Refill: Capillary refill takes less than 2 seconds.      Comments: Pacemaker site is healing without complication.   Neurological:      Mental Status: He is alert.   Psychiatric:         Attention and Perception: Attention and perception normal.         Mood and Affect: Mood and affect normal.         Speech: Speech normal.         Behavior: Behavior normal. Behavior is cooperative.         Thought Content: Thought content normal.         Cognition and Memory: Cognition and memory normal.         Judgment: Judgment normal.       Medications have been reviewed by provider in current encounter    Sarah Landers PA-C

## 2024-05-16 ENCOUNTER — IN-CLINIC DEVICE VISIT (OUTPATIENT)
Dept: CARDIOLOGY CLINIC | Facility: CLINIC | Age: 84
End: 2024-05-16

## 2024-05-16 DIAGNOSIS — Z95.0 PRESENCE OF PERMANENT CARDIAC PACEMAKER: Primary | ICD-10-CM

## 2024-05-16 PROCEDURE — 99024 POSTOP FOLLOW-UP VISIT: CPT | Performed by: INTERNAL MEDICINE

## 2024-05-16 NOTE — PROGRESS NOTES
MDT DC PM/ACTIVE SYSTEM IS MRI CONDITIONAL   DEVICE INTERROGATED IN THE [place] OFFICE:  BATTERY VOLTAGE ADEQUATE (11.7 YR.).  AP 9.4% -LBB 99.0%.  ALL LEAD PARAMETERS WITHIN NORMAL LIMITS.  1 EPISODE OF NSVT (12 @ 238 BPM).  EF 55% (ECHO 05/2024).  PATIENT TAKES METOPROLOL.  TASK TO DR TAVERAS.  3 DEVICE CLASSIFIED AT/AF EPISODES WITH ALL EGMS SHOWING PACS WITH COMPETITIVE PACING.  NO AT/AF PRESENT ON EGMS.  NO PROGRAMMING CHANGES MADE TO DEVICE PARAMETERS.  INCISION CLEAN AND DRY WITH EDGES APPROXIMATED (SITE PIC IN MEDIA).  DRESSING REMOVED.   WOUND CARE AND RESTRICTIONS REVIEWED WITH PATIENT.  NORMAL DEVICE FUNCTION.  RG

## 2024-05-21 DIAGNOSIS — I10 ESSENTIAL HYPERTENSION: ICD-10-CM

## 2024-05-21 DIAGNOSIS — I10 HYPERTENSION, UNSPECIFIED TYPE: ICD-10-CM

## 2024-05-21 RX ORDER — LOSARTAN POTASSIUM 100 MG/1
100 TABLET ORAL DAILY
Qty: 90 TABLET | Refills: 0 | Status: SHIPPED | OUTPATIENT
Start: 2024-05-21

## 2024-05-21 RX ORDER — AMLODIPINE BESYLATE 10 MG/1
10 TABLET ORAL EVERY MORNING
Qty: 90 TABLET | Refills: 0 | Status: SHIPPED | OUTPATIENT
Start: 2024-05-21

## 2024-06-04 ENCOUNTER — APPOINTMENT (OUTPATIENT)
Facility: HOSPITAL | Age: 84
End: 2024-06-04
Payer: COMMERCIAL

## 2024-06-04 ENCOUNTER — OFFICE VISIT (OUTPATIENT)
Age: 84
End: 2024-06-04
Payer: COMMERCIAL

## 2024-06-04 VITALS
BODY MASS INDEX: 31.62 KG/M2 | HEART RATE: 52 BPM | SYSTOLIC BLOOD PRESSURE: 130 MMHG | DIASTOLIC BLOOD PRESSURE: 86 MMHG | WEIGHT: 238.6 LBS | TEMPERATURE: 98.1 F | HEIGHT: 73 IN | OXYGEN SATURATION: 94 %

## 2024-06-04 DIAGNOSIS — J43.2 CENTRILOBULAR EMPHYSEMA (HCC): ICD-10-CM

## 2024-06-04 DIAGNOSIS — J96.11 CHRONIC HYPOXEMIC RESPIRATORY FAILURE (HCC): ICD-10-CM

## 2024-06-04 DIAGNOSIS — R06.02 SOB (SHORTNESS OF BREATH): Primary | ICD-10-CM

## 2024-06-04 DIAGNOSIS — J44.9 COPD, SEVERE (HCC): ICD-10-CM

## 2024-06-04 PROCEDURE — G2211 COMPLEX E/M VISIT ADD ON: HCPCS | Performed by: INTERNAL MEDICINE

## 2024-06-04 PROCEDURE — 99214 OFFICE O/P EST MOD 30 MIN: CPT | Performed by: INTERNAL MEDICINE

## 2024-06-04 RX ORDER — BUDESONIDE, GLYCOPYRROLATE, AND FORMOTEROL FUMARATE 160; 9; 4.8 UG/1; UG/1; UG/1
2 AEROSOL, METERED RESPIRATORY (INHALATION) 2 TIMES DAILY
Qty: 31.1 G | Refills: 3 | Status: SHIPPED | OUTPATIENT
Start: 2024-06-04

## 2024-06-04 NOTE — PROGRESS NOTES
[facility] [place]  [address]  [place] [address]  Cardiology Follow Up    Darrell Mayer  1940  [id number]    1. CHB (complete heart block) ([facility])  Ambulatory Referral to Cardiology      2. Essential (primary) hypertension        3. Coronary artery calcification        4. Aortic valve disorder        5. NSVT (nonsustained ventricular tachycardia) ([facility])            Discussion/Summary:  1.  Complete heart block status post PPM  Patient has Medtronic dual-chamber pacemaker implanted on 5/2/2024  Initial device check within normal limits, 1 episode of NSVT  Pacemaker site appears to be well-healed  Follow in device clinic  Continue with not lifting left arm above the head for another 2 weeks    2.  Hypertension  Initially elevated 152/82 did come down to 140/84  Continue current regimen amlodipine, losartan, Toprol    3.  Coronary artery calcification  Continue risk factor modifications    4.  Aortic valve disorder/aortic insufficiency  Mild to moderate per last echo    5.  NSVT -on BB    Continue on medications.  Report any symptoms.  Follow in device clinic.  Continue not lifting left arm above the shoulder for the next 2 weeks.  Follow with PCP.  All questions answered.  Continue all medications. Previous studies reviewed with patient, medications reviewed and possible side effects discussed. Continue risk factor modification. Optimize weight, regular exercise and follow up with appropriate specialists and primary care physician as discussed.  All questions answered. Patient advised to report any problems prompting to medical attention. Return for follow up visit in 4 months or earlier if needed    Chief Complaint   Patient presents with    Follow-up       Interval History: Presents for routine follow-up visit after recent hospitalization.  Patient went to the emergency room on 5/1/2024 after being found to have low heart rate and abnormal EKG.  It all started while patient  has pulmonary rehab and telemetry strip was found to be abnormal.  Patient was seen at PCPs office who discussed EKG with cardiology who referred the patient to the ED.  There was concern for complete heart block.  Patient subsequently did have symptomatic third-degree heart block and therefore had Medtronic dual-chamber pacemaker placed on 5/2/2024.  Patient states since pacemaker implantation he is overall feeling well.  Denies chest pain, chest pressure, chest heaviness but denies any shortness of breath, dizziness, palpitations.    Recent device check showed normal function, 1 episode of NSVT    PMH: CHB status post PPM, hypertension, coronary artery calcification, COPD, diabetes, TAA, mild to moderate AI    Patient Active Problem List   Diagnosis    Centrilobular emphysema (HCC)    History of hypertension    Acute bronchitis    Hypertension    Post-tussive syncope    Stage 3 chronic kidney disease (HCC)    Type 2 diabetes mellitus with chronic kidney disease, without long-term current use of insulin (HCC)    Chronic hypoxemic respiratory failure (HCC)    Hypertensive kidney disease with stage 3 chronic kidney disease (HCC)    History of colon cancer    Change in bowel habits    Vitamin D deficiency    History of prostate cancer    Prostate cancer (HCC)    Obesity, morbid (HCC)    Hx of prostatectomy    Nonhealing surgical wound    Status post insertion of spinal cord stimulator    Lumbar radiculopathy    Continuous opioid dependence (HCC)    COPD, severe (HCC)    Complete heart block (HCC)     Past Medical History:   Diagnosis Date    Back pain     Bronchiectasis (HCC)     Cataract     Chronic kidney disease     COPD (chronic obstructive pulmonary disease) (HCC)     History of malignant neoplasm of oral cavity     M0    HL (hearing loss)     Hypertension     Kidney stone 05/2019    Malignant neoplasm of colon (HCC)     descending    Prostate cancer (HCC)     SOB (shortness of breath)     Tachycardia      Thalassemia trait     Tinnitus      Social History     Socioeconomic History    Marital status: /Civil Union     Spouse name: Not on file    Number of children: Not on file    Years of education: Not on file    Highest education level: Not on file   Occupational History    Occupation: [employer]   Tobacco Use    Smoking status: Former     Current packs/day: 0.00     Average packs/day: 0.3 packs/day for 44.4 years (11.1 ttl pk-yrs)     Types: Cigarettes     Start date: 1960     Quit date: 2004     Years since quittin.0    Smokeless tobacco: Never    Tobacco comments:     non smoker/tobacco user; quit  as per [employer]   Vaping Use    Vaping status: Never Used   Substance and Sexual Activity    Alcohol use: Not Currently    Drug use: No    Sexual activity: Not Currently     Partners: Female   Other Topics Concern    Not on file   Social History Narrative    Active directive    Living independently with spouse    Hx of Living will on file     Social Determinants of Health     Financial Resource Strain: Low Risk  (2023)    Overall Financial Resource Strain (CARDIA)     Difficulty of Paying Living Expenses: Not hard at all   Food Insecurity: No Food Insecurity (5/3/2024)    Hunger Vital Sign     Worried About Running Out of Food in the Last Year: Never true     Ran Out of Food in the Last Year: Never true   Transportation Needs: No Transportation Needs (5/3/2024)    PRAPARE - Transportation     Lack of Transportation (Medical): No     Lack of Transportation (Non-Medical): No   Physical Activity: Inactive (2023)    Exercise Vital Sign     Days of Exercise per Week: 0 days     Minutes of Exercise per Session: 0 min   Stress: No Stress Concern Present (2021)    Citizen of [place] [employer] - Occupational Stress Questionnaire     Feeling of Stress : Not at all   Social Connections: Not on file   Intimate Partner Violence: Not on file   Housing Stability: Low Risk  (5/3/2024)     Housing Stability Vital Sign     Unable to Pay for Housing in the Last Year: No     Number of Places Lived in the Last Year: 1     Unstable Housing in the Last Year: No      Family History   Problem Relation Age of Onset    Heart failure Mother         as per [employer]    Coronary artery disease Mother         as per [employer]    Diabetes Mother         mellitus - as per [employer]    Coronary artery disease Father         as per [employer]    Cancer Sister         malignant neoplasm     Past Surgical History:   Procedure Laterality Date    BACK SURGERY      CARDIAC ELECTROPHYSIOLOGY PROCEDURE N/A 5/2/2024    Procedure: Cardiac pacer implant;  Surgeon: Fredrick Bernal MD;  Location: [facility];  Service: Cardiology    CHOLECYSTECTOMY      COLON SURGERY  10/2008    partial colectomy    EYE SURGERY      INCISIONAL HERNIA REPAIR      JOINT REPLACEMENT Right     hip    DE COLONOSCOPY FLX DX W/COLLJ SPEC WHEN PFRMD N/A 07/23/2018    Procedure: COLONOSCOPY;  Surgeon: Jv Toribio III, MD;  Location: [facility];  Service: Gastroenterology    PROSTATE SURGERY      SPINAL CORD STIMULATOR IMPLANT      TONSILLECTOMY         Current Outpatient Medications:     acetaminophen-codeine (TYLENOL with CODEINE #4) 300-60 MG per tablet, Take 1 tablet by mouth every 6 (six) hours as needed for moderate pain, Disp: 60 tablet, Rfl: 0    albuterol (2.5 mg/3 mL) 0.083 % nebulizer solution, USE 1 VIAL IN NEBULIZER EVERY 6 HOURS, Disp: 360 mL, Rfl: 1    allopurinol (ZYLOPRIM) 100 mg tablet, Take 1 tablet (100 mg total) by mouth daily, Disp: 90 tablet, Rfl: 3    amLODIPine (NORVASC) 10 mg tablet, Take 1 tablet (10 mg total) by mouth every morning, Disp: 90 tablet, Rfl: 0    Budeson-Glycopyrrol-Formoterol (Breztri Aerosphere) 160-9-4.8 MCG/ACT AERO, Inhale 2 puffs 2 (two) times a day Rinse mouth after use., Disp: 31.1 g, Rfl: 3    cholecalciferol (VITAMIN D3) 1,000 units tablet, Take 5 tablets (5,000 Units total) by mouth every  "other day, Disp: , Rfl:     losartan (COZAAR) 100 MG tablet, Take 1 tablet (100 mg total) by mouth daily, Disp: 90 tablet, Rfl: 0    metoprolol succinate (TOPROL-XL) 25 mg 24 hr tablet, TAKE 1 TABLET DAILY, Disp: 90 tablet, Rfl: 1    Polyvinyl Alcohol-Povidone (REFRESH OP), Apply to eye as needed , Disp: , Rfl:     prochlorperazine (COMPAZINE) 10 mg tablet, Take 1 tablet (10 mg total) by mouth every 6 (six) hours as needed for nausea or vomiting, Disp: 30 tablet, Rfl: 0    Vitamins-Lipotropics (LIPO-FLAVONOID PLUS PO), Take by mouth, Disp: , Rfl:   No Known Allergies      Imaging: Cardiac EP device report    Result Date: 5/16/2024  Narrative: MDT KIRA PM/ACTIVE SYSTEM IS MRI CONDITIONAL DEVICE INTERROGATED IN THE [place] OFFICE:  BATTERY VOLTAGE ADEQUATE (11.7 YR.).  AP 9.4% -LBB 99.0%.  ALL LEAD PARAMETERS WITHIN NORMAL LIMITS.  1 EPISODE OF NSVT (12 @ 238 BPM).  EF 55% (ECHO 05/2024).  PATIENT TAKES METOPROLOL.  TASK TO DR TAVERAS.  3 DEVICE CLASSIFIED AT/AF EPISODES WITH ALL EGMS SHOWING PACS WITH COMPETITIVE PACING.  NO AT/ASF PRESENT ON EGMS.  NO PROGRAMMING CHANGES MADE TO DEVICE PARAMETERS.  INCISION CLEAN AND DRY WITH EDGES APPROXIMATED (SITE PIC IN MEDIA).  DRESSING REMOVED.   WOUND CARE AND RESTRICTIONS REVIEWED WITH PATIENT.  NORMAL DEVICE FUNCTION.  RG       Review of Systems:  Review of Systems   Constitutional: Negative.    Respiratory: Negative.     Cardiovascular: Negative.    Musculoskeletal: Negative.    Neurological: Negative.    Hematological: Negative.    Psychiatric/Behavioral: Negative.     All other systems reviewed and are negative.        /84   Pulse 94   Resp 16   Ht 6' 1\" (1.854 m)   Wt 107 kg (235 lb)   SpO2 97%   BMI 31.00 kg/m²     Physical Exam:  Physical Exam  Vitals and nursing note reviewed.   Constitutional:       Appearance: Normal appearance.   HENT:      Head: Normocephalic and atraumatic.   Cardiovascular:      Rate and Rhythm: Normal rate and regular rhythm. "      Pulses: Normal pulses.      Heart sounds: Normal heart sounds.      Comments: + Well-healing pacemaker site; left chest  Pulmonary:      Effort: Pulmonary effort is normal.      Breath sounds: Normal breath sounds.   Musculoskeletal:         General: Normal range of motion.      Cervical back: Normal range of motion and neck supple.   Skin:     General: Skin is warm and dry.   Neurological:      General: No focal deficit present.      Mental Status: He is alert and oriented to person, place, and time.   Psychiatric:         Mood and Affect: Mood normal.         Behavior: Behavior normal.         Thought Content: Thought content normal.         Judgment: Judgment normal.

## 2024-06-04 NOTE — PROGRESS NOTES
"Assessment/Plan:   Diagnoses and all orders for this visit:    SOB (shortness of breath)    COPD, severe (HCC)  -     Budeson-Glycopyrrol-Formoterol (Breztri Aerosphere) 160-9-4.8 MCG/ACT AERO; Inhale 2 puffs 2 (two) times a day Rinse mouth after use.    Chronic hypoxemic respiratory failure (HCC)    Centrilobular emphysema (HCC)      COPD severe currently stable continue with Breztri 2 puffs twice daily  Rinse mouth after use  Continue with albuterol MDI 2 puffs 4 times daily as needed when outside the house.  He also has a nebulizer with albuterol to be used 4 times daily as needed  Continue with oxygen currently on 2 L of oxygen continuous to continue  Again discussed with the patient the need for pulmonary rehab sessions, he is following up with his cardiologist next week status post pacemaker placement he will get clearance from them to go back for pulmonary rehab sessions  Vaccinations up-to-date  Not a candidate for lung cancer screening last CT of the chest done as a part of his prostate cancer as well chest abdomen and pelvis CT lung and on chest demonstrating stable lung nodules    Return in about 6 months (around 12/4/2024).  All questions are answered to the patient's satisfaction and understanding.  He verbalizes understanding.  He is encouraged to call with any further questions or concerns.    Portions of the record may have been created with voice recognition software.  Occasional wrong word or \"sound a like\" substitutions may have occurred due to the inherent limitations of voice recognition software.  Read the chart carefully and recognize, using context, where substitutions have occurred.    Electronically Signed by Suzanne Fletcher MD    ______________________________________________________________________    Chief Complaint:   Chief Complaint   Patient presents with    Follow-up       Patient ID: Darrell is a 83 y.o. y.o. male has a past medical history of Back pain, Bronchiectasis (HCC), " Cataract, Chronic kidney disease, COPD (chronic obstructive pulmonary disease) (HCC), History of malignant neoplasm of oral cavity, HL (hearing loss), Hypertension, Kidney stone (05/2019), Malignant neoplasm of colon (HCC), Prostate cancer (HCC), SOB (shortness of breath), Tachycardia, Thalassemia trait, and Tinnitus.    6/4/2024  Patient presents today for follow-up visit.  Darrell Mayer is a 83 y.o. male The past medical history-quit 18 years ago, severe CAD, chronic respiratory failure on prostate cancer.  He is here today for 3-month follow-up visit.  He was not noticing any benefit from his Advair and Incruse inhaler.  He was switched to Breztri and provided samples.  Also referred to pulmonary rehab.     Patient states he is doing well.  Symptoms are stable and he prefers the Breztri inhaler given at his last office visit.  Still has shortness of breath with stairs.  Denies any significant cough, mucus production, chest tightness/chest pain.  Occasional wheezing.  He is wearing his oxygen 3 L every night, and as needed during the day with exertion to maintain SpO2 above 88%.  Not requiring frequent use of his albuterol.  Uses nebulizer at night, and on occasion uses inhaler if symptoms severe.  He has not yet started pulmonary rehab, he was given a prescription last visit, he states he went to get the rehab scheduled and then he had to get a pacemaker in the meantime, now he wants to follow-up with cardiology and get clearance from them for going back to the pulmonary rehab.      General  Associated symptoms include coughing. Pertinent negatives include no chest pain, fever, headaches, myalgias or sore throat.       Review of Systems   Constitutional: Negative.  Negative for appetite change and fever.   HENT: Negative.  Negative for ear pain, postnasal drip, rhinorrhea, sneezing, sore throat and trouble swallowing.    Eyes: Negative.    Respiratory:  Positive for cough and shortness of breath.     Cardiovascular: Negative.  Negative for chest pain.   Gastrointestinal: Negative.    Endocrine: Negative.    Genitourinary: Negative.    Musculoskeletal: Negative.  Negative for myalgias.   Allergic/Immunologic: Negative.    Neurological: Negative.  Negative for headaches.   Hematological: Negative.    Psychiatric/Behavioral: Negative.         Smoking history: He reports that he quit smoking about 20 years ago. His smoking use included cigarettes. He started smoking about 64 years ago. He has a 11.1 pack-year smoking history. He has never used smokeless tobacco.    The following portions of the patient's history were reviewed and updated as appropriate: allergies, current medications, past family history, past medical history, past social history, past surgical history, and problem list.    Immunization History   Administered Date(s) Administered    COVID-19 MODERNA VACC 0.5 ML IM 01/26/2021, 02/22/2021, 10/27/2021, 05/13/2022    COVID-19 Moderna Vac BIVALENT 12 Yr+ IM 0.5 ML 10/27/2022, 07/27/2023    INFLUENZA 10/26/2012, 10/22/2013, 12/01/2015, 10/07/2016, 09/18/2018, 11/19/2019, 10/04/2021, 09/26/2022, 10/13/2023    Influenza Split High Dose Preservative Free IM 10/07/2014, 10/01/2017    Influenza, high dose seasonal 0.7 mL 10/08/2020    Influenza, seasonal, injectable 10/26/2012, 10/22/2013, 12/01/2015, 09/03/2016    Pneumococcal Conjugate 13-Valent 08/25/2015, 08/25/2015    Pneumococcal Polysaccharide PPV23 01/03/2017    Respiratory Syncytial Virus Vaccine (Recombinant, Adjuvanted) 01/23/2024    Tdap 07/01/2011    Zoster 1942    Zoster Vaccine Recombinant 12/18/2018, 06/04/2019     Current Outpatient Medications   Medication Sig Dispense Refill    acetaminophen-codeine (TYLENOL with CODEINE #4) 300-60 MG per tablet Take 1 tablet by mouth every 6 (six) hours as needed for moderate pain 60 tablet 0    albuterol (2.5 mg/3 mL) 0.083 % nebulizer solution USE 1 VIAL IN NEBULIZER EVERY 6 HOURS 360 mL 1     "allopurinol (ZYLOPRIM) 100 mg tablet Take 1 tablet (100 mg total) by mouth daily 90 tablet 3    amLODIPine (NORVASC) 10 mg tablet Take 1 tablet (10 mg total) by mouth every morning 90 tablet 0    Budeson-Glycopyrrol-Formoterol (Breztri Aerosphere) 160-9-4.8 MCG/ACT AERO Inhale 2 puffs 2 (two) times a day Rinse mouth after use. 31.1 g 3    cholecalciferol (VITAMIN D3) 1,000 units tablet Take 5 tablets (5,000 Units total) by mouth every other day      losartan (COZAAR) 100 MG tablet Take 1 tablet (100 mg total) by mouth daily 90 tablet 0    metoprolol succinate (TOPROL-XL) 25 mg 24 hr tablet TAKE 1 TABLET DAILY 90 tablet 1    Polyvinyl Alcohol-Povidone (REFRESH OP) Apply to eye as needed       prochlorperazine (COMPAZINE) 10 mg tablet Take 1 tablet (10 mg total) by mouth every 6 (six) hours as needed for nausea or vomiting 30 tablet 0    Vitamins-Lipotropics (LIPO-FLAVONOID PLUS PO) Take by mouth       No current facility-administered medications for this visit.     Allergies: Patient has no known allergies.    Objective:  Vitals:    06/04/24 1112   BP: 130/86   Pulse: (!) 52   Temp: 98.1 °F (36.7 °C)   SpO2: 94%   Weight: 108 kg (238 lb 9.6 oz)   Height: 6' 1\" (1.854 m)   Oxygen Therapy  SpO2: 94 % (wsith 2 lts of oxygen)  .  Wt Readings from Last 3 Encounters:   06/04/24 108 kg (238 lb 9.6 oz)   05/14/24 109 kg (240 lb)   05/02/24 109 kg (240 lb)     Body mass index is 31.48 kg/m².    Physical Exam  Constitutional:       Appearance: He is well-developed.   HENT:      Head: Normocephalic and atraumatic.   Eyes:      Pupils: Pupils are equal, round, and reactive to light.   Neck:      Comments: Short and wide neck  Cardiovascular:      Rate and Rhythm: Normal rate and regular rhythm.      Heart sounds: Normal heart sounds.   Pulmonary:      Effort: Pulmonary effort is normal.      Breath sounds: Normal breath sounds.   Musculoskeletal:         General: Normal range of motion.      Cervical back: Normal range of motion " and neck supple.   Skin:     General: Skin is warm and dry.   Neurological:      Mental Status: He is alert and oriented to person, place, and time.   Psychiatric:         Behavior: Behavior normal.         Lab Review:   Appointment on 05/06/2024   Component Date Value    Sodium 05/06/2024 141     Potassium 05/06/2024 4.1     Chloride 05/06/2024 103     CO2 05/06/2024 31     ANION GAP 05/06/2024 7     BUN 05/06/2024 13     Creatinine 05/06/2024 1.15     Glucose, Fasting 05/06/2024 98     Calcium 05/06/2024 9.8     eGFR 05/06/2024 59     WBC 05/06/2024 10.20 (H)     RBC 05/06/2024 5.87 (H)     Hemoglobin 05/06/2024 13.9     Hematocrit 05/06/2024 45.1     MCV 05/06/2024 77 (L)     MCH 05/06/2024 23.7 (L)     MCHC 05/06/2024 30.8 (L)     RDW 05/06/2024 18.0 (H)     Platelets 05/06/2024 156     nRBC 05/06/2024 0     Segmented % 05/06/2024 76 (H)     Immature Grans % 05/06/2024 1     Lymphocytes % 05/06/2024 15     Monocytes % 05/06/2024 7     Eosinophils Relative 05/06/2024 1     Basophils Relative 05/06/2024 0     Absolute Neutrophils 05/06/2024 7.86 (H)     Absolute Immature Grans 05/06/2024 0.05     Absolute Lymphocytes 05/06/2024 1.53     Absolute Monocytes 05/06/2024 0.68     Eosinophils Absolute 05/06/2024 0.05     Basophils Absolute 05/06/2024 0.03    Admission on 05/01/2024, Discharged on 05/03/2024   Component Date Value    Ventricular Rate 05/01/2024 47     Atrial Rate 05/01/2024 47     QRSD Interval 05/01/2024 144     QT Interval 05/01/2024 544     QTC Interval 05/01/2024 481     P Axis 05/01/2024 74     QRS Axis 05/01/2024 91     T Wave Axis 05/01/2024 69     WBC 05/01/2024 9.63     RBC 05/01/2024 5.84 (H)     Hemoglobin 05/01/2024 13.9     Hematocrit 05/01/2024 45.0     MCV 05/01/2024 77 (L)     MCH 05/01/2024 23.8 (L)     MCHC 05/01/2024 30.9 (L)     RDW 05/01/2024 17.9 (H)     MPV 05/01/2024 10.0     Platelets 05/01/2024 202     nRBC 05/01/2024 0     Segmented % 05/01/2024 81 (H)     Immature Grans %  05/01/2024 0     Lymphocytes % 05/01/2024 13 (L)     Monocytes % 05/01/2024 6     Eosinophils Relative 05/01/2024 0     Basophils Relative 05/01/2024 0     Absolute Neutrophils 05/01/2024 7.74 (H)     Absolute Immature Grans 05/01/2024 0.04     Absolute Lymphocytes 05/01/2024 1.28     Absolute Monocytes 05/01/2024 0.53     Eosinophils Absolute 05/01/2024 0.02     Basophils Absolute 05/01/2024 0.02     Sodium 05/01/2024 139     Potassium 05/01/2024 4.4     Chloride 05/01/2024 107     CO2 05/01/2024 22     ANION GAP 05/01/2024 10     BUN 05/01/2024 21     Creatinine 05/01/2024 1.45 (H)     Glucose 05/01/2024 94     Calcium 05/01/2024 9.6     AST 05/01/2024 39     ALT 05/01/2024 13     Alkaline Phosphatase 05/01/2024 74     Total Protein 05/01/2024 7.6     Albumin 05/01/2024 4.1     Total Bilirubin 05/01/2024 1.35 (H)     eGFR 05/01/2024 44     TSH 3RD GENERATON 05/01/2024 1.757     Magnesium 05/01/2024 1.9     Lyme Total Antibodies 05/01/2024 Positive (A)     POC Glucose 05/01/2024 98     Sodium 05/02/2024 141     Potassium 05/02/2024 4.4     Chloride 05/02/2024 105     CO2 05/02/2024 30     ANION GAP 05/02/2024 6     BUN 05/02/2024 19     Creatinine 05/02/2024 1.30     Glucose 05/02/2024 93     Calcium 05/02/2024 9.2     eGFR 05/02/2024 51     WBC 05/02/2024 7.91     RBC 05/02/2024 5.87 (H)     Hemoglobin 05/02/2024 14.0     Hematocrit 05/02/2024 45.2     MCV 05/02/2024 77 (L)     MCH 05/02/2024 23.9 (L)     MCHC 05/02/2024 31.0 (L)     RDW 05/02/2024 17.5 (H)     MPV 05/02/2024 11.5     Platelets 05/02/2024 227     nRBC 05/02/2024 0     Segmented % 05/02/2024 76 (H)     Immature Grans % 05/02/2024 0     Lymphocytes % 05/02/2024 18     Monocytes % 05/02/2024 6     Eosinophils Relative 05/02/2024 0     Basophils Relative 05/02/2024 0     Absolute Neutrophils 05/02/2024 5.97     Absolute Immature Grans 05/02/2024 0.03     Absolute Lymphocytes 05/02/2024 1.40     Absolute Monocytes 05/02/2024 0.46     Eosinophils  Absolute 05/02/2024 0.03     Basophils Absolute 05/02/2024 0.02     Magnesium 05/02/2024 2.0     POC Glucose 05/02/2024 105     BSA 05/02/2024 2.37     A4C EF 05/02/2024 60     LVIDd 05/02/2024 5.50     LVIDS 05/02/2024 3.30     IVSd 05/02/2024 1.10     LVPWd 05/02/2024 0.90     LVOT peak VTI 05/02/2024 27.8     FS 05/02/2024 40     MV E' Tissue Velocity Se* 05/02/2024 6     LA Volume Index (BP) 05/02/2024 14.3     E/A ratio 05/02/2024 0.92     E wave deceleration time 05/02/2024 244     MV Peak E John 05/02/2024 86     MV Peak A John 05/02/2024 0.93     AV LVOT peak gradient 05/02/2024 6     LVOT peak john 05/02/2024 1.23     RVID d 05/02/2024 3.4     Tricuspid annular plane * 05/02/2024 2.70     LA size 05/02/2024 3.3     LA/Ao Ratio 2D 05/02/2024 0.89     LA volume (BP) 05/02/2024 34     LVOT mn grad 05/02/2024 3.0     AV peak gradient 05/02/2024 10     AV regurgitation pressur* 05/02/2024 921     MV stenosis pressure 1/2* 05/02/2024 72     MV valve area p 1/2 meth* 05/02/2024 3.06     Ao root 05/02/2024 3.70     Asc Ao 05/02/2024 4     AV peak gradient 05/02/2024 97     Left ventricular stroke * 05/02/2024 103.00     IVS 05/02/2024 1.1     LEFT VENTRICLE SYSTOLIC * 05/02/2024 44     LV DIASTOLIC VOLUME (MOD* 05/02/2024 146     Left Atrium Area-systoli* 05/02/2024 17.3     Left Atrium Area-systoli* 05/02/2024 12.7     LVSV, 2D 05/02/2024 103     LV EF 05/02/2024 55     Protime 05/02/2024 14.4     INR 05/02/2024 1.06     Lyme IGM 05/01/2024 Positive (A)     Lyme IGG 05/01/2024 Positive (A)     Ventricular Rate 05/02/2024 44     Atrial Rate 05/02/2024 75     QRSD Interval 05/02/2024 152     QT Interval 05/02/2024 570     QTC Interval 05/02/2024 487     P Axis 05/02/2024 68     QRS Axis 05/02/2024 92     T Wave Axis 05/02/2024 48     POC Glucose 05/02/2024 103     Ventricular Rate 05/02/2024 64     Atrial Rate 05/02/2024 64     MN Interval 05/02/2024 180     QRSD Interval 05/02/2024 160     QT Interval 05/02/2024 510      QTC Interval 05/02/2024 526     QRS Axis 05/02/2024 -81     T Wave Axis 05/02/2024 90     POC Glucose 05/02/2024 126     POC Glucose 05/02/2024 132     WBC 05/03/2024 11.70 (H)     RBC 05/03/2024 5.89 (H)     Hemoglobin 05/03/2024 14.3     Hematocrit 05/03/2024 46.2     MCV 05/03/2024 78 (L)     MCH 05/03/2024 24.3 (L)     MCHC 05/03/2024 31.0 (L)     RDW 05/03/2024 18.1 (H)     Platelets 05/03/2024 177     POC Glucose 05/03/2024 113     POC Glucose 05/03/2024 107    Appointment on 01/17/2024   Component Date Value    CEA 01/17/2024 3.5 (H)     WBC 01/17/2024 7.19     RBC 01/17/2024 6.02 (H)     Hemoglobin 01/17/2024 14.2     Hematocrit 01/17/2024 46.5     MCV 01/17/2024 77 (L)     MCH 01/17/2024 23.6 (L)     MCHC 01/17/2024 30.5 (L)     RDW 01/17/2024 17.4 (H)     Platelets 01/17/2024 250     nRBC 01/17/2024 0     Segmented % 01/17/2024 72     Immature Grans % 01/17/2024 0     Lymphocytes % 01/17/2024 19     Monocytes % 01/17/2024 6     Eosinophils Relative 01/17/2024 2     Basophils Relative 01/17/2024 1     Absolute Neutrophils 01/17/2024 5.22     Absolute Immature Grans 01/17/2024 0.03     Absolute Lymphocytes 01/17/2024 1.34     Absolute Monocytes 01/17/2024 0.44     Eosinophils Absolute 01/17/2024 0.12     Basophils Absolute 01/17/2024 0.04     Sodium 01/17/2024 140     Potassium 01/17/2024 4.1     Chloride 01/17/2024 104     CO2 01/17/2024 30     ANION GAP 01/17/2024 6     BUN 01/17/2024 15     Creatinine 01/17/2024 1.49 (H)     Glucose, Fasting 01/17/2024 104 (H)     Calcium 01/17/2024 9.8     AST 01/17/2024 31     ALT 01/17/2024 14     Alkaline Phosphatase 01/17/2024 92     Total Protein 01/17/2024 8.3     Albumin 01/17/2024 4.4     Total Bilirubin 01/17/2024 0.99     eGFR 01/17/2024 43     Cholesterol 01/17/2024 110     Triglycerides 01/17/2024 93     HDL, Direct 01/17/2024 42     LDL Calculated 01/17/2024 49     Non-HDL-Chol (CHOL-HDL) 01/17/2024 68     Hemoglobin A1C 01/17/2024 6.0 (H)     EAG  01/17/2024 126     [facility]        Diagnostics:  I have personally reviewed pertinent reports.    Cardiac EP device report    Result Date: 5/16/2024  Narrative: MDT DC PM/ACTIVE SYSTEM IS MRI CONDITIONAL DEVICE INTERROGATED IN THE [facility] OFFICE:  BATTERY VOLTAGE ADEQUATE (11.7 YR.).  AP 9.4% -LBB 99.0%.  ALL LEAD PARAMETERS WITHIN NORMAL LIMITS.  1 EPISODE OF NSVT (12 @ 238 BPM).  EF 55% (ECHO 05/2024).  PATIENT TAKES METOPROLOL.  TASK TO DR TAVERAS.  3 DEVICE CLASSIFIED AT/AF EPISODES WITH ALL EGMS SHOWING PACS WITH COMPETITIVE PACING.  NO AT/ASF PRESENT ON EGMS.  NO PROGRAMMING CHANGES MADE TO DEVICE PARAMETERS.  INCISION CLEAN AND DRY WITH EDGES APPROXIMATED (SITE PIC IN MEDIA).  DRESSING REMOVED.   WOUND CARE AND RESTRICTIONS REVIEWED WITH PATIENT.  NORMAL DEVICE FUNCTION.  RG   Answers submitted by the patient for this visit:  Pulmonology Questionnaire (Submitted on 5/28/2024)  Chief Complaint: Primary symptoms  Chronicity: chronic  When did you first notice your symptoms?: more than 1 year ago  How often do your symptoms occur?: intermittently  Since you first noticed this problem, how has it changed?: gradually improving  Do you have shortness of breath that occurs with effort or exertion?: Yes  Do you have ear congestion?: No  Do you have heartburn?: No  Do you have fatigue?: Yes  Do you have nasal congestion?: No  Do you have shortness of breath when lying flat?: No  Do you have shortness of breath when you wake up?: No  Do you have sweats?: No  Have you experienced weight loss?: No  Which of the following makes your symptoms worse?: any activity  Which of the following makes your symptoms better?: rest

## 2024-06-05 ENCOUNTER — OFFICE VISIT (OUTPATIENT)
Dept: CARDIOLOGY CLINIC | Facility: CLINIC | Age: 84
End: 2024-06-05
Payer: COMMERCIAL

## 2024-06-05 VITALS
OXYGEN SATURATION: 97 % | BODY MASS INDEX: 31.14 KG/M2 | DIASTOLIC BLOOD PRESSURE: 84 MMHG | HEIGHT: 73 IN | RESPIRATION RATE: 16 BRPM | WEIGHT: 235 LBS | HEART RATE: 94 BPM | SYSTOLIC BLOOD PRESSURE: 140 MMHG

## 2024-06-05 DIAGNOSIS — I25.10 CORONARY ARTERY CALCIFICATION: ICD-10-CM

## 2024-06-05 DIAGNOSIS — I35.9 AORTIC VALVE DISORDER: ICD-10-CM

## 2024-06-05 DIAGNOSIS — I47.29 NSVT (NONSUSTAINED VENTRICULAR TACHYCARDIA) (HCC): ICD-10-CM

## 2024-06-05 DIAGNOSIS — I10 ESSENTIAL (PRIMARY) HYPERTENSION: ICD-10-CM

## 2024-06-05 DIAGNOSIS — I25.84 CORONARY ARTERY CALCIFICATION: ICD-10-CM

## 2024-06-05 DIAGNOSIS — I44.2 CHB (COMPLETE HEART BLOCK) (HCC): Primary | ICD-10-CM

## 2024-06-05 PROCEDURE — 99214 OFFICE O/P EST MOD 30 MIN: CPT | Performed by: PHYSICIAN ASSISTANT

## 2024-06-05 PROCEDURE — 99024 POSTOP FOLLOW-UP VISIT: CPT | Performed by: PHYSICIAN ASSISTANT

## 2024-06-05 NOTE — PATIENT INSTRUCTIONS
Continue on medications.  Report any symptoms.  Follow in device clinic.  Continue not lifting left arm above the shoulder for the next 2 weeks.  Follow with PCP.  All questions answered.  Continue all medications. Previous studies reviewed with patient, medications reviewed and possible side effects discussed. Continue risk factor modification. Optimize weight, regular exercise and follow up with appropriate specialists and primary care physician as discussed.  All questions answered. Patient advised to report any problems prompting to medical attention. Return for follow up visit in 4 months or earlier if needed

## 2024-06-06 ENCOUNTER — HOSPITAL ENCOUNTER (OUTPATIENT)
Dept: CT IMAGING | Facility: HOSPITAL | Age: 84
End: 2024-06-06
Payer: COMMERCIAL

## 2024-06-06 ENCOUNTER — APPOINTMENT (OUTPATIENT)
Facility: HOSPITAL | Age: 84
End: 2024-06-06
Payer: COMMERCIAL

## 2024-06-06 DIAGNOSIS — R91.1 LUNG NODULE: ICD-10-CM

## 2024-06-06 PROCEDURE — 71250 CT THORAX DX C-: CPT

## 2024-06-10 ENCOUNTER — TELEPHONE (OUTPATIENT)
Age: 84
End: 2024-06-10

## 2024-06-11 ENCOUNTER — APPOINTMENT (OUTPATIENT)
Facility: HOSPITAL | Age: 84
End: 2024-06-11
Payer: COMMERCIAL

## 2024-06-13 ENCOUNTER — APPOINTMENT (OUTPATIENT)
Facility: HOSPITAL | Age: 84
End: 2024-06-13
Payer: COMMERCIAL

## 2024-06-18 ENCOUNTER — APPOINTMENT (OUTPATIENT)
Facility: HOSPITAL | Age: 84
End: 2024-06-18
Payer: COMMERCIAL

## 2024-06-20 ENCOUNTER — APPOINTMENT (OUTPATIENT)
Facility: HOSPITAL | Age: 84
End: 2024-06-20
Payer: COMMERCIAL

## 2024-06-24 ENCOUNTER — TELEPHONE (OUTPATIENT)
Dept: NEPHROLOGY | Facility: CLINIC | Age: 84
End: 2024-06-24

## 2024-06-24 ENCOUNTER — CLINICAL SUPPORT (OUTPATIENT)
Facility: HOSPITAL | Age: 84
End: 2024-06-24
Payer: COMMERCIAL

## 2024-06-24 DIAGNOSIS — J44.9 COPD, SEVERE (HCC): Primary | ICD-10-CM

## 2024-06-24 PROCEDURE — 94625 PHY/QHP OP PULM RHB W/O MNTR: CPT

## 2024-06-25 ENCOUNTER — APPOINTMENT (OUTPATIENT)
Facility: HOSPITAL | Age: 84
End: 2024-06-25
Payer: COMMERCIAL

## 2024-06-26 ENCOUNTER — CLINICAL SUPPORT (OUTPATIENT)
Facility: HOSPITAL | Age: 84
End: 2024-06-26
Payer: COMMERCIAL

## 2024-06-26 DIAGNOSIS — J44.9 COPD, SEVERE (HCC): Primary | ICD-10-CM

## 2024-06-26 PROCEDURE — 94625 PHY/QHP OP PULM RHB W/O MNTR: CPT

## 2024-06-27 ENCOUNTER — APPOINTMENT (OUTPATIENT)
Facility: HOSPITAL | Age: 84
End: 2024-06-27
Payer: COMMERCIAL

## 2024-07-01 ENCOUNTER — CLINICAL SUPPORT (OUTPATIENT)
Facility: HOSPITAL | Age: 84
End: 2024-07-01
Payer: COMMERCIAL

## 2024-07-01 DIAGNOSIS — J44.9 COPD, SEVERE (HCC): Primary | ICD-10-CM

## 2024-07-01 PROCEDURE — 94625 PHY/QHP OP PULM RHB W/O MNTR: CPT

## 2024-07-02 ENCOUNTER — APPOINTMENT (OUTPATIENT)
Facility: HOSPITAL | Age: 84
End: 2024-07-02
Payer: COMMERCIAL

## 2024-07-03 ENCOUNTER — CLINICAL SUPPORT (OUTPATIENT)
Facility: HOSPITAL | Age: 84
End: 2024-07-03
Payer: COMMERCIAL

## 2024-07-03 DIAGNOSIS — J44.9 COPD, SEVERE (HCC): Primary | ICD-10-CM

## 2024-07-03 PROCEDURE — 94625 PHY/QHP OP PULM RHB W/O MNTR: CPT

## 2024-07-04 DIAGNOSIS — M54.16 LUMBAR RADICULOPATHY: ICD-10-CM

## 2024-07-05 RX ORDER — ACETAMINOPHEN AND CODEINE PHOSPHATE 300; 60 MG/1; MG/1
1 TABLET ORAL EVERY 6 HOURS PRN
Qty: 60 TABLET | Refills: 0 | Status: SHIPPED | OUTPATIENT
Start: 2024-07-05

## 2024-07-07 ENCOUNTER — RA CDI HCC (OUTPATIENT)
Dept: OTHER | Facility: HOSPITAL | Age: 84
End: 2024-07-07

## 2024-07-07 NOTE — PROGRESS NOTES
E11.51  HCC coding opportunities          Chart Reviewed number of suggestions sent to Provider: 1     Patients Insurance  HCC coding opportunities          Chart Reviewed number of suggestions sent to Provider: 1     Patients Insurance     Medicare Insurance: [employer]age

## 2024-07-08 ENCOUNTER — CLINICAL SUPPORT (OUTPATIENT)
Facility: HOSPITAL | Age: 84
End: 2024-07-08
Payer: COMMERCIAL

## 2024-07-08 ENCOUNTER — APPOINTMENT (OUTPATIENT)
Dept: LAB | Facility: HOSPITAL | Age: 84
End: 2024-07-08
Payer: COMMERCIAL

## 2024-07-08 DIAGNOSIS — I12.9 HYPERTENSIVE KIDNEY DISEASE WITH STAGE 3B CHRONIC KIDNEY DISEASE (HCC): ICD-10-CM

## 2024-07-08 DIAGNOSIS — N18.32 TYPE 2 DIABETES MELLITUS WITH STAGE 3B CHRONIC KIDNEY DISEASE, WITHOUT LONG-TERM CURRENT USE OF INSULIN (HCC): ICD-10-CM

## 2024-07-08 DIAGNOSIS — E11.22 TYPE 2 DIABETES MELLITUS WITH STAGE 3B CHRONIC KIDNEY DISEASE, WITHOUT LONG-TERM CURRENT USE OF INSULIN (HCC): ICD-10-CM

## 2024-07-08 DIAGNOSIS — N18.32 STAGE 3B CHRONIC KIDNEY DISEASE (HCC): ICD-10-CM

## 2024-07-08 DIAGNOSIS — E55.9 VITAMIN D DEFICIENCY: ICD-10-CM

## 2024-07-08 DIAGNOSIS — J44.9 COPD, SEVERE (HCC): Primary | ICD-10-CM

## 2024-07-08 DIAGNOSIS — N18.32 HYPERTENSIVE KIDNEY DISEASE WITH STAGE 3B CHRONIC KIDNEY DISEASE (HCC): ICD-10-CM

## 2024-07-08 LAB
ALBUMIN SERPL BCG-MCNC: 4.4 G/DL (ref 3.5–5)
ALP SERPL-CCNC: 93 U/L (ref 34–104)
ALT SERPL W P-5'-P-CCNC: 16 U/L (ref 7–52)
ANION GAP SERPL CALCULATED.3IONS-SCNC: 8 MMOL/L (ref 4–13)
AST SERPL W P-5'-P-CCNC: 33 U/L (ref 13–39)
BACTERIA UR QL AUTO: ABNORMAL /HPF
BASOPHILS # BLD AUTO: 0.03 THOUSANDS/ÂΜL (ref 0–0.1)
BASOPHILS NFR BLD AUTO: 0 % (ref 0–1)
BILIRUB SERPL-MCNC: 1.23 MG/DL (ref 0.2–1)
BILIRUB UR QL STRIP: NEGATIVE
BUN SERPL-MCNC: 11 MG/DL (ref 5–25)
CALCIUM SERPL-MCNC: 9.5 MG/DL (ref 8.4–10.2)
CHLORIDE SERPL-SCNC: 105 MMOL/L (ref 96–108)
CHOLEST SERPL-MCNC: 121 MG/DL
CLARITY UR: CLEAR
CO2 SERPL-SCNC: 27 MMOL/L (ref 21–32)
COLOR UR: YELLOW
CREAT SERPL-MCNC: 1.33 MG/DL (ref 0.6–1.3)
EOSINOPHIL # BLD AUTO: 0.05 THOUSAND/ÂΜL (ref 0–0.61)
EOSINOPHIL NFR BLD AUTO: 1 % (ref 0–6)
ERYTHROCYTE [DISTWIDTH] IN BLOOD BY AUTOMATED COUNT: 17.2 % (ref 11.6–15.1)
GFR SERPL CREATININE-BSD FRML MDRD: 49 ML/MIN/1.73SQ M
GLUCOSE P FAST SERPL-MCNC: 105 MG/DL (ref 65–99)
GLUCOSE UR STRIP-MCNC: NEGATIVE MG/DL
HCT VFR BLD AUTO: 42.2 % (ref 36.5–49.3)
HDLC SERPL-MCNC: 50 MG/DL
HGB BLD-MCNC: 13 G/DL (ref 12–17)
HGB UR QL STRIP.AUTO: NEGATIVE
HYALINE CASTS #/AREA URNS LPF: ABNORMAL /LPF
IMM GRANULOCYTES # BLD AUTO: 0.03 THOUSAND/UL (ref 0–0.2)
IMM GRANULOCYTES NFR BLD AUTO: 0 % (ref 0–2)
KETONES UR STRIP-MCNC: NEGATIVE MG/DL
LDLC SERPL CALC-MCNC: 53 MG/DL (ref 0–100)
LEUKOCYTE ESTERASE UR QL STRIP: NEGATIVE
LYMPHOCYTES # BLD AUTO: 1.29 THOUSANDS/ÂΜL (ref 0.6–4.47)
LYMPHOCYTES NFR BLD AUTO: 15 % (ref 14–44)
MCH RBC QN AUTO: 24.2 PG (ref 26.8–34.3)
MCHC RBC AUTO-ENTMCNC: 30.8 G/DL (ref 31.4–37.4)
MCV RBC AUTO: 79 FL (ref 82–98)
MONOCYTES # BLD AUTO: 0.53 THOUSAND/ÂΜL (ref 0.17–1.22)
MONOCYTES NFR BLD AUTO: 6 % (ref 4–12)
MUCOUS THREADS UR QL AUTO: ABNORMAL
NEUTROPHILS # BLD AUTO: 6.49 THOUSANDS/ÂΜL (ref 1.85–7.62)
NEUTS SEG NFR BLD AUTO: 78 % (ref 43–75)
NITRITE UR QL STRIP: NEGATIVE
NON-SQ EPI CELLS URNS QL MICRO: ABNORMAL /HPF
NONHDLC SERPL-MCNC: 71 MG/DL
NRBC BLD AUTO-RTO: 0 /100 WBCS
PH UR STRIP.AUTO: 5.5 [PH]
PHOSPHATE SERPL-MCNC: 2.5 MG/DL (ref 2.3–4.1)
PLATELET # BLD AUTO: 199 THOUSANDS/UL (ref 149–390)
PMV BLD AUTO: 10.2 FL (ref 8.9–12.7)
POTASSIUM SERPL-SCNC: 4.1 MMOL/L (ref 3.5–5.3)
PROT SERPL-MCNC: 7.9 G/DL (ref 6.4–8.4)
PROT UR STRIP-MCNC: ABNORMAL MG/DL
PTH-INTACT SERPL-MCNC: 58.1 PG/ML (ref 12–88)
RBC # BLD AUTO: 5.37 MILLION/UL (ref 3.88–5.62)
RBC #/AREA URNS AUTO: ABNORMAL /HPF
SODIUM SERPL-SCNC: 140 MMOL/L (ref 135–147)
SP GR UR STRIP.AUTO: 1.03 (ref 1–1.03)
TRIGL SERPL-MCNC: 89 MG/DL
TSH SERPL DL<=0.05 MIU/L-ACNC: 1.4 UIU/ML (ref 0.45–4.5)
URATE SERPL-MCNC: 6.5 MG/DL (ref 3.5–8.5)
UROBILINOGEN UR STRIP-ACNC: 2 MG/DL
WBC # BLD AUTO: 8.42 THOUSAND/UL (ref 4.31–10.16)
WBC #/AREA URNS AUTO: ABNORMAL /HPF

## 2024-07-08 PROCEDURE — 94625 PHY/QHP OP PULM RHB W/O MNTR: CPT

## 2024-07-08 PROCEDURE — 85025 COMPLETE CBC W/AUTO DIFF WBC: CPT

## 2024-07-08 PROCEDURE — 36415 COLL VENOUS BLD VENIPUNCTURE: CPT

## 2024-07-08 PROCEDURE — 83036 HEMOGLOBIN GLYCOSYLATED A1C: CPT

## 2024-07-08 PROCEDURE — 82306 VITAMIN D 25 HYDROXY: CPT

## 2024-07-08 PROCEDURE — 83970 ASSAY OF PARATHORMONE: CPT

## 2024-07-08 PROCEDURE — 84443 ASSAY THYROID STIM HORMONE: CPT

## 2024-07-08 PROCEDURE — 84550 ASSAY OF BLOOD/URIC ACID: CPT

## 2024-07-08 PROCEDURE — 81001 URINALYSIS AUTO W/SCOPE: CPT

## 2024-07-08 PROCEDURE — 82570 ASSAY OF URINE CREATININE: CPT

## 2024-07-08 PROCEDURE — 80061 LIPID PANEL: CPT

## 2024-07-08 PROCEDURE — 80053 COMPREHEN METABOLIC PANEL: CPT

## 2024-07-08 PROCEDURE — 82043 UR ALBUMIN QUANTITATIVE: CPT

## 2024-07-08 PROCEDURE — 84100 ASSAY OF PHOSPHORUS: CPT

## 2024-07-09 ENCOUNTER — APPOINTMENT (OUTPATIENT)
Facility: HOSPITAL | Age: 84
End: 2024-07-09
Payer: COMMERCIAL

## 2024-07-09 LAB
25(OH)D3 SERPL-MCNC: 63.2 NG/ML (ref 30–100)
CREAT UR-MCNC: 486.2 MG/DL
EST. AVERAGE GLUCOSE BLD GHB EST-MCNC: 126 MG/DL
HBA1C MFR BLD: 6 %
MICROALBUMIN UR-MCNC: 68.7 MG/L
MICROALBUMIN/CREAT 24H UR: 14 MG/G CREATININE (ref 0–30)

## 2024-07-10 ENCOUNTER — OFFICE VISIT (OUTPATIENT)
Dept: NEPHROLOGY | Facility: CLINIC | Age: 84
End: 2024-07-10
Payer: COMMERCIAL

## 2024-07-10 ENCOUNTER — CLINICAL SUPPORT (OUTPATIENT)
Facility: HOSPITAL | Age: 84
End: 2024-07-10
Payer: COMMERCIAL

## 2024-07-10 VITALS
DIASTOLIC BLOOD PRESSURE: 90 MMHG | SYSTOLIC BLOOD PRESSURE: 130 MMHG | HEIGHT: 73 IN | HEART RATE: 103 BPM | BODY MASS INDEX: 31.14 KG/M2 | RESPIRATION RATE: 16 BRPM | WEIGHT: 235 LBS | TEMPERATURE: 96.9 F | OXYGEN SATURATION: 89 %

## 2024-07-10 DIAGNOSIS — J44.9 COPD, SEVERE (HCC): Primary | ICD-10-CM

## 2024-07-10 DIAGNOSIS — I12.9 HYPERTENSIVE KIDNEY DISEASE WITH STAGE 3 CHRONIC KIDNEY DISEASE, UNSPECIFIED WHETHER STAGE 3A OR 3B CKD (HCC): ICD-10-CM

## 2024-07-10 DIAGNOSIS — N18.30 HYPERTENSIVE KIDNEY DISEASE WITH STAGE 3 CHRONIC KIDNEY DISEASE, UNSPECIFIED WHETHER STAGE 3A OR 3B CKD (HCC): ICD-10-CM

## 2024-07-10 DIAGNOSIS — N18.31 STAGE 3A CHRONIC KIDNEY DISEASE (HCC): Primary | ICD-10-CM

## 2024-07-10 DIAGNOSIS — E55.9 VITAMIN D DEFICIENCY: ICD-10-CM

## 2024-07-10 PROCEDURE — 94625 PHY/QHP OP PULM RHB W/O MNTR: CPT

## 2024-07-10 PROCEDURE — 99214 OFFICE O/P EST MOD 30 MIN: CPT | Performed by: INTERNAL MEDICINE

## 2024-07-10 PROCEDURE — G2211 COMPLEX E/M VISIT ADD ON: HCPCS | Performed by: INTERNAL MEDICINE

## 2024-07-11 ENCOUNTER — APPOINTMENT (OUTPATIENT)
Facility: HOSPITAL | Age: 84
End: 2024-07-11
Payer: COMMERCIAL

## 2024-07-13 PROBLEM — Z86.79 HISTORY OF HYPERTENSION: Status: RESOLVED | Noted: 2017-10-17 | Resolved: 2024-07-13

## 2024-07-13 NOTE — PATIENT INSTRUCTIONS
"[employer].     Patient Education     Low blood sugar in people with diabetes   The Basics   Written by the doctors and editors at [facility]   What is low blood sugar? -- This is when the level of sugar in a person's blood gets too low. It is also called \"hypoglycemia.\"  Low blood sugar can cause symptoms ranging from sweating and feeling hungry to passing out.  Low blood sugar can happen in people with diabetes who take certain medicines, including insulin, other medicines given as shots, and some types of pills.  When can people with diabetes get low blood sugar? -- People with diabetes can get low blood sugar when they:   Take too much medicine, including insulin, other medicines given as shots, or certain diabetes pills   Do not eat enough food   Exercise too much without eating a snack or reducing their insulin dose   Wait too long between meals   Drink too much alcohol or drink alcohol on an empty stomach  What are the symptoms of low blood sugar? -- The symptoms can be different from person to person, and can change over time. During the early stages of low blood sugar, a person can:   Sweat or tremble   Feel hungry   Feel worried  People who have early symptoms should check their blood sugar level to see if it is low and needs to be treated. If low blood sugar levels are not treated, severe symptoms can occur. These can include:   Trouble walking or feeling weak   Trouble seeing clearly   Being confused or acting in a strange way   Passing out or having a seizure  Some people do not get symptoms during the early stages of low blood sugar. Doctors sometimes call this \"hypoglycemia unawareness.\" People with hypoglycemia unawareness are more likely to have severe symptoms, because they might not know that they have low blood sugar until they have severe symptoms. Hypoglycemia unawareness is more likely in people who:   Have had type 1 diabetes for more than 5 to 10 years   Have frequent episodes of low blood " sugar   Use insulin to keep their blood sugar level tightly managed   Are tired   Drink a lot of alcohol   Take certain medicines for high blood pressure or diabetes  How is low blood sugar treated? -- It can be treated with:   Quick sources of sugar - People can eat or drink quick sources of sugar (table 1). Foods that have fat, such as chocolate or cheese, do not treat low blood sugar as quickly. You and a family member should carry a quick source of sugar at all times.   A dose of glucagon - Glucagon is a hormone that can quickly raise blood sugar levels and stop severe symptoms. It comes as a shot (figure 1) or a nose spray. If your doctor recommends that you carry glucagon with you, they will tell you when and how to use it. If possible, it's also a good idea to have a family member, friend, or roommate learn how to give you glucagon. That way, they can give it to you if you can't do it yourself.  After treating low blood sugar, it is very important to recheck your blood sugar level to make sure that it rises and stays in the normal range. Once your blood sugar is normal, eat a small snack that contains protein, fat, and carbohydrate. This can help keep your blood sugar stable.  What should I do after treatment? -- After treatment for low blood sugar, most people can get back to their usual routine. But your doctor or nurse might recommend that you check your blood sugar level more often during the next 2 to 3 days.  If your low blood sugar was treated with glucagon, call your doctor or nurse. They might change the dose of your diabetes medicine.  How can I prevent low blood sugar? -- The best way is to:   Check your blood sugar levels often - Your doctor or nurse will tell you how and when to check your blood sugar levels at home. They will also tell you what your blood sugar levels should be, and when to treat low blood sugar.   Learn the symptoms of low blood sugar, and be ready to treat it in the early  "stages. Treating low blood sugar early can prevent severe symptoms.  When should I go to a hospital or call for an ambulance? -- A family member or friend should take you to a hospital or call for an ambulance (in the [place] and [place], call 9-1-1) if you:   Are still confused 15 minutes after being treated with a dose of glucagon   Have passed out, and there is no glucagon nearby   Still have low blood sugar after treatment  If you have low blood sugar, do not try to drive yourself to the hospital. Driving with low blood sugar can be dangerous.  All topics are updated as new evidence becomes available and our peer review process is complete.  This topic retrieved from [employer] on: Apr 11, 2024.  Topic 19049 Version 22.0  Release: 32.3.2 - C32.100  © 2024 UpToDate, Inc. and/or its affiliates. All rights reserved.  table 1: Quick sources of sugar to treat low blood sugar  3 or 4 glucose tablets   ½ cup of juice or regular soda (not sugar-free)   2 tablespoons of raisins   4 or 5 saltine crackers   1 tablespoon of sugar   1 tablespoon of honey or corn syrup   6 to 8 hard candies   These sources of sugar act quickly to treat low blood sugar levels. People with diabetes who use insulin or certain other diabetes medicines should carry at least 1 of these items at all times.  Graphic 53072 Version 4.0  figure 1: Glucagon autoinjector     Some people carry glucagon in the form of an autoinjector \"pen.\" This makes it easy to give a dose into the upper arm, thigh, or belly.  Graphic 961178 Version 2.0  Consumer Information Use and Disclaimer   Disclaimer: This generalized information is a limited summary of diagnosis, treatment, and/or medication information. It is not meant to be comprehensive and should be used as a tool to help the user understand and/or assess potential diagnostic and treatment options. It does NOT include all information about conditions, treatments, medications, side effects, or risks that may apply to a " specific patient. It is not intended to be medical advice or a substitute for the medical advice, diagnosis, or treatment of a health care provider based on the health care provider's examination and assessment of a patient's specific and unique circumstances. Patients must speak with a health care provider for complete information about their health, medical questions, and treatment options, including any risks or benefits regarding use of medications. This information does not endorse any treatments or medications as safe, effective, or approved for treating a specific patient. UpToDate, Inc. and its affiliates disclaim any warranty or liability relating to this information or the use thereof.The use of this information is governed by the Terms of Use, available at https://www.woltersRackHuntuwer.com/en/know/clinical-effectiveness-terms. 2024© UpToDate, Inc. and its affiliates and/or licensors. All rights reserved.  Copyright   © 2024 UpToDate, Inc. and/or its affiliates. All rights reserved.

## 2024-07-15 ENCOUNTER — OFFICE VISIT (OUTPATIENT)
Age: 84
End: 2024-07-15
Payer: COMMERCIAL

## 2024-07-15 ENCOUNTER — APPOINTMENT (OUTPATIENT)
Facility: HOSPITAL | Age: 84
End: 2024-07-15
Payer: COMMERCIAL

## 2024-07-15 VITALS
DIASTOLIC BLOOD PRESSURE: 82 MMHG | OXYGEN SATURATION: 90 % | HEIGHT: 73 IN | BODY MASS INDEX: 31.41 KG/M2 | SYSTOLIC BLOOD PRESSURE: 130 MMHG | WEIGHT: 237 LBS | HEART RATE: 110 BPM

## 2024-07-15 DIAGNOSIS — N18.32 TYPE 2 DIABETES MELLITUS WITH STAGE 3B CHRONIC KIDNEY DISEASE, WITHOUT LONG-TERM CURRENT USE OF INSULIN (HCC): ICD-10-CM

## 2024-07-15 DIAGNOSIS — J44.9 COPD, SEVERE (HCC): ICD-10-CM

## 2024-07-15 DIAGNOSIS — R53.83 OTHER FATIGUE: ICD-10-CM

## 2024-07-15 DIAGNOSIS — I44.2 COMPLETE HEART BLOCK (HCC): Primary | ICD-10-CM

## 2024-07-15 DIAGNOSIS — Z13.220 SCREENING FOR LIPID DISORDERS: ICD-10-CM

## 2024-07-15 DIAGNOSIS — E11.22 TYPE 2 DIABETES MELLITUS WITH STAGE 3B CHRONIC KIDNEY DISEASE, WITHOUT LONG-TERM CURRENT USE OF INSULIN (HCC): ICD-10-CM

## 2024-07-15 DIAGNOSIS — M54.16 LUMBAR RADICULOPATHY: ICD-10-CM

## 2024-07-15 DIAGNOSIS — I10 PRIMARY HYPERTENSION: ICD-10-CM

## 2024-07-15 DIAGNOSIS — N18.30 HYPERTENSIVE KIDNEY DISEASE WITH STAGE 3 CHRONIC KIDNEY DISEASE, UNSPECIFIED WHETHER STAGE 3A OR 3B CKD (HCC): ICD-10-CM

## 2024-07-15 DIAGNOSIS — I12.9 HYPERTENSIVE KIDNEY DISEASE WITH STAGE 3 CHRONIC KIDNEY DISEASE, UNSPECIFIED WHETHER STAGE 3A OR 3B CKD (HCC): ICD-10-CM

## 2024-07-15 PROCEDURE — G2211 COMPLEX E/M VISIT ADD ON: HCPCS

## 2024-07-15 PROCEDURE — 99214 OFFICE O/P EST MOD 30 MIN: CPT

## 2024-07-15 RX ORDER — ACETAMINOPHEN AND CODEINE PHOSPHATE 300; 60 MG/1; MG/1
1 TABLET ORAL EVERY 6 HOURS PRN
Qty: 60 TABLET | Refills: 0 | Status: SHIPPED | OUTPATIENT
Start: 2024-07-15

## 2024-07-15 NOTE — PROGRESS NOTES
INTERNAL MEDICINE FOLLOW-UP VISIT  [facility] - [facility] INTERNAL MEDICINE [address]    NAME: Darrell Mayer  AGE: 83 y.o. SEX: male  : 1940     DATE: 7/15/2024     Assessment and Plan:   1. Complete heart block (HCC)  HR in office is 110. He is feeling much better since the dual chamber pacemaker was placed. He follows regularly with cardiology.     2. Primary hypertension  BP in office is 130/82. Home BP's are around 130/80s. Limit salt intake to <2,000 mg daily.     3. COPD, severe (HCC)  O2 in office is 90%. Currently on 2 L of portable oxygen. He follows with pulmonology.     4. Type 2 diabetes mellitus with stage 3b chronic kidney disease, without long-term current use of insulin ([facility])  A1c is 6. Currently controlled without medication.     5. Hypertensive kidney disease with stage 3 chronic kidney disease, unspecified whether stage 3a or 3b CKD ([facility])  Kidney function is stable.  He follows with nephrology.        No follow-ups on file.       Chief Complaint:     Chief Complaint   Patient presents with    Follow-up      History of Present Illness:   Patient is a an 83-year-old male that presents today for a 3-month follow-up.      The following portions of the patient's history were reviewed and updated as appropriate: allergies, current medications, past family history, past medical history, past social history, past surgical history and problem list.     Review of Systems:     Review of Systems   Constitutional:  Negative for chills, fatigue and fever.   HENT:  Negative for ear discharge, ear pain, postnasal drip, rhinorrhea, sinus pressure, sinus pain, sore throat, tinnitus and trouble swallowing.    Eyes:  Negative for pain, discharge and itching.   Respiratory:  Negative for cough, shortness of breath and wheezing.    Cardiovascular:  Negative for chest pain, palpitations and leg swelling.   Gastrointestinal:  Negative for abdominal pain, constipation, diarrhea, nausea and  vomiting.   Endocrine: Negative for polydipsia, polyphagia and polyuria.   Genitourinary:  Negative for difficulty urinating, frequency, hematuria and urgency.   Musculoskeletal:  Negative for arthralgias, joint swelling and myalgias.   Skin:  Negative for color change.   Allergic/Immunologic: Negative for environmental allergies.   Neurological:  Negative for dizziness, weakness, light-headedness, numbness and headaches.   Hematological:  Negative for adenopathy.   Psychiatric/Behavioral:  Negative for decreased concentration and sleep disturbance. The patient is not nervous/anxious.         Past Medical History:     Past Medical History:   Diagnosis Date    Back pain     Bronchiectasis (HCC)     Cataract     Chronic kidney disease     COPD (chronic obstructive pulmonary disease) (HCC)     History of malignant neoplasm of oral cavity     M0    HL (hearing loss)     Hypertension     Kidney stone 05/2019    Malignant neoplasm of colon (HCC)     descending    Prostate cancer (HCC)     SOB (shortness of breath)     Tachycardia     Thalassemia trait     Tinnitus         Current Medications:     Current Outpatient Medications:     acetaminophen-codeine (TYLENOL with CODEINE #4) 300-60 MG per tablet, Take 1 tablet by mouth every 6 (six) hours as needed for moderate pain, Disp: 60 tablet, Rfl: 0    albuterol (2.5 mg/3 mL) 0.083 % nebulizer solution, USE 1 VIAL IN NEBULIZER EVERY 6 HOURS, Disp: 360 mL, Rfl: 1    allopurinol (ZYLOPRIM) 100 mg tablet, Take 1 tablet (100 mg total) by mouth daily, Disp: 90 tablet, Rfl: 3    amLODIPine (NORVASC) 10 mg tablet, Take 1 tablet (10 mg total) by mouth every morning, Disp: 90 tablet, Rfl: 0    Budeson-Glycopyrrol-Formoterol (Breztri Aerosphere) 160-9-4.8 MCG/ACT AERO, Inhale 2 puffs 2 (two) times a day Rinse mouth after use., Disp: 31.1 g, Rfl: 3    cholecalciferol (VITAMIN D3) 1,000 units tablet, Take 5 tablets (5,000 Units total) by mouth every other day, Disp: , Rfl:     losartan  "(COZAAR) 100 MG tablet, Take 1 tablet (100 mg total) by mouth daily, Disp: 90 tablet, Rfl: 0    metoprolol succinate (TOPROL-XL) 25 mg 24 hr tablet, TAKE 1 TABLET DAILY, Disp: 90 tablet, Rfl: 1    Polyvinyl Alcohol-Povidone (REFRESH OP), Apply to eye as needed , Disp: , Rfl:     prochlorperazine (COMPAZINE) 10 mg tablet, Take 1 tablet (10 mg total) by mouth every 6 (six) hours as needed for nausea or vomiting, Disp: 30 tablet, Rfl: 0    Vitamins-Lipotropics (LIPO-FLAVONOID PLUS PO), Take by mouth, Disp: , Rfl:      Allergies:   No Known Allergies     Physical Exam:     /82   Pulse (!) 110   Ht 6' 1\" (1.854 m)   Wt 108 kg (237 lb)   SpO2 90%   BMI 31.27 kg/m²     Physical Exam  Vitals and nursing note reviewed.   Constitutional:       General: He is awake.      Appearance: Normal appearance. He is well-developed and well-groomed. He is obese.   HENT:      Head: Normocephalic and atraumatic.      Right Ear: Hearing and external ear normal.      Left Ear: Hearing and external ear normal.      Nose: Nose normal.      Mouth/Throat:      Lips: Pink.      Mouth: Mucous membranes are moist.   Eyes:      General: Lids are normal. Vision grossly intact. Gaze aligned appropriately.      Conjunctiva/sclera: Conjunctivae normal.   Neck:      Vascular: No carotid bruit.      Trachea: Trachea and phonation normal.   Cardiovascular:      Rate and Rhythm: Normal rate and regular rhythm.      Heart sounds: Normal heart sounds, S1 normal and S2 normal. No murmur heard.     No friction rub. No gallop.   Pulmonary:      Effort: Pulmonary effort is normal.      Breath sounds: Normal breath sounds and air entry. No decreased breath sounds, wheezing, rhonchi or rales.   Abdominal:      General: Abdomen is protuberant.   Musculoskeletal:      Cervical back: Neck supple.      Right lower leg: No edema.      Left lower leg: No edema.   Skin:     General: Skin is warm.      Capillary Refill: Capillary refill takes less than 2 " seconds.   Neurological:      Mental Status: He is alert.   Psychiatric:         Attention and Perception: Attention and perception normal.         Mood and Affect: Mood and affect normal.         Speech: Speech normal.         Behavior: Behavior normal. Behavior is cooperative.         Thought Content: Thought content normal.         Cognition and Memory: Cognition and memory normal.         Judgment: Judgment normal.           Data:     Laboratory Results: I have personally reviewed the pertinent laboratory results/reports   Radiology/Other Diagnostic Testing Results: I have personally reviewed pertinent reports.      Sarah Landers PA-C  [facility] INTERNAL MEDICINE [address]

## 2024-07-16 ENCOUNTER — APPOINTMENT (OUTPATIENT)
Facility: HOSPITAL | Age: 84
End: 2024-07-16
Payer: COMMERCIAL

## 2024-07-17 ENCOUNTER — CLINICAL SUPPORT (OUTPATIENT)
Facility: HOSPITAL | Age: 84
End: 2024-07-17
Payer: COMMERCIAL

## 2024-07-17 DIAGNOSIS — J44.9 COPD, SEVERE (HCC): Primary | ICD-10-CM

## 2024-07-17 PROCEDURE — 94625 PHY/QHP OP PULM RHB W/O MNTR: CPT

## 2024-07-18 ENCOUNTER — APPOINTMENT (OUTPATIENT)
Facility: HOSPITAL | Age: 84
End: 2024-07-18
Payer: COMMERCIAL

## 2024-07-22 ENCOUNTER — CLINICAL SUPPORT (OUTPATIENT)
Facility: HOSPITAL | Age: 84
End: 2024-07-22
Payer: COMMERCIAL

## 2024-07-22 DIAGNOSIS — J44.9 COPD, SEVERE (HCC): Primary | ICD-10-CM

## 2024-07-22 PROCEDURE — 94625 PHY/QHP OP PULM RHB W/O MNTR: CPT

## 2024-07-22 NOTE — PROGRESS NOTES
PULMONARY REHABILITATION   ASSESSMENT AND INDIVIDUALIZED TREATMENT PLAN  30 DAY    Today's date: 2024  # of Exercise Sessions Completed: 9  Patient name: Darrell Mayer     : 1940       MRN: [id number]  PCP: Long Chandler MD  Referring Physician: Suzanne Fletcher MD  Pulmonologist: Suzanne Fletcher MD    Provider: Edgar  Clinician: Shakira Chávez MS       ASSESSMENT    Comments: 2024: Pt was placed on a medical hold after Initial eval due to tele revealing a complete heart block. He has since returned and his tolerating exercise well. Darrell is progressing as tolerated and wearing 2L of oxygen with exercise. Will continue to increase workloads and durations as tolerated.     PULMONARY   Dx:   Encounter Diagnosis   Name Primary?    COPD, severe (HCC) Yes     Date of onset: 5/3/2023  Description of Diagnosis: very severe COPD, pt has not be active at home.  Other Pulmonary History:   Comments: centrilobular emphysema, acute bronchitis, chronic hypoxemic respiratory failure    PFT:    does have a PFT on file  FEV1/FVC ratio of 56% and an   FEV1 of 1.53% predicted  severeobstruction.    Pulmonary Disease Risk Factors:  smoking    Sleep Disorders:   none    Medical History:   Past Medical History:   Diagnosis Date    Back pain     Bronchiectasis (HCC)     Cataract     Chronic kidney disease     COPD (chronic obstructive pulmonary disease) (HCC)     History of malignant neoplasm of oral cavity     M0    HL (hearing loss)     Hypertension     Kidney stone 2019    Malignant neoplasm of colon (HCC)     descending    Prostate cancer (HCC)     SOB (shortness of breath)     Tachycardia     Thalassemia trait     Tinnitus        Family History:  Family History   Problem Relation Age of Onset    Heart failure Mother         as per [employer]    Coronary artery disease Mother         as per [employer]    Diabetes Mother         mellitus - as per [employer]    Coronary artery disease Father         as per  [employer]    Cancer Sister         malignant neoplasm       Allergies:   Patient has no known allergies.    Current Medications:   Current Outpatient Medications   Medication Sig Dispense Refill    acetaminophen-codeine (TYLENOL with CODEINE #4) 300-60 MG per tablet Take 1 tablet by mouth every 6 (six) hours as needed for moderate pain 60 tablet 0    albuterol (2.5 mg/3 mL) 0.083 % nebulizer solution USE 1 VIAL IN NEBULIZER EVERY 6 HOURS 360 mL 1    allopurinol (ZYLOPRIM) 100 mg tablet Take 1 tablet (100 mg total) by mouth daily 90 tablet 3    amLODIPine (NORVASC) 10 mg tablet Take 1 tablet (10 mg total) by mouth every morning 90 tablet 0    Budeson-Glycopyrrol-Formoterol (Breztri Aerosphere) 160-9-4.8 MCG/ACT AERO Inhale 2 puffs 2 (two) times a day Rinse mouth after use. 31.1 g 3    cholecalciferol (VITAMIN D3) 1,000 units tablet Take 5 tablets (5,000 Units total) by mouth every other day      losartan (COZAAR) 100 MG tablet Take 1 tablet (100 mg total) by mouth daily 90 tablet 0    metoprolol succinate (TOPROL-XL) 25 mg 24 hr tablet TAKE 1 TABLET DAILY 90 tablet 1    Polyvinyl Alcohol-Povidone (REFRESH OP) Apply to eye as needed       prochlorperazine (COMPAZINE) 10 mg tablet Take 1 tablet (10 mg total) by mouth every 6 (six) hours as needed for nausea or vomiting 30 tablet 0    Vitamins-Lipotropics (LIPO-FLAVONOID PLUS PO) Take by mouth       No current facility-administered medications for this visit.       Medication compliance: Yes   Comments: Pt reports to be compliant with medications    Cultural needs: none      EXERCISE ASSESSMENT:      FUNCTIONAL STATUS:  Current Status:  Occupation: retired  Recreation: Gardening, work around the house  ADL’s:No limitations Capable of performing light to moderate ADLs able to perform self-care  [place]: No limitations Capable of performing light to moderate ADLs able to perform self-care  Home Exercise/Equipment: None, bike  Other: none    Physical Limitations:  "Knee-     Fall Risk: Low   Comments: Ambulates with a steady gait with no assist device and Denies a fall in the past 6 months    Initial Fitness Assessment: 6MWT:  ECG Summary: Possible AV, pt does not follow with cardiologistdanika texted PCP.   Deferred initial testing      NUTRITION ASSESSMENT:    Height:   Ht Readings from Last 1 Encounters:   07/15/24 6' 1\" (1.854 m)       Weight control:    Starting weight: 236.2 lbs   Current weight: 236.2 lbs    Rate Your Plate Score: 46/81    Diabetes: N/A  A1c: 6.0     Last Measured: 1/17/2024  Medication Compliance: compliant all of the time      Current Dietary Habits:  Increased fruit intake, decreased sweets  Coffee and corn bread   Grilled cheese   Does not use extra salt on food  Can increase hydration     Drug/Alcohol Use: No      PSYCHOSOCIAL ASSESSMENT:    Depression screening:  PHQ-9 = 6    Interpretation:  5-9 = Mild Depression  Last Assessed: 4/30/2024    Anxiety screening:  VASQUEZ-7 = 1    Interpretation: 0-4  = Not anxious  Last Assessed: 4/30/24    Pt self-report of depression and anxiety:   Patient reports they are coping well with good social support and denies depression or anxiety    Self-reported stress level:   0   Stressors:  None- does not really get stressed about anything  Stress Management Tools: practice Relaxation Techniques, spend time outside, and spend time with family    Quality of Life Screen:  (Higher score indicates disease impact on QOL)  [facility] score: /40        Social Support: spouse  Community/Social Activities: use to go to gym with wife     Psychosocial Assessment as it relates to rehabilitation:   Patient denies issues with his/her family or home life that may affect their rehabilitation efforts.       OTHER CORE COMPONENT ASSESSMENT:    Tobacco Use: N/A: Pt has a remote history of smoking  Tobacco Intervention: N/A:  Pt has a remote history of smoking.    Pt quit 20 years ago and has abstained since quitting.  "     Hospitalizations in the past year: 0    Oxygen needs:   Rest:  room air  Exercise/physical activity:  supplemental O2 via nasal cannula @ 2L/min   Sleep:   supplemental O2 via nasal cannula @ 2.5L/min     Does Pt monitor home SpO2? no   Average SpO2 at rest:  n/a%   Average SpO2 with ADLs/physical activity:  n/a%      Breathing Treatments:   Rescue Inhaler: Yes:  1 times per day occasionally   Maintenance Inhaler: No  Nebulizer Treatments:  Yes:  2 times per day  Patient practices breathing techniques at home:  No      INDIVIDUALIZED TREATMENT PLAN    Patient will attend 24 monitored exercise sessions beginning Thursday May 2nd at 1pm.    See outlined plan of care below for specific patient goals in each component of care.      PATIENT SPECIFIC GOALS:     Exercise: (home exercise, ADLs)  attend pulmonary rehab regularly  Decrease sitting time at home   Nutrition: (wt control, diabetes management, dietary modifications)  improve hydration  Increase chicken and fish   Psychosocial: (stress, emotional well being, social support)  spend time with family  Maintain emotional health   Core Component: (smoking, BP control, angina control, medication)  reduced dietary sodium <2000mg    SMART GOALS:  Exercise:   reduced score on CAT, improved 6MWT distance, reduced dyspnea during exercise, improved exercise tolerance based on peak METs tolerated in pulmonary rehab exercise session, and SpO2 >88% during exercise   Nutrition:   LDL <100, HDL >40, TRG <150, CHOL <200, Improved Rate Your Plate score  >64, eat 6 or more servings of grain products per day, eat whole grain breads, brown rice and whole grain cereals, eat 5 or more servings of fruits and vegetables a day, eat 2 or more servings of low fat milk or yogurt a day, eat no more than 6oz of meat per day, eat red meat once a week or less, choose lean beef or rarely eat beef, rarely eat processed meats or eat low fat processed meats, eat poultry without the skin, eat  "chicken and fish that is not fried, eat meatless meals twice a week or more, choose 1% or skim milk, rarely eat cheese or choose reduced fat or skim, Do not cook with oil, butter or margarine, Do not eat fried foods, use \"light\" tub margarine on bread, potatoes and vegetables, choose light or fat-free salad dressings or [place], choose healthy snacks such as fruit, pretzels, and low fat crackers, choose healthy desserts and sweets such as pau food cake or  fruit, choose low sodium canned, frozen/packaged foods or rarely/never eat, rarely/never eat salty snacks, and choose low sugar desserts and sweets   Psychosocial:   Physical Fitness in [facility] Score < 3, Pain in [facility] Score < 3, and feel less tired with more energy    Core Components:   consistent, controlled resting BP < 130/80 and medication compliance      EXERCISE PLAN    Progress toward SMART and personal Exercise goals: Goals met: pt is attending rehab regularly and increasing workloads and durations, pt has been using his bike at home occasionally for 5-10 minutes     Group and Individual Education: pursed lipped breathing, diaphragmatic breathing, O2 saturation monitoring, and appropriate O2 response to exercise    Readiness to change: Action:  (Changing behavior)    Plan for next 30 days:    Titrate supplemental oxygen as needed to maintain SpO2>88% with exercise, learn to conserve energy with ADLs , practice diaphragmatic breathing, reduce time sitting at home, increased strength of respiratory muscles, utilize PLB with physical activity, and begin a home exercise program     Current Aerobic Exercise Prescription:      Frequency: 2 days/week *Supplement with home exercise 2+ days/wk as tolerated        Minutes: 35         METS: 1.3-2.8              SpO2: 85-95%              RPD: 0-7          HR:     RPE: 4-6         Modalities: UBE, NuStep, and Recumbent bike     Exercise workloads will be progressed gradually as tolerated, within limits of " patient's ability, and according to the patient's response to the exercise program.    Aerobic Exercise Prescription - 30 day goal:   Frequency: 2 days/week *Supplement with home exercise 2+ days/wk as tolerated        Minutes: 20-40         METS: 1.8-2.5              SpO2: >88%              RPD: 4-6          HR: RHR +30-40bpm   RPE: 4-6         Modalities: UBE, NuStep, and Recumbent bike     Strength trainin-3 days / week  12-15 repetitions  1-2 sets per modality   Will be added following 2-3 weeks of monitored exercise sessions   Modalities: Lateral Raise, Arm Curl, Sit to Stands, and Front Raises    Supplemental Oxygen Needs with Exercise:  supplemental O2 2L/min via nasal canula.    Home Exercise: none    The patient was counseled on exercise guidelines to achieve a minimum of 150 mins/wk of moderate intensity (RPE 4-6) exercise and encouraged to add 1-2 days of exercise on opposite days of cardiac rehab as tolerated.       NUTRITION PLAN    Patient's progress toward SMART and personal Nutrition goals:   Goals met: pt has been watching his sodium intake and hydrating well throughout the day and Goals not met: daily veggie intake      Group and Individual Education:   low sodium diet  maintaining hydration    Readiness to change: Action:  (Changing behavior)    Plan for next 30 days:   group class: Reading Food Labels, group Class: Heart Healthy Eating, choose lean red meat, and eat chicken and fish that is baked, broiled or roasted    Measurable goals were based Rate Your Plate Dietary Self-Assessment. These are the areas in which the patient could score higher on the assessment. Goals include recommendations for a heart healthy diet based on American [employer] Association.      PSYCHOSOCIAL PLAN    Patient's progress toward SMART and personal Psychosocial goals:   Goals met: maintaining emotional health with good support as needed    Group and Individual Education: signs/sxs of depression and benefits of a  positive support system    Readiness to change: Maintenance: (Maintaining the behavior change)    Plan for next 30 days:   Class: Stress and Your Health    Information to utilize [employer] was provided as well as contact information for counseling through  Behavioral [facility] and group psychotherapy groups available.      OTHER CORE COMPONENTS PLAN    Blood pressure:    Restin//80   Exercise: 138/80   Recovery: 110//80    Pt will be encouraged to monitor home BP if advised by cardiologist.    Progress toward SMART and personal Core Component goals:   Goals met: watching sodium intake, hydrating well throughout the day, Blood pressures have been within normal limits at rest and with exercise    Group and Individual Education:  understanding high blood pressure and it's relationship to CAD    Readiness to change: Action:  (Changing behavior)    Plan for next 30 days:   avoid processed foods, engage in regular exercise, eliminate salt shaker at the table, use salt substitutes, check labels for sodium content, monitor home BP, group class: Pulmonary Anatomy and Physiology, and group class:  Pulmonary Medications

## 2024-07-23 ENCOUNTER — APPOINTMENT (OUTPATIENT)
Facility: HOSPITAL | Age: 84
End: 2024-07-23
Payer: COMMERCIAL

## 2024-07-24 ENCOUNTER — CLINICAL SUPPORT (OUTPATIENT)
Facility: HOSPITAL | Age: 84
End: 2024-07-24
Payer: COMMERCIAL

## 2024-07-24 DIAGNOSIS — J44.9 COPD, SEVERE (HCC): Primary | ICD-10-CM

## 2024-07-24 PROCEDURE — 94625 PHY/QHP OP PULM RHB W/O MNTR: CPT

## 2024-07-29 ENCOUNTER — CLINICAL SUPPORT (OUTPATIENT)
Facility: HOSPITAL | Age: 84
End: 2024-07-29
Payer: COMMERCIAL

## 2024-07-29 DIAGNOSIS — J44.9 COPD, SEVERE (HCC): Primary | ICD-10-CM

## 2024-07-29 PROCEDURE — 94625 PHY/QHP OP PULM RHB W/O MNTR: CPT

## 2024-07-31 ENCOUNTER — CLINICAL SUPPORT (OUTPATIENT)
Facility: HOSPITAL | Age: 84
End: 2024-07-31
Payer: COMMERCIAL

## 2024-07-31 DIAGNOSIS — J44.9 COPD, SEVERE (HCC): Primary | ICD-10-CM

## 2024-07-31 PROCEDURE — 94625 PHY/QHP OP PULM RHB W/O MNTR: CPT

## 2024-08-05 ENCOUNTER — CLINICAL SUPPORT (OUTPATIENT)
Facility: HOSPITAL | Age: 84
End: 2024-08-05
Payer: COMMERCIAL

## 2024-08-05 DIAGNOSIS — J44.9 COPD, SEVERE (HCC): Primary | ICD-10-CM

## 2024-08-05 PROCEDURE — 94625 PHY/QHP OP PULM RHB W/O MNTR: CPT

## 2024-08-07 ENCOUNTER — CLINICAL SUPPORT (OUTPATIENT)
Facility: HOSPITAL | Age: 84
End: 2024-08-07
Payer: COMMERCIAL

## 2024-08-07 DIAGNOSIS — J44.9 COPD, SEVERE (HCC): Primary | ICD-10-CM

## 2024-08-07 PROCEDURE — 94625 PHY/QHP OP PULM RHB W/O MNTR: CPT

## 2024-08-12 ENCOUNTER — CLINICAL SUPPORT (OUTPATIENT)
Facility: HOSPITAL | Age: 84
End: 2024-08-12
Payer: COMMERCIAL

## 2024-08-12 DIAGNOSIS — J44.9 COPD, SEVERE (HCC): Primary | ICD-10-CM

## 2024-08-12 PROCEDURE — 94625 PHY/QHP OP PULM RHB W/O MNTR: CPT

## 2024-08-14 ENCOUNTER — CLINICAL SUPPORT (OUTPATIENT)
Facility: HOSPITAL | Age: 84
End: 2024-08-14
Payer: COMMERCIAL

## 2024-08-14 DIAGNOSIS — J44.9 COPD, SEVERE (HCC): Primary | ICD-10-CM

## 2024-08-14 PROCEDURE — 94625 PHY/QHP OP PULM RHB W/O MNTR: CPT

## 2024-08-19 ENCOUNTER — CLINICAL SUPPORT (OUTPATIENT)
Facility: HOSPITAL | Age: 84
End: 2024-08-19
Payer: COMMERCIAL

## 2024-08-19 DIAGNOSIS — J44.9 COPD, SEVERE (HCC): Primary | ICD-10-CM

## 2024-08-19 PROCEDURE — 94625 PHY/QHP OP PULM RHB W/O MNTR: CPT

## 2024-08-19 NOTE — PROGRESS NOTES
PULMONARY REHABILITATION   ASSESSMENT AND INDIVIDUALIZED TREATMENT PLAN  90 DAY    Today's date: 2024  # of Exercise Sessions Completed: 18  Patient name: Darrell Mayer     : 1940       MRN: [id number]  PCP: Long Chandler MD  Referring Physician: Suzanne Fletcher MD  Pulmonologist: Suzanne Fletcher MD    Provider: Edgar  Clinician: Shakira Chávez MS       ASSESSMENT    Comments: : Darrell has attended 9 sessions of ID in the last month, he has been tolerating exercise well and increases intensities and durations as tolerated. However, Darrell is unsure if he has seen an overall increase in his stamina and strength. Discussed with patient the importance of starting to think of a plan for exercise once he his done with the program. Will reassess plan at discharge. Will continue to increase workloads and durations as tolerated. Pt uses 2L of O2 with exercise.     2024: Pt was placed on a medical hold after Initial eval due to tele revealing a complete heart block. He has since returned and his tolerating exercise well. Darrell is progressing as tolerated and wearing 2L of oxygen with exercise. Will continue to increase workloads and durations as tolerated.     PULMONARY   Dx:   Encounter Diagnosis   Name Primary?    COPD, severe (HCC) Yes     Date of onset: 5/3/2023  Description of Diagnosis: very severe COPD, pt has not be active at home.  Other Pulmonary History:   Comments: centrilobular emphysema, acute bronchitis, chronic hypoxemic respiratory failure    PFT:    does have a PFT on file  FEV1/FVC ratio of 56% and an   FEV1 of 1.53% predicted  severeobstruction.    Pulmonary Disease Risk Factors:  smoking    Sleep Disorders:   none    Medical History:   Past Medical History:   Diagnosis Date    Back pain     Bronchiectasis (HCC)     Cataract     Chronic kidney disease     COPD (chronic obstructive pulmonary disease) (HCC)     History of malignant neoplasm of oral cavity     M0    HL  (hearing loss)     Hypertension     Kidney stone 05/2019    Malignant neoplasm of colon (HCC)     descending    Prostate cancer (HCC)     SOB (shortness of breath)     Tachycardia     Thalassemia trait     Tinnitus        Family History:  Family History   Problem Relation Age of Onset    Heart failure Mother         as per [employer]    Coronary artery disease Mother         as per [employer]    Diabetes Mother         mellitus - as per [employer]    Coronary artery disease Father         as per [employer]    Cancer Sister         malignant neoplasm       Allergies:   Patient has no known allergies.    Current Medications:   Current Outpatient Medications   Medication Sig Dispense Refill    acetaminophen-codeine (TYLENOL with CODEINE #4) 300-60 MG per tablet Take 1 tablet by mouth every 6 (six) hours as needed for moderate pain 60 tablet 0    albuterol (2.5 mg/3 mL) 0.083 % nebulizer solution USE 1 VIAL IN NEBULIZER EVERY 6 HOURS 360 mL 1    allopurinol (ZYLOPRIM) 100 mg tablet Take 1 tablet (100 mg total) by mouth daily 90 tablet 3    amLODIPine (NORVASC) 10 mg tablet Take 1 tablet (10 mg total) by mouth every morning 90 tablet 0    Budeson-Glycopyrrol-Formoterol (Breztri Aerosphere) 160-9-4.8 MCG/ACT AERO Inhale 2 puffs 2 (two) times a day Rinse mouth after use. 31.1 g 3    cholecalciferol (VITAMIN D3) 1,000 units tablet Take 5 tablets (5,000 Units total) by mouth every other day      losartan (COZAAR) 100 MG tablet Take 1 tablet (100 mg total) by mouth daily 90 tablet 0    metoprolol succinate (TOPROL-XL) 25 mg 24 hr tablet TAKE 1 TABLET DAILY 90 tablet 1    Polyvinyl Alcohol-Povidone (REFRESH OP) Apply to eye as needed       prochlorperazine (COMPAZINE) 10 mg tablet Take 1 tablet (10 mg total) by mouth every 6 (six) hours as needed for nausea or vomiting 30 tablet 0    Vitamins-Lipotropics (LIPO-FLAVONOID PLUS PO) Take by mouth       No current facility-administered medications for this visit.       Medication  "compliance: Yes   Comments: Pt reports to be compliant with medications    Cultural needs: none      EXERCISE ASSESSMENT:      FUNCTIONAL STATUS:  Current Status:  Occupation: retired  Recreation: Gardening, work around the house  ADL’s:No limitations Capable of performing light to moderate ADLs able to perform self-care  [place]: No limitations Capable of performing light to moderate ADLs able to perform self-care  Home Exercise/Equipment: None, bike  Other: none    Physical Limitations: Knee-     Fall Risk: Low   Comments: Ambulates with a steady gait with no assist device and Denies a fall in the past 6 months    Initial Fitness Assessment: 6MWT:  ECG Summary: Possible AV, pt does not follow with cardiologistdanika texted PCP.   Deferred initial testing      NUTRITION ASSESSMENT:    Height:   Ht Readings from Last 1 Encounters:   07/15/24 6' 1\" (1.854 m)       Weight control:    Starting weight: 236.2 lbs   Current weight: 236.2 lbs    Rate Your Plate Score: 46/81    Diabetes: N/A  A1c: 6.0     Last Measured: 1/17/2024  Medication Compliance: compliant all of the time      Current Dietary Habits:  Increased fruit intake, decreased sweets  Coffee and corn bread   Grilled cheese   Does not use extra salt on food  Can increase hydration     Drug/Alcohol Use: No      PSYCHOSOCIAL ASSESSMENT:    Depression screening:  PHQ-9 = 6    Interpretation:  5-9 = Mild Depression  Last Assessed: 4/30/2024    Anxiety screening:  VASQUEZ-7 = 1    Interpretation: 0-4  = Not anxious  Last Assessed: 4/30/24    Pt self-report of depression and anxiety:   Patient reports they are coping well with good social support and denies depression or anxiety    Self-reported stress level:   0   Stressors:  None- does not really get stressed about anything  Stress Management Tools: practice Relaxation Techniques, spend time outside, and spend time with family    Quality of Life Screen:  (Higher score indicates disease impact on QOL)  Amish " COOP score: /40        Social Support: spouse  Community/Social Activities: use to go to gym with wife     Psychosocial Assessment as it relates to rehabilitation:   Patient denies issues with his/her family or home life that may affect their rehabilitation efforts.       OTHER CORE COMPONENT ASSESSMENT:    Tobacco Use: N/A: Pt has a remote history of smoking  Tobacco Intervention: N/A:  Pt has a remote history of smoking.    Pt quit 20 years ago and has abstained since quitting.      Hospitalizations in the past year: 0    Oxygen needs:   Rest:  room air  Exercise/physical activity:  supplemental O2 via nasal cannula @ 2L/min   Sleep:   supplemental O2 via nasal cannula @ 2.5L/min     Does Pt monitor home SpO2? no   Average SpO2 at rest:  n/a%   Average SpO2 with ADLs/physical activity:  n/a%      Breathing Treatments:   Rescue Inhaler: Yes:  1 times per day occasionally   Maintenance Inhaler: No  Nebulizer Treatments:  Yes:  2 times per day  Patient practices breathing techniques at home:  No      INDIVIDUALIZED TREATMENT PLAN    Patient will attend 24 monitored exercise sessions beginning Thursday May 2nd at 1pm.    See outlined plan of care below for specific patient goals in each component of care.      PATIENT SPECIFIC GOALS:     Exercise: (home exercise, ADLs)  attend pulmonary rehab regularly  Decrease sitting time at home   Nutrition: (wt control, diabetes management, dietary modifications)  improve hydration  Increase chicken and fish   Psychosocial: (stress, emotional well being, social support)  spend time with family  Maintain emotional health   Core Component: (smoking, BP control, angina control, medication)  reduced dietary sodium <2000mg    SMART GOALS:  Exercise:   reduced score on CAT, improved 6MWT distance, reduced dyspnea during exercise, improved exercise tolerance based on peak METs tolerated in pulmonary rehab exercise session, and SpO2 >88% during exercise   Nutrition:   LDL <100, HDL >40,  "TRG <150, CHOL <200, Improved Rate Your Plate score  >64, eat 6 or more servings of grain products per day, eat whole grain breads, brown rice and whole grain cereals, eat 5 or more servings of fruits and vegetables a day, eat 2 or more servings of low fat milk or yogurt a day, eat no more than 6oz of meat per day, eat red meat once a week or less, choose lean beef or rarely eat beef, rarely eat processed meats or eat low fat processed meats, eat poultry without the skin, eat chicken and fish that is not fried, eat meatless meals twice a week or more, choose 1% or skim milk, rarely eat cheese or choose reduced fat or skim, Do not cook with oil, butter or margarine, Do not eat fried foods, use \"light\" tub margarine on bread, potatoes and vegetables, choose light or fat-free salad dressings or grimaldo, choose healthy snacks such as fruit, pretzels, and low fat crackers, choose healthy desserts and sweets such as pau food cake or  fruit, choose low sodium canned, frozen/packaged foods or rarely/never eat, rarely/never eat salty snacks, and choose low sugar desserts and sweets   Psychosocial:   Physical Fitness in [facility] Score < 3, Pain in [facility] Score < 3, and feel less tired with more energy    Core Components:   consistent, controlled resting BP < 130/80 and medication compliance      EXERCISE PLAN    Progress toward SMART and personal Exercise goals: Goals met: pt is attending rehab regularly and increasing workloads and durations, pt has been using his bike at home occasionally for 5-10 minutes, spoke with pt to start thinking of a plan for exercise once he completes the program     Group and Individual Education: pursed lipped breathing, diaphragmatic breathing, O2 saturation monitoring, and appropriate O2 response to exercise    Readiness to change: Action:  (Changing behavior)    Plan for next 30 days:    Titrate supplemental oxygen as needed to maintain SpO2>88% with exercise, learn to conserve energy with " ADLs , practice diaphragmatic breathing, reduce time sitting at home, increased strength of respiratory muscles, utilize PLB with physical activity, and begin a home exercise program     Current Aerobic Exercise Prescription:      Frequency: 2 days/week *Supplement with home exercise 2+ days/wk as tolerated        Minutes:40         METS: 1.8-2.8              SpO2: 89-99%              RPD: 0-4          HR:     RPE: 4-6         Modalities: UBE, NuStep, and Recumbent bike     Exercise workloads will be progressed gradually as tolerated, within limits of patient's ability, and according to the patient's response to the exercise program.    Aerobic Exercise Prescription - 30 day goal:   Frequency: 2 days/week *Supplement with home exercise 2+ days/wk as tolerated        Minutes: 20-40         METS: 1.8-2.5              SpO2: >88%              RPD: 4-6          HR: RHR +30-40bpm   RPE: 4-6         Modalities: UBE, NuStep, and Recumbent bike     Strength trainin-3 days / week  12-15 repetitions  1-2 sets per modality   Will be added following 2-3 weeks of monitored exercise sessions   Modalities: Lateral Raise, Arm Curl, Sit to Stands, and Front Raises    Supplemental Oxygen Needs with Exercise:  supplemental O2 2L/min via nasal canula.    Home Exercise: none    The patient was counseled on exercise guidelines to achieve a minimum of 150 mins/wk of moderate intensity (RPE 4-6) exercise and encouraged to add 1-2 days of exercise on opposite days of cardiac rehab as tolerated.       NUTRITION PLAN    Patient's progress toward SMART and personal Nutrition goals:   Goals met: pt has been watching his sodium intake and hydrating well throughout the day and Goals not met: daily veggie intake, eating red met 2x/wk       Group and Individual Education:   low sodium diet  maintaining hydration    Readiness to change: Action:  (Changing behavior)    Plan for next 30 days:   group class: Reading Food Labels, group Class:  Heart Healthy Eating, choose lean red meat, and eat chicken and fish that is baked, broiled or roasted    Measurable goals were based Rate Your Plate Dietary Self-Assessment. These are the areas in which the patient could score higher on the assessment. Goals include recommendations for a heart healthy diet based on American Heart Association.      PSYCHOSOCIAL PLAN    Patient's progress toward SMART and personal Psychosocial goals:   Goals met: maintaining emotional health with good support as needed    Group and Individual Education: signs/sxs of depression and benefits of a positive support system    Readiness to change: Maintenance: (Maintaining the behavior change)    Plan for next 30 days:   Class: Stress and Your Health    Information to utilize [employer] was provided as well as contact information for counseling through  Behavioral [facility] and group psychotherapy groups available.      OTHER CORE COMPONENTS PLAN    Blood pressure:    Restin//84   Exercise: 128/80   Recovery: 108//80    Pt will be encouraged to monitor home BP if advised by cardiologist.    Progress toward SMART and personal Core Component goals:   Goals met: watching sodium intake, hydrating well throughout the day, Blood pressures have been within normal limits at rest and with exercise    Group and Individual Education:  understanding high blood pressure and it's relationship to CAD    Readiness to change: Action:  (Changing behavior)    Plan for next 30 days:   avoid processed foods, engage in regular exercise, eliminate salt shaker at the table, use salt substitutes, check labels for sodium content, monitor home BP, group class: Pulmonary Anatomy and Physiology, and group class:  Pulmonary Medications

## 2024-08-21 ENCOUNTER — CLINICAL SUPPORT (OUTPATIENT)
Facility: HOSPITAL | Age: 84
End: 2024-08-21
Payer: COMMERCIAL

## 2024-08-21 DIAGNOSIS — J44.9 COPD, SEVERE (HCC): Primary | ICD-10-CM

## 2024-08-21 PROCEDURE — 94625 PHY/QHP OP PULM RHB W/O MNTR: CPT

## 2024-08-22 DIAGNOSIS — I10 ESSENTIAL HYPERTENSION: ICD-10-CM

## 2024-08-22 RX ORDER — AMLODIPINE BESYLATE 10 MG/1
10 TABLET ORAL EVERY MORNING
Qty: 90 TABLET | Refills: 1 | Status: SHIPPED | OUTPATIENT
Start: 2024-08-22

## 2024-08-26 ENCOUNTER — CLINICAL SUPPORT (OUTPATIENT)
Facility: HOSPITAL | Age: 84
End: 2024-08-26
Payer: COMMERCIAL

## 2024-08-26 DIAGNOSIS — J44.9 COPD, SEVERE (HCC): Primary | ICD-10-CM

## 2024-08-26 PROCEDURE — 94625 PHY/QHP OP PULM RHB W/O MNTR: CPT

## 2024-08-28 ENCOUNTER — CLINICAL SUPPORT (OUTPATIENT)
Facility: HOSPITAL | Age: 84
End: 2024-08-28
Payer: COMMERCIAL

## 2024-08-28 DIAGNOSIS — J44.9 COPD, SEVERE (HCC): Primary | ICD-10-CM

## 2024-08-28 PROCEDURE — 94625 PHY/QHP OP PULM RHB W/O MNTR: CPT

## 2024-08-30 ENCOUNTER — IN-CLINIC DEVICE VISIT (OUTPATIENT)
Dept: CARDIOLOGY CLINIC | Facility: CLINIC | Age: 84
End: 2024-08-30
Payer: COMMERCIAL

## 2024-08-30 DIAGNOSIS — Z95.0 PRESENCE OF PERMANENT CARDIAC PACEMAKER: Primary | ICD-10-CM

## 2024-08-30 PROCEDURE — 93280 PM DEVICE PROGR EVAL DUAL: CPT | Performed by: INTERNAL MEDICINE

## 2024-08-30 NOTE — PROGRESS NOTES
MDT DC PM/ACTIVE SYSTEM IS MRI CONDITIONAL   DEVICE INTERROGATED IN THE [place] OFFICE:  BATTERY VOLTAGE ADEQUATE (12.3 YR.).  AP 3.3%  99.8% (AVB).  ALL LEAD PARAMETERS WITHIN NORMAL LIMITS.  NO SIGNIFICANT HIGH RATE EPISODES.DECREASE MADE TO AMPLITUDES TO PROMOTE DEVICE LONGEVITY WHILE MAINTAINING AN APPROPRIATE SAFETY MARGIN.  NORMAL DEVICE FUNCTION.  RG

## 2024-09-03 DIAGNOSIS — I10 HYPERTENSION, UNSPECIFIED TYPE: ICD-10-CM

## 2024-09-03 NOTE — TELEPHONE ENCOUNTER
Reason for call:   [x] Refill   [] Prior Auth  [] Other:     Office:   [x] PCP/Provider - Long Chandler MD   [] Specialty/Provider -         Does the patient have enough for 3 days?   [] Yes   [x] No - Send as HP to POD

## 2024-09-04 ENCOUNTER — CLINICAL SUPPORT (OUTPATIENT)
Facility: HOSPITAL | Age: 84
End: 2024-09-04
Payer: COMMERCIAL

## 2024-09-04 DIAGNOSIS — J44.9 COPD, SEVERE (HCC): Primary | ICD-10-CM

## 2024-09-04 PROCEDURE — 94625 PHY/QHP OP PULM RHB W/O MNTR: CPT

## 2024-09-04 RX ORDER — LOSARTAN POTASSIUM 100 MG/1
100 TABLET ORAL DAILY
Qty: 90 TABLET | Refills: 0 | OUTPATIENT
Start: 2024-09-04

## 2024-09-04 RX ORDER — LOSARTAN POTASSIUM 100 MG/1
100 TABLET ORAL DAILY
Qty: 90 TABLET | Refills: 0 | Status: SHIPPED | OUTPATIENT
Start: 2024-09-04

## 2024-09-05 ENCOUNTER — TELEPHONE (OUTPATIENT)
Age: 84
End: 2024-09-05

## 2024-09-05 NOTE — TELEPHONE ENCOUNTER
Labs due 12/28/24 and then labs due 1/25      Has wellness with you 9/12,    Do you want labs ordered for the 9/12 appt?    Last seen 7/15 -- he made this appt thru jose carlos

## 2024-09-06 ENCOUNTER — CLINICAL SUPPORT (OUTPATIENT)
Facility: HOSPITAL | Age: 84
End: 2024-09-06
Payer: COMMERCIAL

## 2024-09-06 DIAGNOSIS — J44.9 COPD, SEVERE (HCC): Primary | ICD-10-CM

## 2024-09-06 PROCEDURE — 94625 PHY/QHP OP PULM RHB W/O MNTR: CPT

## 2024-09-09 ENCOUNTER — CLINICAL SUPPORT (OUTPATIENT)
Facility: HOSPITAL | Age: 84
End: 2024-09-09
Payer: COMMERCIAL

## 2024-09-09 DIAGNOSIS — J44.9 COPD, SEVERE (HCC): Primary | ICD-10-CM

## 2024-09-09 PROCEDURE — 94625 PHY/QHP OP PULM RHB W/O MNTR: CPT

## 2024-09-10 NOTE — PROGRESS NOTES
PULMONARY REHABILITATION   ASSESSMENT AND INDIVIDUALIZED TREATMENT PLAN  DISCHARGE      Today's date: September 10, 2024  # of Exercise Sessions Completed: 24  Patient name: Darrell Mayer     : 1940       MRN: [id number]  PCP: Long Chandler MD  Referring Physician: Suzanne Fletcher MD  Pulmonologist: Suzanne Fletcher MD    Provider: Edgar  Clinician: Zain Powers, MS, CEP, CCRP      ASSESSMENT    Comments: Discharge note for Darrell. A final 6MWT was not administered d/t significant back pain with walking.   His max METs in pulmonary rehab increased from 1.7 to 1.9. His [facility] QOL improved by 2 points.  PHQ-9 score decreased from 6 to 3.  VASQUEZ-7 remained at 1. CAT score increased from 18 to 20/40. SOBQ score increased  from 41 to 54.   His weight increased by 0.8 pounds. Darrell has been supplementing IA sessions with home exercise which includes occasional 5 min rides on his stationary bike. Despite the increasing scores on his CAT and SOBQ, he reports increased stamina, strength and reduced SOB with ADLs.  He can ascend his basement steps without stopping and is able to go up and down many times per day.  He has returned to gardening, and performing light household maintenance.  He  tolerates 40 mins at 1.9 - 2.9 METs on 2L O2.  SpO2 at rest 92-95% with exercise 90-95%. He does not use his home O2 at rest and admits that he doesn't use it when moving around the house.    All group education classes on pulmonary risk factor modification were attended by the patient.  Discharge plans include using his home stationary bike 20 mins daily.  Encouraged Pt to continue exercise. Frequency: 4-6 days/wk, Intensity: RPE 4-5, Time: 40-50 mins daily, 150-200 mins/wk.  Pt was encouraged to remain compliant with medications and f/u with pulmonologist with any pulmonary symptoms and medication management.          : Darrell has attended 9 sessions of IA in the last month, he has been tolerating exercise well and  increases intensities and durations as tolerated. However, Darrell is unsure if he has seen an overall increase in his stamina and strength. Discussed with patient the importance of starting to think of a plan for exercise once he his done with the program. Will reassess plan at discharge. Will continue to increase workloads and durations as tolerated. Pt uses 2L of O2 with exercise.     7/22/2024: Pt was placed on a medical hold after Initial eval due to tele revealing a complete heart block. He has since returned and his tolerating exercise well. Darrell is progressing as tolerated and wearing 2L of oxygen with exercise. Will continue to increase workloads and durations as tolerated.     PULMONARY   Dx:   Encounter Diagnosis   Name Primary?    COPD, severe (HCC) Yes     Date of onset: 5/3/2023  Description of Diagnosis: very severe COPD, pt has not be active at home.  Other Pulmonary History:   Comments: centrilobular emphysema, acute bronchitis, chronic hypoxemic respiratory failure    PFT:    does have a PFT on file  FEV1/FVC ratio of 56% and an   FEV1 of 1.53% predicted  severeobstruction.    Pulmonary Disease Risk Factors:  smoking    Sleep Disorders:   none    Medical History:   Past Medical History:   Diagnosis Date    Back pain     Bronchiectasis (HCC)     Cataract     Chronic kidney disease     COPD (chronic obstructive pulmonary disease) (HCC)     History of malignant neoplasm of oral cavity     M0    HL (hearing loss)     Hypertension     Kidney stone 05/2019    Malignant neoplasm of colon (HCC)     descending    Prostate cancer (HCC)     SOB (shortness of breath)     Tachycardia     Thalassemia trait     Tinnitus        Family History:  Family History   Problem Relation Age of Onset    Heart failure Mother         as per [employer]    Coronary artery disease Mother         as per [employer]    Diabetes Mother         mellitus - as per [employer]    Coronary artery disease Father         as per [employer]     Cancer Sister         malignant neoplasm       Allergies:   Patient has no known allergies.    Current Medications:   Current Outpatient Medications   Medication Sig Dispense Refill    acetaminophen-codeine (TYLENOL with CODEINE #4) 300-60 MG per tablet Take 1 tablet by mouth every 6 (six) hours as needed for moderate pain 60 tablet 0    albuterol (2.5 mg/3 mL) 0.083 % nebulizer solution USE 1 VIAL IN NEBULIZER EVERY 6 HOURS 360 mL 1    allopurinol (ZYLOPRIM) 100 mg tablet Take 1 tablet (100 mg total) by mouth daily 90 tablet 3    amLODIPine (NORVASC) 10 mg tablet Take 1 tablet (10 mg total) by mouth every morning 90 tablet 1    Budeson-Glycopyrrol-Formoterol (Breztri Aerosphere) 160-9-4.8 MCG/ACT AERO Inhale 2 puffs 2 (two) times a day Rinse mouth after use. 31.1 g 3    cholecalciferol (VITAMIN D3) 1,000 units tablet Take 5 tablets (5,000 Units total) by mouth every other day      losartan (COZAAR) 100 MG tablet Take 1 tablet (100 mg total) by mouth daily 90 tablet 0    metoprolol succinate (TOPROL-XL) 25 mg 24 hr tablet TAKE 1 TABLET DAILY 90 tablet 1    Polyvinyl Alcohol-Povidone (REFRESH OP) Apply to eye as needed       prochlorperazine (COMPAZINE) 10 mg tablet Take 1 tablet (10 mg total) by mouth every 6 (six) hours as needed for nausea or vomiting 30 tablet 0    Vitamins-Lipotropics (LIPO-FLAVONOID PLUS PO) Take by mouth       No current facility-administered medications for this visit.       Medication compliance: Yes   Comments: Pt reports to be compliant with medications    Cultural needs: none      EXERCISE ASSESSMENT:      FUNCTIONAL STATUS:  Current Status:  Occupation: retired  Recreation: Gardening, work around the house  ADL’s: light to moderate ADLs - with reduced SOB  [place]: No limitations   Home Exercise/Equipment: stationary bike 5 mins  Other: none    Physical Limitations: Knee, back pain     Fall Risk: Low   Comments: Ambulates with a steady gait with no assist device and Denies a fall  "in the past 6 months    Discharge Fitness Assessment: No 6MWT administered d/t significant back pain with walking      NUTRITION ASSESSMENT:    Height:   Ht Readings from Last 1 Encounters:   07/15/24 6' 1\" (1.854 m)       Weight control:    Starting weight: 236.2 lbs   Current weight: 237 lbs    Rate Your Plate Score: 49/81    Diabetes: N/A  A1c: 6.0     Last Measured: 1/17/2024  Medication Compliance: compliant all of the time      Current Dietary Habits: - improved hydration, no added salts    Increased fruit intake, decreased sweets  Coffee and corn bread   Grilled cheese   Does not use extra salt on food  Can increase hydration     Drug/Alcohol Use: No      PSYCHOSOCIAL ASSESSMENT:    Depression screening:  PHQ-9 = 3  Interpretation:  1-4 = Minimal Depression  Last Assessed: 9/9/24    Anxiety screening:  VASQUEZ-7 = 1    Interpretation: 0-4  = Not anxious  Last Assessed: 9/9/24    Pt self-report of depression and anxiety:   Patient reports they are coping well with good social support and denies depression or anxiety    Self-reported stress level:   0   Stressors:  None- does not really get stressed about anything  Stress Management Tools: practice Relaxation Techniques, spend time outside, and spend time with family    Quality of Life Screen:  (Higher score indicates disease impact on QOL)  [facility] COOP score: 25/40        Social Support: spouse  Community/Social Activities: use to go to gym with wife     Psychosocial Assessment as it relates to rehabilitation:   Patient denies issues with his/her family or home life that may affect their rehabilitation efforts.       OTHER CORE COMPONENT ASSESSMENT:    Tobacco Use: N/A: Pt has a remote history of smoking  Tobacco Intervention: none    Pt quit 20 years ago and has abstained since quitting.      Hospitalizations in the past year: 0    Oxygen needs:   Rest:  room air  Exercise/physical activity:  supplemental O2 via nasal cannula @ 2L/min   Sleep:   supplemental " "O2 via nasal cannula @ 2L/min     Does Pt monitor home SpO2? no   Average SpO2 at rest:  n/a%   Average SpO2 with ADLs/physical activity:  n/a%      Breathing Treatments:   Rescue Inhaler: Yes:  1 times per day occasionally   Maintenance Inhaler: No  Nebulizer Treatments:  Yes:  2 times per day  Patient practices breathing techniques at home:  No      INDIVIDUALIZED TREATMENT PLAN    See outlined plan of care below for specific patient goals in each component of care.      PATIENT SPECIFIC GOALS:     Exercise: (home exercise, ADLs)  attend pulmonary rehab regularly  Decrease sitting time at home   Nutrition: (wt control, diabetes management, dietary modifications)  improve hydration  Increase chicken and fish   Psychosocial: (stress, emotional well being, social support)  spend time with family  Maintain emotional health   Core Component: (smoking, BP control, angina control, medication)  reduced dietary sodium <2000mg    SMART GOALS:  Exercise:   reduced score on CAT, improved 6MWT distance, reduced dyspnea during exercise, improved exercise tolerance based on peak METs tolerated in pulmonary rehab exercise session, and SpO2 >88% during exercise   Nutrition:   LDL <100, HDL >40, TRG <150, CHOL <200, Improved Rate Your Plate score  >64, eat 6 or more servings of grain products per day, eat whole grain breads, brown rice and whole grain cereals, eat 5 or more servings of fruits and vegetables a day, eat 2 or more servings of low fat milk or yogurt a day, eat no more than 6oz of meat per day, eat red meat once a week or less, choose lean beef or rarely eat beef, rarely eat processed meats or eat low fat processed meats, eat poultry without the skin, eat chicken and fish that is not fried, eat meatless meals twice a week or more, choose 1% or skim milk, rarely eat cheese or choose reduced fat or skim, Do not cook with oil, butter or margarine, Do not eat fried foods, use \"light\" tub margarine on bread, potatoes and " vegetables, choose light or fat-free salad dressings or grimaldo, choose healthy snacks such as fruit, pretzels, and low fat crackers, choose healthy desserts and sweets such as pau food cake or  fruit, choose low sodium canned, frozen/packaged foods or rarely/never eat, rarely/never eat salty snacks, and choose low sugar desserts and sweets   Psychosocial:   Physical Fitness in [facility] Score < 3, Pain in [facility] Score < 3, and feel less tired with more energy    Core Components:   consistent, controlled resting BP < 130/80 and medication compliance      EXERCISE PLAN    Progress toward SMART and personal Exercise goals: Goals met: pt is attending rehab regularly and increasing workloads and durations, pt has been using his bike at home occasionally for 5-10 minutes, Goals not met:  CAT score increased     Group and Individual Education: pursed lipped breathing, diaphragmatic breathing, relaxation breathing, home exercise guidelines, benefits of exercise for pulmonary disease, RPE scale, RPD scale, O2 saturation monitoring, appropriate O2 response to exercise, energy conservation, and education class: Exercise For The Pulmonary Patient    Readiness to change: Action:  (Changing behavior) and Maintenance: (Maintaining the behavior change)    Plan:    Titrate supplemental oxygen as needed to maintain SpO2>88% with exercise, learn to conserve energy with ADLs , practice diaphragmatic breathing, reduce time sitting at home, increased strength of respiratory muscles, utilize PLB with physical activity, and begin a home exercise program     Current Aerobic Exercise Prescription:      Frequency: 2 days/week *Supplement with home exercise 2+ days/wk as tolerated        Minutes:40         METS: 1.8-2.8              SpO2: 89-99%              RPD: 0-4          HR:     RPE: 4-6         Modalities: UBE, NuStep, and Recumbent bike     Exercise workloads will be progressed gradually as tolerated, within limits of patient's  ability, and according to the patient's response to the exercise program.    Aerobic Exercise Prescription   Frequency: 2 days/week *Supplement with home exercise 2+ days/wk as tolerated        Minutes: 20-40         METS: 1.8-2.5              SpO2: >88%              RPD: 4-6          HR: RHR +30-40bpm   RPE: 4-6         Modalities: UBE, NuStep, and Recumbent bike     Strength trainin-3 days / week  12-15 repetitions  1-2 sets per modality    Modalities: Lateral Raise, Arm Curl, Sit to Stands, and Front Raises    Supplemental Oxygen Needs with Exercise:  supplemental O2 2L/min via nasal canula.    Home Exercise: stationary bike     The patient was counseled on exercise guidelines to achieve a minimum of 150 mins/wk of moderate intensity (RPE 4-6) exercise and encouraged to add 1-2 days of exercise on opposite days of cardiac rehab as tolerated.       NUTRITION PLAN    Patient's progress toward SMART and personal Nutrition goals:   Goals met: pt has been watching his sodium intake and hydrating well throughout the day and Goals not met: daily veggie intake, eating red met 2x/wk       Group and Individual Education:   low sodium diet  maintaining hydration  WHY WATER    Readiness to change: Action:  (Changing behavior)    Plan :   choose lean red meat, eat chicken and fish that is baked, broiled or roasted, eat more meatless meals, and hydrate 64 oz daily    Measurable goals were based Rate Your Plate Dietary Self-Assessment. These are the areas in which the patient could score higher on the assessment. Goals include recommendations for a heart healthy diet based on American [employer] Association.      PSYCHOSOCIAL PLAN    Patient's progress toward SMART and personal Psychosocial goals:   Goals met: maintaining emotional health with good support as needed    Group and Individual Education: signs/sxs of depression and benefits of a positive support system    Readiness to change: Maintenance: (Maintaining the behavior  change)    Plan :   Exercise and enjoy family    Information to utilize [employer] was provided as well as contact information for counseling through  [facility] and group psychotherapy groups available.      OTHER CORE COMPONENTS PLAN    Blood pressure:    Restin//84   Exercise: 128/80   Recovery: 108//80    Pt will be encouraged to monitor home BP if advised by cardiologist.    Progress toward SMART and personal Core Component goals:   Goals met: watching sodium intake, hydrating well throughout the day, Blood pressures have been within normal limits at rest and with exercise    Group and Individual Education:  understanding high blood pressure and it's relationship to CAD    Readiness to change: Action:  (Changing behavior)    Plan:   avoid processed foods, engage in regular exercise, eliminate salt shaker at the table, use salt substitutes, check labels for sodium content, and monitor home BP

## 2024-09-11 PROBLEM — G89.4 CHRONIC PAIN DISORDER: Status: ACTIVE | Noted: 2020-08-03

## 2024-09-11 PROBLEM — J20.9 ACUTE BRONCHITIS: Status: RESOLVED | Noted: 2018-01-02 | Resolved: 2024-09-11

## 2024-09-11 PROBLEM — M46.1 INFLAMMATION OF SACROILIAC JOINT (HCC): Status: RESOLVED | Noted: 2024-09-11 | Resolved: 2024-09-11

## 2024-09-11 PROBLEM — M19.049 ARTHRITIS OF HAND: Status: RESOLVED | Noted: 2024-09-11 | Resolved: 2024-09-11

## 2024-09-11 PROBLEM — M25.559 HIP PAIN: Status: RESOLVED | Noted: 2020-02-07 | Resolved: 2024-09-11

## 2024-09-11 PROBLEM — D72.829 ELEVATED WHITE BLOOD CELL COUNT: Status: RESOLVED | Noted: 2017-12-12 | Resolved: 2024-09-11

## 2024-09-11 PROBLEM — M54.50 LOW BACK PAIN: Status: RESOLVED | Noted: 2019-04-25 | Resolved: 2024-09-11

## 2024-09-11 PROBLEM — T81.89XA NONHEALING SURGICAL WOUND: Status: RESOLVED | Noted: 2021-06-15 | Resolved: 2024-09-11

## 2024-09-11 PROBLEM — M25.561 KNEE PAIN, RIGHT: Status: RESOLVED | Noted: 2017-07-18 | Resolved: 2024-09-11

## 2024-09-11 PROBLEM — M25.562 ARTHRALGIA OF LEFT KNEE: Status: RESOLVED | Noted: 2024-04-25 | Resolved: 2024-09-11

## 2024-09-11 PROBLEM — M47.816 LUMBAR SPONDYLOSIS: Status: ACTIVE | Noted: 2019-01-28

## 2024-09-12 ENCOUNTER — OFFICE VISIT (OUTPATIENT)
Age: 84
End: 2024-09-12
Payer: COMMERCIAL

## 2024-09-12 VITALS
WEIGHT: 235 LBS | HEIGHT: 73 IN | SYSTOLIC BLOOD PRESSURE: 136 MMHG | DIASTOLIC BLOOD PRESSURE: 78 MMHG | BODY MASS INDEX: 31.14 KG/M2 | HEART RATE: 80 BPM | OXYGEN SATURATION: 93 %

## 2024-09-12 DIAGNOSIS — I10 HYPERTENSION, UNSPECIFIED TYPE: ICD-10-CM

## 2024-09-12 DIAGNOSIS — J43.2 CENTRILOBULAR EMPHYSEMA (HCC): ICD-10-CM

## 2024-09-12 DIAGNOSIS — I44.2 COMPLETE HEART BLOCK (HCC): ICD-10-CM

## 2024-09-12 DIAGNOSIS — Z00.00 MEDICARE ANNUAL WELLNESS VISIT, SUBSEQUENT: Primary | ICD-10-CM

## 2024-09-12 DIAGNOSIS — Z85.038 HISTORY OF COLON CANCER: ICD-10-CM

## 2024-09-12 DIAGNOSIS — Z85.46 HISTORY OF PROSTATE CANCER: ICD-10-CM

## 2024-09-12 DIAGNOSIS — N18.31 STAGE 3A CHRONIC KIDNEY DISEASE (HCC): ICD-10-CM

## 2024-09-12 DIAGNOSIS — D50.9 IRON DEFICIENCY ANEMIA, UNSPECIFIED IRON DEFICIENCY ANEMIA TYPE: ICD-10-CM

## 2024-09-12 DIAGNOSIS — Z23 NEED FOR COVID-19 VACCINE: ICD-10-CM

## 2024-09-12 DIAGNOSIS — E78.5 DYSLIPIDEMIA: ICD-10-CM

## 2024-09-12 DIAGNOSIS — E11.22 TYPE 2 DIABETES MELLITUS WITH STAGE 3B CHRONIC KIDNEY DISEASE, WITHOUT LONG-TERM CURRENT USE OF INSULIN (HCC): ICD-10-CM

## 2024-09-12 DIAGNOSIS — I10 PRIMARY HYPERTENSION: ICD-10-CM

## 2024-09-12 DIAGNOSIS — N18.32 TYPE 2 DIABETES MELLITUS WITH STAGE 3B CHRONIC KIDNEY DISEASE, WITHOUT LONG-TERM CURRENT USE OF INSULIN (HCC): ICD-10-CM

## 2024-09-12 PROCEDURE — 90480 ADMN SARSCOV2 VAC 1/ONLY CMP: CPT

## 2024-09-12 PROCEDURE — 99214 OFFICE O/P EST MOD 30 MIN: CPT

## 2024-09-12 PROCEDURE — G0439 PPPS, SUBSEQ VISIT: HCPCS

## 2024-09-12 PROCEDURE — 91320 SARSCV2 VAC 30MCG TRS-SUC IM: CPT

## 2024-09-12 RX ORDER — METOPROLOL SUCCINATE 25 MG/1
25 TABLET, EXTENDED RELEASE ORAL DAILY
Qty: 90 TABLET | Refills: 3 | Status: SHIPPED | OUTPATIENT
Start: 2024-09-12

## 2024-09-12 NOTE — ASSESSMENT & PLAN NOTE
Orders:    metoprolol succinate (TOPROL-XL) 25 mg 24 hr tablet; Take 1 tablet (25 mg total) by mouth daily

## 2024-09-12 NOTE — PROGRESS NOTES
Ambulatory Visit  Name: Darrell Mayer      : 1940      MRN: [id number]  Encounter Provider: Sarah Landers PA-C  Encounter Date: 2024   Encounter department: [facility] INTERNAL MEDICINE [facility] [address]    Assessment & Plan  Medicare annual wellness visit, subsequent  He admits to eating a fair amount of steak, but not enough fruits and vegetables. He drinks an adequate amount of water. He is active around the house. He sleeps okay. He quit smoking and drinking around 20 years ago. He denies any illicit drug use. He is UTD with the dentist and eye doctor. He is retired, he worked at the [employer] in [place] as the .  He lives at home with his wife Lisha.  He has a cat.       Primary hypertension  BP in office is 136/78. Home BP's are around there. He limits salt intake to <2,000 mg daily.        Complete heart block (HCC)  He is s/p dual-chamber pacemaker placement.  He is following with cardiology.  Heart rate in office today is 80.       Centrilobular emphysema (HCC)  Currently on 2 L of oxygen.  He denies any cough or wheezing. He just finished pulmonary rehab.  He is able to climb a flight of stairs without being short of breath.  He is currently on Breztri inhaler daily.  He is following with pulmonology.       Type 2 diabetes mellitus with stage 3b chronic kidney disease, without long-term current use of insulin (HCC)  A1c is 6.  Currently controlled with diet.  No home sugars.  Lab Results   Component Value Date    HGBA1C 6.0 (H) 2024            Stage 3a chronic kidney disease (HCC)  Lab Results   Component Value Date    EGFR 49 2024    EGFR 59 2024    EGFR 51 2024    CREATININE 1.33 (H) 2024    CREATININE 1.15 2024    CREATININE 1.30 2024   Kidney function is stable.  Continue drinking adequate amount of water and avoiding nephrotoxic medications like Aleve, Advil, ibuprofen, Motrin.  He follows regularly with nephrology.      SUBJECTIVE:   Natasha Murphy is a 14 year old female presenting with a chief complaint of cough since July 14th, 2021. Patient also endorses chills, fatigue and sore throat. Patient's mother shares she has tried Robitussin and Sushil's as well as Tylenol for symptom management. Patient endorses dyspnea on exertion while playing sports.     Chief Complaint   Patient presents with     Cough     Started last wednesday, cogestion, no fever, chest hurts when she coughs, runny nose.     Review of Systems   Constitutional: Positive for chills and fatigue. Negative for appetite change and fever.   HENT: Positive for congestion, rhinorrhea, sneezing and sore throat. Negative for ear discharge, ear pain, sinus pressure, sinus pain, trouble swallowing and voice change.    Respiratory: Positive for cough. Negative for apnea, chest tightness, shortness of breath, wheezing and stridor.    All other systems reviewed and are negative.    No past medical history on file.  No family history on file.  No current outpatient medications on file.     Social History     Tobacco Use     Smoking status: Not on file   Substance Use Topics     Alcohol use: Not on file       OBJECTIVE  /75   Pulse 76   Temp 96.8  F (36  C) (Tympanic)   Wt 55.4 kg (122 lb 3.2 oz)   SpO2 98%     Physical Exam  Constitutional:       General: She is active. She is not in acute distress.     Appearance: Normal appearance. She is well-developed. She is not toxic-appearing.   HENT:      Right Ear: Tympanic membrane and ear canal normal. There is no impacted cerumen. Tympanic membrane is not erythematous or bulging.      Left Ear: Tympanic membrane and ear canal normal. There is no impacted cerumen. Tympanic membrane is not erythematous or bulging.      Nose: Congestion and rhinorrhea present.      Mouth/Throat:      Mouth: Mucous membranes are moist.      Pharynx: Oropharynx is clear. No oropharyngeal exudate or posterior oropharyngeal erythema.      History of colon cancer  He is s/p hemicolectomy in 2008.  He was cleared by GI.       History of prostate cancer  He is s/p prostate surgery.       Dyslipidemia  LDL is stable.  Not currently on a statin.       Iron deficiency anemia, unspecified iron deficiency anemia type  History of decreased MCV and MCHC.  Will obtain iron panel with 6-month labs.  He denies any unintentional weight loss, hematochezia, melena.  Orders:    Iron Panel (Includes Ferritin, Iron Sat%, Iron, and TIBC); Future    Need for COVID-19 vaccine  COVID vaccine given in office today.  Orders:    COVID-19 Pfizer mRNA vaccine 12 yr and older (Comirnaty pre-filled syringe)       Preventive health issues were discussed with patient, and age appropriate screening tests were ordered as noted in patient's After Visit Summary. Personalized health advice and appropriate referrals for health education or preventive services given if needed, as noted in patient's After Visit Summary.    History of Present Illness     Patient is an 84-year-old male that presents today for his annual Medicare wellness visit.  He has no new complaints today.    Health maintenance:  Labs and screenings up-to-date.       Patient Care Team:  Long Chandler MD as PCP - General (Internal Medicine)  Brian Pierre MD as Consulting Physician (Nephrology)  Anibal Gerber MD as Consulting Physician (Urology)  HUA Day as Nurse Practitioner (Internal Medicine)  Jv Toribio III, MD as Endoscopist    Review of Systems   Constitutional:  Negative for chills, fatigue and fever.   HENT:  Negative for ear discharge, ear pain, postnasal drip, rhinorrhea, sinus pressure, sinus pain, sore throat, tinnitus and trouble swallowing.    Eyes:  Negative for pain, discharge and itching.   Respiratory:  Negative for cough, shortness of breath and wheezing.    Cardiovascular:  Negative for chest pain, palpitations and leg swelling.   Gastrointestinal:  Negative for    Eyes:      General:         Right eye: No discharge.         Left eye: No discharge.      Extraocular Movements: Extraocular movements intact.      Conjunctiva/sclera: Conjunctivae normal.      Pupils: Pupils are equal, round, and reactive to light.   Cardiovascular:      Rate and Rhythm: Normal rate and regular rhythm.   Pulmonary:      Effort: Pulmonary effort is normal. No respiratory distress, nasal flaring or retractions.      Breath sounds: No stridor or decreased air movement. Wheezing present. No rales.   Neurological:      Mental Status: She is alert.       ASSESSMENT:    Medical Decision Making:   Viral Upper Respiratory Illness with Wheeze    PLAN:    Education provided on home-care treatment for viral upper respiratory infection with wheezing. Albuterol inhaler prescribed as needed for wheezing. Cough suppressants OTC and Tylenol for symptom relief.    Followup:    Return to be seen with new or worsening symptoms.    abdominal pain, constipation, diarrhea, nausea and vomiting.   Endocrine: Negative for polydipsia, polyphagia and polyuria.   Genitourinary:  Negative for difficulty urinating, frequency, hematuria and urgency.   Musculoskeletal:  Negative for arthralgias, joint swelling and myalgias.   Skin:  Negative for color change.   Allergic/Immunologic: Negative for environmental allergies.   Neurological:  Negative for dizziness, weakness, light-headedness, numbness and headaches.   Hematological:  Negative for adenopathy.   Psychiatric/Behavioral:  Negative for decreased concentration and sleep disturbance. The patient is not nervous/anxious.      Medical History Reviewed by provider this encounter:       Annual Wellness Visit Questionnaire   Darrell is here for his Subsequent Wellness visit. Last Medicare Wellness visit information reviewed, patient interviewed and updates made to the record today.      Health Risk Assessment:   Patient rates overall health as fair. Patient feels that their physical health rating is slightly worse. Patient is satisfied with their life. Eyesight was rated as same. Hearing was rated as slightly worse. Patient feels that their emotional and mental health rating is same. Patients states they are sometimes angry. Patient states they are never, rarely unusually tired/fatigued. Pain experienced in the last 7 days has been some. Patient's pain rating has been 5/10. Patient states that he has experienced no weight loss or gain in last 6 months.     Fall Risk Screening:   In the past year, patient has experienced: no history of falling in past year      Home Safety:  Patient does not have trouble with stairs inside or outside of their home. Patient has working smoke alarms and has working carbon monoxide detector. Home safety hazards include: none.     Nutrition:   Current diet is Regular.     Medications:   Patient is not currently taking any over-the-counter supplements. Patient is able to manage  medications.     Activities of Daily Living (ADLs)/Instrumental Activities of Daily Living (IADLs):   Walk and transfer into and out of bed and chair?: Yes  Dress and groom yourself?: Yes    Bathe or shower yourself?: Yes    Feed yourself? Yes  Do your laundry/housekeeping?: Yes  Manage your money, pay your bills and track your expenses?: Yes  Make your own meals?: Yes    Do your own shopping?: Yes    Durable Medical Equipment Suppliers  [employer] and [employer]    Previous Hospitalizations:   Any hospitalizations or ED visits within the last 12 months?: Yes    How many hospitalizations have you had in the last year?: 1-2    Hospitalization Comments: Pace Maker implanted    Advance Care Planning:   Living will: Yes    Durable POA for healthcare: Yes    Advanced directive: Yes    Advanced directive counseling given: Yes      PREVENTIVE SCREENINGS      Cardiovascular Screening:    General: Screening Current      Diabetes Screening:     General: Screening Not Indicated, History Diabetes and Screening Current      Colorectal Cancer Screening:     General: History Colorectal Cancer      Prostate Cancer Screening:    General: History Prostate Cancer and Screening Not Indicated      Osteoporosis Screening:    General: Screening Not Indicated      Abdominal Aortic Aneurysm (AAA) Screening:    Risk factors include: tobacco use        Lung Cancer Screening:     General: Screening Not Indicated      Hepatitis C Screening:    General: Screening Current    Screening, Brief Intervention, and Referral to Treatment (SBIRT)    Screening  Typical number of drinks in a day: 0  Typical number of drinks in a week: 0  Interpretation: Low risk drinking behavior.    AUDIT-C Screenin) How often did you have a drink containing alcohol in the past year? never  2) How many drinks did you have on a typical day when you were drinking in the past year? 0  3) How often did you have 6 or more drinks on one occasion in the past year?  "never    AUDIT-C Score: 0  Interpretation: Score 0-3 (male): Negative screen for alcohol misuse    Single Item Drug Screening:  How often have you used an illegal drug (including marijuana) or a prescription medication for non-medical reasons in the past year? never    Single Item Drug Screen Score: 0  Interpretation: Negative screen for possible drug use disorder    Social Determinants of Health     Financial Resource Strain: Low Risk  (1/24/2023)    Overall Financial Resource Strain (CARDIA)     Difficulty of Paying Living Expenses: Not hard at all   Food Insecurity: No Food Insecurity (9/5/2024)    Hunger Vital Sign     Worried About Running Out of Food in the Last Year: Never true     Ran Out of Food in the Last Year: Never true   Transportation Needs: No Transportation Needs (9/5/2024)    PRAPARE - Transportation     Lack of Transportation (Medical): No     Lack of Transportation (Non-Medical): No   Housing Stability: Low Risk  (9/5/2024)    Housing Stability Vital Sign     Unable to Pay for Housing in the Last Year: No     Number of Times Moved in the Last Year: 1     Homeless in the Last Year: No   Utilities: Not At Risk (9/5/2024)    [facility] Utilities     Threatened with loss of utilities: No     No results found.    Objective     /78 (BP Location: Left arm, Patient Position: Sitting, Cuff Size: Large)   Pulse (!) 109   Ht 6' 1\" (1.854 m)   Wt 107 kg (235 lb)   SpO2 (!) 89%   BMI 31.00 kg/m²     Physical Exam  Vitals and nursing note reviewed.   Constitutional:       General: He is awake.      Appearance: Normal appearance. He is well-developed, well-groomed and overweight.   HENT:      Head: Normocephalic and atraumatic.      Right Ear: Hearing and external ear normal.      Left Ear: Hearing and external ear normal.      Nose: Nose normal.      Mouth/Throat:      Lips: Pink.      Mouth: Mucous membranes are moist.   Eyes:      General: Lids are normal. Vision grossly intact. Gaze aligned " appropriately.      Conjunctiva/sclera: Conjunctivae normal.   Neck:      Vascular: No carotid bruit.      Trachea: Trachea and phonation normal.   Cardiovascular:      Rate and Rhythm: Regular rhythm. Tachycardia present.      Heart sounds: Normal heart sounds, S1 normal and S2 normal. No murmur heard.     No friction rub. No gallop.   Pulmonary:      Effort: Pulmonary effort is normal.      Breath sounds: Normal breath sounds. Decreased air movement present. No decreased breath sounds, wheezing, rhonchi or rales.   Abdominal:      General: Abdomen is protuberant.   Musculoskeletal:      Cervical back: Neck supple.      Right lower leg: No edema.      Left lower leg: No edema.   Skin:     General: Skin is warm.      Capillary Refill: Capillary refill takes less than 2 seconds.   Neurological:      Mental Status: He is alert.   Psychiatric:         Attention and Perception: Attention and perception normal.         Mood and Affect: Mood and affect normal.         Speech: Speech normal.         Behavior: Behavior normal. Behavior is cooperative.         Thought Content: Thought content normal.         Cognition and Memory: Cognition and memory normal.         Judgment: Judgment normal.

## 2024-09-12 NOTE — PATIENT INSTRUCTIONS
Medicare Preventive Visit Patient Instructions  Thank you for completing your Welcome to Medicare Visit or Medicare Annual Wellness Visit today. Your next wellness visit will be due in one year (9/13/2025).  The screening/preventive services that you may require over the next 5-10 years are detailed below. Some tests may not apply to you based off risk factors and/or age. Screening tests ordered at today's visit but not completed yet may show as past due. Also, please note that scanned in results may not display below.  Preventive Screenings:  Service Recommendations Previous Testing/Comments   Colorectal Cancer Screening  Colonoscopy    Fecal Occult Blood Test (FOBT)/Fecal Immunochemical Test (FIT)  Fecal DNA/Cologuard Test  Flexible Sigmoidoscopy Age: 45-75 years old   Colonoscopy: every 10 years (May be performed more frequently if at higher risk)  OR  FOBT/FIT: every 1 year  OR  Cologuard: every 3 years  OR  Sigmoidoscopy: every 5 years  Screening may be recommended earlier than age 45 if at higher risk for colorectal cancer. Also, an individualized decision between you and your healthcare provider will decide whether screening between the ages of 76-85 would be appropriate. Colonoscopy: 07/21/2008  FOBT/FIT: Not on file  Cologuard: Not on file  Sigmoidoscopy: Not on file    History Colorectal Cancer     Prostate Cancer Screening Individualized decision between patient and health care provider in men between ages of 55-69   Medicare will cover every 12 months beginning on the day after your 50th birthday PSA: <0.1 ng/mL     History Prostate Cancer  Screening Not Indicated     Hepatitis C Screening Once for adults born between 1945 and 1965  More frequently in patients at high risk for Hepatitis C Hep C Antibody: 01/17/2023    Screening Current   Diabetes Screening 1-2 times per year if you're at risk for diabetes or have pre-diabetes Fasting glucose: 105 mg/dL (7/8/2024)  A1C: 6.0 % (7/8/2024)  Screening Not  Indicated  History Diabetes   Cholesterol Screening Once every 5 years if you don't have a lipid disorder. May order more often based on risk factors. Lipid panel: 07/08/2024  Screening Current      Other Preventive Screenings Covered by Medicare:  Abdominal Aortic Aneurysm (AAA) Screening: covered once if your at risk. You're considered to be at risk if you have a family history of AAA or a male between the age of 65-75 who smoking at least 100 cigarettes in your lifetime.  Lung Cancer Screening: covers low dose CT scan once per year if you meet all of the following conditions: (1) Age 55-77; (2) No signs or symptoms of lung cancer; (3) Current smoker or have quit smoking within the last 15 years; (4) You have a tobacco smoking history of at least 20 pack years (packs per day x number of years you smoked); (5) You get a written order from a healthcare provider.  Glaucoma Screening: covered annually if you're considered high risk: (1) You have diabetes OR (2) Family history of glaucoma OR (3)  aged 50 and older OR (4)  American aged 65 and older  Osteoporosis Screening: covered every 2 years if you meet one of the following conditions: (1) Have a vertebral abnormality; (2) On glucocorticoid therapy for more than 3 months; (3) Have primary hyperparathyroidism; (4) On osteoporosis medications and need to assess response to drug therapy.  HIV Screening: covered annually if you're between the age of 15-65. Also covered annually if you are younger than 15 and older than 65 with risk factors for HIV infection. For pregnant patients, it is covered up to 3 times per pregnancy.    Immunizations:  Immunization Recommendations   Influenza Vaccine Annual influenza vaccination during flu season is recommended for all persons aged >= 6 months who do not have contraindications   Pneumococcal Vaccine   * Pneumococcal conjugate vaccine = PCV13 (Prevnar 13), PCV15 (Vaxneuvance), PCV20 (Prevnar 20)  *  Pneumococcal polysaccharide vaccine = PPSV23 (Pneumovax) Adults 19-65 yo with certain risk factors or if 65+ yo  If never received any pneumonia vaccine: recommend Prevnar 20 (PCV20)  Give PCV20 if previously received 1 dose of PCV13 or PPSV23   Hepatitis B Vaccine 3 dose series if at intermediate or high risk (ex: diabetes, end stage renal disease, liver disease)   Respiratory syncytial virus (RSV) Vaccine - COVERED BY MEDICARE PART D  * RSVPreF3 (Arexvy) CDC recommends that adults 60 years of age and older may receive a single dose of RSV vaccine using shared clinical decision-making (SCDM)   Tetanus (Td) Vaccine - COST NOT COVERED BY MEDICARE PART B Following completion of primary series, a booster dose should be given every 10 years to maintain immunity against tetanus. Td may also be given as tetanus wound prophylaxis.   Tdap Vaccine - COST NOT COVERED BY MEDICARE PART B Recommended at least once for all adults. For pregnant patients, recommended with each pregnancy.   Shingles Vaccine (Shingrix) - COST NOT COVERED BY MEDICARE PART B  2 shot series recommended in those 19 years and older who have or will have weakened immune systems or those 50 years and older     Health Maintenance Due:      Topic Date Due   • Hepatitis C Screening  Completed     Immunizations Due:      Topic Date Due   • Hepatitis A Vaccine (1 of 2 - Risk 2-dose series) Never done   • COVID-19 Vaccine (7 - 2023-24 season) 09/01/2024   • Influenza Vaccine (1) 09/01/2024     Advance Directives   What are advance directives?  Advance directives are legal documents that state your wishes and plans for medical care. These plans are made ahead of time in case you lose your ability to make decisions for yourself. Advance directives can apply to any medical decision, such as the treatments you want, and if you want to donate organs.   What are the types of advance directives?  There are many types of advance directives, and each state has rules about  how to use them. You may choose a combination of any of the following:  Living will:  This is a written record of the treatment you want. You can also choose which treatments you do not want, which to limit, and which to stop at a certain time. This includes surgery, medicine, IV fluid, and tube feedings.   Durable power of  for healthcare (DPAHC):  This is a written record that states who you want to make healthcare choices for you when you are unable to make them for yourself. This person, called a proxy, is usually a family member or a friend. You may choose more than 1 proxy.  Do not resuscitate (DNR) order:  A DNR order is used in case your heart stops beating or you stop breathing. It is a request not to have certain forms of treatment, such as CPR. A DNR order may be included in other types of advance directives.  Medical directive:  This covers the care that you want if you are in a coma, near death, or unable to make decisions for yourself. You can list the treatments you want for each condition. Treatment may include pain medicine, surgery, blood transfusions, dialysis, IV or tube feedings, and a ventilator (breathing machine).  Values history:  This document has questions about your views, beliefs, and how you feel and think about life. This information can help others choose the care that you would choose.  Why are advance directives important?  An advance directive helps you control your care. Although spoken wishes may be used, it is better to have your wishes written down. Spoken wishes can be misunderstood, or not followed. Treatments may be given even if you do not want them. An advance directive may make it easier for your family to make difficult choices about your care.   Weight Management   Why it is important to manage your weight:  Being overweight increases your risk of health conditions such as heart disease, high blood pressure, type 2 diabetes, and certain types of cancer. It can  also increase your risk for osteoarthritis, sleep apnea, and other respiratory problems. Aim for a slow, steady weight loss. Even a small amount of weight loss can lower your risk of health problems.  How to lose weight safely:  A safe and healthy way to lose weight is to eat fewer calories and get regular exercise. You can lose up about 1 pound a week by decreasing the number of calories you eat by 500 calories each day.   Healthy meal plan for weight management:  A healthy meal plan includes a variety of foods, contains fewer calories, and helps you stay healthy. A healthy meal plan includes the following:  Eat whole-grain foods more often.  A healthy meal plan should contain fiber. Fiber is the part of grains, fruits, and vegetables that is not broken down by your body. Whole-grain foods are healthy and provide extra fiber in your diet. Some examples of whole-grain foods are whole-wheat breads and pastas, oatmeal, brown rice, and bulgur.  Eat a variety of vegetables every day.  Include dark, leafy greens such as spinach, kale, june greens, and mustard greens. Eat yellow and orange vegetables such as carrots, sweet potatoes, and winter squash.   Eat a variety of fruits every day.  Choose fresh or canned fruit (canned in its own juice or light syrup) instead of juice. Fruit juice has very little or no fiber.  Eat low-fat dairy foods.  Drink fat-free (skim) milk or 1% milk. Eat fat-free yogurt and low-fat cottage cheese. Try low-fat cheeses such as mozzarella and other reduced-fat cheeses.  Choose meat and other protein foods that are low in fat.  Choose beans or other legumes such as split peas or lentils. Choose fish, skinless poultry (chicken or turkey), or lean cuts of red meat (beef or pork). Before you cook meat or poultry, cut off any visible fat.   Use less fat and oil.  Try baking foods instead of frying them. Add less fat, such as margarine, sour cream, regular salad dressing and mayonnaise to foods.  Eat fewer high-fat foods. Some examples of high-fat foods include french fries, doughnuts, ice cream, and cakes.  Eat fewer sweets.  Limit foods and drinks that are high in sugar. This includes candy, cookies, regular soda, and sweetened drinks.  Exercise:  Exercise at least 30 minutes per day on most days of the week. Some examples of exercise include walking, biking, dancing, and swimming. You can also fit in more physical activity by taking the stairs instead of the elevator or parking farther away from stores. Ask your healthcare provider about the best exercise plan for you.      © Copyright [employer] 2018 Information is for End User's use only and may not be sold, redistributed or otherwise used for commercial purposes. All illustrations and images included in CareNotes® are the copyrighted property of A.D.A.M., Inc. or [employer]

## 2024-09-12 NOTE — ASSESSMENT & PLAN NOTE
He is s/p dual-chamber pacemaker placement.  He is following with cardiology.  Heart rate in office today is 80.

## 2024-09-12 NOTE — ASSESSMENT & PLAN NOTE
Currently on 2 L of oxygen.  He denies any cough or wheezing. He just finished pulmonary rehab.  He is able to climb a flight of stairs without being short of breath.  He is currently on Breztri inhaler daily.  He is following with pulmonology.

## 2024-09-12 NOTE — ASSESSMENT & PLAN NOTE
Lab Results   Component Value Date    EGFR 49 07/08/2024    EGFR 59 05/06/2024    EGFR 51 05/02/2024    CREATININE 1.33 (H) 07/08/2024    CREATININE 1.15 05/06/2024    CREATININE 1.30 05/02/2024   Kidney function is stable.  Continue drinking adequate amount of water and avoiding nephrotoxic medications like Aleve, Advil, ibuprofen, Motrin.  He follows regularly with nephrology.

## 2024-09-12 NOTE — ASSESSMENT & PLAN NOTE
A1c is 6.  Currently controlled with diet.  No home sugars.  Lab Results   Component Value Date    HGBA1C 6.0 (H) 07/08/2024

## 2024-09-12 NOTE — ASSESSMENT & PLAN NOTE
BP in office is 136/78. Home BP's are around there. He limits salt intake to <2,000 mg daily.

## 2024-10-15 ENCOUNTER — OFFICE VISIT (OUTPATIENT)
Dept: CARDIOLOGY CLINIC | Facility: CLINIC | Age: 84
End: 2024-10-15
Payer: COMMERCIAL

## 2024-10-15 VITALS
DIASTOLIC BLOOD PRESSURE: 84 MMHG | SYSTOLIC BLOOD PRESSURE: 148 MMHG | BODY MASS INDEX: 31.94 KG/M2 | HEIGHT: 73 IN | OXYGEN SATURATION: 94 % | WEIGHT: 241 LBS | RESPIRATION RATE: 16 BRPM | HEART RATE: 85 BPM

## 2024-10-15 DIAGNOSIS — N18.32 TYPE 2 DIABETES MELLITUS WITH STAGE 3B CHRONIC KIDNEY DISEASE, WITHOUT LONG-TERM CURRENT USE OF INSULIN (HCC): ICD-10-CM

## 2024-10-15 DIAGNOSIS — I10 PRIMARY HYPERTENSION: ICD-10-CM

## 2024-10-15 DIAGNOSIS — I44.2 COMPLETE HEART BLOCK (HCC): Primary | ICD-10-CM

## 2024-10-15 DIAGNOSIS — E11.22 TYPE 2 DIABETES MELLITUS WITH STAGE 3B CHRONIC KIDNEY DISEASE, WITHOUT LONG-TERM CURRENT USE OF INSULIN (HCC): ICD-10-CM

## 2024-10-15 DIAGNOSIS — J43.2 CENTRILOBULAR EMPHYSEMA (HCC): ICD-10-CM

## 2024-10-15 DIAGNOSIS — J96.11 CHRONIC HYPOXEMIC RESPIRATORY FAILURE (HCC): ICD-10-CM

## 2024-10-15 PROCEDURE — 99214 OFFICE O/P EST MOD 30 MIN: CPT | Performed by: INTERNAL MEDICINE

## 2024-10-29 ENCOUNTER — TELEPHONE (OUTPATIENT)
Dept: CARDIOLOGY CLINIC | Facility: CLINIC | Age: 84
End: 2024-10-29

## 2024-10-29 NOTE — TELEPHONE ENCOUNTER
----- Message from Fredrick Bernal MD sent at 10/29/2024 11:02 AM EDT -----  Please have patient see any provider soon to discuss atrial fibrillation and anticoagulation.  ----- Message -----  From: Janina Hightower MD  Sent: 10/28/2024   1:34 PM EDT  To: Fredrick Bernal MD    Normal device function  Patient found to be in atrial fibrillation.    Battery status is good.

## 2024-11-04 ENCOUNTER — OFFICE VISIT (OUTPATIENT)
Dept: CARDIOLOGY CLINIC | Facility: CLINIC | Age: 84
End: 2024-11-04
Payer: COMMERCIAL

## 2024-11-04 VITALS
OXYGEN SATURATION: 95 % | RESPIRATION RATE: 16 BRPM | BODY MASS INDEX: 31.68 KG/M2 | DIASTOLIC BLOOD PRESSURE: 84 MMHG | HEART RATE: 87 BPM | HEIGHT: 73 IN | WEIGHT: 239 LBS | SYSTOLIC BLOOD PRESSURE: 140 MMHG

## 2024-11-04 DIAGNOSIS — N18.32 TYPE 2 DIABETES MELLITUS WITH STAGE 3B CHRONIC KIDNEY DISEASE, WITHOUT LONG-TERM CURRENT USE OF INSULIN (HCC): ICD-10-CM

## 2024-11-04 DIAGNOSIS — I35.9 AORTIC VALVE DISORDER: ICD-10-CM

## 2024-11-04 DIAGNOSIS — I47.29 NSVT (NONSUSTAINED VENTRICULAR TACHYCARDIA) (HCC): ICD-10-CM

## 2024-11-04 DIAGNOSIS — E11.22 TYPE 2 DIABETES MELLITUS WITH STAGE 3B CHRONIC KIDNEY DISEASE, WITHOUT LONG-TERM CURRENT USE OF INSULIN (HCC): ICD-10-CM

## 2024-11-04 DIAGNOSIS — I10 PRIMARY HYPERTENSION: ICD-10-CM

## 2024-11-04 DIAGNOSIS — Z95.0 PRESENCE OF PERMANENT CARDIAC PACEMAKER: ICD-10-CM

## 2024-11-04 DIAGNOSIS — I48.91 NEW ONSET A-FIB (HCC): Primary | ICD-10-CM

## 2024-11-04 DIAGNOSIS — I10 ESSENTIAL (PRIMARY) HYPERTENSION: ICD-10-CM

## 2024-11-04 DIAGNOSIS — I44.2 COMPLETE HEART BLOCK (HCC): ICD-10-CM

## 2024-11-04 PROCEDURE — 93000 ELECTROCARDIOGRAM COMPLETE: CPT | Performed by: NURSE PRACTITIONER

## 2024-11-04 PROCEDURE — 99214 OFFICE O/P EST MOD 30 MIN: CPT | Performed by: NURSE PRACTITIONER

## 2024-11-04 NOTE — PROGRESS NOTES
Cardiology Office Note    Darrell Mayer 84 y.o. male MRN: [id number]    11/04/24          Assessment/Plan:    1.  New onset paroxysmal atrial fibrillation  - Noted on recent pacemaker interrogation  - EKG performed today shows patient is in atrial fibrillation with rate in the 60s  -Discussed pathophysiology in detail with patient and wife, continue metoprolol succinate  - Will start Eliquis 5 mg twice daily, instructed patient to notify the office if cost is unaffordable    2.  Complete heart block s/p Medtronic pacemaker implantation  - Recent interrogation revealed atrial fibrillation, see plan as above  - Continue to follow-up with device clinic    3.  Hypertension  - Blood pressure overall well-controlled with noted pain, losartan, metoprolol succinate  - Continue ambulatory blood pressure monitoring and low-sodium diet    4.  Coronary artery calcifications  - Continue to monitor symptoms concerning for angina    5.  CKD stage III  - Continue to follow-up with nephrology    6. COPD  -Continue supplemental O2    Follow up: 2 months or sooner as needed    1. New onset a-fib (HCC)  POCT ECG    apixaban (Eliquis) 5 mg      2. Complete heart block (HCC)        3. Primary hypertension        4. Type 2 diabetes mellitus with stage 3b chronic kidney disease, without long-term current use of insulin (HCC)        5. Essential (primary) hypertension        6. Presence of permanent cardiac pacemaker        7. NSVT (nonsustained ventricular tachycardia) (HCC)        8. Aortic valve disorder            HPI: Darrell Mayer is a 84 y.o. year old male with a past medical history of COPD with chronic respiratory failure on supplemental O2, complete heart block s/p Medtronic dual-chamber pacemaker implantation, coronary artery calcifications, mild to moderate aortic regurgitation, hypertension and CKD stage III who presents for office appointment for new onset atrial fibrillation.  His most recent pacemaker interrogation performed at  the end of October 2024 revealed new onset atrial fibrillation.  Today an EKG was performed and revealed ongoing atrial fibrillation.   The findings noted on his pacemaker interrogation as well as the pathophysiology of atrial fibrillation was discussed in detail with the patient and his wife as well as the risk of stroke and the need for anticoagulation.  The risk and benefits of anticoagulation were reviewed with both of them and the patient agreed to start Eliquis 5 mg twice daily.  A prescription was sent to his local [facility] pharmacy and he was advised that if it is unaffordable he should notify the office right away.  He was also supplied with 2 weeks of Eliquis.  He reports that he is asymptomatic with his atrial fibrillation/flutter.    He had a recent TTE performed in May 2024 that showed normal systolic function with an EF of 55% with no regional wall motion abnormalities and normal diastolic function.  Also noted was mild to moderate aortic valve regurgitation.    He denies chest pain, dyspnea on exertion or rest above his baseline (chronic respiratory failure), lower extremity edema, lightheadedness, dizziness, syncopal episodes, or palpitations and was instructed to call  the office or seek medical attention if any such symptoms develop.     All medications reviewed and patient is tolerating medications without side effects. Refills were sent if needed.     EKG-atrial fibrillation with a rate in the 60s    Patient Active Problem List   Diagnosis    Centrilobular emphysema (HCC)    Hypertension    Post-tussive syncope    Stage 3 chronic kidney disease (HCC)    Type 2 diabetes mellitus with chronic kidney disease, without long-term current use of insulin (HCC)    Chronic hypoxemic respiratory failure (HCC)    Hypertensive kidney disease with stage 3 chronic kidney disease (HCC)    History of colon cancer    Change in bowel habits    Vitamin D deficiency    History of prostate cancer    Prostate cancer (HCC)     Obesity, morbid (HCC)    Hx of prostatectomy    Status post insertion of spinal cord stimulator    Lumbar radiculopathy    Continuous opioid dependence (HCC)    COPD, severe (HCC)    Complete heart block (HCC)    Chronic pain disorder    Dyslipidemia    Disc degeneration, lumbar    Herniated lumbar intervertebral disc    Lumbar spondylosis    Vitamin B12 deficiency       No Known Allergies      Current Outpatient Medications:     acetaminophen-codeine (TYLENOL with CODEINE #4) 300-60 MG per tablet, Take 1 tablet by mouth every 6 (six) hours as needed for moderate pain, Disp: 60 tablet, Rfl: 0    albuterol (2.5 mg/3 mL) 0.083 % nebulizer solution, USE 1 VIAL IN NEBULIZER EVERY 6 HOURS, Disp: 360 mL, Rfl: 1    allopurinol (ZYLOPRIM) 100 mg tablet, Take 1 tablet (100 mg total) by mouth daily, Disp: 90 tablet, Rfl: 3    amLODIPine (NORVASC) 10 mg tablet, Take 1 tablet (10 mg total) by mouth every morning, Disp: 90 tablet, Rfl: 1    apixaban (Eliquis) 5 mg, Take 1 tablet (5 mg total) by mouth 2 (two) times a day, Disp: 60 tablet, Rfl: 3    Budeson-Glycopyrrol-Formoterol (Breztri Aerosphere) 160-9-4.8 MCG/ACT AERO, Inhale 2 puffs 2 (two) times a day Rinse mouth after use., Disp: 31.1 g, Rfl: 3    cholecalciferol (VITAMIN D3) 1,000 units tablet, Take 5 tablets (5,000 Units total) by mouth every other day, Disp: , Rfl:     losartan (COZAAR) 100 MG tablet, Take 1 tablet (100 mg total) by mouth daily, Disp: 90 tablet, Rfl: 0    metoprolol succinate (TOPROL-XL) 25 mg 24 hr tablet, Take 1 tablet (25 mg total) by mouth daily, Disp: 90 tablet, Rfl: 3    Polyvinyl Alcohol-Povidone (REFRESH OP), Apply to eye as needed , Disp: , Rfl:     prochlorperazine (COMPAZINE) 10 mg tablet, Take 1 tablet (10 mg total) by mouth every 6 (six) hours as needed for nausea or vomiting, Disp: 30 tablet, Rfl: 0    Vitamins-Lipotropics (LIPO-FLAVONOID PLUS PO), Take by mouth, Disp: , Rfl:     Past Medical History:   Diagnosis Date    Acute  bronchitis 01/02/2018    Arthralgia of left knee 04/25/2024    Arthritis of hand 09/11/2024    Back pain     Bronchiectasis (HCC)     Cataract     Changes in skin texture 05/20/2014    Chronic kidney disease     COPD (chronic obstructive pulmonary disease) (HCC)     Elevated white blood cell count 12/12/2017    Esophageal reflux 02/07/2014    Fissure, anal 05/08/2015    Generalized osteoarthritis of multiple sites 06/09/2015    Gout 12/16/2014    Hand pain 12/16/2014    History of malignant neoplasm of oral cavity     M0    HL (hearing loss)     Hypertension     Inflammation of sacroiliac joint (HCC) 09/11/2024    Iron deficiency anemia 02/07/2014    Kidney stone 05/2019    Knee pain, right 07/18/2017    Low back pain 04/25/2019 04/25/2019 - Lifting Exercise Ball with Feet.      Lung nodule 01/02/2015    Malignant neoplasm of colon (HCC)     descending    Nausea 07/01/2014    Nonhealing surgical wound 06/15/2021    Prostate cancer (HCC)     Renal mass, right 01/02/2015    SOB (shortness of breath)     Tachycardia     Thalassemia trait     Tinnitus     Weight loss, non-intentional 12/23/2014       Family History   Problem Relation Age of Onset    Heart failure Mother         as per [employer]    Coronary artery disease Mother         as per [employer]    Diabetes Mother         mellitus - as per [employer]    Coronary artery disease Father         as per [employer]    Cancer Sister         malignant neoplasm       Past Surgical History:   Procedure Laterality Date    BACK SURGERY      CARDIAC ELECTROPHYSIOLOGY PROCEDURE N/A 5/2/2024    Procedure: Cardiac pacer implant;  Surgeon: Fredrick Bernal MD;  Location: [facility];  Service: Cardiology    CHOLECYSTECTOMY      COLON SURGERY  10/2008    partial colectomy    EYE SURGERY      INCISIONAL HERNIA REPAIR      JOINT REPLACEMENT Right     hip    MT COLONOSCOPY FLX DX W/COLLJ SPEC WHEN PFRMD N/A 07/23/2018    Procedure: COLONOSCOPY;  Surgeon: Jv SANTIAGO  Catarino FRAGA MD;  Location: [facility];  Service: Gastroenterology    PROSTATE SURGERY      SPINAL CORD STIMULATOR IMPLANT      TONSILLECTOMY         Social History     Socioeconomic History    Marital status: /Civil Union     Spouse name: Not on file    Number of children: Not on file    Years of education: Not on file    Highest education level: Not on file   Occupational History    Occupation: [employer]   Tobacco Use    Smoking status: Former     Current packs/day: 0.00     Average packs/day: 0.3 packs/day for 44.4 years (11.1 ttl pk-yrs)     Types: Cigarettes     Start date: 1960     Quit date: 2004     Years since quittin.4    Smokeless tobacco: Never    Tobacco comments:     non smoker/tobacco user; quit  as per [employer]   Vaping Use    Vaping status: Never Used   Substance and Sexual Activity    Alcohol use: Not Currently    Drug use: No    Sexual activity: Not Currently     Partners: Female   Other Topics Concern    Not on file   Social History Narrative    Active directive    Living independently with spouse    Hx of Living will on file     Social Determinants of Health     Financial Resource Strain: Low Risk  (2023)    Overall Financial Resource Strain (CARDIA)     Difficulty of Paying Living Expenses: Not hard at all   Food Insecurity: No Food Insecurity (2024)    Nursing - Inadequate Food Risk Classification     Worried About Running Out of Food in the Last Year: Never true     Ran Out of Food in the Last Year: Never true     Ran Out of Food in the Last Year: Not on file   Transportation Needs: No Transportation Needs (2024)    PRAPARE - Transportation     Lack of Transportation (Medical): No     Lack of Transportation (Non-Medical): No   Physical Activity: Inactive (2023)    Exercise Vital Sign     Days of Exercise per Week: 0 days     Minutes of Exercise per Session: 0 min   Stress: No Stress Concern Present (2021)    [employer] -  "Occupational Stress Questionnaire     Feeling of Stress : Not at all   Social Connections: Unknown (6/18/2024)    Received from [facility]     How often do you feel lonely or isolated from those around you? (Adult - for ages 18 years and over): Not on file   Intimate Partner Violence: Not on file   Housing Stability: Low Risk  (9/5/2024)    Housing Stability Vital Sign     Unable to Pay for Housing in the Last Year: No     Number of Times Moved in the Last Year: 1     Homeless in the Last Year: No       Review of symptoms:   Constitution:  Negative  HEENT:  Negative  Cardiovascular:  Negative  Respiratory:  Negative  Skin:  Negative  Gastrointestinal:  Negative  Genitourinary:  Negative  Musculoskeletal:  Negative  Neurological:  Negative  Endocrine:  Negative  Psychological:  Negative    Vitals: /84 (BP Location: Left arm, Patient Position: Sitting, Cuff Size: Large)   Pulse 87   Resp 16   Ht 6' 1\" (1.854 m)   Wt 108 kg (239 lb)   SpO2 95%   BMI 31.53 kg/m²         Physical Exam:     GEN: Alert and oriented x 3, in no acute distress.  Well appearing and well nourished.   HEENT: Sclera anicteric, conjunctivae pink, mucous membranes moist.   NECK: Supple, no carotid bruits, no significant JVD. Trachea midline.  HEART: Regular rhythm, normal S1 and S2, no murmurs, clicks, gallops or rubs.   LUNGS: Clear to auscultation bilaterally; no wheezes, rales, or rhonchi. No increased work of breathing or signs of respiratory distress.   ABDOMEN: Soft, nontender, nondistended, normoactive bowel sounds.   EXTREMITIES: Skin warm and well perfused, no clubbing, cyanosis, or edema.  NEURO: No focal findings. Normal speech. Mood and affect normal.   SKIN: Normal without suspicious lesions on exposed skin.    "

## 2024-11-17 DIAGNOSIS — M54.16 LUMBAR RADICULOPATHY: ICD-10-CM

## 2024-11-19 RX ORDER — ACETAMINOPHEN AND CODEINE PHOSPHATE 300; 60 MG/1; MG/1
1 TABLET ORAL EVERY 6 HOURS PRN
Qty: 60 TABLET | Refills: 0 | Status: SHIPPED | OUTPATIENT
Start: 2024-11-19

## 2024-11-25 DIAGNOSIS — I10 HYPERTENSION, UNSPECIFIED TYPE: ICD-10-CM

## 2024-11-25 DIAGNOSIS — J44.9 COPD, SEVERE (HCC): ICD-10-CM

## 2024-11-25 RX ORDER — BUDESONIDE, GLYCOPYRROLATE, AND FORMOTEROL FUMARATE 160; 9; 4.8 UG/1; UG/1; UG/1
2 AEROSOL, METERED RESPIRATORY (INHALATION) 2 TIMES DAILY
Qty: 31.1 G | Refills: 5 | Status: SHIPPED | OUTPATIENT
Start: 2024-11-25

## 2024-11-26 RX ORDER — LOSARTAN POTASSIUM 100 MG/1
100 TABLET ORAL DAILY
Qty: 90 TABLET | Refills: 1 | Status: SHIPPED | OUTPATIENT
Start: 2024-11-26

## 2024-12-02 ENCOUNTER — RESULTS FOLLOW-UP (OUTPATIENT)
Dept: CARDIOLOGY CLINIC | Facility: CLINIC | Age: 84
End: 2024-12-02

## 2024-12-02 ENCOUNTER — REMOTE DEVICE CLINIC VISIT (OUTPATIENT)
Dept: CARDIOLOGY CLINIC | Facility: CLINIC | Age: 84
End: 2024-12-02
Payer: COMMERCIAL

## 2024-12-02 DIAGNOSIS — I44.2 CHB (COMPLETE HEART BLOCK) (HCC): Primary | ICD-10-CM

## 2024-12-02 DIAGNOSIS — I48.91 ATRIAL FIBRILLATION, UNSPECIFIED TYPE (HCC): ICD-10-CM

## 2024-12-02 PROCEDURE — 93296 REM INTERROG EVL PM/IDS: CPT | Performed by: INTERNAL MEDICINE

## 2024-12-02 PROCEDURE — 93294 REM INTERROG EVL PM/LDLS PM: CPT | Performed by: INTERNAL MEDICINE

## 2024-12-02 NOTE — PROGRESS NOTES
Results for orders placed or performed in visit on 12/02/24   Cardiac EP device report    Narrative    MDT DC PM/ACTIVE SYSTEM IS MRI CONDITIONAL  CARELINK TRANSMISSION: BATTERY VOLTAGE ADEQUATE (12.6 YRS). AP: <0.1%. : 99.5% (>40%~MVP-OFF~CHB). ALL AVAILABLE LEAD PARAMETERS WITHIN NORMAL LIMITS. 1 AT/AF EPISODE W/ AF IN PROGRESS (HX OF SAME), AF BURDEN: 100%. PT TAKES ELIQUIS, METOPROLOL SUCC. EF: 55% (ECHO 5/2/24). NORMAL DEVICE FUNCTION. CH         Subjective:  Arlet Lamar is a 60 year old female who was admitted on 12/4/2017 with osteopathic his left knee, underwent left cemented knee arthroplasty yesterday.    On evaluation, is awake, sitting in chair. She just worked with therapy. Some pain 6/10 at the surgical site. No nausea or vomiting. Tolerating oral diet. Passing flatus but no bowel movements. No dizziness.     Objective:  Temp:  [97.6 °F (36.4 °C)-99.2 °F (37.3 °C)] 97.6 °F (36.4 °C)  Pulse:  [] 94  Resp:  [16-20] 18  BP: ()/(52-70) 110/70  FiO2 (%):  [21 %-96.3 %] 21 %  Visit Vitals  /70 (BP Location: [facility] [place], Patient Position: Supine)   Pulse 94   Temp 97.6 °F (36.4 °C) (Oral)   Resp 18   Ht 5' 7\" (1.702 m)   Wt 98.3 kg   SpO2 94%   BMI 33.94 kg/m²       Intake/Output Summary (Last 24 hours) at 12/05/17 1108  Last data filed at 12/05/17 0500   Gross per 24 hour   Intake          2336.44 ml   Output             1510 ml   Net           826.44 ml     Gen: Awake, alert.  Eyes: Pale conjunctiva.  ENT: No nasal erythema, moist oral mucosa.  Neck: Supple with no JVD or thyromegaly. Trachea midline. No cervical or supracavicular lymphadenopathy.  Cardiovascular: S1, S2 regular, no S3. No murmurs or gallop.   Resp: Lungs clear to auscultation bilaterally. Respiratory effort adequate. No wheezing/ronchi/rales appreciated.  GI: Soft, non-tender. No hepatosplenomegaly, bowel sounds audible.  Extremities: Left knee postoperative. No pedal edema noted. No calf tenderness. Pedal pulses palpable bilaterally.  Skin: warm and moist. No rashes noted.  Neuro: Alert and oriented  x 3. Cranial nerves 2-12 intact. No gross neuro deficits.   Psych: Pleasant. Normal affect.    Labs:     Recent Labs  Lab 12/05/17  0448   SODIUM 143   POTASSIUM 4.3   CHLORIDE 110*   CO2 26   BUN 17   CREATININE 1.01*   GLUCOSE 163*         Recent Labs  Lab 12/05/17  0448 12/04/17  1736   HGB 11.1* 12.6   HCT 34.6* 40.2       I/O last 3 completed shifts:  In: 2336.4  [P.O.:1240; I.V.:1096.4]  Out: 1510 [Urine:1050; Drains:460]      Assessment/Plan:  Arlet Lamar is and 60 year old female who was admitted on 12/4/2017 with a chief complaint of DJD left knee.     1. DJD left knee. Patient is post left knee cement arthroplasty. Continue orthopedic surgery follow-up, therapy. Possible discharge today.  2. Hypotension. Likely secondary to medications. Remains asymptomatic. Encouraged oral fluids.  3. Continue incentive spirometry.  4. Diabetes mellitus type 2. Accu-Cheks reasonably controlled, on insulin Lantus along with sliding for now. Counseled patient about need for closely monitoring her Accu-Cheks at home especially in the immediate postoperative status. Last A1c noted to be 7.1    Case discussed with patient at bedside.    Denis Marcos MD  12/5/2017

## 2024-12-03 ENCOUNTER — OFFICE VISIT (OUTPATIENT)
Age: 84
End: 2024-12-03
Payer: COMMERCIAL

## 2024-12-03 VITALS
HEIGHT: 73 IN | TEMPERATURE: 97.9 F | BODY MASS INDEX: 32.34 KG/M2 | WEIGHT: 244 LBS | DIASTOLIC BLOOD PRESSURE: 82 MMHG | RESPIRATION RATE: 18 BRPM | HEART RATE: 94 BPM | OXYGEN SATURATION: 95 % | SYSTOLIC BLOOD PRESSURE: 134 MMHG

## 2024-12-03 DIAGNOSIS — R06.09 DYSPNEA ON EXERTION: Primary | ICD-10-CM

## 2024-12-03 DIAGNOSIS — J96.11 CHRONIC HYPOXEMIC RESPIRATORY FAILURE (HCC): ICD-10-CM

## 2024-12-03 DIAGNOSIS — J44.9 COPD, SEVERE (HCC): ICD-10-CM

## 2024-12-03 PROCEDURE — 99214 OFFICE O/P EST MOD 30 MIN: CPT | Performed by: PHYSICIAN ASSISTANT

## 2024-12-03 PROCEDURE — G2211 COMPLEX E/M VISIT ADD ON: HCPCS | Performed by: PHYSICIAN ASSISTANT

## 2024-12-03 NOTE — PROGRESS NOTES
"Assessment/Plan:   Diagnoses and all orders for this visit:    Dyspnea on exertion    COPD, severe (HCC)  -     Complete PFT with post Bronchodilator and Six Minute walk; Future    Chronic hypoxemic respiratory failure (HCC)        Patient is here today for follow-up.  He has noted some worsening of his shortness of breath/dyspnea on exertion.  Suspect this is multifactorial related to his underlying COPD as well as cardiac in nature given his heart block with a pacemaker as well as new onset A-fib.  He continues with his Breztri twice per day, using the nebulizer twice per day after the Breztri with occasional need for albuterol in between.  He does have oxygen which he uses at night and during the day when he is out of the house.    I spoke with him about switching the order and using the nebulizer first before the Breztri.  He completed pulmonary rehab and did note some benefit with the pulmonary rehab.    Previous PFT was done in 2021 which showed moderately severe obstruction without significant response to the bronchodilator.  Will have him repeat a PFT to see if any change in his lung function over time.    Recent CT scan of the chest showed stable 4 mm right upper lobe nodule.  Other pulmonary nodules stable from 2019.    He will follow-up with us in about 3 months or sooner if necessary.    Return in about 3 months (around 3/3/2025).  All questions are answered to the patient's satisfaction and understanding.  He verbalizes understanding.  He is encouraged to call with any further questions or concerns.    Portions of the record may have been created with voice recognition software.  Occasional wrong word or \"sound a like\" substitutions may have occurred due to the inherent limitations of voice recognition software.  Read the chart carefully and recognize, using context, where substitutions have occurred.    Electronically Signed by Abdulaziz Bell, " JESSICA    ______________________________________________________________________    Chief Complaint:   Chief Complaint   Patient presents with    Follow-up       Patient ID: Darrell is a 84 y.o. y.o. male has a past medical history of Acute bronchitis (01/02/2018), Arthralgia of left knee (04/25/2024), Arthritis of hand (09/11/2024), Back pain, Bronchiectasis ([facility]), Cataract, Changes in skin texture (05/20/2014), Chronic kidney disease, COPD (chronic obstructive pulmonary disease) ([facility]), Elevated white blood cell count (12/12/2017), Esophageal reflux (02/07/2014), Fissure, anal (05/08/2015), Generalized osteoarthritis of multiple sites (06/09/2015), Gout (12/16/2014), Hand pain (12/16/2014), History of malignant neoplasm of oral cavity, HL (hearing loss), Hypertension, Inflammation of sacroiliac joint (HCC) (09/11/2024), Iron deficiency anemia (02/07/2014), Kidney stone (05/2019), Knee pain, right (07/18/2017), Low back pain (04/25/2019), Lung nodule (01/02/2015), Malignant neoplasm of colon (HCC), Nausea (07/01/2014), Nonhealing surgical wound (06/15/2021), Prostate cancer ([facility]), Renal mass, right (01/02/2015), SOB (shortness of breath), Tachycardia, Thalassemia trait, Tinnitus, and Weight loss, non-intentional (12/23/2014).    12/3/2024  Patient presents today for follow-up visit.  Patient is an 84-year-old male former smoker with past medical history of COPD, CAD, chronic respiratory failure, prostate cancer.    He is here today for follow-up.  He is on Breztri twice per day, using his nebulizer twice per day after the Breztri.  He completed pulmonary rehab recently.  He continues with 3 L at night and during the day as needed.  He has noted some worsening of his shortness of breath/dyspnea on exertion over time.  Since his last visit with us he was also diagnosed with A-fib and does have a pacemaker placed as well.      primary symptoms  Pertinent negatives include no chest pain, fever, headaches, myalgias or sore  throat.       Review of Systems   Constitutional: Negative.  Negative for appetite change and fever.   HENT: Negative.  Negative for ear pain, postnasal drip, rhinorrhea, sneezing, sore throat and trouble swallowing.    Respiratory:  Positive for shortness of breath.    Cardiovascular: Negative.  Negative for chest pain.   Gastrointestinal: Negative.    Genitourinary: Negative.    Musculoskeletal: Negative.  Negative for myalgias.   Skin: Negative.    Allergic/Immunologic: Negative.    Neurological: Negative.  Negative for headaches.   Psychiatric/Behavioral: Negative.         Smoking history: He reports that he quit smoking about 20 years ago. His smoking use included cigarettes. He started smoking about 64 years ago. He has a 11.1 pack-year smoking history. He has never used smokeless tobacco.    The following portions of the patient's history were reviewed and updated as appropriate: allergies, current medications, past family history, past medical history, past social history, past surgical history, and problem list.    Immunization History   Administered Date(s) Administered    COVID-19 MODERNA VACC 0.5 ML IM 01/26/2021, 02/22/2021, 10/27/2021, 05/13/2022    COVID-19 Moderna Vac BIVALENT 12 Yr+ IM 0.5 ML 10/27/2022, 07/27/2023    COVID-19 Pfizer mRNA vacc PF harsha-sucrose 12 yr and older (Comirnaty) 09/12/2024    INFLUENZA 10/26/2012, 10/22/2013, 12/01/2015, 10/07/2016, 09/18/2018, 11/19/2019, 10/04/2021, 09/26/2022, 10/13/2023    Influenza Split High Dose Preservative Free IM 10/07/2014, 10/01/2017    Influenza, high dose seasonal 0.7 mL 10/08/2020    Influenza, seasonal, injectable 10/26/2012, 10/22/2013, 12/01/2015, 09/03/2016    Pneumococcal Conjugate 13-Valent 08/25/2015, 08/25/2015    Pneumococcal Polysaccharide PPV23 01/03/2017    Respiratory Syncytial Virus Vaccine (Recombinant, Adjuvanted) 01/23/2024    Tdap 07/01/2011    Zoster 1942    Zoster Vaccine Recombinant 12/18/2018, 06/04/2019     Current  "Outpatient Medications   Medication Sig Dispense Refill    acetaminophen-codeine (TYLENOL with CODEINE #4) 300-60 MG per tablet Take 1 tablet by mouth every 6 (six) hours as needed for moderate pain 60 tablet 0    albuterol (2.5 mg/3 mL) 0.083 % nebulizer solution USE 1 VIAL IN NEBULIZER EVERY 6 HOURS 360 mL 1    allopurinol (ZYLOPRIM) 100 mg tablet Take 1 tablet (100 mg total) by mouth daily 90 tablet 3    amLODIPine (NORVASC) 10 mg tablet Take 1 tablet (10 mg total) by mouth every morning 90 tablet 1    apixaban (Eliquis) 5 mg Take 1 tablet (5 mg total) by mouth 2 (two) times a day 60 tablet 3    Budeson-Glycopyrrol-Formoterol (Breztri Aerosphere) 160-9-4.8 MCG/ACT AERO Inhale 2 puffs 2 (two) times a day Rinse mouth after use. 31.1 g 5    cholecalciferol (VITAMIN D3) 1,000 units tablet Take 5 tablets (5,000 Units total) by mouth every other day      losartan (COZAAR) 100 MG tablet Take 1 tablet (100 mg total) by mouth daily 90 tablet 1    metoprolol succinate (TOPROL-XL) 25 mg 24 hr tablet Take 1 tablet (25 mg total) by mouth daily 90 tablet 3    Polyvinyl Alcohol-Povidone (REFRESH OP) Apply to eye as needed       prochlorperazine (COMPAZINE) 10 mg tablet Take 1 tablet (10 mg total) by mouth every 6 (six) hours as needed for nausea or vomiting 30 tablet 0    Vitamins-Lipotropics (LIPO-FLAVONOID PLUS PO) Take by mouth       No current facility-administered medications for this visit.     Allergies: Patient has no known allergies.    Objective:  Vitals:    12/03/24 1428 12/03/24 1429   BP: 134/82    BP Location: Right arm    Patient Position: Sitting    Cuff Size: Large    Pulse: 94    Resp: 18    Temp: 97.9 °F (36.6 °C)    TempSrc: Oral    SpO2: 95% 95%   Weight: 111 kg (244 lb)    Height: 6' 1\" (1.854 m)    Oxygen Therapy  SpO2: 95 %  Oxygen Therapy: Supplemental oxygen  O2 Delivery Method: Nasal cannula  O2 Flow Rate (L/min): 2 L/min  .  Wt Readings from Last 3 Encounters:   12/03/24 111 kg (244 lb)   11/04/24 " 108 kg (239 lb)   10/15/24 109 kg (241 lb)     Body mass index is 32.19 kg/m².    Physical Exam  Constitutional:       General: He is not in acute distress.     Appearance: Normal appearance. He is well-developed. He is not ill-appearing.   HENT:      Head: Normocephalic and atraumatic.      Mouth/Throat:      Pharynx: Oropharynx is clear.   Eyes:      Pupils: Pupils are equal, round, and reactive to light.   Cardiovascular:      Rate and Rhythm: Normal rate and regular rhythm.   Pulmonary:      Effort: Pulmonary effort is normal. No respiratory distress.      Breath sounds: Normal breath sounds. No decreased breath sounds, wheezing, rhonchi or rales.   Abdominal:      General: Abdomen is flat. There is no distension.   Musculoskeletal:         General: Normal range of motion.      Cervical back: Normal range of motion.      Right lower leg: No edema.      Left lower leg: No edema.   Skin:     General: Skin is warm and dry.      Findings: No rash.   Neurological:      Mental Status: He is alert and oriented to person, place, and time.   Psychiatric:         Mood and Affect: Mood normal.         Behavior: Behavior normal.         Lab Review:   Lab Results   Component Value Date     08/18/2015    K 4.1 07/08/2024    K 3.8 08/18/2015     07/08/2024     08/18/2015    CO2 27 07/08/2024    CO2 25.4 08/18/2015    BUN 11 07/08/2024    BUN 9 08/18/2015    CREATININE 1.33 (H) 07/08/2024    CREATININE 1.3 08/18/2015    GLUCOSE 97 08/18/2015    CALCIUM 9.5 07/08/2024    CALCIUM 9.3 08/18/2015     Lab Results   Component Value Date    WBC 8.42 07/08/2024    WBC 8.4 08/18/2015    HGB 13.0 07/08/2024    HGB 13.8 08/18/2015    HCT 42.2 07/08/2024    HCT 44.0 08/18/2015    MCV 79 (L) 07/08/2024    MCV 72 (L) 08/18/2015     07/08/2024     08/18/2015       Diagnostics:    Reviewed recent CT scan  Office Spirometry Results:     ESS:    Cardiac EP device report  Result Date: 12/2/2024  Narrative: TANNER MALONE  PM/ACTIVE SYSTEM IS MRI CONDITIONAL CARELINK TRANSMISSION: BATTERY VOLTAGE ADEQUATE (12.6 YRS). AP: <0.1%. : 99.5% (>40%~MVP-OFF~CHB). ALL AVAILABLE LEAD PARAMETERS WITHIN NORMAL LIMITS. 1 AT/AF EPISODE W/ AF IN PROGRESS (HX OF SAME), AF BURDEN: 100%. PT TAKES ELIQUIS, METOPROLOL SUCC. EF: 55% (ECHO 5/2/24). NORMAL DEVICE FUNCTION. CH   Answers submitted by the patient for this visit:  Pulmonology Questionnaire (Submitted on 11/26/2024)  Chief Complaint: Primary symptoms  Chronicity: chronic  When did you first notice your symptoms?: more than 1 year ago  How often do your symptoms occur?: intermittently  Since you first noticed this problem, how has it changed?: unchanged  Do you have shortness of breath that occurs with effort or exertion?: Yes  Do you have ear congestion?: No  Do you have heartburn?: No  Do you have fatigue?: No  Do you have nasal congestion?: No  Do you have shortness of breath when lying flat?: No  Do you have shortness of breath when you wake up?: No  Do you have sweats?: No  Have you experienced weight loss?: No  Which of the following makes your symptoms worse?: climbing stairs, strenuous activity  Which of the following makes your symptoms better?: change in position, rest

## 2025-01-07 ENCOUNTER — TELEPHONE (OUTPATIENT)
Dept: NEPHROLOGY | Facility: CLINIC | Age: 85
End: 2025-01-07

## 2025-01-07 NOTE — TELEPHONE ENCOUNTER
Pt's spouse called in to confirm if pt had to fast for labs. Pt is aware of appt date and time. Pt's spouse mentioned having another appt the same day at 1:30pm and will try to change the appt times to stay with this appt.

## 2025-01-07 NOTE — TELEPHONE ENCOUNTER
Called spoke with patient advised patient to get labs done 1 week prior to 01/15/25@ 1:00pm follow-up  appointment with . advised patient as a reminder that labs / testing will need to be done in the appropriate time for scheduled appointment as this is important prior to visit. patient  verbalized understanding and is okay with it.

## 2025-01-08 ENCOUNTER — APPOINTMENT (OUTPATIENT)
Dept: LAB | Facility: HOSPITAL | Age: 85
End: 2025-01-08
Attending: INTERNAL MEDICINE
Payer: COMMERCIAL

## 2025-01-08 DIAGNOSIS — I12.9 HYPERTENSIVE KIDNEY DISEASE WITH STAGE 3 CHRONIC KIDNEY DISEASE, UNSPECIFIED WHETHER STAGE 3A OR 3B CKD (HCC): ICD-10-CM

## 2025-01-08 DIAGNOSIS — E55.9 VITAMIN D DEFICIENCY: ICD-10-CM

## 2025-01-08 DIAGNOSIS — N18.31 STAGE 3A CHRONIC KIDNEY DISEASE (HCC): ICD-10-CM

## 2025-01-08 DIAGNOSIS — N18.30 HYPERTENSIVE KIDNEY DISEASE WITH STAGE 3 CHRONIC KIDNEY DISEASE, UNSPECIFIED WHETHER STAGE 3A OR 3B CKD (HCC): ICD-10-CM

## 2025-01-08 LAB
25(OH)D3 SERPL-MCNC: 78.2 NG/ML (ref 30–100)
AMORPH URATE CRY URNS QL MICRO: ABNORMAL
ANION GAP SERPL CALCULATED.3IONS-SCNC: 9 MMOL/L (ref 4–13)
BACTERIA UR QL AUTO: ABNORMAL /HPF
BASOPHILS # BLD AUTO: 0.02 THOUSANDS/ΜL (ref 0–0.1)
BASOPHILS NFR BLD AUTO: 0 % (ref 0–1)
BILIRUB UR QL STRIP: NEGATIVE
BUN SERPL-MCNC: 15 MG/DL (ref 5–25)
CALCIUM SERPL-MCNC: 9.9 MG/DL (ref 8.4–10.2)
CHLORIDE SERPL-SCNC: 103 MMOL/L (ref 96–108)
CLARITY UR: ABNORMAL
CO2 SERPL-SCNC: 30 MMOL/L (ref 21–32)
COLOR UR: YELLOW
CREAT SERPL-MCNC: 1.56 MG/DL (ref 0.6–1.3)
CREAT UR-MCNC: 427.4 MG/DL
EOSINOPHIL # BLD AUTO: 0.05 THOUSAND/ΜL (ref 0–0.61)
EOSINOPHIL NFR BLD AUTO: 1 % (ref 0–6)
ERYTHROCYTE [DISTWIDTH] IN BLOOD BY AUTOMATED COUNT: 17.3 % (ref 11.6–15.1)
GFR SERPL CREATININE-BSD FRML MDRD: 40 ML/MIN/1.73SQ M
GLUCOSE P FAST SERPL-MCNC: 107 MG/DL (ref 65–99)
GLUCOSE UR STRIP-MCNC: NEGATIVE MG/DL
HCT VFR BLD AUTO: 46.2 % (ref 36.5–49.3)
HGB BLD-MCNC: 14.1 G/DL (ref 12–17)
HGB UR QL STRIP.AUTO: NEGATIVE
HYALINE CASTS #/AREA URNS LPF: ABNORMAL /LPF
IMM GRANULOCYTES # BLD AUTO: 0.01 THOUSAND/UL (ref 0–0.2)
IMM GRANULOCYTES NFR BLD AUTO: 0 % (ref 0–2)
KETONES UR STRIP-MCNC: ABNORMAL MG/DL
LEUKOCYTE ESTERASE UR QL STRIP: NEGATIVE
LYMPHOCYTES # BLD AUTO: 1.4 THOUSANDS/ΜL (ref 0.6–4.47)
LYMPHOCYTES NFR BLD AUTO: 18 % (ref 14–44)
MCH RBC QN AUTO: 24.4 PG (ref 26.8–34.3)
MCHC RBC AUTO-ENTMCNC: 30.5 G/DL (ref 31.4–37.4)
MCV RBC AUTO: 80 FL (ref 82–98)
MICROALBUMIN UR-MCNC: 81.3 MG/L
MICROALBUMIN/CREAT 24H UR: 19 MG/G CREATININE (ref 0–30)
MONOCYTES # BLD AUTO: 0.48 THOUSAND/ΜL (ref 0.17–1.22)
MONOCYTES NFR BLD AUTO: 6 % (ref 4–12)
MUCOUS THREADS UR QL AUTO: ABNORMAL
NEUTROPHILS # BLD AUTO: 5.8 THOUSANDS/ΜL (ref 1.85–7.62)
NEUTS SEG NFR BLD AUTO: 75 % (ref 43–75)
NITRITE UR QL STRIP: NEGATIVE
NON-SQ EPI CELLS URNS QL MICRO: ABNORMAL /HPF
NRBC BLD AUTO-RTO: 0 /100 WBCS
PH UR STRIP.AUTO: 5.5 [PH]
PHOSPHATE SERPL-MCNC: 2.7 MG/DL (ref 2.3–4.1)
PLATELET # BLD AUTO: 233 THOUSANDS/UL (ref 149–390)
PMV BLD AUTO: 11.5 FL (ref 8.9–12.7)
POTASSIUM SERPL-SCNC: 3.8 MMOL/L (ref 3.5–5.3)
PROT UR STRIP-MCNC: ABNORMAL MG/DL
PTH-INTACT SERPL-MCNC: 50.2 PG/ML (ref 12–88)
RBC # BLD AUTO: 5.78 MILLION/UL (ref 3.88–5.62)
RBC #/AREA URNS AUTO: ABNORMAL /HPF
SODIUM SERPL-SCNC: 142 MMOL/L (ref 135–147)
SP GR UR STRIP.AUTO: 1.03 (ref 1–1.03)
URATE SERPL-MCNC: 6.7 MG/DL (ref 3.5–8.5)
UROBILINOGEN UR STRIP-ACNC: <2 MG/DL
WBC # BLD AUTO: 7.76 THOUSAND/UL (ref 4.31–10.16)
WBC #/AREA URNS AUTO: ABNORMAL /HPF

## 2025-01-08 PROCEDURE — 85025 COMPLETE CBC W/AUTO DIFF WBC: CPT

## 2025-01-08 PROCEDURE — 84550 ASSAY OF BLOOD/URIC ACID: CPT

## 2025-01-08 PROCEDURE — 83970 ASSAY OF PARATHORMONE: CPT

## 2025-01-08 PROCEDURE — 82570 ASSAY OF URINE CREATININE: CPT

## 2025-01-08 PROCEDURE — 84100 ASSAY OF PHOSPHORUS: CPT

## 2025-01-08 PROCEDURE — 82043 UR ALBUMIN QUANTITATIVE: CPT

## 2025-01-08 PROCEDURE — 80048 BASIC METABOLIC PNL TOTAL CA: CPT

## 2025-01-08 PROCEDURE — 36415 COLL VENOUS BLD VENIPUNCTURE: CPT

## 2025-01-08 PROCEDURE — 81001 URINALYSIS AUTO W/SCOPE: CPT

## 2025-01-08 PROCEDURE — 82306 VITAMIN D 25 HYDROXY: CPT

## 2025-01-15 ENCOUNTER — OFFICE VISIT (OUTPATIENT)
Dept: NEPHROLOGY | Facility: CLINIC | Age: 85
End: 2025-01-15
Payer: COMMERCIAL

## 2025-01-15 VITALS
HEART RATE: 100 BPM | BODY MASS INDEX: 31.7 KG/M2 | WEIGHT: 239.2 LBS | DIASTOLIC BLOOD PRESSURE: 64 MMHG | TEMPERATURE: 97.4 F | HEIGHT: 73 IN | SYSTOLIC BLOOD PRESSURE: 126 MMHG | OXYGEN SATURATION: 93 % | RESPIRATION RATE: 18 BRPM

## 2025-01-15 DIAGNOSIS — N18.32 STAGE 3B CHRONIC KIDNEY DISEASE (HCC): ICD-10-CM

## 2025-01-15 DIAGNOSIS — N18.30 HYPERTENSIVE KIDNEY DISEASE WITH STAGE 3 CHRONIC KIDNEY DISEASE, UNSPECIFIED WHETHER STAGE 3A OR 3B CKD (HCC): Primary | ICD-10-CM

## 2025-01-15 DIAGNOSIS — E55.9 VITAMIN D DEFICIENCY: ICD-10-CM

## 2025-01-15 DIAGNOSIS — I12.9 HYPERTENSIVE KIDNEY DISEASE WITH STAGE 3 CHRONIC KIDNEY DISEASE, UNSPECIFIED WHETHER STAGE 3A OR 3B CKD (HCC): Primary | ICD-10-CM

## 2025-01-15 PROCEDURE — 99214 OFFICE O/P EST MOD 30 MIN: CPT | Performed by: INTERNAL MEDICINE

## 2025-01-15 NOTE — PROGRESS NOTES
NEPHROLOGY OFFICE FOLLOW UP  Darrell Mayer 84 y.o.male YOB: 1940 MRN: [id number]    Encounter: [id number] DATE: 1/15/2025    REASON FOR VISIT: Darrell Mayer is a 84 y.o. male who is here on 1/15/2025 for Chronic Kidney Disease and Follow-up      ASSESSMENT and PLAN:  Darrell was seen today for chronic kidney disease and follow-up.    Diagnoses and all orders for this visit:    Hypertensive kidney disease with stage 3 chronic kidney disease, unspecified whether stage 3a or 3b CKD (HCC)  -     Basic metabolic panel; Future  -     Urinalysis with microscopic; Future  -     Albumin / creatinine urine ratio; Future    Stage 3b chronic kidney disease (HCC)  -     CBC and differential; Future  -     Basic metabolic panel; Future  -     Urinalysis with microscopic; Future  -     Albumin / creatinine urine ratio; Future  -     Phosphorus; Future  -     Uric acid; Future  -     PTH, intact; Future  -     Vitamin D 25 hydroxy; Future    Vitamin D deficiency  -     Vitamin D 25 hydroxy; Future      Assessment & Plan  Stage 3b chronic kidney disease (HCC)    Hypertensive kidney disease with stage 3 chronic kidney disease, unspecified whether stage 3a or 3b CKD (HCC)    Vitamin D deficiency      1.  CKD stage 3.  Multifactorial and was suspected due to underlying hypertensive nephrosclerosis.     Upon review of old medical records, new baseline creatinine seems to be fluctuating around 1.6-1.7 and in the interim renal function has remained at baseline with current creatinine of 1.56 with EGFR of 40.    Clinically patient is not in volume overload state and recent urine analysis showed 3-5 hyaline cast per high-power field suggesting possible intravascular volume depletion.  Patient has underlying COPD and uses nasal oxygen chronically at 2 L/min.  Advised patient to drink around 2 L of fluid on daily basis to ensure staying well-hydrated.     Management of hypertension as mentioned below.     Plan to avoid NSAIDs  including Mobic/meloxicam and recheck renal function before next visit.     2.  Hypertension in chronic kidney disease.  Today's blood pressure was under well control and plan to monitor hypertension with amlodipine 10 mg PO daily, metoprolol XL 25 mg PO daily and losartan 100 mg PO daily.  Also advised patient to follow low-salt diet .     3. Vitamin-D deficiency. Patient is currently taking cholecalciferol 5000 Units every other day.  Current vitamin D level is 78 which is at goal and plan to continue cholecalciferol at current dose and recheck vitamin D level with the next visit.     Returns to the nephrology office in 6 months.  Future labs ordered.        HPI:    This is a 84-year-old male with a past medical history of CKD stage 3, hypertension, COPD, returns to the nephrology office for further management of CKD stage 3.     Upon review of old medical records,  Patient's new baseline creatinine seems to be fluctuating around 1.6-1.7.     Patient's wife was also present at the time of consultation and history was also obtained from her and all the questions were answered.     Patient has COPD.  Patient currently does not smoke.  According to patient his breathing is currently under good control with the use of nebulizers.  Patient also uses oxygen 2 liters/minute.     Patient was also found to have symptomatic heart block in May 2024 and now status post pacemaker.     Patient has underlying hypertension and has been compliant with his antihypertensive medications.     Patient has gout and currently taking allopurinol 100 mg PO daily on daily basis.  Patient denies any recent gout flare-up.     Patient also have chronic back pain and was also been followed by pain management specialist in the past and was using narcotics in the past but now uses as needed Tylenol and also have a back stimulator in place.     Patient was also found to have vitamin-D deficiency and currently taking cholecalciferol.     Patient  also have kidney stone and was also seen by Dr. Gerber-urologist in the past.     According to patient he does not use any NSAIDs including Mobic.          REVIEW OF SYSTEMS:    Review of Systems   Constitutional:  Negative for chills and fever.   HENT:  Negative for nosebleeds.    Eyes:  Negative for photophobia and pain.   Respiratory:  Negative for choking.    Cardiovascular:  Negative for palpitations.   Gastrointestinal:  Negative for blood in stool.   Genitourinary:  Negative for hematuria.   Musculoskeletal:  Negative for neck stiffness.   Neurological:  Negative for seizures.   Psychiatric/Behavioral:  Negative for confusion and suicidal ideas.          PAST MEDICAL HISTORY:  Past Medical History:   Diagnosis Date    Acute bronchitis 01/02/2018    Arthralgia of left knee 04/25/2024    Arthritis of hand 09/11/2024    Back pain     Bronchiectasis (HCC)     Cancer (HCC) 01/2014    On file    Cataract     Changes in skin texture 05/20/2014    Chronic kidney disease     COPD (chronic obstructive pulmonary disease) [facility])     Elevated white blood cell count 12/12/2017    Esophageal reflux 02/07/2014    Fissure, anal 05/08/2015    Generalized osteoarthritis of multiple sites 06/09/2015    Gout 12/16/2014    Hand pain 12/16/2014    History of malignant neoplasm of oral cavity     M0    HL (hearing loss)     Hypertension     Inflammation of sacroiliac joint (HCC) 09/11/2024    Iron deficiency anemia 02/07/2014    Kidney stone 05/2019    Knee pain, right 07/18/2017    Low back pain 04/25/2019 04/25/2019 - Lifting Exercise Ball with Feet.      Lung nodule 01/02/2015    Malignant neoplasm of colon (HCC)     descending    Nausea 07/01/2014    Nonhealing surgical wound 06/15/2021    Prostate cancer (HCC)     Renal mass, right 01/02/2015    SOB (shortness of breath)     Tachycardia     Thalassemia trait     Tinnitus     Weight loss, non-intentional 12/23/2014       PAST SURGICAL HISTORY:  Past Surgical History:    Procedure Laterality Date    BACK SURGERY      CARDIAC ELECTROPHYSIOLOGY PROCEDURE N/A 2024    Procedure: Cardiac pacer implant;  Surgeon: Fredrick Bernal MD;  Location: [facility];  Service: Cardiology    CHOLECYSTECTOMY      COLON SURGERY  10/2008    partial colectomy    EYE SURGERY      INCISIONAL HERNIA REPAIR      JOINT REPLACEMENT Right     hip    RI COLONOSCOPY FLX DX W/COLLJ SPEC WHEN PFRMD N/A 2018    Procedure: COLONOSCOPY;  Surgeon: Jv Toribio III, MD;  Location: [facility];  Service: Gastroenterology    PROSTATE SURGERY      SPINAL CORD STIMULATOR IMPLANT      TONSILLECTOMY         SOCIAL HISTORY:  Social History     Substance and Sexual Activity   Alcohol Use Not Currently     Social History     Substance and Sexual Activity   Drug Use No     Social History     Tobacco Use   Smoking Status Former    Current packs/day: 0.00    Average packs/day: 0.3 packs/day for 48.3 years (15.0 ttl pk-yrs)    Types: Cigarettes    Start date: 1960    Quit date: 2004    Years since quittin.6    Passive exposure: Past   Smokeless Tobacco Never   Tobacco Comments    non smoker/tobacco user; quit  as per [employer]       FAMILY HISTORY:  Family History   Problem Relation Age of Onset    Heart failure Mother         as per [employer]    Coronary artery disease Mother         as per [employer]    Diabetes Mother         mellitus - as per [employer]    Coronary artery disease Father         as per [employer]    Cancer Sister         malignant neoplasm       ALLERGY:  No Known Allergies    MEDICATIONS:    Current Outpatient Medications:     acetaminophen-codeine (TYLENOL with CODEINE #4) 300-60 MG per tablet, Take 1 tablet by mouth every 6 (six) hours as needed for moderate pain, Disp: 60 tablet, Rfl: 0    albuterol (2.5 mg/3 mL) 0.083 % nebulizer solution, USE 1 VIAL IN NEBULIZER EVERY 6 HOURS, Disp: 360 mL, Rfl: 1    allopurinol (ZYLOPRIM) 100 mg tablet, Take 1 tablet (100 mg  "total) by mouth daily, Disp: 90 tablet, Rfl: 3    amLODIPine (NORVASC) 10 mg tablet, Take 1 tablet (10 mg total) by mouth every morning, Disp: 90 tablet, Rfl: 1    apixaban (Eliquis) 5 mg, Take 1 tablet (5 mg total) by mouth 2 (two) times a day, Disp: 60 tablet, Rfl: 3    Budeson-Glycopyrrol-Formoterol (Breztri Aerosphere) 160-9-4.8 MCG/ACT AERO, Inhale 2 puffs 2 (two) times a day Rinse mouth after use., Disp: 31.1 g, Rfl: 5    cholecalciferol (VITAMIN D3) 1,000 units tablet, Take 5 tablets (5,000 Units total) by mouth every other day, Disp: , Rfl:     losartan (COZAAR) 100 MG tablet, Take 1 tablet (100 mg total) by mouth daily, Disp: 90 tablet, Rfl: 1    metoprolol succinate (TOPROL-XL) 25 mg 24 hr tablet, Take 1 tablet (25 mg total) by mouth daily, Disp: 90 tablet, Rfl: 3    Polyvinyl Alcohol-Povidone (REFRESH OP), Apply to eye as needed , Disp: , Rfl:     prochlorperazine (COMPAZINE) 10 mg tablet, Take 1 tablet (10 mg total) by mouth every 6 (six) hours as needed for nausea or vomiting, Disp: 30 tablet, Rfl: 0    Vitamins-Lipotropics (LIPO-FLAVONOID PLUS PO), Take by mouth, Disp: , Rfl:     PHYSICAL EXAM:  Vitals:    01/15/25 1312   BP: 126/64   Patient Position: Sitting   Cuff Size: Adult   Pulse: 100   Resp: 18   Temp: (!) 97.4 °F (36.3 °C)   TempSrc: Temporal   SpO2: 93%   Weight: 109 kg (239 lb 3.2 oz)   Height: 6' 1\" (1.854 m)     Body mass index is 31.56 kg/m².    Physical Exam  Constitutional:       General: He is not in acute distress.  HENT:      Right Ear: External ear normal.   Eyes:      General: No scleral icterus.     Conjunctiva/sclera:      Right eye: No hemorrhage.  Neck:      Thyroid: No thyromegaly.      Vascular: No JVD.   Cardiovascular:      Rate and Rhythm: Normal rate.   Pulmonary:      Effort: Pulmonary effort is normal. No accessory muscle usage or respiratory distress.      Breath sounds: No wheezing.   Abdominal:      General: There is no distension.   Musculoskeletal:      Right " ankle: No swelling.      Left ankle: No swelling.   Skin:     General: Skin is warm.      Coloration: Skin is not jaundiced.   Neurological:      Mental Status: He is alert. He is not disoriented.   Psychiatric:         Speech: He is communicative.         Behavior: Behavior is not combative.         LAB RESULTS:  Results for orders placed or performed in visit on 01/08/25   CBC and differential    Collection Time: 01/08/25  2:49 PM   Result Value Ref Range    WBC 7.76 4.31 - 10.16 Thousand/uL    RBC 5.78 (H) 3.88 - 5.62 Million/uL    Hemoglobin 14.1 12.0 - 17.0 g/dL    Hematocrit 46.2 36.5 - 49.3 %    MCV 80 (L) 82 - 98 fL    MCH 24.4 (L) 26.8 - 34.3 pg    MCHC 30.5 (L) 31.4 - 37.4 g/dL    RDW 17.3 (H) 11.6 - 15.1 %    MPV 11.5 8.9 - 12.7 fL    Platelets 233 149 - 390 Thousands/uL    nRBC 0 /100 WBCs    Segmented % 75 43 - 75 %    Immature Grans % 0 0 - 2 %    Lymphocytes % 18 14 - 44 %    Monocytes % 6 4 - 12 %    Eosinophils Relative 1 0 - 6 %    Basophils Relative 0 0 - 1 %    Absolute Neutrophils 5.80 1.85 - 7.62 Thousands/µL    Absolute Immature Grans 0.01 0.00 - 0.20 Thousand/uL    Absolute Lymphocytes 1.40 0.60 - 4.47 Thousands/µL    Absolute Monocytes 0.48 0.17 - 1.22 Thousand/µL    Eosinophils Absolute 0.05 0.00 - 0.61 Thousand/µL    Basophils Absolute 0.02 0.00 - 0.10 Thousands/µL   Basic metabolic panel    Collection Time: 01/08/25  2:49 PM   Result Value Ref Range    Sodium 142 135 - 147 mmol/L    Potassium 3.8 3.5 - 5.3 mmol/L    Chloride 103 96 - 108 mmol/L    CO2 30 21 - 32 mmol/L    ANION GAP 9 4 - 13 mmol/L    BUN 15 5 - 25 mg/dL    Creatinine 1.56 (H) 0.60 - 1.30 mg/dL    Glucose, Fasting 107 (H) 65 - 99 mg/dL    Calcium 9.9 8.4 - 10.2 mg/dL    eGFR 40 ml/min/1.73sq m   Urinalysis with microscopic    Collection Time: 01/08/25  2:49 PM   Result Value Ref Range    Color, UA Yellow     Clarity, UA Turbid     Specific Gravity, UA 1.027 1.003 - 1.030    pH, UA 5.5 4.5, 5.0, 5.5, 6.0, 6.5, 7.0, 7.5, 8.0  "   Leukocytes, UA Negative Negative    Nitrite, UA Negative Negative    Protein, UA Trace (A) Negative mg/dl    Glucose, UA Negative Negative mg/dl    Ketones, UA Trace (A) Negative mg/dl    Urobilinogen, UA <2.0 <2.0 mg/dl mg/dl    Bilirubin, UA Negative Negative    Occult Blood, UA Negative Negative    RBC, UA 4-10 (A) None Seen, 1-2 /hpf    WBC, UA 4-10 (A) None Seen, 1-2 /hpf    Epithelial Cells Occasional None Seen, Occasional /hpf    Bacteria, UA Occasional None Seen, Occasional /hpf    MUCUS THREADS Moderate (A) None Seen    Hyaline Casts, UA 3-5 (A) None Seen /lpf    Amorphous Crystals, UA Occasional    Albumin / creatinine urine ratio    Collection Time: 01/08/25  2:49 PM   Result Value Ref Range    Creatinine, Ur 427.4 Reference range not established. mg/dL    Albumin,U,Random 81.3 (H) <20.0 mg/L    Albumin Creat Ratio 19 0 - 30 mg/g creatinine   Phosphorus    Collection Time: 01/08/25  2:49 PM   Result Value Ref Range    Phosphorus 2.7 2.3 - 4.1 mg/dL   Uric acid    Collection Time: 01/08/25  2:49 PM   Result Value Ref Range    Uric Acid 6.7 3.5 - 8.5 mg/dL   PTH, intact    Collection Time: 01/08/25  2:49 PM   Result Value Ref Range    PTH 50.2 12.0 - 88.0 pg/mL   Vitamin D 25 hydroxy    Collection Time: 01/08/25  2:49 PM   Result Value Ref Range    Vit D, 25-Hydroxy 78.2 30.0 - 100.0 ng/mL         Portions of the record may have been created with voice recognition software. Occasional wrong word or \"sound a like\" substitutions may have occurred due to the inherent limitations of voice recognition software. Read the chart carefully and recognize, using context, where substitutions have occurred. If you have any questions, please contact the dictating provider.   "

## 2025-01-16 ENCOUNTER — HOSPITAL ENCOUNTER (OUTPATIENT)
Dept: PULMONOLOGY | Facility: HOSPITAL | Age: 85
End: 2025-01-16
Payer: COMMERCIAL

## 2025-01-16 DIAGNOSIS — J44.9 COPD, SEVERE (HCC): ICD-10-CM

## 2025-01-16 PROCEDURE — 94729 DIFFUSING CAPACITY: CPT

## 2025-01-16 PROCEDURE — 94729 DIFFUSING CAPACITY: CPT | Performed by: INTERNAL MEDICINE

## 2025-01-16 PROCEDURE — 94761 N-INVAS EAR/PLS OXIMETRY MLT: CPT

## 2025-01-16 PROCEDURE — 94060 EVALUATION OF WHEEZING: CPT

## 2025-01-16 PROCEDURE — 94060 EVALUATION OF WHEEZING: CPT | Performed by: INTERNAL MEDICINE

## 2025-01-16 PROCEDURE — 94727 GAS DIL/WSHOT DETER LNG VOL: CPT

## 2025-01-16 PROCEDURE — 94618 PULMONARY STRESS TESTING: CPT | Performed by: INTERNAL MEDICINE

## 2025-01-16 PROCEDURE — 94727 GAS DIL/WSHOT DETER LNG VOL: CPT | Performed by: INTERNAL MEDICINE

## 2025-01-16 RX ORDER — ALBUTEROL SULFATE 0.83 MG/ML
2.5 SOLUTION RESPIRATORY (INHALATION) ONCE
Status: COMPLETED | OUTPATIENT
Start: 2025-01-16 | End: 2025-01-16

## 2025-01-16 RX ADMIN — ALBUTEROL SULFATE 2.5 MG: 2.5 SOLUTION RESPIRATORY (INHALATION) at 13:37

## 2025-01-22 DIAGNOSIS — R11.0 NAUSEA: ICD-10-CM

## 2025-01-22 DIAGNOSIS — I10 ESSENTIAL HYPERTENSION: ICD-10-CM

## 2025-01-22 RX ORDER — AMLODIPINE BESYLATE 10 MG/1
10 TABLET ORAL EVERY MORNING
Qty: 90 TABLET | Refills: 1 | Status: SHIPPED | OUTPATIENT
Start: 2025-01-22

## 2025-01-22 RX ORDER — PROCHLORPERAZINE MALEATE 10 MG
TABLET ORAL
Qty: 30 TABLET | Refills: 0 | Status: SHIPPED | OUTPATIENT
Start: 2025-01-22

## 2025-02-04 DIAGNOSIS — J43.2 CENTRILOBULAR EMPHYSEMA (HCC): ICD-10-CM

## 2025-02-04 RX ORDER — ALBUTEROL SULFATE 0.83 MG/ML
2.5 SOLUTION RESPIRATORY (INHALATION) EVERY 6 HOURS PRN
Qty: 360 ML | Refills: 1 | Status: SHIPPED | OUTPATIENT
Start: 2025-02-04 | End: 2025-02-06 | Stop reason: SDUPTHER

## 2025-02-04 NOTE — TELEPHONE ENCOUNTER
Transmission to pharmacy failed and is not going through electronically can the office please fax to pharmacy thank you.    albuterol (2.5 mg/3 mL) 0.083 % nebulizer solution     [facility], [address] [place] [address], [place]   [phone]

## 2025-02-04 NOTE — TELEPHONE ENCOUNTER
Reason for call:   [x] Refill   [] Prior Auth  [] Other:     Office:   [] PCP/Provider -   [x] Specialty/Provider - Pulm    Medication: albuterol (2.5 mg/3 mL) 0.083 % nebulizer solution    Dose/Frequency:  USE 1 VIAL IN NEBULIZER EVERY 6 HOURS     Quantity: 360 ml    Pharmacy: [facility], [address] [place] [address]    Does the patient have enough for 3 days?   [] Yes   [x] No - Send as HP to POD

## 2025-02-06 RX ORDER — ALBUTEROL SULFATE 0.83 MG/ML
2.5 SOLUTION RESPIRATORY (INHALATION) EVERY 6 HOURS PRN
Qty: 360 ML | Refills: 1 | Status: SHIPPED | OUTPATIENT
Start: 2025-02-06 | End: 2025-02-07 | Stop reason: SDUPTHER

## 2025-02-07 DIAGNOSIS — J43.2 CENTRILOBULAR EMPHYSEMA (HCC): ICD-10-CM

## 2025-02-07 RX ORDER — ALBUTEROL SULFATE 0.83 MG/ML
2.5 SOLUTION RESPIRATORY (INHALATION) EVERY 6 HOURS PRN
Qty: 360 ML | Refills: 1 | Status: SHIPPED | OUTPATIENT
Start: 2025-02-07 | End: 2025-02-07 | Stop reason: SDUPTHER

## 2025-02-07 RX ORDER — ALBUTEROL SULFATE 0.83 MG/ML
2.5 SOLUTION RESPIRATORY (INHALATION) EVERY 6 HOURS PRN
Qty: 360 ML | Refills: 1 | Status: SHIPPED | OUTPATIENT
Start: 2025-02-07 | End: 2025-02-10 | Stop reason: SDUPTHER

## 2025-02-07 NOTE — TELEPHONE ENCOUNTER
Transmission to the pharmacy failed needs to be resent.     Reason for call:   [x] Refill   [] Prior Auth  [] Other:     Office:   [] PCP/Provider -   [x] Specialty/Provider - Pulmonary    Medication: albuterol (2.5 mg/3 mL) 0.083 % nebulizer solution Take 3 mL (2.5 mg total) by nebulization every 6 (six) hours as needed for wheezing or shortness of breath       Pharmacy: [facility], [address] [place] [address]      Does the patient have enough for 3 days?   [] Yes   [x] No - Send as HP to POD

## 2025-02-07 NOTE — TELEPHONE ENCOUNTER
NOT A DUPLICATE transmission failed to pharmacy 2 times.    Reason for call:   [x] Refill   [] Prior Auth  [] Other:     Office:   [] PCP/Provider -   [x] Specialty/Provider - Pulmonology    Medication: albuterol (2.5 mg/3 mL) 0.083 % nebulizer solution Take 3 mL (2.5 mg total) by nebulization every 6 (six) hours as needed for wheezing or shortness of breath       Pharmacy: [facility], [address] [place] [address]     Does the patient have enough for 3 days?   [] Yes   [x] No - Send as HP to POD

## 2025-02-10 RX ORDER — ALBUTEROL SULFATE 0.83 MG/ML
2.5 SOLUTION RESPIRATORY (INHALATION) EVERY 6 HOURS PRN
Qty: 360 ML | Refills: 3 | Status: SHIPPED | OUTPATIENT
Start: 2025-02-10

## 2025-02-24 ENCOUNTER — OFFICE VISIT (OUTPATIENT)
Dept: CARDIOLOGY CLINIC | Facility: CLINIC | Age: 85
End: 2025-02-24
Payer: COMMERCIAL

## 2025-02-24 DIAGNOSIS — I10 PRIMARY HYPERTENSION: ICD-10-CM

## 2025-02-24 DIAGNOSIS — I47.29 NSVT (NONSUSTAINED VENTRICULAR TACHYCARDIA) (HCC): ICD-10-CM

## 2025-02-24 DIAGNOSIS — I48.19 PERSISTENT ATRIAL FIBRILLATION (HCC): Primary | ICD-10-CM

## 2025-02-24 DIAGNOSIS — I44.2 COMPLETE HEART BLOCK (HCC): ICD-10-CM

## 2025-02-24 DIAGNOSIS — I25.10 CORONARY ARTERY CALCIFICATION: ICD-10-CM

## 2025-02-24 PROCEDURE — 99214 OFFICE O/P EST MOD 30 MIN: CPT

## 2025-02-24 NOTE — PROGRESS NOTES
[facility] Cardiology   Office Visit    Darrell Mayer 84 y.o. male MRN: [id number]    02/24/25      Assessment & Plan  Persistent atrial fibrillation  100% AF burden on most recent device report.  Currently asymptomatic.  HR is well-controlled, continue Toprol-XL.  [id number] at least 3, continue Eliquis 5 mg bid.  Advised to notify our office for any new/worsening symptoms.  CHB s/p MDT DC PPM  Follows with the device clinic.  Primary hypertension  BP is well-controlled.  Continue amlodipine, losartan, and Toprol-XL.  Encourage ambulatory monitoring and low-sodium diet.  Coronary artery calcification  Not on ASA due to increased bleeding risk with AC.  LDL is well-controlled without statin.  Continue dietary control and risk factor modification.  History of NSVT  Continue Toprol-XL.        Interval history: Darrell Mayer is a pleasant 84 y.o. year old male with history of atrial fibrillation on Eliquis, CHB s/p MDT DC PPM, hypertension, coronary artery calcification, NSVT, CKD 3, T2DM, and COPD with chronic respiratory failure on supplemental O2.  Who presents for office visit.  During previous visit with cardiology patient was found to be in new onset atrial fibrillation.  He was initiated on anticoagulation with Eliquis which is affordable.  Patient reports he has been very well overall from a cardiac standpoint since that time.  Most recent device report shows 100% A-fib burden.  Denies palpitations or any associated symptoms.  Endorses strict compliance to all medications.  Denies adverse effects to current medications.  Denies major bleeding events or any additional complaints at this time.  Follows with Dr. Bernal as primary cardiologist.       Review of Systems:  Review of Systems   Constitutional:  Negative for chills and fever.   HENT:  Negative for ear pain and sore throat.    Eyes:  Negative for pain and visual disturbance.   Respiratory:  Negative for cough and shortness of breath.    Cardiovascular:   "Negative for chest pain, palpitations and leg swelling.   Gastrointestinal:  Negative for abdominal pain and vomiting.   Genitourinary:  Negative for dysuria and hematuria.   Musculoskeletal:  Negative for arthralgias and back pain.   Skin:  Negative for color change and rash.   Neurological:  Negative for seizures and syncope.   All other systems reviewed and are negative.      PHYSICAL EXAM:  Vitals:   Vitals:    02/24/25 1625   Pulse: 82   Resp: 16   SpO2: 95%   Weight: 104 kg (230 lb)   Height: 6' 1\" (1.854 m)        Physical Exam:  GEN: Alert and oriented x 3, in no acute distress.  Well appearing and well nourished.   HEENT: Sclera anicteric, conjunctivae pink, mucous membranes moist. Oropharynx clear.   NECK: Supple, no carotid bruits, no significant JVD. Trachea midline, no thyromegaly.   HEART: Irregular rhythm, normal S1 and S2, no murmurs, clicks, gallops or rubs. PMI nondisplaced, no thrills.   LUNGS: Clear to auscultation bilaterally; no wheezes, rales, or rhonchi. No increased work of breathing or signs of respiratory distress.   ABDOMEN: Soft, nontender, nondistended, normoactive bowel sounds.   EXTREMITIES: Skin warm and well perfused, no clubbing or cyanosis, no edema.  NEURO: No focal findings. Normal speech. Mood and affect normal.   SKIN: Normal without suspicious lesions on exposed skin.    Follow up: 4 months or sooner as needed    No Known Allergies      Current Outpatient Medications:     acetaminophen-codeine (TYLENOL with CODEINE #4) 300-60 MG per tablet, Take 1 tablet by mouth every 6 (six) hours as needed for moderate pain, Disp: 60 tablet, Rfl: 0    albuterol (2.5 mg/3 mL) 0.083 % nebulizer solution, Take 3 mL (2.5 mg total) by nebulization every 6 (six) hours as needed for wheezing or shortness of breath, Disp: 360 mL, Rfl: 3    allopurinol (ZYLOPRIM) 100 mg tablet, Take 1 tablet (100 mg total) by mouth daily, Disp: 90 tablet, Rfl: 3    amLODIPine (NORVASC) 10 mg tablet, Take 1 tablet " (10 mg total) by mouth every morning, Disp: 90 tablet, Rfl: 1    apixaban (Eliquis) 5 mg, Take 1 tablet (5 mg total) by mouth 2 (two) times a day, Disp: 60 tablet, Rfl: 3    Budeson-Glycopyrrol-Formoterol (Breztri Aerosphere) 160-9-4.8 MCG/ACT AERO, Inhale 2 puffs 2 (two) times a day Rinse mouth after use., Disp: 31.1 g, Rfl: 5    cholecalciferol (VITAMIN D3) 1,000 units tablet, Take 5 tablets (5,000 Units total) by mouth every other day, Disp: , Rfl:     losartan (COZAAR) 100 MG tablet, Take 1 tablet (100 mg total) by mouth daily, Disp: 90 tablet, Rfl: 1    metoprolol succinate (TOPROL-XL) 25 mg 24 hr tablet, Take 1 tablet (25 mg total) by mouth daily, Disp: 90 tablet, Rfl: 3    Polyvinyl Alcohol-Povidone (REFRESH OP), Apply to eye as needed , Disp: , Rfl:     prochlorperazine (COMPAZINE) 10 mg tablet, TAKE ONE TABLET BY MOUTH EVERY 6 HOURS AS NEEDED FOR NAUSEA OR VOMITING, Disp: 30 tablet, Rfl: 0    Vitamins-Lipotropics (LIPO-FLAVONOID PLUS PO), Take by mouth, Disp: , Rfl:     Past Medical History:   Diagnosis Date    Acute bronchitis 01/02/2018    Arthralgia of left knee 04/25/2024    Arthritis of hand 09/11/2024    Back pain     Bronchiectasis (HCC)     Cancer ([facility]) 01/2014    On file    Cataract     Changes in skin texture 05/20/2014    Chronic kidney disease     COPD (chronic obstructive pulmonary disease) ([facility])     Elevated white blood cell count 12/12/2017    Esophageal reflux 02/07/2014    Fissure, anal 05/08/2015    Generalized osteoarthritis of multiple sites 06/09/2015    Gout 12/16/2014    Hand pain 12/16/2014    History of malignant neoplasm of oral cavity     M0    HL (hearing loss)     Hypertension     Inflammation of sacroiliac joint (HCC) 09/11/2024    Iron deficiency anemia 02/07/2014    Kidney stone 05/2019    Knee pain, right 07/18/2017    Low back pain 04/25/2019 04/25/2019 - Lifting Exercise Ball with Feet.      Lung nodule 01/02/2015    Malignant neoplasm of colon (HCC)     descending     Nausea 07/01/2014    Nonhealing surgical wound 06/15/2021    Prostate cancer (HCC)     Renal mass, right 01/02/2015    SOB (shortness of breath)     Tachycardia     Thalassemia trait     Tinnitus     Weight loss, non-intentional 12/23/2014       Family History   Problem Relation Age of Onset    Heart failure Mother         as per [employer]    Coronary artery disease Mother         as per [employer]    Diabetes Mother         mellitus - as per [employer]    Coronary artery disease Father         as per [employer]    Cancer Sister         malignant neoplasm       Past Medical History:   Diagnosis Date    Acute bronchitis 01/02/2018    Arthralgia of left knee 04/25/2024    Arthritis of hand 09/11/2024    Back pain     Bronchiectasis (HCC)     Cancer (HCC) 01/2014    On file    Cataract     Changes in skin texture 05/20/2014    Chronic kidney disease     COPD (chronic obstructive pulmonary disease) (HCC)     Elevated white blood cell count 12/12/2017    Esophageal reflux 02/07/2014    Fissure, anal 05/08/2015    Generalized osteoarthritis of multiple sites 06/09/2015    Gout 12/16/2014    Hand pain 12/16/2014    History of malignant neoplasm of oral cavity     M0    HL (hearing loss)     Hypertension     Inflammation of sacroiliac joint (HCC) 09/11/2024    Iron deficiency anemia 02/07/2014    Kidney stone 05/2019    Knee pain, right 07/18/2017    Low back pain 04/25/2019 04/25/2019 - Lifting Exercise Ball with Feet.      Lung nodule 01/02/2015    Malignant neoplasm of colon (HCC)     descending    Nausea 07/01/2014    Nonhealing surgical wound 06/15/2021    Prostate cancer (HCC)     Renal mass, right 01/02/2015    SOB (shortness of breath)     Tachycardia     Thalassemia trait     Tinnitus     Weight loss, non-intentional 12/23/2014       Past Surgical History:   Procedure Laterality Date    BACK SURGERY      CARDIAC ELECTROPHYSIOLOGY PROCEDURE N/A 5/2/2024    Procedure: Cardiac pacer implant;  Surgeon: Fredrick DEVLIN  MD Dolores;  Location: [facility];  Service: Cardiology    CHOLECYSTECTOMY      COLON SURGERY  10/2008    partial colectomy    EYE SURGERY      INCISIONAL HERNIA REPAIR      JOINT REPLACEMENT Right     hip    [facility] COLONOSCOPY FLX DX W/COLLJ SPEC WHEN PFRMD N/A 2018    Procedure: COLONOSCOPY;  Surgeon: Jv Toribio III, MD;  Location: [facility];  Service: Gastroenterology    PROSTATE SURGERY      SPINAL CORD STIMULATOR IMPLANT      TONSILLECTOMY         Social History     Socioeconomic History    Marital status: /Civil Union     Spouse name: Not on file    Number of children: Not on file    Years of education: Not on file    Highest education level: Not on file   Occupational History    Occupation: Cook   Tobacco Use    Smoking status: Former     Current packs/day: 0.00     Average packs/day: 0.3 packs/day for 48.3 years (15.0 ttl pk-yrs)     Types: Cigarettes     Start date: 1960     Quit date: 2004     Years since quittin.7     Passive exposure: Past    Smokeless tobacco: Never    Tobacco comments:     non smoker/tobacco user; quit  as per [employer]   Vaping Use    Vaping status: Never Used   Substance and Sexual Activity    Alcohol use: Not Currently    Drug use: No    Sexual activity: Not Currently     Partners: Female   Other Topics Concern    Not on file   Social History Narrative    Active directive    Living independently with spouse    Hx of Living will on file     Social Drivers of Health     Financial Resource Strain: Low Risk  (2023)    Overall Financial Resource Strain (CARDIA)     Difficulty of Paying Living Expenses: Not hard at all   Food Insecurity: No Food Insecurity (2024)    Nursing - Inadequate Food Risk Classification     Worried About Running Out of Food in the Last Year: Never true     Ran Out of Food in the Last Year: Never true     Ran Out of Food in the Last Year: Not on file   Transportation Needs: No Transportation Needs (2024)     PRAPARE - Transportation     Lack of Transportation (Medical): No     Lack of Transportation (Non-Medical): No   Physical Activity: Inactive (1/24/2023)    Exercise Vital Sign     Days of Exercise per Week: 0 days     Minutes of Exercise per Session: 0 min   Stress: No Stress Concern Present (1/7/2021)    [employer] - Occupational Stress Questionnaire     Feeling of Stress : Not at all   Social Connections: Unknown (6/18/2024)    Received from [employer]     How often do you feel lonely or isolated from those around you? (Adult - for ages 18 years and over): Not on file   Intimate Partner Violence: Not on file   Housing Stability: Low Risk  (9/5/2024)    Housing Stability Vital Sign     Unable to Pay for Housing in the Last Year: No     Number of Times Moved in the Last Year: 1     Homeless in the Last Year: No         LABORATORY RESULTS:    Lab Results   Component Value Date    WBC 7.76 01/08/2025    HGB 14.1 01/08/2025    HCT 46.2 01/08/2025    MCV 80 (L) 01/08/2025     01/08/2025     Lab Results   Component Value Date    GLUCOSE 97 08/18/2015    CALCIUM 9.9 01/08/2025     08/18/2015    K 3.8 01/08/2025    CO2 30 01/08/2025     01/08/2025    BUN 15 01/08/2025    CREATININE 1.56 (H) 01/08/2025     Lab Results   Component Value Date    HGBA1C 6.0 (H) 07/08/2024       Lipid Profile:   Lab Results   Component Value Date    CHOL 112 08/18/2015    CHOL 122 04/07/2015    CHOL 122 12/02/2014     Lab Results   Component Value Date    HDL 50 07/08/2024    HDL 42 01/17/2024    HDL 48 07/19/2023     Lab Results   Component Value Date    LDLCALC 53 07/08/2024    LDLCALC 49 01/17/2024    LDLCALC 42 07/19/2023     Lab Results   Component Value Date    TRIG 89 07/08/2024    TRIG 93 01/17/2024    TRIG 72 07/19/2023       The ASCVD Risk score (Andressa DK, et al., 2019) failed to calculate for the following reasons:    The 2019 ASCVD risk score is only valid for  ages 40 to 79    1. Persistent atrial fibrillation        2. CHB s/p MDT DC PPM        3. Primary hypertension        4. Coronary artery calcification        5. History of NSVT            Imaging: I have reviewed all pertinent imaging studies.      Recommend aggressive risk factor modification and therapeutic lifestyle changes.  Low-salt, low-calorie, low-fat, low-cholesterol diet with regular exercise and to optimize weight.    Discussed concepts of atherosclerosis, including signs and symptoms of cardiac disease.    Medications reviewed and possible side effects discussed.  Previous studies were reviewed.    Safety measures were reviewed.  All questions and concerns addressed.  Patient was advised to report any problems requiring medical attention.    Follow-up with PCP and appropriate specialist and lab work as discussed.    Return for follow up visit as scheduled or earlier, if needed.  Thank you for allowing me to participate in the care and evaluation of your patient.  Should you have any questions, please feel free to contact me.    Anson Shahid PA-C  2/24/2025,5:08 PM

## 2025-02-24 NOTE — ASSESSMENT & PLAN NOTE
Not on ASA due to increased bleeding risk with AC.  LDL is well-controlled without statin.  Continue dietary control and risk factor modification.

## 2025-02-24 NOTE — ASSESSMENT & PLAN NOTE
100% AF burden on most recent device report.  Currently asymptomatic.  HR is well-controlled, continue Toprol-XL.  [id number] at least 3, continue Eliquis 5 mg bid.  Advised to notify our office for any new/worsening symptoms.

## 2025-02-24 NOTE — ASSESSMENT & PLAN NOTE
BP is well-controlled.  Continue amlodipine, losartan, and Toprol-XL.  Encourage ambulatory monitoring and low-sodium diet.

## 2025-02-26 VITALS
HEIGHT: 73 IN | WEIGHT: 230 LBS | DIASTOLIC BLOOD PRESSURE: 80 MMHG | RESPIRATION RATE: 16 BRPM | BODY MASS INDEX: 30.48 KG/M2 | HEART RATE: 82 BPM | SYSTOLIC BLOOD PRESSURE: 118 MMHG | OXYGEN SATURATION: 95 %

## 2025-03-03 ENCOUNTER — REMOTE DEVICE CLINIC VISIT (OUTPATIENT)
Dept: CARDIOLOGY CLINIC | Facility: CLINIC | Age: 85
End: 2025-03-03
Payer: COMMERCIAL

## 2025-03-03 ENCOUNTER — RESULTS FOLLOW-UP (OUTPATIENT)
Dept: CARDIOLOGY CLINIC | Facility: CLINIC | Age: 85
End: 2025-03-03

## 2025-03-03 DIAGNOSIS — Z95.0 PRESENCE OF PERMANENT CARDIAC PACEMAKER: Primary | ICD-10-CM

## 2025-03-03 PROCEDURE — 93294 REM INTERROG EVL PM/LDLS PM: CPT | Performed by: INTERNAL MEDICINE

## 2025-03-03 PROCEDURE — 93296 REM INTERROG EVL PM/IDS: CPT | Performed by: INTERNAL MEDICINE

## 2025-03-03 NOTE — PROGRESS NOTES
Results for orders placed or performed in visit on 03/03/25   Cardiac EP device report    Narrative    MDT DC PM/ACTIVE SYSTEM IS MRI CONDITIONAL  CARELINK TRANSMISSION: BATTERY VOLTAGE ADEQUATE. (12.4 YRS) AP <1%  99%. ALL AVAILABLE LEAD PARAMETERS WITHIN NORMAL LIMITS. NO SIGNIFICANT HIGH RATE EPISODES. CURRENTLY IN AF. PATIENT IS ON ELIQUIS AND METOPROLOL. NORMAL DEVICE FUNCTION.---ROTHMAN

## 2025-03-16 DIAGNOSIS — M54.16 LUMBAR RADICULOPATHY: ICD-10-CM

## 2025-03-16 DIAGNOSIS — I10 HYPERTENSION, UNSPECIFIED TYPE: ICD-10-CM

## 2025-03-17 RX ORDER — ACETAMINOPHEN AND CODEINE PHOSPHATE 300; 60 MG/1; MG/1
1 TABLET ORAL EVERY 6 HOURS PRN
Qty: 120 TABLET | Refills: 0 | Status: SHIPPED | OUTPATIENT
Start: 2025-03-17

## 2025-03-17 RX ORDER — LOSARTAN POTASSIUM 100 MG/1
100 TABLET ORAL DAILY
Qty: 90 TABLET | Refills: 1 | Status: SHIPPED | OUTPATIENT
Start: 2025-03-17

## 2025-03-19 ENCOUNTER — OFFICE VISIT (OUTPATIENT)
Age: 85
End: 2025-03-19
Payer: COMMERCIAL

## 2025-03-19 VITALS
TEMPERATURE: 97.7 F | BODY MASS INDEX: 31.14 KG/M2 | HEIGHT: 73 IN | WEIGHT: 235 LBS | DIASTOLIC BLOOD PRESSURE: 76 MMHG | OXYGEN SATURATION: 93 % | HEART RATE: 89 BPM | SYSTOLIC BLOOD PRESSURE: 124 MMHG

## 2025-03-19 DIAGNOSIS — R06.02 SOB (SHORTNESS OF BREATH) ON EXERTION: Primary | ICD-10-CM

## 2025-03-19 DIAGNOSIS — J96.11 CHRONIC HYPOXEMIC RESPIRATORY FAILURE (HCC): ICD-10-CM

## 2025-03-19 DIAGNOSIS — J43.2 CENTRILOBULAR EMPHYSEMA (HCC): ICD-10-CM

## 2025-03-19 DIAGNOSIS — J44.9 COPD, MILD (HCC): ICD-10-CM

## 2025-03-19 DIAGNOSIS — E66.9 OBESITY (BMI 30-39.9): ICD-10-CM

## 2025-03-19 PROCEDURE — 99214 OFFICE O/P EST MOD 30 MIN: CPT | Performed by: INTERNAL MEDICINE

## 2025-03-19 PROCEDURE — G2211 COMPLEX E/M VISIT ADD ON: HCPCS | Performed by: INTERNAL MEDICINE

## 2025-03-19 NOTE — PROGRESS NOTES
Follow-up  Visit - Pulmonary Medicine   Name: Darrell Mayer      : 1940      MRN: [id number]  Encounter Provider: Suzanne Fletcher MD  Encounter Date: 3/19/2025   Encounter department: [facility] PULMONARY [facility]  :  Assessment & Plan  SOB (shortness of breath) on exertion  Shortness of breath and dyspnea on exertion likely multifactorial with underlying COPD as well as cardiac in nature given his significant heart block and new onset A-fib status post pacemaker placement  Discussed with the patient to continue with Breztri 2 puffs twice daily  Continue with nebulizer with albuterol 4 times daily as needed       COPD, mild (HCC)  Answers submitted by the patient for this visit:  Pulmonology Questionnaire (Submitted on 3/15/2025)  Chief Complaint: Primary symptoms  Chronicity: recurrent  When did you first notice your symptoms?: more than 1 year ago  How often do your symptoms occur?: intermittently  Since you first noticed this problem, how has it changed?: gradually worsening  Do you have shortness of breath that occurs with effort or exertion?: Yes  Do you have ear congestion?: No  Do you have heartburn?: No  Do you have fatigue?: Yes  Do you have nasal congestion?: No  Do you have shortness of breath when lying flat?: No  Do you have shortness of breath when you wake up?: Yes  Do you have sweats?: No  Have you experienced weight loss?: No  Which of the following makes your symptoms worse?: any activity, climbing stairs, exercise, strenuous activity  Which of the following makes your symptoms better?: nothing  Mild COPD in the prior PFTs with FEV1 of 52% plan is about to continue with Breztri 2 puffs twice daily  Rinse mouth after use  Continue with albuterol via the nebulizer 4 times daily as needed         Chronic hypoxemic respiratory failure (HCC)  Chronic hypoxemic respiratory failure on 2 L of oxygen using it at nighttime       Centrilobular emphysema (HCC)  Centrilobular emphysema  "on the CT of the chest, PFTs with mild obstructive airflow limitation on the PFTs recent PFTs       Obesity (BMI 30-39.9)  Recommend weight loss         No follow-ups on file.    History of Present Illness   Darrell Mayer is a 84 y.o. male who presents for follow-up he states his shortness of breath and dyspnea on exertion is slowly getting worse he did have a PFT done  Also spouse states that he has not been moving around much he did not complete his pulmonary rehab but not exercising currently.    Review of Systems   Constitutional:  Positive for fatigue. Negative for appetite change and fever.   HENT: Negative.  Negative for ear pain, postnasal drip, rhinorrhea, sneezing, sore throat and trouble swallowing.    Eyes: Negative.    Respiratory:  Positive for shortness of breath.    Cardiovascular: Negative.  Negative for chest pain.   Gastrointestinal: Negative.    Endocrine: Negative.    Musculoskeletal:  Positive for myalgias.   Allergic/Immunologic: Negative.    Neurological: Negative.  Negative for headaches.   Psychiatric/Behavioral: Negative.         Aside from what is mentioned in the HPI, ROS is otherwise negative    Medical History Reviewed by provider this encounter:  Meds  Problems     .     Medical History Reviewed by provider this encounter:     .    Objective   /76   Pulse 89   Temp 97.7 °F (36.5 °C)   Ht 6' 1\" (1.854 m)   Wt 107 kg (235 lb)   SpO2 93% Comment: with 2 lts of oxygen  BMI 31.00 kg/m²     Physical Exam  Vitals and nursing note reviewed.   Constitutional:       Appearance: He is well-developed.   HENT:      Head: Normocephalic and atraumatic.   Eyes:      Conjunctiva/sclera: Conjunctivae normal.      Pupils: Pupils are equal, round, and reactive to light.   Neck:      Thyroid: No thyromegaly.      Vascular: No JVD.   Cardiovascular:      Rate and Rhythm: Normal rate and regular rhythm.      Heart sounds: Normal heart sounds. No murmur heard.     No friction rub. No gallop. "   Pulmonary:      Effort: Pulmonary effort is normal. No respiratory distress.      Breath sounds: Normal breath sounds. No wheezing or rales.   Chest:      Chest wall: No tenderness.   Musculoskeletal:         General: No tenderness or deformity. Normal range of motion.      Cervical back: Normal range of motion and neck supple.   Lymphadenopathy:      Cervical: No cervical adenopathy.   Skin:     General: Skin is warm and dry.   Neurological:      Mental Status: He is alert and oriented to person, place, and time.           Diagnostic Data:  Labs: I personally reviewed the most recent laboratory data pertinent to today's visit.      Radiology results:  Radiology Results Review: I personally reviewed the following image studies in PACS and associated radiology reports: CT chest. My interpretation of the radiology images/reports is: 6/6/2024.  Mild emphysematous changes bilaterally small lung nodules bilaterally stable asbestos-related pleural plaques      PFT/spirometry results:  Mild obstructive airflow defect with FEV1 of 52% positive bronchodilator response moderate restriction as indicated by lung volumes and moderately decreased DLCO patient had a 6-minute walk test patient could walk for only 1 minute after which he stopped because of back pain and shortness of breath      Suzanne Fletcher MD

## 2025-03-19 NOTE — ASSESSMENT & PLAN NOTE
Centrilobular emphysema on the CT of the chest, PFTs with mild obstructive airflow limitation on the PFTs recent PFTs

## 2025-03-31 DIAGNOSIS — M10.9 GOUT, UNSPECIFIED CAUSE, UNSPECIFIED CHRONICITY, UNSPECIFIED SITE: ICD-10-CM

## 2025-04-02 RX ORDER — ALLOPURINOL 100 MG/1
100 TABLET ORAL DAILY
Qty: 90 TABLET | Refills: 1 | Status: SHIPPED | OUTPATIENT
Start: 2025-04-02

## 2025-05-01 DIAGNOSIS — I48.91 NEW ONSET A-FIB (HCC): ICD-10-CM

## 2025-05-01 NOTE — TELEPHONE ENCOUNTER
apixaban (Eliquis) 5 mg         Sig: Take 1 tablet (5 mg total) by mouth 2 (two) times a day    Disp: 60 tablet    Refills: 3    Start: 5/1/2025    Class: Normal    For: New onset a-fib (HCC)    Last ordered: 5 months ago (11/4/2024) by HUA Carver    Hematology:  Anticoagulants [id number]/2025 12:37 PM   Protocol Details eGFR is 60 or above and within 360 days    Valid encounter within last 6 months    HGB in normal range and within 360 days    PLT in normal range and within 360 days    HCT in normal range and within 360 days      To be filled at: [id number] [place], [place] - [address]

## 2025-05-01 NOTE — TELEPHONE ENCOUNTER
Spouse called to confirm request for Eliquis 5mg was received, advised in process pending approval with high priority  Confirmed shay ramírez  He has 6 pills left

## 2025-05-01 NOTE — TELEPHONE ENCOUNTER
Medication: eliquis    Dose/Frequency: 5 mg 2 times a day    Quantity: 60    Pharmacy: [facility]     Office:   [] PCP/Provider -   [x] Speciality/Provider - Dorota SEQUEIRA    Does the patient have enough for 3 days?   [] Yes   [x] No - Send as HP to POD

## 2025-06-02 ENCOUNTER — RESULTS FOLLOW-UP (OUTPATIENT)
Dept: CARDIOLOGY CLINIC | Facility: CLINIC | Age: 85
End: 2025-06-02

## 2025-06-02 ENCOUNTER — REMOTE DEVICE CLINIC VISIT (OUTPATIENT)
Dept: CARDIOLOGY CLINIC | Facility: CLINIC | Age: 85
End: 2025-06-02
Payer: COMMERCIAL

## 2025-06-02 DIAGNOSIS — Z95.0 CARDIAC PACEMAKER IN SITU: Primary | ICD-10-CM

## 2025-06-02 PROCEDURE — 93294 REM INTERROG EVL PM/LDLS PM: CPT | Performed by: INTERNAL MEDICINE

## 2025-06-02 PROCEDURE — 93296 REM INTERROG EVL PM/IDS: CPT | Performed by: INTERNAL MEDICINE

## 2025-06-02 NOTE — PROGRESS NOTES
Results for orders placed or performed in visit on 06/02/25   Cardiac EP device report    Narrative    MDT DC PM/ACTIVE SYSTEM IS MRI CONDITIONAL  CARELINK TRANSMISSION: BATTERY VOLTAGE ADEQUATE (12.3 YRS). AP<0.1%, -93% (>40% DDD@60PPM/MVP OFF/AF). ALL AVAILABLE LEAD PARAMETERS WITHIN NORMAL LIMITS. NO NEW SIGNIFICANT HIGH RATE EPISODES. PT IN AF SINCE 10/24/24. PT ON ELIQUIS & METOPROLOL. NORMAL DEVICE FUNCTION. GV

## 2025-07-01 ENCOUNTER — APPOINTMENT (EMERGENCY)
Dept: CT IMAGING | Facility: HOSPITAL | Age: 85
End: 2025-07-01
Payer: COMMERCIAL

## 2025-07-01 ENCOUNTER — HOSPITAL ENCOUNTER (EMERGENCY)
Facility: HOSPITAL | Age: 85
Discharge: HOME | End: 2025-07-01
Payer: COMMERCIAL

## 2025-07-01 VITALS
TEMPERATURE: 98.1 F | OXYGEN SATURATION: 94 % | SYSTOLIC BLOOD PRESSURE: 125 MMHG | RESPIRATION RATE: 18 BRPM | HEART RATE: 72 BPM | DIASTOLIC BLOOD PRESSURE: 82 MMHG

## 2025-07-01 DIAGNOSIS — N20.1 URETEROLITHIASIS: Primary | ICD-10-CM

## 2025-07-01 LAB
ALBUMIN SERPL BCG-MCNC: 4.3 G/DL (ref 3.5–5)
ALP SERPL-CCNC: 78 U/L (ref 34–104)
ALT SERPL W P-5'-P-CCNC: 13 U/L (ref 7–52)
ANION GAP SERPL CALCULATED.3IONS-SCNC: 7 MMOL/L (ref 4–13)
AST SERPL W P-5'-P-CCNC: 27 U/L (ref 13–39)
BASOPHILS # BLD AUTO: 0.01 THOUSANDS/ÂΜL (ref 0–0.1)
BASOPHILS NFR BLD AUTO: 0 % (ref 0–1)
BILIRUB SERPL-MCNC: 1.51 MG/DL (ref 0.2–1)
BILIRUB UR QL STRIP: NEGATIVE
BUN SERPL-MCNC: 12 MG/DL (ref 5–25)
CALCIUM SERPL-MCNC: 9.3 MG/DL (ref 8.4–10.2)
CHLORIDE SERPL-SCNC: 102 MMOL/L (ref 96–108)
CLARITY UR: CLEAR
CO2 SERPL-SCNC: 27 MMOL/L (ref 21–32)
COLOR UR: COLORLESS
CREAT SERPL-MCNC: 1.3 MG/DL (ref 0.6–1.3)
EOSINOPHIL # BLD AUTO: 0.03 THOUSAND/ÂΜL (ref 0–0.61)
EOSINOPHIL NFR BLD AUTO: 1 % (ref 0–6)
ERYTHROCYTE [DISTWIDTH] IN BLOOD BY AUTOMATED COUNT: 16.5 % (ref 11.6–15.1)
GFR SERPL CREATININE-BSD FRML MDRD: 50 ML/MIN/1.73SQ M
GLUCOSE SERPL-MCNC: 106 MG/DL (ref 65–140)
GLUCOSE UR STRIP-MCNC: NEGATIVE MG/DL
HCT VFR BLD AUTO: 43.1 % (ref 36.5–49.3)
HGB BLD-MCNC: 13.4 G/DL (ref 12–17)
HGB UR QL STRIP.AUTO: NEGATIVE
IMM GRANULOCYTES # BLD AUTO: 0.02 THOUSAND/UL (ref 0–0.2)
IMM GRANULOCYTES NFR BLD AUTO: 0 % (ref 0–2)
KETONES UR STRIP-MCNC: NEGATIVE MG/DL
LEUKOCYTE ESTERASE UR QL STRIP: NEGATIVE
LIPASE SERPL-CCNC: 46 U/L (ref 11–82)
LYMPHOCYTES # BLD AUTO: 1.11 THOUSANDS/ÂΜL (ref 0.6–4.47)
LYMPHOCYTES NFR BLD AUTO: 17 % (ref 14–44)
MCH RBC QN AUTO: 24.6 PG (ref 26.8–34.3)
MCHC RBC AUTO-ENTMCNC: 31.1 G/DL (ref 31.4–37.4)
MCV RBC AUTO: 79 FL (ref 82–98)
MONOCYTES # BLD AUTO: 0.38 THOUSAND/ÂΜL (ref 0.17–1.22)
MONOCYTES NFR BLD AUTO: 6 % (ref 4–12)
NEUTROPHILS # BLD AUTO: 5.01 THOUSANDS/ÂΜL (ref 1.85–7.62)
NEUTS SEG NFR BLD AUTO: 76 % (ref 43–75)
NITRITE UR QL STRIP: NEGATIVE
NRBC BLD AUTO-RTO: 0 /100 WBCS
PH UR STRIP.AUTO: 5.5 [PH]
PLATELET # BLD AUTO: 206 THOUSANDS/UL (ref 149–390)
PMV BLD AUTO: 10.9 FL (ref 8.9–12.7)
POTASSIUM SERPL-SCNC: 3.9 MMOL/L (ref 3.5–5.3)
PROT SERPL-MCNC: 7 G/DL (ref 6.4–8.4)
PROT UR STRIP-MCNC: NEGATIVE MG/DL
RBC # BLD AUTO: 5.45 MILLION/UL (ref 3.88–5.62)
SODIUM SERPL-SCNC: 136 MMOL/L (ref 135–147)
SP GR UR STRIP.AUTO: 1.03 (ref 1–1.03)
UROBILINOGEN UR STRIP-ACNC: <2 MG/DL
WBC # BLD AUTO: 6.56 THOUSAND/UL (ref 4.31–10.16)

## 2025-07-01 PROCEDURE — 96361 HYDRATE IV INFUSION ADD-ON: CPT

## 2025-07-01 PROCEDURE — 96360 HYDRATION IV INFUSION INIT: CPT

## 2025-07-01 PROCEDURE — 36415 COLL VENOUS BLD VENIPUNCTURE: CPT

## 2025-07-01 PROCEDURE — 81003 URINALYSIS AUTO W/O SCOPE: CPT

## 2025-07-01 PROCEDURE — 80053 COMPREHEN METABOLIC PANEL: CPT

## 2025-07-01 PROCEDURE — 83690 ASSAY OF LIPASE: CPT

## 2025-07-01 PROCEDURE — 99285 EMERGENCY DEPT VISIT HI MDM: CPT

## 2025-07-01 PROCEDURE — 85025 COMPLETE CBC W/AUTO DIFF WBC: CPT

## 2025-07-01 PROCEDURE — 74177 CT ABD & PELVIS W/CONTRAST: CPT

## 2025-07-01 PROCEDURE — 99284 EMERGENCY DEPT VISIT MOD MDM: CPT

## 2025-07-01 RX ORDER — ONDANSETRON 4 MG/1
4 TABLET, ORALLY DISINTEGRATING ORAL EVERY 6 HOURS PRN
Qty: 30 TABLET | Refills: 0 | Status: SHIPPED | OUTPATIENT
Start: 2025-07-01

## 2025-07-01 RX ADMIN — SODIUM CHLORIDE 1000 ML: 0.9 INJECTION, SOLUTION INTRAVENOUS at 15:56

## 2025-07-01 RX ADMIN — IOHEXOL 100 ML: 350 INJECTION, SOLUTION INTRAVENOUS at 16:44

## 2025-07-01 NOTE — ED PROVIDER NOTES
Time reflects when diagnosis was documented in both MDM as applicable and the Disposition within this note       Time User Action Codes Description Comment    7/1/2025  6:03 PM Dell Escobar Add [N20.1] Ureterolithiasis           ED Disposition       ED Disposition   Discharge    Condition   Stable    Date/Time   Tue Jul 1, 2025  6:02 PM    Comment   Darrell Mayer discharge to home/self care.                   Assessment & Plan       Medical Decision Making  84-year-old male with history of COPD, emphysema, type 2 diabetes, atrial fibrillation, and history of prior partial colectomy and prostate cancer status post prostatectomy presenting today for valuation of 2 days of intermittent right lower quadrant pain.    Differential: Appendicitis, small bowel obstruction, metastatic process, colitis, musculoskeletal abdominal strain.    Plan: CBC, CMP, lipase, CT abdomen pelvis.  Patient declines pain medications at this time.  Plan to give 1 L fluids    Patient CT showing acute 5 mm proximal right ureteral calculus distal to the UPJ causing hydroureteronephrosis.  Patient UA showing no evidence of infection.  Patient states pain is well-controlled at this time.  Will plan to discharge with instructions to use prescribed Tylenol with codeine at home and follow-up with urology.  Return precautions given, all question answered.    Amount and/or Complexity of Data Reviewed  Labs: ordered.  Radiology: ordered.    Risk  Prescription drug management.             Medications   sodium chloride 0.9 % bolus 1,000 mL (0 mL Intravenous Stopped 7/1/25 1816)   iohexol (OMNIPAQUE) 350 MG/ML injection (MULTI-DOSE) 100 mL (100 mL Intravenous Given 7/1/25 1644)       ED Risk Strat Scores                    No data recorded                            History of Present Illness       Chief Complaint   Patient presents with    Abdominal Pain     Pt arrives c/o RLQ pain x 1 day. Pt denies N/V. Hx of kidney stones       Past Medical History[1]    Past Surgical History[2]   Family History[3]   Social History[4]   E-Cigarette/Vaping    E-Cigarette Use Never User       E-Cigarette/Vaping Substances    Nicotine No     THC No     CBD No     Flavoring No     Other No     Unknown No       I have reviewed and agree with the history as documented.     Patient is an 84-year-old male with history of COPD, emphysema, type 2 diabetes, atrial fibrillation, history of prior partial colectomy, and history of prostate cancer status post prostatectomy presents today for evaluation of abdominal pain.  Patient states that he had 2 days of intermittent right lower quadrant abdominal pain.  He states that the pain started randomly without specific provoking event.  He denies any associated fevers, nausea, vomiting, dysuria, hematuria, urinary frequency, diarrhea, constipation.  He states he does have a history of kidney stones and is not sure if this feels the same.  He endorses a history of        Review of Systems   Constitutional:  Negative for chills and fever.   HENT:  Negative for congestion, rhinorrhea and sore throat.    Eyes:  Negative for pain and redness.   Respiratory:  Negative for cough and shortness of breath.    Cardiovascular:  Negative for chest pain and palpitations.   Gastrointestinal:  Positive for abdominal pain. Negative for constipation, diarrhea, nausea and vomiting.   Genitourinary:  Negative for dysuria and hematuria.   Musculoskeletal:  Negative for back pain and neck pain.   Skin:  Negative for pallor and rash.   Neurological:  Negative for weakness and numbness.   All other systems reviewed and are negative.          Objective       ED Triage Vitals [07/01/25 1522]   Temperature Pulse Blood Pressure Respirations SpO2 Patient Position - Orthostatic VS   98.1 °F (36.7 °C) 72 125/82 18 94 % Sitting      Temp Source Heart Rate Source BP Location FiO2 (%) Pain Score    Temporal Monitor Left arm -- --      Vitals      Date and Time Temp Pulse SpO2 Resp  BP Pain Score FACES Pain Rating User   07/01/25 1522 98.1 °F (36.7 °C) 72 94 % 18 125/82 -- -- EP            Physical Exam  Vitals and nursing note reviewed.   Constitutional:       General: He is not in acute distress.     Appearance: Normal appearance. He is well-developed. He is obese. He is not ill-appearing, toxic-appearing or diaphoretic.   HENT:      Head: Normocephalic and atraumatic.      Right Ear: External ear normal.      Left Ear: External ear normal.      Nose: Nose normal. No congestion or rhinorrhea.      Mouth/Throat:      Mouth: Mucous membranes are moist.     Eyes:      General: No scleral icterus.        Right eye: No discharge.         Left eye: No discharge.      Conjunctiva/sclera: Conjunctivae normal.       Cardiovascular:      Rate and Rhythm: Normal rate and regular rhythm.      Pulses: Normal pulses.      Heart sounds: Normal heart sounds. No murmur heard.     No friction rub. No gallop.   Pulmonary:      Effort: Pulmonary effort is normal. No respiratory distress.      Breath sounds: Normal breath sounds. No stridor. No wheezing, rhonchi or rales.   Abdominal:      General: Abdomen is flat. Bowel sounds are normal.      Palpations: Abdomen is soft.      Tenderness: There is abdominal tenderness in the right lower quadrant. There is no right CVA tenderness, left CVA tenderness, guarding or rebound. Negative signs include Gaona's sign and McBurney's sign.     Musculoskeletal:         General: Normal range of motion.      Cervical back: Normal range of motion and neck supple. No rigidity or tenderness.     Skin:     General: Skin is warm and dry.      Capillary Refill: Capillary refill takes less than 2 seconds.      Coloration: Skin is not jaundiced or pale.     Neurological:      General: No focal deficit present.      Mental Status: He is alert and oriented to person, place, and time.     Psychiatric:         Mood and Affect: Mood normal.         Behavior: Behavior normal.          Results Reviewed       Procedure Component Value Units Date/Time    UA w Reflex to Microscopic w Reflex to Culture [[id number]]  (Abnormal) Collected: 07/01/25 1744    Lab Status: Final result Specimen: Urine, Clean Catch Updated: 07/01/25 1751     Color, UA Colorless     Clarity, UA Clear     Specific Gravity, UA 1.032     pH, UA 5.5     Leukocytes, UA Negative     Nitrite, UA Negative     Protein, UA Negative mg/dl      Glucose, UA Negative mg/dl      Ketones, UA Negative mg/dl      Urobilinogen, UA <2.0 mg/dl      Bilirubin, UA Negative     Occult Blood, UA Negative    Comprehensive metabolic panel [[id number]]  (Abnormal) Collected: 07/01/25 1553    Lab Status: Final result Specimen: Blood from Arm, Right Updated: 07/01/25 1619     Sodium 136 mmol/L      Potassium 3.9 mmol/L      Chloride 102 mmol/L      CO2 27 mmol/L      ANION GAP 7 mmol/L      BUN 12 mg/dL      Creatinine 1.30 mg/dL      Glucose 106 mg/dL      Calcium 9.3 mg/dL      AST 27 U/L      ALT 13 U/L      Alkaline Phosphatase 78 U/L      Total Protein 7.0 g/dL      Albumin 4.3 g/dL      Total Bilirubin 1.51 mg/dL      eGFR 50 ml/min/1.73sq m     Narrative:      National Kid[employer] Foundation guidelines for Chronic Kidney Disease (CKD):     Stage 1 with normal or high GFR (GFR > 90 mL/min/1.73 square meters)    Stage 2 Mild CKD (GFR = 60-89 mL/min/1.73 square meters)    Stage 3A Moderate CKD (GFR = 45-59 mL/min/1.73 square meters)    Stage 3B Moderate CKD (GFR = 30-44 mL/min/1.73 square meters)    Stage 4 Severe CKD (GFR = 15-29 mL/min/1.73 square meters)    Stage 5 End Stage CKD (GFR <15 mL/min/1.73 square meters)  Note: GFR calculation is accurate only with a steady state creatinine    Lipase [[id number]]  (Normal) Collected: 07/01/25 1553    Lab Status: Final result Specimen: Blood from Arm, Right Updated: 07/01/25 1619     Lipase 46 u/L     CBC and differential [[id number]]  (Abnormal) Collected: 07/01/25 1553    Lab Status: Final result  Specimen: Blood from Arm, Right Updated: 07/01/25 1600     WBC 6.56 Thousand/uL      RBC 5.45 Million/uL      Hemoglobin 13.4 g/dL      Hematocrit 43.1 %      MCV 79 fL      MCH 24.6 pg      MCHC 31.1 g/dL      RDW 16.5 %      MPV 10.9 fL      Platelets 206 Thousands/uL      nRBC 0 /100 WBCs      Segmented % 76 %      Immature Grans % 0 %      Lymphocytes % 17 %      Monocytes % 6 %      Eosinophils Relative 1 %      Basophils Relative 0 %      Absolute Neutrophils 5.01 Thousands/µL      Absolute Immature Grans 0.02 Thousand/uL      Absolute Lymphocytes 1.11 Thousands/µL      Absolute Monocytes 0.38 Thousand/µL      Eosinophils Absolute 0.03 Thousand/µL      Basophils Absolute 0.01 Thousands/µL             CT abdomen pelvis with contrast   Final Interpretation by Owen Suárez MD (07/01 1709)      5 mm proximal right ureteral calculus located 6 cm distal to the right UPJ currently causing hydroureteronephrosis.      The study was marked in EPIC for immediate notification.         Workstation performed: [id number]             Procedures    ED Medication and Procedure Management   Prior to Admission Medications   Prescriptions Last Dose Informant Patient Reported? Taking?   Budeson-Glycopyrrol-Formoterol (Breztri Aerosphere) 160-9-4.8 MCG/ACT AERO  Self, Spouse/Significant Other No No   Sig: Inhale 2 puffs 2 (two) times a day Rinse mouth after use.   Polyvinyl Alcohol-Povidone (REFRESH OP)  Self, Spouse/Significant Other Yes No   Sig: Apply to eye as needed    Vitamins-Lipotropics (LIPO-FLAVONOID PLUS PO)  Self, Spouse/Significant Other Yes No   Sig: Take by mouth   acetaminophen-codeine (TYLENOL with CODEINE #4) 300-60 MG per tablet   No No   Sig: Take 1 tablet by mouth every 6 (six) hours as needed for moderate pain   albuterol (2.5 mg/3 mL) 0.083 % nebulizer solution   No No   Sig: Take 3 mL (2.5 mg total) by nebulization every 6 (six) hours as needed for wheezing or shortness of breath   allopurinol  (ZYLOPRIM) 100 mg tablet   No No   Sig: TAKE 1 TABLET DAILY   amLODIPine (NORVASC) 10 mg tablet   No No   Sig: Take 1 tablet (10 mg total) by mouth every morning   apixaban (Eliquis) 5 mg   No No   Sig: Take 1 tablet (5 mg total) by mouth 2 (two) times a day   cholecalciferol (VITAMIN D3) 1,000 units tablet  Self, Spouse/Significant Other No No   Sig: Take 5 tablets (5,000 Units total) by mouth every other day   losartan (COZAAR) 100 MG tablet   No No   Sig: TAKE 1 TABLET DAILY   metoprolol succinate (TOPROL-XL) 25 mg 24 hr tablet  Self, Spouse/Significant Other No No   Sig: Take 1 tablet (25 mg total) by mouth daily   prochlorperazine (COMPAZINE) 10 mg tablet   No No   Sig: TAKE ONE TABLET BY MOUTH EVERY 6 HOURS AS NEEDED FOR NAUSEA OR VOMITING      Facility-Administered Medications: None     Discharge Medication List as of 7/1/2025  6:04 PM        START taking these medications    Details   ondansetron (ZOFRAN-ODT) 4 mg disintegrating tablet Take 1 tablet (4 mg total) by mouth every 6 (six) hours as needed for nausea or vomiting, Starting Tue 7/1/2025, Normal           CONTINUE these medications which have NOT CHANGED    Details   acetaminophen-codeine (TYLENOL with CODEINE #4) 300-60 MG per tablet Take 1 tablet by mouth every 6 (six) hours as needed for moderate pain, Starting Mon 3/17/2025, Normal      albuterol (2.5 mg/3 mL) 0.083 % nebulizer solution Take 3 mL (2.5 mg total) by nebulization every 6 (six) hours as needed for wheezing or shortness of breath, Starting Mon 2/10/2025, Normal      allopurinol (ZYLOPRIM) 100 mg tablet TAKE 1 TABLET DAILY, Starting Wed 4/2/2025, Normal      amLODIPine (NORVASC) 10 mg tablet Take 1 tablet (10 mg total) by mouth every morning, Starting Wed 1/22/2025, Normal      apixaban (Eliquis) 5 mg Take 1 tablet (5 mg total) by mouth 2 (two) times a day, Starting Thu 5/1/2025, Normal      Budeson-Glycopyrrol-Formoterol (Breztri Aerosphere) 160-9-4.8 MCG/ACT AERO Inhale 2 puffs 2  (two) times a day Rinse mouth after use., Starting Mon 11/25/2024, Normal      cholecalciferol (VITAMIN D3) 1,000 units tablet Take 5 tablets (5,000 Units total) by mouth every other day, Starting Thu 5/12/2022, No Print      losartan (COZAAR) 100 MG tablet TAKE 1 TABLET DAILY, Starting Mon 3/17/2025, Normal      metoprolol succinate (TOPROL-XL) 25 mg 24 hr tablet Take 1 tablet (25 mg total) by mouth daily, Starting Thu 9/12/2024, Normal      Polyvinyl Alcohol-Povidone (REFRESH OP) Apply to eye as needed , Historical Med      prochlorperazine (COMPAZINE) 10 mg tablet TAKE ONE TABLET BY MOUTH EVERY 6 HOURS AS NEEDED FOR NAUSEA OR VOMITING, Normal      Vitamins-Lipotropics (LIPO-FLAVONOID PLUS PO) Take by mouth, Historical Med             ED SEPSIS DOCUMENTATION   Time reflects when diagnosis was documented in both MDM as applicable and the Disposition within this note       Time User Action Codes Description Comment    7/1/2025  6:03 PM Dell Escobar Add [N20.1] Ureterolithiasis                      [1]   Past Medical History:  Diagnosis Date    Acute bronchitis 01/02/2018    Arthralgia of left knee 04/25/2024    Arthritis of hand 09/11/2024    Back pain     Bronchiectasis (HCC)     Cancer (HCC) 01/2014    On file    Cataract     Changes in skin texture 05/20/2014    Chronic kidney disease     COPD (chronic obstructive pulmonary disease) ([facility])     Elevated white blood cell count 12/12/2017    Esophageal reflux 02/07/2014    Fissure, anal 05/08/2015    Generalized osteoarthritis of multiple sites 06/09/2015    Gout 12/16/2014    Hand pain 12/16/2014    History of malignant neoplasm of oral cavity     M0    HL (hearing loss)     Hypertension     Inflammation of sacroiliac joint (HCC) 09/11/2024    Iron deficiency anemia 02/07/2014    Kidney stone 05/2019    Knee pain, right 07/18/2017    Low back pain 04/25/2019 04/25/2019 - Lifting Exercise Ball with Feet.      Lung nodule 01/02/2015    Malignant neoplasm of  colon (HCC)     descending    Nausea 2014    Nonhealing surgical wound 06/15/2021    Prostate cancer (HCC)     Renal mass, right 2015    SOB (shortness of breath)     Tachycardia     Thalassemia trait     Tinnitus     Weight loss, non-intentional 2014   [2]   Past Surgical History:  Procedure Laterality Date    BACK SURGERY      CARDIAC ELECTROPHYSIOLOGY PROCEDURE N/A 2024    Procedure: Cardiac pacer implant;  Surgeon: Fredrick Bernal MD;  Location: [facility];  Service: Cardiology    CHOLECYSTECTOMY      COLON SURGERY  10/2008    partial colectomy    EYE SURGERY      INCISIONAL HERNIA REPAIR      JOINT REPLACEMENT Right     hip    MS COLONOSCOPY FLX DX W/COLLJ SPEC WHEN PFRMD N/A 2018    Procedure: COLONOSCOPY;  Surgeon: Jv Toribio III, MD;  Location: [facility];  Service: Gastroenterology    PROSTATE SURGERY      SPINAL CORD STIMULATOR IMPLANT      TONSILLECTOMY     [3]   Family History  Problem Relation Name Age of Onset    Heart failure Mother Elise Mayer         as per [employer]    Coronary artery disease Mother Elise Mayer         as per [employer]    Diabetes Mother Elise Mayer         mellitus - as per [employer]    Coronary artery disease Father Giovani Mayer         as per [employer]    Cancer Sister          malignant neoplasm   [4]   Social History  Tobacco Use    Smoking status: Former     Current packs/day: 0.00     Average packs/day: 0.3 packs/day for 48.3 years (15.0 ttl pk-yrs)     Types: Cigarettes     Start date: 1960     Quit date: 2004     Years since quittin.0     Passive exposure: Past    Smokeless tobacco: Never    Tobacco comments:     non smoker/tobacco user; quit  as per [employer]   Vaping Use    Vaping status: Never Used   Substance Use Topics    Alcohol use: Not Currently    Drug use: No        Dell Escobar MD  [phone]

## 2025-07-01 NOTE — DISCHARGE INSTRUCTIONS
Please continue taking Tylenol at home for your pain.  Please schedule follow-up appointment with urology to be reevaluated for your symptoms.  Please take Zofran as prescribed for any nausea or vomiting.    Please return if you develop any new or concerning symptoms including fevers, severe vomiting, severe uncontrolled abdominal or flank pain, or new burning or urinary frequency.

## 2025-07-02 ENCOUNTER — TELEPHONE (OUTPATIENT)
Age: 85
End: 2025-07-02

## 2025-07-02 ENCOUNTER — VBI (OUTPATIENT)
Age: 85
End: 2025-07-02

## 2025-07-02 NOTE — TELEPHONE ENCOUNTER
Would recommend visit within next 1-2 weeks. If unwilling to travel to different office for potential sooner availability, then soonest available at .

## 2025-07-02 NOTE — TELEPHONE ENCOUNTER
07/02/25 9:03 AM    Patient contacted post ED visit, [facility] department spoke with patient/caregiver and outreach was successful.    Thank you.  Melly Gonzalez  [facility]

## 2025-07-02 NOTE — TELEPHONE ENCOUNTER
Patient last seen in 2021. Had to schedule as a new patient    S/O refused any other location. She said they were too far away    New Patient      Insurance   Current Insurance?yes   Insurance E-verified? yes    History   Reason for appointment/active symptoms?  Ureterolithiasis      Has the patient had any previous Urologist(s)? [facility] back in 2021     Was the patient seen in the ED?  Yes     Labs/Imaging(Including Out Of Network)? CT in chart     Records Requested? no  Records Visible in EPIC? yes     Personal history of cancer? unknown     Appointment   Office location preference:  [place]  ?   Appointment Details   Date:  9/10/2025  Time:  1 pm  Location:  [place]  Provider:  Shakira GOMEZ  Does the appointment need further review?

## 2025-07-02 NOTE — TELEPHONE ENCOUNTER
Patients wife called stating he was in the ED and was referred to urology. She is asking if he should make a urology appointment or move his 7/16 appointment with nephrology up? Instructed her to schedule with urology as the diagnosis he is being referred to them for is not something we treat in nephrology. She expressed understanding.  No further action needed

## 2025-07-07 ENCOUNTER — TELEPHONE (OUTPATIENT)
Age: 85
End: 2025-07-07

## 2025-07-07 ENCOUNTER — APPOINTMENT (OUTPATIENT)
Dept: LAB | Facility: HOSPITAL | Age: 85
End: 2025-07-07
Payer: COMMERCIAL

## 2025-07-07 DIAGNOSIS — N18.32 STAGE 3B CHRONIC KIDNEY DISEASE (HCC): ICD-10-CM

## 2025-07-07 DIAGNOSIS — E55.9 VITAMIN D DEFICIENCY: ICD-10-CM

## 2025-07-07 DIAGNOSIS — N18.30 HYPERTENSIVE KIDNEY DISEASE WITH STAGE 3 CHRONIC KIDNEY DISEASE, UNSPECIFIED WHETHER STAGE 3A OR 3B CKD (HCC): ICD-10-CM

## 2025-07-07 DIAGNOSIS — I12.9 HYPERTENSIVE KIDNEY DISEASE WITH STAGE 3 CHRONIC KIDNEY DISEASE, UNSPECIFIED WHETHER STAGE 3A OR 3B CKD (HCC): ICD-10-CM

## 2025-07-07 LAB
25(OH)D3 SERPL-MCNC: 58.3 NG/ML (ref 30–100)
ANION GAP SERPL CALCULATED.3IONS-SCNC: 9 MMOL/L (ref 4–13)
BACTERIA UR QL AUTO: ABNORMAL /HPF
BASOPHILS # BLD AUTO: 0.03 THOUSANDS/ÂΜL (ref 0–0.1)
BASOPHILS NFR BLD AUTO: 1 % (ref 0–1)
BILIRUB UR QL STRIP: NEGATIVE
BUN SERPL-MCNC: 14 MG/DL (ref 5–25)
CALCIUM SERPL-MCNC: 9.6 MG/DL (ref 8.4–10.2)
CHLORIDE SERPL-SCNC: 104 MMOL/L (ref 96–108)
CLARITY UR: CLEAR
CO2 SERPL-SCNC: 26 MMOL/L (ref 21–32)
COLOR UR: YELLOW
CREAT SERPL-MCNC: 1.46 MG/DL (ref 0.6–1.3)
CREAT UR-MCNC: 395.4 MG/DL
EOSINOPHIL # BLD AUTO: 0.03 THOUSAND/ÂΜL (ref 0–0.61)
EOSINOPHIL NFR BLD AUTO: 1 % (ref 0–6)
ERYTHROCYTE [DISTWIDTH] IN BLOOD BY AUTOMATED COUNT: 16.5 % (ref 11.6–15.1)
GFR SERPL CREATININE-BSD FRML MDRD: 44 ML/MIN/1.73SQ M
GLUCOSE P FAST SERPL-MCNC: 110 MG/DL (ref 65–99)
GLUCOSE UR STRIP-MCNC: NEGATIVE MG/DL
HCT VFR BLD AUTO: 43.1 % (ref 36.5–49.3)
HGB BLD-MCNC: 13.7 G/DL (ref 12–17)
HGB UR QL STRIP.AUTO: ABNORMAL
HYALINE CASTS #/AREA URNS LPF: ABNORMAL /LPF
IMM GRANULOCYTES # BLD AUTO: 0.02 THOUSAND/UL (ref 0–0.2)
IMM GRANULOCYTES NFR BLD AUTO: 0 % (ref 0–2)
KETONES UR STRIP-MCNC: NEGATIVE MG/DL
LEUKOCYTE ESTERASE UR QL STRIP: NEGATIVE
LYMPHOCYTES # BLD AUTO: 1.28 THOUSANDS/ÂΜL (ref 0.6–4.47)
LYMPHOCYTES NFR BLD AUTO: 20 % (ref 14–44)
MCH RBC QN AUTO: 25.2 PG (ref 26.8–34.3)
MCHC RBC AUTO-ENTMCNC: 31.8 G/DL (ref 31.4–37.4)
MCV RBC AUTO: 79 FL (ref 82–98)
MICROALBUMIN UR-MCNC: 62.8 MG/L
MICROALBUMIN/CREAT 24H UR: 16 MG/G CREATININE (ref 0–30)
MONOCYTES # BLD AUTO: 0.38 THOUSAND/ÂΜL (ref 0.17–1.22)
MONOCYTES NFR BLD AUTO: 6 % (ref 4–12)
MUCOUS THREADS UR QL AUTO: ABNORMAL
NEUTROPHILS # BLD AUTO: 4.52 THOUSANDS/ÂΜL (ref 1.85–7.62)
NEUTS SEG NFR BLD AUTO: 72 % (ref 43–75)
NITRITE UR QL STRIP: NEGATIVE
NON-SQ EPI CELLS URNS QL MICRO: ABNORMAL /HPF
NRBC BLD AUTO-RTO: 0 /100 WBCS
PH UR STRIP.AUTO: 5.5 [PH]
PHOSPHATE SERPL-MCNC: 2.8 MG/DL (ref 2.3–4.1)
PLATELET # BLD AUTO: 213 THOUSANDS/UL (ref 149–390)
PMV BLD AUTO: 12 FL (ref 8.9–12.7)
POTASSIUM SERPL-SCNC: 4.1 MMOL/L (ref 3.5–5.3)
PROT UR STRIP-MCNC: ABNORMAL MG/DL
PTH-INTACT SERPL-MCNC: 50.2 PG/ML (ref 12–88)
RBC # BLD AUTO: 5.44 MILLION/UL (ref 3.88–5.62)
RBC #/AREA URNS AUTO: ABNORMAL /HPF
SODIUM SERPL-SCNC: 139 MMOL/L (ref 135–147)
SP GR UR STRIP.AUTO: 1.03 (ref 1–1.03)
URATE SERPL-MCNC: 6.6 MG/DL (ref 3.5–8.5)
UROBILINOGEN UR STRIP-ACNC: 2 MG/DL
WBC # BLD AUTO: 6.26 THOUSAND/UL (ref 4.31–10.16)
WBC #/AREA URNS AUTO: ABNORMAL /HPF

## 2025-07-07 PROCEDURE — 80048 BASIC METABOLIC PNL TOTAL CA: CPT

## 2025-07-07 PROCEDURE — 81001 URINALYSIS AUTO W/SCOPE: CPT

## 2025-07-07 PROCEDURE — 36415 COLL VENOUS BLD VENIPUNCTURE: CPT

## 2025-07-07 PROCEDURE — 82043 UR ALBUMIN QUANTITATIVE: CPT

## 2025-07-07 PROCEDURE — 84100 ASSAY OF PHOSPHORUS: CPT

## 2025-07-07 PROCEDURE — 83970 ASSAY OF PARATHORMONE: CPT

## 2025-07-07 PROCEDURE — 85025 COMPLETE CBC W/AUTO DIFF WBC: CPT

## 2025-07-07 PROCEDURE — 84550 ASSAY OF BLOOD/URIC ACID: CPT

## 2025-07-07 PROCEDURE — 82570 ASSAY OF URINE CREATININE: CPT

## 2025-07-07 PROCEDURE — 82306 VITAMIN D 25 HYDROXY: CPT

## 2025-07-07 NOTE — TELEPHONE ENCOUNTER
Phone call from patient's spouse (listed on Patient communication consent) to check if fasting is required for labs. Reviewed lab orders, no note found that fasting is required. Informed patient's spouse that fasting was not required. Patient to report to lab sometime.

## 2025-07-14 NOTE — ASSESSMENT & PLAN NOTE
Patient is currently taking cholecalciferol 5000 Units every other day.  Current vitamin D level is 58 which is at goal and plan to continue cholecalciferol at current dose and recheck vitamin D level at the next visit.

## 2025-07-14 NOTE — ASSESSMENT & PLAN NOTE
Today's blood pressures under well control and plan to monitor hypertension with amlodipine 10 mg PO daily, metoprolol XL 25 mg PO daily and losartan 100 mg PO daily.  Also advised patient to follow low-salt diet .

## 2025-07-14 NOTE — ASSESSMENT & PLAN NOTE
Multifactorial and was suspected due to underlying hypertensive nephrosclerosis.     Upon review of old medical records from [facility] chart, new baseline creatinine seems to be fluctuating around 1.3-1.5 and in the interim renal function has remained close to baseline with current creatinine of 1.46 with EGFR of 44.    Urine analysis showed 0-3 hyaline cast per high-power field.  Clinically patient is not in volume overload state and advised patient to drink around 2 L of fluid on daily basis to ensure well-hydrated.    Patient had underwent CT scan of abdomen pelvis on 7/1/2025 and found to have 5 mm proximal right ureteral calculi distal to right UPJ junction causing hydroureteronephrosis.  Urine analysis also showed 30-50 RBC per high-power field which is secondary to underlying kidney stone.  Patient is now being followed by urology team at [facility] and has appointment in office on 8/14/2025.     Management of hypertension as mentioned below.     Plan to avoid NSAIDs including Mobic/meloxicam and recheck renal function before next visit.

## 2025-07-14 NOTE — PROGRESS NOTES
NEPHROLOGY OFFICE FOLLOW UP  Darrell Mayer 84 y.o.male YOB: 1940 MRN: [id number]    Encounter: [id number] DATE: 7/16/2025    REASON FOR VISIT: Darrell Mayer is a 84 y.o. male who is here on 7/16/2025 for Chronic Kidney Disease and Follow-up      ASSESSMENT and PLAN:  Darrell was seen today for chronic kidney disease and follow-up.    Diagnoses and all orders for this visit:    Hypertensive kidney disease with stage 3 chronic kidney disease, unspecified whether stage 3a or 3b CKD (HCC)  -     Basic metabolic panel; Future  -     Urinalysis with microscopic; Future  -     Albumin / creatinine urine ratio; Future    Stage 3b chronic kidney disease (HCC)  -     CBC and differential; Future  -     Basic metabolic panel; Future  -     Urinalysis with microscopic; Future  -     Albumin / creatinine urine ratio; Future  -     Phosphorus; Future  -     Uric acid; Future  -     PTH, intact; Future  -     Vitamin D 25 hydroxy; Future    Vitamin D deficiency  -     Vitamin D 25 hydroxy; Future      Assessment & Plan  Stage 3b chronic kidney disease (HCC)      Multifactorial and was suspected due to underlying hypertensive nephrosclerosis.     Upon review of old medical records from [facility] chart, new baseline creatinine seems to be fluctuating around 1.3-1.5 and in the interim renal function has remained close to baseline with current creatinine of 1.46 with EGFR of 44.    Urine analysis showed 0-3 hyaline cast per high-power field.  Clinically patient is not in volume overload state and advised patient to drink around 2 L of fluid on daily basis to ensure well-hydrated.    Patient had underwent CT scan of abdomen pelvis on 7/1/2025 and found to have 5 mm proximal right ureteral calculi distal to right UPJ junction causing hydroureteronephrosis.  Urine analysis also showed 30-50 RBC per high-power field which is secondary to underlying kidney stone.  Patient is now being followed by urology team at [facility] and has  appointment in office on 8/14/2025.     Management of hypertension as mentioned below.     Plan to avoid NSAIDs including Mobic/meloxicam and recheck renal function before next visit.  Hypertensive kidney disease with stage 3 chronic kidney disease, unspecified whether stage 3a or 3b CKD (HCC)      Today's blood pressures under well control and plan to monitor hypertension with amlodipine 10 mg PO daily, metoprolol XL 25 mg PO daily and losartan 100 mg PO daily.  Also advised patient to follow low-salt diet .  Vitamin D deficiency    Patient is currently taking cholecalciferol 5000 Units every other day.  Current vitamin D level is 58 which is at goal and plan to continue cholecalciferol at current dose and recheck vitamin D level at the next visit.     Returns to the nephrology office in 6 months.  Future labs ordered.        HPI:    This is a 84-year-old male with a past medical history of CKD stage 3, hypertension, COPD, returns to the nephrology office for further management of CKD stage 3.     Upon review of old medical records from [facility] chart, new baseline creatinine seems to be fluctuating around 1.3-1.5     Patient's wife was also present at the time of consultation and history was also obtained from her and all the questions were answered.     Patient has COPD.  Patient currently does not smoke.  According to patient his breathing is currently under good control with the use of nebulizers.  Patient also uses oxygen 2 liters/minute.     Patient was also found to have symptomatic heart block in May 2024 and now status post pacemaker.  Patient has underlying A-fib and currently takes Eliquis.     Patient has underlying hypertension and has been compliant with his antihypertensive medications.     Patient has gout and currently taking allopurinol 100 mg PO daily on daily basis.  Patient denies any recent gout flare-up.     Patient also have chronic back pain and was also been followed by pain management  specialist in the past and was using narcotics in the past but now uses as needed Tylenol and also have a back stimulator in place.     Patient was also found to have vitamin-D deficiency and currently taking cholecalciferol.     Patient has underlying history of nephrolithiasis and had underwent CT scan of abdomen pelvis on 7/1/2025 and found to have obstructive stone on right side with hydroureteronephrosis.  Patient is now also been followed by urologist.     According to patient he does not use any NSAIDs including Mobic.          REVIEW OF SYSTEMS:    Review of Systems   Constitutional:  Negative for chills and fever.   HENT:  Negative for nosebleeds.    Eyes:  Negative for photophobia and pain.   Respiratory:  Negative for choking.    Cardiovascular:  Negative for palpitations.   Gastrointestinal:  Negative for blood in stool.   Genitourinary:  Negative for hematuria.   Musculoskeletal:  Negative for neck stiffness.   Neurological:  Negative for seizures.   Psychiatric/Behavioral:  Negative for confusion and suicidal ideas.          PAST MEDICAL HISTORY:  Past Medical History:   Diagnosis Date    Acute bronchitis 01/02/2018    Arthralgia of left knee 04/25/2024    Arthritis of hand 09/11/2024    Back pain     Bronchiectasis (HCC)     Cancer ([facility]) 01/2014    On file    Cataract     Changes in skin texture 05/20/2014    Chronic kidney disease     COPD (chronic obstructive pulmonary disease) ([facility])     Elevated white blood cell count 12/12/2017    Esophageal reflux 02/07/2014    Fissure, anal 05/08/2015    Generalized osteoarthritis of multiple sites 06/09/2015    Gout 12/16/2014    Hand pain 12/16/2014    History of malignant neoplasm of oral cavity     M0    HL (hearing loss)     Hypertension     Inflammation of sacroiliac joint (HCC) 09/11/2024    Iron deficiency anemia 02/07/2014    Kidney stone 05/2019    Knee pain, right 07/18/2017    Low back pain 04/25/2019 04/25/2019 - Lifting Exercise Ball with Feet.       Lung nodule 2015    Malignant neoplasm of colon (HCC)     descending    Nausea 2014    Nonhealing surgical wound 06/15/2021    Prostate cancer (HCC)     Renal mass, right 2015    SOB (shortness of breath)     Tachycardia     Thalassemia trait     Tinnitus     Weight loss, non-intentional 2014       PAST SURGICAL HISTORY:  Past Surgical History:   Procedure Laterality Date    BACK SURGERY      CARDIAC ELECTROPHYSIOLOGY PROCEDURE N/A 2024    Procedure: Cardiac pacer implant;  Surgeon: Fredrick Bernal MD;  Location: [facility];  Service: Cardiology    CHOLECYSTECTOMY      COLON SURGERY  10/2008    partial colectomy    EYE SURGERY      INCISIONAL HERNIA REPAIR      JOINT REPLACEMENT Right     hip    VT COLONOSCOPY FLX DX W/COLLJ SPEC WHEN PFRMD N/A 2018    Procedure: COLONOSCOPY;  Surgeon: Jv Toribio III, MD;  Location: [facility];  Service: Gastroenterology    PROSTATE SURGERY      SPINAL CORD STIMULATOR IMPLANT      TONSILLECTOMY         SOCIAL HISTORY:  Social History     Substance and Sexual Activity   Alcohol Use Not Currently     Social History     Substance and Sexual Activity   Drug Use No     Social History     Tobacco Use   Smoking Status Former    Current packs/day: 0.00    Average packs/day: 0.3 packs/day for 48.3 years (15.0 ttl pk-yrs)    Types: Cigarettes    Start date: 1960    Quit date: 2004    Years since quittin.1    Passive exposure: Past   Smokeless Tobacco Never   Tobacco Comments    non smoker/tobacco user; quit  as per [employer]       FAMILY HISTORY:  Family History   Problem Relation Name Age of Onset    Heart failure Mother Elise Mayer         as per [employer]    Coronary artery disease Mother Elise Mayer         as per [employer]    Diabetes Mother Elise Mayer         mellitus - as per [employer]    Coronary artery disease Father Giovani Mayer         as per [employer]    Cancer Sister          malignant neoplasm        ALLERGY:  No Known Allergies    MEDICATIONS:    Current Outpatient Medications:     acetaminophen-codeine (TYLENOL with CODEINE #4) 300-60 MG per tablet, Take 1 tablet by mouth every 6 (six) hours as needed for moderate pain, Disp: 120 tablet, Rfl: 0    albuterol (2.5 mg/3 mL) 0.083 % nebulizer solution, Take 3 mL (2.5 mg total) by nebulization every 6 (six) hours as needed for wheezing or shortness of breath, Disp: 360 mL, Rfl: 3    allopurinol (ZYLOPRIM) 100 mg tablet, TAKE 1 TABLET DAILY, Disp: 90 tablet, Rfl: 1    amLODIPine (NORVASC) 10 mg tablet, Take 1 tablet (10 mg total) by mouth every morning, Disp: 90 tablet, Rfl: 1    apixaban (Eliquis) 5 mg, Take 1 tablet (5 mg total) by mouth 2 (two) times a day, Disp: 60 tablet, Rfl: 3    Budeson-Glycopyrrol-Formoterol (Breztri Aerosphere) 160-9-4.8 MCG/ACT AERO, Inhale 2 puffs 2 (two) times a day Rinse mouth after use., Disp: 31.1 g, Rfl: 5    cholecalciferol (VITAMIN D3) 1,000 units tablet, Take 5 tablets (5,000 Units total) by mouth every other day, Disp: , Rfl:     losartan (COZAAR) 100 MG tablet, TAKE 1 TABLET DAILY, Disp: 90 tablet, Rfl: 1    metoprolol succinate (TOPROL-XL) 25 mg 24 hr tablet, Take 1 tablet (25 mg total) by mouth daily, Disp: 90 tablet, Rfl: 3    ondansetron (ZOFRAN-ODT) 4 mg disintegrating tablet, Take 1 tablet (4 mg total) by mouth every 6 (six) hours as needed for nausea or vomiting, Disp: 30 tablet, Rfl: 0    Polyvinyl Alcohol-Povidone (REFRESH OP), Apply to eye as needed, Disp: , Rfl:     prochlorperazine (COMPAZINE) 10 mg tablet, TAKE ONE TABLET BY MOUTH EVERY 6 HOURS AS NEEDED FOR NAUSEA OR VOMITING, Disp: 30 tablet, Rfl: 0    Vitamins-Lipotropics (LIPO-FLAVONOID PLUS PO), Take by mouth, Disp: , Rfl:     PHYSICAL EXAM:  Vitals:    07/16/25 1549   BP: 118/80   Patient Position: Sitting   Cuff Size: Standard   Pulse: 88   Resp: 18   Temp: 97.6 °F (36.4 °C)   TempSrc: Temporal   SpO2: 94%   Weight: 102 kg (225 lb)   Height: 6' (1.829  m)       Body mass index is 30.52 kg/m².    Physical Exam  Constitutional:       General: He is not in acute distress.     Comments: Uses nasal oxygen   HENT:      Right Ear: External ear normal.     Eyes:      General: No scleral icterus.     Conjunctiva/sclera:      Right eye: No hemorrhage.    Neck:      Thyroid: No thyromegaly.      Vascular: No JVD.     Cardiovascular:      Rate and Rhythm: Normal rate.   Pulmonary:      Effort: Pulmonary effort is normal. No accessory muscle usage or respiratory distress.      Breath sounds: No wheezing.   Abdominal:      General: There is no distension.     Musculoskeletal:      Right ankle: No swelling.      Left ankle: No swelling.     Skin:     General: Skin is warm.      Coloration: Skin is not jaundiced.     Neurological:      Mental Status: He is alert. He is not disoriented.     Psychiatric:         Speech: He is communicative.         Behavior: Behavior is not combative.         LAB RESULTS:  Results for orders placed or performed in visit on 07/07/25   CBC and differential   Result Value Ref Range    WBC 6.26 4.31 - 10.16 Thousand/uL    RBC 5.44 3.88 - 5.62 Million/uL    Hemoglobin 13.7 12.0 - 17.0 g/dL    Hematocrit 43.1 36.5 - 49.3 %    MCV 79 (L) 82 - 98 fL    MCH 25.2 (L) 26.8 - 34.3 pg    MCHC 31.8 31.4 - 37.4 g/dL    RDW 16.5 (H) 11.6 - 15.1 %    MPV 12.0 8.9 - 12.7 fL    Platelets 213 149 - 390 Thousands/uL    nRBC 0 /100 WBCs    Segmented % 72 43 - 75 %    Immature Grans % 0 0 - 2 %    Lymphocytes % 20 14 - 44 %    Monocytes % 6 4 - 12 %    Eosinophils Relative 1 0 - 6 %    Basophils Relative 1 0 - 1 %    Absolute Neutrophils 4.52 1.85 - 7.62 Thousands/µL    Absolute Immature Grans 0.02 0.00 - 0.20 Thousand/uL    Absolute Lymphocytes 1.28 0.60 - 4.47 Thousands/µL    Absolute Monocytes 0.38 0.17 - 1.22 Thousand/µL    Eosinophils Absolute 0.03 0.00 - 0.61 Thousand/µL    Basophils Absolute 0.03 0.00 - 0.10 Thousands/µL   Basic metabolic panel   Result Value Ref  "Range    Sodium 139 135 - 147 mmol/L    Potassium 4.1 3.5 - 5.3 mmol/L    Chloride 104 96 - 108 mmol/L    CO2 26 21 - 32 mmol/L    ANION GAP 9 4 - 13 mmol/L    BUN 14 5 - 25 mg/dL    Creatinine 1.46 (H) 0.60 - 1.30 mg/dL    Glucose, Fasting 110 (H) 65 - 99 mg/dL    Calcium 9.6 8.4 - 10.2 mg/dL    eGFR 44 ml/min/1.73sq m   Urinalysis with microscopic   Result Value Ref Range    Color, UA Yellow     Clarity, UA Clear     Specific Gravity, UA 1.027 1.003 - 1.030    pH, UA 5.5 4.5, 5.0, 5.5, 6.0, 6.5, 7.0, 7.5, 8.0    Leukocytes, UA Negative Negative    Nitrite, UA Negative Negative    Protein, UA Trace (A) Negative mg/dl    Glucose, UA Negative Negative mg/dl    Ketones, UA Negative Negative mg/dl    Urobilinogen, UA 2.0 (A) <2.0 mg/dl mg/dl    Bilirubin, UA Negative Negative    Occult Blood, UA Moderate (A) Negative    RBC, UA 30-50 (A) None Seen, 1-2 /hpf    WBC, UA 1-2 None Seen, 1-2 /hpf    Epithelial Cells Occasional None Seen, Occasional /hpf    Bacteria, UA None Seen None Seen, Occasional /hpf    MUCUS THREADS Occasional (A) None Seen    Hyaline Casts, UA 0-3 (A) None Seen /lpf   Albumin / creatinine urine ratio   Result Value Ref Range    Creatinine, Ur 395.4 Reference range not established. mg/dL    Albumin,U,Random 62.8 (H) <20.0 mg/L    Albumin Creat Ratio 16 0 - 30 mg/g creatinine   Phosphorus   Result Value Ref Range    Phosphorus 2.8 2.3 - 4.1 mg/dL   Uric acid   Result Value Ref Range    Uric Acid 6.6 3.5 - 8.5 mg/dL   PTH, intact   Result Value Ref Range    PTH 50.2 12.0 - 88.0 pg/mL   Vitamin D 25 hydroxy   Result Value Ref Range    Vit D, 25-Hydroxy 58.3 30.0 - 100.0 ng/mL         Portions of the record may have been created with voice recognition software. Occasional wrong word or \"sound a like\" substitutions may have occurred due to the inherent limitations of voice recognition software. Read the chart carefully and recognize, using context, where substitutions have occurred. If you have any " questions, please contact the dictating provider.

## 2025-07-15 ENCOUNTER — OFFICE VISIT (OUTPATIENT)
Dept: CARDIOLOGY CLINIC | Facility: CLINIC | Age: 85
End: 2025-07-15
Payer: COMMERCIAL

## 2025-07-15 VITALS
DIASTOLIC BLOOD PRESSURE: 70 MMHG | OXYGEN SATURATION: 96 % | SYSTOLIC BLOOD PRESSURE: 122 MMHG | HEART RATE: 71 BPM | RESPIRATION RATE: 16 BRPM | HEIGHT: 73 IN | WEIGHT: 227 LBS | BODY MASS INDEX: 30.09 KG/M2

## 2025-07-15 DIAGNOSIS — I44.2 COMPLETE HEART BLOCK (HCC): Primary | ICD-10-CM

## 2025-07-15 DIAGNOSIS — I10 PRIMARY HYPERTENSION: ICD-10-CM

## 2025-07-15 DIAGNOSIS — I48.19 PERSISTENT ATRIAL FIBRILLATION (HCC): ICD-10-CM

## 2025-07-15 DIAGNOSIS — I47.29 NSVT (NONSUSTAINED VENTRICULAR TACHYCARDIA) (HCC): ICD-10-CM

## 2025-07-15 DIAGNOSIS — I25.10 CORONARY ARTERY CALCIFICATION: ICD-10-CM

## 2025-07-15 PROCEDURE — 99214 OFFICE O/P EST MOD 30 MIN: CPT | Performed by: INTERNAL MEDICINE

## 2025-07-16 ENCOUNTER — OFFICE VISIT (OUTPATIENT)
Dept: NEPHROLOGY | Facility: CLINIC | Age: 85
End: 2025-07-16
Payer: COMMERCIAL

## 2025-07-16 VITALS
TEMPERATURE: 97.6 F | WEIGHT: 225 LBS | RESPIRATION RATE: 18 BRPM | HEART RATE: 88 BPM | OXYGEN SATURATION: 94 % | DIASTOLIC BLOOD PRESSURE: 80 MMHG | BODY MASS INDEX: 30.48 KG/M2 | HEIGHT: 72 IN | SYSTOLIC BLOOD PRESSURE: 118 MMHG

## 2025-07-16 DIAGNOSIS — I12.9 HYPERTENSIVE KIDNEY DISEASE WITH STAGE 3 CHRONIC KIDNEY DISEASE, UNSPECIFIED WHETHER STAGE 3A OR 3B CKD (HCC): Primary | ICD-10-CM

## 2025-07-16 DIAGNOSIS — N18.30 HYPERTENSIVE KIDNEY DISEASE WITH STAGE 3 CHRONIC KIDNEY DISEASE, UNSPECIFIED WHETHER STAGE 3A OR 3B CKD (HCC): Primary | ICD-10-CM

## 2025-07-16 DIAGNOSIS — N18.32 STAGE 3B CHRONIC KIDNEY DISEASE (HCC): ICD-10-CM

## 2025-07-16 DIAGNOSIS — E55.9 VITAMIN D DEFICIENCY: ICD-10-CM

## 2025-07-16 PROCEDURE — 99214 OFFICE O/P EST MOD 30 MIN: CPT | Performed by: INTERNAL MEDICINE

## 2025-07-16 PROCEDURE — G2211 COMPLEX E/M VISIT ADD ON: HCPCS | Performed by: INTERNAL MEDICINE

## 2025-07-24 DIAGNOSIS — I10 ESSENTIAL HYPERTENSION: ICD-10-CM

## 2025-07-25 RX ORDER — AMLODIPINE BESYLATE 10 MG/1
10 TABLET ORAL EVERY MORNING
Qty: 90 TABLET | Refills: 0 | Status: SHIPPED | OUTPATIENT
Start: 2025-07-25

## 2025-08-14 ENCOUNTER — OFFICE VISIT (OUTPATIENT)
Dept: UROLOGY | Facility: CLINIC | Age: 85
End: 2025-08-14
Payer: COMMERCIAL

## 2025-08-15 ENCOUNTER — RESULTS FOLLOW-UP (OUTPATIENT)
Dept: UROLOGY | Facility: CLINIC | Age: 85
End: 2025-08-15

## (undated) DEVICE — IMMOBILIZER SHOULDER UNIVERAL

## (undated) DEVICE — INTRO SHEATH 7 FR PEEL AWAY

## (undated) DEVICE — RADIFOCUS GLIDEWIRE: Brand: GLIDEWIRE

## (undated) DEVICE — STERILE ICS MINOR PACK: Brand: CARDINAL HEALTH

## (undated) DEVICE — 3M™ IOBAN™ 2 ANTIMICROBIAL INCISE DRAPE 6650EZ: Brand: IOBAN™ 2

## (undated) DEVICE — CATH GUIDING FIXED SHAPE 43CM